# Patient Record
Sex: MALE | NOT HISPANIC OR LATINO | URBAN - METROPOLITAN AREA
[De-identification: names, ages, dates, MRNs, and addresses within clinical notes are randomized per-mention and may not be internally consistent; named-entity substitution may affect disease eponyms.]

---

## 2019-08-30 ENCOUNTER — INPATIENT (INPATIENT)
Facility: HOSPITAL | Age: 54
LOS: 1 days | Discharge: HOME | DRG: 281 | End: 2019-09-01
Attending: HOSPITALIST | Admitting: HOSPITALIST
Payer: COMMERCIAL

## 2019-08-30 VITALS — HEIGHT: 66 IN | WEIGHT: 214.95 LBS

## 2019-08-30 DIAGNOSIS — I21.4 NON-ST ELEVATION (NSTEMI) MYOCARDIAL INFARCTION: ICD-10-CM

## 2019-08-30 LAB
APPEARANCE UR: CLEAR — SIGNIFICANT CHANGE UP
BILIRUB UR-MCNC: NEGATIVE — SIGNIFICANT CHANGE UP
COLOR SPEC: YELLOW — SIGNIFICANT CHANGE UP
DIFF PNL FLD: ABNORMAL
GLUCOSE UR QL: 1000 MG/DL
KETONES UR-MCNC: NEGATIVE — SIGNIFICANT CHANGE UP
LEUKOCYTE ESTERASE UR-ACNC: NEGATIVE — SIGNIFICANT CHANGE UP
NITRITE UR-MCNC: NEGATIVE — SIGNIFICANT CHANGE UP
PH UR: 6 — SIGNIFICANT CHANGE UP (ref 5–8)
PROT UR-MCNC: 500 MG/DL
SP GR SPEC: 1.02 — SIGNIFICANT CHANGE UP (ref 1.01–1.02)
TROPONIN I SERPL-MCNC: 0.19 NG/ML — HIGH (ref 0.01–0.04)
UROBILINOGEN FLD QL: NEGATIVE MG/DL — SIGNIFICANT CHANGE UP

## 2019-08-30 PROCEDURE — 93306 TTE W/DOPPLER COMPLETE: CPT

## 2019-08-30 PROCEDURE — 93010 ELECTROCARDIOGRAM REPORT: CPT

## 2019-08-30 PROCEDURE — 71045 X-RAY EXAM CHEST 1 VIEW: CPT | Mod: 26

## 2019-08-30 PROCEDURE — 83036 HEMOGLOBIN GLYCOSYLATED A1C: CPT

## 2019-08-30 PROCEDURE — 36415 COLL VENOUS BLD VENIPUNCTURE: CPT

## 2019-08-30 PROCEDURE — 84484 ASSAY OF TROPONIN QUANT: CPT

## 2019-08-30 PROCEDURE — 82962 GLUCOSE BLOOD TEST: CPT

## 2019-08-30 RX ORDER — DEXTROSE 50 % IN WATER 50 %
25 SYRINGE (ML) INTRAVENOUS ONCE
Refills: 0 | Status: DISCONTINUED | OUTPATIENT
Start: 2019-08-30 | End: 2019-09-01

## 2019-08-30 RX ORDER — DEXTROSE 50 % IN WATER 50 %
12.5 SYRINGE (ML) INTRAVENOUS ONCE
Refills: 0 | Status: DISCONTINUED | OUTPATIENT
Start: 2019-08-30 | End: 2019-09-01

## 2019-08-30 RX ORDER — METOPROLOL TARTRATE 50 MG
25 TABLET ORAL EVERY 12 HOURS
Refills: 0 | Status: DISCONTINUED | OUTPATIENT
Start: 2019-08-30 | End: 2019-09-01

## 2019-08-30 RX ORDER — GLUCAGON INJECTION, SOLUTION 0.5 MG/.1ML
1 INJECTION, SOLUTION SUBCUTANEOUS ONCE
Refills: 0 | Status: DISCONTINUED | OUTPATIENT
Start: 2019-08-30 | End: 2019-09-01

## 2019-08-30 RX ORDER — METOPROLOL TARTRATE 50 MG
25 TABLET ORAL ONCE
Refills: 0 | Status: COMPLETED | OUTPATIENT
Start: 2019-08-30 | End: 2019-08-30

## 2019-08-30 RX ORDER — INSULIN LISPRO 100/ML
VIAL (ML) SUBCUTANEOUS AT BEDTIME
Refills: 0 | Status: DISCONTINUED | OUTPATIENT
Start: 2019-08-30 | End: 2019-09-01

## 2019-08-30 RX ORDER — AMLODIPINE BESYLATE 2.5 MG/1
10 TABLET ORAL ONCE
Refills: 0 | Status: COMPLETED | OUTPATIENT
Start: 2019-08-30 | End: 2019-08-30

## 2019-08-30 RX ORDER — ENOXAPARIN SODIUM 100 MG/ML
90 INJECTION SUBCUTANEOUS EVERY 12 HOURS
Refills: 0 | Status: DISCONTINUED | OUTPATIENT
Start: 2019-08-30 | End: 2019-08-31

## 2019-08-30 RX ORDER — SODIUM CHLORIDE 9 MG/ML
1000 INJECTION, SOLUTION INTRAVENOUS
Refills: 0 | Status: DISCONTINUED | OUTPATIENT
Start: 2019-08-30 | End: 2019-09-01

## 2019-08-30 RX ORDER — SIMVASTATIN 20 MG/1
20 TABLET, FILM COATED ORAL AT BEDTIME
Refills: 0 | Status: DISCONTINUED | OUTPATIENT
Start: 2019-08-30 | End: 2019-08-31

## 2019-08-30 RX ORDER — INSULIN LISPRO 100/ML
VIAL (ML) SUBCUTANEOUS
Refills: 0 | Status: DISCONTINUED | OUTPATIENT
Start: 2019-08-30 | End: 2019-09-01

## 2019-08-30 RX ORDER — ACETAMINOPHEN 500 MG
650 TABLET ORAL EVERY 6 HOURS
Refills: 0 | Status: DISCONTINUED | OUTPATIENT
Start: 2019-08-30 | End: 2019-09-01

## 2019-08-30 RX ORDER — ASPIRIN/CALCIUM CARB/MAGNESIUM 324 MG
324 TABLET ORAL ONCE
Refills: 0 | Status: COMPLETED | OUTPATIENT
Start: 2019-08-30 | End: 2019-08-30

## 2019-08-30 RX ORDER — ASPIRIN/CALCIUM CARB/MAGNESIUM 324 MG
325 TABLET ORAL DAILY
Refills: 0 | Status: DISCONTINUED | OUTPATIENT
Start: 2019-08-30 | End: 2019-09-01

## 2019-08-30 RX ORDER — LOSARTAN POTASSIUM 100 MG/1
50 TABLET, FILM COATED ORAL DAILY
Refills: 0 | Status: DISCONTINUED | OUTPATIENT
Start: 2019-08-30 | End: 2019-09-01

## 2019-08-30 RX ORDER — DEXTROSE 50 % IN WATER 50 %
15 SYRINGE (ML) INTRAVENOUS ONCE
Refills: 0 | Status: DISCONTINUED | OUTPATIENT
Start: 2019-08-30 | End: 2019-09-01

## 2019-08-30 RX ADMIN — SODIUM CHLORIDE 70 MILLILITER(S): 9 INJECTION, SOLUTION INTRAVENOUS at 18:12

## 2019-08-30 RX ADMIN — Medication 2: at 18:00

## 2019-08-30 RX ADMIN — ENOXAPARIN SODIUM 90 MILLIGRAM(S): 100 INJECTION SUBCUTANEOUS at 17:59

## 2019-08-30 RX ADMIN — Medication 324 MILLIGRAM(S): at 12:24

## 2019-08-30 RX ADMIN — LOSARTAN POTASSIUM 50 MILLIGRAM(S): 100 TABLET, FILM COATED ORAL at 17:59

## 2019-08-30 RX ADMIN — Medication 25 MILLIGRAM(S): at 23:47

## 2019-08-30 RX ADMIN — AMLODIPINE BESYLATE 10 MILLIGRAM(S): 2.5 TABLET ORAL at 12:24

## 2019-08-30 RX ADMIN — Medication 25 MILLIGRAM(S): at 12:24

## 2019-08-30 RX ADMIN — SIMVASTATIN 20 MILLIGRAM(S): 20 TABLET, FILM COATED ORAL at 23:47

## 2019-08-30 NOTE — ED STATDOCS - CLINICAL SUMMARY MEDICAL DECISION MAKING FREE TEXT BOX
53 M h/o HTN, HLD, DM presents from pulm office for elevated BP. Pt sees pulm for testing s/p PNA this past summer. Now c/o mild chest tightness and SOB. Exam with 1+ LE pitting edema. Heart score: 3. Will delta Trop, EKG, dimer, reeval. Pt is noncompliant with home meds.

## 2019-08-30 NOTE — ED STATDOCS - NS_ ATTENDINGSCRIBEDETAILS _ED_A_ED_FT
I, Kemar Cloud MD,  performed the initial face to face bedside interview with this patient regarding history of present illness, review of symptoms and relevant past medical, social and family history.  I completed an independent physical examination.  I was the initial provider who evaluated this patient.  The history, relevant review of systems, past medical and surgical history, medical decision making, and physical examination was documented by the scribe in my presence and I attest to the accuracy of the documentation.

## 2019-08-30 NOTE — H&P ADULT - NSHPLABSRESULTS_GEN_ALL_CORE
14.8   8.83  )-----------( 223      ( 30 Aug 2019 12:40 )             44.5     30 Aug 2019 12:40    137    |  102    |  21     ----------------------------<  178    3.8     |  28     |  1.41     Ca    8.9        30 Aug 2019 12:40  Mg     1.9       30 Aug 2019 12:40    TPro  7.8    /  Alb  3.6    /  TBili  0.4    /  DBili  x      /  AST  29     /  ALT  28     /  AlkPhos  98     30 Aug 2019 12:40    LIVER FUNCTIONS - ( 30 Aug 2019 12:40 )  Alb: 3.6 g/dL / Pro: 7.8 gm/dL / ALK PHOS: 98 U/L / ALT: 28 U/L / AST: 29 U/L / GGT: x           PT/INR - ( 30 Aug 2019 12:40 )   PT: 11.0 sec;   INR: 0.99 ratio         PTT - ( 30 Aug 2019 12:40 )  PTT:31.0 sec  CAPILLARY BLOOD GLUCOSE        CARDIAC MARKERS ( 30 Aug 2019 12:40 )  0.171 ng/mL / x     / x     / x     / x          Urinalysis Basic - ( 30 Aug 2019 13:00 )    Color: Yellow / Appearance: Clear / S.020 / pH: x  Gluc: x / Ketone: Negative  / Bili: Negative / Urobili: Negative mg/dL   Blood: x / Protein: 500 mg/dL / Nitrite: Negative   Leuk Esterase: Negative / RBC: 0-2 /HPF / WBC Negative   Sq Epi: x / Non Sq Epi: Negative / Bacteria: Occasional

## 2019-08-30 NOTE — ED STATDOCS - OBJECTIVE STATEMENT
52 y/o M with a PMHx of HTN, DM, HLD presenting to the ED c/o intermittent chest tightness. Pt had PNA in 7/2019 so he saw pulm MD today for a visit and was found to be hypertensive at 180/117 and was sent to ED for further evaluation. +SOB. Pt was prescribed Metformin, Bystolic 10mg, Amlodipine with which he has been noncompliant Denies EtOH use, tobacco use, weakens, N/V, fever, tingling, numbness, or recent travel. Pulmonologist: Dr. Arceo.

## 2019-08-30 NOTE — ED STATDOCS - CARE PLAN
Principal Discharge DX:	NSTEMI (non-ST elevated myocardial infarction)  Secondary Diagnosis:	Renal insufficiency  Secondary Diagnosis:	Uncontrolled hypertension

## 2019-08-30 NOTE — ED STATDOCS - NS ED ROS FT
Constitutional: No fever or chills  Eyes: No visual changes  HEENT: No throat pain  CV: +chest congestion/tightness   Resp: +SOB no cough  GI: No abd pain, nausea or vomiting  : No dysuria  MSK: No musculoskeletal pain  Skin: No rash  Neuro: No headache

## 2019-08-30 NOTE — H&P ADULT - NSHPPHYSICALEXAM_GEN_ALL_CORE
Vital Signs Last 24 Hrs  T(C): 36.7 (30 Aug 2019 11:27), Max: 36.7 (30 Aug 2019 11:27)  T(F): 98 (30 Aug 2019 11:27), Max: 98 (30 Aug 2019 11:27)  HR: 74 (30 Aug 2019 14:39) (74 - 91)  BP: 165/122 (30 Aug 2019 14:39) (161/109 - 181/117)  BP(mean): --  RR: 17 (30 Aug 2019 14:39) (17 - 18)  SpO2: 96% (30 Aug 2019 14:39) (96% - 96%)      Constitutional: NAD AAOx3  	Eyes: PERRLA EOMI  	Head: Normocephalic atraumatic  	Mouth: MMM  	Cardiac: regular rate. 1+ LE pitting edema   	Resp: Lungs CTAB  	GI: Abd s/nt/nd  	Neuro: CN2-12 intact  Skin: No rashes

## 2019-08-30 NOTE — ED ADULT NURSE NOTE - NSIMPLEMENTINTERV_GEN_ALL_ED
Implemented All Universal Safety Interventions:  [place] to call system. Call bell, personal items and telephone within reach. Instruct patient to call for assistance. Room bathroom lighting operational. Non-slip footwear when patient is off stretcher. Physically safe environment: no spills, clutter or unnecessary equipment. Stretcher in lowest position, wheels locked, appropriate side rails in place.

## 2019-08-30 NOTE — ED STATDOCS - PROGRESS NOTE DETAILS
Patient seen and evaluated in intake with ED Attending,  ED attending note and orders reviewed, will continue with patient follow up and care -Julita Fraire PA-C pt aware of lab results and agrees for admission. spoke to hospitalist. -Julita Fraire PA-C

## 2019-08-30 NOTE — H&P ADULT - HISTORY OF PRESENT ILLNESS
52 y/o M with a PMHx of HTN, DM, HLD, recent pneumonia and bronchitis presenting to the ED c/o intermittent chest tightness. Pt had PNA in 7/2019 and completed antibiotic course. He went to see Dr Arceo  today for a follow up visit and was found to be hypertensive at 180/117 and was sent to ED for further evaluation. +SOB. Pt was prescribed Metformin, Bystolic 10mg, Amlodipine with which he has been noncompliant. He Denies EtOH use, tobacco use, weakens, N/V, fever, tingling, numbness, or recent travel.

## 2019-08-30 NOTE — ED ADULT NURSE NOTE - OBJECTIVE STATEMENT
Patient presents to ED complaining of HTN. Patient was seen by pulmonologist sent to ED for high blood pressure and further evaluation. Patient states he was recently diagnosed with PNA. patient still experiencing chest tightness and productive cough. Pt states he did not take BP medications this morning. Patient denies dizziness, HA, vision changes. A&0x3

## 2019-08-30 NOTE — ED STATDOCS - PHYSICAL EXAMINATION
Constitutional: NAD AAOx3  Eyes: PERRLA EOMI  Head: Normocephalic atraumatic  Mouth: MMM  Cardiac: regular rate. 1+ LE pitting edema   Resp: Lungs CTAB  GI: Abd s/nt/nd  Neuro: CN2-12 intact  Skin: No rashes

## 2019-08-30 NOTE — H&P ADULT - ASSESSMENT
52 y/o M with a PMHx of HTN, DM, HLD, recent pneumonia and bronchitis presenting to the ED c/o intermittent chest tightness. Pt had PNA in 7/2019 and completed antibiotic course. He went to see Dr Arceo  today for a follow up visit and was found to be hypertensive at 180/117 and was sent to ED for further evaluation. +SOB. Pt was prescribed Metformin, Bystolic 10mg, Amlodipine with which he has been noncompliant. He Denies EtOH use, tobacco use, weakens, N/V, fever, tingling, numbness, or recent travel.       1. NSTEMI-  Possibly demand ischemia  Admit to tele  Check 2 more sets of cardiac enzymes.  Start lovenox, aspirin  Cardiology eval    2. Hypertensive urgency-  Noncompliant with his meds  Continue norvasc, ARB and BB    3. DM-2  Start ISS  Hold metformin.    4. Hyperlipidemia-  Continue statin.

## 2019-08-31 VITALS
DIASTOLIC BLOOD PRESSURE: 96 MMHG | RESPIRATION RATE: 18 BRPM | SYSTOLIC BLOOD PRESSURE: 148 MMHG | TEMPERATURE: 98 F | OXYGEN SATURATION: 100 % | HEART RATE: 87 BPM

## 2019-08-31 LAB
HBA1C BLD-MCNC: 8.6 % — HIGH (ref 4–5.6)
TROPONIN I SERPL-MCNC: 0.15 NG/ML — HIGH (ref 0.01–0.04)

## 2019-08-31 PROCEDURE — 93306 TTE W/DOPPLER COMPLETE: CPT | Mod: 26

## 2019-08-31 RX ORDER — SIMVASTATIN 20 MG/1
1 TABLET, FILM COATED ORAL
Qty: 0 | Refills: 0 | DISCHARGE

## 2019-08-31 RX ORDER — METOPROLOL TARTRATE 50 MG
1 TABLET ORAL
Qty: 60 | Refills: 0
Start: 2019-08-31

## 2019-08-31 RX ORDER — ATORVASTATIN CALCIUM 80 MG/1
40 TABLET, FILM COATED ORAL AT BEDTIME
Refills: 0 | Status: DISCONTINUED | OUTPATIENT
Start: 2019-08-31 | End: 2019-09-01

## 2019-08-31 RX ORDER — ATORVASTATIN CALCIUM 80 MG/1
1 TABLET, FILM COATED ORAL
Qty: 30 | Refills: 0
Start: 2019-08-31

## 2019-08-31 RX ORDER — ASPIRIN/CALCIUM CARB/MAGNESIUM 324 MG
1 TABLET ORAL
Qty: 0 | Refills: 0 | DISCHARGE
Start: 2019-08-31

## 2019-08-31 RX ORDER — NEBIVOLOL HYDROCHLORIDE 5 MG/1
3 TABLET ORAL
Qty: 0 | Refills: 0 | DISCHARGE

## 2019-08-31 RX ORDER — ACETAMINOPHEN 500 MG
2 TABLET ORAL
Qty: 0 | Refills: 0 | DISCHARGE
Start: 2019-08-31

## 2019-08-31 RX ORDER — LOSARTAN POTASSIUM 100 MG/1
1 TABLET, FILM COATED ORAL
Qty: 30 | Refills: 0
Start: 2019-08-31

## 2019-08-31 RX ADMIN — LOSARTAN POTASSIUM 50 MILLIGRAM(S): 100 TABLET, FILM COATED ORAL at 06:32

## 2019-08-31 RX ADMIN — Medication 325 MILLIGRAM(S): at 12:54

## 2019-08-31 RX ADMIN — Medication 2: at 12:53

## 2019-08-31 RX ADMIN — Medication 25 MILLIGRAM(S): at 12:54

## 2019-08-31 RX ADMIN — Medication 2: at 08:50

## 2019-08-31 RX ADMIN — ENOXAPARIN SODIUM 90 MILLIGRAM(S): 100 INJECTION SUBCUTANEOUS at 06:31

## 2019-08-31 NOTE — DISCHARGE NOTE PROVIDER - HOSPITAL COURSE
Subjective:    This patient presents for evaluation of chest pain, in the setting of hypertensive emergency. With blood pressure control, his chest pain and shortness of breath has resolved and is back to baseline. He is not on oxygen. Cardiac enzymes were mildly elevated but has since trended down. EKG shows no ST-T wave changes consistent with ischemia and telemetry shows no events.        Recommend aggressive blood pressure control with close outpatient follow-up. An echocardiogram performed showed slight cardio myopathy with ejection fraction of 50% with severe pulmonary hypertension. He'll need management of his hypertension, and underlying primary condition as the pulmonary hypertension is multifactorial. Regarding cardiomyopathy, this may be due to long-standing hypertension as the patient developed hypertension in his 30s.However given his cardio vascular risk factors he warrants an ischemic evaluation. Plan for ischemic evaluation as an outpatient given no anginal symptoms.                    Vital Signs Last 24 Hrs    T(C): 36.7 (31 Aug 2019 12:47), Max: 36.7 (31 Aug 2019 12:47)    T(F): 98 (31 Aug 2019 12:47), Max: 98 (31 Aug 2019 12:47)    HR: 87 (31 Aug 2019 12:47) (76 - 92)    BP: 148/96 (31 Aug 2019 12:47) (140/100 - 162/102)    BP(mean): --    RR: 18 (31 Aug 2019 12:47) (18 - 18)    SpO2: 100% (31 Aug 2019 12:47) (91% - 100%)        PHYSICAL EXAMINATION:    SKIN: no rashes    HEAD: NC/AT    EYES: PERRLA, EOMI    EARS: TM's intact    NOSE: no abnormalities    NECK:  Supple. No lymphadenopathy. Jugular venous pressure not elevated. Carotids equal.     HEART:   The cardiac impulse has a normal quality. Reg., Nl S1 and S2.  There are no murmurs, rubs or gallops noted    CHEST:  Chest is clear to auscultation. Normal respiratory effort.    ABDOMEN:  Soft and nontender.     EXTREMITIES:  no C/C/E    NEURO: AAO x 3, no focal deficts             LABS:                            14.8     8.83  )-----------( 223      ( 30 Aug 2019 12:40 )               44.5         08-30        137  |  102  |  21    ----------------------------<  178<H>    3.8   |  28  |  1.41<H>        Ca    8.9      30 Aug 2019 12:40    Mg     1.9             TPro  7.8  /  Alb  3.6  /  TBili  0.4  /  DBili  x   /  AST  29  /  ALT  28  /  AlkPhos  98          PT/INR - ( 30 Aug 2019 12:40 )   PT: 11.0 sec;   INR: 0.99 ratio               PTT - ( 30 Aug 2019 12:40 )  PTT:31.0 sec    Urinalysis Basic - ( 30 Aug 2019 13:00 )        Color: Yellow / Appearance: Clear / S.020 / pH: x    Gluc: x / Ketone: Negative  / Bili: Negative / Urobili: Negative mg/dL     Blood: x / Protein: 500 mg/dL / Nitrite: Negative     Leuk Esterase: Negative / RBC: 0-2 /HPF / WBC Negative     Sq Epi: x / Non Sq Epi: Negative / Bacteria: Occasional

## 2019-08-31 NOTE — CONSULT NOTE ADULT - SUBJECTIVE AND OBJECTIVE BOX
Patient is a 53y old  Male who presents with a chief complaint of Chest tightness (30 Aug 2019 15:39)      HPI:  52 y/o M with a PMHx of HTN, DM, HLD, recent pneumonia and bronchitis presenting to the ED c/o intermittent chest tightness. Pt had PNA in 2019 and completed antibiotic course. He went to see Dr Arceo  today for a follow up visit and was found to be hypertensive at 180/117 and was sent to ED for further evaluation. +SOB. Pt was prescribed Metformin, Bystolic 10mg, Amlodipine with which he has been noncompliant. He Denies EtOH use, tobacco use, weakens, N/V, fever, tingling, numbness, or recent travel. (30 Aug 2019 15:39)      PAST MEDICAL & SURGICAL HISTORY:  Diabetes  HTN (hypertension)      PREVIOUS DIAGNOSTIC TESTING:      MEDICATIONS  (STANDING):  aspirin 325 milliGRAM(s) Oral daily  dextrose 5%. 1000 milliLiter(s) (50 mL/Hr) IV Continuous <Continuous>  dextrose 50% Injectable 12.5 Gram(s) IV Push once  dextrose 50% Injectable 25 Gram(s) IV Push once  dextrose 50% Injectable 25 Gram(s) IV Push once  enoxaparin Injectable 90 milliGRAM(s) SubCutaneous every 12 hours  insulin lispro (HumaLOG) corrective regimen sliding scale   SubCutaneous three times a day before meals  insulin lispro (HumaLOG) corrective regimen sliding scale   SubCutaneous at bedtime  losartan 50 milliGRAM(s) Oral daily  metoprolol tartrate 25 milliGRAM(s) Oral every 12 hours  simvastatin 20 milliGRAM(s) Oral at bedtime  sodium chloride 0.45%. 1000 milliLiter(s) (70 mL/Hr) IV Continuous <Continuous>    MEDICATIONS  (PRN):  acetaminophen   Tablet .. 650 milliGRAM(s) Oral every 6 hours PRN Temp greater or equal to 38C (100.4F), Mild Pain (1 - 3)  dextrose 40% Gel 15 Gram(s) Oral once PRN Blood Glucose LESS THAN 70 milliGRAM(s)/deciliter  glucagon  Injectable 1 milliGRAM(s) IntraMuscular once PRN Glucose LESS THAN 70 milligrams/deciliter      FAMILY HISTORY:      SOCIAL HISTORY:  ***    REVIEW OF SYSTEM:  dyspnea, chest tightness, otherwise 12 point ROS negative    Vital Signs Last 24 Hrs  T(C): 36.4 (31 Aug 2019 05:48), Max: 36.7 (30 Aug 2019 11:27)  T(F): 97.5 (31 Aug 2019 05:48), Max: 98 (30 Aug 2019 11:27)  HR: 76 (31 Aug 2019 05:48) (74 - 92)  BP: 140/100 (31 Aug 2019 05:48) (140/100 - 181/117)  BP(mean): --  RR: 18 (31 Aug 2019 05:48) (15 - 18)  SpO2: 92% (31 Aug 2019 05:48) (91% - 96%)    I&O's Summary    PHYSICAL EXAM  General Appearance: cooperative, no acute distress,   HEENT: PERRL, conjunctiva clear, EOM's intact, non injected pharynx, no exudate, TM   normal  Neck: Supple, , no adenopathy, thyroid: not enlarged, no carotid bruit or JVD  Back: Symmetric, no  tenderness,no soft tissue tenderness  Lungs: diminished bilaterally   Heart: Regular rate and rhythm, S1, S2 normal, no murmur, rub or gallop  Abdomen: Soft, non-tender, bowel sounds active , no hepatosplenomegaly  Extremities: no cyanosis or edema, no joint swelling  Skin: Skin color, texture normal, no rashes   Neurologic: Alert and oriented X3 , cranial nerves intact, sensory and motor normal,    ECG:    LABS:                          14.8   8.83  )-----------( 223      ( 30 Aug 2019 12:40 )             44.5     08-30    137  |  102  |  21  ----------------------------<  178<H>  3.8   |  28  |  1.41<H>    Ca    8.9      30 Aug 2019 12:40  Mg     1.9     08-30    TPro  7.8  /  Alb  3.6  /  TBili  0.4  /  DBili  x   /  AST  29  /  ALT  28  /  AlkPhos  98  08-30    CARDIAC MARKERS ( 30 Aug 2019 15:22 )  0.194 ng/mL / x     / x     / x     / x      CARDIAC MARKERS ( 30 Aug 2019 12:40 )  0.171 ng/mL / x     / x     / x     / x            Pro BNP  675 08 @ 12:40  D Dimer  <150 08-30 @ 12:40    PT/INR - ( 30 Aug 2019 12:40 )   PT: 11.0 sec;   INR: 0.99 ratio         PTT - ( 30 Aug 2019 12:40 )  PTT:31.0 sec  Urinalysis Basic - ( 30 Aug 2019 13:00 )    Color: Yellow / Appearance: Clear / S.020 / pH: x  Gluc: x / Ketone: Negative  / Bili: Negative / Urobili: Negative mg/dL   Blood: x / Protein: 500 mg/dL / Nitrite: Negative   Leuk Esterase: Negative / RBC: 0-2 /HPF / WBC Negative   Sq Epi: x / Non Sq Epi: Negative / Bacteria: Occasional            RADIOLOGY & ADDITIONAL STUDIES:

## 2019-08-31 NOTE — DISCHARGE NOTE PROVIDER - NSDCCPCAREPLAN_GEN_ALL_CORE_FT
PRINCIPAL DISCHARGE DIAGNOSIS  Diagnosis: NSTEMI (non-ST elevated myocardial infarction)  Assessment and Plan of Treatment:       SECONDARY DISCHARGE DIAGNOSES  Diagnosis: Uncontrolled hypertension  Assessment and Plan of Treatment:     Diagnosis: Renal insufficiency  Assessment and Plan of Treatment:

## 2019-08-31 NOTE — CONSULT NOTE ADULT - SUBJECTIVE AND OBJECTIVE BOX
1.	NSTEMI Vs demand ischemia  2.	HTN Emergency  3.	DM  4.	HLD    Plan  Echo show LVH with pulm htn and low normal to mild red LVEF  No CP   Out pt ischemic eval  asa/statin    OK to DC  Full note to follow.     Thank you,  RUT Lujan DO, FACC Patient is a 53y old  Male who presents with a chief complaint of Chest tightness     ________________________________  ADAMS COY is a 53y year old Male with a past medical history of obesity, HTN, DM, HLD, recent pneumonia and bronchitis presenting to the ED c/o intermittent chest tightness. Pt had PNA in 2019 and completed antibiotic course. He went to see Dr Arceo  today for a follow up visit and was found to be hypertensive at 180/117 and was sent to ED for further evaluation.    Regarding his chest pain, he vaguely describes it as a numbing sensation. Symptoms are not exertional, not worse with deep breaths or movement. Symptoms have since resolved. Cardiac enzymes were mildly elevated, and remained profoundly flat.  Currently denies any chest pain shortness of breath. His blood pressure has markedly improved.    He was seen by pulmonary. Evaluation noted.    Regarding previous cardiac workup, last stress test in  showed no ischemic changes. Echocardiogram at that time showed preserved LVEF.    Had a urgent echocardiogram performed this morning. She'll need ejection fraction approximately 50% with severe pulmonary hypertension and left ventricular hypertrophy.    ________________________________  Review of systems: A 10 point review of system has been performed, and is negative except for what has been mentioned in the above history of present illness.     PAST MEDICAL & SURGICAL HISTORY:  Diabetes  HTN (hypertension)    FAMILY HISTORY:     The patient denies any history of premature CAD or sudden cardiac death.    SOCIAL HISTORY: The patient denies any history of tobacco abuse, alcohol abuse or illicit drug use.     Home Medications:  amlodipine-benazepril 10 mg-40 mg oral capsule: 1 cap(s) orally once a day (30 Aug 2019 15:42)  Bystolic 10 mg oral tablet: 3 tab(s) orally once a day (30 Aug 2019 15:42)  fenofibric acid 135 mg oral delayed release capsule: 1 cap(s) orally once a day (30 Aug 2019 15:42)  metFORMIN 500 mg oral tablet, extended release: 4 tab(s) orally once a day (30 Aug 2019 15:42)  simvastatin 20 mg oral tablet: 1 tab(s) orally once a day (at bedtime) (30 Aug 2019 15:42)  spironolactone 25 mg oral tablet: 1 tab(s) orally once a day (30 Aug 2019 15:42)    MEDICATIONS  (STANDING):  aspirin 325 milliGRAM(s) Oral daily  atorvastatin 40 milliGRAM(s) Oral at bedtime  dextrose 5%. 1000 milliLiter(s) (50 mL/Hr) IV Continuous <Continuous>  dextrose 50% Injectable 12.5 Gram(s) IV Push once  dextrose 50% Injectable 25 Gram(s) IV Push once  dextrose 50% Injectable 25 Gram(s) IV Push once  insulin lispro (HumaLOG) corrective regimen sliding scale   SubCutaneous three times a day before meals  insulin lispro (HumaLOG) corrective regimen sliding scale   SubCutaneous at bedtime  losartan 50 milliGRAM(s) Oral daily  metoprolol tartrate 25 milliGRAM(s) Oral every 12 hours  sodium chloride 0.45%. 1000 milliLiter(s) (70 mL/Hr) IV Continuous <Continuous>    MEDICATIONS  (PRN):  acetaminophen   Tablet .. 650 milliGRAM(s) Oral every 6 hours PRN Temp greater or equal to 38C (100.4F), Mild Pain (1 - 3)  dextrose 40% Gel 15 Gram(s) Oral once PRN Blood Glucose LESS THAN 70 milliGRAM(s)/deciliter  glucagon  Injectable 1 milliGRAM(s) IntraMuscular once PRN Glucose LESS THAN 70 milligrams/deciliter    Vital Signs Last 24 Hrs  T(C): 36.7 (31 Aug 2019 12:47), Max: 36.7 (31 Aug 2019 12:47)  T(F): 98 (31 Aug 2019 12:47), Max: 98 (31 Aug 2019 12:47)  HR: 87 (31 Aug 2019 12:47) (74 - 92)  BP: 148/96 (31 Aug 2019 12:47) (140/100 - 165/122)  BP(mean): --  RR: 18 (31 Aug 2019 12:47) (15 - 18)  SpO2: 100% (31 Aug 2019 12:47) (91% - 100%)  I&O's Summary    ________________________________  PHYSICAL EXAM:  GENERAL APPEARANCE:  No acute distress  HEAD: normocephalic, atraumatic  NECK: supple, no jugular venous distention, no carotid bruit    HEART: regular rate and rhythm, S1, S2 normal, no significant murmur    CHEST:  No anterior chest wall tenderness    LUNGS:  Clear to auscultation, without any wheezing, rhonchi or rales    ABDOMEN soft, nontender, nondistended, with positive bowel sounds appreciated  EXTREMITIES: no clubbing, cyanosis, or edema.   NEURO:  Alert and oriented x3  PSYC:  Normal affect  SKIN:  Dry   ________________________________  TELEMETRY: Sinus Rhythm     ECG:  Sinus Rhythm w/ non specific changes    LABS:                        14.8   8.83  )-----------( 223      ( 30 Aug 2019 12:40 )             44.5                 137  |  102  |  21  ----------------------------<  178<H>  3.8   |  28  |  1.41<H>    Ca    8.9      30 Aug 2019 12:40  Mg     1.9         TPro  7.8  /  Alb  3.6  /  TBili  0.4  /  DBili  x   /  AST  29  /  ALT  28  /  AlkPhos  98  30      LIVER FUNCTIONS - ( 30 Aug 2019 12:40 )  Alb: 3.6 g/dL / Pro: 7.8 gm/dL / ALK PHOS: 98 U/L / ALT: 28 U/L / AST: 29 U/L / GGT: x         PT/INR - ( 30 Aug 2019 12:40 )   PT: 11.0 sec;   INR: 0.99 ratio         PTT - ( 30 Aug 2019 12:40 )  PTT:31.0 sec     @ 11:11  Trop-I  0.146  CK      --  CK-MB   --     @ 15:22  Trop-I  0.194  CK      --  CK-MB   --     @ 12:40  Trop-I  0.171  CK      --  CK-MB   --  Urinalysis Basic - ( 30 Aug 2019 13:00 )    Color: Yellow / Appearance: Clear / S.020 / pH: x  Gluc: x / Ketone: Negative  / Bili: Negative / Urobili: Negative mg/dL   Blood: x / Protein: 500 mg/dL / Nitrite: Negative   Leuk Esterase: Negative / RBC: 0-2 /HPF / WBC Negative   Sq Epi: x / Non Sq Epi: Negative / Bacteria: Occasional      Pro BNP  675 08-30 @ 12:40  D Dimer  <150 08-30 @ 12:40    PT/INR - ( 30 Aug 2019 12:40 )   PT: 11.0 sec;   INR: 0.99 ratio         PTT - ( 30 Aug 2019 12:40 )  PTT:31.0 sec  Urinalysis Basic - ( 30 Aug 2019 13:00 )    Color: Yellow / Appearance: Clear / S.020 / pH: x  Gluc: x / Ketone: Negative  / Bili: Negative / Urobili: Negative mg/dL   Blood: x / Protein: 500 mg/dL / Nitrite: Negative   Leuk Esterase: Negative / RBC: 0-2 /HPF / WBC Negative   Sq Epi: x / Non Sq Epi: Negative / Bacteria: Occasional        ________________________________    RADIOLOGY & ADDITIONAL STUDIES:IMPRESSION:    No lung consolidations.          ________________________________    ASSESSMENT:  Type I non-ST elevation myocardial infarction versus demand ischemia  Hypertensive emergency  Chest pain and shortness of breath  Diabetes  Mixed hyperlipidemia    PLAN:  This patient presents for evaluation of chest pain, in the setting of hypertensive emergency. With blood pressure control, his chest pain and shortness of breath has resolved and is back to baseline. He is not on oxygen. Cardiac enzymes were mildly elevated but has since trended down. EKG shows no ST-T wave changes consistent with ischemia and telemetry shows no events.    Recommend aggressive blood pressure control with close outpatient follow-up. An echocardiogram performed showed slight cardio myopathy with ejection fraction of 50% per my review with severe pulmonary hypertension. He'll need management of his hypertension, and underlying primary condition as the pulmonary hypertension is multifactorial. Regarding cardio myopathy, this may be due to long-standing hypertension as the patient developed hypertension in his 30s.    However given his cardio vascular risk factors he wants an ischemic evaluation. We'll plan for ischemic evaluation as an outpatient given no anginal symptoms.      Recommend aspirin and statin therapy for cardioprotective effect. Recommend beta-blockade.  OK for DC  __________________________________________________________________________  Thank you for allowing me to participate in the care of your patient. Please contact me should any questions arise.    RUT Lujan DO, [facility]  [phone]

## 2019-08-31 NOTE — DISCHARGE NOTE PROVIDER - CARE PROVIDER_API CALL
Yodit Lujan (DO; JOSE)  Cardiology  [address] [place] [address]  Phone: [phone]  Fax: [phone]  Follow Up Time:

## 2019-08-31 NOTE — DISCHARGE NOTE NURSING/CASE MANAGEMENT/SOCIAL WORK - PATIENT PORTAL LINK FT
You can access the FollowMyHealth Patient Portal offered by [facility] by registering at the following website: [email or web address]. By joining [facility]’s FollowMyHealth portal, you will also be able to view your health information using other applications (apps) compatible with our system.

## 2019-08-31 NOTE — CONSULT NOTE ADULT - ASSESSMENT
1) Hypertensive Urgency  2) Bronchiectasis  3) Chronic Dyspnea  4) Abnormal CT Chest  5) NSTEMI    52 y/o M with a PMHx of HTN, DM, HLD, recent pneumonia and bronchitis presenting to the ED c/o intermittent chest tightness. Pt had PNA in 7/2019 and completed antibiotic course. He presented to the office on 8/30, was noted to have a BP of 180/117 and was sent to ED for further evaluation. +SOB.   He has extensive history of hypertension but has not been compliant with Metformin, Bystolic 10mg, Amlodipine with which he has been noncompliant.   He has chronic dyspnea, at this point thought to be secondary to bronchiectasis, PFT was not revealing  In light of his chest tightness associated with it, recommended that he see Cardiology  Follow up cardiology recommendations regarding cardiac catheterization/etc  Pulmonary wise, will start symbicort 2 puffs bid  Continue BP control  I'll repeat CT chest as an outpatient, had copious mucus plugs with atelectasis and plan was for bronchoscopy in the future.  Will continue to monitor

## 2019-09-04 PROBLEM — I10 ESSENTIAL (PRIMARY) HYPERTENSION: Chronic | Status: ACTIVE | Noted: 2019-08-30

## 2019-09-04 PROBLEM — E11.9 TYPE 2 DIABETES MELLITUS WITHOUT COMPLICATIONS: Chronic | Status: ACTIVE | Noted: 2019-08-30

## 2019-09-05 RX ORDER — FENOFIBRIC ACID 105 MG/1
1 TABLET ORAL
Qty: 0 | Refills: 0 | DISCHARGE

## 2019-09-05 RX ORDER — METFORMIN HYDROCHLORIDE 850 MG/1
4 TABLET ORAL
Qty: 0 | Refills: 0 | DISCHARGE

## 2019-09-05 NOTE — H&P ADULT - PROBLEM SELECTOR PLAN 1
Pt is referred for Lt heart cath/possible PCI. Labs & medications are reviewed. Informed consent obtained after discussion of [facility] risks, benefits and alternatives  with patient. Risk discussed included, but not limited to MI, stroke, mortality, major bleeding, arrhythmia, or infection.  An educational material provided. Pt. verbalizes understandings of pre-procedural instructions.

## 2019-09-05 NOTE — H&P ADULT - ASSESSMENT
52 yo M with PMHx of HTN, DM, had recent hospitalization d/t hypertensive urgency with NSTEMI d/t demand ischemia. Echo showed EF 45-50% 2+ TR, Pt referred for Rt & LHC with possible intervention. This is a 54 yo M with PM Hx of HTN, DM, had recent hospitalization d/t hypertensive urgency with NSTEMI d/t demand ischemia. Echo showed EF 45-50% 2+ TR, Pt referred for Rt & LHC with possible intervention.  Patient presents now for an angiogram with possible intervention.  - risk/ benefits discussed  - informed consent obtained

## 2019-09-05 NOTE — H&P ADULT - HISTORY OF PRESENT ILLNESS
54 yo M with PMHx of HTN, DM, had recent hospitalization d/t hypertensive urgency with NSTEMI d/t demand ischemia. Echo showed EF 45-50% 2+ TR, Pt referred for Rt & LHC with possible intervention. This is a 52 yo M with PM Hx of HTN, DM, had recent hospitalization d/t hypertensive urgency with NSTEMI d/t demand ischemia. Echo showed EF 45-50% 2+ TR, Pt referred for Rt & LHC with possible intervention.

## 2019-09-06 ENCOUNTER — OUTPATIENT (OUTPATIENT)
Dept: OUTPATIENT SERVICES | Facility: HOSPITAL | Age: 54
LOS: 1 days | Discharge: HOME | End: 2019-09-06
Payer: COMMERCIAL

## 2019-09-06 VITALS
RESPIRATION RATE: 18 BRPM | DIASTOLIC BLOOD PRESSURE: 80 MMHG | SYSTOLIC BLOOD PRESSURE: 146 MMHG | OXYGEN SATURATION: 98 % | HEART RATE: 80 BPM

## 2019-09-06 VITALS
DIASTOLIC BLOOD PRESSURE: 64 MMHG | OXYGEN SATURATION: 98 % | HEART RATE: 79 BPM | RESPIRATION RATE: 18 BRPM | TEMPERATURE: 98 F | SYSTOLIC BLOOD PRESSURE: 146 MMHG

## 2019-09-06 DIAGNOSIS — I25.10 ATHEROSCLEROTIC HEART DISEASE OF NATIVE CORONARY ARTERY WITHOUT ANGINA PECTORIS: ICD-10-CM

## 2019-09-06 PROCEDURE — C1889: CPT

## 2019-09-06 PROCEDURE — C1769: CPT

## 2019-09-06 PROCEDURE — 93460 R&L HRT ART/VENTRICLE ANGIO: CPT

## 2019-09-06 PROCEDURE — C1894: CPT

## 2019-09-06 PROCEDURE — C1760: CPT

## 2019-09-06 PROCEDURE — C1887: CPT

## 2019-09-06 RX ORDER — SPIRONOLACTONE 25 MG/1
1 TABLET, FILM COATED ORAL
Qty: 0 | Refills: 0 | DISCHARGE

## 2019-09-06 RX ORDER — LOSARTAN POTASSIUM 100 MG/1
1 TABLET, FILM COATED ORAL
Qty: 0 | Refills: 0 | DISCHARGE

## 2019-09-06 RX ORDER — FLUTICASONE PROPIONATE 50 MCG
1 SPRAY, SUSPENSION NASAL
Qty: 0 | Refills: 0 | DISCHARGE

## 2019-09-06 RX ORDER — AMLODIPINE BESYLATE AND BENAZEPRIL HYDROCHLORIDE 10; 20 MG/1; MG/1
1 CAPSULE ORAL
Qty: 0 | Refills: 0 | DISCHARGE

## 2019-09-06 RX ORDER — SIMVASTATIN 20 MG/1
1 TABLET, FILM COATED ORAL
Qty: 0 | Refills: 0 | DISCHARGE

## 2019-09-06 RX ORDER — METFORMIN HYDROCHLORIDE 850 MG/1
1 TABLET ORAL
Qty: 0 | Refills: 0 | DISCHARGE

## 2019-09-06 RX ORDER — FENOFIBRIC ACID 105 MG/1
1 TABLET ORAL
Qty: 0 | Refills: 0 | DISCHARGE

## 2019-09-06 NOTE — PROGRESS NOTE ADULT - SUBJECTIVE AND OBJECTIVE BOX
Cardiology NP     Patient is a 53y old  Male who presents with a chief complaint of HTN, elevated cardiac enzymes (05 Sep 2019 16:40)      HPI:  This is a 54 yo M with PM Hx of HTN, DM, had recent hospitalization d/t hypertensive urgency with NSTEMI d/t demand ischemia. Echo showed EF 45-50% 2+ TR, Pt referred for Rt & LHC with possible intervention. (05 Sep 2019 16:40)      PAST MEDICAL & SURGICAL HISTORY:  Diabetes  HTN (hypertension)      Allergies    No Known Allergies      REVIEW OF SYSTEMS: As mentioned in HPI all others Negative     Vital Signs Last 24 Hrs  T(C): 36.7 (06 Sep 2019 08:32), Max: 36.7 (06 Sep 2019 08:32)  T(F): 98 (06 Sep 2019 08:32), Max: 98 (06 Sep 2019 08:32)  HR: 79 (06 Sep 2019 08:32) (79 - 79)  BP: 146/64 (06 Sep 2019 08:32) (146/64 - 146/64)  BP(mean): --  RR: 18 (06 Sep 2019 08:32) (18 - 18)  SpO2: 98% (06 Sep 2019 08:32) (98% - 98%)    PHYSICAL EXAM:  NERVOUS SYSTEM:  Alert & Oriented X3, Good concentration  CHEST/LUNG: Clear to auscultation bilaterally; No rales, rhonchi, wheezing, or rubs  HEART: Regular rate and rhythm; No murmurs, rubs, or gallops  ABDOMEN: Soft, Nontender, Nondistended; Bowel sounds present  EXTREMITIES:  2+ Peripheral Pulses, No clubbing, cyanosis, or edema  SKIN: Right femoral cath site with Perclose device, site without bleeding or hematoma

## 2019-09-06 NOTE — PACU DISCHARGE NOTE - COMMENTS
pt ready for discharge home, pt verbalizes understanding discharge instructions, no questions at this time, pt has all belongings, pt refuses wheelchair at this time. site intact and iv removed

## 2019-09-06 NOTE — PROGRESS NOTE ADULT - ASSESSMENT
Patient now s/p angiogram that revealed :   Normal coronary arteries.   Normal LV systolic function with segmental abnormalities. Estimated LV   ejection fraction is 50 %.   No aortic valve stenosis.     Recommendations     Manage with medical therapy.    - DC home  - f/u with Dr in 1-2 weeks

## 2019-09-07 DIAGNOSIS — Z79.899 OTHER LONG TERM (CURRENT) DRUG THERAPY: ICD-10-CM

## 2019-09-07 DIAGNOSIS — R07.9 CHEST PAIN, UNSPECIFIED: ICD-10-CM

## 2019-09-07 DIAGNOSIS — I27.20 PULMONARY HYPERTENSION, UNSPECIFIED: ICD-10-CM

## 2019-09-07 DIAGNOSIS — Z79.84 LONG TERM (CURRENT) USE OF ORAL HYPOGLYCEMIC DRUGS: ICD-10-CM

## 2019-09-07 DIAGNOSIS — I42.9 CARDIOMYOPATHY, UNSPECIFIED: ICD-10-CM

## 2019-09-07 DIAGNOSIS — I21.4 NON-ST ELEVATION (NSTEMI) MYOCARDIAL INFARCTION: ICD-10-CM

## 2019-09-07 DIAGNOSIS — E78.5 HYPERLIPIDEMIA, UNSPECIFIED: ICD-10-CM

## 2019-09-07 DIAGNOSIS — I16.0 HYPERTENSIVE URGENCY: ICD-10-CM

## 2019-09-07 DIAGNOSIS — I10 ESSENTIAL (PRIMARY) HYPERTENSION: ICD-10-CM

## 2019-09-07 DIAGNOSIS — E11.9 TYPE 2 DIABETES MELLITUS WITHOUT COMPLICATIONS: ICD-10-CM

## 2019-09-07 DIAGNOSIS — J47.9 BRONCHIECTASIS, UNCOMPLICATED: ICD-10-CM

## 2019-09-10 DIAGNOSIS — I20.0 UNSTABLE ANGINA: ICD-10-CM

## 2019-09-10 DIAGNOSIS — E78.5 HYPERLIPIDEMIA, UNSPECIFIED: ICD-10-CM

## 2019-09-10 DIAGNOSIS — E11.9 TYPE 2 DIABETES MELLITUS WITHOUT COMPLICATIONS: ICD-10-CM

## 2019-09-10 DIAGNOSIS — I10 ESSENTIAL (PRIMARY) HYPERTENSION: ICD-10-CM

## 2021-07-21 ENCOUNTER — INPATIENT (INPATIENT)
Facility: HOSPITAL | Age: 56
LOS: 75 days | Discharge: HOME HEALTH | DRG: 177 | End: 2021-10-05
Attending: SURGERY | Admitting: HOSPITALIST
Payer: COMMERCIAL

## 2021-07-21 VITALS
OXYGEN SATURATION: 89 % | WEIGHT: 220.02 LBS | DIASTOLIC BLOOD PRESSURE: 72 MMHG | TEMPERATURE: 98 F | SYSTOLIC BLOOD PRESSURE: 109 MMHG | RESPIRATION RATE: 22 BRPM | HEART RATE: 90 BPM | HEIGHT: 66 IN

## 2021-07-21 DIAGNOSIS — J18.9 PNEUMONIA, UNSPECIFIED ORGANISM: ICD-10-CM

## 2021-07-21 LAB
ADD ON TEST-SPECIMEN IN LAB: SIGNIFICANT CHANGE UP
ALBUMIN SERPL ELPH-MCNC: 2.6 G/DL — LOW (ref 3.3–5)
ALP SERPL-CCNC: 187 U/L — HIGH (ref 40–120)
ALT FLD-CCNC: 51 U/L — SIGNIFICANT CHANGE UP (ref 12–78)
ANION GAP SERPL CALC-SCNC: 11 MMOL/L — SIGNIFICANT CHANGE UP (ref 5–17)
ANION GAP SERPL CALC-SCNC: 9 MMOL/L — SIGNIFICANT CHANGE UP (ref 5–17)
APTT BLD: 37.2 SEC — HIGH (ref 27.5–35.5)
AST SERPL-CCNC: 92 U/L — HIGH (ref 15–37)
BASE EXCESS BLDV CALC-SCNC: -3.8 MMOL/L — LOW (ref -2–2)
BASOPHILS # BLD AUTO: 0.02 K/UL — SIGNIFICANT CHANGE UP (ref 0–0.2)
BASOPHILS NFR BLD AUTO: 0.2 % — SIGNIFICANT CHANGE UP (ref 0–2)
BILIRUB SERPL-MCNC: 0.7 MG/DL — SIGNIFICANT CHANGE UP (ref 0.2–1.2)
BUN SERPL-MCNC: 30 MG/DL — HIGH (ref 7–23)
BUN SERPL-MCNC: 35 MG/DL — HIGH (ref 7–23)
CALCIUM SERPL-MCNC: 8.1 MG/DL — LOW (ref 8.5–10.1)
CALCIUM SERPL-MCNC: 8.5 MG/DL — SIGNIFICANT CHANGE UP (ref 8.5–10.1)
CHLORIDE SERPL-SCNC: 95 MMOL/L — LOW (ref 96–108)
CHLORIDE SERPL-SCNC: 96 MMOL/L — SIGNIFICANT CHANGE UP (ref 96–108)
CO2 SERPL-SCNC: 20 MMOL/L — LOW (ref 22–31)
CO2 SERPL-SCNC: 22 MMOL/L — SIGNIFICANT CHANGE UP (ref 22–31)
CREAT SERPL-MCNC: 1.71 MG/DL — HIGH (ref 0.5–1.3)
CREAT SERPL-MCNC: 1.9 MG/DL — HIGH (ref 0.5–1.3)
D DIMER BLD IA.RAPID-MCNC: 460 NG/ML DDU — HIGH
EOSINOPHIL # BLD AUTO: 0 K/UL — SIGNIFICANT CHANGE UP (ref 0–0.5)
EOSINOPHIL NFR BLD AUTO: 0 % — SIGNIFICANT CHANGE UP (ref 0–6)
FIBRINOGEN PPP-MCNC: 1106 MG/DL — HIGH (ref 290–520)
GLUCOSE SERPL-MCNC: 205 MG/DL — HIGH (ref 70–99)
GLUCOSE SERPL-MCNC: 291 MG/DL — HIGH (ref 70–99)
HCO3 BLDV-SCNC: 21 MMOL/L — SIGNIFICANT CHANGE UP (ref 21–29)
HCT VFR BLD CALC: 44 % — SIGNIFICANT CHANGE UP (ref 39–50)
HGB BLD-MCNC: 14.8 G/DL — SIGNIFICANT CHANGE UP (ref 13–17)
IMM GRANULOCYTES NFR BLD AUTO: 0.5 % — SIGNIFICANT CHANGE UP (ref 0–1.5)
INR BLD: 1.28 RATIO — HIGH (ref 0.88–1.16)
LACTATE SERPL-SCNC: 1.5 MMOL/L — SIGNIFICANT CHANGE UP (ref 0.7–2)
LDH SERPL L TO P-CCNC: 720 U/L — HIGH (ref 84–241)
LYMPHOCYTES # BLD AUTO: 0.89 K/UL — LOW (ref 1–3.3)
LYMPHOCYTES # BLD AUTO: 9.6 % — LOW (ref 13–44)
MCHC RBC-ENTMCNC: 27.8 PG — SIGNIFICANT CHANGE UP (ref 27–34)
MCHC RBC-ENTMCNC: 33.6 GM/DL — SIGNIFICANT CHANGE UP (ref 32–36)
MCV RBC AUTO: 82.6 FL — SIGNIFICANT CHANGE UP (ref 80–100)
MONOCYTES # BLD AUTO: 1.13 K/UL — HIGH (ref 0–0.9)
MONOCYTES NFR BLD AUTO: 12.2 % — SIGNIFICANT CHANGE UP (ref 2–14)
NEUTROPHILS # BLD AUTO: 7.16 K/UL — SIGNIFICANT CHANGE UP (ref 1.8–7.4)
NEUTROPHILS NFR BLD AUTO: 77.5 % — HIGH (ref 43–77)
PCO2 BLDV: 39 MMHG — SIGNIFICANT CHANGE UP (ref 35–50)
PH BLDV: 7.35 — SIGNIFICANT CHANGE UP (ref 7.35–7.45)
PLATELET # BLD AUTO: 228 K/UL — SIGNIFICANT CHANGE UP (ref 150–400)
PO2 BLDV: 52 MMHG — HIGH (ref 25–45)
POTASSIUM SERPL-MCNC: 3.6 MMOL/L — SIGNIFICANT CHANGE UP (ref 3.5–5.3)
POTASSIUM SERPL-MCNC: 3.6 MMOL/L — SIGNIFICANT CHANGE UP (ref 3.5–5.3)
POTASSIUM SERPL-SCNC: 3.6 MMOL/L — SIGNIFICANT CHANGE UP (ref 3.5–5.3)
POTASSIUM SERPL-SCNC: 3.6 MMOL/L — SIGNIFICANT CHANGE UP (ref 3.5–5.3)
PROT SERPL-MCNC: 7.7 GM/DL — SIGNIFICANT CHANGE UP (ref 6–8.3)
PROTHROM AB SERPL-ACNC: 14.8 SEC — HIGH (ref 10.6–13.6)
RBC # BLD: 5.33 M/UL — SIGNIFICANT CHANGE UP (ref 4.2–5.8)
RBC # FLD: 11.9 % — SIGNIFICANT CHANGE UP (ref 10.3–14.5)
SAO2 % BLDV: 82 % — SIGNIFICANT CHANGE UP (ref 67–88)
SODIUM SERPL-SCNC: 126 MMOL/L — LOW (ref 135–145)
SODIUM SERPL-SCNC: 127 MMOL/L — LOW (ref 135–145)
TROPONIN I SERPL-MCNC: 0.41 NG/ML — HIGH (ref 0.01–0.04)
WBC # BLD: 9.25 K/UL — SIGNIFICANT CHANGE UP (ref 3.8–10.5)
WBC # FLD AUTO: 9.25 K/UL — SIGNIFICANT CHANGE UP (ref 3.8–10.5)

## 2021-07-21 PROCEDURE — 85025 COMPLETE CBC W/AUTO DIFF WBC: CPT

## 2021-07-21 PROCEDURE — 84100 ASSAY OF PHOSPHORUS: CPT

## 2021-07-21 PROCEDURE — 85045 AUTOMATED RETICULOCYTE COUNT: CPT

## 2021-07-21 PROCEDURE — 82248 BILIRUBIN DIRECT: CPT

## 2021-07-21 PROCEDURE — 94640 AIRWAY INHALATION TREATMENT: CPT

## 2021-07-21 PROCEDURE — 99285 EMERGENCY DEPT VISIT HI MDM: CPT

## 2021-07-21 PROCEDURE — 82306 VITAMIN D 25 HYDROXY: CPT

## 2021-07-21 PROCEDURE — 80202 ASSAY OF VANCOMYCIN: CPT

## 2021-07-21 PROCEDURE — 82310 ASSAY OF CALCIUM: CPT

## 2021-07-21 PROCEDURE — 71045 X-RAY EXAM CHEST 1 VIEW: CPT

## 2021-07-21 PROCEDURE — 85379 FIBRIN DEGRADATION QUANT: CPT

## 2021-07-21 PROCEDURE — C9399: CPT

## 2021-07-21 PROCEDURE — 83880 ASSAY OF NATRIURETIC PEPTIDE: CPT

## 2021-07-21 PROCEDURE — 81001 URINALYSIS AUTO W/SCOPE: CPT

## 2021-07-21 PROCEDURE — 82803 BLOOD GASES ANY COMBINATION: CPT

## 2021-07-21 PROCEDURE — 86901 BLOOD TYPING SEROLOGIC RH(D): CPT

## 2021-07-21 PROCEDURE — 97530 THERAPEUTIC ACTIVITIES: CPT | Mod: GP

## 2021-07-21 PROCEDURE — 86850 RBC ANTIBODY SCREEN: CPT

## 2021-07-21 PROCEDURE — 83615 LACTATE (LD) (LDH) ENZYME: CPT

## 2021-07-21 PROCEDURE — 87070 CULTURE OTHR SPECIMN AEROBIC: CPT

## 2021-07-21 PROCEDURE — 83735 ASSAY OF MAGNESIUM: CPT

## 2021-07-21 PROCEDURE — 82962 GLUCOSE BLOOD TEST: CPT

## 2021-07-21 PROCEDURE — C9113: CPT

## 2021-07-21 PROCEDURE — 82728 ASSAY OF FERRITIN: CPT

## 2021-07-21 PROCEDURE — 99223 1ST HOSP IP/OBS HIGH 75: CPT

## 2021-07-21 PROCEDURE — 84466 ASSAY OF TRANSFERRIN: CPT

## 2021-07-21 PROCEDURE — 93005 ELECTROCARDIOGRAM TRACING: CPT

## 2021-07-21 PROCEDURE — 83605 ASSAY OF LACTIC ACID: CPT

## 2021-07-21 PROCEDURE — 93970 EXTREMITY STUDY: CPT

## 2021-07-21 PROCEDURE — 93306 TTE W/DOPPLER COMPLETE: CPT

## 2021-07-21 PROCEDURE — 86140 C-REACTIVE PROTEIN: CPT

## 2021-07-21 PROCEDURE — U0005: CPT

## 2021-07-21 PROCEDURE — 80076 HEPATIC FUNCTION PANEL: CPT

## 2021-07-21 PROCEDURE — 36415 COLL VENOUS BLD VENIPUNCTURE: CPT

## 2021-07-21 PROCEDURE — 86900 BLOOD TYPING SEROLOGIC ABO: CPT

## 2021-07-21 PROCEDURE — 94660 CPAP INITIATION&MGMT: CPT

## 2021-07-21 PROCEDURE — 97116 GAIT TRAINING THERAPY: CPT | Mod: GP

## 2021-07-21 PROCEDURE — 87040 BLOOD CULTURE FOR BACTERIA: CPT

## 2021-07-21 PROCEDURE — 36600 WITHDRAWAL OF ARTERIAL BLOOD: CPT

## 2021-07-21 PROCEDURE — 86769 SARS-COV-2 COVID-19 ANTIBODY: CPT

## 2021-07-21 PROCEDURE — 87449 NOS EACH ORGANISM AG IA: CPT

## 2021-07-21 PROCEDURE — 84478 ASSAY OF TRIGLYCERIDES: CPT

## 2021-07-21 PROCEDURE — 83970 ASSAY OF PARATHORMONE: CPT

## 2021-07-21 PROCEDURE — 93971 EXTREMITY STUDY: CPT | Mod: LT

## 2021-07-21 PROCEDURE — 84484 ASSAY OF TROPONIN QUANT: CPT

## 2021-07-21 PROCEDURE — 82565 ASSAY OF CREATININE: CPT

## 2021-07-21 PROCEDURE — 83540 ASSAY OF IRON: CPT

## 2021-07-21 PROCEDURE — 83550 IRON BINDING TEST: CPT

## 2021-07-21 PROCEDURE — 71045 X-RAY EXAM CHEST 1 VIEW: CPT | Mod: 26

## 2021-07-21 PROCEDURE — 83036 HEMOGLOBIN GLYCOSYLATED A1C: CPT

## 2021-07-21 PROCEDURE — 84145 PROCALCITONIN (PCT): CPT

## 2021-07-21 PROCEDURE — U0003: CPT

## 2021-07-21 PROCEDURE — 80048 BASIC METABOLIC PNL TOTAL CA: CPT

## 2021-07-21 PROCEDURE — 97162 PT EVAL MOD COMPLEX 30 MIN: CPT | Mod: GP

## 2021-07-21 PROCEDURE — 93010 ELECTROCARDIOGRAM REPORT: CPT

## 2021-07-21 PROCEDURE — 80053 COMPREHEN METABOLIC PANEL: CPT

## 2021-07-21 PROCEDURE — 94760 N-INVAS EAR/PLS OXIMETRY 1: CPT

## 2021-07-21 PROCEDURE — 85027 COMPLETE CBC AUTOMATED: CPT

## 2021-07-21 RX ORDER — ACETAMINOPHEN 500 MG
650 TABLET ORAL EVERY 4 HOURS
Refills: 0 | Status: DISCONTINUED | OUTPATIENT
Start: 2021-07-21 | End: 2021-10-05

## 2021-07-21 RX ORDER — ACETAMINOPHEN 500 MG
650 TABLET ORAL ONCE
Refills: 0 | Status: COMPLETED | OUTPATIENT
Start: 2021-07-21 | End: 2021-08-08

## 2021-07-21 RX ORDER — ALBUTEROL 90 UG/1
2 AEROSOL, METERED ORAL EVERY 4 HOURS
Refills: 0 | Status: DISCONTINUED | OUTPATIENT
Start: 2021-07-21 | End: 2021-10-05

## 2021-07-21 RX ORDER — LOSARTAN POTASSIUM 100 MG/1
50 TABLET, FILM COATED ORAL DAILY
Refills: 0 | Status: DISCONTINUED | OUTPATIENT
Start: 2021-07-21 | End: 2021-08-19

## 2021-07-21 RX ORDER — REMDESIVIR 5 MG/ML
INJECTION INTRAVENOUS
Refills: 0 | Status: COMPLETED | OUTPATIENT
Start: 2021-07-21 | End: 2021-07-25

## 2021-07-21 RX ORDER — GLUCAGON INJECTION, SOLUTION 0.5 MG/.1ML
1 INJECTION, SOLUTION SUBCUTANEOUS ONCE
Refills: 0 | Status: DISCONTINUED | OUTPATIENT
Start: 2021-07-21 | End: 2021-07-23

## 2021-07-21 RX ORDER — SODIUM CHLORIDE 9 MG/ML
1000 INJECTION INTRAMUSCULAR; INTRAVENOUS; SUBCUTANEOUS ONCE
Refills: 0 | Status: COMPLETED | OUTPATIENT
Start: 2021-07-21 | End: 2021-07-21

## 2021-07-21 RX ORDER — REMDESIVIR 5 MG/ML
100 INJECTION INTRAVENOUS EVERY 24 HOURS
Refills: 0 | Status: COMPLETED | OUTPATIENT
Start: 2021-07-22 | End: 2021-07-25

## 2021-07-21 RX ORDER — REMDESIVIR 5 MG/ML
200 INJECTION INTRAVENOUS EVERY 24 HOURS
Refills: 0 | Status: COMPLETED | OUTPATIENT
Start: 2021-07-21 | End: 2021-07-21

## 2021-07-21 RX ORDER — ATORVASTATIN CALCIUM 80 MG/1
80 TABLET, FILM COATED ORAL AT BEDTIME
Refills: 0 | Status: DISCONTINUED | OUTPATIENT
Start: 2021-07-21 | End: 2021-09-28

## 2021-07-21 RX ORDER — SPIRONOLACTONE 25 MG/1
25 TABLET, FILM COATED ORAL DAILY
Refills: 0 | Status: DISCONTINUED | OUTPATIENT
Start: 2021-07-21 | End: 2021-07-23

## 2021-07-21 RX ORDER — DEXTROSE 50 % IN WATER 50 %
25 SYRINGE (ML) INTRAVENOUS ONCE
Refills: 0 | Status: DISCONTINUED | OUTPATIENT
Start: 2021-07-21 | End: 2021-07-23

## 2021-07-21 RX ORDER — ACETAMINOPHEN 500 MG
650 TABLET ORAL ONCE
Refills: 0 | Status: COMPLETED | OUTPATIENT
Start: 2021-07-21 | End: 2021-07-21

## 2021-07-21 RX ORDER — ENOXAPARIN SODIUM 100 MG/ML
100 INJECTION SUBCUTANEOUS EVERY 12 HOURS
Refills: 0 | Status: DISCONTINUED | OUTPATIENT
Start: 2021-07-21 | End: 2021-07-22

## 2021-07-21 RX ORDER — ONDANSETRON 8 MG/1
4 TABLET, FILM COATED ORAL EVERY 6 HOURS
Refills: 0 | Status: DISCONTINUED | OUTPATIENT
Start: 2021-07-21 | End: 2021-07-24

## 2021-07-21 RX ORDER — SODIUM CHLORIDE 9 MG/ML
1000 INJECTION, SOLUTION INTRAVENOUS
Refills: 0 | Status: DISCONTINUED | OUTPATIENT
Start: 2021-07-21 | End: 2021-07-23

## 2021-07-21 RX ORDER — INSULIN LISPRO 100/ML
VIAL (ML) SUBCUTANEOUS AT BEDTIME
Refills: 0 | Status: DISCONTINUED | OUTPATIENT
Start: 2021-07-21 | End: 2021-07-23

## 2021-07-21 RX ORDER — DEXTROSE 50 % IN WATER 50 %
12.5 SYRINGE (ML) INTRAVENOUS ONCE
Refills: 0 | Status: DISCONTINUED | OUTPATIENT
Start: 2021-07-21 | End: 2021-07-23

## 2021-07-21 RX ORDER — DEXTROSE 50 % IN WATER 50 %
15 SYRINGE (ML) INTRAVENOUS ONCE
Refills: 0 | Status: DISCONTINUED | OUTPATIENT
Start: 2021-07-21 | End: 2021-07-23

## 2021-07-21 RX ORDER — LABETALOL HCL 100 MG
200 TABLET ORAL EVERY 12 HOURS
Refills: 0 | Status: DISCONTINUED | OUTPATIENT
Start: 2021-07-21 | End: 2021-08-18

## 2021-07-21 RX ORDER — DEXAMETHASONE 0.5 MG/5ML
6 ELIXIR ORAL ONCE
Refills: 0 | Status: COMPLETED | OUTPATIENT
Start: 2021-07-21 | End: 2021-07-21

## 2021-07-21 RX ORDER — DEXAMETHASONE 0.5 MG/5ML
6 ELIXIR ORAL DAILY
Refills: 0 | Status: DISCONTINUED | OUTPATIENT
Start: 2021-07-22 | End: 2021-07-24

## 2021-07-21 RX ORDER — AMLODIPINE BESYLATE 2.5 MG/1
10 TABLET ORAL DAILY
Refills: 0 | Status: DISCONTINUED | OUTPATIENT
Start: 2021-07-21 | End: 2021-07-23

## 2021-07-21 RX ORDER — INSULIN LISPRO 100/ML
VIAL (ML) SUBCUTANEOUS
Refills: 0 | Status: DISCONTINUED | OUTPATIENT
Start: 2021-07-21 | End: 2021-07-23

## 2021-07-21 RX ADMIN — Medication 650 MILLIGRAM(S): at 15:26

## 2021-07-21 RX ADMIN — Medication 100 MILLIGRAM(S): at 15:26

## 2021-07-21 RX ADMIN — Medication 650 MILLIGRAM(S): at 15:56

## 2021-07-21 RX ADMIN — SODIUM CHLORIDE 1000 MILLILITER(S): 9 INJECTION INTRAMUSCULAR; INTRAVENOUS; SUBCUTANEOUS at 15:25

## 2021-07-21 RX ADMIN — SODIUM CHLORIDE 1000 MILLILITER(S): 9 INJECTION INTRAMUSCULAR; INTRAVENOUS; SUBCUTANEOUS at 16:25

## 2021-07-21 RX ADMIN — Medication 6 MILLIGRAM(S): at 15:27

## 2021-07-21 RX ADMIN — ENOXAPARIN SODIUM 100 MILLIGRAM(S): 100 INJECTION SUBCUTANEOUS at 21:17

## 2021-07-21 RX ADMIN — Medication 4: at 21:17

## 2021-07-21 RX ADMIN — REMDESIVIR 200 MILLIGRAM(S): 5 INJECTION INTRAVENOUS at 21:19

## 2021-07-21 NOTE — H&P ADULT - ASSESSMENT
A:  Acute hypoxemic respiratory failure 2/2 to interstitial pneumonia secondary to COVID-19  Elevated troponin suspect demand from infection  Hyponatremia      P:  - Admit to tele for elevated troponin  - underwent LHC in 2019 with normal coronaries  - trend trops and obtain 2D echo, if any abnormalities, get cardiology involved  - Pulseox continuous. Airborne and contact isolation  - O2 supplemental and maintain SpO2 between 88-94%  - cont 15% NRB -> escalate to HFNC if increased work of breathing or desaturations  - IF still not responding to above or having increased work of breathing-> trial of bipap in negative pressure isolation room  - IV decadron 6mg IV qd for 5-10 days  - -> give first dose of remdesivir 200mg IV x 1  - obtain COVID abx  - Obtain baseline and inflammatory markers. Monitor Q72hrs  - high dimer and inflammatory markers, will obtain CTPE study when CrCl improves, cant have V/Q as has abnl CXR due to bilateral infiltrates from covi  - will treat empirically with therapeutic lmwh. No e/o strain on EKG  - Limit use of NSAIDs  - Albuterol MDI to limit aerosolization  - limit normal saline -> goal to keep patient more on the dry side. Goal increase Na 3-4 meq today.   - DVT px: COVID patients to be start on LMWH as per recent data supporting hypercoagubale state especially with high dimers with no absolute CI to its use ie GI bleed or other stigmata of bleeding within last 3 months or PLTs < 50-> this patient has Crcl of 63mL/min-> therefore can have LMWH      Full code

## 2021-07-21 NOTE — ED PROVIDER NOTE - NSTIMEPROVIDERCAREINITIATE_GEN_ER
[facility] in [place] dosen't have the adderall and doesn't know when they will. The Patient wants a written Rx for adderalland will find a pharmacy that has the quantity  Please call when ready [phone]  
21-Jul-2021 14:41

## 2021-07-21 NOTE — H&P ADULT - NSHPPHYSICALEXAM_GEN_ALL_CORE
ICU Vital Signs Last 24 Hrs  T(C): 36.8 (21 Jul 2021 16:30), Max: 36.8 (21 Jul 2021 16:30)  T(F): 98.3 (21 Jul 2021 16:30), Max: 98.3 (21 Jul 2021 16:30)  HR: 85 (21 Jul 2021 16:30) (85 - 91)  BP: 110/72 (21 Jul 2021 16:30) (95/70 - 118/80)  BP(mean): --  ABP: --  ABP(mean): --  RR: 28 (21 Jul 2021 16:30) (22 - 30)  SpO2: 95% (21 Jul 2021 16:30) (89% - 98%)      PHYSICAL EXAM:    Constitutional: NAD, awake and alert, ill apperaing  HEENT: PERR, EOMI, Normal Hearing, MMM  Neck: Soft and supple, No LAD, No JVD  Respiratory: decreased BS bilaterally  Cardiovascular: S1 and S2, regular rate and rhythm, no Murmurs, gallops or rubs  Gastrointestinal: Bowel Sounds present, soft, nontender, nondistended, no guarding, no rebound  Extremities: No peripheral edema  Vascular: 2+ peripheral pulses  Neurological: A/O x 3, no focal deficits  Musculoskeletal: 5/5 strength b/l upper and lower extremities  Skin: No rashes

## 2021-07-21 NOTE — ED ADULT NURSE NOTE - NSIMPLEMENTINTERV_GEN_ALL_ED
Implemented All Universal Safety Interventions:  [place] to call system. Call bell, personal items and telephone within reach. Instruct patient to call for assistance. Room bathroom lighting operational. Non-slip footwear when patient is off stretcher. Physically safe environment: no spills, clutter or unnecessary equipment. Stretcher in lowest position, wheels locked, appropriate side rails in place.

## 2021-07-21 NOTE — H&P ADULT - HISTORY OF PRESENT ILLNESS
55 M with pmh HLD, dm, htn presents with 8 days onset of covid symptoms which included, cough, fevers, sob, hypoxia, fevers, diarrhea, chills and myalgias. TEsted positive 7/15. Wife endorsed he was becoming more sob of recently. On arrival hypoxic to 80s and tachypneic. NRB placed, presently on 15% and saturating 95%. Na 126 s/p 1L NS. CXR: Frandy infiltrates. Last scr 1.4 in 2019-> today 1.9 unknown if underlying ckd. Dimer 450 - normal for age  however with covid and increasing fibrinogen, will need further eval.   Denies chets pain. LHC performed 2019 revealed normal coronaries.         PAST MEDICAL/SURGICAL/FAMILY/SOCIAL HISTORY:    Past Medical History:  Diabetes    HTN (hypertension).  No surgeries     Tobacco Usage:  · Tobacco Usage	non smoker  Fhx: none

## 2021-07-21 NOTE — ED PROVIDER NOTE - CLINICAL SUMMARY MEDICAL DECISION MAKING FREE TEXT BOX
54 y/o male PMHx of HTN, HLD, DM II, no prior COVID vaccine, BIBA worsening SOB and difficulty breathing. COVID sx x 8 days. Positive COVID test 7/15. Pt hypoxic in 80s on RA. Pt w/ b/l crackles on exam. Plan: EKG, CXR, COVID observation and precaution. Labs including coags, COVID inflammatory markers, O2 supplementation, IV decadron, Tessalon perles, Tylenol prn. monitor, observe, admit.

## 2021-07-21 NOTE — ED PROVIDER NOTE - PROGRESS NOTE DETAILS
FALGUNI Pierre MD:  case d/w DR. Fairchild for admission.  No CTA  at this time as per Dr. Fairchild, Tele admission accepted w/ COVID isolation precautions. C MD Gabby:  case d/w DR. Fairchild for admission.  No CTA  at this time as per Dr. Fairchild, Tele admission accepted w/ COVID isolation precautions.  B/L PNA believed to be COVID-related & NOT bacterial, no antibacterial Abx indicated at this time.

## 2021-07-21 NOTE — H&P ADULT - NSHPLABSRESULTS_GEN_ALL_CORE
LABS: All Labs Reviewed:                        14.8   9.25  )-----------( 228      ( 21 Jul 2021 14:42 )             44.0     07-21    126<L>  |  95<L>  |  30<H>  ----------------------------<  205<H>  3.6   |  22  |  1.90<H>    Ca    8.5      21 Jul 2021 14:42    TPro  7.7  /  Alb  2.6<L>  /  TBili  0.7  /  DBili  x   /  AST  92<H>  /  ALT  51  /  AlkPhos  187<H>  07-21    PT/INR - ( 21 Jul 2021 14:42 )   PT: 14.8 sec;   INR: 1.28 ratio         PTT - ( 21 Jul 2021 14:42 )  PTT:37.2 sec  CARDIAC MARKERS ( 21 Jul 2021 14:42 )  0.410 ng/mL / x     / x     / x     / x            EKG: NSR , no st-t changes  Blood Culture:

## 2021-07-21 NOTE — ED PROVIDER NOTE - OBJECTIVE STATEMENT
56 y/o male with a PMHx of HTN, HLD, DM Type II, 8 days of COVID like sx started on 7/13, then outpatient covid test positive on 7/15, presents to ED BIBA from home regarding increased cough, wife reports pt with increased difficulty breathing. EMS reports pt was hypoxic with O2 sat low 80s on room air. Administered 100% O2 NRB on route to ED. Pt reports 8 days fever, chills, fatigue, mild diarrhea, cough with poorly productive of greenish sputum, and diffuse myalgias. Denies SOB, except wife states that pt had difficulty breathing today. Denies change in smell/taste and HA. Pt unaware of ill exposure. NKDA  PCP: Fara

## 2021-07-21 NOTE — ED ADULT NURSE NOTE - OBJECTIVE STATEMENT
56 y/o male awake alert and oriented x4 presents to ED c/o SOB x couple of days. Pt was positive for COVID on 7/15/21. Unvaccinated. pt states "I came in because my wife thinks I am more short of breath." Denies chest pain, nausea, vomiting, fever/chills, abd pain or urinary sx. Pt presents to ED on NRB sat 95%. non smoker. 54 y/o male awake alert and oriented x4 presents to ED c/o SOB x couple of days. Pt was positive for COVID on 7/15/21. Unvaccinated. pt states "I came in because my wife thinks I am more short of breath." Denies chest pain, nausea, vomiting, fever/chills, abd pain or urinary sx. Pt presents to ED on NRB sat 95%. non smoker. Tachypneic and labored breathing noted.

## 2021-07-21 NOTE — ED ADULT NURSE REASSESSMENT NOTE - NS ED NURSE REASSESS COMMENT FT1
pt ambulate to and from the bathroom with assistance. oxygen provided. Pt tolerated well. Will cont to monitor for safety and comfort.

## 2021-07-21 NOTE — ED ADULT TRIAGE NOTE - CHIEF COMPLAINT QUOTE
Pt comes to ED with c/o SOB after being diagnosed with COVID about a week ago. Pt reports cough, SOB, low grade temp. SpO2 on RA in the low 80's per EMS. Pt on 15L NRB currently 89%.

## 2021-07-21 NOTE — ED PROVIDER NOTE - CARE PLAN
Principal Discharge DX:	Bilateral pneumonia  Secondary Diagnosis:	COVID-19  Secondary Diagnosis:	Troponin I above reference range  Secondary Diagnosis:	Hypoxia

## 2021-07-21 NOTE — ED ADULT NURSE REASSESSMENT NOTE - NS ED NURSE REASSESS COMMENT FT1
pt resting comfortably in bed with no acute distress noted. updated with plan of care.  Will cont to monitor for safety and comfort.

## 2021-07-22 LAB
A1C WITH ESTIMATED AVERAGE GLUCOSE RESULT: 11.6 % — HIGH (ref 4–5.6)
ANION GAP SERPL CALC-SCNC: 11 MMOL/L — SIGNIFICANT CHANGE UP (ref 5–17)
APPEARANCE UR: CLEAR — SIGNIFICANT CHANGE UP
BILIRUB UR-MCNC: NEGATIVE — SIGNIFICANT CHANGE UP
BUN SERPL-MCNC: 40 MG/DL — HIGH (ref 7–23)
CALCIUM SERPL-MCNC: 8.3 MG/DL — LOW (ref 8.5–10.1)
CHLORIDE SERPL-SCNC: 100 MMOL/L — SIGNIFICANT CHANGE UP (ref 96–108)
CO2 SERPL-SCNC: 19 MMOL/L — LOW (ref 22–31)
COLOR SPEC: YELLOW — SIGNIFICANT CHANGE UP
COVID-19 SPIKE DOMAIN AB INTERP: NEGATIVE — SIGNIFICANT CHANGE UP
COVID-19 SPIKE DOMAIN ANTIBODY RESULT: 0.4 U/ML — SIGNIFICANT CHANGE UP
CREAT SERPL-MCNC: 1.52 MG/DL — HIGH (ref 0.5–1.3)
CRP SERPL-MCNC: 157 MG/L — HIGH
DIFF PNL FLD: ABNORMAL
ESTIMATED AVERAGE GLUCOSE: 286 MG/DL — HIGH (ref 68–114)
FERRITIN SERPL-MCNC: 4077 NG/ML — HIGH (ref 30–400)
GLUCOSE SERPL-MCNC: 331 MG/DL — HIGH (ref 70–99)
GLUCOSE UR QL: 1000 MG/DL
HCT VFR BLD CALC: 40.6 % — SIGNIFICANT CHANGE UP (ref 39–50)
HGB BLD-MCNC: 14 G/DL — SIGNIFICANT CHANGE UP (ref 13–17)
KETONES UR-MCNC: NEGATIVE — SIGNIFICANT CHANGE UP
LEUKOCYTE ESTERASE UR-ACNC: NEGATIVE — SIGNIFICANT CHANGE UP
MCHC RBC-ENTMCNC: 28.3 PG — SIGNIFICANT CHANGE UP (ref 27–34)
MCHC RBC-ENTMCNC: 34.5 GM/DL — SIGNIFICANT CHANGE UP (ref 32–36)
MCV RBC AUTO: 82 FL — SIGNIFICANT CHANGE UP (ref 80–100)
NITRITE UR-MCNC: NEGATIVE — SIGNIFICANT CHANGE UP
PH UR: 5 — SIGNIFICANT CHANGE UP (ref 5–8)
PLATELET # BLD AUTO: 229 K/UL — SIGNIFICANT CHANGE UP (ref 150–400)
POTASSIUM SERPL-MCNC: 3.5 MMOL/L — SIGNIFICANT CHANGE UP (ref 3.5–5.3)
POTASSIUM SERPL-SCNC: 3.5 MMOL/L — SIGNIFICANT CHANGE UP (ref 3.5–5.3)
PROCALCITONIN SERPL-MCNC: 0.91 NG/ML — HIGH (ref 0.02–0.1)
PROT UR-MCNC: 30 MG/DL
RBC # BLD: 4.95 M/UL — SIGNIFICANT CHANGE UP (ref 4.2–5.8)
RBC # FLD: 12 % — SIGNIFICANT CHANGE UP (ref 10.3–14.5)
SARS-COV-2 IGG+IGM SERPL QL IA: 0.4 U/ML — SIGNIFICANT CHANGE UP
SARS-COV-2 IGG+IGM SERPL QL IA: NEGATIVE — SIGNIFICANT CHANGE UP
SODIUM SERPL-SCNC: 130 MMOL/L — LOW (ref 135–145)
SP GR SPEC: 1.02 — SIGNIFICANT CHANGE UP (ref 1.01–1.02)
TROPONIN I SERPL-MCNC: 0.32 NG/ML — HIGH (ref 0.01–0.04)
UROBILINOGEN FLD QL: NEGATIVE MG/DL — SIGNIFICANT CHANGE UP
WBC # BLD: 8.85 K/UL — SIGNIFICANT CHANGE UP (ref 3.8–10.5)
WBC # FLD AUTO: 8.85 K/UL — SIGNIFICANT CHANGE UP (ref 3.8–10.5)

## 2021-07-22 PROCEDURE — 93306 TTE W/DOPPLER COMPLETE: CPT | Mod: 26

## 2021-07-22 PROCEDURE — 99233 SBSQ HOSP IP/OBS HIGH 50: CPT

## 2021-07-22 PROCEDURE — 93010 ELECTROCARDIOGRAM REPORT: CPT

## 2021-07-22 RX ORDER — ENOXAPARIN SODIUM 100 MG/ML
40 INJECTION SUBCUTANEOUS EVERY 12 HOURS
Refills: 0 | Status: DISCONTINUED | OUTPATIENT
Start: 2021-07-22 | End: 2021-07-26

## 2021-07-22 RX ORDER — SODIUM CHLORIDE 9 MG/ML
500 INJECTION INTRAMUSCULAR; INTRAVENOUS; SUBCUTANEOUS ONCE
Refills: 0 | Status: COMPLETED | OUTPATIENT
Start: 2021-07-22 | End: 2021-07-22

## 2021-07-22 RX ORDER — INSULIN GLARGINE 100 [IU]/ML
15 INJECTION, SOLUTION SUBCUTANEOUS AT BEDTIME
Refills: 0 | Status: DISCONTINUED | OUTPATIENT
Start: 2021-07-22 | End: 2021-07-23

## 2021-07-22 RX ADMIN — Medication 6 MILLIGRAM(S): at 11:01

## 2021-07-22 RX ADMIN — INSULIN GLARGINE 15 UNIT(S): 100 INJECTION, SOLUTION SUBCUTANEOUS at 21:47

## 2021-07-22 RX ADMIN — ATORVASTATIN CALCIUM 80 MILLIGRAM(S): 80 TABLET, FILM COATED ORAL at 01:03

## 2021-07-22 RX ADMIN — REMDESIVIR 540 MILLIGRAM(S): 5 INJECTION INTRAVENOUS at 21:48

## 2021-07-22 RX ADMIN — ENOXAPARIN SODIUM 40 MILLIGRAM(S): 100 INJECTION SUBCUTANEOUS at 21:48

## 2021-07-22 RX ADMIN — Medication 8: at 08:59

## 2021-07-22 RX ADMIN — Medication 200 MILLIGRAM(S): at 21:48

## 2021-07-22 RX ADMIN — SODIUM CHLORIDE 500 MILLILITER(S): 9 INJECTION INTRAMUSCULAR; INTRAVENOUS; SUBCUTANEOUS at 05:30

## 2021-07-22 RX ADMIN — ENOXAPARIN SODIUM 40 MILLIGRAM(S): 100 INJECTION SUBCUTANEOUS at 11:00

## 2021-07-22 RX ADMIN — Medication 10: at 12:26

## 2021-07-22 RX ADMIN — Medication 6: at 21:58

## 2021-07-22 RX ADMIN — Medication 6: at 17:46

## 2021-07-22 RX ADMIN — ATORVASTATIN CALCIUM 80 MILLIGRAM(S): 80 TABLET, FILM COATED ORAL at 21:48

## 2021-07-22 RX ADMIN — Medication 100 MILLIGRAM(S): at 11:01

## 2021-07-22 RX ADMIN — Medication 100 MILLIGRAM(S): at 21:47

## 2021-07-22 RX ADMIN — Medication 200 MILLIGRAM(S): at 01:03

## 2021-07-22 NOTE — PROGRESS NOTE ADULT - SUBJECTIVE AND OBJECTIVE BOX
Reason for Admission: cough/sob  History of Present Illness:   Patient is a 55 M with pmh HLD, dm, htn presents with 8 days onset of covid symptoms which included, cough, fevers, sob, hypoxia, fevers, diarrhea, chills and myalgias. TEsted positive 7/15. Wife endorsed he was becoming more sob of recently. On arrival hypoxic to 80s and tachypneic. NRB placed, presently on 15% and saturating 95%. Na 126 s/p 1L NS. CXR: Frandy infiltrates. Last scr 1.4 in 2019-> today 1.9 unknown if underlying ckd. Dimer 450 - normal for age  however with covid and increasing fibrinogen, will need further eval.   Denies chets pain. LHC performed 2019 revealed normal coronaries.     Medical progress: on NRB , deconditioned, sick, requires to be proned. ID following  Complaints: SOB    REVIEW OF SYSTEMS:  General: NAD, hemodynamically stable, (-)  fever, (-) chills, (-) weakness  HEENT:  Eyes:  No visual loss, blurred vision, double vision or yellow sclerae. Ears, Nose, Throat:  No hearing loss, sneezing, congestion, runny nose or sore throat.  SKIN:  No rash or itching.  CARDIOVASCULAR:  No chest pain, chest pressure or chest discomfort. No palpitations or edema.  RESPIRATORY:  No shortness of breath, cough or sputum.  GASTROINTESTINAL:  No anorexia, nausea, vomiting or diarrhea. No abdominal pain or blood.  NEUROLOGICAL:  No headache, dizziness, syncope, paralysis, ataxia, numbness or tingling in the extremities. No change in bowel or bladder control.  MUSCULOSKELETAL:  No muscle, back pain, joint pain or stiffness.  HEMATOLOGIC:  No anemia, bleeding or bruising.  LYMPHATICS:  No enlarged nodes. No history of splenectomy.  ENDOCRINOLOGIC:  No reports of sweating, cold or heat intolerance. No polyuria or polydipsia.  ALLERGIES:  No history of asthma, hives, eczema or rhinitis.    Physical Exam:   GENERAL APPEARANCE:  NAD, hemodynamically stable  T(C): 36.4 (21 @ 09:36), Max: 36.8 (21 @ 19:38)  HR: 82 (21 @ 12:26) (54 - 86)  BP: 125/78 (21 @ 12:26) (91/55 - 130/78)  RR: 26 (21 @ 09:36) (24 - 35)  SpO2: 90% (21 @ 09:36) (87% - 96%)  Wt(kg): --  HEENT:  Head is normocephalic    Skin:  Warm and dry without any rash   NECK:  Supple without lymphadenopathy.   HEART:  Regular rate and rhythm. normal S1 and S2, No M/R/G  LUNGS:  Good ins/exp effort, no W/R/R/C  ABDOMEN:  Soft, nontender, nondistended with good bowel sounds heard  EXTREMITIES:  Without cyanosis, clubbing or edema.   NEUROLOGICAL:  Gross nonfocal       CBC Full  -  ( 2021 07:57 )  WBC Count : 8.85 K/uL  RBC Count : 4.95 M/uL  Hemoglobin : 14.0 g/dL  Hematocrit : 40.6 %  Platelet Count - Automated : 229 K/uL  Mean Cell Volume : 82.0 fl  Mean Cell Hemoglobin : 28.3 pg  Mean Cell Hemoglobin Concentration : 34.5 gm/dL  Auto Neutrophil # : x  Auto Lymphocyte # : x  Auto Monocyte # : x  Auto Eosinophil # : x  Auto Basophil # : x  Auto Neutrophil % : x  Auto Lymphocyte % : x  Auto Monocyte % : x  Auto Eosinophil % : x  Auto Basophil % : x    PT/INR - ( 2021 14:42 )   PT: 14.8 sec;   INR: 1.28 ratio         PTT - ( 2021 14:42 )  PTT:37.2 sec  Urinalysis Basic - ( 2021 06:10 )    Color: Yellow / Appearance: Clear / S.020 / pH: x  Gluc: x / Ketone: Negative  / Bili: Negative / Urobili: Negative mg/dL   Blood: x / Protein: 30 mg/dL / Nitrite: Negative   Leuk Esterase: Negative / RBC: Negative /HPF / WBC 0-2   Sq Epi: x / Non Sq Epi: Negative / Bacteria: Negative          130<L>  |  100  |  40<H>  ----------------------------<  331<H>  3.5   |  19<L>  |  1.52<H>    Ca    8.3<L>      2021 07:57    TPro  6.9  /  Alb  2.2<L>  /  TBili  0.4  /  DBili  0.1  /  AST  73<H>  /  ALT  51  /  AlkPhos  178<H>        # Acute hypoxemic respiratory failure 2/2 to interstitial pneumonia secondary to COVID-19  - agree with remdesivir #2 monitor renal/hepatic function closely on therapy  - on decadron 6mg daily #2  - on NRB  - isolation precautions  - prone positioning  - supportive care    # Uncontrolled DMII  - A1C - 11.6, poor glycemic control  - Lantus 15 units initiated ISS    # Hyponatremia  - pseudohyponatremia secondary to high blood sugars    # HTN  - Labetalol / losartan /  Norvasc /  spironolactone  - close monitoring of     # DVT proph Reason for Admission: cough/sob  History of Present Illness:   Patient is a 55 M with pmh HLD, dm, htn presents with 8 days onset of covid symptoms which included, cough, fevers, sob, hypoxia, fevers, diarrhea, chills and myalgias. TEsted positive 7/15. Wife endorsed he was becoming more sob of recently. On arrival hypoxic to 80s and tachypneic. NRB placed, presently on 15% and saturating 95%. Na 126 s/p 1L NS. CXR: Frandy infiltrates. Last scr 1.4 in 2019-> today 1.9 unknown if underlying ckd. Dimer 450 - normal for age  however with covid and increasing fibrinogen, will need further eval.   Denies chets pain. LHC performed 2019 revealed normal coronaries.     Medical progress: on NRB , deconditioned, sick, requires to be proned. ID following  Complaints: SOB    REVIEW OF SYSTEMS:  General: NAD, hemodynamically stable, (-)  fever, (-) chills, (-) weakness  HEENT:  Eyes:  No visual loss, blurred vision, double vision or yellow sclerae. Ears, Nose, Throat:  No hearing loss, sneezing, congestion, runny nose or sore throat.  SKIN:  No rash or itching.  CARDIOVASCULAR:  No chest pain, chest pressure or chest discomfort. No palpitations or edema.  RESPIRATORY:  No shortness of breath, cough or sputum.  GASTROINTESTINAL:  No anorexia, nausea, vomiting or diarrhea. No abdominal pain or blood.  NEUROLOGICAL:  No headache, dizziness, syncope, paralysis, ataxia, numbness or tingling in the extremities. No change in bowel or bladder control.  MUSCULOSKELETAL:  No muscle, back pain, joint pain or stiffness.  HEMATOLOGIC:  No anemia, bleeding or bruising.  LYMPHATICS:  No enlarged nodes. No history of splenectomy.  ENDOCRINOLOGIC:  No reports of sweating, cold or heat intolerance. No polyuria or polydipsia.  ALLERGIES:  No history of asthma, hives, eczema or rhinitis.    Physical Exam:   GENERAL APPEARANCE:  NAD, hemodynamically stable  T(C): 36.4 (21 @ 09:36), Max: 36.8 (21 @ 19:38)  HR: 82 (21 @ 12:26) (54 - 86)  BP: 125/78 (21 @ 12:26) (91/55 - 130/78)  RR: 26 (21 @ 09:36) (24 - 35)  SpO2: 90% (21 @ 09:36) (87% - 96%)  HEENT:  Head is normocephalic    Skin:  Warm and dry without any rash   NECK:  Supple without lymphadenopathy.   HEART:  Regular rate and rhythm. normal S1 and S2, No M/R/G  LUNGS:  Good ins/exp effort, no W/R/R/C  ABDOMEN:  Soft, nontender, nondistended with good bowel sounds heard  EXTREMITIES:  Without cyanosis, clubbing or edema.   NEUROLOGICAL:  Gross nonfocal       CBC Full  -  ( 2021 07:57 )  WBC Count : 8.85 K/uL  RBC Count : 4.95 M/uL  Hemoglobin : 14.0 g/dL  Hematocrit : 40.6 %  Platelet Count - Automated : 229 K/uL  Mean Cell Volume : 82.0 fl  Mean Cell Hemoglobin : 28.3 pg  Mean Cell Hemoglobin Concentration : 34.5 gm/dL  Auto Neutrophil # : x  Auto Lymphocyte # : x  Auto Monocyte # : x  Auto Eosinophil # : x  Auto Basophil # : x  Auto Neutrophil % : x  Auto Lymphocyte % : x  Auto Monocyte % : x  Auto Eosinophil % : x  Auto Basophil % : x    PT/INR - ( 2021 14:42 )   PT: 14.8 sec;   INR: 1.28 ratio         PTT - ( 2021 14:42 )  PTT:37.2 sec  Urinalysis Basic - ( 2021 06:10 )    Color: Yellow / Appearance: Clear / S.020 / pH: x  Gluc: x / Ketone: Negative  / Bili: Negative / Urobili: Negative mg/dL   Blood: x / Protein: 30 mg/dL / Nitrite: Negative   Leuk Esterase: Negative / RBC: Negative /HPF / WBC 0-2   Sq Epi: x / Non Sq Epi: Negative / Bacteria: Negative          130<L>  |  100  |  40<H>  ----------------------------<  331<H>  3.5   |  19<L>  |  1.52<H>    Ca    8.3<L>      2021 07:57    TPro  6.9  /  Alb  2.2<L>  /  TBili  0.4  /  DBili  0.1  /  AST  73<H>  /  ALT  51  /  AlkPhos  178<H>        # Acute hypoxemic respiratory failure 2/2 to interstitial pneumonia secondary to COVID-19  - agree with remdesivir #2 monitor renal/hepatic function closely on therapy  - on decadron 6mg daily #2  - on NRB  - isolation precautions  - prone positioning  - supportive care    # New dx of DMII (previously unknown for the patient)  - A1C - 11.6, poor glycemic control -  aggressive Blood sugar control  - Lantus 15 units initiated ISS    # Hyponatremia  - pseudohyponatremia secondary to high blood sugars    # HTN  - Labetalol / losartan /  Norvasc /  spironolactone  - close monitoring of     # DVT proph

## 2021-07-22 NOTE — CHART NOTE - NSCHARTNOTEFT_GEN_A_CORE
Alerted by RN for desat into 80s. RN also appreciated tachypnea, hypotension and diaphoresis. Patients bp meds were held 7/21PM due to med rec not being completed on time. Pt given labetolol. amlodipine and losartan held. Pt examined at bedside and is resting prone Trendelenburg. On exam patient is 92% O2 on non-rebreather mask.  Pt. no longer diaphoretic and is conversing. Pt. is in NAD at this time. Lung sounds unremarkable and are markedly improved from admission.          #desat  - desatting in 80s  - pt. on prone and trendelenburg  - satting in 90s after position shift  - on non rebreather  - will escalte to high flow O2 if desats continue     #hypotension  - SBP in high 80s  - Pt. received labetolol at 2300  - Amlodipine and losartan held over night  - Given 500mL bolus NS

## 2021-07-23 LAB
ALBUMIN SERPL ELPH-MCNC: 2.2 G/DL — LOW (ref 3.3–5)
ALP SERPL-CCNC: 160 U/L — HIGH (ref 40–120)
ALT FLD-CCNC: 50 U/L — SIGNIFICANT CHANGE UP (ref 12–78)
AST SERPL-CCNC: 59 U/L — HIGH (ref 15–37)
BILIRUB DIRECT SERPL-MCNC: 0.1 MG/DL — SIGNIFICANT CHANGE UP (ref 0–0.2)
BILIRUB INDIRECT FLD-MCNC: 0.3 MG/DL — SIGNIFICANT CHANGE UP (ref 0.2–1)
BILIRUB SERPL-MCNC: 0.4 MG/DL — SIGNIFICANT CHANGE UP (ref 0.2–1.2)
HCT VFR BLD CALC: 39.2 % — SIGNIFICANT CHANGE UP (ref 39–50)
HGB BLD-MCNC: 13.5 G/DL — SIGNIFICANT CHANGE UP (ref 13–17)
MCHC RBC-ENTMCNC: 28.7 PG — SIGNIFICANT CHANGE UP (ref 27–34)
MCHC RBC-ENTMCNC: 34.4 GM/DL — SIGNIFICANT CHANGE UP (ref 32–36)
MCV RBC AUTO: 83.4 FL — SIGNIFICANT CHANGE UP (ref 80–100)
PLATELET # BLD AUTO: 281 K/UL — SIGNIFICANT CHANGE UP (ref 150–400)
PROT SERPL-MCNC: 6.5 GM/DL — SIGNIFICANT CHANGE UP (ref 6–8.3)
RBC # BLD: 4.7 M/UL — SIGNIFICANT CHANGE UP (ref 4.2–5.8)
RBC # FLD: 12.2 % — SIGNIFICANT CHANGE UP (ref 10.3–14.5)
WBC # BLD: 14.65 K/UL — HIGH (ref 3.8–10.5)
WBC # FLD AUTO: 14.65 K/UL — HIGH (ref 3.8–10.5)

## 2021-07-23 PROCEDURE — 99233 SBSQ HOSP IP/OBS HIGH 50: CPT

## 2021-07-23 RX ORDER — TOCILIZUMAB 20 MG/ML
600 INJECTION, SOLUTION, CONCENTRATE INTRAVENOUS ONCE
Refills: 0 | Status: COMPLETED | OUTPATIENT
Start: 2021-07-23 | End: 2021-07-23

## 2021-07-23 RX ORDER — SODIUM CHLORIDE 9 MG/ML
1000 INJECTION, SOLUTION INTRAVENOUS
Refills: 0 | Status: DISCONTINUED | OUTPATIENT
Start: 2021-07-23 | End: 2021-08-03

## 2021-07-23 RX ORDER — INSULIN LISPRO 100/ML
VIAL (ML) SUBCUTANEOUS
Refills: 0 | Status: DISCONTINUED | OUTPATIENT
Start: 2021-07-23 | End: 2021-07-27

## 2021-07-23 RX ORDER — INSULIN LISPRO 100/ML
VIAL (ML) SUBCUTANEOUS AT BEDTIME
Refills: 0 | Status: DISCONTINUED | OUTPATIENT
Start: 2021-07-23 | End: 2021-08-01

## 2021-07-23 RX ORDER — INSULIN LISPRO 100/ML
7 VIAL (ML) SUBCUTANEOUS
Refills: 0 | Status: DISCONTINUED | OUTPATIENT
Start: 2021-07-23 | End: 2021-08-03

## 2021-07-23 RX ORDER — INSULIN LISPRO 100/ML
4 VIAL (ML) SUBCUTANEOUS ONCE
Refills: 0 | Status: COMPLETED | OUTPATIENT
Start: 2021-07-23 | End: 2021-07-24

## 2021-07-23 RX ORDER — DEXTROSE 50 % IN WATER 50 %
12.5 SYRINGE (ML) INTRAVENOUS ONCE
Refills: 0 | Status: DISCONTINUED | OUTPATIENT
Start: 2021-07-23 | End: 2021-08-01

## 2021-07-23 RX ORDER — DEXTROSE 50 % IN WATER 50 %
15 SYRINGE (ML) INTRAVENOUS ONCE
Refills: 0 | Status: DISCONTINUED | OUTPATIENT
Start: 2021-07-23 | End: 2021-08-01

## 2021-07-23 RX ORDER — ASPIRIN/CALCIUM CARB/MAGNESIUM 324 MG
81 TABLET ORAL DAILY
Refills: 0 | Status: DISCONTINUED | OUTPATIENT
Start: 2021-07-23 | End: 2021-09-28

## 2021-07-23 RX ORDER — DEXTROSE 50 % IN WATER 50 %
25 SYRINGE (ML) INTRAVENOUS ONCE
Refills: 0 | Status: DISCONTINUED | OUTPATIENT
Start: 2021-07-23 | End: 2021-08-01

## 2021-07-23 RX ORDER — GLUCAGON INJECTION, SOLUTION 0.5 MG/.1ML
1 INJECTION, SOLUTION SUBCUTANEOUS ONCE
Refills: 0 | Status: DISCONTINUED | OUTPATIENT
Start: 2021-07-23 | End: 2021-08-03

## 2021-07-23 RX ORDER — INSULIN GLARGINE 100 [IU]/ML
25 INJECTION, SOLUTION SUBCUTANEOUS AT BEDTIME
Refills: 0 | Status: DISCONTINUED | OUTPATIENT
Start: 2021-07-23 | End: 2021-07-25

## 2021-07-23 RX ADMIN — ENOXAPARIN SODIUM 40 MILLIGRAM(S): 100 INJECTION SUBCUTANEOUS at 21:59

## 2021-07-23 RX ADMIN — LOSARTAN POTASSIUM 50 MILLIGRAM(S): 100 TABLET, FILM COATED ORAL at 09:00

## 2021-07-23 RX ADMIN — ATORVASTATIN CALCIUM 80 MILLIGRAM(S): 80 TABLET, FILM COATED ORAL at 21:59

## 2021-07-23 RX ADMIN — ENOXAPARIN SODIUM 40 MILLIGRAM(S): 100 INJECTION SUBCUTANEOUS at 08:56

## 2021-07-23 RX ADMIN — SPIRONOLACTONE 25 MILLIGRAM(S): 25 TABLET, FILM COATED ORAL at 08:56

## 2021-07-23 RX ADMIN — Medication 12: at 17:35

## 2021-07-23 RX ADMIN — Medication 8: at 22:00

## 2021-07-23 RX ADMIN — AMLODIPINE BESYLATE 10 MILLIGRAM(S): 2.5 TABLET ORAL at 08:57

## 2021-07-23 RX ADMIN — REMDESIVIR 540 MILLIGRAM(S): 5 INJECTION INTRAVENOUS at 20:01

## 2021-07-23 RX ADMIN — INSULIN GLARGINE 25 UNIT(S): 100 INJECTION, SOLUTION SUBCUTANEOUS at 21:59

## 2021-07-23 RX ADMIN — Medication 200 MILLIGRAM(S): at 08:58

## 2021-07-23 RX ADMIN — TOCILIZUMAB 100 MILLIGRAM(S): 20 INJECTION, SOLUTION, CONCENTRATE INTRAVENOUS at 16:30

## 2021-07-23 RX ADMIN — Medication 8: at 10:07

## 2021-07-23 RX ADMIN — Medication 200 MILLIGRAM(S): at 21:59

## 2021-07-23 RX ADMIN — Medication 6 MILLIGRAM(S): at 08:57

## 2021-07-23 RX ADMIN — Medication 7 UNIT(S): at 16:41

## 2021-07-23 NOTE — DIETITIAN INITIAL EVALUATION ADULT. - PERTINENT MEDS FT
MEDICATIONS  (STANDING):  amLODIPine   Tablet 10 milliGRAM(s) Oral daily  atorvastatin 80 milliGRAM(s) Oral at bedtime  dexAMETHasone  Injectable 6 milliGRAM(s) IV Push daily  dextrose 40% Gel 15 Gram(s) Oral once  dextrose 5%. 1000 milliLiter(s) (50 mL/Hr) IV Continuous <Continuous>  dextrose 5%. 1000 milliLiter(s) (100 mL/Hr) IV Continuous <Continuous>  dextrose 50% Injectable 25 Gram(s) IV Push once  dextrose 50% Injectable 12.5 Gram(s) IV Push once  dextrose 50% Injectable 25 Gram(s) IV Push once    Home Medications:  metFORMIN 1000 mg oral tablet: 1 tab(s) orally 2 times a day (21 Jul 2021 21:40)  metFORMIN 500 mg oral tablet: 1 tab(s) orally 2 times a day (06 Sep 2019 08:40)    enoxaparin Injectable 40 milliGRAM(s) SubCutaneous every 12 hours  glucagon  Injectable 1 milliGRAM(s) IntraMuscular once  insulin glargine Injectable (LANTUS) 25 Unit(s) SubCutaneous at bedtime  insulin lispro (ADMELOG) corrective regimen sliding scale   SubCutaneous three times a day before meals  insulin lispro (ADMELOG) corrective regimen sliding scale   SubCutaneous at bedtime  insulin lispro Injectable (ADMELOG) 7 Unit(s) SubCutaneous three times a day before meals  labetalol 200 milliGRAM(s) Oral every 12 hours  losartan 50 milliGRAM(s) Oral daily  remdesivir  IVPB   IV Intermittent   remdesivir  IVPB 100 milliGRAM(s) IV Intermittent every 24 hours  spironolactone 25 milliGRAM(s) Oral daily  tocilizumab IVPB 600 milliGRAM(s) IV Intermittent once    MEDICATIONS  (PRN):  acetaminophen   Tablet .. 650 milliGRAM(s) Oral once PRN Temp greater or equal to 38C (100.4F), Mild Pain (1 - 3)  acetaminophen   Tablet .. 650 milliGRAM(s) Oral every 4 hours PRN Temp greater or equal to 38C (100.4F), Mild Pain (1 - 3)  ALBUTerol    90 MICROgram(s) HFA Inhaler 2 Puff(s) Inhalation every 4 hours PRN Shortness of Breath and/or Wheezing  benzonatate 100 milliGRAM(s) Oral three times a day PRN Cough  ondansetron Injectable 4 milliGRAM(s) IV Push every 6 hours PRN Nausea and/or Vomiting

## 2021-07-23 NOTE — DIETITIAN INITIAL EVALUATION ADULT. - OTHER INFO
55 M with pmh HLD, dm, htn presents with 8 days onset of covid symptoms which included, cough, fevers, sob, hypoxia, fevers, diarrhea, chills and myalgias. TEsted positive 7/15. Wife endorsed he was becoming more sob of recently. On arrival hypoxic to 80s and tachypneic. NRB placed, presently on 15% and saturating 95%. Na 126 s/p 1L NS. CXR: Frandy infiltrates. Last scr 1.4 in 2019-> today 1.9 unknown if underlying ckd. Dimer 450 - normal for age  however with covid and increasing fibrinogen, will need further eval.   Denies chets pain. LHC performed 2019 revealed normal coronaries.  Spoke with pt over phone, who reports 20 lb wt loss in 2 weeks.  PO intake < 75% nutritritional needs > one month  Pt breathing better now, was prone this AM.  Is eating much better.   On non-rebreather.

## 2021-07-23 NOTE — DIETITIAN NUTRITION RISK NOTIFICATION - ADDITIONAL COMMENTS/DIETITIAN RECOMMENDATIONS
Maintain Consistent Carb Diet  Glucerna 8 oz tid  POCT  SSI  Consider Lantus 15 U AM,  15 U PM   subQ  adjust PRN  Record PO intake in EMR after each meal (nursing.)   Monitor PO intake, tolerance, labs and weight.

## 2021-07-23 NOTE — DIETITIAN INITIAL EVALUATION ADULT. - ADD RECOMMEND
Record PO intake in EMR after each meal (nursing.)  SSI.  POCT Q 6 hours. Consider Lantus 15U AM, 15U PM.  Consult with pharmacy.  Maintain SSI.  Adjust Lantus PRN.  Pt PO intake is increasing.

## 2021-07-23 NOTE — PROGRESS NOTE ADULT - SUBJECTIVE AND OBJECTIVE BOX
Date of service: 21 @ 12:04    pt seen and examined  on NRB, hypoxic to mid 80s   has sob/dyspnea  sitting up   afebrile    ROS: no fever or chills; denies dizziness, no HA,  no abdominal pain, no diarrhea or constipation; no dysuria, no urinary frequency, no legs pain, no rashes    MEDICATIONS  (STANDING):  amLODIPine   Tablet 10 milliGRAM(s) Oral daily  atorvastatin 80 milliGRAM(s) Oral at bedtime  dexAMETHasone  Injectable 6 milliGRAM(s) IV Push daily  dextrose 40% Gel 15 Gram(s) Oral once  dextrose 5%. 1000 milliLiter(s) (50 mL/Hr) IV Continuous <Continuous>  dextrose 5%. 1000 milliLiter(s) (100 mL/Hr) IV Continuous <Continuous>  dextrose 50% Injectable 25 Gram(s) IV Push once  dextrose 50% Injectable 12.5 Gram(s) IV Push once  dextrose 50% Injectable 25 Gram(s) IV Push once  enoxaparin Injectable 40 milliGRAM(s) SubCutaneous every 12 hours  glucagon  Injectable 1 milliGRAM(s) IntraMuscular once  insulin glargine Injectable (LANTUS) 25 Unit(s) SubCutaneous at bedtime  insulin lispro (ADMELOG) corrective regimen sliding scale   SubCutaneous three times a day before meals  insulin lispro (ADMELOG) corrective regimen sliding scale   SubCutaneous at bedtime  insulin lispro Injectable (ADMELOG) 7 Unit(s) SubCutaneous three times a day before meals  labetalol 200 milliGRAM(s) Oral every 12 hours  losartan 50 milliGRAM(s) Oral daily  remdesivir  IVPB   IV Intermittent   remdesivir  IVPB 100 milliGRAM(s) IV Intermittent every 24 hours  spironolactone 25 milliGRAM(s) Oral daily  tocilizumab IVPB 600 milliGRAM(s) IV Intermittent once    Vital Signs Last 24 Hrs  T(C): 36.4 (2021 08:20), Max: 36.4 (2021 17:32)  T(F): 97.5 (2021 08:20), Max: 97.6 (2021 17:32)  HR: 75 (2021 08:20) (75 - 83)  BP: 113/70 (2021 08:20) (113/70 - 125/78)  BP(mean): --  RR: 17 (2021 08:20) (17 - 17)  SpO2: 85% (2021 08:20) (85% - 92%)      PE:  Constitutional: NAD on NRB  HEENT: NC/AT, EOMI, PERRLA, conjunctivae clear; ears and nose atraumatic; pharynx benign  Neck: supple; thyroid not palpable  Back: no tenderness  Respiratory: decreased breath sounds, rhonchi  Cardiovascular: S1S2 regular, no murmurs  Abdomen: soft, not tender, not distended, positive BS; liver and spleen WNL  Genitourinary: no suprapubic tenderness  Lymphatic: no LN palpable  Musculoskeletal: no muscle tenderness, no joint swelling or tenderness  Extremities: no pedal edema  Neurological/ Psychiatric: AxOx3, Judgement and insight normal;  moving all extremities  Skin: no rashes; no palpable lesions    Labs: all available labs reviewed                                   .5   1465 )-----------( 281      ( 2021 08:48 )             39.2         131<L>  |  100  |  43<H>  ----------------------------<  316<H>  3.6   |  21<L>  |  1.42<H>    Ca    8.5      2021 08:48    TPro  6.5  /  Alb  2.2<L>  /  TBili  0.4  /  DBili  0.1  /  AST  59<H>  /  ALT  50  /  AlkPhos  160<H>          C-Reactive Protein, Serum: 157 mg/L (21 @ 14:42)  D-Dimer Assay, Quantitative: 460 ng/mL DDU (21 @ 14:42)  Ferritin, Serum: 4077 ng/mL (21 @ 14:42)    Urinalysis Basic - ( 2021 06:10 )    Color: Yellow / Appearance: Clear / S.020 / pH: x  Gluc: x / Ketone: Negative  / Bili: Negative / Urobili: Negative mg/dL   Blood: x / Protein: 30 mg/dL / Nitrite: Negative   Leuk Esterase: Negative / RBC: Negative /HPF / WBC 0-2   Sq Epi: x / Non Sq Epi: Negative / Bacteria: Negative    Culture - Blood (21 @ 14:42)   Specimen Source: .Blood Blood-Peripheral   Culture Results:   No growth to date.       Radiology: all available radiological tests reviewed  < from: Xray Chest 1 View- PORTABLE-Urgent (21 @ 15:33) >    EXAM:  XR CHEST PORTABLE URGENT 1V                            PROCEDURE DATE:  2021          INTERPRETATION:  AP chest on 2021 at 3:27 PM. Patient is short of breath with cough and fever.    Heart magnified by technique.    There arescattered mid lower lung field infiltrates right greater than left possibly related: Pneumonia.    The lungs are clear on 2019.    IMPRESSION: Bilateral infiltrates as above.    < end of copied text >    Advanced directives addressed: full resuscitation

## 2021-07-23 NOTE — DIETITIAN INITIAL EVALUATION ADULT. - MALNUTRITION
Pt meets criteria for severe protein-calorie malnutrition in context of acute disease Pt meets criteria for severe protein-calorie malnutrition in context of acute condition

## 2021-07-23 NOTE — DIETITIAN INITIAL EVALUATION ADULT. - PERTINENT LABORATORY DATA
07-23 Na131 mmol/L<L> Glu 316 mg/dL<H> K+ 3.6 mmol/L Cr  1.42 mg/dL<H> BUN 43 mg/dL<H> Phos n/a   Alb 2.2 g/dL<L> PAB n/a       07-23    131<L>  |  100  |  43<H>  ----------------------------<  316<H>  3.6   |  21<L>  |  1.42<H>    Ca    8.5      23 Jul 2021 08:48      TPro  6.5  /  Alb  2.2<L>  /  TBili  0.4  /  DBili  0.1  /  AST  59<H>  /  ALT  50  /  AlkPhos  160<H>  07-23

## 2021-07-23 NOTE — PROVIDER CONTACT NOTE (OTHER) - ASSESSMENT
Tachypneic. only c/o exhaustion.  Explained to patient that he feels exhausted because he's breathing fast.   No acute distress.   Currently switched from R side to Trendelenburg and prone; still SaO2 <88%
VSS. asymptomatic

## 2021-07-23 NOTE — PROGRESS NOTE ADULT - SUBJECTIVE AND OBJECTIVE BOX
CC: cough/sob (2021 13:40)    HPI: 55 M with PMH of  HLD, DM Type II, HTN  presented  with 8 days onset of covid symptoms which included, cough, fevers, sob, hypoxia, fevers, diarrhea, chills and myalgias. Tested positive 7/15. Wife endorsed he was becoming more sob of recently. On arrival hypoxic to 80s and tachypneic. NRB placed, presently on 15% and saturating 95%. Na 126 s/p 1L NS. CXR: Frandy infiltrates. Last scr 1.4 in 2019-> today 1.9 unknown if underlying ckd. Dimer 450 - normal for age  however with covid and increasing fibrinogen, will need further eval.   Denies chets pain. LHC performed 2019 revealed normal coronaries.     INTERVAL HPI/ OVERNIGHT EVENTS: Chart reviewed, Pt was seen and examined, oob standing, trying to wash up himself, reports that today feels better. Less SOB, less cough, better oral intake. No CP. Plan and new med discussed     Vital Signs Last 24 Hrs  T(C): 34.7 (2021 16:11), Max: 36.4 (2021 20:45)  T(F): 94.5 (2021 16:11), Max: 97.6 (2021 20:45)  HR: 73 (2021 16:11) (73 - 83)  BP: 111/68 (2021 16:11) (111/68 - 121/80)  RR: 17 (2021 16:11) (17 - 17)  SpO2: 93% (2021 16:11) (85% - 93%)        REVIEW OF SYSTEMS:  All other review of systems is negative unless indicated above.      PHYSICAL EXAM:  General: Well developed; mildly dyspneic on NRB mask   Eyes:  EOMI; conjunctiva and sclera clear  Head: Normocephalic; atraumatic  ENMT: No nasal discharge; airway clear  Neck: Supple; non tender; no masses  Respiratory: Coarse BS b/l. No wheezes  Cardiovascular: Regular rate and rhythm. S1 and S2 Normal;   Gastrointestinal: Soft non-tender non-distended; Normal bowel sounds  Genitourinary: No  suprapubic  tenderness  Extremities: No  edema  Vascular: Peripheral pulses palpable 2+ bilaterally  Neurological: Alert and oriented x3, non focal   Musculoskeletal: Normal muscle tone and strength   Psychiatric: Cooperative       LABS:   CARDIAC MARKERS ( 2021 00:41 )  0.316 ng/mL / x     / x     / x     / x      CARDIAC MARKERS ( 2021 19:53 )  0.317 ng/mL / x     / x     / x     / x                                13.5   14.65 )-----------( 281      ( 2021 08:48 )             39.2     2021 08:48    131    |  100    |  43     ----------------------------<  316    3.6     |  21     |  1.42     Ca    8.5        2021 08:48    TPro  6.5    /  Alb  2.2    /  TBili  0.4    /  DBili  0.1    /  AST  59     /  ALT  50     /  AlkPhos  160    2021 08:48      CAPILLARY BLOOD GLUCOSE;   POCT Blood Glucose.: 415 mg/dL (2021 17:35)  POCT Blood Glucose.: 303 mg/dL (2021 09:10)  POCT Blood Glucose.: 356 mg/dL (2021 21:50)    LIVER FUNCTIONS - ( 2021 08:48 )  Alb: 2.2 g/dL / Pro: 6.5 gm/dL / ALK PHOS: 160 U/L / ALT: 50 U/L / AST: 59 U/L / GGT: x           Urinalysis Basic - ( 2021 06:10 )    Color: Yellow / Appearance: Clear / S.020 / pH: x  Gluc: x / Ketone: Negative  / Bili: Negative / Urobili: Negative mg/dL   Blood: x / Protein: 30 mg/dL / Nitrite: Negative   Leuk Esterase: Negative / RBC: Negative /HPF / WBC 0-2   Sq Epi: x / Non Sq Epi: Negative / Bacteria: Negative        MEDICATIONS  (STANDING):  atorvastatin 80 milliGRAM(s) Oral at bedtime  dexAMETHasone  Injectable 6 milliGRAM(s) IV Push daily  dextrose 40% Gel 15 Gram(s) Oral once  dextrose 5%. 1000 milliLiter(s) (50 mL/Hr) IV Continuous <Continuous>  dextrose 5%. 1000 milliLiter(s) (100 mL/Hr) IV Continuous <Continuous>  dextrose 50% Injectable 25 Gram(s) IV Push once  dextrose 50% Injectable 12.5 Gram(s) IV Push once  dextrose 50% Injectable 25 Gram(s) IV Push once  enoxaparin Injectable 40 milliGRAM(s) SubCutaneous every 12 hours  glucagon  Injectable 1 milliGRAM(s) IntraMuscular once  insulin glargine Injectable (LANTUS) 25 Unit(s) SubCutaneous at bedtime  insulin lispro (ADMELOG) corrective regimen sliding scale   SubCutaneous three times a day before meals  insulin lispro (ADMELOG) corrective regimen sliding scale   SubCutaneous at bedtime  insulin lispro Injectable (ADMELOG) 7 Unit(s) SubCutaneous three times a day before meals  labetalol 200 milliGRAM(s) Oral every 12 hours  losartan 50 milliGRAM(s) Oral daily  remdesivir  IVPB   IV Intermittent   remdesivir  IVPB 100 milliGRAM(s) IV Intermittent every 24 hours  tocilizumab IVPB 600 milliGRAM(s) IV Intermittent once    MEDICATIONS  (PRN):  acetaminophen   Tablet .. 650 milliGRAM(s) Oral once PRN Temp greater or equal to 38C (100.4F), Mild Pain (1 - 3)  acetaminophen   Tablet .. 650 milliGRAM(s) Oral every 4 hours PRN Temp greater or equal to 38C (100.4F), Mild Pain (1 - 3)  ALBUTerol    90 MICROgram(s) HFA Inhaler 2 Puff(s) Inhalation every 4 hours PRN Shortness of Breath and/or Wheezing  benzonatate 100 milliGRAM(s) Oral three times a day PRN Cough  ondansetron Injectable 4 milliGRAM(s) IV Push every 6 hours PRN Nausea and/or Vomiting        RADIOLOGY & ADDITIONAL TESTS:    EXAM:  XR CHEST PORTABLE URGENT 1V                        PROCEDURE DATE:  2021      INTERPRETATION:  AP chest on 2021 at 3:27 PM. Patient is short of breath with cough and fever.    Heart magnified by technique.    There arescattered mid lower lung field infiltrates right greater than left possibly related: Pneumonia.    The lungs are clear on 2019.    IMPRESSION: Bilateral infiltrates as above.

## 2021-07-23 NOTE — PROGRESS NOTE ADULT - ASSESSMENT
55 M with pmh HLD, dm, htn presents with 8 days onset of covid symptoms which included, cough, fevers, sob, hypoxia, fevers, diarrhea, chills and myalgias. Tested positive 7/15. Wife endorsed he was becoming more sob of recently. On arrival hypoxic to 80s and tachypneic. NRB placed, presently on 15% and saturating 95%. Na 126 s/p 1L NS. CXR: Frandy infiltrates. Last scr 1.4 in 2019-> 7/21 1.9 unknown if underlying ckd. Dimer 450 - normal for age  however with covid and increasing fibrinogen, will need further eval.   Denies chest pain. LHC performed 2019 revealed normal coronaries. Here xray with b/l lung infiltrates, hypoxic requiring NRB, was given remdesivir/decadron.     1. acute respiratory failure. covid-19 viral syndrome. multifocal pneumonia  - on remdesivir #3 monitor renal/hepatic function closely on therapy  - on decadron 6mg daily #3  - worsening hypoxia on nrb and on steroids/antiviral therapy, pt candidate for immunomodulator tx   - will order tocilizumab x 1, side effects/benefits/risks discussed with pt and is amenable to this therapy  - isolation precautions  - fu cbc  - monitor temps  - observe off antibiotics  - prone positoning  - may require icu eval/hfnc  - supportive care    2. other issues - care per medicine

## 2021-07-23 NOTE — DIETITIAN NUTRITION RISK NOTIFICATION - TREATMENT: THE FOLLOWING DIET HAS BEEN RECOMMENDED
Diet, Regular:   Consistent Carbohydrate {Evening Snack} (CSTCHOSN) (07-22-21 @ 12:12) [Active]

## 2021-07-23 NOTE — PROGRESS NOTE ADULT - ASSESSMENT
55 M with PMH of  HLD, DM Type II, HTN admitted for:       # Acute hypoxemic respiratory failure 2/2 to interstitial pneumonia secondary to COVID-19  - respiratpry status slightly improved today, satting 94-97 this afternoon   - C/w 100% NRB   - C/w  remdesivir #3   - C/w  decadron 6mg daily #3   - Albuterol inhaler PRN   - Tessalon pearls   - isolation precautions  - prone positioning  - supportive care  - trend inflammatory markers   - D/w Dr Marley, will start Tocilizumab today         # NSTEMI   -  no acute CP, ECG: ST, non specific T wave changes  - ECHO: EF 50% ( not new) but had mild hypokinesis   - Start ASA, c/w BB, ACEI and Lipitor   - CArdio eval     # Hypotension   Likely 2/2 infection and poor oral intake  BP better now, will hold spironolactone and Norvasc for now   Monitor BP  Encourage oral intake     # Hyponatremia  - initially  due to Poor oral inatke and dehydration now  pseudohyponatremia secondary to high blood sugars  - control BS and monitor BMP         # DESTINEY/CKD stage 3   -s/p  IVF   - monitor closely   - dose meds according to Cr[facility]        # New dx of DMII (previously unknown for the patient)  - A1C - 11.6, poor glycemic control now on steroids   -  aggressive Blood sugar control  - Increase  Lantus to 25 units, start pre meal Insulin  - Monitor BS QAS and HS and cover with ISS   - dietician eval          # HTN  - BP borderline  -C/w Labetalol / losartan, Hold Norvasc  and   spironolactone      DVT PPX: Lovenox SQ

## 2021-07-24 LAB
ALBUMIN SERPL ELPH-MCNC: 2.3 G/DL — LOW (ref 3.3–5)
ALP SERPL-CCNC: 165 U/L — HIGH (ref 40–120)
ALT FLD-CCNC: 64 U/L — SIGNIFICANT CHANGE UP (ref 12–78)
ANION GAP SERPL CALC-SCNC: 6 MMOL/L — SIGNIFICANT CHANGE UP (ref 5–17)
AST SERPL-CCNC: 73 U/L — HIGH (ref 15–37)
BASE EXCESS BLDA CALC-SCNC: -3.2 MMOL/L — LOW (ref -2–2)
BILIRUB SERPL-MCNC: 0.4 MG/DL — SIGNIFICANT CHANGE UP (ref 0.2–1.2)
BLOOD GAS COMMENTS ARTERIAL: SIGNIFICANT CHANGE UP
BUN SERPL-MCNC: 43 MG/DL — HIGH (ref 7–23)
CALCIUM SERPL-MCNC: 8.7 MG/DL — SIGNIFICANT CHANGE UP (ref 8.5–10.1)
CHLORIDE SERPL-SCNC: 105 MMOL/L — SIGNIFICANT CHANGE UP (ref 96–108)
CO2 SERPL-SCNC: 25 MMOL/L — SIGNIFICANT CHANGE UP (ref 22–31)
CREAT SERPL-MCNC: 1.31 MG/DL — HIGH (ref 0.5–1.3)
CRP SERPL-MCNC: 35 MG/L — HIGH
D DIMER BLD IA.RAPID-MCNC: 446 NG/ML DDU — HIGH
GAS PNL BLDA: SIGNIFICANT CHANGE UP
GLUCOSE SERPL-MCNC: 227 MG/DL — HIGH (ref 70–99)
HCO3 BLDA-SCNC: 19 MMOL/L — LOW (ref 21–29)
HCT VFR BLD CALC: 39.5 % — SIGNIFICANT CHANGE UP (ref 39–50)
HGB BLD-MCNC: 13.2 G/DL — SIGNIFICANT CHANGE UP (ref 13–17)
HOROWITZ INDEX BLDA+IHG-RTO: 1 — SIGNIFICANT CHANGE UP
LDH SERPL L TO P-CCNC: 678 U/L — HIGH (ref 84–241)
MCHC RBC-ENTMCNC: 28.1 PG — SIGNIFICANT CHANGE UP (ref 27–34)
MCHC RBC-ENTMCNC: 33.4 GM/DL — SIGNIFICANT CHANGE UP (ref 32–36)
MCV RBC AUTO: 84 FL — SIGNIFICANT CHANGE UP (ref 80–100)
PCO2 BLDA: 28 MMHG — LOW (ref 32–46)
PH BLDA: 7.45 — SIGNIFICANT CHANGE UP (ref 7.35–7.45)
PLATELET # BLD AUTO: 338 K/UL — SIGNIFICANT CHANGE UP (ref 150–400)
PO2 BLDA: 63 MMHG — LOW (ref 74–108)
POTASSIUM SERPL-MCNC: 4.3 MMOL/L — SIGNIFICANT CHANGE UP (ref 3.5–5.3)
POTASSIUM SERPL-SCNC: 4.3 MMOL/L — SIGNIFICANT CHANGE UP (ref 3.5–5.3)
PROT SERPL-MCNC: 6.6 GM/DL — SIGNIFICANT CHANGE UP (ref 6–8.3)
RBC # BLD: 4.7 M/UL — SIGNIFICANT CHANGE UP (ref 4.2–5.8)
RBC # FLD: 12.4 % — SIGNIFICANT CHANGE UP (ref 10.3–14.5)
SAO2 % BLDA: 92 % — SIGNIFICANT CHANGE UP (ref 92–96)
SODIUM SERPL-SCNC: 136 MMOL/L — SIGNIFICANT CHANGE UP (ref 135–145)
WBC # BLD: 13.52 K/UL — HIGH (ref 3.8–10.5)
WBC # FLD AUTO: 13.52 K/UL — HIGH (ref 3.8–10.5)

## 2021-07-24 PROCEDURE — 99233 SBSQ HOSP IP/OBS HIGH 50: CPT

## 2021-07-24 PROCEDURE — 99291 CRITICAL CARE FIRST HOUR: CPT

## 2021-07-24 RX ORDER — DEXAMETHASONE 0.5 MG/5ML
6 ELIXIR ORAL DAILY
Refills: 0 | Status: DISCONTINUED | OUTPATIENT
Start: 2021-07-24 | End: 2021-07-27

## 2021-07-24 RX ORDER — DEXAMETHASONE 0.5 MG/5ML
6 ELIXIR ORAL DAILY
Refills: 0 | Status: DISCONTINUED | OUTPATIENT
Start: 2021-07-24 | End: 2021-07-24

## 2021-07-24 RX ADMIN — Medication 6 MILLIGRAM(S): at 09:05

## 2021-07-24 RX ADMIN — Medication 6 MILLIGRAM(S): at 18:17

## 2021-07-24 RX ADMIN — ATORVASTATIN CALCIUM 80 MILLIGRAM(S): 80 TABLET, FILM COATED ORAL at 22:05

## 2021-07-24 RX ADMIN — Medication 7 UNIT(S): at 11:16

## 2021-07-24 RX ADMIN — REMDESIVIR 540 MILLIGRAM(S): 5 INJECTION INTRAVENOUS at 18:17

## 2021-07-24 RX ADMIN — Medication 4 UNIT(S): at 00:07

## 2021-07-24 RX ADMIN — Medication 4: at 11:16

## 2021-07-24 RX ADMIN — Medication 200 MILLIGRAM(S): at 09:08

## 2021-07-24 RX ADMIN — ENOXAPARIN SODIUM 40 MILLIGRAM(S): 100 INJECTION SUBCUTANEOUS at 22:05

## 2021-07-24 RX ADMIN — Medication 200 MILLIGRAM(S): at 22:05

## 2021-07-24 RX ADMIN — Medication 60 MILLIGRAM(S): at 11:18

## 2021-07-24 RX ADMIN — Medication 81 MILLIGRAM(S): at 09:04

## 2021-07-24 RX ADMIN — Medication 4: at 09:45

## 2021-07-24 RX ADMIN — Medication 7 UNIT(S): at 09:08

## 2021-07-24 RX ADMIN — Medication 7 UNIT(S): at 16:03

## 2021-07-24 RX ADMIN — LOSARTAN POTASSIUM 50 MILLIGRAM(S): 100 TABLET, FILM COATED ORAL at 09:05

## 2021-07-24 RX ADMIN — Medication 4: at 22:23

## 2021-07-24 RX ADMIN — INSULIN GLARGINE 25 UNIT(S): 100 INJECTION, SOLUTION SUBCUTANEOUS at 22:23

## 2021-07-24 RX ADMIN — ENOXAPARIN SODIUM 40 MILLIGRAM(S): 100 INJECTION SUBCUTANEOUS at 09:05

## 2021-07-24 RX ADMIN — Medication 2: at 16:03

## 2021-07-24 NOTE — CONSULT NOTE ADULT - ASSESSMENT
55 M with pmh HLD, dm, htn presents with 8 days onset of covid symptoms which included, cough, fevers, sob, hypoxia, fevers, diarrhea, chills and myalgias.    7/24  He is in respiratory distress and is being aggressively with proning, and high flow oxygen for his covid infection. His echo showed LV changes but the overall LV function is preserved at 50%. He had a cardiac cath in 2019 which showed no coronary stenosis.   At this time, I would not do anything differently from a cardiac perspective. He should continue aggressive covid therapy and when improved, be assess for obstructive sleep apnea.   Once also improved, will need a repeat echocardiogram to see if the LV function improves. I suspect the LV hypokinesis is related to his septic condition at present.   
55 M with pmh HLD, dm, htn presents with 8 days onset of covid symptoms which included, cough, fevers, sob, hypoxia, fevers, diarrhea, chills and myalgias. Tested positive 7/15. Wife endorsed he was becoming more sob of recently. On arrival hypoxic to 80s and tachypneic. NRB placed, presently on 15% and saturating 95%. Na 126 s/p 1L NS. CXR: Frandy infiltrates. Last scr 1.4 in 2019-> 7/21 1.9 unknown if underlying ckd. Dimer 450 - normal for age  however with covid and increasing fibrinogen, will need further eval.   Denies chest pain. LHC performed 2019 revealed normal coronaries. Here xray with b/l lung infiltrates, hypoxic requiring NRB, was given remdesivir/decadron.     1. acute respiratory failure. covid-19 viral syndrome. multifocal pneumonia  - agree with remdesivir #2 monitor renal/hepatic function closely on therapy  - on decadron 6mg daily #2  - isolation precautions  - fu cbc  - monitor temps  - observe off antibiotics  - prone positoning  - supportive care    2. other issues - care per medicine   
1) COVID Pneumonia  2) Dyspnea  3) Abnormal CXR  4) Hypoxemia  5) Hypertension  6) ROBERT    55 M noted to have COVID 7/15  On arrival hypoxic to 80s and tachypneic. NRB placed,in the ER saturating 95%. Na 126 s/p 1L NS. CXR: Frandy infiltrates. Last scr 1.4 in 2019-> today 1.9 unknown if underlying ckd.  Patient not vaccinated.   Last seen by me in 2019 for ROBERT noted to have uncontrolled hypertension  Treated with IV Remdesivir and Decadron, now on Toci  SaO2 is 80-85% despite being on 100% NRB  ABG pending  Reviewed CXR  Given the decline in status, will start IV Solumedrol 60mg q 8 which will be monitored closely and reduced based on clinical status  Placed order for HFNC  Appreciate ID recommendations  Started Symbicort 160/4.5 2 puffs BID  Discussed with hospitalist; given the obesity/hypertension and prior co-morbidities, he is at risk of intubation but will likely do well with HFNC. 
Imp:   Acute hypoxic resp failure and ARDS due to COVID pna     Plan:  Transfer to ICU   Continue HFNC, prone positioning   On steroids and remdesivir   Received toci 7/23  Will discuss role of casirivimab/imdevimab with ID given symptom onset ~7/13 and in presence of toci   No current evidence of bacterial infection, no need for abx   Does not appear volume overloaded   Continue insulin and other meds  DVT ppx with lovenox   Plan d/w pulm and ID

## 2021-07-24 NOTE — PROGRESS NOTE ADULT - SUBJECTIVE AND OBJECTIVE BOX
CC: cough/sob (2021 13:40)    HPI: 55 M with PMH of  HLD, DM Type II, HTN  presented  with 8 days onset of covid symptoms which included, cough, fevers, sob, hypoxia, fevers, diarrhea, chills and myalgias. Tested positive 7/15. Wife endorsed he was becoming more sob of recently. On arrival hypoxic to 80s and tachypneic. NRB placed, presently on 15% and saturating 95%. Na 126 s/p 1L NS. CXR: Frandy infiltrates. Last scr 1.4 in 2019-> today 1.9 unknown if underlying ckd. Dimer 450 - normal for age  however with covid and increasing fibrinogen, will need further eval.   Denies chets pain. LHC performed 2019 revealed normal coronaries.     INTERVAL HPI/ OVERNIGHT EVENTS:  Pt was seen and examined, proned,   started on HF NC, states that feels better. No fevers. transfer to ICU discussed was given Solumedrol earlier       Vital Signs Last 24 Hrs  T(C): 36.2 (2021 16:00), Max: 36.7 (2021 22:05)  T(F): 97.1 (2021 16:00), Max: 98 (2021 22:05)  HR: 71 (2021 19:00) (61 - 79)  BP: 120/73 (2021 19:00) (106/73 - 124/67)  BP(mean): 79 (2021 18:00) (75 - 83)  RR: 35 (2021 19:00) (15 - 37)  SpO2: 91% (2021 19:00) (85% - 94%)      REVIEW OF SYSTEMS:  All other review of systems is negative unless indicated above.      PHYSICAL EXAM:  General: Well developed; On HFNC  Eyes:  EOMI; conjunctiva and sclera clear  Head: Normocephalic; atraumatic  ENMT: No nasal discharge; airway clear  Neck: Supple; non tender; no masses  Respiratory: Coarse BS b/l. No wheezes  Cardiovascular: Regular rate and rhythm. S1 and S2 Normal;   Gastrointestinal: Soft non-tender non-distended; Normal bowel sounds  Genitourinary: No  suprapubic  tenderness  Extremities: No  edema  Vascular: Peripheral pulses palpable 2+ bilaterally  Neurological: Alert and oriented x3, non focal   Musculoskeletal: Normal muscle tone and strength   Psychiatric: Cooperative       LABS:                         13.2   13.52 )-----------( 338      ( 2021 08:06 )             39.5         136  |  105  |  43<H>  ----------------------------<  227<H>  4.3   |  25  |  1.31<H>    Ca    8.7      2021 08:06    TPro  6.6  /  Alb  2.3<L>  /  TBili  0.4  /  DBili  x   /  AST  73<H>  /  ALT  64  /  AlkPhos  165<H>        LIVER FUNCTIONS - ( 2021 08:06 )  Alb: 2.3 g/dL / Pro: 6.6 gm/dL / ALK PHOS: 165 U/L / ALT: 64 U/L / AST: 73 U/L / GGT: x             ABG - ( 2021 10:46 )  pH, Arterial: 7.45  pH, Blood: x     /  pCO2: 28    /  pO2: 63    / HCO3: 19    / Base Excess: -3.2  /  SaO2: 92                                  13.5   14.65 )-----------( 281      ( 2021 08:48 )             39.2     2021 08:48    131    |  100    |  43     ----------------------------<  316    3.6     |  21     |  1.42     Ca    8.5        2021 08:48    TPro  6.5    /  Alb  2.2    /  TBili  0.4    /  DBili  0.1    /  AST  59     /  ALT  50     /  AlkPhos  160    2021 08:48      CAPILLARY BLOOD GLUCOSE  POCT Blood Glucose.: 179 mg/dL (2021 16:00)  POCT Blood Glucose.: 211 mg/dL (2021 11:10)  POCT Blood Glucose.: 225 mg/dL (2021 09:14)  POCT Blood Glucose.: 305 mg/dL (2021 01:16)  POCT Blood Glucose.: 429 mg/dL (2021 23:10)  POCT Blood Glucose.: 406 mg/dL (2021 21:48)      LIVER FUNCTIONS - ( 2021 08:48 )  Alb: 2.2 g/dL / Pro: 6.5 gm/dL / ALK PHOS: 160 U/L / ALT: 50 U/L / AST: 59 U/L / GGT: x           Urinalysis Basic - ( 2021 06:10 )    Color: Yellow / Appearance: Clear / S.020 / pH: x  Gluc: x / Ketone: Negative  / Bili: Negative / Urobili: Negative mg/dL   Blood: x / Protein: 30 mg/dL / Nitrite: Negative   Leuk Esterase: Negative / RBC: Negative /HPF / WBC 0-2   Sq Epi: x / Non Sq Epi: Negative / Bacteria: Negative        MEDICATIONS  (STANDING):  aspirin  chewable 81 milliGRAM(s) Oral daily  atorvastatin 80 milliGRAM(s) Oral at bedtime  dextrose 40% Gel 15 Gram(s) Oral once  dextrose 5%. 1000 milliLiter(s) (50 mL/Hr) IV Continuous <Continuous>  dextrose 5%. 1000 milliLiter(s) (100 mL/Hr) IV Continuous <Continuous>  dextrose 50% Injectable 25 Gram(s) IV Push once  dextrose 50% Injectable 12.5 Gram(s) IV Push once  dextrose 50% Injectable 25 Gram(s) IV Push once  enoxaparin Injectable 40 milliGRAM(s) SubCutaneous every 12 hours  glucagon  Injectable 1 milliGRAM(s) IntraMuscular once  insulin glargine Injectable (LANTUS) 25 Unit(s) SubCutaneous at bedtime  insulin lispro (ADMELOG) corrective regimen sliding scale   SubCutaneous three times a day before meals  insulin lispro (ADMELOG) corrective regimen sliding scale   SubCutaneous at bedtime  insulin lispro Injectable (ADMELOG) 7 Unit(s) SubCutaneous three times a day before meals  labetalol 200 milliGRAM(s) Oral every 12 hours  losartan 50 milliGRAM(s) Oral daily  remdesivir  IVPB   IV Intermittent   remdesivir  IVPB 100 milliGRAM(s) IV Intermittent every 24 hours    MEDICATIONS  (PRN):  acetaminophen   Tablet .. 650 milliGRAM(s) Oral once PRN Temp greater or equal to 38C (100.4F), Mild Pain (1 - 3)  acetaminophen   Tablet .. 650 milliGRAM(s) Oral every 4 hours PRN Temp greater or equal to 38C (100.4F), Mild Pain (1 - 3)  ALBUTerol    90 MICROgram(s) HFA Inhaler 2 Puff(s) Inhalation every 4 hours PRN Shortness of Breath and/or Wheezing  benzonatate 100 milliGRAM(s) Oral three times a day PRN Cough        RADIOLOGY & ADDITIONAL TESTS:    EXAM:  XR CHEST PORTABLE URGENT 1V                        PROCEDURE DATE:  2021      INTERPRETATION:  AP chest on 2021 at 3:27 PM. Patient is short of breath with cough and fever.    Heart magnified by technique.    There arescattered mid lower lung field infiltrates right greater than left possibly related: Pneumonia.    The lungs are clear on 2019.    IMPRESSION: Bilateral infiltrates as above.

## 2021-07-24 NOTE — PROGRESS NOTE ADULT - SUBJECTIVE AND OBJECTIVE BOX
Date of service: 07-24-21 @ 14:37    Patient events noted; moved to ICU and on high flow oxygen  s/p torcilizumab  infusion  Afebrile, but hypoxic        ROS unable to obtain secondary to patient medical condition     MEDICATIONS  (STANDING):  aspirin  chewable 81 milliGRAM(s) Oral daily  atorvastatin 80 milliGRAM(s) Oral at bedtime  dextrose 40% Gel 15 Gram(s) Oral once  dextrose 5%. 1000 milliLiter(s) (50 mL/Hr) IV Continuous <Continuous>  dextrose 5%. 1000 milliLiter(s) (100 mL/Hr) IV Continuous <Continuous>  dextrose 50% Injectable 25 Gram(s) IV Push once  dextrose 50% Injectable 12.5 Gram(s) IV Push once  dextrose 50% Injectable 25 Gram(s) IV Push once  enoxaparin Injectable 40 milliGRAM(s) SubCutaneous every 12 hours  glucagon  Injectable 1 milliGRAM(s) IntraMuscular once  insulin glargine Injectable (LANTUS) 25 Unit(s) SubCutaneous at bedtime  insulin lispro (ADMELOG) corrective regimen sliding scale   SubCutaneous three times a day before meals  insulin lispro (ADMELOG) corrective regimen sliding scale   SubCutaneous at bedtime  insulin lispro Injectable (ADMELOG) 7 Unit(s) SubCutaneous three times a day before meals  labetalol 200 milliGRAM(s) Oral every 12 hours  losartan 50 milliGRAM(s) Oral daily  methylPREDNISolone sodium succinate Injectable 60 milliGRAM(s) IV Push every 8 hours  remdesivir  IVPB   IV Intermittent   remdesivir  IVPB 100 milliGRAM(s) IV Intermittent every 24 hours    MEDICATIONS  (PRN):  acetaminophen   Tablet .. 650 milliGRAM(s) Oral once PRN Temp greater or equal to 38C (100.4F), Mild Pain (1 - 3)  acetaminophen   Tablet .. 650 milliGRAM(s) Oral every 4 hours PRN Temp greater or equal to 38C (100.4F), Mild Pain (1 - 3)  ALBUTerol    90 MICROgram(s) HFA Inhaler 2 Puff(s) Inhalation every 4 hours PRN Shortness of Breath and/or Wheezing  benzonatate 100 milliGRAM(s) Oral three times a day PRN Cough      Vital Signs Last 24 Hrs  T(C): 35.8 (24 Jul 2021 12:50), Max: 36.7 (23 Jul 2021 22:05)  T(F): 96.4 (24 Jul 2021 12:50), Max: 98 (23 Jul 2021 22:05)  HR: 64 (24 Jul 2021 14:00) (64 - 79)  BP: 108/76 (24 Jul 2021 14:00) (108/70 - 124/67)  BP(mean): 83 (24 Jul 2021 14:00) (79 - 83)  RR: 25 (24 Jul 2021 14:00) (15 - 25)  SpO2: 88% (24 Jul 2021 14:00) (85% - 94%)        Physical Exam:            PE:  Constitutional: NAD on NRB  HEENT: NC/AT, EOMI, PERRLA, conjunctivae clear; ears and nose atraumatic; pharynx benign  Neck: supple; thyroid not palpable  Back: no tenderness  Respiratory: decreased breath sounds, rhonchi  Cardiovascular: S1S2 regular, no murmurs  Abdomen: soft, not tender, not distended, positive BS; liver and spleen WNL  Genitourinary: no suprapubic tenderness  Musculoskeletal: no muscle tenderness, no joint swelling or tenderness  Extremities: no pedal edema  Neurological/ Psychiatric: AxOx3, Judgement and insight normal;  moving all extremities  Skin: no rashes; no palpable lesions    Labs: all available labs reviewed                                Labs:                        13.2   13.52 )-----------( 338      ( 24 Jul 2021 08:06 )             39.5     07-24    136  |  105  |  43<H>  ----------------------------<  227<H>  4.3   |  25  |  1.31<H>    Ca    8.7      24 Jul 2021 08:06    TPro  6.6  /  Alb  2.3<L>  /  TBili  0.4  /  DBili  x   /  AST  73<H>  /  ALT  64  /  AlkPhos  165<H>  07-24           Cultures:       Culture - Blood (collected 07-21-21 @ 14:42)  Source: .Blood Blood-Peripheral  Preliminary Report (07-22-21 @ 22:01):    No growth to date.      D-Dimer Assay, Quantitative: 446 ng/mL DDU (07-24-21 @ 08:06)  C-Reactive Protein, Serum: 35 mg/L (07-24-21 @ 08:06)  C-Reactive Protein, Serum: 157 mg/L (07-21-21 @ 14:42)  D-Dimer Assay, Quantitative: 460 ng/mL DDU (07-21-21 @ 14:42)  Ferritin, Serum: 4077 ng/mL (07-21-21 @ 14:42)          Radiology: all available radiological tests reviewed  < from: Xray Chest 1 View- PORTABLE-Urgent (07.21.21 @ 15:33) >    EXAM:  XR CHEST PORTABLE URGENT 1V                            PROCEDURE DATE:  07/21/2021          INTERPRETATION:  AP chest on July 21, 2021 at 3:27 PM. Patient is short of breath with cough and fever.    Heart magnified by technique.    There arescattered mid lower lung field infiltrates right greater than left possibly related: Pneumonia.    The lungs are clear on August 30, 2019.    IMPRESSION: Bilateral infiltrates as above.    < end of copied text >    Advanced directives addressed: full resuscitation

## 2021-07-24 NOTE — PROGRESS NOTE ADULT - ASSESSMENT
55 M with pmh HLD, dm, htn presents with 8 days onset of covid symptoms which included, cough, fevers, sob, hypoxia, fevers, diarrhea, chills and myalgias. Tested positive 7/15. Wife endorsed he was becoming more sob of recently. On arrival hypoxic to 80s and tachypneic. NRB placed, presently on 15% and saturating 95%. Na 126 s/p 1L NS. CXR: Frandy infiltrates. Last scr 1.4 in 2019-> 7/21 1.9 unknown if underlying ckd. Dimer 450 - normal for age  however with covid and increasing fibrinogen, will need further eval.   Denies chest pain. LHC performed 2019 revealed normal coronaries. Here xray with b/l lung infiltrates, hypoxic requiring NRB, was given remdesivir/decadron.     1. acute respiratory failure. covid-19 viral syndrome. multifocal pneumonia  - on remdesivir #4 monitor renal/hepatic function closely on therapy, most likely will extend to 10 days rx  - decadron was changed to solumedrol, favor decadron given the Recovery trial and [facility] guidelines  - glycemic control  - continue on high flow oxygen  - isolation precautions  - fu cbc  - monitor temps  - observe off antibiotics  - prone positoning  - supportive care    2. other issues - care per medicine    55 M with pmh HLD, dm, htn presents with 8 days onset of covid symptoms which included, cough, fevers, sob, hypoxia, fevers, diarrhea, chills and myalgias. Tested positive 7/15. Wife endorsed he was becoming more sob of recently. On arrival hypoxic to 80s and tachypneic. NRB placed, presently on 15% and saturating 95%. Na 126 s/p 1L NS. CXR: Frandy infiltrates. Last scr 1.4 in 2019-> 7/21 1.9 unknown if underlying ckd. Dimer 450 - normal for age  however with covid and increasing fibrinogen, will need further eval.   Denies chest pain. LHC performed 2019 revealed normal coronaries. Here xray with b/l lung infiltrates, hypoxic requiring NRB, was given remdesivir/decadron.     1. acute respiratory failure. covid-19 viral syndrome. multifocal pneumonia  - on remdesivir #4 monitor renal/hepatic function closely on therapy, most likely will extend to 10 days rx  - decadron was changed to solumedrol, favor decadron given the Recovery trial and [facility] guidelines  - no role for monoclonal antibody, will negate the effects of torcilizumab, and the patient is greater than 3-4 days from diagnosis, usually the monoclonal antibody will be given in outpatient recently diagnosed patient with non severe disease  - glycemic control  - continue on high flow oxygen  - isolation precautions  - fu cbc  - monitor temps  - observe off antibiotics  - prone positoning  - supportive care    2. other issues - care per medicine

## 2021-07-24 NOTE — CONSULT NOTE ADULT - SUBJECTIVE AND OBJECTIVE BOX
Patient is a 55y old  Male who presents with a chief complaint of cough/sob (23 Jul 2021 14:03)      HPI:  55 M with pmh HLD, dm, htn presents with 8 days onset of covid symptoms which included, cough, fevers, sob, hypoxia, fevers, diarrhea, chills and myalgias. TEsted positive 7/15. Wife endorsed he was becoming more sob of recently. On arrival hypoxic to 80s and tachypneic. NRB placed, presently on 15% and saturating 95%. Na 126 s/p 1L NS. CXR: Frandy infiltrates. Last scr 1.4 in 2019-> today 1.9 unknown if underlying ckd.  Patient not vaccinated.   Last seen by me in 2019  History of ROBERT and uncontrolled hypertension  Treated with IV Remdesivir and Decadron, now on Toci  SaO2 is 80-85% despite being on 100% NRB  ABG pending          PAST MEDICAL/SURGICAL/FAMILY/SOCIAL HISTORY:    Past Medical History:  Diabetes    HTN (hypertension).  No surgeries     Tobacco Usage:  · Tobacco Usage	non smoker  Fhx: none (21 Jul 2021 19:19)      PAST MEDICAL & SURGICAL HISTORY:  HTN (hypertension)    Diabetes        PREVIOUS DIAGNOSTIC TESTING:      MEDICATIONS  (STANDING):  aspirin  chewable 81 milliGRAM(s) Oral daily  atorvastatin 80 milliGRAM(s) Oral at bedtime  dexAMETHasone  Injectable 6 milliGRAM(s) IV Push daily  dextrose 40% Gel 15 Gram(s) Oral once  dextrose 5%. 1000 milliLiter(s) (50 mL/Hr) IV Continuous <Continuous>  dextrose 5%. 1000 milliLiter(s) (100 mL/Hr) IV Continuous <Continuous>  dextrose 50% Injectable 25 Gram(s) IV Push once  dextrose 50% Injectable 12.5 Gram(s) IV Push once  dextrose 50% Injectable 25 Gram(s) IV Push once  enoxaparin Injectable 40 milliGRAM(s) SubCutaneous every 12 hours  glucagon  Injectable 1 milliGRAM(s) IntraMuscular once  insulin glargine Injectable (LANTUS) 25 Unit(s) SubCutaneous at bedtime  insulin lispro (ADMELOG) corrective regimen sliding scale   SubCutaneous three times a day before meals  insulin lispro (ADMELOG) corrective regimen sliding scale   SubCutaneous at bedtime  insulin lispro Injectable (ADMELOG) 7 Unit(s) SubCutaneous three times a day before meals  labetalol 200 milliGRAM(s) Oral every 12 hours  losartan 50 milliGRAM(s) Oral daily  remdesivir  IVPB   IV Intermittent   remdesivir  IVPB 100 milliGRAM(s) IV Intermittent every 24 hours    MEDICATIONS  (PRN):  acetaminophen   Tablet .. 650 milliGRAM(s) Oral once PRN Temp greater or equal to 38C (100.4F), Mild Pain (1 - 3)  acetaminophen   Tablet .. 650 milliGRAM(s) Oral every 4 hours PRN Temp greater or equal to 38C (100.4F), Mild Pain (1 - 3)  ALBUTerol    90 MICROgram(s) HFA Inhaler 2 Puff(s) Inhalation every 4 hours PRN Shortness of Breath and/or Wheezing  benzonatate 100 milliGRAM(s) Oral three times a day PRN Cough  ondansetron Injectable 4 milliGRAM(s) IV Push every 6 hours PRN Nausea and/or Vomiting      FAMILY HISTORY:      SOCIAL HISTORY:  ***    REVIEW OF SYSTEM:  Pertinent items are noted in HPI.  Constitutional negative for chills, fevers, sweats and weight loss  throat, and face:  negative for epistaxis, nasal congestion, sore throat and   tinnitus  Respiratory: negative for cough, dyspnea on exertion, pleuritic chest pain  and wheezing  Cardiovascular:  negative for chest pain, dyspnea and palpitations  Gastrointestinal: negative for abdominal pain, diarrhea, nausea and vomiting  Genitourinary: negative for dysuria, frequency and urinary incontinence  Skin:  negative for redness, rash, pruritus, swelling, dryness and   fissures  Hematologic/lymphatic: negative for bleeding and easy bruising  Musculoskeletal: negative for arthralgias, back pain and muscle weakness  Neurological: negative for dizziness, headaches, seizures and tremors  Behavioral/Psych:  negative for mood change, depression, suicidal attempts    Allergic/Immunologic: negative for anaphylaxis, angioedema and urticaria    Vital Signs Last 24 Hrs  T(C): 36.4 (24 Jul 2021 06:00), Max: 36.7 (23 Jul 2021 22:05)  T(F): 97.6 (24 Jul 2021 06:00), Max: 98 (23 Jul 2021 22:05)  HR: 69 (24 Jul 2021 06:00) (69 - 79)  BP: 124/67 (24 Jul 2021 06:00) (111/68 - 124/67)  BP(mean): --  RR: 20 (24 Jul 2021 06:00) (17 - 20)  SpO2: 94% (24 Jul 2021 06:00) (92% - 94%)    I&O's Summary    23 Jul 2021 07:01  -  24 Jul 2021 07:00  --------------------------------------------------------  IN: 0 mL / OUT: 1600 mL / NET: -1600 mL      PHYSICAL EXAM  General Appearance: cooperative, no acute distress,   HEENT: PERRL, conjunctiva clear, EOM's intact, non injected pharynx, no exudate, TM   normal  Neck: Supple, , no adenopathy, thyroid: not enlarged, no carotid bruit or JVD  Back: Symmetric, no  tenderness,no soft tissue tenderness  Lungs: Clear to auscultation bilateral,no adventitious breath sounds, normal   expiratory phase  Heart: Regular rate and rhythm, S1, S2 normal, no murmur, rub or gallop  Abdomen: Soft, non-tender, bowel sounds active , no hepatosplenomegaly  Extremities: no cyanosis or edema, no joint swelling  Skin: Skin color, texture normal, no rashes   Neurologic: Alert and oriented X3 , cranial nerves intact, sensory and motor normal,    ECG:    LABS:                          13.2   13.52 )-----------( 338      ( 24 Jul 2021 08:06 )             39.5     07-24    136  |  105  |  43<H>  ----------------------------<  227<H>  4.3   |  25  |  1.31<H>    Ca    8.7      24 Jul 2021 08:06    TPro  6.6  /  Alb  2.3<L>  /  TBili  0.4  /  DBili  x   /  AST  73<H>  /  ALT  64  /  AlkPhos  165<H>  07-24          Pro BNP  -- 07-24 @ 08:06  D Dimer  446 07-24 @ 08:06  Pro BNP  261 07-21 @ 14:42  D Dimer  460 07-21 @ 14:42              RADIOLOGY & ADDITIONAL STUDIES:    
55 M with pmh HLD, dm, htn presents with 8 days onset of covid symptoms which included, cough, fevers, sob, hypoxia, fevers, diarrhea, chills and myalgias. TEsted positive 7/15. Wife endorsed he was becoming more sob of recently. On arrival hypoxic to 80s and tachypneic. NRB placed, presently on 15% and saturating 95%. Na 126 s/p 1L NS. CXR: Frandy infiltrates. Last scr 1.4 in 2019-> today 1.9 unknown if underlying ckd. Dimer 450 - normal for age  however with covid and increasing fibrinogen, will need further eval.   Denies chets pain. LHC performed 2019 revealed normal coronaries.     7/24  overnight, had bradycardia down to the 30s. has obstructive sleep apnea. the bradycardia was asymptomatic and transient.   He has a Covid infection, with hypoxia, and is being proned. He is on high flow oxygen.   Had an echo which showed some LV hypokinesis but a preserved EF        PAST MEDICAL/SURGICAL/FAMILY/SOCIAL HISTORY:    Past Medical History:  Diabetes    HTN (hypertension).  No surgeries     Tobacco Usage:  · Tobacco Usage	non smoker  Fhx: none (21 Jul 2021 19:19)      PAST MEDICAL & SURGICAL HISTORY:  HTN (hypertension)    Diabetes      MEDICATIONS  (STANDING):  aspirin  chewable 81 milliGRAM(s) Oral daily  atorvastatin 80 milliGRAM(s) Oral at bedtime  dextrose 40% Gel 15 Gram(s) Oral once  dextrose 5%. 1000 milliLiter(s) (50 mL/Hr) IV Continuous <Continuous>  dextrose 5%. 1000 milliLiter(s) (100 mL/Hr) IV Continuous <Continuous>  dextrose 50% Injectable 25 Gram(s) IV Push once  dextrose 50% Injectable 25 Gram(s) IV Push once  dextrose 50% Injectable 12.5 Gram(s) IV Push once  enoxaparin Injectable 40 milliGRAM(s) SubCutaneous every 12 hours  glucagon  Injectable 1 milliGRAM(s) IntraMuscular once  insulin glargine Injectable (LANTUS) 25 Unit(s) SubCutaneous at bedtime  insulin lispro (ADMELOG) corrective regimen sliding scale   SubCutaneous three times a day before meals  insulin lispro (ADMELOG) corrective regimen sliding scale   SubCutaneous at bedtime  insulin lispro Injectable (ADMELOG) 7 Unit(s) SubCutaneous three times a day before meals  labetalol 200 milliGRAM(s) Oral every 12 hours  losartan 50 milliGRAM(s) Oral daily  methylPREDNISolone sodium succinate Injectable 60 milliGRAM(s) IV Push every 8 hours  remdesivir  IVPB   IV Intermittent   remdesivir  IVPB 100 milliGRAM(s) IV Intermittent every 24 hours    MEDICATIONS  (PRN):  acetaminophen   Tablet .. 650 milliGRAM(s) Oral once PRN Temp greater or equal to 38C (100.4F), Mild Pain (1 - 3)  acetaminophen   Tablet .. 650 milliGRAM(s) Oral every 4 hours PRN Temp greater or equal to 38C (100.4F), Mild Pain (1 - 3)  ALBUTerol    90 MICROgram(s) HFA Inhaler 2 Puff(s) Inhalation every 4 hours PRN Shortness of Breath and/or Wheezing  benzonatate 100 milliGRAM(s) Oral three times a day PRN Cough    Allergies    No Known Allergies    Intolerances      FAMILY HISTORY:        REVIEW OF SYSTEMS:    CONSTITUTIONAL: No weakness, fevers or chills  EYES/ENT: No visual changes;  No vertigo or throat pain   NECK: No pain or stiffness  RESPIRATORY: No cough, wheezing, hemoptysis; No shortness of breath  CARDIOVASCULAR: No chest pain or palpitations  GASTROINTESTINAL: No abdominal or epigastric pain. No nausea, vomiting, or hematemesis; No diarrhea or constipation. No melena or hematochezia.  GENITOURINARY: No dysuria, frequency or hematuria  NEUROLOGICAL: No numbness or weakness  SKIN: No itching, burning, rashes, or lesions   All other review of systems is negative unless indicated above      PHYSICAL EXAM:  Daily     Daily   Vital Signs Last 24 Hrs  T(C): 36.2 (24 Jul 2021 08:38), Max: 36.7 (23 Jul 2021 22:05)  T(F): 97.2 (24 Jul 2021 08:38), Max: 98 (23 Jul 2021 22:05)  HR: 70 (24 Jul 2021 08:38) (69 - 79)  BP: 110/74 (24 Jul 2021 08:38) (110/74 - 124/67)  BP(mean): --  RR: 22 (24 Jul 2021 08:38) (17 - 22)  SpO2: 90% (24 Jul 2021 08:38) (90% - 94%)      LABS: All Labs Reviewed:                        13.2   13.52 )-----------( 338      ( 24 Jul 2021 08:06 )             39.5     07-24    136  |  105  |  43<H>  ----------------------------<  227<H>  4.3   |  25  |  1.31<H>    Ca    8.7      24 Jul 2021 08:06    TPro  6.6  /  Alb  2.3<L>  /  TBili  0.4  /  DBili  x   /  AST  73<H>  /  ALT  64  /  AlkPhos  165<H>  07-24          Blood Culture: Organism --  Gram Stain Blood -- Gram Stain --  Specimen Source .Blood Blood-Peripheral  Culture-Blood --        RADIOLOGY: < from: Xray Chest 1 View- PORTABLE-Urgent (07.21.21 @ 15:33) >    EXAM:  XR CHEST PORTABLE URGENT 1V                            PROCEDURE DATE:  07/21/2021          INTERPRETATION:  AP chest on July 21, 2021 at 3:27 PM. Patient is short of breath with cough and fever.    Heart magnified by technique.    There arescattered mid lower lung field infiltrates right greater than left possibly related: Pneumonia.    The lungs are clear on August 30, 2019.    IMPRESSION: Bilateral infiltrates as above.    < end of copied text >    EKG: NSR    CARDIOLOGY TESTING:  < from: TTE Echo Complete w/o Contrast w/ Doppler (07.22.21 @ 11:51) >   EXAM:  ECHO TTE WO CON COMP W DOPP         PROCEDURE DATE:  07/22/2021        INTERPRETATION:  Transthoracic Echocardiography Report (TTE)     Demographics     Patient name        ANNELIESE LOWE       Age           55 year(s)     Med Rec #  [id number]            Gender        Male     Account #           [id number]         Date of Birth 1965     Interpreting        Elle Henderson, Room Number   0321   Physician           MD     Referring Physician Heidi Fairchild      Sonographer   Benita Longo                                                          ASHISH     Date of study       07/22/2021 11:07 AM     Height              65.98 in             Weight        220.02 pounds    Type of Study:     TTE procedure: ECHO TTEWO CON COMP W DOP     BP: 91/65 mmHg     [facility] Quality: Fair    Indications   1) R94.31 - Abnormal electrocardiogram ECG EKG    M-Mode Measurements (cm)     LVEDd: 5.68 cm            LVESd: 4.22 cm   IVSEd: 1.44 cm   LVPWd: 1.3 cm             AO Root Dimension: 3.8 cm                             ACS: 2 cm                             LA: 4.3 cm    Doppler Measurements:     AV Velocity:155 cm/s               MV Peak E-Wave: 86.9 cm/s   AV Peak Gradient: 9.61 mmHg        MV Peak A-Wave: 61.7 cm/s                                      MV E/A Ratio: 1.41 %   TR Velocity:277 cm/s               MV Peak Gradient: 3.02 mmHg   TR Gradient:30.6916 mmHg   Estimated RAP:5 mmHg   RVSP:36 mmHg     Findings     Mitral Valve   Fibrocalcific changes noted to the mitral valve leaflets with preserved   leaflet excursion.   Trace mitral regurgitation is present.     Aortic Valve   The aortic valve is trileaflet with thin pliable leaflets.   Mild aortic regurgitation is present.   The aortic root is at the upper limits of normal.   The ascending aorta appears to be minimally dilated.     Tricuspid Valve   The tricuspid valve leaflets are thin and pliable; valve motion is normal.   Trace tricuspid valve regurgitation is present.   Mild pulmonary hypertension.     Pulmonic Valve   Normal appearing pulmonic valve structure.   Trace pulmonic valvular regurgitation is present.     Left Atrium   Normal appearing left atrium.     Left Ventricle   Mild concentric left ventricular hypertrophy is present. Mild dyskinesis   of the left ventricle.   Estimated left ventricular ejection fraction is 50 %.     Right Atrium   The right atrium is at the upper limits of normal.     Right Ventricle   Normal appearing right ventricle structure and function.     Pericardial Effusion   No evidence of pericardial effusion.     Pleural Effusion   No evidence of pleural effusion.     Miscellaneous   The IVC appears underdistended.     Impression     Summary     Fibrocalcific changes noted to the mitral valve leaflets with preserved   leaflet excursion.   Trace mitral regurgitation is present.   The aortic valve is trileaflet with thin pliable leaflets.   Mild aortic regurgitation is present.   The aortic root is at the upper limits of normal.   The ascending aorta appears to be minimally dilated.   The tricuspid valve leaflets are thin and pliable; valve motion is normal.   Trace tricuspid valve regurgitation is present.   Mild pulmonary hypertension.   Normal appearing pulmonic valve structure.   Trace pulmonic valvular regurgitation is present.   Normal appearing left atrium.   Mild concentric left ventricular hypertrophy is present. Mild dyskinesis   of the left ventricle.   Estimated left ventricular ejection fraction is 50 %.   The right atrium is at the upper limits of normal.   Normal appearing right ventricle structure and function.    < end of copied text >  
Patient is a 55y old  Male who presents with a chief complaint of cough/sob (2021 19:19)    HPI:  55 M with pmh HLD, dm, htn presents with 8 days onset of covid symptoms which included, cough, fevers, sob, hypoxia, fevers, diarrhea, chills and myalgias. Tested positive 7/15. Wife endorsed he was becoming more sob of recently. On arrival hypoxic to 80s and tachypneic. NRB placed, presently on 15% and saturating 95%. Na 126 s/p 1L NS. CXR: Frandy infiltrates. Last scr 1.4 in ->  1.9 unknown if underlying ckd. Dimer 450 - normal for age  however with covid and increasing fibrinogen, will need further eval.   Denies chest pain. LHC performed  revealed normal coronaries. Here xray with b/l lung infiltrates, hypoxic requiring NRB, was given remdesivir/decadron.       PAST MEDICAL HX:    Diabetes    HTN (hypertension).  No surgeries      Meds: per reconciliation sheet, noted below  MEDICATIONS  (STANDING):  amLODIPine   Tablet 10 milliGRAM(s) Oral daily  atorvastatin 80 milliGRAM(s) Oral at bedtime  dexAMETHasone  Injectable 6 milliGRAM(s) IV Push daily  dextrose 40% Gel 15 Gram(s) Oral once  dextrose 5%. 1000 milliLiter(s) (50 mL/Hr) IV Continuous <Continuous>  dextrose 5%. 1000 milliLiter(s) (100 mL/Hr) IV Continuous <Continuous>  dextrose 50% Injectable 25 Gram(s) IV Push once  dextrose 50% Injectable 12.5 Gram(s) IV Push once  dextrose 50% Injectable 25 Gram(s) IV Push once  enoxaparin Injectable 40 milliGRAM(s) SubCutaneous every 12 hours  glucagon  Injectable 1 milliGRAM(s) IntraMuscular once  insulin lispro (ADMELOG) corrective regimen sliding scale   SubCutaneous three times a day before meals  insulin lispro (ADMELOG) corrective regimen sliding scale   SubCutaneous at bedtime  labetalol 200 milliGRAM(s) Oral every 12 hours  losartan 50 milliGRAM(s) Oral daily  remdesivir  IVPB   IV Intermittent   remdesivir  IVPB 100 milliGRAM(s) IV Intermittent every 24 hours  spironolactone 25 milliGRAM(s) Oral daily      Allergies    No Known Allergies    Intolerances      Social: no smoking, no alcohol, no illegal drugs; no recent travel, no exposure to TB  FAMILY HISTORY:     no history of premature cardiovascular disease in first degree relatives    ROS: no HA, no dizziness, no sore throat, no blurry vision, no CP, no palpitations, no abdominal pain, no diarrhea, no N/V, no dysuria, no leg pain, no claudication, no rash, no joint aches, no rectal pain or bleeding, no night sweats    All other systems reviewed and are negative    Vital Signs Last 24 Hrs  T(C): 36.4 (2021 09:36), Max: 36.8 (2021 16:30)  T(F): 97.6 (2021 09:36), Max: 98.3 (2021 16:30)  HR: 70 (2021 09:36) (54 - 91)  BP: 98/55 (2021 09:36) (91/55 - 130/78)  BP(mean): 67 (2021 06:18) (63 - 67)  RR: 26 (2021 09:36) (22 - 35)  SpO2: 90% (2021 09:36) (87% - 98%)  Daily Height in cm: 167.64 (2021 14:34)    Daily Weight in k.3 (2021 06:18)    PE:  Constitutional: NAD on NRB  HEENT: NC/AT, EOMI, PERRLA, conjunctivae clear; ears and nose atraumatic; pharynx benign  Neck: supple; thyroid not palpable  Back: no tenderness  Respiratory: decreased breath sounds, rhonchi  Cardiovascular: S1S2 regular, no murmurs  Abdomen: soft, not tender, not distended, positive BS; liver and spleen WNL  Genitourinary: no suprapubic tenderness  Lymphatic: no LN palpable  Musculoskeletal: no muscle tenderness, no joint swelling or tenderness  Extremities: no pedal edema  Neurological/ Psychiatric: AxOx3, Judgement and insight normal;  moving all extremities  Skin: no rashes; no palpable lesions    Labs: all available labs reviewed                        14.0   8.85  )-----------( 229      ( 2021 07:57 )             40.6     07-22    130<L>  |  100  |  40<H>  ----------------------------<  331<H>  3.5   |  19<L>  |  1.52<H>    Ca    8.3<L>      2021 07:57    TPro  6.9  /  Alb  2.2<L>  /  TBili  0.4  /  DBili  0.1  /  AST  73<H>  /  ALT  51  /  AlkPhos  178<H>       LIVER FUNCTIONS - ( 2021 07:57 )  Alb: 2.2 g/dL / Pro: 6.9 gm/dL / ALK PHOS: 178 U/L / ALT: 51 U/L / AST: 73 U/L / GGT: x           Urinalysis Basic - ( 2021 06:10 )    Color: Yellow / Appearance: Clear / S.020 / pH: x  Gluc: x / Ketone: Negative  / Bili: Negative / Urobili: Negative mg/dL   Blood: x / Protein: 30 mg/dL / Nitrite: Negative   Leuk Esterase: Negative / RBC: Negative /HPF / WBC 0-2   Sq Epi: x / Non Sq Epi: Negative / Bacteria: Negative          Radiology: all available radiological tests reviewed  < from: Xray Chest 1 View- PORTABLE-Urgent (21 @ 15:33) >    EXAM:  XR CHEST PORTABLE URGENT 1V                            PROCEDURE DATE:  2021          INTERPRETATION:  AP chest on 2021 at 3:27 PM. Patient is short of breath with cough and fever.    Heart magnified by technique.    There arescattered mid lower lung field infiltrates right greater than left possibly related: Pneumonia.    The lungs are clear on 2019.    IMPRESSION: Bilateral infiltrates as above.    < end of copied text >    Advanced directives addressed: full resuscitation
Patient is a 55y old  Male who presents with a chief complaint of cough/sob (24 Jul 2021 11:09)    HPI:  55 M with pmh HLD, dm, htn presents with 8 days onset of covid symptoms which included, cough, fevers, sob, hypoxia, fevers, diarrhea, chills and myalgias. TEsted positive 7/15. Wife endorsed he was becoming more sob of recently. On arrival hypoxic to 80s and tachypneic. NRB placed, presently on 15% and saturating 95%. Na 126 s/p 1L NS. CXR: Frandy infiltrates. Last scr 1.4 in 2019-> today 1.9 unknown if underlying ckd. Dimer 450 - normal for age  however with covid and increasing fibrinogen, will need further eval.   Denies chets pain. LHC performed 2019 revealed normal coronaries.     ICU: consulted for worsening hypoxia, patient was switched from dexamethasone to solumedrol today, received a dose of tocilizumab 7/23 and is on remdesivir. He is placed on HFNC today, sats are in 90s when he is calm and in prone position, drop to low to mid 80s when he starts moving around       PAST MEDICAL/SURGICAL/FAMILY/SOCIAL HISTORY:    Past Medical History:  Diabetes    HTN (hypertension).  No surgeries     Tobacco Usage:  · Tobacco Usage	non smoker  Fhx: none (21 Jul 2021 19:19)    Allergies: No Known Allergies    PAST MEDICAL & SURGICAL HISTORY:  HTN (hypertension)    Diabetes      FAMILY HISTORY:    HOME MEDICATIONS  amlodipine-benazepril 10 mg-40 mg oral capsule: 1 cap(s) orally once a day (06 Sep 2019 08:40)  aspirin 325 mg oral tablet: 1 tab(s) orally once a day (06 Sep 2019 08:40)  fenofibric acid 135 mg oral delayed release capsule: 1 cap(s) orally once a day (06 Sep 2019 08:40)  losartan 50 mg oral tablet: 1 tab(s) orally once a day (06 Sep 2019 08:40)  metFORMIN 1000 mg oral tablet: 1 tab(s) orally 2 times a day (21 Jul 2021 21:40)  metFORMIN 500 mg oral tablet: 1 tab(s) orally 2 times a day (06 Sep 2019 08:40)  Metoprolol Tartrate:  (06 Sep 2019 08:40)  spironolactone 25 mg oral tablet: 1 tab(s) orally once a day (06 Sep 2019 08:40)    REVIEW OF SYSTEMS  Constitutional: No fever, chills, fatigue  Neuro: No headache, numbness, weakness  Resp: +cough, shortness of breath  CVS: No chest pain, palpitations, leg swelling  GI: No abdominal pain, nausea, vomiting, diarrhea   : No dysuria, frequency, incontinence  Skin: No itching, burning, rashes, or lesions   Msk: No joint pain or swelling  Psych: No depression, anxiety, mood swings  Heme: No bleeding    T(F): 97.2 (07-24-21 @ 08:38), Max: 98 (07-23-21 @ 22:05)  HR: 70 (07-24-21 @ 08:38) (69 - 79)  BP: 110/74 (07-24-21 @ 08:38) (110/74 - 124/67)  RR: 22 (07-24-21 @ 08:38) (17 - 22)  SpO2: 90% (07-24-21 @ 08:38) (90% - 94%)  Wt(kg): --            I&O's Summary    07-23 @ 07:01  -  07-24 @ 07:00  --------------------------------------------------------  IN: 0 mL / OUT: 1600 mL / NET: -1600 mL      PHYSICAL EXAM  General: middle aged male on HFNC, NAD  CNS: AAOx3, no focal deficits   HEENT: on HFNC, dry mucosa, pupils reactive b/l   Resp: good b/l AE, scattered rales   CVS: S1S2, regular   Abd: soft, NT, +BS  Ext: no edema   Skin: warm, not mottled     MEDICATIONS  remdesivir  IVPB IV Intermittent  remdesivir  IVPB IV Intermittent    labetalol Oral  losartan Oral    atorvastatin Oral  dextrose 40% Gel Oral  dextrose 50% Injectable IV Push  dextrose 50% Injectable IV Push  dextrose 50% Injectable IV Push  glucagon  Injectable IntraMuscular  insulin glargine Injectable (LANTUS) SubCutaneous  insulin lispro (ADMELOG) corrective regimen sliding scale SubCutaneous  insulin lispro (ADMELOG) corrective regimen sliding scale SubCutaneous  insulin lispro Injectable (ADMELOG) SubCutaneous  methylPREDNISolone sodium succinate Injectable IV Push    ALBUTerol    90 MICROgram(s) HFA Inhaler Inhalation PRN  benzonatate Oral PRN    acetaminophen   Tablet .. Oral PRN  acetaminophen   Tablet .. Oral PRN      aspirin  chewable Oral  enoxaparin Injectable SubCutaneous        dextrose 5%. IV Continuous  dextrose 5%. IV Continuous                                13.2   13.52 )-----------( 338      ( 24 Jul 2021 08:06 )             39.5       07-24    136  |  105  |  43<H>  ----------------------------<  227<H>  4.3   |  25  |  1.31<H>    Ca    8.7      24 Jul 2021 08:06    TPro  6.6  /  Alb  2.3<L>  /  TBili  0.4  /  DBili  x   /  AST  73<H>  /  ALT  64  /  AlkPhos  165<H>  07-24              .Blood Blood-Peripheral   No growth to date. -- 07-21 @ 14:42      CXR (7/21): bilateral infiltrates, no pleural effusion     CODE STATUS: full  GOC discussion: LIANE

## 2021-07-24 NOTE — PROGRESS NOTE ADULT - ASSESSMENT
55 M with PMH of  HLD, DM Type II, HTN admitted for:       # Acute hypoxemic respiratory failure 2/2 to interstitial pneumonia secondary to COVID-19  - respiratory status tenuous, desats with mon movement to mid-high 80s on NRB     -d/w Dr Perkins, abg reviewed.  - Pt started on HFNC  - C/w  remdesivir #4  -  decadron changed to Solumedrol IV   - started on Tocilizumab on 7/23  - Albuterol inhaler PRN   - Tessalon pearls PRN  - isolation precautions  - prone positioning  - supportive care  - trend inflammatory markers       # NSTEMI   -  no acute CP, ECG: ST, non specific T wave changes  - ECHO: EF 50% ( not new) but had mild hypokinesis   - Start ASA, c/w BB, ACEI and Lipitor   - D/w Dr Pierce    # Hypotension   Likely 2/2 infection and poor oral intake  BP better now,  hold spironolactone and Norvasc for now   Monitor BP  Encourage oral intake     # Hyponatremia  - initially  due to Poor oral inatke and dehydration now  pseudohyponatremia secondary to high blood sugars  - control BS and monitor BMP         # DETSINEY/CKD stage 3   -s/p  IVF   - monitor closely   - dose meds according to [facility]        # New dx of DMII (previously unknown for the patient)  - A1C - 11.6, poor glycemic control now on steroids   -  aggressive Blood sugar control  - C/w   Lantus to 25 units, start pre meal Insulin  - Monitor BS QAS and HS and cover with ISS   - dietician evaristo          # HTN  - BP borderline  -C/w Labetalol / losartan, Hold Norvasc  and   spironolactone      DVT PPX: Lovenox SQ    D/w  intensivist Pt to be transferred to ICU for further management

## 2021-07-24 NOTE — CONSULT NOTE ADULT - CONSULT REASON
COVID Pneumonia
COVID pna, resp failure
acute resp failure  covid-19  viral syndrome/pna
LV wall motion changes on echo

## 2021-07-25 LAB
HCT VFR BLD CALC: 38.8 % — LOW (ref 39–50)
HGB BLD-MCNC: 13.1 G/DL — SIGNIFICANT CHANGE UP (ref 13–17)
MCHC RBC-ENTMCNC: 28.6 PG — SIGNIFICANT CHANGE UP (ref 27–34)
MCHC RBC-ENTMCNC: 33.8 GM/DL — SIGNIFICANT CHANGE UP (ref 32–36)
MCV RBC AUTO: 84.7 FL — SIGNIFICANT CHANGE UP (ref 80–100)
PLATELET # BLD AUTO: 348 K/UL — SIGNIFICANT CHANGE UP (ref 150–400)
RBC # BLD: 4.58 M/UL — SIGNIFICANT CHANGE UP (ref 4.2–5.8)
RBC # FLD: 12.4 % — SIGNIFICANT CHANGE UP (ref 10.3–14.5)
WBC # BLD: 12.51 K/UL — HIGH (ref 3.8–10.5)
WBC # FLD AUTO: 12.51 K/UL — HIGH (ref 3.8–10.5)

## 2021-07-25 PROCEDURE — 93970 EXTREMITY STUDY: CPT | Mod: 26

## 2021-07-25 PROCEDURE — 99291 CRITICAL CARE FIRST HOUR: CPT

## 2021-07-25 RX ORDER — REMDESIVIR 5 MG/ML
100 INJECTION INTRAVENOUS EVERY 24 HOURS
Refills: 0 | Status: COMPLETED | OUTPATIENT
Start: 2021-07-26 | End: 2021-07-30

## 2021-07-25 RX ORDER — HEPARIN SODIUM 5000 [USP'U]/ML
5000 INJECTION INTRAVENOUS; SUBCUTANEOUS EVERY 8 HOURS
Refills: 0 | Status: DISCONTINUED | OUTPATIENT
Start: 2021-07-25 | End: 2021-07-25

## 2021-07-25 RX ORDER — QUETIAPINE FUMARATE 200 MG/1
25 TABLET, FILM COATED ORAL AT BEDTIME
Refills: 0 | Status: DISCONTINUED | OUTPATIENT
Start: 2021-07-25 | End: 2021-07-25

## 2021-07-25 RX ORDER — INSULIN GLARGINE 100 [IU]/ML
35 INJECTION, SOLUTION SUBCUTANEOUS AT BEDTIME
Refills: 0 | Status: DISCONTINUED | OUTPATIENT
Start: 2021-07-25 | End: 2021-07-27

## 2021-07-25 RX ADMIN — ENOXAPARIN SODIUM 40 MILLIGRAM(S): 100 INJECTION SUBCUTANEOUS at 21:54

## 2021-07-25 RX ADMIN — ATORVASTATIN CALCIUM 80 MILLIGRAM(S): 80 TABLET, FILM COATED ORAL at 21:54

## 2021-07-25 RX ADMIN — Medication 7 UNIT(S): at 16:34

## 2021-07-25 RX ADMIN — Medication 7 UNIT(S): at 13:17

## 2021-07-25 RX ADMIN — Medication 4: at 16:34

## 2021-07-25 RX ADMIN — Medication 200 MILLIGRAM(S): at 21:54

## 2021-07-25 RX ADMIN — Medication 200 MILLIGRAM(S): at 10:27

## 2021-07-25 RX ADMIN — ENOXAPARIN SODIUM 40 MILLIGRAM(S): 100 INJECTION SUBCUTANEOUS at 10:27

## 2021-07-25 RX ADMIN — Medication 2: at 22:07

## 2021-07-25 RX ADMIN — INSULIN GLARGINE 35 UNIT(S): 100 INJECTION, SOLUTION SUBCUTANEOUS at 22:06

## 2021-07-25 RX ADMIN — Medication 7 UNIT(S): at 08:08

## 2021-07-25 RX ADMIN — LOSARTAN POTASSIUM 50 MILLIGRAM(S): 100 TABLET, FILM COATED ORAL at 10:28

## 2021-07-25 RX ADMIN — Medication 6: at 13:17

## 2021-07-25 RX ADMIN — Medication 6 MILLIGRAM(S): at 10:28

## 2021-07-25 RX ADMIN — REMDESIVIR 540 MILLIGRAM(S): 5 INJECTION INTRAVENOUS at 18:07

## 2021-07-25 RX ADMIN — Medication 8: at 08:08

## 2021-07-25 RX ADMIN — Medication 81 MILLIGRAM(S): at 10:27

## 2021-07-25 NOTE — PROGRESS NOTE ADULT - ASSESSMENT
1) COVID Pneumonia  2) Dyspnea  3) Abnormal CXR  4) Hypoxemia  5) Hypertension  6) ROBERT    55 M noted to have COVID 7/15  On arrival hypoxic to 80s and tachypneic. NRB placed,in the ER saturating 95%. Na 126 s/p 1L NS. CXR: Frandy infiltrates. Last scr 1.4 in 2019-> today 1.9 unknown if underlying ckd.  Patient not vaccinated.   Last seen by me in 2019 for ROBERT noted to have uncontrolled hypertension  Treated with IV Remdesivir and Decadron, ? Tocilizumab   SaO2 is 80-85% despite being on 100% NRB so was switched to HFNC  ABG/imaging reviewed  Given the decline in status, recommend to continue IV Solumedrol 60mg q 8 which will be monitored closely and reduced based on clinical status  Continue HFNC, imperative that invasive modality with 37C is placed at all times to improve conductance. currently 60L, 100%  Appreciate ID recommendations  Started Symbicort 160/4.5 2 puffs BID  Discussed with MICU service;   Given the obesity/hypertension and prior co-morbidities, he is at risk of intubation but continue HFNC, respiratory status remains tenuous

## 2021-07-25 NOTE — PROGRESS NOTE ADULT - ASSESSMENT
Acute hypoxic resp failure and ARDS due to COVID pna     Plan:     Continue HFNC, prone positioning as tolerated, if needed could try bipap  On steroids and remdesivir   Received toci 7/23  d/w ID not candidate for monoclonal antibodies.  No current evidence of bacterial infection, no need for abx   still hyperglycemic will increase lantus   DVT ppx with lovenox , will get screening dopplers to r/o DVT  Still hungry and eating  At extremely high risk of intubation,      Acute hypoxic resp failure and ARDS due to COVID pna     Plan:     Continue HFNC, prone positioning as tolerated, if needed could try bipap  On steroids and remdesivir   Received toci 7/23  d/w ID not candidate for monoclonal antibodies.  No current evidence of bacterial infection, no need for abx   still hyperglycemic will increase lantus  mild DESTINEY on admission 1.9 now 1.3 likely close to baseline  slightly increased LFTs will continue to monitor , continue remdesivir for  now   DVT ppx with lovenox , will get screening dopplers to r/o DVT  Still hungry and eating  At extremely high risk of intubation,      Acute hypoxic resp failure and ARDS due to COVID pna     Plan:     Continue HFNC, prone positioning as tolerated, if needed could try bipap  On steroids and remdesivir   Received toci 7/23  d/w ID not candidate for monoclonal antibodies.  No current evidence of bacterial infection, no need for abx   still hyperglycemic will increase lantus  mild DESTINEY on admission 1.9 now 1.3 likely close to baseline  slightly increased LFTs will continue to monitor , continue remdesivir for  now   DVT ppx with lovenox , will get screening dopplers to r/o DVT  Still hungry and eating  At extremely high risk of intubation,    Addendum: Wife was called and updated on seriousness of patients status

## 2021-07-25 NOTE — PROGRESS NOTE ADULT - SUBJECTIVE AND OBJECTIVE BOX
Patient is a 55y old  Male who presents with a chief complaint of cough/sob (25 Jul 2021 12:54)      BRIEF HOSPITAL COURSE: 54 y/o male with pmhx of HLD, HTN, DM admitted on 7/21 with COVID-19 pna. tested positive on 7/15 outpatient. unvaccinated. Escalating fio2 requirements and development of ARDS now requiring HFNC and NRB. S/p actemra on 7/23.    Events last 24 hours: remains on HFNC 100%/60 lpm with NRB on top of it. desats frequently but recovers on own.     PAST MEDICAL & SURGICAL HISTORY:  HTN (hypertension)    Diabetes        Review of Systems:  CONSTITUTIONAL: No fever, chills, or fatigue  EYES: No eye pain, visual disturbances, or discharge  ENMT:  No difficulty hearing, tinnitus, vertigo; No sinus or throat pain  NECK: No pain or stiffness  RESPIRATORY: No cough, wheezing, chills or hemoptysis; No shortness of breath  CARDIOVASCULAR: No chest pain, palpitations, dizziness, or leg swelling  GASTROINTESTINAL: No abdominal or epigastric pain. No nausea, vomiting, or hematemesis; No diarrhea or constipation. No melena or hematochezia.  GENITOURINARY: No dysuria, frequency, hematuria, or incontinence  NEUROLOGICAL: No headaches, memory loss, loss of strength, numbness, or tremors  SKIN: No itching, burning, rashes, or lesions   MUSCULOSKELETAL: No joint pain or swelling; No muscle, back, or extremity pain  PSYCHIATRIC: No depression, anxiety, mood swings, or difficulty sleeping      Medications:    labetalol 200 milliGRAM(s) Oral every 12 hours  losartan 50 milliGRAM(s) Oral daily    ALBUTerol    90 MICROgram(s) HFA Inhaler 2 Puff(s) Inhalation every 4 hours PRN  benzonatate 100 milliGRAM(s) Oral three times a day PRN    acetaminophen   Tablet .. 650 milliGRAM(s) Oral once PRN  acetaminophen   Tablet .. 650 milliGRAM(s) Oral every 4 hours PRN      aspirin  chewable 81 milliGRAM(s) Oral daily  enoxaparin Injectable 40 milliGRAM(s) SubCutaneous every 12 hours        atorvastatin 80 milliGRAM(s) Oral at bedtime  dexAMETHasone  Injectable 6 milliGRAM(s) IV Push daily  dextrose 40% Gel 15 Gram(s) Oral once  dextrose 50% Injectable 25 Gram(s) IV Push once  dextrose 50% Injectable 12.5 Gram(s) IV Push once  dextrose 50% Injectable 25 Gram(s) IV Push once  glucagon  Injectable 1 milliGRAM(s) IntraMuscular once  insulin glargine Injectable (LANTUS) 35 Unit(s) SubCutaneous at bedtime  insulin lispro (ADMELOG) corrective regimen sliding scale   SubCutaneous three times a day before meals  insulin lispro (ADMELOG) corrective regimen sliding scale   SubCutaneous at bedtime  insulin lispro Injectable (ADMELOG) 7 Unit(s) SubCutaneous three times a day before meals    dextrose 5%. 1000 milliLiter(s) IV Continuous <Continuous>  dextrose 5%. 1000 milliLiter(s) IV Continuous <Continuous>                ICU Vital Signs Last 24 Hrs  T(C): 37.1 (25 Jul 2021 20:00), Max: 37.1 (25 Jul 2021 20:00)  T(F): 98.7 (25 Jul 2021 20:00), Max: 98.7 (25 Jul 2021 20:00)  HR: 67 (25 Jul 2021 23:00) (62 - 77)  BP: 118/80 (25 Jul 2021 23:00) (103/79 - 140/74)  BP(mean): 89 (25 Jul 2021 23:00) (78 - 101)  ABP: --  ABP(mean): --  RR: 41 (25 Jul 2021 23:00) (15 - 43)  SpO2: 88% (25 Jul 2021 23:00) (83% - 95%)      ABG - ( 24 Jul 2021 10:46 )  pH, Arterial: 7.45  pH, Blood: x     /  pCO2: 28    /  pO2: 63    / HCO3: 19    / Base Excess: -3.2  /  SaO2: 92                  I&O's Detail    24 Jul 2021 07:01  -  25 Jul 2021 07:00  --------------------------------------------------------  IN:    Oral Fluid: 880 mL  Total IN: 880 mL    OUT:    Voided (mL): 3000 mL  Total OUT: 3000 mL    Total NET: -2120 mL      25 Jul 2021 07:01  -  26 Jul 2021 00:02  --------------------------------------------------------  IN:    IV PiggyBack: 270 mL  Total IN: 270 mL    OUT:    Voided (mL): 900 mL  Total OUT: 900 mL    Total NET: -630 mL            LABS:                        13.1   12.51 )-----------( 348      ( 25 Jul 2021 05:42 )             38.8     07-25    136  |  104  |  39<H>  ----------------------------<  318<H>  4.7   |  25  |  1.37<H>    Ca    8.4<L>      25 Jul 2021 05:41    TPro  6.4  /  Alb  2.3<L>  /  TBili  0.4  /  DBili  x   /  AST  159<H>  /  ALT  146<H>  /  AlkPhos  200<H>  07-25          CAPILLARY BLOOD GLUCOSE      POCT Blood Glucose.: 264 mg/dL (25 Jul 2021 22:00)        CULTURES:  Culture Results:   No growth to date. (07-21 @ 14:42)      Physical Examination:    General: No acute distress.      HEENT: Pupils equal, reactive to light.  Symmetric.    PULM: Clear to auscultation bilaterally, no significant sputum production. tachypneic to 30 but no use of accessory respiratory muscles.    NECK: Supple, no lymphadenopathy, trachea midline    CVS: Regular rate and rhythm, no murmurs, rubs, or gallops    ABD: Soft, nondistended, nontender, normoactive bowel sounds, no masses    EXT: No edema, nontender    SKIN: Warm and well perfused, no rashes noted.    NEURO: Alert, oriented, interactive, nonfocal    DEVICES: none    RADIOLOGY:     IMPRESSION:  No evidence of deep venous thrombosis in either lower extremity.    --- End of Report ---            JESUSITA SANCHEZ MD; Attending Radiologist  This document has been electronically signed. Jul 25 2021  1:13PM      CRITICAL CARE TIME SPENT: 35 minutes of critical care time spent providing medical care for patient's acute illness/conditions that impairs at least one vital organ system and/or poses a high risk of imminent or life threatening deterioration in the patient's condition. It includes time spent evaluating and treating the patient's acute illness as well as time spent reviewing labs, radiology, discussing goals of care with patient and/or patient's family, and discussing the case with a multidisciplinary team in an effort to prevent further life threatening deterioration or end organ damage. This time is independent of any procedures performed.

## 2021-07-25 NOTE — PROGRESS NOTE ADULT - ASSESSMENT
55 M with pmh HLD, dm, htn presents with 8 days onset of covid symptoms which included, cough, fevers, sob, hypoxia, fevers, diarrhea, chills and myalgias. Tested positive 7/15. Wife endorsed he was becoming more sob of recently. On arrival hypoxic to 80s and tachypneic. NRB placed, presently on 15% and saturating 95%. Na 126 s/p 1L NS. CXR: Frandy infiltrates. Last scr 1.4 in 2019-> 7/21 1.9 unknown if underlying ckd. Dimer 450 - normal for age  however with covid and increasing fibrinogen, will need further eval.   Denies chest pain. LHC performed 2019 revealed normal coronaries. Here xray with b/l lung infiltrates, hypoxic requiring NRB, was given remdesivir/decadron.     1. acute respiratory failure. covid-19 viral syndrome. multifocal pneumonia  - on remdesivir #5 monitor renal/hepatic function closely on therapy,  will extend to 10 days rx  - on decadron  - no role for monoclonal antibody, will negate the effects of torcilizumab, and the patient is greater than 3-4 days from diagnosis, usually the monoclonal antibody will be given in outpatient recently diagnosed patient with non severe disease  - glycemic control  - continue on high flow oxygen  - isolation precautions  - fu cbc  - monitor temps  - observe off antibiotics  - prone positoning  - supportive care    2. other issues - care per medicine

## 2021-07-25 NOTE — PROGRESS NOTE ADULT - SUBJECTIVE AND OBJECTIVE BOX
Date of service: 07-25-21 @ 12:55      Patient lying in bed; on high flow oxygen plus 100%NRB      ROS unable to obtain secondary to patient medical condition     MEDICATIONS  (STANDING):  aspirin  chewable 81 milliGRAM(s) Oral daily  atorvastatin 80 milliGRAM(s) Oral at bedtime  dexAMETHasone  Injectable 6 milliGRAM(s) IV Push daily  dextrose 40% Gel 15 Gram(s) Oral once  dextrose 5%. 1000 milliLiter(s) (50 mL/Hr) IV Continuous <Continuous>  dextrose 5%. 1000 milliLiter(s) (100 mL/Hr) IV Continuous <Continuous>  dextrose 50% Injectable 25 Gram(s) IV Push once  dextrose 50% Injectable 12.5 Gram(s) IV Push once  dextrose 50% Injectable 25 Gram(s) IV Push once  enoxaparin Injectable 40 milliGRAM(s) SubCutaneous every 12 hours  glucagon  Injectable 1 milliGRAM(s) IntraMuscular once  insulin glargine Injectable (LANTUS) 35 Unit(s) SubCutaneous at bedtime  insulin lispro (ADMELOG) corrective regimen sliding scale   SubCutaneous three times a day before meals  insulin lispro (ADMELOG) corrective regimen sliding scale   SubCutaneous at bedtime  insulin lispro Injectable (ADMELOG) 7 Unit(s) SubCutaneous three times a day before meals  labetalol 200 milliGRAM(s) Oral every 12 hours  losartan 50 milliGRAM(s) Oral daily  remdesivir  IVPB   IV Intermittent   remdesivir  IVPB 100 milliGRAM(s) IV Intermittent every 24 hours    MEDICATIONS  (PRN):  acetaminophen   Tablet .. 650 milliGRAM(s) Oral once PRN Temp greater or equal to 38C (100.4F), Mild Pain (1 - 3)  acetaminophen   Tablet .. 650 milliGRAM(s) Oral every 4 hours PRN Temp greater or equal to 38C (100.4F), Mild Pain (1 - 3)  ALBUTerol    90 MICROgram(s) HFA Inhaler 2 Puff(s) Inhalation every 4 hours PRN Shortness of Breath and/or Wheezing  benzonatate 100 milliGRAM(s) Oral three times a day PRN Cough      Vital Signs Last 24 Hrs  T(C): 35.8 (25 Jul 2021 08:00), Max: 37 (25 Jul 2021 00:00)  T(F): 96.4 (25 Jul 2021 08:00), Max: 98.6 (25 Jul 2021 00:00)  HR: 67 (25 Jul 2021 12:00) (61 - 89)  BP: 104/73 (25 Jul 2021 12:00) (103/79 - 152/83)  BP(mean): 81 (25 Jul 2021 12:00) (67 - 101)  RR: 34 (25 Jul 2021 12:00) (15 - 46)  SpO2: 93% (25 Jul 2021 12:00) (82% - 95%)        Physical Exam:          PE:  Constitutional: NAD on NRB  HEENT: NC/AT, EOMI, PERRLA, conjunctivae clear; ears and nose atraumatic; pharynx benign  Neck: supple; thyroid not palpable  Back: no tenderness  Respiratory: decreased breath sounds, rhonchi  Cardiovascular: S1S2 regular, no murmurs  Abdomen: soft, not tender, not distended, positive BS; liver and spleen WNL  Genitourinary: no suprapubic tenderness  Musculoskeletal: no muscle tenderness, no joint swelling or tenderness  Extremities: no pedal edema  Neurological/ Psychiatric: AxOx3, Judgement and insight normal;  moving all extremities  Skin: no rashes; no palpable lesions    Labs: all available labs reviewed                                Labs:                        Labs:                        13.1   12.51 )-----------( 348      ( 25 Jul 2021 05:42 )             38.8     07-25    136  |  104  |  39<H>  ----------------------------<  318<H>  4.7   |  25  |  1.37<H>    Ca    8.4<L>      25 Jul 2021 05:41    TPro  6.4  /  Alb  2.3<L>  /  TBili  0.4  /  DBili  x   /  AST  159<H>  /  ALT  146<H>  /  AlkPhos  200<H>  07-25           Cultures:       Culture - Blood (collected 07-21-21 @ 14:42)  Source: .Blood Blood-Peripheral  Preliminary Report (07-22-21 @ 22:01):    No growth to date.      D-Dimer Assay, Quantitative: 446 ng/mL DDU (07-24-21 @ 08:06)  C-Reactive Protein, Serum: 35 mg/L (07-24-21 @ 08:06)  C-Reactive Protein, Serum: 157 mg/L (07-21-21 @ 14:42)  D-Dimer Assay, Quantitative: 460 ng/mL DDU (07-21-21 @ 14:42)  Ferritin, Serum: 4077 ng/mL (07-21-21 @ 14:42)          Radiology: all available radiological tests reviewed  < from: Xray Chest 1 View- PORTABLE-Urgent (07.21.21 @ 15:33) >    EXAM:  XR CHEST PORTABLE URGENT 1V                            PROCEDURE DATE:  07/21/2021          INTERPRETATION:  AP chest on July 21, 2021 at 3:27 PM. Patient is short of breath with cough and fever.    Heart magnified by technique.    There arescattered mid lower lung field infiltrates right greater than left possibly related: Pneumonia.    The lungs are clear on August 30, 2019.    IMPRESSION: Bilateral infiltrates as above.    < end of copied text >    Advanced directives addressed: full resuscitation

## 2021-07-25 NOTE — PROGRESS NOTE ADULT - SUBJECTIVE AND OBJECTIVE BOX
Patient is a 55y old  Male who presents with a chief complaint of cough/sob (23 Jul 2021 14:03)      HPI:  55 M with pmh HLD, dm, htn presents with 8 days onset of covid symptoms which included, cough, fevers, sob, hypoxia, fevers, diarrhea, chills and myalgias. TEsted positive 7/15. Wife endorsed he was becoming more sob of recently. On arrival hypoxic to 80s and tachypneic. NRB placed, presently on 15% and saturating 95%. Na 126 s/p 1L NS. CXR: Frandy infiltrates. Last scr 1.4 in 2019-> today 1.9 unknown if underlying ckd.  Patient not vaccinated.   Last seen by me in 2019  History of ROBERT and uncontrolled hypertension  Treated with IV Remdesivir and Decadron, now on Toci  SaO2 is 80-85% despite being on 100% NRB  ABG pending    7/25/2021  Pt transferred to MICU  ABG 7.45/28/63  on HFNC      PAST MEDICAL/SURGICAL/FAMILY/SOCIAL HISTORY:    Past Medical History:  Diabetes    HTN (hypertension).  No surgeries     Tobacco Usage:  · Tobacco Usage	non smoker  Fhx: none (21 Jul 2021 19:19)      PAST MEDICAL & SURGICAL HISTORY:  HTN (hypertension)    Diabetes    MEDICATIONS  (STANDING):  aspirin  chewable 81 milliGRAM(s) Oral daily  atorvastatin 80 milliGRAM(s) Oral at bedtime  dexAMETHasone  Injectable 6 milliGRAM(s) IV Push daily  dextrose 40% Gel 15 Gram(s) Oral once  dextrose 5%. 1000 milliLiter(s) (50 mL/Hr) IV Continuous <Continuous>  dextrose 5%. 1000 milliLiter(s) (100 mL/Hr) IV Continuous <Continuous>  dextrose 50% Injectable 25 Gram(s) IV Push once  dextrose 50% Injectable 12.5 Gram(s) IV Push once  dextrose 50% Injectable 25 Gram(s) IV Push once  enoxaparin Injectable 40 milliGRAM(s) SubCutaneous every 12 hours  glucagon  Injectable 1 milliGRAM(s) IntraMuscular once  insulin glargine Injectable (LANTUS) 35 Unit(s) SubCutaneous at bedtime  insulin lispro (ADMELOG) corrective regimen sliding scale   SubCutaneous three times a day before meals  insulin lispro (ADMELOG) corrective regimen sliding scale   SubCutaneous at bedtime  insulin lispro Injectable (ADMELOG) 7 Unit(s) SubCutaneous three times a day before meals  labetalol 200 milliGRAM(s) Oral every 12 hours  losartan 50 milliGRAM(s) Oral daily  remdesivir  IVPB 100 milliGRAM(s) IV Intermittent every 24 hours  remdesivir  IVPB   IV Intermittent     MEDICATIONS  (PRN):  acetaminophen   Tablet .. 650 milliGRAM(s) Oral once PRN Temp greater or equal to 38C (100.4F), Mild Pain (1 - 3)  acetaminophen   Tablet .. 650 milliGRAM(s) Oral every 4 hours PRN Temp greater or equal to 38C (100.4F), Mild Pain (1 - 3)  ALBUTerol    90 MICROgram(s) HFA Inhaler 2 Puff(s) Inhalation every 4 hours PRN Shortness of Breath and/or Wheezing  benzonatate 100 milliGRAM(s) Oral three times a day PRN Cough            Vital Signs Last 24 Hrs  T(C): 35.8 (25 Jul 2021 08:00), Max: 37 (25 Jul 2021 00:00)  T(F): 96.4 (25 Jul 2021 08:00), Max: 98.6 (25 Jul 2021 00:00)  HR: 77 (25 Jul 2021 09:00) (61 - 89)  BP: 128/76 (25 Jul 2021 09:00) (103/79 - 152/83)  BP(mean): 88 (25 Jul 2021 09:00) (67 - 101)  RR: 38 (25 Jul 2021 09:00) (15 - 46)  SpO2: 89% (25 Jul 2021 09:00) (82% - 94%)  23 Jul 2021 07:01  -  24 Jul 2021 07:00  --------------------------------------------------------  IN: 0 mL / OUT: 1600 mL / NET: -1600 mL      PHYSICAL EXAM  General Appearance: cooperative, no acute distress,   HEENT: PERRL, conjunctiva clear, EOM's intact, non injected pharynx, no exudate, TM   normal  Neck: Supple, , no adenopathy, thyroid: not enlarged, no carotid bruit or JVD  Back: Symmetric, no  tenderness,no soft tissue tenderness  Lungs: coarse bilaterally  Heart: Regular rate and rhythm, S1, S2 normal, no murmur, rub or gallop  Abdomen: Soft, non-tender, bowel sounds active , no hepatosplenomegaly  Extremities: no cyanosis or edema, no joint swelling  Skin: Skin color, texture normal, no rashes   Neurologic: Alert and oriented X3 , cranial nerves intact, sensory and motor normal,    ECG:    LABS:                          13.2   13.52 )-----------( 338      ( 24 Jul 2021 08:06 )             39.5     07-24    136  |  105  |  43<H>  ----------------------------<  227<H>  4.3   |  25  |  1.31<H>    Ca    8.7      24 Jul 2021 08:06    TPro  6.6  /  Alb  2.3<L>  /  TBili  0.4  /  DBili  x   /  AST  73<H>  /  ALT  64  /  AlkPhos  165<H>  07-24          Pro BNP  -- 07-24 @ 08:06  D Dimer  446 07-24 @ 08:06  Pro BNP  261 07-21 @ 14:42  D Dimer  460 07-21 @ 14:42              RADIOLOGY & ADDITIONAL STUDIES:

## 2021-07-25 NOTE — PROGRESS NOTE ADULT - ASSESSMENT
56 y/o male with pmhx of HLD, HTN, DM now with:    1. covid-19 pna  2. acute hypoxic respiraotry failure  3. ARDS    - continue HFNC with NRB. unable to wean fio2/flow. remains on 100% and 60 lpm satting 90-92%  - complete 10 day course of steroids. now on decadron 6 mg IVP daily  - on remdesevir  -s/p toci  - self prone as tolerated  - high risk for further decompensation requiring intubation .

## 2021-07-26 LAB
ALBUMIN SERPL ELPH-MCNC: 2.2 G/DL — LOW (ref 3.3–5)
ALP SERPL-CCNC: 239 U/L — HIGH (ref 40–120)
ALT FLD-CCNC: 137 U/L — HIGH (ref 12–78)
ANION GAP SERPL CALC-SCNC: 6 MMOL/L — SIGNIFICANT CHANGE UP (ref 5–17)
AST SERPL-CCNC: 103 U/L — HIGH (ref 15–37)
BILIRUB SERPL-MCNC: 0.4 MG/DL — SIGNIFICANT CHANGE UP (ref 0.2–1.2)
BUN SERPL-MCNC: 37 MG/DL — HIGH (ref 7–23)
CALCIUM SERPL-MCNC: 8.4 MG/DL — LOW (ref 8.5–10.1)
CHLORIDE SERPL-SCNC: 104 MMOL/L — SIGNIFICANT CHANGE UP (ref 96–108)
CO2 SERPL-SCNC: 23 MMOL/L — SIGNIFICANT CHANGE UP (ref 22–31)
CREAT SERPL-MCNC: 1.17 MG/DL — SIGNIFICANT CHANGE UP (ref 0.5–1.3)
CULTURE RESULTS: SIGNIFICANT CHANGE UP
D DIMER BLD IA.RAPID-MCNC: 1663 NG/ML DDU — HIGH
FERRITIN SERPL-MCNC: 1806 NG/ML — HIGH (ref 30–400)
FERRITIN SERPL-MCNC: 2592 NG/ML — HIGH (ref 30–400)
GLUCOSE SERPL-MCNC: 232 MG/DL — HIGH (ref 70–99)
HCT VFR BLD CALC: 38.4 % — LOW (ref 39–50)
HGB BLD-MCNC: 12.9 G/DL — LOW (ref 13–17)
MCHC RBC-ENTMCNC: 28.4 PG — SIGNIFICANT CHANGE UP (ref 27–34)
MCHC RBC-ENTMCNC: 33.6 GM/DL — SIGNIFICANT CHANGE UP (ref 32–36)
MCV RBC AUTO: 84.6 FL — SIGNIFICANT CHANGE UP (ref 80–100)
PLATELET # BLD AUTO: 366 K/UL — SIGNIFICANT CHANGE UP (ref 150–400)
POTASSIUM SERPL-MCNC: 4.2 MMOL/L — SIGNIFICANT CHANGE UP (ref 3.5–5.3)
POTASSIUM SERPL-SCNC: 4.2 MMOL/L — SIGNIFICANT CHANGE UP (ref 3.5–5.3)
PROT SERPL-MCNC: 6.3 GM/DL — SIGNIFICANT CHANGE UP (ref 6–8.3)
RBC # BLD: 4.54 M/UL — SIGNIFICANT CHANGE UP (ref 4.2–5.8)
RBC # FLD: 12.5 % — SIGNIFICANT CHANGE UP (ref 10.3–14.5)
SODIUM SERPL-SCNC: 133 MMOL/L — LOW (ref 135–145)
SPECIMEN SOURCE: SIGNIFICANT CHANGE UP
WBC # BLD: 15 K/UL — HIGH (ref 3.8–10.5)
WBC # FLD AUTO: 15 K/UL — HIGH (ref 3.8–10.5)

## 2021-07-26 PROCEDURE — 71045 X-RAY EXAM CHEST 1 VIEW: CPT | Mod: 26

## 2021-07-26 PROCEDURE — 99291 CRITICAL CARE FIRST HOUR: CPT

## 2021-07-26 RX ORDER — FAMOTIDINE 10 MG/ML
20 INJECTION INTRAVENOUS DAILY
Refills: 0 | Status: DISCONTINUED | OUTPATIENT
Start: 2021-07-26 | End: 2021-08-18

## 2021-07-26 RX ORDER — ENOXAPARIN SODIUM 100 MG/ML
90 INJECTION SUBCUTANEOUS EVERY 12 HOURS
Refills: 0 | Status: DISCONTINUED | OUTPATIENT
Start: 2021-07-26 | End: 2021-08-02

## 2021-07-26 RX ADMIN — Medication 200 MILLIGRAM(S): at 23:32

## 2021-07-26 RX ADMIN — Medication 2: at 21:21

## 2021-07-26 RX ADMIN — Medication 81 MILLIGRAM(S): at 10:07

## 2021-07-26 RX ADMIN — Medication 7 UNIT(S): at 10:08

## 2021-07-26 RX ADMIN — Medication 4: at 18:31

## 2021-07-26 RX ADMIN — LOSARTAN POTASSIUM 50 MILLIGRAM(S): 100 TABLET, FILM COATED ORAL at 10:07

## 2021-07-26 RX ADMIN — Medication 4: at 13:06

## 2021-07-26 RX ADMIN — ENOXAPARIN SODIUM 40 MILLIGRAM(S): 100 INJECTION SUBCUTANEOUS at 10:07

## 2021-07-26 RX ADMIN — Medication 7 UNIT(S): at 13:07

## 2021-07-26 RX ADMIN — ATORVASTATIN CALCIUM 80 MILLIGRAM(S): 80 TABLET, FILM COATED ORAL at 21:18

## 2021-07-26 RX ADMIN — Medication 200 MILLIGRAM(S): at 10:07

## 2021-07-26 RX ADMIN — INSULIN GLARGINE 35 UNIT(S): 100 INJECTION, SOLUTION SUBCUTANEOUS at 21:21

## 2021-07-26 RX ADMIN — FAMOTIDINE 20 MILLIGRAM(S): 10 INJECTION INTRAVENOUS at 10:07

## 2021-07-26 RX ADMIN — REMDESIVIR 500 MILLIGRAM(S): 5 INJECTION INTRAVENOUS at 18:22

## 2021-07-26 RX ADMIN — ENOXAPARIN SODIUM 90 MILLIGRAM(S): 100 INJECTION SUBCUTANEOUS at 21:17

## 2021-07-26 RX ADMIN — Medication 6 MILLIGRAM(S): at 10:07

## 2021-07-26 RX ADMIN — Medication 4: at 08:26

## 2021-07-26 RX ADMIN — Medication 7 UNIT(S): at 18:31

## 2021-07-26 NOTE — PROGRESS NOTE ADULT - ASSESSMENT
A/P: 55 year old male with Acute Hypoxic Respiratory Failure from COVID-19 Pneumonia  HTN  DM II      Plan:  ICU    PULM: Continue NF NC O2, try to wean off the NRB.  CXR done today remains stable and out of proportion to the degress of hypoxia he has.  Can not send for a CTA of the Chest.  Will empirically start full dose Lovenox today.  Encourage Prone positioning     Cardio: Hemodynamics reasonable    Renal: Cr Stable    GI: H2 blocker, Po Diet    ENDO: Continue Lantus and RISS.  May need to increase the lantus    ID: S/P Actemra, On day 5 of Rem and Day 6 of Decadron

## 2021-07-26 NOTE — PROGRESS NOTE ADULT - SUBJECTIVE AND OBJECTIVE BOX
ICU Progress Note    HPI:    S:    Pt seen and examined  HD # 6  FULL CODE  PMHx HLD, HTN, DM   Pt here for OSB, cough for several days  Admitted on 7/21 with COVID-19 pna. tested positive on 7/15 outpatient. unvaccinated. Escalating fio2 requirements and development of ARDS now requiring HFNC and NRB. S/p actemra on 7/23 7/26 PM: Remains on HFNC 50/100% with frequent desaturations.    ROS: + SOB  Remainder of systems reviewed, negative    Allergies    No Known Allergies    Intolerances    MEDICATIONS  (STANDING):    aspirin  chewable 81 milliGRAM(s) Oral daily  atorvastatin 80 milliGRAM(s) Oral at bedtime  dextrose 40% Gel 15 Gram(s) Oral once  dextrose 5%. 1000 milliLiter(s) (50 mL/Hr) IV Continuous <Continuous>  dextrose 5%. 1000 milliLiter(s) (100 mL/Hr) IV Continuous <Continuous>  dextrose 50% Injectable 25 Gram(s) IV Push once  dextrose 50% Injectable 12.5 Gram(s) IV Push once  dextrose 50% Injectable 25 Gram(s) IV Push once  enoxaparin Injectable 90 milliGRAM(s) SubCutaneous every 12 hours  famotidine    Tablet 20 milliGRAM(s) Oral daily  glucagon  Injectable 1 milliGRAM(s) IntraMuscular once  insulin glargine Injectable (LANTUS) 40 Unit(s) SubCutaneous at bedtime  insulin lispro (ADMELOG) corrective regimen sliding scale   SubCutaneous three times a day before meals  insulin lispro (ADMELOG) corrective regimen sliding scale   SubCutaneous at bedtime  insulin lispro Injectable (ADMELOG) 7 Unit(s) SubCutaneous three times a day before meals  labetalol 200 milliGRAM(s) Oral every 12 hours  losartan 50 milliGRAM(s) Oral daily  methylPREDNISolone sodium succinate Injectable 60 milliGRAM(s) IV Push every 8 hours  remdesivir  IVPB 100 milliGRAM(s) IV Intermittent every 24 hours    MEDICATIONS  (PRN):    acetaminophen   Tablet .. 650 milliGRAM(s) Oral once PRN Temp greater or equal to 38C (100.4F), Mild Pain (1 - 3)  acetaminophen   Tablet .. 650 milliGRAM(s) Oral every 4 hours PRN Temp greater or equal to 38C (100.4F), Mild Pain (1 - 3)  ALBUTerol    90 MICROgram(s) HFA Inhaler 2 Puff(s) Inhalation every 4 hours PRN Shortness of Breath and/or Wheezing  benzonatate 100 milliGRAM(s) Oral three times a day PRN Cough    Drug Dosing Weight    Height (cm): 167.6 (21 Jul 2021 14:34)  Weight (kg): 90.4 (21 Jul 2021 22:00)  BMI (kg/m2): 32.2 (21 Jul 2021 22:00)  BSA (m2): 2 (21 Jul 2021 22:00)    PAST MEDICAL & SURGICAL HISTORY:    HTN (hypertension)    Diabetes    FAMILY HISTORY:    ROS: See HPI; otherwise, all systems reviewed and negative.    O:    ICU Vital Signs Last 24 Hrs  T(C): 36.7 (27 Jul 2021 18:00), Max: 36.7 (27 Jul 2021 18:00)  T(F): 98 (27 Jul 2021 18:00), Max: 98 (27 Jul 2021 18:00)  HR: 70 (27 Jul 2021 19:00) (59 - 70)  BP: 159/78 (27 Jul 2021 19:00) (120/91 - 164/89)  BP(mean): 98 (27 Jul 2021 19:00) (86 - 123)  ABP: --  ABP(mean): --  RR: 28 (27 Jul 2021 21:09) (18 - 39)  SpO2: 90% (27 Jul 2021 21:09) (84% - 93%)    I&O's Detail    26 Jul 2021 07:01  -  27 Jul 2021 07:00  --------------------------------------------------------  IN:    IV PiggyBack: 270 mL    Oral Fluid: 720 mL  Total IN: 990 mL    OUT:    Voided (mL): 2550 mL  Total OUT: 2550 mL    Total NET: -1560 mL    27 Jul 2021 07:01  -  27 Jul 2021 22:15  --------------------------------------------------------  IN:    Oral Fluid: 720 mL  Total IN: 720 mL    OUT:    Voided (mL): 1600 mL  Total OUT: 1600 mL    Total NET: -880 mL    PE:    Adult M sitting in chair  No JVD trachea midline + HFNC 50/100% + NRB  S1S2+  Diminished BS B/L  Abd soft NTND  No leg swelling/edema noted  Awake and alert  Skin pink and well perfused    LABS:    CBC Full  -  ( 27 Jul 2021 05:48 )  WBC Count : 11.75 K/uL  RBC Count : 5.02 M/uL  Hemoglobin : 13.9 g/dL  Hematocrit : 42.5 %  Platelet Count - Automated : 373 K/uL  Mean Cell Volume : 84.7 fl  Mean Cell Hemoglobin : 27.7 pg  Mean Cell Hemoglobin Concentration : 32.7 gm/dL  Auto Neutrophil # : x  Auto Lymphocyte # : x  Auto Monocyte # : x  Auto Eosinophil # : x  Auto Basophil # : x  Auto Neutrophil % : x  Auto Lymphocyte % : x  Auto Monocyte % : x  Auto Eosinophil % : x  Auto Basophil % : x    07-27    135  |  103  |  32<H>  ----------------------------<  157<H>  4.3   |  25  |  1.04    Ca    8.3<L>      27 Jul 2021 05:48  Phos  3.9     07-27  Mg     2.3     07-27    TPro  6.2  /  Alb  2.4<L>  /  TBili  0.5  /  DBili  0.1  /  AST  65<H>  /  ALT  114<H>  /  AlkPhos  269<H>  07-27        CAPILLARY BLOOD GLUCOSE      POCT Blood Glucose.: 246 mg/dL (27 Jul 2021 21:45)  POCT Blood Glucose.: 211 mg/dL (27 Jul 2021 17:31)  POCT Blood Glucose.: 179 mg/dL (27 Jul 2021 12:22)  POCT Blood Glucose.: 141 mg/dL (27 Jul 2021 07:19)        LIVER FUNCTIONS - ( 27 Jul 2021 05:48 )  Alb: 2.4 g/dL / Pro: 6.2 gm/dL / ALK PHOS: 269 U/L / ALT: 114 U/L / AST: 65 U/L / GGT: x

## 2021-07-26 NOTE — PROGRESS NOTE ADULT - SUBJECTIVE AND OBJECTIVE BOX
Date of service: 07-26-21 @ 11:46      Patient lying in bed; on high flow oxygen plus 100%NRB  O2 sats mid 80s  Afebrile      ROS: no fever or chills; denies dizziness, no HA,  no abdominal pain, no diarrhea or constipation; no dysuria, no urinary frequency, no legs pain, no rashes    MEDICATIONS  (STANDING):  aspirin  chewable 81 milliGRAM(s) Oral daily  atorvastatin 80 milliGRAM(s) Oral at bedtime  dexAMETHasone  Injectable 6 milliGRAM(s) IV Push daily  dextrose 40% Gel 15 Gram(s) Oral once  dextrose 5%. 1000 milliLiter(s) (50 mL/Hr) IV Continuous <Continuous>  dextrose 5%. 1000 milliLiter(s) (100 mL/Hr) IV Continuous <Continuous>  dextrose 50% Injectable 25 Gram(s) IV Push once  dextrose 50% Injectable 25 Gram(s) IV Push once  dextrose 50% Injectable 12.5 Gram(s) IV Push once  enoxaparin Injectable 40 milliGRAM(s) SubCutaneous every 12 hours  famotidine    Tablet 20 milliGRAM(s) Oral daily  glucagon  Injectable 1 milliGRAM(s) IntraMuscular once  insulin glargine Injectable (LANTUS) 35 Unit(s) SubCutaneous at bedtime  insulin lispro (ADMELOG) corrective regimen sliding scale   SubCutaneous three times a day before meals  insulin lispro (ADMELOG) corrective regimen sliding scale   SubCutaneous at bedtime  insulin lispro Injectable (ADMELOG) 7 Unit(s) SubCutaneous three times a day before meals  labetalol 200 milliGRAM(s) Oral every 12 hours  losartan 50 milliGRAM(s) Oral daily  remdesivir  IVPB 100 milliGRAM(s) IV Intermittent every 24 hours    Vital Signs Last 24 Hrs  T(C): 36.7 (26 Jul 2021 10:00), Max: 37.1 (25 Jul 2021 20:00)  T(F): 98 (26 Jul 2021 10:00), Max: 98.7 (25 Jul 2021 20:00)  HR: 66 (26 Jul 2021 11:00) (62 - 74)  BP: 118/75 (26 Jul 2021 11:00) (104/73 - 146/93)  BP(mean): 84 (26 Jul 2021 11:00) (78 - 110)  RR: 40 (26 Jul 2021 11:00) (19 - 41)  SpO2: 91% (26 Jul 2021 11:00) (81% - 95%)      PE:  Constitutional: NAD on NRB/HFNC  HEENT: NC/AT, EOMI, PERRLA, conjunctivae clear; ears and nose atraumatic; pharynx benign  Neck: supple; thyroid not palpable  Back: no tenderness  Respiratory: decreased breath sounds, rhonchi  Cardiovascular: S1S2 regular, no murmurs  Abdomen: soft, not tender, not distended, positive BS; liver and spleen WNL  Genitourinary: no suprapubic tenderness  Musculoskeletal: no muscle tenderness, no joint swelling or tenderness  Extremities: no pedal edema  Neurological/ Psychiatric: AxOx3, Judgement and insight normal;  moving all extremities  Skin: no rashes; no palpable lesions    Labs: all available labs reviewed                                                      12.9   15.00 )-----------( 366      ( 26 Jul 2021 06:20 )             38.4     07-26    133<L>  |  104  |  37<H>  ----------------------------<  232<H>  4.2   |  23  |  1.17    Ca    8.4<L>      26 Jul 2021 06:20    TPro  6.3  /  Alb  2.2<L>  /  TBili  0.4  /  DBili  0.1  /  AST  103<H>  /  ALT  137<H>  /  AlkPhos  239<H>  07       Cultures:       Culture - Blood (collected 07-21-21 @ 14:42)  Source: .Blood Blood-Peripheral  Preliminary Report (07-22-21 @ 22:01):    No growth to date.      D-Dimer Assay, Quantitative: 446 ng/mL DDU (07-24-21 @ 08:06)  C-Reactive Protein, Serum: 35 mg/L (07-24-21 @ 08:06)  C-Reactive Protein, Serum: 157 mg/L (07-21-21 @ 14:42)  D-Dimer Assay, Quantitative: 460 ng/mL DDU (07-21-21 @ 14:42)  Ferritin, Serum: 4077 ng/mL (07-21-21 @ 14:42)          Radiology: all available radiological tests reviewed  < from: Xray Chest 1 View- PORTABLE-Urgent (07.21.21 @ 15:33) >    EXAM:  XR CHEST PORTABLE URGENT 1V                            PROCEDURE DATE:  07/21/2021          INTERPRETATION:  AP chest on July 21, 2021 at 3:27 PM. Patient is short of breath with cough and fever.    Heart magnified by technique.    There arescattered mid lower lung field infiltrates right greater than left possibly related: Pneumonia.    The lungs are clear on August 30, 2019.    IMPRESSION: Bilateral infiltrates as above.    < end of copied text >    Advanced directives addressed: full resuscitation

## 2021-07-26 NOTE — PROGRESS NOTE ADULT - ASSESSMENT
55 M with pmh HLD, dm, htn presents with 8 days onset of covid symptoms which included, cough, fevers, sob, hypoxia, fevers, diarrhea, chills and myalgias. Tested positive 7/15. Wife endorsed he was becoming more sob of recently. On arrival hypoxic to 80s and tachypneic. NRB placed, presently on 15% and saturating 95%. Na 126 s/p 1L NS. CXR: Frandy infiltrates. Last scr 1.4 in 2019-> 7/21 1.9 unknown if underlying ckd. Dimer 450 - normal for age  however with covid and increasing fibrinogen, will need further eval.   Denies chest pain. LHC performed 2019 revealed normal coronaries. Here xray with b/l lung infiltrates, hypoxic requiring NRB, was given remdesivir/decadron.     1. acute respiratory failure. covid-19 viral syndrome. multifocal pneumonia  - on remdesivir #6/10 monitor renal/hepatic function closely on therapy  - on decadron #6/10  - s/p tocilizumab x 1 7/23  - no role for monoclonal antibody, will negate the effects of tocilizumab, and the patient is greater than 3-4 days from diagnosis, usually the monoclonal antibody will be given in outpatient recently diagnosed patient with non severe disease  - glycemic control  - continue on high flow oxygen  - isolation precautions  - fu cbc  - monitor temps  - observe off antibiotics  - prone positoning  - supportive care    2. other issues - care per medicine

## 2021-07-26 NOTE — PROGRESS NOTE ADULT - ASSESSMENT
1) COVID Pneumonia  2) Dyspnea  3) Abnormal CXR  4) Hypoxemia  5) Hypertension  6) ROBERT    55 M noted to have COVID 7/15  On arrival hypoxic to 80s and tachypneic. NRB placed,in the ER saturating 95%. Na 126 s/p 1L NS. CXR: Frandy infiltrates. Last scr 1.4 in 2019-> today 1.9 unknown if underlying ckd.  Patient not vaccinated.   Last seen by me in 2019 for ROBERT noted to have uncontrolled hypertension  Treated with IV Remdesivir and Decadron, ? Tocilizumab   SaO2 is 80-85% despite being on 100% NRB so was switched to HFNC  ABG/imaging reviewed  Given the decline in status, recommend to continue IV Solumedrol 60mg q 8 which will be monitored closely and reduced based on clinical status  Given the obesity/hypertension and prior co-morbidities, he is at risk of intubation but continue HFNC, respiratory status remains critical  7/26  covering for Dr Arceo today  Pt is critically ill on 100% Fio2 with O2 sat 85-88%  He gives thumb's up sign and appears in good spirits /sitting upright in bed  data discussed with critical care RN as well as the intensivist DR Espinoza today  His med list and recent films reviewed  repeat cxr requested now  elevated D Dimer with negative leg dopplers  Given the obesity/hypertension and prior co-morbidities, he is at risk of intubation but continue HFNC, respiratory status remains critical  suggest switch to Iv solumedrol and 60 Q8 hrs dose- discussed with Intensivist  CXR repeat pending  Suggest switch to full dose AC with Lovenox  given leukocytosis will discuss with ID if need to add broad spectrum antibiotic coverage  pt is at high risk for intubation due to hypoxemic resp failure  following hospital COVID pneumonia policies / data discussed with DR Alexis Arceo will resume in am

## 2021-07-26 NOTE — PROVIDER CONTACT NOTE (OTHER) - ACTION/TREATMENT ORDERED:
Lantus and short acting insulin given per MAR. recheck blood in an hour.
Called office.  Please fax discharge summary to [phone].
MD made aware.

## 2021-07-26 NOTE — PROGRESS NOTE ADULT - SUBJECTIVE AND OBJECTIVE BOX
HPI:     55 M with pmh HLD, dm, htn presents with 8 days onset of covid symptoms which included, cough, fevers, sob, hypoxia, fevers, diarrhea, chills and myalgias. tested positive 7/15.  Patient was unvaccinated. He thinks he got it going to daughters dance [facility] week of July 4 th.  Wife also had covid but her case was mild.  Admitted for Hypoxia to  on 7/21 and transferred to the ICU on 7/24 for HF NC O2.      7/26: Patient seen in bed, on HF NC O2 100% and 50L, along with a 100% NRB mask                PAST MEDICAL/SURGICAL/FAMILY/SOCIAL HISTORY:    Past Medical History:  Diabetes    HTN (hypertension).  No surgeries     Tobacco Usage:  · Tobacco Usage	non smoker  Fhx: none (21 Jul 2021 19:19)       PAST MEDICAL & SURGICAL HISTORY:  HTN (hypertension)    Diabetes        FAMILY HISTORY:      Social Hx:    Allergies    No Known Allergies    Intolerances            ICU Vital Signs Last 24 Hrs  T(C): 36.7 (26 Jul 2021 10:00), Max: 37.1 (25 Jul 2021 20:00)  T(F): 98 (26 Jul 2021 10:00), Max: 98.7 (25 Jul 2021 20:00)  HR: 71 (26 Jul 2021 12:00) (62 - 74)  BP: 134/91 (26 Jul 2021 12:00) (118/66 - 146/93)  BP(mean): 100 (26 Jul 2021 12:00) (78 - 110)  ABP: --  ABP(mean): --  RR: 40 (26 Jul 2021 12:00) (19 - 41)  SpO2: 92% (26 Jul 2021 12:00) (81% - 95%)          I&O's Summary    25 Jul 2021 07:01  -  26 Jul 2021 07:00  --------------------------------------------------------  IN: 750 mL / OUT: 1800 mL / NET: -1050 mL    26 Jul 2021 07:01  -  26 Jul 2021 13:16  --------------------------------------------------------  IN: 480 mL / OUT: 1000 mL / NET: -520 mL                              12.9   15.00 )-----------( 366      ( 26 Jul 2021 06:20 )             38.4       07-26    133<L>  |  104  |  37<H>  ----------------------------<  232<H>  4.2   |  23  |  1.17    Ca    8.4<L>      26 Jul 2021 06:20    TPro  6.3  /  Alb  2.2<L>  /  TBili  0.4  /  DBili  0.1  /  AST  103<H>  /  ALT  137<H>  /  AlkPhos  239<H>  07-26                    MEDICATIONS  (STANDING):  aspirin  chewable 81 milliGRAM(s) Oral daily  atorvastatin 80 milliGRAM(s) Oral at bedtime  dexAMETHasone  Injectable 6 milliGRAM(s) IV Push daily  dextrose 40% Gel 15 Gram(s) Oral once  dextrose 5%. 1000 milliLiter(s) (50 mL/Hr) IV Continuous <Continuous>  dextrose 5%. 1000 milliLiter(s) (100 mL/Hr) IV Continuous <Continuous>  dextrose 50% Injectable 25 Gram(s) IV Push once  dextrose 50% Injectable 12.5 Gram(s) IV Push once  dextrose 50% Injectable 25 Gram(s) IV Push once  enoxaparin Injectable 40 milliGRAM(s) SubCutaneous every 12 hours  famotidine    Tablet 20 milliGRAM(s) Oral daily  glucagon  Injectable 1 milliGRAM(s) IntraMuscular once  insulin glargine Injectable (LANTUS) 35 Unit(s) SubCutaneous at bedtime  insulin lispro (ADMELOG) corrective regimen sliding scale   SubCutaneous three times a day before meals  insulin lispro (ADMELOG) corrective regimen sliding scale   SubCutaneous at bedtime  insulin lispro Injectable (ADMELOG) 7 Unit(s) SubCutaneous three times a day before meals  labetalol 200 milliGRAM(s) Oral every 12 hours  losartan 50 milliGRAM(s) Oral daily  remdesivir  IVPB 100 milliGRAM(s) IV Intermittent every 24 hours    MEDICATIONS  (PRN):  acetaminophen   Tablet .. 650 milliGRAM(s) Oral once PRN Temp greater or equal to 38C (100.4F), Mild Pain (1 - 3)  acetaminophen   Tablet .. 650 milliGRAM(s) Oral every 4 hours PRN Temp greater or equal to 38C (100.4F), Mild Pain (1 - 3)  ALBUTerol    90 MICROgram(s) HFA Inhaler 2 Puff(s) Inhalation every 4 hours PRN Shortness of Breath and/or Wheezing  benzonatate 100 milliGRAM(s) Oral three times a day PRN Cough      DVT Prophylaxis:    Advanced Directives:  Discussed with:    Visit Information: 45 min    ** Time is exclusive of billed procedures and/or teaching and/or routine family updates.

## 2021-07-26 NOTE — PROGRESS NOTE ADULT - ASSESSMENT
A:    55M  HD # 6    Here for:    1. Acute hypoxic resp failure 2/2  2. COVID-19 PNA complicated by  3. ARDS  4. Hyperglycemia  5. DESTINEY  6. CKD    This patient requires critical care for support of one or more vital organ systems with a high probability of imminent or life threatening deterioration in his/her condition    P:    PRN analgesia/anxiolysis. Avoid deleriogenic meds.  HD monitoring. Labetalol 200mg PO BID, cozaar 50mg PO qd.  HFNC 50/100% + PRN NRB. Goal maintain SpO2 > 90% and alleviate WOB. Pulmonary involved.  Disease modifying therapy: s/p actemra 7/23, on RDV day # 6, plan for 10 day course. Continue steroid therapy.  VTE ppx lovenox, PUD ppx pepcid.  f/u AM labs, replete lytes PRN.  Lipitor.  Goal BG < 180, lantus and ISS.  DESTINEY on CKD, improving    Dispo: Cont critical care. Remains high risk for further decompensation requiring intubation    TOTAL CRITICAL CARE TIME: 37 minutes EXCLUSIVE of any non bundled procedures)    Note: This time spent INCLUDES time spent directly as this patient's bedside with evaluation, review of chart including review of laboratory and imaging studies, interpretation of vital signs and cardiac output measurements, any necessary ventilator management, and time spent discussing plan of care with patient and family, including goals of care discussion.

## 2021-07-26 NOTE — PROVIDER CONTACT NOTE (OTHER) - SITUATION
RR 50 while asleep, with accessory muscle use.   He states he "feels fine."  VS 91/55 (M63), HR 54, 97.7F oral, 85-87% on NRB, RR 30.
Spoke to Brenda at answering service
Spoke to Benita at answering service
Pt level of care upgraded from 3 north to ICU for respiratory distress.
diabetic pt with covid on steroids with a peripheral blood glucose of 407.

## 2021-07-26 NOTE — PROGRESS NOTE ADULT - SUBJECTIVE AND OBJECTIVE BOX
Patient is a 55y old  Male who presents with a chief complaint of cough/sob (23 Jul 2021 14:03)      HPI:  55 M with pmh HLD, dm, htn presents with 8 days onset of covid symptoms which included, cough, fevers, sob, hypoxia, fevers, diarrhea, chills and myalgias. TEsted positive 7/15. Wife endorsed he was becoming more sob of recently. On arrival hypoxic to 80s and tachypneic. NRB placed, presently on 15% and saturating 95%. Na 126 s/p 1L NS. CXR: Frandy infiltrates. Last scr 1.4 in 2019-> today 1.9 unknown if underlying ckd.  Patient not vaccinated.   Last seen by me in 2019  History of ROBERT and uncontrolled hypertension  Treated with IV Remdesivir and Decadron, now on Toci  SaO2 is 80-85% despite being on 100% NRB  ABG pending    7/25/2021  Pt transferred to MICU  ABG 7.45/28/63  on HFNC    7/26  covering for Dr Arceo today  Pt is critically ill on 100% Fio2 with O2 sat 85-88%  He gives thumb's up sign and appears in good spirits  sitting upright in bed  data discussed with critical care RN as well as the intensivist DR Espinoza today  His med list and recent films reviewed  repeat cxr requested now  time spent taking care of critically ill pt 30 mins      PAST MEDICAL/SURGICAL/FAMILY/SOCIAL HISTORY:    Past Medical History:  Diabetes    HTN (hypertension).  No surgeries     Tobacco Usage:  · Tobacco Usage	non smoker  Fhx: none (21 Jul 2021 19:19)      PAST MEDICAL & SURGICAL HISTORY:  HTN (hypertension)    Diabetes    MEDICATIONS  (STANDING):  aspirin  chewable 81 milliGRAM(s) Oral daily  atorvastatin 80 milliGRAM(s) Oral at bedtime  dexAMETHasone  Injectable 6 milliGRAM(s) IV Push daily  dextrose 40% Gel 15 Gram(s) Oral once  dextrose 5%. 1000 milliLiter(s) (50 mL/Hr) IV Continuous <Continuous>  dextrose 5%. 1000 milliLiter(s) (100 mL/Hr) IV Continuous <Continuous>  dextrose 50% Injectable 25 Gram(s) IV Push once  dextrose 50% Injectable 12.5 Gram(s) IV Push once  dextrose 50% Injectable 25 Gram(s) IV Push once  enoxaparin Injectable 40 milliGRAM(s) SubCutaneous every 12 hours  glucagon  Injectable 1 milliGRAM(s) IntraMuscular once  insulin glargine Injectable (LANTUS) 35 Unit(s) SubCutaneous at bedtime  insulin lispro (ADMELOG) corrective regimen sliding scale   SubCutaneous three times a day before meals  insulin lispro (ADMELOG) corrective regimen sliding scale   SubCutaneous at bedtime  insulin lispro Injectable (ADMELOG) 7 Unit(s) SubCutaneous three times a day before meals  labetalol 200 milliGRAM(s) Oral every 12 hours  losartan 50 milliGRAM(s) Oral daily  remdesivir  IVPB 100 milliGRAM(s) IV Intermittent every 24 hours  remdesivir  IVPB   IV Intermittent     MEDICATIONS  (PRN):  acetaminophen   Tablet .. 650 milliGRAM(s) Oral once PRN Temp greater or equal to 38C (100.4F), Mild Pain (1 - 3)  acetaminophen   Tablet .. 650 milliGRAM(s) Oral every 4 hours PRN Temp greater or equal to 38C (100.4F), Mild Pain (1 - 3)  ALBUTerol    90 MICROgram(s) HFA Inhaler 2 Puff(s) Inhalation every 4 hours PRN Shortness of Breath and/or Wheezing  benzonatate 100 milliGRAM(s) Oral three times a day PRN Cough            ICU Vital Signs Last 24 Hrs  T(C): 37 (26 Jul 2021 04:00), Max: 37.1 (25 Jul 2021 20:00)  T(F): 98.6 (26 Jul 2021 04:00), Max: 98.7 (25 Jul 2021 20:00)  HR: 63 (26 Jul 2021 08:09) (62 - 77)  BP: 140/90 (26 Jul 2021 08:00) (104/73 - 145/90)  BP(mean): 100 (26 Jul 2021 08:00) (78 - 110)  ABP: --  ABP(mean): --  RR: 19 (26 Jul 2021 08:09) (15 - 41)  SpO2: 92% (26 Jul 2021 08:09) (81% - 95%)  I&O's Detail    25 Jul 2021 07:01  -  26 Jul 2021 07:00  --------------------------------------------------------  IN:    IV PiggyBack: 270 mL    Oral Fluid: 480 mL  Total IN: 750 mL    OUT:    Voided (mL): 1800 mL  Total OUT: 1800 mL    Total NET: -1050 mL          PHYSICAL EXAM  General Appearance: sitting uptright and moderate resp distress  HEENT: PERRL, conjunctiva clear  Neck: Supple, , no adenopathy  Lungs: coarse bilaterally  Heart: Regular rate and rhythm, S1, S2 normal,Not tachycardic  Abdomen: Soft, non-tender, bowel sounds active   Extremities: no cyanosis or edema, no joint swelling  Skin: Skin color, texture normal, no rashes   Neurologic: Alert and oriented X3 , non focal    ECG:    LABS:                        12.9   15.00 )-----------( 366      ( 26 Jul 2021 06:20 )             38.4   07-26    133<L>  |  104  |  37<H>  ----------------------------<  232<H>  4.2   |  23  |  1.17    Ca    8.4<L>      26 Jul 2021 06:20    TPro  6.3  /  Alb  2.2<L>  /  TBili  0.4  /  DBili  0.1  /  AST  103<H>  /  ALT  137<H>  /  AlkPhos  239<H>  07-26                          13.2   13.52 )-----------( 338      ( 24 Jul 2021 08:06 )             39.5     07-24    136  |  105  |  43<H>  ----------------------------<  227<H>  4.3   |  25  |  1.31<H>    Ca    8.7      24 Jul 2021 08:06    TPro  6.6  /  Alb  2.3<L>  /  TBili  0.4  /  DBili  x   /  AST  73<H>  /  ALT  64  /  AlkPhos  165<H>  07-24          Pro BNP  -- 07-24 @ 08:06  D Dimer  446 07-24 @ 08:06  Pro BNP  261 07-21 @ 14:42  D Dimer  460 07-21 @ 14:42              < from: Xray Chest 1 View- PORTABLE-Urgent (07.21.21 @ 15:33) >    PROCEDURE DATE:  07/21/2021          INTERPRETATION:  AP chest on July 21, 2021 at 3:27 PM. Patient is short of breath with cough and fever.    Heart magnified by technique.    There arescattered mid lower lung field infiltrates right greater than left possibly related: Pneumonia.    The lungs are clear on August 30, 2019.    IMPRESSION: Bilateral infiltrates as above.    < end of copied text >  < from: US Duplex Venous Lower Ext Complete, Bilateral (07.25.21 @ 08:42) >  COMPARISON: None available.    TECHNIQUE: Duplex sonography of the BILATERAL LOWER extremity veins with color and spectral Doppler, with and without compression.    FINDINGS:    RIGHT:  Normal compressibility of the RIGHT common femoral, femoral and popliteal veins.  Doppler examination shows normal spontaneous and phasic flow.  No RIGHT calf vein thrombosis is detected.    LEFT:  Normal compressibility of the LEFT common femoral, femoral and popliteal veins.  Doppler examination shows normal spontaneous and phasic flow.  No LEFT calf vein thrombosis is detected.    IMPRESSION:  No evidence of deep venous thrombosis in either lower extremity.    < end of copied text >  RADIOLOGY & ADDITIONAL STUDIES:

## 2021-07-27 LAB
ANION GAP SERPL CALC-SCNC: 7 MMOL/L — SIGNIFICANT CHANGE UP (ref 5–17)
BUN SERPL-MCNC: 32 MG/DL — HIGH (ref 7–23)
CALCIUM SERPL-MCNC: 8.3 MG/DL — LOW (ref 8.5–10.1)
CHLORIDE SERPL-SCNC: 103 MMOL/L — SIGNIFICANT CHANGE UP (ref 96–108)
CO2 SERPL-SCNC: 25 MMOL/L — SIGNIFICANT CHANGE UP (ref 22–31)
CREAT SERPL-MCNC: 1.04 MG/DL — SIGNIFICANT CHANGE UP (ref 0.5–1.3)
GLUCOSE SERPL-MCNC: 157 MG/DL — HIGH (ref 70–99)
HCT VFR BLD CALC: 42.5 % — SIGNIFICANT CHANGE UP (ref 39–50)
HGB BLD-MCNC: 13.9 G/DL — SIGNIFICANT CHANGE UP (ref 13–17)
MAGNESIUM SERPL-MCNC: 2.3 MG/DL — SIGNIFICANT CHANGE UP (ref 1.6–2.6)
MCHC RBC-ENTMCNC: 27.7 PG — SIGNIFICANT CHANGE UP (ref 27–34)
MCHC RBC-ENTMCNC: 32.7 GM/DL — SIGNIFICANT CHANGE UP (ref 32–36)
MCV RBC AUTO: 84.7 FL — SIGNIFICANT CHANGE UP (ref 80–100)
PHOSPHATE SERPL-MCNC: 3.9 MG/DL — SIGNIFICANT CHANGE UP (ref 2.5–4.5)
PLATELET # BLD AUTO: 373 K/UL — SIGNIFICANT CHANGE UP (ref 150–400)
POTASSIUM SERPL-MCNC: 4.3 MMOL/L — SIGNIFICANT CHANGE UP (ref 3.5–5.3)
POTASSIUM SERPL-SCNC: 4.3 MMOL/L — SIGNIFICANT CHANGE UP (ref 3.5–5.3)
RBC # BLD: 5.02 M/UL — SIGNIFICANT CHANGE UP (ref 4.2–5.8)
RBC # FLD: 12.3 % — SIGNIFICANT CHANGE UP (ref 10.3–14.5)
SODIUM SERPL-SCNC: 135 MMOL/L — SIGNIFICANT CHANGE UP (ref 135–145)
WBC # BLD: 11.75 K/UL — HIGH (ref 3.8–10.5)
WBC # FLD AUTO: 11.75 K/UL — HIGH (ref 3.8–10.5)

## 2021-07-27 PROCEDURE — 99291 CRITICAL CARE FIRST HOUR: CPT

## 2021-07-27 RX ORDER — INSULIN GLARGINE 100 [IU]/ML
40 INJECTION, SOLUTION SUBCUTANEOUS AT BEDTIME
Refills: 0 | Status: DISCONTINUED | OUTPATIENT
Start: 2021-07-28 | End: 2021-07-30

## 2021-07-27 RX ORDER — INSULIN LISPRO 100/ML
VIAL (ML) SUBCUTANEOUS
Refills: 0 | Status: DISCONTINUED | OUTPATIENT
Start: 2021-07-27 | End: 2021-08-01

## 2021-07-27 RX ADMIN — ENOXAPARIN SODIUM 90 MILLIGRAM(S): 100 INJECTION SUBCUTANEOUS at 10:16

## 2021-07-27 RX ADMIN — Medication 100 MILLIGRAM(S): at 22:08

## 2021-07-27 RX ADMIN — FAMOTIDINE 20 MILLIGRAM(S): 10 INJECTION INTRAVENOUS at 10:17

## 2021-07-27 RX ADMIN — LOSARTAN POTASSIUM 50 MILLIGRAM(S): 100 TABLET, FILM COATED ORAL at 10:17

## 2021-07-27 RX ADMIN — Medication 2: at 12:24

## 2021-07-27 RX ADMIN — Medication 7 UNIT(S): at 12:23

## 2021-07-27 RX ADMIN — ENOXAPARIN SODIUM 90 MILLIGRAM(S): 100 INJECTION SUBCUTANEOUS at 21:43

## 2021-07-27 RX ADMIN — Medication 7 UNIT(S): at 17:31

## 2021-07-27 RX ADMIN — Medication 4: at 17:33

## 2021-07-27 RX ADMIN — Medication 81 MILLIGRAM(S): at 10:17

## 2021-07-27 RX ADMIN — INSULIN GLARGINE 35 UNIT(S): 100 INJECTION, SOLUTION SUBCUTANEOUS at 21:43

## 2021-07-27 RX ADMIN — ATORVASTATIN CALCIUM 80 MILLIGRAM(S): 80 TABLET, FILM COATED ORAL at 21:43

## 2021-07-27 RX ADMIN — Medication 7 UNIT(S): at 07:20

## 2021-07-27 RX ADMIN — REMDESIVIR 500 MILLIGRAM(S): 5 INJECTION INTRAVENOUS at 18:38

## 2021-07-27 RX ADMIN — Medication 200 MILLIGRAM(S): at 10:17

## 2021-07-27 RX ADMIN — Medication 60 MILLIGRAM(S): at 21:43

## 2021-07-27 RX ADMIN — Medication 6 MILLIGRAM(S): at 10:17

## 2021-07-27 RX ADMIN — Medication 60 MILLIGRAM(S): at 17:34

## 2021-07-27 RX ADMIN — Medication 200 MILLIGRAM(S): at 21:43

## 2021-07-27 NOTE — PROGRESS NOTE ADULT - SUBJECTIVE AND OBJECTIVE BOX
Patient is a 55y old  Male who presents with a chief complaint of cough/sob (23 Jul 2021 14:03)      HPI:  55 M with pmh HLD, dm, htn presents with 8 days onset of covid symptoms which included, cough, fevers, sob, hypoxia, fevers, diarrhea, chills and myalgias. TEsted positive 7/15. Wife endorsed he was becoming more sob of recently. On arrival hypoxic to 80s and tachypneic. NRB placed, presently on 15% and saturating 95%. Na 126 s/p 1L NS. CXR: Frandy infiltrates. Last scr 1.4 in 2019-> today 1.9 unknown if underlying ckd.  Patient not vaccinated.   Last seen by me in 2019  History of ROBERT and uncontrolled hypertension  Treated with IV Remdesivir and Decadron, now on Toci  SaO2 is 80-85% despite being on 100% NRB  ABG pending    7/25/2021  Pt transferred to MICU  ABG 7.45/28/63  on HFNC    7/26  covering for Dr Arceo today  Pt is critically ill on 100% Fio2 with O2 sat 85-88%  He gives thumb's up sign and appears in good spirits  sitting upright in bed  data discussed with critical care RN as well as the intensivist DR Espinoza today  His med list and recent films reviewed  repeat cxr requested now  time spent taking care of critically ill pt 30 mins  7/27  covering for DR Arceo  data discussed with team members  now on full dose AC but solumedrol not started with concern for additional infection as risk factor  data discussed with Intensivist again today  cxr is reviewed with critical care RN staff  he remains critically ill on 100% O2 and high flow being used  he could not tolerate Prone positioning  Auto diuresis with good urine output noted  sitting upright in bed  time spent taking care of critically ill pt 30 mins  PAST MEDICAL/SURGICAL/FAMILY/SOCIAL HISTORY:    Past Medical History:  Diabetes    HTN (hypertension).  No surgeries     Tobacco Usage:  · Tobacco Usage	non smoker  Fhx: none (21 Jul 2021 19:19)      PAST MEDICAL & SURGICAL HISTORY:  HTN (hypertension)    Diabetes    MEDICATIONS  (STANDING):  aspirin  chewable 81 milliGRAM(s) Oral daily  atorvastatin 80 milliGRAM(s) Oral at bedtime  dexAMETHasone  Injectable 6 milliGRAM(s) IV Push daily  dextrose 40% Gel 15 Gram(s) Oral once  dextrose 5%. 1000 milliLiter(s) (50 mL/Hr) IV Continuous <Continuous>  dextrose 5%. 1000 milliLiter(s) (100 mL/Hr) IV Continuous <Continuous>  dextrose 50% Injectable 25 Gram(s) IV Push once  dextrose 50% Injectable 12.5 Gram(s) IV Push once  dextrose 50% Injectable 25 Gram(s) IV Push once  enoxaparin Injectable 90 milliGRAM(s) SubCutaneous every 12 hours  famotidine    Tablet 20 milliGRAM(s) Oral daily  glucagon  Injectable 1 milliGRAM(s) IntraMuscular once  insulin glargine Injectable (LANTUS) 35 Unit(s) SubCutaneous at bedtime  insulin lispro (ADMELOG) corrective regimen sliding scale   SubCutaneous three times a day before meals  insulin lispro (ADMELOG) corrective regimen sliding scale   SubCutaneous at bedtime  insulin lispro Injectable (ADMELOG) 7 Unit(s) SubCutaneous three times a day before meals  labetalol 200 milliGRAM(s) Oral every 12 hours  losartan 50 milliGRAM(s) Oral daily  remdesivir  IVPB 100 milliGRAM(s) IV Intermittent every 24 hours      MEDICATIONS  (PRN):  acetaminophen   Tablet .. 650 milliGRAM(s) Oral once PRN Temp greater or equal to 38C (100.4F), Mild Pain (1 - 3)  acetaminophen   Tablet .. 650 milliGRAM(s) Oral every 4 hours PRN Temp greater or equal to 38C (100.4F), Mild Pain (1 - 3)  ALBUTerol    90 MICROgram(s) HFA Inhaler 2 Puff(s) Inhalation every 4 hours PRN Shortness of Breath and/or Wheezing  benzonatate 100 milliGRAM(s) Oral three times a day PRN Cough                ICU Vital Signs Last 24 Hrs  T(C): 36.1 (27 Jul 2021 02:00), Max: 36.7 (26 Jul 2021 10:00)  T(F): 97 (27 Jul 2021 02:00), Max: 98 (26 Jul 2021 10:00)  HR: 62 (27 Jul 2021 08:00) (59 - 72)  BP: 120/91 (27 Jul 2021 08:00) (118/75 - 164/89)  BP(mean): 96 (27 Jul 2021 08:00) (77 - 123)  ABP: --  ABP(mean): --  RR: 30 (27 Jul 2021 08:00) (18 - 41)  SpO2: 90% (27 Jul 2021 08:00) (77% - 93%)      I&O's Detail    26 Jul 2021 07:01  -  27 Jul 2021 07:00  --------------------------------------------------------  IN:    IV PiggyBack: 270 mL    Oral Fluid: 720 mL  Total IN: 990 mL    OUT:    Voided (mL): 2550 mL  Total OUT: 2550 mL    Total NET: -1560 mL            PHYSICAL EXAM  General Appearance: sitting uptright and mild to moderate resp distress  HEENT: PERRL, conjunctiva clear  Neck: Supple, , no adenopathy  Lungs: coarse bilaterally  Heart: Regular rate and rhythm, S1, S2 normal,Not tachycardic  Abdomen: Soft, non-tender, bowel sounds active   Extremities: no cyanosis or edema, no joint swelling  Skin: Skin color, texture normal, no rashes   Neurologic: Alert and oriented X3 , non focal    ECG:    LABS:                            12.9   15.00 )-----------( 366      ( 26 Jul 2021 06:20 )             38.4   07-26    133<L>  |  104  |  37<H>  ----------------------------<  232<H>  4.2   |  23  |  1.17    Ca    8.4<L>      26 Jul 2021 06:20    TPro  6.3  /  Alb  2.2<L>  /  TBili  0.4  /  DBili  0.1  /  AST  103<H>  /  ALT  137<H>  /  AlkPhos  239<H>  07-26                          13.2   13.52 )-----------( 338      ( 24 Jul 2021 08:06 )             39.5     07-24    136  |  105  |  43<H>  ----------------------------<  227<H>  4.3   |  25  |  1.31<H>    Ca    8.7      24 Jul 2021 08:06    TPro  6.6  /  Alb  2.3<L>  /  TBili  0.4  /  DBili  x   /  AST  73<H>  /  ALT  64  /  AlkPhos  165<H>  07-24          Pro BNP  -- 07-24 @ 08:06  D Dimer  446 07-24 @ 08:06  Pro BNP  261 07-21 @ 14:42  D Dimer  460 07-21 @ 14:42              < from: Xray Chest 1 View- PORTABLE-Urgent (07.21.21 @ 15:33) >    PROCEDURE DATE:  07/21/2021          INTERPRETATION:  AP chest on July 21, 2021 at 3:27 PM. Patient is short of breath with cough and fever.    Heart magnified by technique.    There arescattered mid lower lung field infiltrates right greater than left possibly related: Pneumonia.    The lungs are clear on August 30, 2019.    IMPRESSION: Bilateral infiltrates as above.    < end of copied text >  < from: US Duplex Venous Lower Ext Complete, Bilateral (07.25.21 @ 08:42) >  COMPARISON: None available.    TECHNIQUE: Duplex sonography of the BILATERAL LOWER extremity veins with color and spectral Doppler, with and without compression.    FINDINGS:    RIGHT:  Normal compressibility of the RIGHT common femoral, femoral and popliteal veins.  Doppler examination shows normal spontaneous and phasic flow.  No RIGHT calf vein thrombosis is detected.    LEFT:  Normal compressibility of the LEFT common femoral, femoral and popliteal veins.  Doppler examination shows normal spontaneous and phasic flow.  No LEFT calf vein thrombosis is detected.    IMPRESSION:  No evidence of deep venous thrombosis in either lower extremity.    < end of copied text >  RADIOLOGY & ADDITIONAL STUDIES:    < from: Xray Chest 1 View- PORTABLE-Urgent (Xray Chest 1 View- PORTABLE-Urgent .) (07.26.21 @ 08:43) >    PROCEDURE DATE:  07/26/2021          INTERPRETATION:  Portable chest radiograph    CLINICAL INFORMATION: Pneumonia due to Covid 19.. Follow-up    TECHNIQUE:  Portable  AP view of the chest was obtained.    COMPARISON: 7/21/2021 chest available for review.    FINDINGS:    The lungs show stable bilateral  multifocal and diffuse ill-defined airspace opacities.. No pneumothorax.    The  heart is enlarged in transverse diameter. No hilar mass.     Visualized osseous structures are intact.    IMPRESSION:   Stable bilateral  multifocal and diffuse ill-defined airspace opacities..    < end of copied text >

## 2021-07-27 NOTE — PROGRESS NOTE ADULT - ASSESSMENT
55 M with pmh HLD, dm, htn presents with 8 days onset of covid symptoms which included, cough, fevers, sob, hypoxia, fevers, diarrhea, chills and myalgias. Tested positive 7/15. Wife endorsed he was becoming more sob of recently. On arrival hypoxic to 80s and tachypneic. NRB placed, presently on 15% and saturating 95%. Na 126 s/p 1L NS. CXR: Frandy infiltrates. Last scr 1.4 in 2019-> 7/21 1.9 unknown if underlying ckd. Dimer 450 - normal for age  however with covid and increasing fibrinogen, will need further eval.   Denies chest pain. LHC performed 2019 revealed normal coronaries. Here xray with b/l lung infiltrates, hypoxic requiring NRB, was given remdesivir/decadron.     1. acute respiratory failure. covid-19 viral syndrome. multifocal pneumonia  - on remdesivir #7/10 monitor renal/hepatic function closely on therapy  - on decadron #7/10  - s/p tocilizumab x 1 7/23  - glycemic control  - continue on high flow oxygen  - isolation precautions  - fu cbc  - monitor temps  - observe off antibiotics  - prone positoning  - supportive care    2. other issues - care per medicine    55 M with pmh HLD, dm, htn presents with 8 days onset of covid symptoms which included, cough, fevers, sob, hypoxia, fevers, diarrhea, chills and myalgias. Tested positive 7/15. Wife endorsed he was becoming more sob of recently. On arrival hypoxic to 80s and tachypneic. NRB placed, presently on 15% and saturating 95%. Na 126 s/p 1L NS. CXR: Frandy infiltrates. Last scr 1.4 in 2019-> 7/21 1.9 unknown if underlying ckd. Dimer 450 - normal for age  however with covid and increasing fibrinogen, will need further eval. Denies chest pain. LHC performed 2019 revealed normal coronaries. Here xray with b/l lung infiltrates, hypoxic requiring NRB, was given remdesivir/decadron.     1. acute respiratory failure. covid-19 viral syndrome. multifocal pneumonia  - on remdesivir #7/10 monitor renal/hepatic function closely on therapy  - on steroids #7  - s/p tocilizumab x 1 7/23  - glycemic control  - continue on high flow oxygen  - isolation precautions  - fu cbc  - monitor temps  - observe off antibiotics  - prone positoning  - supportive care    2. other issues - care per medicine

## 2021-07-27 NOTE — PROGRESS NOTE ADULT - ASSESSMENT
A:    55M  HD # 7    Here for:    1. Acute hypoxic resp failure 2/2  2. COVID-19 PNA complicated by  3. ARDS  4. Hyperglycemia  5. DESTINEY  6. CKD    This patient requires critical care for support of one or more vital organ systems with a high probability of imminent or life threatening deterioration in his/her condition    P:    PRN analgesia/anxiolysis. Avoid deleriogenic meds.  HD monitoring. Labetalol 200mg PO BID, cozaar 50mg PO qd.  HFNC 50/100% + PRN NRB. Goal maintain SpO2 > 90% and alleviate WOB. Pulmonary involved.  Disease modifying therapy: s/p actemra 7/23, on RDV day # 7, plan for 10 day course. Continue steroid therapy.  VTE ppx lovenox, PUD ppx pepcid.  f/u AM labs, replete lytes PRN.  Lipitor.  Goal BG < 180, BG sub optimally controlled. Increase lantus from 35 to 40U qhs, increase ISS; suspect 2/2 steroid use.  DESTINEY on CKD, improving    Dispo: Cont critical care. Remains high risk for further decompensation requiring intubation    TOTAL CRITICAL CARE TIME: 38 minutes EXCLUSIVE of any non bundled procedures)    Note: This time spent INCLUDES time spent directly as this patient's bedside with evaluation, review of chart including review of laboratory and imaging studies, interpretation of vital signs and cardiac output measurements, any necessary ventilator management, and time spent discussing plan of care with patient and family, including goals of care discussion.

## 2021-07-27 NOTE — PROGRESS NOTE ADULT - SUBJECTIVE AND OBJECTIVE BOX
HPI:     55 M with pmh HLD, dm, htn presents with 8 days onset of covid symptoms which included, cough, fevers, sob, hypoxia, fevers, diarrhea, chills and myalgias. tested positive 7/15.  Patient was unvaccinated. He thinks he got it going to [facility] dance [facility] week of July 4 th.  Wife also had covid but her case was mild.  Admitted for Hypoxia to  on 7/21 and transferred to the ICU on 7/24 for HF NC O2.      7/26: Patient seen in bed, on HF NC O2 100% and 50L, along with a 100% NRB mask  7/27: Patient remains on HF NC O2 100% 50L as well as a %      PAST MEDICAL/SURGICAL/FAMILY/SOCIAL HISTORY:    Past Medical History:  Diabetes    HTN (hypertension).  No surgeries     Tobacco Usage:  · Tobacco Usage	non smoker  Fhx: none (21 Jul 2021 19:19)       PAST MEDICAL & SURGICAL HISTORY:  HTN (hypertension)    Diabetes        FAMILY HISTORY:      Social Hx:    Allergies    No Known Allergies    Intolerances            ICU Vital Signs Last 24 Hrs  T(C): 36.1 (27 Jul 2021 02:00), Max: 36.4 (27 Jul 2021 00:00)  T(F): 97 (27 Jul 2021 02:00), Max: 97.5 (27 Jul 2021 00:00)  HR: 69 (27 Jul 2021 10:00) (59 - 72)  BP: 140/93 (27 Jul 2021 10:00) (118/75 - 164/89)  BP(mean): 103 (27 Jul 2021 10:00) (77 - 123)  ABP: --  ABP(mean): --  RR: 31 (27 Jul 2021 10:00) (18 - 41)  SpO2: 88% (27 Jul 2021 10:00) (77% - 93%)          I&O's Summary    26 Jul 2021 07:01  -  27 Jul 2021 07:00  --------------------------------------------------------  IN: 990 mL / OUT: 2550 mL / NET: -1560 mL    27 Jul 2021 07:01  -  27 Jul 2021 10:47  --------------------------------------------------------  IN: 480 mL / OUT: 400 mL / NET: 80 mL                              13.9   11.75 )-----------( 373      ( 27 Jul 2021 05:48 )             42.5       07-27    135  |  103  |  32<H>  ----------------------------<  157<H>  4.3   |  25  |  1.04    Ca    8.3<L>      27 Jul 2021 05:48  Phos  3.9     07-27  Mg     2.3     07-27    TPro  6.2  /  Alb  2.4<L>  /  TBili  0.5  /  DBili  0.1  /  AST  65<H>  /  ALT  114<H>  /  AlkPhos  269<H>  07-27                    MEDICATIONS  (STANDING):  aspirin  chewable 81 milliGRAM(s) Oral daily  atorvastatin 80 milliGRAM(s) Oral at bedtime  dexAMETHasone  Injectable 6 milliGRAM(s) IV Push daily  dextrose 40% Gel 15 Gram(s) Oral once  dextrose 5%. 1000 milliLiter(s) (50 mL/Hr) IV Continuous <Continuous>  dextrose 5%. 1000 milliLiter(s) (100 mL/Hr) IV Continuous <Continuous>  dextrose 50% Injectable 25 Gram(s) IV Push once  dextrose 50% Injectable 12.5 Gram(s) IV Push once  dextrose 50% Injectable 25 Gram(s) IV Push once  enoxaparin Injectable 90 milliGRAM(s) SubCutaneous every 12 hours  famotidine    Tablet 20 milliGRAM(s) Oral daily  glucagon  Injectable 1 milliGRAM(s) IntraMuscular once  insulin glargine Injectable (LANTUS) 35 Unit(s) SubCutaneous at bedtime  insulin lispro (ADMELOG) corrective regimen sliding scale   SubCutaneous three times a day before meals  insulin lispro (ADMELOG) corrective regimen sliding scale   SubCutaneous at bedtime  insulin lispro Injectable (ADMELOG) 7 Unit(s) SubCutaneous three times a day before meals  labetalol 200 milliGRAM(s) Oral every 12 hours  losartan 50 milliGRAM(s) Oral daily  remdesivir  IVPB 100 milliGRAM(s) IV Intermittent every 24 hours    MEDICATIONS  (PRN):  acetaminophen   Tablet .. 650 milliGRAM(s) Oral once PRN Temp greater or equal to 38C (100.4F), Mild Pain (1 - 3)  acetaminophen   Tablet .. 650 milliGRAM(s) Oral every 4 hours PRN Temp greater or equal to 38C (100.4F), Mild Pain (1 - 3)  ALBUTerol    90 MICROgram(s) HFA Inhaler 2 Puff(s) Inhalation every 4 hours PRN Shortness of Breath and/or Wheezing  benzonatate 100 milliGRAM(s) Oral three times a day PRN Cough      DVT Prophylaxis: Lovenoxx    Advanced Directives:  Discussed with:    Visit Information: 45 min    ** Time is exclusive of billed procedures and/or teaching and/or routine family updates.

## 2021-07-27 NOTE — PROGRESS NOTE ADULT - SUBJECTIVE AND OBJECTIVE BOX
Date of service: 07-27-21 @ 12:07    Pt seen and examined  Patient sitting up  Feels okay  On high flow oxygen plus 100%NRB  O2 sats upper 80s-90s   Afebrile      ROS: no fever or chills; denies dizziness, no HA,  no abdominal pain, no diarrhea or constipation; no dysuria, no urinary frequency, no legs pain, no rashes      MEDICATIONS  (STANDING):  aspirin  chewable 81 milliGRAM(s) Oral daily  atorvastatin 80 milliGRAM(s) Oral at bedtime  dexAMETHasone  Injectable 6 milliGRAM(s) IV Push daily  dextrose 40% Gel 15 Gram(s) Oral once  dextrose 5%. 1000 milliLiter(s) (50 mL/Hr) IV Continuous <Continuous>  dextrose 5%. 1000 milliLiter(s) (100 mL/Hr) IV Continuous <Continuous>  dextrose 50% Injectable 25 Gram(s) IV Push once  dextrose 50% Injectable 12.5 Gram(s) IV Push once  dextrose 50% Injectable 25 Gram(s) IV Push once  enoxaparin Injectable 90 milliGRAM(s) SubCutaneous every 12 hours  famotidine    Tablet 20 milliGRAM(s) Oral daily  glucagon  Injectable 1 milliGRAM(s) IntraMuscular once  insulin glargine Injectable (LANTUS) 35 Unit(s) SubCutaneous at bedtime  insulin lispro (ADMELOG) corrective regimen sliding scale   SubCutaneous three times a day before meals  insulin lispro (ADMELOG) corrective regimen sliding scale   SubCutaneous at bedtime  insulin lispro Injectable (ADMELOG) 7 Unit(s) SubCutaneous three times a day before meals  labetalol 200 milliGRAM(s) Oral every 12 hours  losartan 50 milliGRAM(s) Oral daily  remdesivir  IVPB 100 milliGRAM(s) IV Intermittent every 24 hours    Vital Signs Last 24 Hrs  T(C): 36.2 (27 Jul 2021 10:00), Max: 36.4 (27 Jul 2021 00:00)  T(F): 97.2 (27 Jul 2021 10:00), Max: 97.5 (27 Jul 2021 00:00)  HR: 64 (27 Jul 2021 12:00) (59 - 72)  BP: 134/91 (27 Jul 2021 12:00) (120/91 - 164/89)  BP(mean): 102 (27 Jul 2021 12:00) (77 - 123)  RR: 34 (27 Jul 2021 12:00) (18 - 41)  SpO2: 88% (27 Jul 2021 12:00) (77% - 93%)        PE:  Constitutional: NAD on NRB/HFNC  HEENT: NC/AT, EOMI, PERRLA, conjunctivae clear; ears and nose atraumatic; pharynx benign  Neck: supple; thyroid not palpable  Back: no tenderness  Respiratory: decreased breath sounds, rhonchi  Cardiovascular: S1S2 regular, no murmurs  Abdomen: soft, not tender, not distended, positive BS; liver and spleen WNL  Genitourinary: no suprapubic tenderness  Musculoskeletal: no muscle tenderness, no joint swelling or tenderness  Extremities: no pedal edema  Neurological/ Psychiatric: AxOx3, Judgement and insight normal;  moving all extremities  Skin: no rashes; no palpable lesions    Labs: all available labs reviewed                                                                 13.9   11.75 )-----------( 373      ( 27 Jul 2021 05:48 )             42.5     07-27    135  |  103  |  32<H>  ----------------------------<  157<H>  4.3   |  25  |  1.04    Ca    8.3<L>      27 Jul 2021 05:48  Phos  3.9     07-27  Mg     2.3     07-27    TPro  6.2  /  Alb  2.4<L>  /  TBili  0.5  /  DBili  0.1  /  AST  65<H>  /  ALT  114<H>  /  AlkPhos  269<H>  07-27      Culture - Blood (collected 07-21-21 @ 14:42)  Source: .Blood Blood-Peripheral  Preliminary Report (07-22-21 @ 22:01):    No growth to date.      D-Dimer Assay, Quantitative: 446 ng/mL DDU (07-24-21 @ 08:06)  C-Reactive Protein, Serum: 35 mg/L (07-24-21 @ 08:06)  C-Reactive Protein, Serum: 157 mg/L (07-21-21 @ 14:42)  D-Dimer Assay, Quantitative: 460 ng/mL DDU (07-21-21 @ 14:42)  Ferritin, Serum: 4077 ng/mL (07-21-21 @ 14:42)          Radiology: all available radiological tests reviewed  < from: Xray Chest 1 View- PORTABLE-Urgent (07.21.21 @ 15:33) >    EXAM:  XR CHEST PORTABLE URGENT 1V                            PROCEDURE DATE:  07/21/2021          INTERPRETATION:  AP chest on July 21, 2021 at 3:27 PM. Patient is short of breath with cough and fever.    Heart magnified by technique.    There arescattered mid lower lung field infiltrates right greater than left possibly related: Pneumonia.    The lungs are clear on August 30, 2019.    IMPRESSION: Bilateral infiltrates as above.    < end of copied text >    Advanced directives addressed: full resuscitation

## 2021-07-27 NOTE — PROGRESS NOTE ADULT - SUBJECTIVE AND OBJECTIVE BOX
ICU Progress Note    HPI:    S:    Pt seen and examined  HD # 7  FULL CODE  PMHx HLD, HTN, DM   Pt here for OSB, cough for several days  Admitted on 7/21 with COVID-19 pna. tested positive on 7/15 outpatient. unvaccinated. Escalating fio2 requirements and development of ARDS now requiring HFNC and NRB. S/p actemra on 7/23 7/27 PM: Remains on HFNC 50/100% with frequent desaturations.    ROS: + SOB  Remainder of systems reviewed, negative    Allergies    No Known Allergies    Intolerances    MEDICATIONS  (STANDING):    aspirin  chewable 81 milliGRAM(s) Oral daily  atorvastatin 80 milliGRAM(s) Oral at bedtime  dextrose 40% Gel 15 Gram(s) Oral once  dextrose 5%. 1000 milliLiter(s) (50 mL/Hr) IV Continuous <Continuous>  dextrose 5%. 1000 milliLiter(s) (100 mL/Hr) IV Continuous <Continuous>  dextrose 50% Injectable 25 Gram(s) IV Push once  dextrose 50% Injectable 12.5 Gram(s) IV Push once  dextrose 50% Injectable 25 Gram(s) IV Push once  enoxaparin Injectable 90 milliGRAM(s) SubCutaneous every 12 hours  famotidine    Tablet 20 milliGRAM(s) Oral daily  glucagon  Injectable 1 milliGRAM(s) IntraMuscular once  insulin glargine Injectable (LANTUS) 35 Unit(s) SubCutaneous at bedtime  insulin lispro (ADMELOG) corrective regimen sliding scale   SubCutaneous three times a day before meals  insulin lispro (ADMELOG) corrective regimen sliding scale   SubCutaneous at bedtime  insulin lispro Injectable (ADMELOG) 7 Unit(s) SubCutaneous three times a day before meals  labetalol 200 milliGRAM(s) Oral every 12 hours  losartan 50 milliGRAM(s) Oral daily  methylPREDNISolone sodium succinate Injectable 60 milliGRAM(s) IV Push every 8 hours  remdesivir  IVPB 100 milliGRAM(s) IV Intermittent every 24 hours    MEDICATIONS  (PRN):    acetaminophen   Tablet .. 650 milliGRAM(s) Oral once PRN Temp greater or equal to 38C (100.4F), Mild Pain (1 - 3)  acetaminophen   Tablet .. 650 milliGRAM(s) Oral every 4 hours PRN Temp greater or equal to 38C (100.4F), Mild Pain (1 - 3)  ALBUTerol    90 MICROgram(s) HFA Inhaler 2 Puff(s) Inhalation every 4 hours PRN Shortness of Breath and/or Wheezing  benzonatate 100 milliGRAM(s) Oral three times a day PRN Cough    Drug Dosing Weight    Height (cm): 167.6 (21 Jul 2021 14:34)  Weight (kg): 90.4 (21 Jul 2021 22:00)  BMI (kg/m2): 32.2 (21 Jul 2021 22:00)  BSA (m2): 2 (21 Jul 2021 22:00)    PAST MEDICAL & SURGICAL HISTORY:    HTN (hypertension)    Diabetes    FAMILY HISTORY:    ROS: See HPI; otherwise, all systems reviewed and negative.    O:    ICU Vital Signs Last 24 Hrs  T(C): 36.7 (27 Jul 2021 18:00), Max: 36.7 (27 Jul 2021 18:00)  T(F): 98 (27 Jul 2021 18:00), Max: 98 (27 Jul 2021 18:00)  HR: 70 (27 Jul 2021 19:00) (59 - 70)  BP: 159/78 (27 Jul 2021 19:00) (120/91 - 164/89)  BP(mean): 98 (27 Jul 2021 19:00) (86 - 123)  ABP: --  ABP(mean): --  RR: 28 (27 Jul 2021 21:09) (18 - 39)  SpO2: 90% (27 Jul 2021 21:09) (84% - 93%)    I&O's Detail    26 Jul 2021 07:01  -  27 Jul 2021 07:00  --------------------------------------------------------  IN:    IV PiggyBack: 270 mL    Oral Fluid: 720 mL  Total IN: 990 mL    OUT:    Voided (mL): 2550 mL  Total OUT: 2550 mL    Total NET: -1560 mL    27 Jul 2021 07:01  -  27 Jul 2021 21:39  --------------------------------------------------------  IN:    Oral Fluid: 720 mL  Total IN: 720 mL    OUT:    Voided (mL): 1600 mL  Total OUT: 1600 mL    Total NET: -880 mL    PE:    Adult M sitting in chair  No JVD trachea midline + HFNC 50/100% + NRB  S1S2+  Diminished BS B/L  Abd soft NTND  No leg swelling/edema noted  Awake and alert  Skin pink and well perfused    LABS:    CBC Full  -  ( 27 Jul 2021 05:48 )  WBC Count : 11.75 K/uL  RBC Count : 5.02 M/uL  Hemoglobin : 13.9 g/dL  Hematocrit : 42.5 %  Platelet Count - Automated : 373 K/uL  Mean Cell Volume : 84.7 fl  Mean Cell Hemoglobin : 27.7 pg  Mean Cell Hemoglobin Concentration : 32.7 gm/dL  Auto Neutrophil # : x  Auto Lymphocyte # : x  Auto Monocyte # : x  Auto Eosinophil # : x  Auto Basophil # : x  Auto Neutrophil % : x  Auto Lymphocyte % : x  Auto Monocyte % : x  Auto Eosinophil % : x  Auto Basophil % : x    07-27    135  |  103  |  32<H>  ----------------------------<  157<H>  4.3   |  25  |  1.04    Ca    8.3<L>      27 Jul 2021 05:48  Phos  3.9     07-27  Mg     2.3     07-27    TPro  6.2  /  Alb  2.4<L>  /  TBili  0.5  /  DBili  0.1  /  AST  65<H>  /  ALT  114<H>  /  AlkPhos  269<H>  07-27      CAPILLARY BLOOD GLUCOSE    POCT Blood Glucose.: 211 mg/dL (27 Jul 2021 17:31)  POCT Blood Glucose.: 179 mg/dL (27 Jul 2021 12:22)  POCT Blood Glucose.: 141 mg/dL (27 Jul 2021 07:19)    LIVER FUNCTIONS - ( 27 Jul 2021 05:48 )    Alb: 2.4 g/dL / Pro: 6.2 gm/dL / ALK PHOS: 269 U/L / ALT: 114 U/L / AST: 65 U/L / GGT: x

## 2021-07-27 NOTE — PROGRESS NOTE ADULT - ASSESSMENT
1) COVID Pneumonia  2) Dyspnea  3) Abnormal CXR  4) Hypoxemia  5) Hypertension  6) ROBERT    55 M noted to have COVID 7/15  On arrival hypoxic to 80s and tachypneic. NRB placed,in the ER saturating 95%. Na 126 s/p 1L NS. CXR: Frandy infiltrates. Last scr 1.4 in 2019-> today 1.9 unknown if underlying ckd.  Patient not vaccinated.   Last seen by me in 2019 for ROBERT noted to have uncontrolled hypertension  Treated with IV Remdesivir and Decadron, ? Tocilizumab   SaO2 is 80-85% despite being on 100% NRB so was switched to HFNC  ABG/imaging reviewed  Given the decline in status, recommend to continue IV Solumedrol 60mg q 8 which will be monitored closely and reduced based on clinical status  Given the obesity/hypertension and prior co-morbidities, he is at risk of intubation but continue HFNC, respiratory status remains critical  7/26  covering for Dr Arceo today  Pt is critically ill on 100% Fio2 with O2 sat 85-88%  He gives thumb's up sign and appears in good spirits /sitting upright in bed  data discussed with critical care RN as well as the intensivist DR Espinoza today  His med list and recent films reviewed  repeat cxr requested now  elevated D Dimer with negative leg dopplers  Given the obesity/hypertension and prior co-morbidities, he is at risk of intubation but continue HFNC, respiratory status remains critical  suggest switch to Iv solumedrol and 60 Q8 hrs dose- discussed with Intensivist  CXR repeat pending  Suggest switch to full dose AC with Lovenox  given leukocytosis will discuss with ID if need to add broad spectrum antibiotic coverage  pt is at high risk for intubation due to hypoxemic resp failure  following hospital COVID pneumonia policies / data discussed with DR Alexis Arceo will resume in am  7/27  covering for DR Arceo  data discussed with team members  now on full dose AC but solumedrol not started with concern for additional infection as risk factor  data discussed with Intensivist again today  cxr is reviewed with critical care RN staff  he remains critically ill on 100% O2 and high flow being used  he could not tolerate Prone positioning  Auto diuresis with good urine output noted  I would recommend switch to solumedrol given overall presentation and DC  Decadron  bactrim prophylaxis maybe used while on high diose steroids  Resp status remains critical and will be closely monitored for any decline to proceed with intubation and vent support  overall prognosis remains guarded  Dr Arceo will resume in am

## 2021-07-27 NOTE — PROGRESS NOTE ADULT - ASSESSMENT
A/P: 55 year old male with Acute Hypoxic Respiratory Failure from COVID-19 Pneumonia  HTN  DM II      Plan:  ICU    PULM: Continue NF NC O2, try to wean off the NRB.  Continue full dose Lovenox for epimeric PE treatment.  Encourage Prone positioning     Cardio: Hemodynamics reasonable    Renal: Cr Stable    GI: H2 blocker, PO Diet    ENDO: Continue Lantus and RISS.    ID: S/P Actemra, On day 5 of Rem and Day 6 of Decadron    DVT Prophylaxis with Lovenox    LE Dopplers on 7/26 were negative for DVT

## 2021-07-28 LAB
ANION GAP SERPL CALC-SCNC: 8 MMOL/L — SIGNIFICANT CHANGE UP (ref 5–17)
BUN SERPL-MCNC: 32 MG/DL — HIGH (ref 7–23)
CALCIUM SERPL-MCNC: 8.3 MG/DL — LOW (ref 8.5–10.1)
CHLORIDE SERPL-SCNC: 102 MMOL/L — SIGNIFICANT CHANGE UP (ref 96–108)
CO2 SERPL-SCNC: 22 MMOL/L — SIGNIFICANT CHANGE UP (ref 22–31)
CREAT SERPL-MCNC: 1.08 MG/DL — SIGNIFICANT CHANGE UP (ref 0.5–1.3)
CRP SERPL-MCNC: 5 MG/L — HIGH
D DIMER BLD IA.RAPID-MCNC: 2409 NG/ML DDU — HIGH
FERRITIN SERPL-MCNC: 1719 NG/ML — HIGH (ref 30–400)
GLUCOSE SERPL-MCNC: 212 MG/DL — HIGH (ref 70–99)
HCT VFR BLD CALC: 41.3 % — SIGNIFICANT CHANGE UP (ref 39–50)
HGB BLD-MCNC: 13.9 G/DL — SIGNIFICANT CHANGE UP (ref 13–17)
LDH SERPL L TO P-CCNC: 769 U/L — HIGH (ref 84–241)
MAGNESIUM SERPL-MCNC: 2.3 MG/DL — SIGNIFICANT CHANGE UP (ref 1.6–2.6)
MCHC RBC-ENTMCNC: 28.1 PG — SIGNIFICANT CHANGE UP (ref 27–34)
MCHC RBC-ENTMCNC: 33.7 GM/DL — SIGNIFICANT CHANGE UP (ref 32–36)
MCV RBC AUTO: 83.4 FL — SIGNIFICANT CHANGE UP (ref 80–100)
PHOSPHATE SERPL-MCNC: 4.4 MG/DL — SIGNIFICANT CHANGE UP (ref 2.5–4.5)
PLATELET # BLD AUTO: 425 K/UL — HIGH (ref 150–400)
POTASSIUM SERPL-MCNC: 4.5 MMOL/L — SIGNIFICANT CHANGE UP (ref 3.5–5.3)
POTASSIUM SERPL-SCNC: 4.5 MMOL/L — SIGNIFICANT CHANGE UP (ref 3.5–5.3)
RBC # BLD: 4.95 M/UL — SIGNIFICANT CHANGE UP (ref 4.2–5.8)
RBC # FLD: 12.3 % — SIGNIFICANT CHANGE UP (ref 10.3–14.5)
SODIUM SERPL-SCNC: 132 MMOL/L — LOW (ref 135–145)
WBC # BLD: 13.07 K/UL — HIGH (ref 3.8–10.5)
WBC # FLD AUTO: 13.07 K/UL — HIGH (ref 3.8–10.5)

## 2021-07-28 PROCEDURE — 99291 CRITICAL CARE FIRST HOUR: CPT

## 2021-07-28 RX ADMIN — REMDESIVIR 500 MILLIGRAM(S): 5 INJECTION INTRAVENOUS at 18:10

## 2021-07-28 RX ADMIN — ATORVASTATIN CALCIUM 80 MILLIGRAM(S): 80 TABLET, FILM COATED ORAL at 21:12

## 2021-07-28 RX ADMIN — Medication 3: at 16:06

## 2021-07-28 RX ADMIN — Medication 2: at 21:13

## 2021-07-28 RX ADMIN — LOSARTAN POTASSIUM 50 MILLIGRAM(S): 100 TABLET, FILM COATED ORAL at 09:19

## 2021-07-28 RX ADMIN — Medication 200 MILLIGRAM(S): at 09:19

## 2021-07-28 RX ADMIN — ENOXAPARIN SODIUM 90 MILLIGRAM(S): 100 INJECTION SUBCUTANEOUS at 09:19

## 2021-07-28 RX ADMIN — Medication 60 MILLIGRAM(S): at 06:47

## 2021-07-28 RX ADMIN — Medication 7 UNIT(S): at 16:06

## 2021-07-28 RX ADMIN — Medication 60 MILLIGRAM(S): at 21:27

## 2021-07-28 RX ADMIN — ENOXAPARIN SODIUM 90 MILLIGRAM(S): 100 INJECTION SUBCUTANEOUS at 21:12

## 2021-07-28 RX ADMIN — Medication 81 MILLIGRAM(S): at 09:19

## 2021-07-28 RX ADMIN — Medication 8: at 13:43

## 2021-07-28 RX ADMIN — Medication 60 MILLIGRAM(S): at 13:43

## 2021-07-28 RX ADMIN — Medication 200 MILLIGRAM(S): at 21:12

## 2021-07-28 RX ADMIN — Medication 3: at 09:21

## 2021-07-28 RX ADMIN — INSULIN GLARGINE 40 UNIT(S): 100 INJECTION, SOLUTION SUBCUTANEOUS at 21:12

## 2021-07-28 RX ADMIN — Medication 7 UNIT(S): at 09:21

## 2021-07-28 RX ADMIN — FAMOTIDINE 20 MILLIGRAM(S): 10 INJECTION INTRAVENOUS at 09:19

## 2021-07-28 RX ADMIN — Medication 7 UNIT(S): at 13:43

## 2021-07-28 NOTE — PROGRESS NOTE ADULT - ASSESSMENT
1) COVID Pneumonia  2) Dyspnea  3) Abnormal CXR  4) Hypoxemia  5) Hypertension  6) ROBERT    55 M noted to have COVID 7/15  On arrival hypoxic to 80s and tachypneic. NRB placed,in the ER saturating 95%. Na 126 s/p 1L NS. CXR: Frandy infiltrates. Last scr 1.4 in 2019-> today 1.9 unknown if underlying ckd.  Patient not vaccinated.   Last seen by me in 2019 for ROBERT noted to have uncontrolled hypertension  Treated with IV Remdesivir and Decadron, Tocilizumab   SaO2 is 80-85% despite being on 100% NRB so was switched to HFNC  ABG/imaging reviewed  Given the obesity/hypertension and prior co-morbidities, he is at risk of intubation but continue HFNC, respiratory status remains critical  XR reviewed- pulmonary infiltrates are present  DDimer elevated  Possibly to go for CTPE once stabilized, on therapeutic lovenox now  Started IV Solumedrol 60mg q 8 hrs for 72 hours  Will likely need a prolonged course of HFNC given the tenuous respiratory status  Discussed with ICU

## 2021-07-28 NOTE — PROGRESS NOTE ADULT - SUBJECTIVE AND OBJECTIVE BOX
Patient is a 55y old  Male who presents with a chief complaint of cough/sob (28 Jul 2021 16:20)      BRIEF HOSPITAL COURSE: 54 y/o male with pmhx of HLD, HTN, DM admitted on 7/21 with COVID-19 pna. tested positive on 7/15 outpatient. unvaccinated. Escalating fio2 requirements and development of ARDS now requiring HFNC and NRB. S/p actemra on 7/23.    Events last 24 hours: remains on HFNC 100%/60 lpm with NRB on top of it. desats frequently but recovers on own.         PAST MEDICAL & SURGICAL HISTORY:  HTN (hypertension)    Diabetes        Review of Systems:  CONSTITUTIONAL: No fever, chills, or fatigue  EYES: No eye pain, visual disturbances, or discharge  ENMT:  No difficulty hearing, tinnitus, vertigo; No sinus or throat pain  NECK: No pain or stiffness  RESPIRATORY: No cough, wheezing, chills or hemoptysis; No shortness of breath  CARDIOVASCULAR: No chest pain, palpitations, dizziness, or leg swelling  GASTROINTESTINAL: No abdominal or epigastric pain. No nausea, vomiting, or hematemesis; No diarrhea or constipation. No melena or hematochezia.  GENITOURINARY: No dysuria, frequency, hematuria, or incontinence  NEUROLOGICAL: No headaches, memory loss, loss of strength, numbness, or tremors  SKIN: No itching, burning, rashes, or lesions   MUSCULOSKELETAL: No joint pain or swelling; No muscle, back, or extremity pain  PSYCHIATRIC: No depression, anxiety, mood swings, or difficulty sleeping      Medications:  remdesivir  IVPB 100 milliGRAM(s) IV Intermittent every 24 hours    labetalol 200 milliGRAM(s) Oral every 12 hours  losartan 50 milliGRAM(s) Oral daily    ALBUTerol    90 MICROgram(s) HFA Inhaler 2 Puff(s) Inhalation every 4 hours PRN  benzonatate 100 milliGRAM(s) Oral three times a day PRN    acetaminophen   Tablet .. 650 milliGRAM(s) Oral once PRN  acetaminophen   Tablet .. 650 milliGRAM(s) Oral every 4 hours PRN      aspirin  chewable 81 milliGRAM(s) Oral daily  enoxaparin Injectable 90 milliGRAM(s) SubCutaneous every 12 hours    famotidine    Tablet 20 milliGRAM(s) Oral daily      atorvastatin 80 milliGRAM(s) Oral at bedtime  dextrose 40% Gel 15 Gram(s) Oral once  dextrose 50% Injectable 25 Gram(s) IV Push once  dextrose 50% Injectable 12.5 Gram(s) IV Push once  dextrose 50% Injectable 25 Gram(s) IV Push once  glucagon  Injectable 1 milliGRAM(s) IntraMuscular once  insulin glargine Injectable (LANTUS) 40 Unit(s) SubCutaneous at bedtime  insulin lispro (ADMELOG) corrective regimen sliding scale   SubCutaneous at bedtime  insulin lispro (ADMELOG) corrective regimen sliding scale   SubCutaneous three times a day before meals  insulin lispro Injectable (ADMELOG) 7 Unit(s) SubCutaneous three times a day before meals  methylPREDNISolone sodium succinate Injectable 60 milliGRAM(s) IV Push every 8 hours    dextrose 5%. 1000 milliLiter(s) IV Continuous <Continuous>  dextrose 5%. 1000 milliLiter(s) IV Continuous <Continuous>                ICU Vital Signs Last 24 Hrs  T(C): 36.2 (28 Jul 2021 19:00), Max: 36.8 (27 Jul 2021 22:00)  T(F): 97.1 (28 Jul 2021 19:00), Max: 98.2 (27 Jul 2021 22:00)  HR: 74 (28 Jul 2021 21:00) (64 - 80)  BP: 127/81 (28 Jul 2021 21:00) (92/64 - 156/81)  BP(mean): 91 (28 Jul 2021 21:00) (70 - 117)  ABP: --  ABP(mean): --  RR: 35 (28 Jul 2021 21:00) (18 - 38)  SpO2: 87% (28 Jul 2021 21:00) (82% - 93%)          I&O's Detail    27 Jul 2021 07:01  -  28 Jul 2021 07:00  --------------------------------------------------------  IN:    Oral Fluid: 1080 mL  Total IN: 1080 mL    OUT:    Voided (mL): 2150 mL  Total OUT: 2150 mL    Total NET: -1070 mL      28 Jul 2021 07:01  -  28 Jul 2021 21:46  --------------------------------------------------------  IN:    IV PiggyBack: 250 mL    Oral Fluid: 880 mL  Total IN: 1130 mL    OUT:    Voided (mL): 1500 mL  Total OUT: 1500 mL    Total NET: -370 mL            LABS:                        13.9   13.07 )-----------( 425      ( 28 Jul 2021 05:47 )             41.3     07-28    132<L>  |  102  |  32<H>  ----------------------------<  212<H>  4.5   |  22  |  1.08    Ca    8.3<L>      28 Jul 2021 05:47  Phos  4.4     07-28  Mg     2.3     07-28    TPro  6.2  /  Alb  2.3<L>  /  TBili  0.5  /  DBili  0.1  /  AST  52<H>  /  ALT  95<H>  /  AlkPhos  272<H>  07-28          CAPILLARY BLOOD GLUCOSE      POCT Blood Glucose.: 283 mg/dL (28 Jul 2021 21:11)        CULTURES:      Physical Examination:    General: No acute distress.      HEENT: Pupils equal, reactive to light.  Symmetric.    PULM: Clear to auscultation bilaterally, no significant sputum production    NECK: Supple, no lymphadenopathy, trachea midline    CVS: Regular rate and rhythm, no murmurs, rubs, or gallops    ABD: Soft, nondistended, nontender, normoactive bowel sounds, no masses    EXT: No edema, nontender    SKIN: Warm and well perfused, no rashes noted.    NEURO: Alert, oriented, interactive, nonfocal    DEVICES:     RADIOLOGY:     EXAM:  XR CHEST PORTABLE URGENT 1V                            PROCEDURE DATE:  07/26/2021          INTERPRETATION:  Portable chest radiograph    CLINICAL INFORMATION: Pneumonia due to Covid 19.. Follow-up    TECHNIQUE:  Portable  AP view of the chest was obtained.    COMPARISON: 7/21/2021 chest available for review.    FINDINGS:    The lungs show stable bilateral  multifocal and diffuse ill-defined airspace opacities.. No pneumothorax.    The  heart is enlarged in transverse diameter. No hilar mass.     Visualized osseous structures are intact.    IMPRESSION:   Stable bilateral  multifocal and diffuse ill-defined airspace opacities..    --- End of Report ---            TERRI CANTU MD; Attending Radiologist  This document has been electronically signed. Jul 27 2021  8:32AM      CRITICAL CARE TIME SPENT: 35 minutes of critical care time spent providing medical care for patient's acute illness/conditions that impairs at least one vital organ system and/or poses a high risk of imminent or life threatening deterioration in the patient's condition. It includes time spent evaluating and treating the patient's acute illness as well as time spent reviewing labs, radiology, discussing goals of care with patient and/or patient's family, and discussing the case with a multidisciplinary team in an effort to prevent further life threatening deterioration or end organ damage. This time is independent of any procedures performed.

## 2021-07-28 NOTE — PROGRESS NOTE ADULT - ASSESSMENT
55 M with pmh HLD, dm, htn presents with 8 days onset of covid symptoms which included, cough, fevers, sob, hypoxia, fevers, diarrhea, chills and myalgias. Tested positive 7/15. Wife endorsed he was becoming more sob of recently. On arrival hypoxic to 80s and tachypneic. NRB placed, presently on 15% and saturating 95%. Na 126 s/p 1L NS. CXR: Frandy infiltrates. Last scr 1.4 in 2019-> 7/21 1.9 unknown if underlying ckd. Dimer 450 - normal for age  however with covid and increasing fibrinogen, will need further eval.   Denies chest pain. LHC performed 2019 revealed normal coronaries. Here xray with b/l lung infiltrates, hypoxic requiring NRB, was given remdesivir/decadron.     1. acute respiratory failure. covid-19 viral syndrome. multifocal pneumonia  - on remdesivir #8/10 monitor renal/hepatic function closely on therapy  - on decadron #8  - s/p tocilizumab x 1 7/23  - glycemic control  - continue on high flow oxygen  - isolation precautions  - fu cbc  - monitor temps  - observe off antibiotics  - prone positoning  - supportive care    2. other issues - care per medicine    55 M with pmh HLD, dm, htn presents with 8 days onset of covid symptoms which included, cough, fevers, sob, hypoxia, fevers, diarrhea, chills and myalgias. Tested positive 7/15. Wife endorsed he was becoming more sob of recently. On arrival hypoxic to 80s and tachypneic. NRB placed, presently on 15% and saturating 95%. Na 126 s/p 1L NS. CXR: Frandy infiltrates. Last scr 1.4 in 2019-> 7/21 1.9 unknown if underlying ckd. Dimer 450 - normal for age  however with covid and increasing fibrinogen, will need further eval.   Denies chest pain. LHC performed 2019 revealed normal coronaries. Here xray with b/l lung infiltrates, hypoxic requiring NRB, was given remdesivir/decadron.     1. acute respiratory failure. covid-19 viral syndrome. multifocal pneumonia  - on remdesivir #8/10 monitor renal/hepatic function closely on therapy  - on steroids #8  - s/p tocilizumab x 1 7/23  - glycemic control  - continue on high flow oxygen  - isolation precautions  - fu cbc  - monitor temps  - observe off antibiotics  - prone positoning  - supportive care    2. other issues - care per medicine

## 2021-07-28 NOTE — PROGRESS NOTE ADULT - SUBJECTIVE AND OBJECTIVE BOX
HPI:     55 M with pmh HLD, dm, htn presents with 8 days onset of covid symptoms which included, cough, fevers, sob, hypoxia, fevers, diarrhea, chills and myalgias. tested positive 7/15.  Patient was unvaccinated. He thinks he got it going to [facility] dance [facility] week of July 4 th.  Wife also had covid but her case was mild.  Admitted for Hypoxia to  on 7/21 and transferred to the ICU on 7/24 for HF NC O2.      7/26: Patient seen in bed, on HF NC O2 100% and 50L, along with a 100% NRB mask  7/27: Patient remains on HF NC O2 100% 50L as well as a %  7/28: He remains on 100% and 50 L HF NC and 100% NRB            PAST MEDICAL/SURGICAL/FAMILY/SOCIAL HISTORY:    Past Medical History:  Diabetes    HTN (hypertension).  No surgeries     Tobacco Usage:  · Tobacco Usage	non smoker  Fhx: none (21 Jul 2021 19:19)       PAST MEDICAL & SURGICAL HISTORY:  HTN (hypertension)    Diabetes        FAMILY HISTORY:      Social Hx:    Allergies    No Known Allergies    Intolerances            ICU Vital Signs Last 24 Hrs  T(C): 36.1 (28 Jul 2021 08:00), Max: 36.8 (27 Jul 2021 22:00)  T(F): 97 (28 Jul 2021 08:00), Max: 98.2 (27 Jul 2021 22:00)  HR: 69 (28 Jul 2021 12:00) (61 - 78)  BP: 138/90 (28 Jul 2021 12:00) (124/77 - 159/78)  BP(mean): 100 (28 Jul 2021 12:00) (84 - 117)  ABP: --  ABP(mean): --  RR: 28 (28 Jul 2021 12:00) (20 - 38)  SpO2: 89% (28 Jul 2021 12:00) (82% - 91%)          I&O's Summary    27 Jul 2021 07:01  -  28 Jul 2021 07:00  --------------------------------------------------------  IN: 1080 mL / OUT: 2150 mL / NET: -1070 mL    28 Jul 2021 07:01  -  28 Jul 2021 12:55  --------------------------------------------------------  IN: 0 mL / OUT: 500 mL / NET: -500 mL                              13.9   13.07 )-----------( 425      ( 28 Jul 2021 05:47 )             41.3       07-28    132<L>  |  102  |  32<H>  ----------------------------<  212<H>  4.5   |  22  |  1.08    Ca    8.3<L>      28 Jul 2021 05:47  Phos  4.4     07-28  Mg     2.3     07-28    TPro  6.2  /  Alb  2.3<L>  /  TBili  0.5  /  DBili  0.1  /  AST  52<H>  /  ALT  95<H>  /  AlkPhos  272<H>  07-28                    MEDICATIONS  (STANDING):  aspirin  chewable 81 milliGRAM(s) Oral daily  atorvastatin 80 milliGRAM(s) Oral at bedtime  dextrose 40% Gel 15 Gram(s) Oral once  dextrose 5%. 1000 milliLiter(s) (50 mL/Hr) IV Continuous <Continuous>  dextrose 5%. 1000 milliLiter(s) (100 mL/Hr) IV Continuous <Continuous>  dextrose 50% Injectable 25 Gram(s) IV Push once  dextrose 50% Injectable 12.5 Gram(s) IV Push once  dextrose 50% Injectable 25 Gram(s) IV Push once  enoxaparin Injectable 90 milliGRAM(s) SubCutaneous every 12 hours  famotidine    Tablet 20 milliGRAM(s) Oral daily  glucagon  Injectable 1 milliGRAM(s) IntraMuscular once  insulin glargine Injectable (LANTUS) 40 Unit(s) SubCutaneous at bedtime  insulin lispro (ADMELOG) corrective regimen sliding scale   SubCutaneous at bedtime  insulin lispro (ADMELOG) corrective regimen sliding scale   SubCutaneous three times a day before meals  insulin lispro Injectable (ADMELOG) 7 Unit(s) SubCutaneous three times a day before meals  labetalol 200 milliGRAM(s) Oral every 12 hours  losartan 50 milliGRAM(s) Oral daily  methylPREDNISolone sodium succinate Injectable 60 milliGRAM(s) IV Push every 8 hours  remdesivir  IVPB 100 milliGRAM(s) IV Intermittent every 24 hours    MEDICATIONS  (PRN):  acetaminophen   Tablet .. 650 milliGRAM(s) Oral once PRN Temp greater or equal to 38C (100.4F), Mild Pain (1 - 3)  acetaminophen   Tablet .. 650 milliGRAM(s) Oral every 4 hours PRN Temp greater or equal to 38C (100.4F), Mild Pain (1 - 3)  ALBUTerol    90 MICROgram(s) HFA Inhaler 2 Puff(s) Inhalation every 4 hours PRN Shortness of Breath and/or Wheezing  benzonatate 100 milliGRAM(s) Oral three times a day PRN Cough      DVT Prophylaxis: Lovenox    Advanced Directives:  Discussed with:    Visit Information: 45 min    ** Time is exclusive of billed procedures and/or teaching and/or routine family updates.

## 2021-07-28 NOTE — PROGRESS NOTE ADULT - ASSESSMENT
A/P: 55 year old male with Acute Hypoxic Respiratory Failure from COVID-19 Pneumonia  HTN  DM II      Plan:  ICU    PULM: Continue NF NC O2, try to wean off the NRB.  Continue full dose Lovenox for epimeric PE treatment.  Encourage Prone positioning     Cardio: Hemodynamics reasonable    Renal: Cr Stable    GI: H2 blocker, PO Diet    ENDO: Continue Lantus and RISS.    ID: S/P Actemra, On REM and High Dose Solumedrol    DVT Prophylaxis with Lovenox    LE Dopplers on 7/26 were negative for DVT

## 2021-07-28 NOTE — PROGRESS NOTE ADULT - ASSESSMENT
54 y/o male with pmhx of HLD, HTN, DM now with:    1. acute hypoxic respiratory failure  2. covid-19 pna  3. ARDS  4. hyperglycemia    -HFNC with NRB. remains on 100% fio2 high flow but flow rate lowered to 50 lpm. goal spo2 > 90%.   - BG curve improving. ISS coverage increased to moderate. goal 140-180 while critically ill  - decadron 60 q 8 hours  - on remdesevir extended course  - s/p actemra

## 2021-07-28 NOTE — PROGRESS NOTE ADULT - SUBJECTIVE AND OBJECTIVE BOX
Patient is a 55y old  Male who presents with a chief complaint of cough/sob (23 Jul 2021 14:03)      HPI:  55 M with pmh HLD, dm, htn presents with 8 days onset of covid symptoms which included, cough, fevers, sob, hypoxia, fevers, diarrhea, chills and myalgias. TEsted positive 7/15. Wife endorsed he was becoming more sob of recently. On arrival hypoxic to 80s and tachypneic. NRB placed, presently on 15% and saturating 95%. Na 126 s/p 1L NS. CXR: Frandy infiltrates. Last scr 1.4 in 2019-> today 1.9 unknown if underlying ckd.  Patient not vaccinated.   Last seen by me in 2019  History of ROBERT and uncontrolled hypertension  Treated with IV Remdesivir and Decadron, now on Toci  SaO2 is 80-85% despite being on 100% NRB  ABG pending    7/25/2021  Pt transferred to MICU  ABG 7.45/28/63  on HFNC    7/26  covering for Dr Arceo today  Pt is critically ill on 100% Fio2 with O2 sat 85-88%  He gives thumb's up sign and appears in good spirits  sitting upright in bed  data discussed with critical care RN as well as the intensivist DR Espinoza today  His med list and recent films reviewed  repeat cxr requested now  time spent taking care of critically ill pt 30 mins  7/27  covering for DR Arceo  data discussed with team members  now on full dose AC but solumedrol not started with concern for additional infection as risk factor  data discussed with Intensivist again today  cxr is reviewed with critical care RN staff  he remains critically ill on 100% O2 and high flow being used  he could not tolerate Prone positioning  Auto diuresis with good urine output noted  sitting upright in bed  time spent taking care of critically ill pt 30 mins    7/28  Patient is on HFNC  Started IV Solumedrol 60mg q 8 for 3 days    PAST MEDICAL/SURGICAL/FAMILY/SOCIAL HISTORY:    Past Medical History:  Diabetes    HTN (hypertension).  No surgeries     Tobacco Usage:  · Tobacco Usage	non smoker  Fhx: none (21 Jul 2021 19:19)      MEDICATIONS  (STANDING):  aspirin  chewable 81 milliGRAM(s) Oral daily  atorvastatin 80 milliGRAM(s) Oral at bedtime  dextrose 40% Gel 15 Gram(s) Oral once  dextrose 5%. 1000 milliLiter(s) (50 mL/Hr) IV Continuous <Continuous>  dextrose 5%. 1000 milliLiter(s) (100 mL/Hr) IV Continuous <Continuous>  dextrose 50% Injectable 25 Gram(s) IV Push once  dextrose 50% Injectable 12.5 Gram(s) IV Push once  dextrose 50% Injectable 25 Gram(s) IV Push once  enoxaparin Injectable 90 milliGRAM(s) SubCutaneous every 12 hours  famotidine    Tablet 20 milliGRAM(s) Oral daily  glucagon  Injectable 1 milliGRAM(s) IntraMuscular once  insulin glargine Injectable (LANTUS) 40 Unit(s) SubCutaneous at bedtime  insulin lispro (ADMELOG) corrective regimen sliding scale   SubCutaneous at bedtime  insulin lispro (ADMELOG) corrective regimen sliding scale   SubCutaneous three times a day before meals  insulin lispro Injectable (ADMELOG) 7 Unit(s) SubCutaneous three times a day before meals  labetalol 200 milliGRAM(s) Oral every 12 hours  losartan 50 milliGRAM(s) Oral daily  methylPREDNISolone sodium succinate Injectable 60 milliGRAM(s) IV Push every 8 hours  remdesivir  IVPB 100 milliGRAM(s) IV Intermittent every 24 hours    MEDICATIONS  (PRN):  acetaminophen   Tablet .. 650 milliGRAM(s) Oral once PRN Temp greater or equal to 38C (100.4F), Mild Pain (1 - 3)  acetaminophen   Tablet .. 650 milliGRAM(s) Oral every 4 hours PRN Temp greater or equal to 38C (100.4F), Mild Pain (1 - 3)  ALBUTerol    90 MICROgram(s) HFA Inhaler 2 Puff(s) Inhalation every 4 hours PRN Shortness of Breath and/or Wheezing  benzonatate 100 milliGRAM(s) Oral three times a day PRN Cough              Vital Signs Last 24 Hrs  T(C): 36.1 (28 Jul 2021 16:00), Max: 36.8 (27 Jul 2021 22:00)  T(F): 97 (28 Jul 2021 16:00), Max: 98.2 (27 Jul 2021 22:00)  HR: 68 (28 Jul 2021 16:00) (64 - 78)  BP: 141/57 (28 Jul 2021 16:00) (124/77 - 159/78)  BP(mean): 77 (28 Jul 2021 16:00) (77 - 117)  RR: 28 (28 Jul 2021 16:00) (18 - 38)  SpO2: 90% (28 Jul 2021 16:00) (82% - 93%)  I&O's Detail    26 Jul 2021 07:01  -  27 Jul 2021 07:00  --------------------------------------------------------  IN:    IV PiggyBack: 270 mL    Oral Fluid: 720 mL  Total IN: 990 mL    OUT:    Voided (mL): 2550 mL  Total OUT: 2550 mL    Total NET: -1560 mL            PHYSICAL EXAM  General Appearance: sitting uptright and mild to moderate resp distress  HEENT: PERRL, conjunctiva clear  Neck: Supple, , no adenopathy  Lungs: coarse bilaterally  Heart: Regular rate and rhythm, S1, S2 normal,Not tachycardic  Abdomen: Soft, non-tender, bowel sounds active   Extremities: no cyanosis or edema, no joint swelling  Skin: Skin color, texture normal, no rashes   Neurologic: Alert and oriented X3 , non focal    ECG:    LABS:                            12.9   15.00 )-----------( 366      ( 26 Jul 2021 06:20 )             38.4   07-26    133<L>  |  104  |  37<H>  ----------------------------<  232<H>  4.2   |  23  |  1.17    Ca    8.4<L>      26 Jul 2021 06:20    TPro  6.3  /  Alb  2.2<L>  /  TBili  0.4  /  DBili  0.1  /  AST  103<H>  /  ALT  137<H>  /  AlkPhos  239<H>  07-26                          13.2   13.52 )-----------( 338      ( 24 Jul 2021 08:06 )             39.5     07-24    136  |  105  |  43<H>  ----------------------------<  227<H>  4.3   |  25  |  1.31<H>    Ca    8.7      24 Jul 2021 08:06    TPro  6.6  /  Alb  2.3<L>  /  TBili  0.4  /  DBili  x   /  AST  73<H>  /  ALT  64  /  AlkPhos  165<H>  07-24          Pro BNP  -- 07-24 @ 08:06  D Dimer  446 07-24 @ 08:06  Pro BNP  261 07-21 @ 14:42  D Dimer  460 07-21 @ 14:42              < from: Xray Chest 1 View- PORTABLE-Urgent (07.21.21 @ 15:33) >    PROCEDURE DATE:  07/21/2021          INTERPRETATION:  AP chest on July 21, 2021 at 3:27 PM. Patient is short of breath with cough and fever.    Heart magnified by technique.    There arescattered mid lower lung field infiltrates right greater than left possibly related: Pneumonia.    The lungs are clear on August 30, 2019.    IMPRESSION: Bilateral infiltrates as above.    < end of copied text >  < from: US Duplex Venous Lower Ext Complete, Bilateral (07.25.21 @ 08:42) >  COMPARISON: None available.    TECHNIQUE: Duplex sonography of the BILATERAL LOWER extremity veins with color and spectral Doppler, with and without compression.    FINDINGS:    RIGHT:  Normal compressibility of the RIGHT common femoral, femoral and popliteal veins.  Doppler examination shows normal spontaneous and phasic flow.  No RIGHT calf vein thrombosis is detected.    LEFT:  Normal compressibility of the LEFT common femoral, femoral and popliteal veins.  Doppler examination shows normal spontaneous and phasic flow.  No LEFT calf vein thrombosis is detected.    IMPRESSION:  No evidence of deep venous thrombosis in either lower extremity.    < end of copied text >  RADIOLOGY & ADDITIONAL STUDIES:    < from: Xray Chest 1 View- PORTABLE-Urgent (Xray Chest 1 View- PORTABLE-Urgent .) (07.26.21 @ 08:43) >    PROCEDURE DATE:  07/26/2021          INTERPRETATION:  Portable chest radiograph    CLINICAL INFORMATION: Pneumonia due to Covid 19.. Follow-up    TECHNIQUE:  Portable  AP view of the chest was obtained.    COMPARISON: 7/21/2021 chest available for review.    FINDINGS:    The lungs show stable bilateral  multifocal and diffuse ill-defined airspace opacities.. No pneumothorax.    The  heart is enlarged in transverse diameter. No hilar mass.     Visualized osseous structures are intact.    IMPRESSION:   Stable bilateral  multifocal and diffuse ill-defined airspace opacities..    < end of copied text >

## 2021-07-28 NOTE — PROGRESS NOTE ADULT - SUBJECTIVE AND OBJECTIVE BOX
Date of service: 07-28-21 @ 12:37    Pt seen and examined  Patient sitting up  On high flow oxygen plus 100%NRB  O2 sats upper 80s-90s   Feels better  Afebrile      ROS: no fever or chills; denies dizziness, no HA,  no abdominal pain, no diarrhea or constipation; no dysuria, no urinary frequency, no legs pain, no rashes      MEDICATIONS  (STANDING):  aspirin  chewable 81 milliGRAM(s) Oral daily  atorvastatin 80 milliGRAM(s) Oral at bedtime  dextrose 40% Gel 15 Gram(s) Oral once  dextrose 5%. 1000 milliLiter(s) (50 mL/Hr) IV Continuous <Continuous>  dextrose 5%. 1000 milliLiter(s) (100 mL/Hr) IV Continuous <Continuous>  dextrose 50% Injectable 25 Gram(s) IV Push once  dextrose 50% Injectable 12.5 Gram(s) IV Push once  dextrose 50% Injectable 25 Gram(s) IV Push once  enoxaparin Injectable 90 milliGRAM(s) SubCutaneous every 12 hours  famotidine    Tablet 20 milliGRAM(s) Oral daily  glucagon  Injectable 1 milliGRAM(s) IntraMuscular once  insulin glargine Injectable (LANTUS) 40 Unit(s) SubCutaneous at bedtime  insulin lispro (ADMELOG) corrective regimen sliding scale   SubCutaneous at bedtime  insulin lispro (ADMELOG) corrective regimen sliding scale   SubCutaneous three times a day before meals  insulin lispro Injectable (ADMELOG) 7 Unit(s) SubCutaneous three times a day before meals  labetalol 200 milliGRAM(s) Oral every 12 hours  losartan 50 milliGRAM(s) Oral daily  methylPREDNISolone sodium succinate Injectable 60 milliGRAM(s) IV Push every 8 hours  remdesivir  IVPB 100 milliGRAM(s) IV Intermittent every 24 hours    Vital Signs Last 24 Hrs  T(C): 36.1 (28 Jul 2021 08:00), Max: 36.8 (27 Jul 2021 22:00)  T(F): 97 (28 Jul 2021 08:00), Max: 98.2 (27 Jul 2021 22:00)  HR: 75 (28 Jul 2021 11:00) (61 - 78)  BP: 140/72 (28 Jul 2021 11:00) (124/77 - 159/78)  BP(mean): 90 (28 Jul 2021 11:00) (84 - 117)  RR: 27 (28 Jul 2021 11:00) (20 - 38)  SpO2: 85% (28 Jul 2021 11:00) (82% - 91%)    PE:  Constitutional: NAD on NRB/HFNC  HEENT: NC/AT, EOMI, PERRLA, conjunctivae clear; ears and nose atraumatic; pharynx benign  Neck: supple; thyroid not palpable  Back: no tenderness  Respiratory: decreased breath sounds, rhonchi  Cardiovascular: S1S2 regular, no murmurs  Abdomen: soft, not tender, not distended, positive BS; liver and spleen WNL  Genitourinary: no suprapubic tenderness  Musculoskeletal: no muscle tenderness, no joint swelling or tenderness  Extremities: no pedal edema  Neurological/ Psychiatric: AxOx3, Judgement and insight normal;  moving all extremities  Skin: no rashes; no palpable lesions    Labs: all available labs reviewed                                                      13.9   13.07 )-----------( 425      ( 28 Jul 2021 05:47 )             41.3     07-28    132<L>  |  102  |  32<H>  ----------------------------<  212<H>  4.5   |  22  |  1.08    Ca    8.3<L>      28 Jul 2021 05:47  Phos  4.4     07-28  Mg     2.3     07-28    TPro  6.2  /  Alb  2.3<L>  /  TBili  0.5  /  DBili  0.1  /  AST  52<H>  /  ALT  95<H>  /  AlkPhos  272<H>  07-28           D-Dimer Assay, Quantitative: 2409 ng/mL DDU (07-28-21 @ 05:47)  Ferritin, Serum: 1719 ng/mL (07-28-21 @ 05:47)  D-Dimer Assay, Quantitative: 1663 ng/mL DDU (07-26-21 @ 06:20)  Ferritin, Serum: 1806 ng/mL (07-26-21 @ 06:20)  D-Dimer Assay, Quantitative: 446 ng/mL DDU (07-24-21 @ 08:06)  C-Reactive Protein, Serum: 35 mg/L (07-24-21 @ 08:06)  Ferritin, Serum: 2592 ng/mL (07-24-21 @ 08:06)  C-Reactive Protein, Serum: 157 mg/L (07-21-21 @ 14:42)  D-Dimer Assay, Quantitative: 460 ng/mL DDU (07-21-21 @ 14:42)  Ferritin, Serum: 4077 ng/mL (07-21-21 @ 14:42)      Culture - Blood (collected 07-21-21 @ 14:42)  Source: .Blood Blood-Peripheral  Preliminary Report (07-22-21 @ 22:01):    No growth to date.      Radiology: all available radiological tests reviewed    EXAM:  XR CHEST PORTABLE URGENT 1V                            PROCEDURE DATE:  07/21/2021          INTERPRETATION:  AP chest on July 21, 2021 at 3:27 PM. Patient is short of breath with cough and fever.    Heart magnified by technique.    There arescattered mid lower lung field infiltrates right greater than left possibly related: Pneumonia.    The lungs are clear on August 30, 2019.    IMPRESSION: Bilateral infiltrates as above.    < end of copied text >    Advanced directives addressed: full resuscitation

## 2021-07-29 LAB
ANION GAP SERPL CALC-SCNC: 6 MMOL/L — SIGNIFICANT CHANGE UP (ref 5–17)
BUN SERPL-MCNC: 40 MG/DL — HIGH (ref 7–23)
CALCIUM SERPL-MCNC: 8.4 MG/DL — LOW (ref 8.5–10.1)
CHLORIDE SERPL-SCNC: 104 MMOL/L — SIGNIFICANT CHANGE UP (ref 96–108)
CO2 SERPL-SCNC: 25 MMOL/L — SIGNIFICANT CHANGE UP (ref 22–31)
CREAT SERPL-MCNC: 1.18 MG/DL — SIGNIFICANT CHANGE UP (ref 0.5–1.3)
GLUCOSE SERPL-MCNC: 183 MG/DL — HIGH (ref 70–99)
HCT VFR BLD CALC: 40.7 % — SIGNIFICANT CHANGE UP (ref 39–50)
HGB BLD-MCNC: 13.6 G/DL — SIGNIFICANT CHANGE UP (ref 13–17)
MAGNESIUM SERPL-MCNC: 2.4 MG/DL — SIGNIFICANT CHANGE UP (ref 1.6–2.6)
MCHC RBC-ENTMCNC: 28.3 PG — SIGNIFICANT CHANGE UP (ref 27–34)
MCHC RBC-ENTMCNC: 33.4 GM/DL — SIGNIFICANT CHANGE UP (ref 32–36)
MCV RBC AUTO: 84.6 FL — SIGNIFICANT CHANGE UP (ref 80–100)
PHOSPHATE SERPL-MCNC: 4.2 MG/DL — SIGNIFICANT CHANGE UP (ref 2.5–4.5)
PLATELET # BLD AUTO: 459 K/UL — HIGH (ref 150–400)
POTASSIUM SERPL-MCNC: 4.4 MMOL/L — SIGNIFICANT CHANGE UP (ref 3.5–5.3)
POTASSIUM SERPL-SCNC: 4.4 MMOL/L — SIGNIFICANT CHANGE UP (ref 3.5–5.3)
RBC # BLD: 4.81 M/UL — SIGNIFICANT CHANGE UP (ref 4.2–5.8)
RBC # FLD: 12.3 % — SIGNIFICANT CHANGE UP (ref 10.3–14.5)
SODIUM SERPL-SCNC: 135 MMOL/L — SIGNIFICANT CHANGE UP (ref 135–145)
WBC # BLD: 22.68 K/UL — HIGH (ref 3.8–10.5)
WBC # FLD AUTO: 22.68 K/UL — HIGH (ref 3.8–10.5)

## 2021-07-29 PROCEDURE — 71045 X-RAY EXAM CHEST 1 VIEW: CPT | Mod: 26

## 2021-07-29 PROCEDURE — 99291 CRITICAL CARE FIRST HOUR: CPT

## 2021-07-29 RX ADMIN — ATORVASTATIN CALCIUM 80 MILLIGRAM(S): 80 TABLET, FILM COATED ORAL at 21:28

## 2021-07-29 RX ADMIN — LOSARTAN POTASSIUM 50 MILLIGRAM(S): 100 TABLET, FILM COATED ORAL at 09:33

## 2021-07-29 RX ADMIN — Medication 60 MILLIGRAM(S): at 05:53

## 2021-07-29 RX ADMIN — Medication 2: at 21:27

## 2021-07-29 RX ADMIN — Medication 7 UNIT(S): at 07:50

## 2021-07-29 RX ADMIN — Medication 3: at 07:51

## 2021-07-29 RX ADMIN — Medication 8: at 12:25

## 2021-07-29 RX ADMIN — Medication 60 MILLIGRAM(S): at 14:45

## 2021-07-29 RX ADMIN — Medication 200 MILLIGRAM(S): at 09:34

## 2021-07-29 RX ADMIN — Medication 60 MILLIGRAM(S): at 21:28

## 2021-07-29 RX ADMIN — Medication 81 MILLIGRAM(S): at 09:33

## 2021-07-29 RX ADMIN — Medication 8: at 17:14

## 2021-07-29 RX ADMIN — FAMOTIDINE 20 MILLIGRAM(S): 10 INJECTION INTRAVENOUS at 09:33

## 2021-07-29 RX ADMIN — Medication 200 MILLIGRAM(S): at 21:28

## 2021-07-29 RX ADMIN — INSULIN GLARGINE 40 UNIT(S): 100 INJECTION, SOLUTION SUBCUTANEOUS at 21:27

## 2021-07-29 RX ADMIN — ENOXAPARIN SODIUM 90 MILLIGRAM(S): 100 INJECTION SUBCUTANEOUS at 21:27

## 2021-07-29 RX ADMIN — ENOXAPARIN SODIUM 90 MILLIGRAM(S): 100 INJECTION SUBCUTANEOUS at 09:34

## 2021-07-29 RX ADMIN — Medication 7 UNIT(S): at 17:14

## 2021-07-29 RX ADMIN — Medication 7 UNIT(S): at 12:24

## 2021-07-29 RX ADMIN — REMDESIVIR 500 MILLIGRAM(S): 5 INJECTION INTRAVENOUS at 19:58

## 2021-07-29 NOTE — PROGRESS NOTE ADULT - ASSESSMENT
55 M with pmh HLD, dm, htn presents with 8 days onset of covid symptoms which included, cough, fevers, sob, hypoxia, fevers, diarrhea, chills and myalgias. Tested positive 7/15. Wife endorsed he was becoming more sob of recently. On arrival hypoxic to 80s and tachypneic. NRB placed, presently on 15% and saturating 95%. Na 126 s/p 1L NS. CXR: Frandy infiltrates. Last scr 1.4 in 2019-> 7/21 1.9 unknown if underlying ckd. Dimer 450 - normal for age  however with covid and increasing fibrinogen, will need further eval.   Denies chest pain. LHC performed 2019 revealed normal coronaries. Here xray with b/l lung infiltrates, hypoxic requiring NRB, was given remdesivir/decadron.     1. acute respiratory failure. covid-19 viral syndrome. multifocal pneumonia  - on remdesivir #8/10 monitor renal/hepatic function closely on therapy  - on decadron #8  - s/p tocilizumab x 1 7/23  - glycemic control  - continue on high flow oxygen  - isolation precautions  - fu cbc  - monitor temps  - observe off antibiotics  - prone positoning  - supportive care    2. other issues - care per medicine    55 M with pmh HLD, dm, htn presents with 8 days onset of covid symptoms which included, cough, fevers, sob, hypoxia, fevers, diarrhea, chills and myalgias. Tested positive 7/15. Wife endorsed he was becoming more sob of recently. On arrival hypoxic to 80s and tachypneic. NRB placed, presently on 15% and saturating 95%. Na 126 s/p 1L NS. CXR: Frandy infiltrates. Last scr 1.4 in 2019-> 7/21 1.9 unknown if underlying ckd. Dimer 450 - normal for age  however with covid and increasing fibrinogen, will need further eval.   Denies chest pain. LHC performed 2019 revealed normal coronaries. Here xray with b/l lung infiltrates, hypoxic requiring NRB, was given remdesivir/decadron.     1. acute respiratory failure. covid-19 viral syndrome. multifocal pneumonia  - on remdesivir #9/10 monitor renal/hepatic function closely on therapy  - on decadron #9  - s/p tocilizumab x 1 7/23  - glycemic control  - continue on high flow oxygen  - isolation precautions  - fu cbc  - monitor temps  - observe off antibiotics  - prone positoning  - supportive care    2. other issues - care per medicine    55 M with pmh HLD, dm, htn presents with 8 days onset of covid symptoms which included, cough, fevers, sob, hypoxia, fevers, diarrhea, chills and myalgias. Tested positive 7/15. Wife endorsed he was becoming more sob of recently. On arrival hypoxic to 80s and tachypneic. NRB placed, presently on 15% and saturating 95%. Na 126 s/p 1L NS. CXR: Frandy infiltrates. Last scr 1.4 in 2019-> 7/21 1.9 unknown if underlying ckd. Dimer 450 - normal for age  however with covid and increasing fibrinogen, will need further eval.   Denies chest pain. LHC performed 2019 revealed normal coronaries. Here xray with b/l lung infiltrates, hypoxic requiring NRB, was given remdesivir/decadron.     1. acute respiratory failure. covid-19 viral syndrome. multifocal pneumonia  - on remdesivir #9/10 monitor renal/hepatic function closely on therapy  - on IV steroids #9  - s/p tocilizumab x 1 7/23  - glycemic control  - continue on high flow oxygen  - isolation precautions  - fu cbc  - monitor temps  - observe off antibiotics  - prone positoning  - supportive care    2. other issues - care per medicine

## 2021-07-29 NOTE — PROGRESS NOTE ADULT - SUBJECTIVE AND OBJECTIVE BOX
Date of service: 07-28-21 @ 12:37    Pt seen and examined  Patient sitting up  On high flow oxygen plus 100%NRB  O2 sats upper 80s-90s   Feels better  Afebrile    ROS: no fever or chills; denies dizziness, no HA,  no abdominal pain, no diarrhea or constipation; no dysuria, no urinary frequency, no legs pain, no rashes    MEDICATIONS  (STANDING):  aspirin  chewable 81 milliGRAM(s) Oral daily  atorvastatin 80 milliGRAM(s) Oral at bedtime  dextrose 40% Gel 15 Gram(s) Oral once  dextrose 5%. 1000 milliLiter(s) (50 mL/Hr) IV Continuous <Continuous>  dextrose 5%. 1000 milliLiter(s) (100 mL/Hr) IV Continuous <Continuous>  dextrose 50% Injectable 25 Gram(s) IV Push once  dextrose 50% Injectable 12.5 Gram(s) IV Push once  dextrose 50% Injectable 25 Gram(s) IV Push once  enoxaparin Injectable 90 milliGRAM(s) SubCutaneous every 12 hours  famotidine    Tablet 20 milliGRAM(s) Oral daily  glucagon  Injectable 1 milliGRAM(s) IntraMuscular once  insulin glargine Injectable (LANTUS) 40 Unit(s) SubCutaneous at bedtime  insulin lispro (ADMELOG) corrective regimen sliding scale   SubCutaneous at bedtime  insulin lispro (ADMELOG) corrective regimen sliding scale   SubCutaneous three times a day before meals  insulin lispro Injectable (ADMELOG) 7 Unit(s) SubCutaneous three times a day before meals  labetalol 200 milliGRAM(s) Oral every 12 hours  losartan 50 milliGRAM(s) Oral daily  methylPREDNISolone sodium succinate Injectable 60 milliGRAM(s) IV Push every 8 hours  remdesivir  IVPB 100 milliGRAM(s) IV Intermittent every 24 hours    Vital Signs Last 24 Hrs  T(C): 36.1 (28 Jul 2021 08:00), Max: 36.8 (27 Jul 2021 22:00)  T(F): 97 (28 Jul 2021 08:00), Max: 98.2 (27 Jul 2021 22:00)  HR: 75 (28 Jul 2021 11:00) (61 - 78)  BP: 140/72 (28 Jul 2021 11:00) (124/77 - 159/78)  BP(mean): 90 (28 Jul 2021 11:00) (84 - 117)  RR: 27 (28 Jul 2021 11:00) (20 - 38)  SpO2: 85% (28 Jul 2021 11:00) (82% - 91%)    PE:  Constitutional: NAD on NRB/HFNC  HEENT: NC/AT, EOMI, PERRLA, conjunctivae clear; ears and nose atraumatic; pharynx benign  Neck: supple; thyroid not palpable  Back: no tenderness  Respiratory: decreased breath sounds, rhonchi  Cardiovascular: S1S2 regular, no murmurs  Abdomen: soft, not tender, not distended, positive BS; liver and spleen WNL  Genitourinary: no suprapubic tenderness  Musculoskeletal: no muscle tenderness, no joint swelling or tenderness  Extremities: no pedal edema  Neurological/ Psychiatric: AxOx3, Judgement and insight normal;  moving all extremities  Skin: no rashes; no palpable lesions    Labs: all available labs reviewed                                                      13.9   13.07 )-----------( 425      ( 28 Jul 2021 05:47 )             41.3     07-28    132<L>  |  102  |  32<H>  ----------------------------<  212<H>  4.5   |  22  |  1.08    Ca    8.3<L>      28 Jul 2021 05:47  Phos  4.4     07-28  Mg     2.3     07-28    TPro  6.2  /  Alb  2.3<L>  /  TBili  0.5  /  DBili  0.1  /  AST  52<H>  /  ALT  95<H>  /  AlkPhos  272<H>  07-28           D-Dimer Assay, Quantitative: 2409 ng/mL DDU (07-28-21 @ 05:47)  Ferritin, Serum: 1719 ng/mL (07-28-21 @ 05:47)  D-Dimer Assay, Quantitative: 1663 ng/mL DDU (07-26-21 @ 06:20)  Ferritin, Serum: 1806 ng/mL (07-26-21 @ 06:20)  D-Dimer Assay, Quantitative: 446 ng/mL DDU (07-24-21 @ 08:06)  C-Reactive Protein, Serum: 35 mg/L (07-24-21 @ 08:06)  Ferritin, Serum: 2592 ng/mL (07-24-21 @ 08:06)  C-Reactive Protein, Serum: 157 mg/L (07-21-21 @ 14:42)  D-Dimer Assay, Quantitative: 460 ng/mL DDU (07-21-21 @ 14:42)  Ferritin, Serum: 4077 ng/mL (07-21-21 @ 14:42)      Culture - Blood (collected 07-21-21 @ 14:42)  Source: .Blood Blood-Peripheral  Preliminary Report (07-22-21 @ 22:01):    No growth to date.      Radiology: all available radiological tests reviewed    EXAM:  XR CHEST PORTABLE URGENT 1V                            PROCEDURE DATE:  07/21/2021          INTERPRETATION:  AP chest on July 21, 2021 at 3:27 PM. Patient is short of breath with cough and fever.    Heart magnified by technique.    There arescattered mid lower lung field infiltrates right greater than left possibly related: Pneumonia.    The lungs are clear on August 30, 2019.    IMPRESSION: Bilateral infiltrates as above.    < end of copied text >    Advanced directives addressed: full resuscitation Date of service: 07-28-21 @ 12:37    Pt seen and examined  Patient sitting up in bed  On high flow oxygen 50 % plus 100%NRB  O2 sats upper 80s-90s   Afebrile    ROS: no fever or chills; denies dizziness, no HA,  no abdominal pain, no diarrhea or constipation; no dysuria, no urinary frequency, no legs pain, no rashes    MEDICATIONS  (STANDING):  aspirin  chewable 81 milliGRAM(s) Oral daily  atorvastatin 80 milliGRAM(s) Oral at bedtime  dextrose 40% Gel 15 Gram(s) Oral once  dextrose 5%. 1000 milliLiter(s) (50 mL/Hr) IV Continuous <Continuous>  dextrose 5%. 1000 milliLiter(s) (100 mL/Hr) IV Continuous <Continuous>  dextrose 50% Injectable 25 Gram(s) IV Push once  dextrose 50% Injectable 12.5 Gram(s) IV Push once  dextrose 50% Injectable 25 Gram(s) IV Push once  enoxaparin Injectable 90 milliGRAM(s) SubCutaneous every 12 hours  famotidine    Tablet 20 milliGRAM(s) Oral daily  glucagon  Injectable 1 milliGRAM(s) IntraMuscular once  insulin glargine Injectable (LANTUS) 40 Unit(s) SubCutaneous at bedtime  insulin lispro (ADMELOG) corrective regimen sliding scale   SubCutaneous at bedtime  insulin lispro (ADMELOG) corrective regimen sliding scale   SubCutaneous three times a day before meals  insulin lispro Injectable (ADMELOG) 7 Unit(s) SubCutaneous three times a day before meals  labetalol 200 milliGRAM(s) Oral every 12 hours  losartan 50 milliGRAM(s) Oral daily  methylPREDNISolone sodium succinate Injectable 60 milliGRAM(s) IV Push every 8 hours  remdesivir  IVPB 100 milliGRAM(s) IV Intermittent every 24 hours    Vital Signs Last 24 Hrs  T(C): 36.1 (28 Jul 2021 08:00), Max: 36.8 (27 Jul 2021 22:00)  T(F): 97 (28 Jul 2021 08:00), Max: 98.2 (27 Jul 2021 22:00)  HR: 75 (28 Jul 2021 11:00) (61 - 78)  BP: 140/72 (28 Jul 2021 11:00) (124/77 - 159/78)  BP(mean): 90 (28 Jul 2021 11:00) (84 - 117)  RR: 27 (28 Jul 2021 11:00) (20 - 38)  SpO2: 85% (28 Jul 2021 11:00) (82% - 91%)    PE:  Constitutional: NAD on NRB/HFNC  HEENT: NC/AT, EOMI, PERRLA, conjunctivae clear; ears and nose atraumatic; pharynx benign  Neck: supple; thyroid not palpable  Back: no tenderness  Respiratory: decreased breath sounds, rhonchi  Cardiovascular: S1S2 regular, no murmurs  Abdomen: soft, not tender, not distended, positive BS; liver and spleen WNL  Genitourinary: no suprapubic tenderness  Musculoskeletal: no muscle tenderness, no joint swelling or tenderness  Extremities: no pedal edema  Neurological/ Psychiatric: AxOx3, Judgement and insight normal;  moving all extremities  Skin: no rashes; no palpable lesions    Labs: all available labs reviewed                                   13.6   22.68 )-----------( 459      ( 29 Jul 2021 07:03 )             40.7     07-29    135  |  104  |  40<H>  ----------------------------<  183<H>  4.4   |  25  |  1.18    Ca    8.4<L>      29 Jul 2021 07:03  Phos  4.2     07-29  Mg     2.4     07-29    TPro  6.0  /  Alb  2.5<L>  /  TBili  0.5  /  DBili  0.1  /  AST  41<H>  /  ALT  81<H>  /  AlkPhos  244<H>  07-29      D-Dimer Assay, Quantitative: 2409 ng/mL DDU (07-28-21 @ 05:47)  C-Reactive Protein, Serum: 5 mg/L (07-28-21 @ 05:47)  Ferritin, Serum: 1719 ng/mL (07-28-21 @ 05:47)  D-Dimer Assay, Quantitative: 1663 ng/mL DDU (07-26-21 @ 06:20)  Ferritin, Serum: 1806 ng/mL (07-26-21 @ 06:20)  D-Dimer Assay, Quantitative: 446 ng/mL DDU (07-24-21 @ 08:06)  C-Reactive Protein, Serum: 35 mg/L (07-24-21 @ 08:06)  Ferritin, Serum: 2592 ng/mL (07-24-21 @ 08:06)  C-Reactive Protein, Serum: 157 mg/L (07-21-21 @ 14:42)  D-Dimer Assay, Quantitative: 460 ng/mL DDU (07-21-21 @ 14:42)  Ferritin, Serum: 4077 ng/mL (07-21-21 @ 14:42)      Culture - Blood (collected 07-21-21 @ 14:42)  Source: .Blood Blood-Peripheral  Preliminary Report (07-22-21 @ 22:01):    No growth to date.      Radiology: all available radiological tests reviewed    EXAM:  XR CHEST PORTABLE URGENT 1V                            PROCEDURE DATE:  07/21/2021          INTERPRETATION:  AP chest on July 21, 2021 at 3:27 PM. Patient is short of breath with cough and fever.    Heart magnified by technique.    There arescattered mid lower lung field infiltrates right greater than left possibly related: Pneumonia.    The lungs are clear on August 30, 2019.    IMPRESSION: Bilateral infiltrates as above.    < end of copied text >    Advanced directives addressed: full resuscitation

## 2021-07-29 NOTE — PROGRESS NOTE ADULT - SUBJECTIVE AND OBJECTIVE BOX
HPI:    5 y/o male with pmhx of HLD, HTN, DM admitted on 7/21 with COVID-19 pna. tested positive on 7/15 outpatient. unvaccinated. Escalating fio2 requirements and development of ARDS now requiring HFNC and NRB. S/p actemra on 7/23.    Events last 24 hours: Able to titrate HFNC to 80%  50L/min  WOB OK proning now          Hosp day # 8  ICU day 7/24      Vital signs/reviewed and physical exam performed where pertinent and urgently required.    Lab/radiology studies/ABG/meds reviewed and interpreted into the assessment and treatment plan.    Assessment/Plan/Therapeutic interventions      Neuro: No issues     Cor:  Titrating Pressor support as needed to maintain MAP 65.  Avoiding fluid challenges.     Pulm:  HFNC  medrol     GI:  Gi prophylaxis   Tolerating PO     Renal: Even to negative fluid balance as tolerated by hemodynamics and renal fx.    DESTINEY resolved     Heme:  On full AC  doppler LE neg     ID: second course remdesivir   s/p tocilizumab     Endo Aggressive glycemic control to limit FS glucose to < 180mg/dl.        COVID 19 specific considerations and therapeutic options based on the available and rapidly changing medical literature.    Goals of care considerations:  Ongoing assessment for patient specific treatment options based on clinical progression or decline.  I have involved the family with updates and requests in guidance for medical decision making.      38 minutes of critical care time spent, (independent of any procedures),  in the management of this critically ill COVID-19 patient  with continuous assessments and interventions based on the interpretation of multiple databases.   HPI:    56 y/o male with pmhx of HLD, HTN, DM admitted on 7/21 with COVID-19 pna. tested positive on 7/15 outpatient. unvaccinated. Escalating fio2 requirements and development of ARDS now requiring HFNC and NRB. S/p actemra on 7/23.    Events last 24 hours: Able to titrate HFNC to 80%  50L/min  WOB OK proning now          Hosp day # 8  ICU day 7/24      Vital signs/reviewed and physical exam performed where pertinent and urgently required.    Lab/radiology studies/ABG/meds reviewed and interpreted into the assessment and treatment plan.    Assessment/Plan/Therapeutic interventions      Neuro: No issues     Cor:  Titrating Pressor support as needed to maintain MAP 65.  Avoiding fluid challenges.     Pulm:  HFNC  medrol   trype 1 resp failure    GI:  Gi prophylaxis   Tolerating PO     Renal: Even to negative fluid balance as tolerated by hemodynamics and renal fx.    DESTINEY resolved     Heme:  On full AC  doppler LE neg     ID: second course remdesivir   s/p tocilizumab     Endo Aggressive glycemic control to limit FS glucose to < 180mg/dl.        COVID 19 specific considerations and therapeutic options based on the available and rapidly changing medical literature.    Goals of care considerations:  Ongoing assessment for patient specific treatment options based on clinical progression or decline.  I have involved the family with updates and requests in guidance for medical decision making.      38 minutes of critical care time spent, (independent of any procedures),  in the management of this critically ill COVID-19 patient  with continuous assessments and interventions based on the interpretation of multiple databases.

## 2021-07-29 NOTE — PROGRESS NOTE ADULT - ASSESSMENT
A/P: 55 year old male with Acute Hypoxic Respiratory Failure from COVID-19 Pneumonia  HTN  DM II      Plan:  ICU    PULM: Continue NF NC O2, try to wean off the NRB.  Continue full dose Lovenox for epimeric PE treatment.  Encourage Prone positioning     Cardio: Hemodynamics reasonable    Renal: Cr Stable but BUN rising--Will encourage PO intake.  May need to start IVF    GI: H2 blocker, PO Diet    ENDO: Continue Lantus and RISS.    ID: S/P Actemra, On REM and High Dose Solumedrol    DVT Prophylaxis with Lovenox    LE Dopplers on 7/26 were negative for DVT    OOB to Chair

## 2021-07-29 NOTE — PROGRESS NOTE ADULT - SUBJECTIVE AND OBJECTIVE BOX
HPI:     55 M with pmh HLD, dm, htn presents with 8 days onset of covid symptoms which included, cough, fevers, sob, hypoxia, fevers, diarrhea, chills and myalgias. tested positive 7/15.  Patient was unvaccinated. He thinks he got it going to daughters dance [facility] week of July 4 th.  Wife also had covid but her case was mild.  Admitted for Hypoxia to  on 7/21 and transferred to the ICU on 7/24 for HF NC O2.      7/26: Patient seen in bed, on HF NC O2 100% and 50L, along with a 100% NRB mask  7/27: Patient remains on HF NC O2 100% 50L as well as a %  7/28: He remains on 100% and 50 L HF NC and 100% NRB    7/29: Patient remains on 100% 50 L NF NC and 100% NRB        PAST MEDICAL/SURGICAL/FAMILY/SOCIAL HISTORY:    Past Medical History:  Diabetes    HTN (hypertension).  No surgeries     Tobacco Usage:  · Tobacco Usage	non smoker  Fhx: none (21 Jul 2021 19:19)       PAST MEDICAL & SURGICAL HISTORY:  HTN (hypertension)    Diabetes        FAMILY HISTORY:      Social Hx:    Allergies    No Known Allergies    Intolerances            ICU Vital Signs Last 24 Hrs  T(C): 36.1 (29 Jul 2021 08:00), Max: 36.6 (29 Jul 2021 04:00)  T(F): 96.9 (29 Jul 2021 08:00), Max: 97.8 (29 Jul 2021 04:00)  HR: 74 (29 Jul 2021 10:00) (65 - 82)  BP: 126/69 (29 Jul 2021 10:00) (92/64 - 162/87)  BP(mean): 81 (29 Jul 2021 10:00) (70 - 109)  ABP: --  ABP(mean): --  RR: 27 (29 Jul 2021 10:00) (18 - 35)  SpO2: 95% (29 Jul 2021 10:00) (80% - 95%)          I&O's Summary    28 Jul 2021 07:01  -  29 Jul 2021 07:00  --------------------------------------------------------  IN: 1130 mL / OUT: 1500 mL / NET: -370 mL                              13.6   22.68 )-----------( 459      ( 29 Jul 2021 07:03 )             40.7       07-29    135  |  104  |  40<H>  ----------------------------<  183<H>  4.4   |  25  |  1.18    Ca    8.4<L>      29 Jul 2021 07:03  Phos  4.2     07-29  Mg     2.4     07-29    TPro  6.0  /  Alb  2.5<L>  /  TBili  0.5  /  DBili  0.1  /  AST  41<H>  /  ALT  81<H>  /  AlkPhos  244<H>  07-29                    MEDICATIONS  (STANDING):  aspirin  chewable 81 milliGRAM(s) Oral daily  atorvastatin 80 milliGRAM(s) Oral at bedtime  dextrose 40% Gel 15 Gram(s) Oral once  dextrose 5%. 1000 milliLiter(s) (50 mL/Hr) IV Continuous <Continuous>  dextrose 5%. 1000 milliLiter(s) (100 mL/Hr) IV Continuous <Continuous>  dextrose 50% Injectable 25 Gram(s) IV Push once  dextrose 50% Injectable 12.5 Gram(s) IV Push once  dextrose 50% Injectable 25 Gram(s) IV Push once  enoxaparin Injectable 90 milliGRAM(s) SubCutaneous every 12 hours  famotidine    Tablet 20 milliGRAM(s) Oral daily  glucagon  Injectable 1 milliGRAM(s) IntraMuscular once  insulin glargine Injectable (LANTUS) 40 Unit(s) SubCutaneous at bedtime  insulin lispro (ADMELOG) corrective regimen sliding scale   SubCutaneous at bedtime  insulin lispro (ADMELOG) corrective regimen sliding scale   SubCutaneous three times a day before meals  insulin lispro Injectable (ADMELOG) 7 Unit(s) SubCutaneous three times a day before meals  labetalol 200 milliGRAM(s) Oral every 12 hours  losartan 50 milliGRAM(s) Oral daily  methylPREDNISolone sodium succinate Injectable 60 milliGRAM(s) IV Push every 8 hours  remdesivir  IVPB 100 milliGRAM(s) IV Intermittent every 24 hours    MEDICATIONS  (PRN):  acetaminophen   Tablet .. 650 milliGRAM(s) Oral every 4 hours PRN Temp greater or equal to 38C (100.4F), Mild Pain (1 - 3)  acetaminophen   Tablet .. 650 milliGRAM(s) Oral once PRN Temp greater or equal to 38C (100.4F), Mild Pain (1 - 3)  ALBUTerol    90 MICROgram(s) HFA Inhaler 2 Puff(s) Inhalation every 4 hours PRN Shortness of Breath and/or Wheezing  benzonatate 100 milliGRAM(s) Oral three times a day PRN Cough      DVT Prophylaxis:Lovenox    Advanced Directives:  Discussed with:    Visit Information: 45 min    ** Time is exclusive of billed procedures and/or teaching and/or routine family updates.

## 2021-07-30 LAB
HCT VFR BLD CALC: 40.9 % — SIGNIFICANT CHANGE UP (ref 39–50)
HGB BLD-MCNC: 13.9 G/DL — SIGNIFICANT CHANGE UP (ref 13–17)
MAGNESIUM SERPL-MCNC: 2.5 MG/DL — SIGNIFICANT CHANGE UP (ref 1.6–2.6)
MCHC RBC-ENTMCNC: 28.5 PG — SIGNIFICANT CHANGE UP (ref 27–34)
MCHC RBC-ENTMCNC: 34 GM/DL — SIGNIFICANT CHANGE UP (ref 32–36)
MCV RBC AUTO: 83.8 FL — SIGNIFICANT CHANGE UP (ref 80–100)
PHOSPHATE SERPL-MCNC: 4.7 MG/DL — HIGH (ref 2.5–4.5)
PLATELET # BLD AUTO: 450 K/UL — HIGH (ref 150–400)
RBC # BLD: 4.88 M/UL — SIGNIFICANT CHANGE UP (ref 4.2–5.8)
RBC # FLD: 12.4 % — SIGNIFICANT CHANGE UP (ref 10.3–14.5)
WBC # BLD: 24.58 K/UL — HIGH (ref 3.8–10.5)
WBC # FLD AUTO: 24.58 K/UL — HIGH (ref 3.8–10.5)

## 2021-07-30 PROCEDURE — 99291 CRITICAL CARE FIRST HOUR: CPT

## 2021-07-30 PROCEDURE — 71045 X-RAY EXAM CHEST 1 VIEW: CPT | Mod: 26

## 2021-07-30 RX ORDER — INSULIN GLARGINE 100 [IU]/ML
25 INJECTION, SOLUTION SUBCUTANEOUS
Refills: 0 | Status: DISCONTINUED | OUTPATIENT
Start: 2021-07-30 | End: 2021-07-31

## 2021-07-30 RX ORDER — LANOLIN ALCOHOL/MO/W.PET/CERES
6 CREAM (GRAM) TOPICAL AT BEDTIME
Refills: 0 | Status: DISCONTINUED | OUTPATIENT
Start: 2021-07-30 | End: 2021-09-04

## 2021-07-30 RX ORDER — CEFEPIME 1 G/1
2000 INJECTION, POWDER, FOR SOLUTION INTRAMUSCULAR; INTRAVENOUS EVERY 12 HOURS
Refills: 0 | Status: COMPLETED | OUTPATIENT
Start: 2021-07-30 | End: 2021-08-03

## 2021-07-30 RX ADMIN — ENOXAPARIN SODIUM 90 MILLIGRAM(S): 100 INJECTION SUBCUTANEOUS at 09:11

## 2021-07-30 RX ADMIN — Medication 7 UNIT(S): at 18:13

## 2021-07-30 RX ADMIN — Medication 200 MILLIGRAM(S): at 21:22

## 2021-07-30 RX ADMIN — Medication 60 MILLIGRAM(S): at 05:42

## 2021-07-30 RX ADMIN — CEFEPIME 100 MILLIGRAM(S): 1 INJECTION, POWDER, FOR SOLUTION INTRAMUSCULAR; INTRAVENOUS at 21:23

## 2021-07-30 RX ADMIN — Medication 60 MILLIGRAM(S): at 13:00

## 2021-07-30 RX ADMIN — FAMOTIDINE 20 MILLIGRAM(S): 10 INJECTION INTRAVENOUS at 09:10

## 2021-07-30 RX ADMIN — INSULIN GLARGINE 40 UNIT(S): 100 INJECTION, SOLUTION SUBCUTANEOUS at 21:23

## 2021-07-30 RX ADMIN — Medication 200 MILLIGRAM(S): at 09:10

## 2021-07-30 RX ADMIN — Medication 60 MILLIGRAM(S): at 21:23

## 2021-07-30 RX ADMIN — Medication 81 MILLIGRAM(S): at 09:10

## 2021-07-30 RX ADMIN — Medication 7 UNIT(S): at 12:58

## 2021-07-30 RX ADMIN — ENOXAPARIN SODIUM 90 MILLIGRAM(S): 100 INJECTION SUBCUTANEOUS at 21:22

## 2021-07-30 RX ADMIN — CEFEPIME 100 MILLIGRAM(S): 1 INJECTION, POWDER, FOR SOLUTION INTRAMUSCULAR; INTRAVENOUS at 09:11

## 2021-07-30 RX ADMIN — Medication 4: at 21:23

## 2021-07-30 RX ADMIN — Medication 7 UNIT(S): at 09:11

## 2021-07-30 RX ADMIN — Medication 100 MILLIGRAM(S): at 21:34

## 2021-07-30 RX ADMIN — Medication 8: at 12:58

## 2021-07-30 RX ADMIN — Medication 6: at 18:13

## 2021-07-30 RX ADMIN — LOSARTAN POTASSIUM 50 MILLIGRAM(S): 100 TABLET, FILM COATED ORAL at 09:11

## 2021-07-30 RX ADMIN — ATORVASTATIN CALCIUM 80 MILLIGRAM(S): 80 TABLET, FILM COATED ORAL at 21:22

## 2021-07-30 RX ADMIN — REMDESIVIR 500 MILLIGRAM(S): 5 INJECTION INTRAVENOUS at 18:11

## 2021-07-30 NOTE — PROGRESS NOTE ADULT - ASSESSMENT
1) COVID Pneumonia  2) Dyspnea  3) Abnormal CXR  4) Hypoxemia  5) Hypertension  6) ROBERT    55 M noted to have COVID 7/15  On arrival hypoxic to 80s and tachypneic. NRB placed,in the ER saturating 95%. Na 126 s/p 1L NS. CXR: Frandy infiltrates. Last scr 1.4 in 2019-> today 1.9 unknown if underlying ckd.  Patient not vaccinated.   Last seen by me in 2019 for ROBERT noted to have uncontrolled hypertension  Treated with IV Remdesivir and Decadron, Tocilizumab   SaO2 is 80-85% despite being on 100% NRB so was switched to HFNC  ABG/imaging reviewed  Given the obesity/hypertension and prior co-morbidities, he is at risk of intubation but continue HFNC, respiratory status remains critical  XR reviewed- pulmonary infiltrates are present  DDimer elevated  Possibly to go for CTPE once stabilized, on therapeutic lovenox now  Continue IV Solumedrol 60mg q 8 hrs for 72 hours (last day today)  HFNC 90%FiO2/50LPM 37C  Will likely need a prolonged course of HFNC given the tenuous respiratory status  Discussed inspiratory muscle exercises with patient while he is out of bed +/- incentive spirometer   Will need repeat DDimer

## 2021-07-30 NOTE — PROGRESS NOTE ADULT - SUBJECTIVE AND OBJECTIVE BOX
HPI:     55 M with pmh HLD, dm, htn presents with 8 days onset of covid symptoms which included, cough, fevers, sob, hypoxia, fevers, diarrhea, chills and myalgias. tested positive 7/15.  Patient was unvaccinated. He thinks he got it going to [facility] dance [facility] week of July 4 th.  Wife also had covid but her case was mild.  Admitted for Hypoxia to  on 7/21 and transferred to the ICU on 7/24 for HF NC O2.      7/26: Patient seen in bed, on HF NC O2 100% and 50L, along with a 100% NRB mask  7/27: Patient remains on HF NC O2 100% 50L as well as a %  7/28: He remains on 100% and 50 L HF NC and 100% NRB    7/29: Patient remains on 100% 50 L NF NC and 100% NRB  7/30: PAtient now down to 80% FI O2 and 50L on HF NC, WBC rising.            PAST MEDICAL/SURGICAL/FAMILY/SOCIAL HISTORY:    Past Medical History:  Diabetes    HTN (hypertension).  No surgeries     Tobacco Usage:  · Tobacco Usage	non smoker  Fhx: none (21 Jul 2021 19:19)       PAST MEDICAL & SURGICAL HISTORY:  HTN (hypertension)    Diabetes        FAMILY HISTORY:      Social Hx:    Allergies    No Known Allergies    Intolerances            ICU Vital Signs Last 24 Hrs  T(C): 36.2 (30 Jul 2021 04:00), Max: 37.2 (30 Jul 2021 00:00)  T(F): 97.2 (30 Jul 2021 04:00), Max: 98.9 (30 Jul 2021 00:00)  HR: 64 (30 Jul 2021 11:00) (61 - 75)  BP: 126/76 (30 Jul 2021 11:00) (99/75 - 136/79)  BP(mean): 87 (30 Jul 2021 11:00) (65 - 95)  ABP: --  ABP(mean): --  RR: 23 (30 Jul 2021 11:00) (17 - 31)  SpO2: 97% (30 Jul 2021 11:00) (83% - 100%)          I&O's Summary    29 Jul 2021 07:01  -  30 Jul 2021 07:00  --------------------------------------------------------  IN: 1300 mL / OUT: 1700 mL / NET: -400 mL                              13.9   24.58 )-----------( 450      ( 30 Jul 2021 06:04 )             40.9       07-30    136  |  106  |  40<H>  ----------------------------<  164<H>  5.0   |  23  |  1.12    Ca    8.2<L>      30 Jul 2021 06:04  Phos  4.7     07-30  Mg     2.5     07-30    TPro  6.0  /  Alb  2.3<L>  /  TBili  0.5  /  DBili  <0.1  /  AST  53<H>  /  ALT  96<H>  /  AlkPhos  219<H>  07-30                    MEDICATIONS  (STANDING):  aspirin  chewable 81 milliGRAM(s) Oral daily  atorvastatin 80 milliGRAM(s) Oral at bedtime  cefepime   IVPB 2000 milliGRAM(s) IV Intermittent every 12 hours  dextrose 40% Gel 15 Gram(s) Oral once  dextrose 5%. 1000 milliLiter(s) (50 mL/Hr) IV Continuous <Continuous>  dextrose 5%. 1000 milliLiter(s) (100 mL/Hr) IV Continuous <Continuous>  dextrose 50% Injectable 25 Gram(s) IV Push once  dextrose 50% Injectable 12.5 Gram(s) IV Push once  dextrose 50% Injectable 25 Gram(s) IV Push once  enoxaparin Injectable 90 milliGRAM(s) SubCutaneous every 12 hours  famotidine    Tablet 20 milliGRAM(s) Oral daily  glucagon  Injectable 1 milliGRAM(s) IntraMuscular once  insulin glargine Injectable (LANTUS) 40 Unit(s) SubCutaneous at bedtime  insulin lispro (ADMELOG) corrective regimen sliding scale   SubCutaneous at bedtime  insulin lispro (ADMELOG) corrective regimen sliding scale   SubCutaneous three times a day before meals  insulin lispro Injectable (ADMELOG) 7 Unit(s) SubCutaneous three times a day before meals  labetalol 200 milliGRAM(s) Oral every 12 hours  losartan 50 milliGRAM(s) Oral daily  methylPREDNISolone sodium succinate Injectable 60 milliGRAM(s) IV Push every 8 hours  remdesivir  IVPB 100 milliGRAM(s) IV Intermittent every 24 hours    MEDICATIONS  (PRN):  acetaminophen   Tablet .. 650 milliGRAM(s) Oral once PRN Temp greater or equal to 38C (100.4F), Mild Pain (1 - 3)  acetaminophen   Tablet .. 650 milliGRAM(s) Oral every 4 hours PRN Temp greater or equal to 38C (100.4F), Mild Pain (1 - 3)  ALBUTerol    90 MICROgram(s) HFA Inhaler 2 Puff(s) Inhalation every 4 hours PRN Shortness of Breath and/or Wheezing  benzonatate 100 milliGRAM(s) Oral three times a day PRN Cough      DVT Prophylaxis: Lovenox    Advanced Directives:  Discussed with:    Visit Information: 45 min    ** Time is exclusive of billed procedures and/or teaching and/or routine family updates.

## 2021-07-30 NOTE — PROGRESS NOTE ADULT - SUBJECTIVE AND OBJECTIVE BOX
Patient is a 55y old  Male who presents with a chief complaint of cough/sob (30 Jul 2021 12:17)      BRIEF HOSPITAL COURSE: 55M with PMHx HLD, HTN, DM admitted with cough, SOB, hypoxia, COVID+. Pt unvaccinated. Progressive hypoxic respiratory failure requiring escalation to HFNC. Complicated by ARDS. sp Actemra, sp Remdesivir.     Events last 24 hours: afebrile, hemodynamics stable, currently on HFNC weaned to 80% earlier today with 50L. Denies CP, abd pain, N/V, SOB. Still with dry cough.    PAST MEDICAL & SURGICAL HISTORY:  HTN (hypertension)    Diabetes    Hosp day #9d      Vital signs / Reviewed and Physical Exam Performed where pertinent and urgently required    Lab / Radiology  studies / ABG / Meds -  reviewed and interpreted into the assessment and treatment plan.      Impression:  1. COVID-19 Viral PNA  2. acute hyoxemic respiratory failure  3. ARDS  4. hyperglycemia/diabetes mellitus  5. benign essential HTN  6. DESTINEY    Neuro - stable, addition of melatonin for sleep hygiene    CV -  hemodynamics stable          cont cozaar and labetalol, long term goal BP<130/80    Pulm -  cont HFNC with high level flow for PEEP effect             actively titrating FiO2 to keep sats>86%             encouraging proning as tolerates             cont stress dose IV steroids    GI -  PPI  cont PO diet    Renal - Cr improved, avoid nephrotoxins, strict I/Os, remains negative fluid balance, trend BMP     Heme -  Full AC with tx dose lovenox, prior LE duplex negative, no signs of bleeding, SCDs     ID - empiric IV abx as per ID,  Abx adjustment/discontinuation based on discussion with ID in conjunction with clinical features and culture data    Endo -  Aggressive glycemic control to limit FS glucose to < 180mg/dl, A1c 11.6, remains hyperglycemic, change lantus to 25U BID with ISS              coverage and premeal, carb controlled diet    COVID 19 specific considerations and therapeutic  options based on the available and rapidly changing literature    35 minutes of critical care time spent in the management of this critically ill COVID-19 patient with continuous assessments and interventions based on the interpretation of multiple databases.   Patient is a 55y old  Male who presents with a chief complaint of cough/sob (30 Jul 2021 12:17)      BRIEF HOSPITAL COURSE: 55M with PMHx HLD, HTN, DM admitted with cough, SOB, hypoxia, COVID+. Pt unvaccinated. Progressive hypoxic respiratory failure requiring escalation to HFNC. Complicated by ARDS. sp Actemra, sp Remdesivir.     Events last 24 hours: afebrile, hemodynamics stable, currently on HFNC weaned to 80% earlier today with 50L. Denies CP, abd pain, N/V, SOB. Still with dry cough.    PAST MEDICAL & SURGICAL HISTORY:  HTN (hypertension)    Diabetes    Hosp day #9d      Vital signs / Reviewed and Physical Exam Performed where pertinent and urgently required    Lab / Radiology  studies / ABG / Meds -  reviewed and interpreted into the assessment and treatment plan.      Impression:  1. COVID-19 Viral PNA  2. acute hyoxemic respiratory failure  3. ARDS  4. hyperglycemia/diabetes mellitus  5. benign essential HTN  6. DESTINEY  7. severe protein malnutrition    Plan:  Neuro - stable, addition of melatonin for sleep hygiene    CV -  hemodynamics stable          cont cozaar and labetalol, long term goal BP<130/80    Pulm -  cont HFNC with high level flow for PEEP effect             actively titrating FiO2 to keep sats>86%             encouraging proning as tolerates             cont stress dose IV steroids    GI -  PPI  cont PO diet    Renal - Cr improved, avoid nephrotoxins, strict I/Os, remains negative fluid balance, trend BMP     Heme -  Full AC with tx dose lovenox, prior LE duplex negative, no signs of bleeding, SCDs     ID - empiric IV abx as per ID,  Abx adjustment/discontinuation based on discussion with ID in conjunction with clinical features and culture data    Endo -  Aggressive glycemic control to limit FS glucose to < 180mg/dl, A1c 11.6, remains hyperglycemic, change lantus to 25U BID with ISS              coverage and premeal, carb controlled diet    COVID 19 specific considerations and therapeutic  options based on the available and rapidly changing literature    35 minutes of critical care time spent in the management of this critically ill COVID-19 patient with continuous assessments and interventions based on the interpretation of multiple databases.   Patient is a 55y old  Male who presents with a chief complaint of cough/sob (30 Jul 2021 12:17)      BRIEF HOSPITAL COURSE: 55M with PMHx HLD, HTN, DM admitted with cough, SOB, hypoxia, COVID+. Pt unvaccinated. Progressive hypoxic respiratory failure requiring escalation to HFNC. Complicated by ARDS. sp Actemra, sp Remdesivir.     Events last 24 hours: afebrile, hemodynamics stable, currently on HFNC weaned to 80% earlier today with 50L. Denies CP, abd pain, N/V, SOB. Still with dry cough.    PAST MEDICAL & SURGICAL HISTORY:  HTN (hypertension)    Diabetes    Hosp day #9d      Vital signs / Reviewed and Physical Exam Performed where pertinent and urgently required    Lab / Radiology  studies / ABG / Meds -  reviewed and interpreted into the assessment and treatment plan.      Impression:  1. COVID-19 Viral PNA  2. acute hyoxemic respiratory failure  3. ARDS  4. hyperglycemia/diabetes mellitus  5. benign essential HTN  6. DESTINEY  7. severe protein malnutrition    Plan:  Neuro - stable, addition of melatonin for sleep hygiene    CV -  hemodynamics stable          cont cozaar and labetalol, long term goal BP<130/80    Pulm -  cont HFNC with high level flow for PEEP effect             actively titrating FiO2 to keep sats>86%             encouraging proning as tolerates             cont stress dose IV steroids    GI -  PPI  cont PO diet, carb controlled, add glucerna shakes TID as per RD recs    Renal - Cr improved, avoid nephrotoxins, strict I/Os, remains negative fluid balance, trend BMP     Heme -  Full AC with tx dose lovenox, prior LE duplex negative, no signs of bleeding, SCDs     ID - empiric IV abx as per ID,  Abx adjustment/discontinuation based on discussion with ID in conjunction with clinical features and culture data    Endo -  Aggressive glycemic control to limit FS glucose to < 180mg/dl, A1c 11.6, remains hyperglycemic, change lantus to 25U BID with ISS              coverage and premeal, carb controlled diet    COVID 19 specific considerations and therapeutic  options based on the available and rapidly changing literature    35 minutes of critical care time spent in the management of this critically ill COVID-19 patient with continuous assessments and interventions based on the interpretation of multiple databases.

## 2021-07-30 NOTE — PROGRESS NOTE ADULT - SUBJECTIVE AND OBJECTIVE BOX
Patient is a 55y old  Male who presents with a chief complaint of cough/sob (23 Jul 2021 14:03)      HPI:  55 M with pmh HLD, dm, htn presents with 8 days onset of covid symptoms which included, cough, fevers, sob, hypoxia, fevers, diarrhea, chills and myalgias. TEsted positive 7/15. Wife endorsed he was becoming more sob of recently. On arrival hypoxic to 80s and tachypneic. NRB placed, presently on 15% and saturating 95%. Na 126 s/p 1L NS. CXR: Frandy infiltrates. Last scr 1.4 in 2019-> today 1.9 unknown if underlying ckd.  Patient not vaccinated.   Last seen by me in 2019  History of ROBERT and uncontrolled hypertension  Treated with IV Remdesivir and Decadron, now on Toci  SaO2 is 80-85% despite being on 100% NRB  ABG pending    7/25/2021  Pt transferred to MICU  ABG 7.45/28/63  on HFNC    7/26  covering for Dr Arceo today  Pt is critically ill on 100% Fio2 with O2 sat 85-88%  He gives thumb's up sign and appears in good spirits  sitting upright in bed  data discussed with critical care RN as well as the intensivist DR Espinoza today  His med list and recent films reviewed  repeat cxr requested now  time spent taking care of critically ill pt 30 mins  7/27  covering for DR Arceo  data discussed with team members  now on full dose AC but solumedrol not started with concern for additional infection as risk factor  data discussed with Intensivist again today  cxr is reviewed with critical care RN staff  he remains critically ill on 100% O2 and high flow being used  he could not tolerate Prone positioning  Auto diuresis with good urine output noted  sitting upright in bed  time spent taking care of critically ill pt 30 mins    7/28  Patient is on HFNC  Started IV Solumedrol 60mg q 8 for 3 days    7/30  No acute pulmonary events occurred overnight  Patient continues HFNC 90%FiO2/50LPM 37C    PAST MEDICAL/SURGICAL/FAMILY/SOCIAL HISTORY:    Past Medical History:  Diabetes    HTN (hypertension).  No surgeries     Tobacco Usage:  · Tobacco Usage	non smoker  Fhx: none (21 Jul 2021 19:19)      MEDICATIONS  (STANDING):  aspirin  chewable 81 milliGRAM(s) Oral daily  atorvastatin 80 milliGRAM(s) Oral at bedtime  dextrose 40% Gel 15 Gram(s) Oral once  dextrose 5%. 1000 milliLiter(s) (50 mL/Hr) IV Continuous <Continuous>  dextrose 5%. 1000 milliLiter(s) (100 mL/Hr) IV Continuous <Continuous>  dextrose 50% Injectable 25 Gram(s) IV Push once  dextrose 50% Injectable 12.5 Gram(s) IV Push once  dextrose 50% Injectable 25 Gram(s) IV Push once  enoxaparin Injectable 90 milliGRAM(s) SubCutaneous every 12 hours  famotidine    Tablet 20 milliGRAM(s) Oral daily  glucagon  Injectable 1 milliGRAM(s) IntraMuscular once  insulin glargine Injectable (LANTUS) 40 Unit(s) SubCutaneous at bedtime  insulin lispro (ADMELOG) corrective regimen sliding scale   SubCutaneous at bedtime  insulin lispro (ADMELOG) corrective regimen sliding scale   SubCutaneous three times a day before meals  insulin lispro Injectable (ADMELOG) 7 Unit(s) SubCutaneous three times a day before meals  labetalol 200 milliGRAM(s) Oral every 12 hours  losartan 50 milliGRAM(s) Oral daily  methylPREDNISolone sodium succinate Injectable 60 milliGRAM(s) IV Push every 8 hours  remdesivir  IVPB 100 milliGRAM(s) IV Intermittent every 24 hours    MEDICATIONS  (PRN):  acetaminophen   Tablet .. 650 milliGRAM(s) Oral once PRN Temp greater or equal to 38C (100.4F), Mild Pain (1 - 3)  acetaminophen   Tablet .. 650 milliGRAM(s) Oral every 4 hours PRN Temp greater or equal to 38C (100.4F), Mild Pain (1 - 3)  ALBUTerol    90 MICROgram(s) HFA Inhaler 2 Puff(s) Inhalation every 4 hours PRN Shortness of Breath and/or Wheezing  benzonatate 100 milliGRAM(s) Oral three times a day PRN Cough              Vital Signs Last 24 Hrs  T(C): 36.1 (28 Jul 2021 16:00), Max: 36.8 (27 Jul 2021 22:00)  T(F): 97 (28 Jul 2021 16:00), Max: 98.2 (27 Jul 2021 22:00)  HR: 68 (28 Jul 2021 16:00) (64 - 78)  BP: 141/57 (28 Jul 2021 16:00) (124/77 - 159/78)  BP(mean): 77 (28 Jul 2021 16:00) (77 - 117)  RR: 28 (28 Jul 2021 16:00) (18 - 38)  SpO2: 90% (28 Jul 2021 16:00) (82% - 93%)  I&O's Detail    26 Jul 2021 07:01  -  27 Jul 2021 07:00  --------------------------------------------------------  IN:    IV PiggyBack: 270 mL    Oral Fluid: 720 mL  Total IN: 990 mL    OUT:    Voided (mL): 2550 mL  Total OUT: 2550 mL    Total NET: -1560 mL            PHYSICAL EXAM  General Appearance: sitting uptright and mild to moderate resp distress  HEENT: PERRL, conjunctiva clear  Neck: Supple, , no adenopathy  Lungs: coarse bilaterally  Heart: Regular rate and rhythm, S1, S2 normal,Not tachycardic  Abdomen: Soft, non-tender, bowel sounds active   Extremities: no cyanosis or edema, no joint swelling  Skin: Skin color, texture normal, no rashes   Neurologic: Alert and oriented X3 , non focal    ECG:    LABS:                            12.9   15.00 )-----------( 366      ( 26 Jul 2021 06:20 )             38.4   07-26    133<L>  |  104  |  37<H>  ----------------------------<  232<H>  4.2   |  23  |  1.17    Ca    8.4<L>      26 Jul 2021 06:20    TPro  6.3  /  Alb  2.2<L>  /  TBili  0.4  /  DBili  0.1  /  AST  103<H>  /  ALT  137<H>  /  AlkPhos  239<H>  07-26                          13.2   13.52 )-----------( 338      ( 24 Jul 2021 08:06 )             39.5     07-24    136  |  105  |  43<H>  ----------------------------<  227<H>  4.3   |  25  |  1.31<H>    Ca    8.7      24 Jul 2021 08:06    TPro  6.6  /  Alb  2.3<L>  /  TBili  0.4  /  DBili  x   /  AST  73<H>  /  ALT  64  /  AlkPhos  165<H>  07-24          Pro BNP  -- 07-24 @ 08:06  D Dimer  446 07-24 @ 08:06  Pro BNP  261 07-21 @ 14:42  D Dimer  460 07-21 @ 14:42              < from: Xray Chest 1 View- PORTABLE-Urgent (07.21.21 @ 15:33) >    PROCEDURE DATE:  07/21/2021          INTERPRETATION:  AP chest on July 21, 2021 at 3:27 PM. Patient is short of breath with cough and fever.    Heart magnified by technique.    There arescattered mid lower lung field infiltrates right greater than left possibly related: Pneumonia.    The lungs are clear on August 30, 2019.    IMPRESSION: Bilateral infiltrates as above.    < end of copied text >  < from: US Duplex Venous Lower Ext Complete, Bilateral (07.25.21 @ 08:42) >  COMPARISON: None available.    TECHNIQUE: Duplex sonography of the BILATERAL LOWER extremity veins with color and spectral Doppler, with and without compression.    FINDINGS:    RIGHT:  Normal compressibility of the RIGHT common femoral, femoral and popliteal veins.  Doppler examination shows normal spontaneous and phasic flow.  No RIGHT calf vein thrombosis is detected.    LEFT:  Normal compressibility of the LEFT common femoral, femoral and popliteal veins.  Doppler examination shows normal spontaneous and phasic flow.  No LEFT calf vein thrombosis is detected.    IMPRESSION:  No evidence of deep venous thrombosis in either lower extremity.    < end of copied text >  RADIOLOGY & ADDITIONAL STUDIES:    < from: Xray Chest 1 View- PORTABLE-Urgent (Xray Chest 1 View- PORTABLE-Urgent .) (07.26.21 @ 08:43) >    PROCEDURE DATE:  07/26/2021          INTERPRETATION:  Portable chest radiograph    CLINICAL INFORMATION: Pneumonia due to Covid 19.. Follow-up    TECHNIQUE:  Portable  AP view of the chest was obtained.    COMPARISON: 7/21/2021 chest available for review.    FINDINGS:    The lungs show stable bilateral  multifocal and diffuse ill-defined airspace opacities.. No pneumothorax.    The  heart is enlarged in transverse diameter. No hilar mass.     Visualized osseous structures are intact.    IMPRESSION:   Stable bilateral  multifocal and diffuse ill-defined airspace opacities..    < end of copied text >

## 2021-07-30 NOTE — PROGRESS NOTE ADULT - SUBJECTIVE AND OBJECTIVE BOX
Date of service: 07-30-21 @ 12:18    Pt seen and examined  Patient sitting up in chair  on high flow with NRB  O2 sats upper 80s-90s   Afebrile    ROS: no fever or chills; denies dizziness, no HA,  no abdominal pain, no diarrhea or constipation; no dysuria, no urinary frequency, no legs pain, no rashes    MEDICATIONS  (STANDING):  aspirin  chewable 81 milliGRAM(s) Oral daily  atorvastatin 80 milliGRAM(s) Oral at bedtime  cefepime   IVPB 2000 milliGRAM(s) IV Intermittent every 12 hours  dextrose 40% Gel 15 Gram(s) Oral once  dextrose 5%. 1000 milliLiter(s) (50 mL/Hr) IV Continuous <Continuous>  dextrose 5%. 1000 milliLiter(s) (100 mL/Hr) IV Continuous <Continuous>  dextrose 50% Injectable 25 Gram(s) IV Push once  dextrose 50% Injectable 12.5 Gram(s) IV Push once  dextrose 50% Injectable 25 Gram(s) IV Push once  enoxaparin Injectable 90 milliGRAM(s) SubCutaneous every 12 hours  famotidine    Tablet 20 milliGRAM(s) Oral daily  glucagon  Injectable 1 milliGRAM(s) IntraMuscular once  insulin glargine Injectable (LANTUS) 40 Unit(s) SubCutaneous at bedtime  insulin lispro (ADMELOG) corrective regimen sliding scale   SubCutaneous at bedtime  insulin lispro (ADMELOG) corrective regimen sliding scale   SubCutaneous three times a day before meals  insulin lispro Injectable (ADMELOG) 7 Unit(s) SubCutaneous three times a day before meals  labetalol 200 milliGRAM(s) Oral every 12 hours  losartan 50 milliGRAM(s) Oral daily  methylPREDNISolone sodium succinate Injectable 60 milliGRAM(s) IV Push every 8 hours  remdesivir  IVPB 100 milliGRAM(s) IV Intermittent every 24 hours      Vital Signs Last 24 Hrs  T(C): 36.2 (30 Jul 2021 04:00), Max: 37.2 (30 Jul 2021 00:00)  T(F): 97.2 (30 Jul 2021 04:00), Max: 98.9 (30 Jul 2021 00:00)  HR: 62 (30 Jul 2021 12:00) (61 - 75)  BP: 130/79 (30 Jul 2021 12:00) (99/75 - 136/79)  BP(mean): 91 (30 Jul 2021 12:00) (65 - 95)  RR: 28 (30 Jul 2021 12:00) (17 - 31)  SpO2: 91% (30 Jul 2021 12:00) (83% - 100%)        PE:  Constitutional: NAD on NRB/HFNC  HEENT: NC/AT, EOMI, PERRLA, conjunctivae clear; ears and nose atraumatic; pharynx benign  Neck: supple; thyroid not palpable  Back: no tenderness  Respiratory: decreased breath sounds, rhonchi  Cardiovascular: S1S2 regular, no murmurs  Abdomen: soft, not tender, not distended, positive BS; liver and spleen WNL  Genitourinary: no suprapubic tenderness  Musculoskeletal: no muscle tenderness, no joint swelling or tenderness  Extremities: no pedal edema  Neurological/ Psychiatric: AxOx3, Judgement and insight normal;  moving all extremities  Skin: no rashes; no palpable lesions    Labs: all available labs reviewed                                              13.9   24.58 )-----------( 450      ( 30 Jul 2021 06:04 )             40.9     07-30    136  |  106  |  40<H>  ----------------------------<  164<H>  5.0   |  23  |  1.12    Ca    8.2<L>      30 Jul 2021 06:04  Phos  4.7     07-30  Mg     2.5     07-30    TPro  6.0  /  Alb  2.3<L>  /  TBili  0.5  /  DBili  <0.1  /  AST  53<H>  /  ALT  96<H>  /  AlkPhos  219<H>  07-30        D-Dimer Assay, Quantitative: 2409 ng/mL DDU (07-28-21 @ 05:47)  C-Reactive Protein, Serum: 5 mg/L (07-28-21 @ 05:47)  Ferritin, Serum: 1719 ng/mL (07-28-21 @ 05:47)  D-Dimer Assay, Quantitative: 1663 ng/mL DDU (07-26-21 @ 06:20)  Ferritin, Serum: 1806 ng/mL (07-26-21 @ 06:20)  D-Dimer Assay, Quantitative: 446 ng/mL DDU (07-24-21 @ 08:06)  C-Reactive Protein, Serum: 35 mg/L (07-24-21 @ 08:06)  Ferritin, Serum: 2592 ng/mL (07-24-21 @ 08:06)  C-Reactive Protein, Serum: 157 mg/L (07-21-21 @ 14:42)  D-Dimer Assay, Quantitative: 460 ng/mL DDU (07-21-21 @ 14:42)  Ferritin, Serum: 4077 ng/mL (07-21-21 @ 14:42)      Culture - Blood (collected 07-21-21 @ 14:42)  Source: .Blood Blood-Peripheral  Preliminary Report (07-22-21 @ 22:01):    No growth to date.      Radiology: all available radiological tests reviewed    EXAM:  XR CHEST PORTABLE URGENT 1V                            PROCEDURE DATE:  07/21/2021          INTERPRETATION:  AP chest on July 21, 2021 at 3:27 PM. Patient is short of breath with cough and fever.    Heart magnified by technique.    There arescattered mid lower lung field infiltrates right greater than left possibly related: Pneumonia.    The lungs are clear on August 30, 2019.    IMPRESSION: Bilateral infiltrates as above.    < end of copied text >    Advanced directives addressed: full resuscitation

## 2021-07-30 NOTE — PROGRESS NOTE ADULT - ASSESSMENT
A/P: 55 year old male with Acute Hypoxic Respiratory Failure from COVID-19 Pneumonia  HTN  DM II      Plan:  ICU    PULM: Continue NF NC O2, continue to kathya down.  Continue full dose Lovenox for epimeric PE treatment.  Encourage Prone positioning.  CXR reviewed and remains unchanged     Cardio: Hemodynamics reasonable    Renal: Cr Stable but BUN rising--Will encourage PO intake.  May need to start IVF    GI: H2 blocker, PO Diet    ENDO: Continue Lantus and RISS.    ID: S/P Actemra, On REM and High Dose Solumedrol    DVT Prophylaxis with Lovenox    LE Dopplers on 7/26 were negative for DVT    OOB to Chair    D/W Patients wife daily

## 2021-07-30 NOTE — PROGRESS NOTE ADULT - ASSESSMENT
55 M with pmh HLD, dm, htn presents with 8 days onset of covid symptoms which included, cough, fevers, sob, hypoxia, fevers, diarrhea, chills and myalgias. Tested positive 7/15. Wife endorsed he was becoming more sob of recently. On arrival hypoxic to 80s and tachypneic. NRB placed, presently on 15% and saturating 95%. Na 126 s/p 1L NS. CXR: Frandy infiltrates. Last scr 1.4 in 2019-> 7/21 1.9 unknown if underlying ckd. Dimer 450 - normal for age  however with covid and increasing fibrinogen, will need further eval.   Denies chest pain. LHC performed 2019 revealed normal coronaries. Here xray with b/l lung infiltrates, hypoxic requiring NRB, was given remdesivir/decadron.     1. acute respiratory failure. covid-19 viral syndrome. multifocal pneumonia  - leukocytosis 24, persistnt lung infiltrates, concern for superimposed bacterial pna  - on cefepime [id number]   - on remdesivir #10/10 monitor renal/hepatic function closely on therapy  - on decadron #10  - s/p tocilizumab x 1 7/23  - glycemic control  - isolation precautions  - fu cbc  - monitor temps  - prone positoning  - supportive care    2. other issues - care per medicine    55 M with pmh HLD, dm, htn presents with 8 days onset of covid symptoms which included, cough, fevers, sob, hypoxia, fevers, diarrhea, chills and myalgias. Tested positive 7/15. Wife endorsed he was becoming more sob of recently. On arrival hypoxic to 80s and tachypneic. NRB placed, presently on 15% and saturating 95%. Na 126 s/p 1L NS. CXR: Frandy infiltrates. Last scr 1.4 in 2019-> 7/21 1.9 unknown if underlying ckd. Dimer 450 - normal for age  however with covid and increasing fibrinogen, will need further eval.   Denies chest pain. LHC performed 2019 revealed normal coronaries. Here xray with b/l lung infiltrates, hypoxic requiring NRB, was given remdesivir/decadron.     1. acute respiratory failure. covid-19 viral syndrome. multifocal pneumonia  - leukocytosis 24, persistnt lung infiltrates, concern for superimposed bacterial pna  - on cefepime [id number]   - on remdesivir #10/10 monitor renal/hepatic function closely on therapy  - on IV steroids #10  - s/p tocilizumab x 1 7/23  - glycemic control  - isolation precautions  - fu cbc  - monitor temps  - prone positoning  - supportive care    2. other issues - care per medicine

## 2021-07-31 LAB
ANION GAP SERPL CALC-SCNC: 5 MMOL/L — SIGNIFICANT CHANGE UP (ref 5–17)
BUN SERPL-MCNC: 38 MG/DL — HIGH (ref 7–23)
CALCIUM SERPL-MCNC: 8.1 MG/DL — LOW (ref 8.5–10.1)
CHLORIDE SERPL-SCNC: 106 MMOL/L — SIGNIFICANT CHANGE UP (ref 96–108)
CO2 SERPL-SCNC: 25 MMOL/L — SIGNIFICANT CHANGE UP (ref 22–31)
CREAT SERPL-MCNC: 0.97 MG/DL — SIGNIFICANT CHANGE UP (ref 0.5–1.3)
GLUCOSE SERPL-MCNC: 113 MG/DL — HIGH (ref 70–99)
HCT VFR BLD CALC: 40.8 % — SIGNIFICANT CHANGE UP (ref 39–50)
HGB BLD-MCNC: 13.4 G/DL — SIGNIFICANT CHANGE UP (ref 13–17)
MAGNESIUM SERPL-MCNC: 2.4 MG/DL — SIGNIFICANT CHANGE UP (ref 1.6–2.6)
MCHC RBC-ENTMCNC: 27.7 PG — SIGNIFICANT CHANGE UP (ref 27–34)
MCHC RBC-ENTMCNC: 32.8 GM/DL — SIGNIFICANT CHANGE UP (ref 32–36)
MCV RBC AUTO: 84.5 FL — SIGNIFICANT CHANGE UP (ref 80–100)
PHOSPHATE SERPL-MCNC: 4.4 MG/DL — SIGNIFICANT CHANGE UP (ref 2.5–4.5)
PLATELET # BLD AUTO: 413 K/UL — HIGH (ref 150–400)
POTASSIUM SERPL-MCNC: 4.6 MMOL/L — SIGNIFICANT CHANGE UP (ref 3.5–5.3)
POTASSIUM SERPL-SCNC: 4.6 MMOL/L — SIGNIFICANT CHANGE UP (ref 3.5–5.3)
RBC # BLD: 4.83 M/UL — SIGNIFICANT CHANGE UP (ref 4.2–5.8)
RBC # FLD: 12.3 % — SIGNIFICANT CHANGE UP (ref 10.3–14.5)
SODIUM SERPL-SCNC: 136 MMOL/L — SIGNIFICANT CHANGE UP (ref 135–145)
WBC # BLD: 26.75 K/UL — HIGH (ref 3.8–10.5)
WBC # FLD AUTO: 26.75 K/UL — HIGH (ref 3.8–10.5)

## 2021-07-31 PROCEDURE — 99291 CRITICAL CARE FIRST HOUR: CPT

## 2021-07-31 RX ORDER — INSULIN GLARGINE 100 [IU]/ML
30 INJECTION, SOLUTION SUBCUTANEOUS EVERY MORNING
Refills: 0 | Status: DISCONTINUED | OUTPATIENT
Start: 2021-07-31 | End: 2021-08-01

## 2021-07-31 RX ORDER — INSULIN GLARGINE 100 [IU]/ML
25 INJECTION, SOLUTION SUBCUTANEOUS AT BEDTIME
Refills: 0 | Status: DISCONTINUED | OUTPATIENT
Start: 2021-07-31 | End: 2021-08-01

## 2021-07-31 RX ORDER — BENZOCAINE AND MENTHOL 5; 1 G/100ML; G/100ML
1 LIQUID ORAL EVERY 6 HOURS
Refills: 0 | Status: DISCONTINUED | OUTPATIENT
Start: 2021-07-31 | End: 2021-09-04

## 2021-07-31 RX ADMIN — Medication 12: at 18:25

## 2021-07-31 RX ADMIN — Medication 60 MILLIGRAM(S): at 23:20

## 2021-07-31 RX ADMIN — CEFEPIME 100 MILLIGRAM(S): 1 INJECTION, POWDER, FOR SOLUTION INTRAMUSCULAR; INTRAVENOUS at 23:20

## 2021-07-31 RX ADMIN — Medication 60 MILLIGRAM(S): at 05:58

## 2021-07-31 RX ADMIN — ATORVASTATIN CALCIUM 80 MILLIGRAM(S): 80 TABLET, FILM COATED ORAL at 23:20

## 2021-07-31 RX ADMIN — Medication 7 UNIT(S): at 18:25

## 2021-07-31 RX ADMIN — BENZOCAINE AND MENTHOL 1 LOZENGE: 5; 1 LIQUID ORAL at 23:20

## 2021-07-31 RX ADMIN — INSULIN GLARGINE 25 UNIT(S): 100 INJECTION, SOLUTION SUBCUTANEOUS at 23:27

## 2021-07-31 RX ADMIN — Medication 200 MILLIGRAM(S): at 23:20

## 2021-07-31 RX ADMIN — CEFEPIME 100 MILLIGRAM(S): 1 INJECTION, POWDER, FOR SOLUTION INTRAMUSCULAR; INTRAVENOUS at 09:33

## 2021-07-31 RX ADMIN — LOSARTAN POTASSIUM 50 MILLIGRAM(S): 100 TABLET, FILM COATED ORAL at 09:30

## 2021-07-31 RX ADMIN — Medication 7 UNIT(S): at 14:59

## 2021-07-31 RX ADMIN — Medication 6: at 15:00

## 2021-07-31 RX ADMIN — INSULIN GLARGINE 25 UNIT(S): 100 INJECTION, SOLUTION SUBCUTANEOUS at 09:29

## 2021-07-31 RX ADMIN — Medication 81 MILLIGRAM(S): at 09:30

## 2021-07-31 RX ADMIN — ENOXAPARIN SODIUM 90 MILLIGRAM(S): 100 INJECTION SUBCUTANEOUS at 09:30

## 2021-07-31 RX ADMIN — Medication 100 MILLIGRAM(S): at 09:30

## 2021-07-31 RX ADMIN — FAMOTIDINE 20 MILLIGRAM(S): 10 INJECTION INTRAVENOUS at 09:30

## 2021-07-31 RX ADMIN — Medication 2: at 23:26

## 2021-07-31 RX ADMIN — ENOXAPARIN SODIUM 90 MILLIGRAM(S): 100 INJECTION SUBCUTANEOUS at 23:19

## 2021-07-31 RX ADMIN — Medication 7 UNIT(S): at 09:30

## 2021-07-31 RX ADMIN — Medication 60 MILLIGRAM(S): at 15:00

## 2021-07-31 NOTE — PROGRESS NOTE ADULT - SUBJECTIVE AND OBJECTIVE BOX
Patient is a 55y old  Male who presents with a chief complaint of cough/sob (31 Jul 2021 13:46)      BRIEF HOSPITAL COURSE:   55M with PMHx HLD, HTN, DM admitted with cough, SOB, hypoxia, COVID+. Pt unvaccinated. Progressive hypoxic respiratory failure requiring escalation to HFNC. Complicated by ARDS. sp Actemra, sp Remdesivir.     Events last 24 hours: afebrile, hemodynamics stable, currently on HFNC weaned to 70% earlier today with 50L. Denies CP, abd pain, N/V, SOB. Still with dry cough. Tolerating shakes. Remains with hyperglycemia.    PAST MEDICAL & SURGICAL HISTORY:  HTN (hypertension)    Diabetes        Hosp day #10d      Vital signs / Reviewed and Physical Exam Performed where pertinent and urgently required    Lab / Radiology  studies / ABG / Meds -  reviewed and interpreted into the assessment and treatment plan.    I&O's Summary    30 Jul 2021 07:01  -  31 Jul 2021 07:00  --------------------------------------------------------  IN: 0 mL / OUT: 800 mL / NET: -800 mL    31 Jul 2021 07:01  -  31 Jul 2021 20:55  --------------------------------------------------------  IN: 50 mL / OUT: 600 mL / NET: -550 mL        Impression:  1. COVID-19 Viral PNA  2. acute hyoxemic respiratory failure  3. ARDS  4. hyperglycemia/diabetes mellitus  5. benign essential HTN  6. DESTINEY  7. severe protein malnutrition    Plan:  Neuro - stable, addition of melatonin for sleep hygiene    CV -  hemodynamics stable          cont cozaar and labetalol, long term goal BP<130/80    Pulm -  cont HFNC with high level flow for PEEP effect             actively titrating FiO2 to keep sats>86%             encouraging proning as tolerates             cont stress dose IV steroids             add cepacol lozenges for cough/sore throat    GI -  PPI  cont PO diet, carb controlled, cont glucerna shakes TID as per RD recs    Renal - Cr stable, avoid nephrotoxins, strict I/Os, remains negative fluid balance, trend BMP     Heme -  Full AC with tx dose lovenox, prior LE duplex negative, no signs of bleeding, SCDs     ID - empiric IV abx as per ID,  Abx adjustment/discontinuation based on discussion with ID in conjunction with clinical features and culture data    Endo -  Aggressive glycemic control to limit FS glucose to < 180mg/dl, A1c 11.6, remains hyperglycemic, change lantus to 25U pm and 30 U am,               with ISS coverage and premeal, carb controlled diet    COVID 19 specific considerations and therapeutic  options based on the available and rapidly changing literature    35 minutes of critical care time spent in the management of this critically ill COVID-19 patient with continuous assessments and interventions based on the interpretation of multiple databases.

## 2021-07-31 NOTE — PROGRESS NOTE ADULT - SUBJECTIVE AND OBJECTIVE BOX
Date of service: 07-31-21 @ 13:46    Pt seen and examined  Patient sitting up in chair  On high flow with NRB  Afebrile    ROS: no fever or chills; denies dizziness, no HA,  no abdominal pain, no diarrhea or constipation; no dysuria, no urinary frequency, no legs pain, no rashes      MEDICATIONS  (STANDING):  aspirin  chewable 81 milliGRAM(s) Oral daily  atorvastatin 80 milliGRAM(s) Oral at bedtime  cefepime   IVPB 2000 milliGRAM(s) IV Intermittent every 12 hours  dextrose 40% Gel 15 Gram(s) Oral once  dextrose 5%. 1000 milliLiter(s) (50 mL/Hr) IV Continuous <Continuous>  dextrose 5%. 1000 milliLiter(s) (100 mL/Hr) IV Continuous <Continuous>  dextrose 50% Injectable 25 Gram(s) IV Push once  dextrose 50% Injectable 12.5 Gram(s) IV Push once  dextrose 50% Injectable 25 Gram(s) IV Push once  enoxaparin Injectable 90 milliGRAM(s) SubCutaneous every 12 hours  famotidine    Tablet 20 milliGRAM(s) Oral daily  glucagon  Injectable 1 milliGRAM(s) IntraMuscular once  insulin glargine Injectable (LANTUS) 25 Unit(s) SubCutaneous two times a day  insulin lispro (ADMELOG) corrective regimen sliding scale   SubCutaneous at bedtime  insulin lispro (ADMELOG) corrective regimen sliding scale   SubCutaneous three times a day before meals  insulin lispro Injectable (ADMELOG) 7 Unit(s) SubCutaneous three times a day before meals  labetalol 200 milliGRAM(s) Oral every 12 hours  losartan 50 milliGRAM(s) Oral daily  methylPREDNISolone sodium succinate Injectable 60 milliGRAM(s) IV Push every 8 hours    Vital Signs Last 24 Hrs  T(C): 36.1 (31 Jul 2021 09:18), Max: 36.8 (30 Jul 2021 16:00)  T(F): 96.9 (31 Jul 2021 09:18), Max: 98.2 (30 Jul 2021 16:00)  HR: 66 (31 Jul 2021 11:00) (60 - 81)  BP: 110/61 (31 Jul 2021 11:00) (110/61 - 150/115)  BP(mean): 71 (31 Jul 2021 11:00) (71 - 123)  RR: 22 (31 Jul 2021 11:00) (19 - 32)  SpO2: 92% (31 Jul 2021 11:00) (80% - 99%)      PE:  Constitutional: NAD on NRB/HFNC  HEENT: NC/AT, EOMI, PERRLA, conjunctivae clear; ears and nose atraumatic; pharynx benign  Neck: supple; thyroid not palpable  Back: no tenderness  Respiratory: decreased breath sounds, rhonchi  Cardiovascular: S1S2 regular, no murmurs  Abdomen: soft, not tender, not distended, positive BS; liver and spleen WNL  Genitourinary: no suprapubic tenderness  Musculoskeletal: no muscle tenderness, no joint swelling or tenderness  Extremities: no pedal edema  Neurological/ Psychiatric: AxOx3, Judgement and insight normal;  moving all extremities  Skin: no rashes; no palpable lesions    Labs: all available labs reviewed                                              13.4   26.75 )-----------( 413      ( 31 Jul 2021 05:55 )             40.8     07-31    136  |  106  |  38<H>  ----------------------------<  113<H>  4.6   |  25  |  0.97    Ca    8.1<L>      31 Jul 2021 05:55  Phos  4.4     07-31  Mg     2.4     07-31    TPro  6.0  /  Alb  2.3<L>  /  TBili  0.5  /  DBili  <0.1  /  AST  53<H>  /  ALT  96<H>  /  AlkPhos  219<H>  07-30            D-Dimer Assay, Quantitative: 2409 ng/mL DDU (07-28-21 @ 05:47)  C-Reactive Protein, Serum: 5 mg/L (07-28-21 @ 05:47)  Ferritin, Serum: 1719 ng/mL (07-28-21 @ 05:47)  D-Dimer Assay, Quantitative: 1663 ng/mL DDU (07-26-21 @ 06:20)  Ferritin, Serum: 1806 ng/mL (07-26-21 @ 06:20)  D-Dimer Assay, Quantitative: 446 ng/mL DDU (07-24-21 @ 08:06)  C-Reactive Protein, Serum: 35 mg/L (07-24-21 @ 08:06)  Ferritin, Serum: 2592 ng/mL (07-24-21 @ 08:06)  C-Reactive Protein, Serum: 157 mg/L (07-21-21 @ 14:42)  D-Dimer Assay, Quantitative: 460 ng/mL DDU (07-21-21 @ 14:42)  Ferritin, Serum: 4077 ng/mL (07-21-21 @ 14:42)      Culture - Blood (collected 07-21-21 @ 14:42)  Source: .Blood Blood-Peripheral  Preliminary Report (07-22-21 @ 22:01):    No growth to date.      Radiology: all available radiological tests reviewed    EXAM:  XR CHEST PORTABLE URGENT 1V                            PROCEDURE DATE:  07/21/2021          INTERPRETATION:  AP chest on July 21, 2021 at 3:27 PM. Patient is short of breath with cough and fever.    Heart magnified by technique.    There arescattered mid lower lung field infiltrates right greater than left possibly related: Pneumonia.    The lungs are clear on August 30, 2019.    IMPRESSION: Bilateral infiltrates as above.    < end of copied text >    Advanced directives addressed: full resuscitation

## 2021-07-31 NOTE — PROGRESS NOTE ADULT - SUBJECTIVE AND OBJECTIVE BOX
Patient is a 55y old  Male who presents with a chief complaint of cough/sob (23 Jul 2021 14:03)      HPI:  55 M with pmh HLD, dm, htn presents with 8 days onset of covid symptoms which included, cough, fevers, sob, hypoxia, fevers, diarrhea, chills and myalgias. TEsted positive 7/15. Wife endorsed he was becoming more sob of recently. On arrival hypoxic to 80s and tachypneic. NRB placed, presently on 15% and saturating 95%. Na 126 s/p 1L NS. CXR: Frandy infiltrates. Last scr 1.4 in 2019-> today 1.9 unknown if underlying ckd.  Patient not vaccinated.   Last seen by me in 2019  History of ROBERT and uncontrolled hypertension  Treated with IV Remdesivir and Decadron, now on Toci  SaO2 is 80-85% despite being on 100% NRB  ABG pending    7/25/2021  Pt transferred to MICU  ABG 7.45/28/63  on HFNC    7/26  covering for Dr Arceo today  Pt is critically ill on 100% Fio2 with O2 sat 85-88%  He gives thumb's up sign and appears in good spirits  sitting upright in bed  data discussed with critical care RN as well as the intensivist DR Espinoza today  His med list and recent films reviewed  repeat cxr requested now  time spent taking care of critically ill pt 30 mins  7/27  covering for DR Arceo  data discussed with team members  now on full dose AC but solumedrol not started with concern for additional infection as risk factor  data discussed with Intensivist again today  cxr is reviewed with critical care RN staff  he remains critically ill on 100% O2 and high flow being used  he could not tolerate Prone positioning  Auto diuresis with good urine output noted  sitting upright in bed  time spent taking care of critically ill pt 30 mins    7/28  Patient is on HFNC  Started IV Solumedrol 60mg q 8 for 3 days    7/30  No acute pulmonary events occurred overnight  Patient continues HFNC 90%FiO2/50LPM 37C    7/31  On HFNC 85%, 50LPM      PAST MEDICAL/SURGICAL/FAMILY/SOCIAL HISTORY:    Past Medical History:  Diabetes    HTN (hypertension).  No surgeries     Tobacco Usage:  · Tobacco Usage	non smoker  Fhx: none (21 Jul 2021 19:19)      MEDICATIONS  (STANDING):  aspirin  chewable 81 milliGRAM(s) Oral daily  atorvastatin 80 milliGRAM(s) Oral at bedtime  dextrose 40% Gel 15 Gram(s) Oral once  dextrose 5%. 1000 milliLiter(s) (50 mL/Hr) IV Continuous <Continuous>  dextrose 5%. 1000 milliLiter(s) (100 mL/Hr) IV Continuous <Continuous>  dextrose 50% Injectable 25 Gram(s) IV Push once  dextrose 50% Injectable 12.5 Gram(s) IV Push once  dextrose 50% Injectable 25 Gram(s) IV Push once  enoxaparin Injectable 90 milliGRAM(s) SubCutaneous every 12 hours  famotidine    Tablet 20 milliGRAM(s) Oral daily  glucagon  Injectable 1 milliGRAM(s) IntraMuscular once  insulin glargine Injectable (LANTUS) 40 Unit(s) SubCutaneous at bedtime  insulin lispro (ADMELOG) corrective regimen sliding scale   SubCutaneous at bedtime  insulin lispro (ADMELOG) corrective regimen sliding scale   SubCutaneous three times a day before meals  insulin lispro Injectable (ADMELOG) 7 Unit(s) SubCutaneous three times a day before meals  labetalol 200 milliGRAM(s) Oral every 12 hours  losartan 50 milliGRAM(s) Oral daily  methylPREDNISolone sodium succinate Injectable 60 milliGRAM(s) IV Push every 8 hours  remdesivir  IVPB 100 milliGRAM(s) IV Intermittent every 24 hours    MEDICATIONS  (PRN):  acetaminophen   Tablet .. 650 milliGRAM(s) Oral once PRN Temp greater or equal to 38C (100.4F), Mild Pain (1 - 3)  acetaminophen   Tablet .. 650 milliGRAM(s) Oral every 4 hours PRN Temp greater or equal to 38C (100.4F), Mild Pain (1 - 3)  ALBUTerol    90 MICROgram(s) HFA Inhaler 2 Puff(s) Inhalation every 4 hours PRN Shortness of Breath and/or Wheezing  benzonatate 100 milliGRAM(s) Oral three times a day PRN Cough          Vital Signs Last 24 Hrs  T(C): 35.7 (31 Jul 2021 06:00), Max: 36.8 (30 Jul 2021 16:00)  T(F): 96.3 (31 Jul 2021 06:00), Max: 98.2 (30 Jul 2021 16:00)  HR: 65 (31 Jul 2021 07:00) (62 - 81)  BP: 136/84 (31 Jul 2021 06:00) (115/72 - 144/93)  BP(mean): 96 (31 Jul 2021 06:00) (82 - 111)  RR: 21 (31 Jul 2021 07:00) (21 - 32)  SpO2: 98% (31 Jul 2021 07:00) (80% - 99%)    IN:    IV PiggyBack: 270 mL    Oral Fluid: 720 mL  Total IN: 990 mL    OUT:    Voided (mL): 2550 mL  Total OUT: 2550 mL    Total NET: -1560 mL            PHYSICAL EXAM  General Appearance: sitting uptright and mild to moderate resp distress  HEENT: PERRL, conjunctiva clear  Neck: Supple, , no adenopathy  Lungs: coarse bilaterally  Heart: Regular rate and rhythm, S1, S2 normal,Not tachycardic  Abdomen: Soft, non-tender, bowel sounds active   Extremities: no cyanosis or edema, no joint swelling  Skin: Skin color, texture normal, no rashes   Neurologic: Alert and oriented X3 , non focal    ECG:    LABS:                            12.9   15.00 )-----------( 366      ( 26 Jul 2021 06:20 )             38.4   07-26    133<L>  |  104  |  37<H>  ----------------------------<  232<H>  4.2   |  23  |  1.17    Ca    8.4<L>      26 Jul 2021 06:20    TPro  6.3  /  Alb  2.2<L>  /  TBili  0.4  /  DBili  0.1  /  AST  103<H>  /  ALT  137<H>  /  AlkPhos  239<H>  07-26                          13.2   13.52 )-----------( 338      ( 24 Jul 2021 08:06 )             39.5     07-24    136  |  105  |  43<H>  ----------------------------<  227<H>  4.3   |  25  |  1.31<H>    Ca    8.7      24 Jul 2021 08:06    TPro  6.6  /  Alb  2.3<L>  /  TBili  0.4  /  DBili  x   /  AST  73<H>  /  ALT  64  /  AlkPhos  165<H>  07-24          Pro BNP  -- 07-24 @ 08:06  D Dimer  446 07-24 @ 08:06  Pro BNP  261 07-21 @ 14:42  D Dimer  460 07-21 @ 14:42              < from: Xray Chest 1 View- PORTABLE-Urgent (07.21.21 @ 15:33) >    PROCEDURE DATE:  07/21/2021          INTERPRETATION:  AP chest on July 21, 2021 at 3:27 PM. Patient is short of breath with cough and fever.    Heart magnified by technique.    There arescattered mid lower lung field infiltrates right greater than left possibly related: Pneumonia.    The lungs are clear on August 30, 2019.    IMPRESSION: Bilateral infiltrates as above.    < end of copied text >  < from: US Duplex Venous Lower Ext Complete, Bilateral (07.25.21 @ 08:42) >  COMPARISON: None available.    TECHNIQUE: Duplex sonography of the BILATERAL LOWER extremity veins with color and spectral Doppler, with and without compression.    FINDINGS:    RIGHT:  Normal compressibility of the RIGHT common femoral, femoral and popliteal veins.  Doppler examination shows normal spontaneous and phasic flow.  No RIGHT calf vein thrombosis is detected.    LEFT:  Normal compressibility of the LEFT common femoral, femoral and popliteal veins.  Doppler examination shows normal spontaneous and phasic flow.  No LEFT calf vein thrombosis is detected.    IMPRESSION:  No evidence of deep venous thrombosis in either lower extremity.    < end of copied text >  RADIOLOGY & ADDITIONAL STUDIES:    < from: Xray Chest 1 View- PORTABLE-Urgent (Xray Chest 1 View- PORTABLE-Urgent .) (07.26.21 @ 08:43) >    PROCEDURE DATE:  07/26/2021          INTERPRETATION:  Portable chest radiograph    CLINICAL INFORMATION: Pneumonia due to Covid 19.. Follow-up    TECHNIQUE:  Portable  AP view of the chest was obtained.    COMPARISON: 7/21/2021 chest available for review.    FINDINGS:    The lungs show stable bilateral  multifocal and diffuse ill-defined airspace opacities.. No pneumothorax.    The  heart is enlarged in transverse diameter. No hilar mass.     Visualized osseous structures are intact.    IMPRESSION:   Stable bilateral  multifocal and diffuse ill-defined airspace opacities..    < end of copied text >

## 2021-07-31 NOTE — PROGRESS NOTE ADULT - ASSESSMENT
1) COVID Pneumonia  2) Dyspnea  3) Abnormal CXR  4) Hypoxemia  5) Hypertension  6) ROBERT  7) Mild PH    55 M noted to have COVID 7/15  On arrival hypoxic to 80s and tachypneic. NRB placed,in the ER saturating 95%. Na 126 s/p 1L NS. CXR: Frandy infiltrates. Last scr 1.4 in 2019-> today 1.9 unknown if underlying ckd.  Patient not vaccinated.   Last seen by me in 2019 for ROBERT noted to have uncontrolled hypertension  Treated with IV Remdesivir and Decadron, Tocilizumab   SaO2 is 80-85% despite being on 100% NRB so was switched to HFNC  ABG/imaging reviewed  Given the obesity/hypertension and prior co-morbidities, he is at risk of intubation but continue HFNC, respiratory status remains critical  XR reviewed- pulmonary infiltrates are present  DDimer elevated  Possibly to go for CTPE once stabilized, on therapeutic Lovenox now  Continue IV Solumedrol 60mg q 8 hrs for 72 hours (last day today)  HFNC 85%FiO2/50LPM 37C  Will likely need a prolonged course of HFNC given the tenuous respiratory status  Discussed inspiratory muscle exercises with patient while he is out of bed +/- incentive spirometer   Will need repeat DDimer

## 2021-07-31 NOTE — PROGRESS NOTE ADULT - ASSESSMENT
55 M with pmh HLD, dm, htn presents with 8 days onset of covid symptoms which included, cough, fevers, sob, hypoxia, fevers, diarrhea, chills and myalgias. Tested positive 7/15. Wife endorsed he was becoming more sob of recently. On arrival hypoxic to 80s and tachypneic. NRB placed, presently on 15% and saturating 95%. Na 126 s/p 1L NS. CXR: Frandy infiltrates. Last scr 1.4 in 2019-> 7/21 1.9 unknown if underlying ckd. Dimer 450 - normal for age  however with covid and increasing fibrinogen, will need further eval.   Denies chest pain. LHC performed 2019 revealed normal coronaries. Here xray with b/l lung infiltrates, hypoxic requiring NRB, was given remdesivir/decadron.     1. acute respiratory failure. covid-19 viral syndrome. multifocal pneumonia  - leukocytosis 26, persistnt lung infiltrates, concern for superimposed bacterial pna  - on cefepime [id number] #2  - continue with abx coverage  - check sputum cx if able  - steroids could also be contributing to wbc ct   - completed remdesivir #10/10 monitor renal/hepatic function closely on therapy  - on IV steroids #11  - s/p tocilizumab x 1 7/23  - glycemic control  - isolation precautions  - fu cbc  - monitor temps  - prone positoning  - supportive care    2. other issues - care per medicine

## 2021-07-31 NOTE — PROGRESS NOTE ADULT - SUBJECTIVE AND OBJECTIVE BOX
HPI:     55 M with pmh HLD, dm, htn presents with 8 days onset of covid symptoms which included, cough, fevers, sob, hypoxia, fevers, diarrhea, chills and myalgias. tested positive 7/15.  Patient was unvaccinated. He thinks he got it going to [facility] week of July 4 th.  Wife also had covid but her case was mild.  Admitted for Hypoxia to  on 7/21 and transferred to the ICU on 7/24 for HF NC O2.      7/26: Patient seen in bed, on HF NC O2 100% and 50L, along with a 100% NRB mask  7/27: Patient remains on HF NC O2 100% 50L as well as a %  7/28: He remains on 100% and 50 L HF NC and 100% NRB    7/29: Patient remains on 100% 50 L NF NC and 100% NRB  7/30: Patient now down to 80% FI O2 and 50L on HF NC, WBC rising.    7/31: Patient is on 75 % Fi O2 now, WBC remains elevated    PAST MEDICAL/SURGICAL/FAMILY/SOCIAL HISTORY:    Past Medical History:  Diabetes    HTN (hypertension).  No surgeries     Tobacco Usage:  · Tobacco Usage	non smoker  Fhx: none (21 Jul 2021 19:19)       PAST MEDICAL & SURGICAL HISTORY:  HTN (hypertension)    Diabetes        FAMILY HISTORY:      Social Hx:    Allergies    No Known Allergies    Intolerances            ICU Vital Signs Last 24 Hrs  T(C): 36.1 (31 Jul 2021 09:18), Max: 36.8 (30 Jul 2021 16:00)  T(F): 96.9 (31 Jul 2021 09:18), Max: 98.2 (30 Jul 2021 16:00)  HR: 66 (31 Jul 2021 10:58) (60 - 81)  BP: 120/65 (31 Jul 2021 10:00) (115/72 - 150/115)  BP(mean): 78 (31 Jul 2021 10:00) (78 - 123)  ABP: --  ABP(mean): --  RR: 22 (31 Jul 2021 10:58) (19 - 32)  SpO2: 92% (31 Jul 2021 10:58) (80% - 99%)          I&O's Summary    30 Jul 2021 07:01  -  31 Jul 2021 07:00  --------------------------------------------------------  IN: 0 mL / OUT: 800 mL / NET: -800 mL                              13.4   26.75 )-----------( 413      ( 31 Jul 2021 05:55 )             40.8       07-31    136  |  106  |  38<H>  ----------------------------<  113<H>  4.6   |  25  |  0.97    Ca    8.1<L>      31 Jul 2021 05:55  Phos  4.4     07-31  Mg     2.4     07-31    TPro  6.0  /  Alb  2.3<L>  /  TBili  0.5  /  DBili  <0.1  /  AST  53<H>  /  ALT  96<H>  /  AlkPhos  219<H>  07-30                    MEDICATIONS  (STANDING):  aspirin  chewable 81 milliGRAM(s) Oral daily  atorvastatin 80 milliGRAM(s) Oral at bedtime  cefepime   IVPB 2000 milliGRAM(s) IV Intermittent every 12 hours  dextrose 40% Gel 15 Gram(s) Oral once  dextrose 5%. 1000 milliLiter(s) (50 mL/Hr) IV Continuous <Continuous>  dextrose 5%. 1000 milliLiter(s) (100 mL/Hr) IV Continuous <Continuous>  dextrose 50% Injectable 25 Gram(s) IV Push once  dextrose 50% Injectable 12.5 Gram(s) IV Push once  dextrose 50% Injectable 25 Gram(s) IV Push once  enoxaparin Injectable 90 milliGRAM(s) SubCutaneous every 12 hours  famotidine    Tablet 20 milliGRAM(s) Oral daily  glucagon  Injectable 1 milliGRAM(s) IntraMuscular once  insulin glargine Injectable (LANTUS) 25 Unit(s) SubCutaneous two times a day  insulin lispro (ADMELOG) corrective regimen sliding scale   SubCutaneous at bedtime  insulin lispro (ADMELOG) corrective regimen sliding scale   SubCutaneous three times a day before meals  insulin lispro Injectable (ADMELOG) 7 Unit(s) SubCutaneous three times a day before meals  labetalol 200 milliGRAM(s) Oral every 12 hours  losartan 50 milliGRAM(s) Oral daily  methylPREDNISolone sodium succinate Injectable 60 milliGRAM(s) IV Push every 8 hours    MEDICATIONS  (PRN):  acetaminophen   Tablet .. 650 milliGRAM(s) Oral once PRN Temp greater or equal to 38C (100.4F), Mild Pain (1 - 3)  acetaminophen   Tablet .. 650 milliGRAM(s) Oral every 4 hours PRN Temp greater or equal to 38C (100.4F), Mild Pain (1 - 3)  ALBUTerol    90 MICROgram(s) HFA Inhaler 2 Puff(s) Inhalation every 4 hours PRN Shortness of Breath and/or Wheezing  benzonatate 100 milliGRAM(s) Oral three times a day PRN Cough  melatonin 3 milliGRAM(s) Oral at bedtime PRN Insomnia      DVT Prophylaxis:    Advanced Directives:  Discussed with:    Visit Information: 30 min    ** Time is exclusive of billed procedures and/or teaching and/or routine family updates.

## 2021-07-31 NOTE — PROGRESS NOTE ADULT - ASSESSMENT
A/P: 55 year old male with Acute Hypoxic Respiratory Failure from COVID-19 Pneumonia  HTN  DM II      Plan:  ICU    PULM: Continue NF NC O2, continue to wean down.  Continue full dose Lovenox for epimeric PE treatment.  Encourage Prone positioning.  CXR on 8/1    Cardio: Hemodynamics reasonable    Renal: Cr Stable but BUN improving--Will encourage PO intake.      GI: H2 blocker, PO Diet    ENDO: Lantus increased to 25 bid and RISS.    ID: S/P Actemra, Completed REM and High Dose Solumedrol    DVT Prophylaxis with Lovenox    LE Dopplers on 7/26 were negative for DVT    OOB to Chair    D/W Patients wife daily

## 2021-08-01 LAB
ADD ON TEST-SPECIMEN IN LAB: SIGNIFICANT CHANGE UP
ANION GAP SERPL CALC-SCNC: 5 MMOL/L — SIGNIFICANT CHANGE UP (ref 5–17)
BUN SERPL-MCNC: 40 MG/DL — HIGH (ref 7–23)
CALCIUM SERPL-MCNC: 8.3 MG/DL — LOW (ref 8.5–10.1)
CHLORIDE SERPL-SCNC: 106 MMOL/L — SIGNIFICANT CHANGE UP (ref 96–108)
CO2 SERPL-SCNC: 25 MMOL/L — SIGNIFICANT CHANGE UP (ref 22–31)
CREAT SERPL-MCNC: 1.06 MG/DL — SIGNIFICANT CHANGE UP (ref 0.5–1.3)
GLUCOSE SERPL-MCNC: 214 MG/DL — HIGH (ref 70–99)
HCT VFR BLD CALC: 42.1 % — SIGNIFICANT CHANGE UP (ref 39–50)
HGB BLD-MCNC: 13.7 G/DL — SIGNIFICANT CHANGE UP (ref 13–17)
MAGNESIUM SERPL-MCNC: 2.5 MG/DL — SIGNIFICANT CHANGE UP (ref 1.6–2.6)
MCHC RBC-ENTMCNC: 27.8 PG — SIGNIFICANT CHANGE UP (ref 27–34)
MCHC RBC-ENTMCNC: 32.5 GM/DL — SIGNIFICANT CHANGE UP (ref 32–36)
MCV RBC AUTO: 85.4 FL — SIGNIFICANT CHANGE UP (ref 80–100)
PHOSPHATE SERPL-MCNC: 4.3 MG/DL — SIGNIFICANT CHANGE UP (ref 2.5–4.5)
PLATELET # BLD AUTO: 384 K/UL — SIGNIFICANT CHANGE UP (ref 150–400)
POTASSIUM SERPL-MCNC: 4.5 MMOL/L — SIGNIFICANT CHANGE UP (ref 3.5–5.3)
POTASSIUM SERPL-SCNC: 4.5 MMOL/L — SIGNIFICANT CHANGE UP (ref 3.5–5.3)
RBC # BLD: 4.93 M/UL — SIGNIFICANT CHANGE UP (ref 4.2–5.8)
RBC # FLD: 12.5 % — SIGNIFICANT CHANGE UP (ref 10.3–14.5)
SODIUM SERPL-SCNC: 136 MMOL/L — SIGNIFICANT CHANGE UP (ref 135–145)
WBC # BLD: 27.07 K/UL — HIGH (ref 3.8–10.5)
WBC # FLD AUTO: 27.07 K/UL — HIGH (ref 3.8–10.5)

## 2021-08-01 PROCEDURE — 71045 X-RAY EXAM CHEST 1 VIEW: CPT | Mod: 26

## 2021-08-01 PROCEDURE — 99291 CRITICAL CARE FIRST HOUR: CPT

## 2021-08-01 RX ORDER — INSULIN GLARGINE 100 [IU]/ML
40 INJECTION, SOLUTION SUBCUTANEOUS AT BEDTIME
Refills: 0 | Status: DISCONTINUED | OUTPATIENT
Start: 2021-08-01 | End: 2021-08-01

## 2021-08-01 RX ORDER — DEXTROSE 50 % IN WATER 50 %
25 SYRINGE (ML) INTRAVENOUS ONCE
Refills: 0 | Status: DISCONTINUED | OUTPATIENT
Start: 2021-08-01 | End: 2021-08-03

## 2021-08-01 RX ORDER — INSULIN GLARGINE 100 [IU]/ML
40 INJECTION, SOLUTION SUBCUTANEOUS EVERY MORNING
Refills: 0 | Status: DISCONTINUED | OUTPATIENT
Start: 2021-08-01 | End: 2021-08-01

## 2021-08-01 RX ORDER — DEXTROSE 50 % IN WATER 50 %
12.5 SYRINGE (ML) INTRAVENOUS ONCE
Refills: 0 | Status: DISCONTINUED | OUTPATIENT
Start: 2021-08-01 | End: 2021-08-03

## 2021-08-01 RX ORDER — DEXTROSE 50 % IN WATER 50 %
15 SYRINGE (ML) INTRAVENOUS ONCE
Refills: 0 | Status: DISCONTINUED | OUTPATIENT
Start: 2021-08-01 | End: 2021-08-03

## 2021-08-01 RX ORDER — INSULIN GLARGINE 100 [IU]/ML
40 INJECTION, SOLUTION SUBCUTANEOUS AT BEDTIME
Refills: 0 | Status: DISCONTINUED | OUTPATIENT
Start: 2021-08-01 | End: 2021-08-03

## 2021-08-01 RX ORDER — INSULIN LISPRO 100/ML
VIAL (ML) SUBCUTANEOUS
Refills: 0 | Status: DISCONTINUED | OUTPATIENT
Start: 2021-08-01 | End: 2021-08-03

## 2021-08-01 RX ORDER — BUDESONIDE AND FORMOTEROL FUMARATE DIHYDRATE 160; 4.5 UG/1; UG/1
2 AEROSOL RESPIRATORY (INHALATION)
Refills: 0 | Status: DISCONTINUED | OUTPATIENT
Start: 2021-08-01 | End: 2021-10-05

## 2021-08-01 RX ADMIN — Medication 200 MILLIGRAM(S): at 10:20

## 2021-08-01 RX ADMIN — Medication 200 MILLIGRAM(S): at 21:09

## 2021-08-01 RX ADMIN — CEFEPIME 100 MILLIGRAM(S): 1 INJECTION, POWDER, FOR SOLUTION INTRAMUSCULAR; INTRAVENOUS at 21:09

## 2021-08-01 RX ADMIN — INSULIN GLARGINE 40 UNIT(S): 100 INJECTION, SOLUTION SUBCUTANEOUS at 08:52

## 2021-08-01 RX ADMIN — Medication 40 MILLIGRAM(S): at 10:22

## 2021-08-01 RX ADMIN — Medication 2: at 18:07

## 2021-08-01 RX ADMIN — CEFEPIME 100 MILLIGRAM(S): 1 INJECTION, POWDER, FOR SOLUTION INTRAMUSCULAR; INTRAVENOUS at 10:18

## 2021-08-01 RX ADMIN — ENOXAPARIN SODIUM 90 MILLIGRAM(S): 100 INJECTION SUBCUTANEOUS at 10:18

## 2021-08-01 RX ADMIN — ATORVASTATIN CALCIUM 80 MILLIGRAM(S): 80 TABLET, FILM COATED ORAL at 21:09

## 2021-08-01 RX ADMIN — INSULIN GLARGINE 40 UNIT(S): 100 INJECTION, SOLUTION SUBCUTANEOUS at 21:10

## 2021-08-01 RX ADMIN — Medication 60 MILLIGRAM(S): at 05:14

## 2021-08-01 RX ADMIN — BUDESONIDE AND FORMOTEROL FUMARATE DIHYDRATE 2 PUFF(S): 160; 4.5 AEROSOL RESPIRATORY (INHALATION) at 20:59

## 2021-08-01 RX ADMIN — Medication 8: at 21:17

## 2021-08-01 RX ADMIN — Medication 7 UNIT(S): at 09:06

## 2021-08-01 RX ADMIN — Medication 40 MILLIGRAM(S): at 21:09

## 2021-08-01 RX ADMIN — ENOXAPARIN SODIUM 90 MILLIGRAM(S): 100 INJECTION SUBCUTANEOUS at 21:09

## 2021-08-01 RX ADMIN — Medication 81 MILLIGRAM(S): at 10:20

## 2021-08-01 RX ADMIN — FAMOTIDINE 20 MILLIGRAM(S): 10 INJECTION INTRAVENOUS at 10:20

## 2021-08-01 RX ADMIN — Medication 7 UNIT(S): at 18:07

## 2021-08-01 RX ADMIN — Medication 7 UNIT(S): at 13:44

## 2021-08-01 RX ADMIN — Medication 2: at 13:44

## 2021-08-01 RX ADMIN — LOSARTAN POTASSIUM 50 MILLIGRAM(S): 100 TABLET, FILM COATED ORAL at 10:20

## 2021-08-01 NOTE — PROGRESS NOTE ADULT - SUBJECTIVE AND OBJECTIVE BOX
Date of service: 08-01-21 @ 10:48    Pt seen and examined  Patient sitting up in chair  On high flow 70%  Feels better  Afebrile    ROS: no fever or chills; denies dizziness, no HA,  no abdominal pain, no diarrhea or constipation; no dysuria, no urinary frequency, no legs pain, no rashes      MEDICATIONS  (STANDING):  aspirin  chewable 81 milliGRAM(s) Oral daily  atorvastatin 80 milliGRAM(s) Oral at bedtime  budesonide 160 MICROgram(s)/formoterol 4.5 MICROgram(s) Inhaler 2 Puff(s) Inhalation two times a day  cefepime   IVPB 2000 milliGRAM(s) IV Intermittent every 12 hours  dextrose 40% Gel 15 Gram(s) Oral once  dextrose 5%. 1000 milliLiter(s) (50 mL/Hr) IV Continuous <Continuous>  dextrose 5%. 1000 milliLiter(s) (100 mL/Hr) IV Continuous <Continuous>  dextrose 50% Injectable 25 Gram(s) IV Push once  dextrose 50% Injectable 12.5 Gram(s) IV Push once  dextrose 50% Injectable 25 Gram(s) IV Push once  enoxaparin Injectable 90 milliGRAM(s) SubCutaneous every 12 hours  famotidine    Tablet 20 milliGRAM(s) Oral daily  glucagon  Injectable 1 milliGRAM(s) IntraMuscular once  insulin glargine Injectable (LANTUS) 40 Unit(s) SubCutaneous at bedtime  insulin lispro (ADMELOG) corrective regimen sliding scale   SubCutaneous Before meals and at bedtime  insulin lispro Injectable (ADMELOG) 7 Unit(s) SubCutaneous three times a day before meals  labetalol 200 milliGRAM(s) Oral every 12 hours  losartan 50 milliGRAM(s) Oral daily  methylPREDNISolone sodium succinate Injectable 40 milliGRAM(s) IV Push every 12 hours      Vital Signs Last 24 Hrs  T(C): 36 (01 Aug 2021 09:00), Max: 36.6 (01 Aug 2021 00:00)  T(F): 96.8 (01 Aug 2021 09:00), Max: 97.8 (01 Aug 2021 00:00)  HR: 63 (01 Aug 2021 09:00) (61 - 84)  BP: 128/96 (01 Aug 2021 09:00) (104/71 - 139/81)  BP(mean): 103 (01 Aug 2021 09:00) (71 - 112)  RR: 24 (01 Aug 2021 09:00) (8 - 32)  SpO2: 91% (01 Aug 2021 09:00) (83% - 99%)      PE:  Constitutional: NAD on HFNC  HEENT: NC/AT, EOMI, PERRLA, conjunctivae clear; ears and nose atraumatic; pharynx benign  Neck: supple; thyroid not palpable  Back: no tenderness  Respiratory: decreased breath sounds, rhonchi  Cardiovascular: S1S2 regular, no murmurs  Abdomen: soft, not tender, not distended, positive BS; liver and spleen WNL  Genitourinary: no suprapubic tenderness  Musculoskeletal: no muscle tenderness, no joint swelling or tenderness  Extremities: no pedal edema  Neurological/ Psychiatric: AxOx3, Judgement and insight normal;  moving all extremities  Skin: no rashes; no palpable lesions    Labs: all available labs reviewed                                              13.7   27.07 )-----------( 384      ( 01 Aug 2021 07:28 )             42.1     08-01    136  |  106  |  40<H>  ----------------------------<  214<H>  4.5   |  25  |  1.06    Ca    8.3<L>      01 Aug 2021 07:28  Phos  4.3     08-01  Mg     2.5     08-01              D-Dimer Assay, Quantitative: 2409 ng/mL DDU (07-28-21 @ 05:47)  C-Reactive Protein, Serum: 5 mg/L (07-28-21 @ 05:47)  Ferritin, Serum: 1719 ng/mL (07-28-21 @ 05:47)  D-Dimer Assay, Quantitative: 1663 ng/mL DDU (07-26-21 @ 06:20)  Ferritin, Serum: 1806 ng/mL (07-26-21 @ 06:20)  D-Dimer Assay, Quantitative: 446 ng/mL DDU (07-24-21 @ 08:06)  C-Reactive Protein, Serum: 35 mg/L (07-24-21 @ 08:06)  Ferritin, Serum: 2592 ng/mL (07-24-21 @ 08:06)  C-Reactive Protein, Serum: 157 mg/L (07-21-21 @ 14:42)  D-Dimer Assay, Quantitative: 460 ng/mL DDU (07-21-21 @ 14:42)  Ferritin, Serum: 4077 ng/mL (07-21-21 @ 14:42)      Culture - Blood (collected 07-21-21 @ 14:42)  Source: .Blood Blood-Peripheral  Preliminary Report (07-22-21 @ 22:01):    No growth to date.      Radiology: all available radiological tests reviewed    EXAM:  XR CHEST PORTABLE URGENT 1V                            PROCEDURE DATE:  07/21/2021          INTERPRETATION:  AP chest on July 21, 2021 at 3:27 PM. Patient is short of breath with cough and fever.    Heart magnified by technique.    There arescattered mid lower lung field infiltrates right greater than left possibly related: Pneumonia.    The lungs are clear on August 30, 2019.    IMPRESSION: Bilateral infiltrates as above.    < end of copied text >    Advanced directives addressed: full resuscitation

## 2021-08-01 NOTE — PROGRESS NOTE ADULT - SUBJECTIVE AND OBJECTIVE BOX
HPI:    54 y/o male with pmhx of HLD, HTN, DM admitted on 7/21 with COVID-19 pna. tested positive on 7/15 outpatient. unvaccinated. Type 1 res failureEscalating fio2 requirements and development of ARDS now requiring HFNC and NRB. S/p actemra on 7/23.    Events last 24 hours: Able to titrate HFNC to 60%  50L/min  with NRBM  WOB OK proning now        Symptomatic day 7/15   Hosp day # 7/21  ICU day 7/24  Remdesivir  completed 10 days   Tocilizumab 7/23    On full AC empirically  doppler negative      Recent clinical changes:  Continued slow improvement        Vital signs/reviewed and physical exam performed where pertinent and urgently required.    Lab/radiology studies/ABG/meds reviewed and interpreted into the assessment and treatment plan.    Assessment/Plan/Therapeutic interventions      Neuro:  Melatonin sleep hygiene     Cor:  HD stable SR  BP controlled with current regimen     Pulm:  HTFNC with NRBM  Prone   tapering medrol 40 q12.      GI:  Gi prophylaxis   Enteric feeds as tolerated.     Renal: Even to negative fluid balance as tolerated by hemodynamics and renal fx.      Heme:  Pharmacologic DVT P in addition to SCD's  following D- Dimer clinical course and doppler studied where appropriate.   Dr Espinoza has patient on full AC d dimer was > 2k coming down now.  LE doppler neg     ID: ABX discontinuation based on discussion with ID in conjunction with clinical features, culture data, and judicious procalcitonin monitoring.  Cefepime as per ID.  Send PCT if low d/c abx   wbc inc > steroids         Endo Aggressive glycemic control to limit FS glucose to < 180mg/dl.  Lantus adjusted         COVID 19 specific considerations and therapeutic options based on the available and rapidly changing medical literature.    Goals of care considerations:  Ongoing assessment for patient specific treatment options based on clinical progression or decline.  I have involved the family with updates and requests in guidance for medical decision making.      33  minutes of critical care time spent, (independent of any procedures),  in the management of this critically ill COVID-19 patient  with continuous assessments and interventions based on the interpretation of multiple databases.

## 2021-08-01 NOTE — PROGRESS NOTE ADULT - SUBJECTIVE AND OBJECTIVE BOX
Patient is a 55y old  Male who presents with a chief complaint of cough/sob (23 Jul 2021 14:03)      HPI:  55 M with pmh HLD, dm, htn presents with 8 days onset of covid symptoms which included, cough, fevers, sob, hypoxia, fevers, diarrhea, chills and myalgias. TEsted positive 7/15. Wife endorsed he was becoming more sob of recently. On arrival hypoxic to 80s and tachypneic. NRB placed, presently on 15% and saturating 95%. Na 126 s/p 1L NS. CXR: Frandy infiltrates. Last scr 1.4 in 2019-> today 1.9 unknown if underlying ckd.  Patient not vaccinated.   Last seen by me in 2019  History of ROBERT and uncontrolled hypertension  Treated with IV Remdesivir and Decadron, now on Toci  SaO2 is 80-85% despite being on 100% NRB  ABG pending    7/25/2021  Pt transferred to MICU  ABG 7.45/28/63  on HFNC    7/26  covering for Dr Arceo today  Pt is critically ill on 100% Fio2 with O2 sat 85-88%  He gives thumb's up sign and appears in good spirits  sitting upright in bed  data discussed with critical care RN as well as the intensivist DR Espinoza today  His med list and recent films reviewed  repeat cxr requested now  time spent taking care of critically ill pt 30 mins  7/27  covering for DR Arceo  data discussed with team members  now on full dose AC but solumedrol not started with concern for additional infection as risk factor  data discussed with Intensivist again today  cxr is reviewed with critical care RN staff  he remains critically ill on 100% O2 and high flow being used  he could not tolerate Prone positioning  Auto diuresis with good urine output noted  sitting upright in bed  time spent taking care of critically ill pt 30 mins    7/28  Patient is on HFNC  Started IV Solumedrol 60mg q 8 for 3 days    7/30  No acute pulmonary events occurred overnight  Patient continues HFNC 90%FiO2/50LPM 37C    7/31  On HFNC 85%, 50LPM    8/1  HFNC reduced to 50LPM, 70%FiO2    PAST MEDICAL/SURGICAL/FAMILY/SOCIAL HISTORY:    Past Medical History:  Diabetes    HTN (hypertension).  No surgeries     Tobacco Usage:  · Tobacco Usage	non smoker  Fhx: none (21 Jul 2021 19:19)      MEDICATIONS  (STANDING):  aspirin  chewable 81 milliGRAM(s) Oral daily  atorvastatin 80 milliGRAM(s) Oral at bedtime  dextrose 40% Gel 15 Gram(s) Oral once  dextrose 5%. 1000 milliLiter(s) (50 mL/Hr) IV Continuous <Continuous>  dextrose 5%. 1000 milliLiter(s) (100 mL/Hr) IV Continuous <Continuous>  dextrose 50% Injectable 25 Gram(s) IV Push once  dextrose 50% Injectable 12.5 Gram(s) IV Push once  dextrose 50% Injectable 25 Gram(s) IV Push once  enoxaparin Injectable 90 milliGRAM(s) SubCutaneous every 12 hours  famotidine    Tablet 20 milliGRAM(s) Oral daily  glucagon  Injectable 1 milliGRAM(s) IntraMuscular once  insulin glargine Injectable (LANTUS) 40 Unit(s) SubCutaneous at bedtime  insulin lispro (ADMELOG) corrective regimen sliding scale   SubCutaneous at bedtime  insulin lispro (ADMELOG) corrective regimen sliding scale   SubCutaneous three times a day before meals  insulin lispro Injectable (ADMELOG) 7 Unit(s) SubCutaneous three times a day before meals  labetalol 200 milliGRAM(s) Oral every 12 hours  losartan 50 milliGRAM(s) Oral daily  methylPREDNISolone sodium succinate Injectable 60 milliGRAM(s) IV Push every 8 hours  remdesivir  IVPB 100 milliGRAM(s) IV Intermittent every 24 hours    MEDICATIONS  (PRN):  acetaminophen   Tablet .. 650 milliGRAM(s) Oral once PRN Temp greater or equal to 38C (100.4F), Mild Pain (1 - 3)  acetaminophen   Tablet .. 650 milliGRAM(s) Oral every 4 hours PRN Temp greater or equal to 38C (100.4F), Mild Pain (1 - 3)  ALBUTerol    90 MICROgram(s) HFA Inhaler 2 Puff(s) Inhalation every 4 hours PRN Shortness of Breath and/or Wheezing  benzonatate 100 milliGRAM(s) Oral three times a day PRN Cough      Vital Signs Last 24 Hrs  T(C): 36.6 (01 Aug 2021 05:00), Max: 36.6 (01 Aug 2021 00:00)  T(F): 97.8 (01 Aug 2021 05:00), Max: 97.8 (01 Aug 2021 00:00)  HR: 67 (01 Aug 2021 08:00) (60 - 84)  BP: 123/94 (01 Aug 2021 08:00) (104/71 - 150/115)  BP(mean): 101 (01 Aug 2021 08:00) (71 - 123)  RR: 31 (01 Aug 2021 08:00) (8 - 32)  SpO2: 89% (01 Aug 2021 08:00) (83% - 99%)  IN:    IV PiggyBack: 270 mL    Oral Fluid: 720 mL  Total IN: 990 mL    OUT:    Voided (mL): 2550 mL  Total OUT: 2550 mL    Total NET: -1560 mL            PHYSICAL EXAM  General Appearance: sitting uptright and mild to moderate resp distress  HEENT: PERRL, conjunctiva clear  Neck: Supple, , no adenopathy  Lungs: coarse bilaterally  Heart: Regular rate and rhythm, S1, S2 normal,Not tachycardic  Abdomen: Soft, non-tender, bowel sounds active   Extremities: no cyanosis or edema, no joint swelling  Skin: Skin color, texture normal, no rashes   Neurologic: Alert and oriented X3 , non focal    ECG:    LABS:                            12.9   15.00 )-----------( 366      ( 26 Jul 2021 06:20 )             38.4   07-26    133<L>  |  104  |  37<H>  ----------------------------<  232<H>  4.2   |  23  |  1.17    Ca    8.4<L>      26 Jul 2021 06:20    TPro  6.3  /  Alb  2.2<L>  /  TBili  0.4  /  DBili  0.1  /  AST  103<H>  /  ALT  137<H>  /  AlkPhos  239<H>  07-26                          13.2   13.52 )-----------( 338      ( 24 Jul 2021 08:06 )             39.5     07-24    136  |  105  |  43<H>  ----------------------------<  227<H>  4.3   |  25  |  1.31<H>    Ca    8.7      24 Jul 2021 08:06    TPro  6.6  /  Alb  2.3<L>  /  TBili  0.4  /  DBili  x   /  AST  73<H>  /  ALT  64  /  AlkPhos  165<H>  07-24          Pro BNP  -- 07-24 @ 08:06  D Dimer  446 07-24 @ 08:06  Pro BNP  261 07-21 @ 14:42  D Dimer  460 07-21 @ 14:42              < from: Xray Chest 1 View- PORTABLE-Urgent (07.21.21 @ 15:33) >    PROCEDURE DATE:  07/21/2021          INTERPRETATION:  AP chest on July 21, 2021 at 3:27 PM. Patient is short of breath with cough and fever.    Heart magnified by technique.    There arescattered mid lower lung field infiltrates right greater than left possibly related: Pneumonia.    The lungs are clear on August 30, 2019.    IMPRESSION: Bilateral infiltrates as above.    < end of copied text >  < from: US Duplex Venous Lower Ext Complete, Bilateral (07.25.21 @ 08:42) >  COMPARISON: None available.    TECHNIQUE: Duplex sonography of the BILATERAL LOWER extremity veins with color and spectral Doppler, with and without compression.    FINDINGS:    RIGHT:  Normal compressibility of the RIGHT common femoral, femoral and popliteal veins.  Doppler examination shows normal spontaneous and phasic flow.  No RIGHT calf vein thrombosis is detected.    LEFT:  Normal compressibility of the LEFT common femoral, femoral and popliteal veins.  Doppler examination shows normal spontaneous and phasic flow.  No LEFT calf vein thrombosis is detected.    IMPRESSION:  No evidence of deep venous thrombosis in either lower extremity.    < end of copied text >  RADIOLOGY & ADDITIONAL STUDIES:    < from: Xray Chest 1 View- PORTABLE-Urgent (Xray Chest 1 View- PORTABLE-Urgent .) (07.26.21 @ 08:43) >    PROCEDURE DATE:  07/26/2021          INTERPRETATION:  Portable chest radiograph    CLINICAL INFORMATION: Pneumonia due to Covid 19.. Follow-up    TECHNIQUE:  Portable  AP view of the chest was obtained.    COMPARISON: 7/21/2021 chest available for review.    FINDINGS:    The lungs show stable bilateral  multifocal and diffuse ill-defined airspace opacities.. No pneumothorax.    The  heart is enlarged in transverse diameter. No hilar mass.     Visualized osseous structures are intact.    IMPRESSION:   Stable bilateral  multifocal and diffuse ill-defined airspace opacities..    < end of copied text >

## 2021-08-01 NOTE — PROGRESS NOTE ADULT - ASSESSMENT
1) COVID Pneumonia  2) Dyspnea  3) Abnormal CXR  4) Hypoxemia  5) Hypertension  6) ROBERT  7) Mild PH    55 M noted to have COVID 7/15  On arrival hypoxic to 80s and tachypneic. NRB placed,in the ER saturating 95%. Na 126 s/p 1L NS. CXR: Frandy infiltrates. Last scr 1.4 in 2019-> today 1.9 unknown if underlying ckd.  Patient not vaccinated.   Last seen by me in 2019 for ROBERT noted to have uncontrolled hypertension  Treated with IV Remdesivir and Decadron, Tocilizumab   SaO2 is 80-85% despite being on 100% NRB so was switched to HFNC  ABG/imaging reviewed  Given the obesity/hypertension and prior co-morbidities, he is at risk of intubation but continue HFNC, respiratory status remains critical  XR reviewed- pulmonary infiltrates are present  DDimer elevated  Possibly to go for CTPE once stabilized, on therapeutic Lovenox now  On Solumedrol 40mg q 12/ Symbicort 160/4.5 BID (https://www.theResearchGatet.com/journals/lanres/article/TDCS1624-4674, STO study)  HFNC 70%FiO2/50LPM 37C, can likely d/c NRB  Will likely need a prolonged course of HFNC given the tenuous respiratory status  Discussed inspiratory muscle exercises with patient while he is out of bed +/- incentive spirometer   Follow up BNP, DDimer

## 2021-08-01 NOTE — PROGRESS NOTE ADULT - SUBJECTIVE AND OBJECTIVE BOX
HPI:     55 M with pmh HLD, dm, htn presents with 8 days onset of covid symptoms which included, cough, fevers, sob, hypoxia, fevers, diarrhea, chills and myalgias. tested positive 7/15.  Patient was unvaccinated. He thinks he got it going to [facility] week of July 4 th.  Wife also had covid but her case was mild.  Admitted for Hypoxia to  on 7/21 and transferred to the ICU on 7/24 for HF NC O2.      7/26: Patient seen in bed, on HF NC O2 100% and 50L, along with a 100% NRB mask  7/27: Patient remains on HF NC O2 100% 50L as well as a %  7/28: He remains on 100% and 50 L HF NC and 100% NRB    7/29: Patient remains on 100% 50 L NF NC and 100% NRB  7/30: Patient now down to 80% FI O2 and 50L on HF NC, WBC rising.    7/31: Patient is on 75 % Fi O2 now, WBC remains elevated  8/1: Patient now down to 65% and 45L        PAST MEDICAL/SURGICAL/FAMILY/SOCIAL HISTORY:    Past Medical History:  Diabetes    HTN (hypertension).  No surgeries     Tobacco Usage:  · Tobacco Usage	non smoker  Fhx: none (21 Jul 2021 19:19)       PAST MEDICAL & SURGICAL HISTORY:  HTN (hypertension)    Diabetes        FAMILY HISTORY:      Social Hx:    Allergies    No Known Allergies    Intolerances            ICU Vital Signs Last 24 Hrs  T(C): 36 (01 Aug 2021 09:00), Max: 36.6 (01 Aug 2021 00:00)  T(F): 96.8 (01 Aug 2021 09:00), Max: 97.8 (01 Aug 2021 00:00)  HR: 71 (01 Aug 2021 11:00) (61 - 84)  BP: 135/83 (01 Aug 2021 11:00) (104/71 - 139/81)  BP(mean): 96 (01 Aug 2021 11:00) (71 - 112)  ABP: --  ABP(mean): --  RR: 26 (01 Aug 2021 11:00) (8 - 32)  SpO2: 88% (01 Aug 2021 11:00) (83% - 99%)          I&O's Summary    31 Jul 2021 07:01  -  01 Aug 2021 07:00  --------------------------------------------------------  IN: 100 mL / OUT: 1290 mL / NET: -1190 mL                              13.7   27.07 )-----------( 384      ( 01 Aug 2021 07:28 )             42.1       08-01    136  |  106  |  40<H>  ----------------------------<  214<H>  4.5   |  25  |  1.06    Ca    8.3<L>      01 Aug 2021 07:28  Phos  4.3     08-01  Mg     2.5     08-01        MEDICATIONS  (STANDING):  aspirin  chewable 81 milliGRAM(s) Oral daily  atorvastatin 80 milliGRAM(s) Oral at bedtime  budesonide 160 MICROgram(s)/formoterol 4.5 MICROgram(s) Inhaler 2 Puff(s) Inhalation two times a day  cefepime   IVPB 2000 milliGRAM(s) IV Intermittent every 12 hours  dextrose 40% Gel 15 Gram(s) Oral once  dextrose 5%. 1000 milliLiter(s) (50 mL/Hr) IV Continuous <Continuous>  dextrose 5%. 1000 milliLiter(s) (100 mL/Hr) IV Continuous <Continuous>  dextrose 50% Injectable 25 Gram(s) IV Push once  dextrose 50% Injectable 12.5 Gram(s) IV Push once  dextrose 50% Injectable 25 Gram(s) IV Push once  enoxaparin Injectable 90 milliGRAM(s) SubCutaneous every 12 hours  famotidine    Tablet 20 milliGRAM(s) Oral daily  glucagon  Injectable 1 milliGRAM(s) IntraMuscular once  insulin glargine Injectable (LANTUS) 40 Unit(s) SubCutaneous at bedtime  insulin lispro (ADMELOG) corrective regimen sliding scale   SubCutaneous Before meals and at bedtime  insulin lispro Injectable (ADMELOG) 7 Unit(s) SubCutaneous three times a day before meals  labetalol 200 milliGRAM(s) Oral every 12 hours  losartan 50 milliGRAM(s) Oral daily  methylPREDNISolone sodium succinate Injectable 40 milliGRAM(s) IV Push every 12 hours    MEDICATIONS  (PRN):  acetaminophen   Tablet .. 650 milliGRAM(s) Oral once PRN Temp greater or equal to 38C (100.4F), Mild Pain (1 - 3)  acetaminophen   Tablet .. 650 milliGRAM(s) Oral every 4 hours PRN Temp greater or equal to 38C (100.4F), Mild Pain (1 - 3)  ALBUTerol    90 MICROgram(s) HFA Inhaler 2 Puff(s) Inhalation every 4 hours PRN Shortness of Breath and/or Wheezing  benzocaine 15 mG/menthol 3.6 mG (Sugar-Free) Lozenge 1 Lozenge Oral every 6 hours PRN Sore Throat  benzonatate 100 milliGRAM(s) Oral three times a day PRN Cough  melatonin 3 milliGRAM(s) Oral at bedtime PRN Insomnia      DVT Prophylaxis:    Advanced Directives:  Discussed with:    Visit Information: 30 min    ** Time is exclusive of billed procedures and/or teaching and/or routine family updates.

## 2021-08-01 NOTE — PROGRESS NOTE ADULT - ASSESSMENT
55 M with pmh HLD, dm, htn presents with 8 days onset of covid symptoms which included, cough, fevers, sob, hypoxia, fevers, diarrhea, chills and myalgias. Tested positive 7/15. Wife endorsed he was becoming more sob of recently. On arrival hypoxic to 80s and tachypneic. NRB placed, presently on 15% and saturating 95%. Na 126 s/p 1L NS. CXR: Frandy infiltrates. Last scr 1.4 in 2019-> 7/21 1.9 unknown if underlying ckd. Dimer 450 - normal for age  however with covid and increasing fibrinogen, will need further eval.   Denies chest pain. LHC performed 2019 revealed normal coronaries. Here xray with b/l lung infiltrates, hypoxic requiring NRB, was given remdesivir/decadron.     1. acute respiratory failure. covid-19 viral syndrome. multifocal pneumonia  - leukocytosis 27, persistnt lung infiltrates, concern for superimposed bacterial pna  - on cefepime [id number] #3  - continue with abx coverage  - check sputum cx if able  - steroids could also be contributing to wbc ct   - completed remdesivir #10/10 monitor renal/hepatic function closely on therapy  - on IV steroids #12  - s/p tocilizumab x 1 7/23  - glycemic control  - isolation precautions-  - on ac, plan for CTA when stable  - prone positoning  - supportive care    2. other issues - care per medicine

## 2021-08-01 NOTE — PROGRESS NOTE ADULT - ASSESSMENT
A/P: 55 year old male with Acute Hypoxic Respiratory Failure from COVID-19 Pneumonia  HTN  DM II      Plan:  ICU    PULM: Continue NF NC O2, continue to wean down FI O2 as saturation tolerates.  Continue full dose Lovenox for epimeric PE treatment.  Encourage Prone positioning.  CXR on 8/1 with increased diffuse infiltrates.  Continue Cefepime for possible superimposed bacterial PNA    Cardio: Hemodynamics reasonable    Renal: Cr Stable but BUN improving--Continue to encourage PO intake.      GI: H2 blocker, PO Diet--Consistent carbohydrate     ENDO: Lantus increased to 40 bid and RISS and nutritional insulin    ID: S/P Actemra, Completed REM and High Dose Solumedrol 40 q12h    DVT Prophylaxis with Lovenox    LE Dopplers on 7/26 were negative for DVT    OOB to Chair    D/W Patients wife daily

## 2021-08-02 LAB
ANION GAP SERPL CALC-SCNC: 4 MMOL/L — LOW (ref 5–17)
BUN SERPL-MCNC: 46 MG/DL — HIGH (ref 7–23)
CALCIUM SERPL-MCNC: 8.4 MG/DL — LOW (ref 8.5–10.1)
CHLORIDE SERPL-SCNC: 107 MMOL/L — SIGNIFICANT CHANGE UP (ref 96–108)
CO2 SERPL-SCNC: 25 MMOL/L — SIGNIFICANT CHANGE UP (ref 22–31)
CREAT SERPL-MCNC: 1.1 MG/DL — SIGNIFICANT CHANGE UP (ref 0.5–1.3)
CRP SERPL-MCNC: <3 MG/L — SIGNIFICANT CHANGE UP
D DIMER BLD IA.RAPID-MCNC: 647 NG/ML DDU — HIGH
GLUCOSE SERPL-MCNC: 177 MG/DL — HIGH (ref 70–99)
HCT VFR BLD CALC: 40.6 % — SIGNIFICANT CHANGE UP (ref 39–50)
HGB BLD-MCNC: 13.3 G/DL — SIGNIFICANT CHANGE UP (ref 13–17)
MAGNESIUM SERPL-MCNC: 2.5 MG/DL — SIGNIFICANT CHANGE UP (ref 1.6–2.6)
MCHC RBC-ENTMCNC: 28.1 PG — SIGNIFICANT CHANGE UP (ref 27–34)
MCHC RBC-ENTMCNC: 32.8 GM/DL — SIGNIFICANT CHANGE UP (ref 32–36)
MCV RBC AUTO: 85.7 FL — SIGNIFICANT CHANGE UP (ref 80–100)
NT-PROBNP SERPL-SCNC: 175 PG/ML — HIGH (ref 0–125)
PHOSPHATE SERPL-MCNC: 4.4 MG/DL — SIGNIFICANT CHANGE UP (ref 2.5–4.5)
PLATELET # BLD AUTO: 350 K/UL — SIGNIFICANT CHANGE UP (ref 150–400)
POTASSIUM SERPL-MCNC: 4.8 MMOL/L — SIGNIFICANT CHANGE UP (ref 3.5–5.3)
POTASSIUM SERPL-SCNC: 4.8 MMOL/L — SIGNIFICANT CHANGE UP (ref 3.5–5.3)
PROCALCITONIN SERPL-MCNC: 0.02 NG/ML — SIGNIFICANT CHANGE UP (ref 0.02–0.1)
PROCALCITONIN SERPL-MCNC: 0.03 NG/ML — SIGNIFICANT CHANGE UP (ref 0.02–0.1)
RBC # BLD: 4.74 M/UL — SIGNIFICANT CHANGE UP (ref 4.2–5.8)
RBC # FLD: 12.5 % — SIGNIFICANT CHANGE UP (ref 10.3–14.5)
SODIUM SERPL-SCNC: 136 MMOL/L — SIGNIFICANT CHANGE UP (ref 135–145)
WBC # BLD: 30.49 K/UL — HIGH (ref 3.8–10.5)
WBC # FLD AUTO: 30.49 K/UL — HIGH (ref 3.8–10.5)

## 2021-08-02 PROCEDURE — 99291 CRITICAL CARE FIRST HOUR: CPT

## 2021-08-02 RX ORDER — ENOXAPARIN SODIUM 100 MG/ML
40 INJECTION SUBCUTANEOUS EVERY 12 HOURS
Refills: 0 | Status: DISCONTINUED | OUTPATIENT
Start: 2021-08-03 | End: 2021-09-28

## 2021-08-02 RX ADMIN — Medication 81 MILLIGRAM(S): at 12:00

## 2021-08-02 RX ADMIN — FAMOTIDINE 20 MILLIGRAM(S): 10 INJECTION INTRAVENOUS at 12:01

## 2021-08-02 RX ADMIN — CEFEPIME 100 MILLIGRAM(S): 1 INJECTION, POWDER, FOR SOLUTION INTRAMUSCULAR; INTRAVENOUS at 22:38

## 2021-08-02 RX ADMIN — BUDESONIDE AND FORMOTEROL FUMARATE DIHYDRATE 2 PUFF(S): 160; 4.5 AEROSOL RESPIRATORY (INHALATION) at 21:24

## 2021-08-02 RX ADMIN — Medication 7 UNIT(S): at 09:33

## 2021-08-02 RX ADMIN — Medication 200 MILLIGRAM(S): at 22:39

## 2021-08-02 RX ADMIN — ATORVASTATIN CALCIUM 80 MILLIGRAM(S): 80 TABLET, FILM COATED ORAL at 22:36

## 2021-08-02 RX ADMIN — INSULIN GLARGINE 40 UNIT(S): 100 INJECTION, SOLUTION SUBCUTANEOUS at 22:38

## 2021-08-02 RX ADMIN — Medication 2: at 22:38

## 2021-08-02 RX ADMIN — Medication 40 MILLIGRAM(S): at 12:02

## 2021-08-02 RX ADMIN — ENOXAPARIN SODIUM 90 MILLIGRAM(S): 100 INJECTION SUBCUTANEOUS at 12:01

## 2021-08-02 RX ADMIN — LOSARTAN POTASSIUM 50 MILLIGRAM(S): 100 TABLET, FILM COATED ORAL at 12:01

## 2021-08-02 RX ADMIN — CEFEPIME 100 MILLIGRAM(S): 1 INJECTION, POWDER, FOR SOLUTION INTRAMUSCULAR; INTRAVENOUS at 12:00

## 2021-08-02 RX ADMIN — BUDESONIDE AND FORMOTEROL FUMARATE DIHYDRATE 2 PUFF(S): 160; 4.5 AEROSOL RESPIRATORY (INHALATION) at 09:20

## 2021-08-02 RX ADMIN — Medication 2: at 12:01

## 2021-08-02 NOTE — PROGRESS NOTE ADULT - ASSESSMENT
55M PMH HTN, DM2, HLD, obesity, admitted with COVID-19 pneumonia and acute hypoxic resp failure. He did not receive COVID vaccine.     -resp status mostly unchanged   -wean HFNC as he tolerates (on 50L, 100% this morning)   -s/p tocilizumab 7/23  -on steroids since admission, intermittently received high dose solumedrol, on 40mg q12 now - will taper  -continue symbicort, IS   -leucocytosis likely due to steroids but on empiric cefepime for suspected superimposed bacterial pna, ID following   -LE duplex neg for DVT 7/25, don't suspect PE, change lovenox to 40mg q12 for prophylaxis   -uncontrolled DM2 - on lantus, sliding scale, goal glu 140-200   -HD stable, continue labetalol, losartan, statin, aspirin   -ambulating in room   -lovenox and SCDs for DVT ppx   -patient updated, plan d/w pulm

## 2021-08-02 NOTE — PROGRESS NOTE ADULT - ASSESSMENT
55 M with pmh HLD, dm, htn presents with 8 days onset of covid symptoms which included, cough, fevers, sob, hypoxia, fevers, diarrhea, chills and myalgias. Tested positive 7/15. Wife endorsed he was becoming more sob of recently. On arrival hypoxic to 80s and tachypneic. NRB placed, presently on 15% and saturating 95%. Na 126 s/p 1L NS. CXR: Frandy infiltrates. Last scr 1.4 in 2019-> 7/21 1.9 unknown if underlying ckd. Dimer 450 - normal for age  however with covid and increasing fibrinogen, will need further eval.   Denies chest pain. LHC performed 2019 revealed normal coronaries. Here xray with b/l lung infiltrates, hypoxic requiring NRB, was given remdesivir/decadron.     1. acute respiratory failure. covid-19 viral syndrome. multifocal pneumonia  - leukocytosis 30 persistnt lung infiltrates, concern for superimposed bacterial pna  - on cefepime [id number] #4  - continue with abx coverage  - check sputum cx if able  - steroids could also be contributing to wbc ct   - completed remdesivir #10- --> 7/30  - on IV steroids #12  - s/p tocilizumab x 1 7/23  - glycemic control  - isolation precautions-  - on ac, plan for CTA when stable  - prone positoning  - supportive care    2. other issues - care per medicine

## 2021-08-02 NOTE — PROGRESS NOTE ADULT - ASSESSMENT
1) COVID Pneumonia  2) Dyspnea  3) Abnormal CXR  4) Hypoxemia  5) Hypertension  6) ROBERT  7) Mild PH    55 M noted to have COVID 7/15  On arrival hypoxic to 80s and tachypneic. NRB placed,in the ER saturating 95%. Na 126 s/p 1L NS. CXR: Frandy infiltrates. Last scr 1.4 in 2019-> today 1.9 unknown if underlying ckd.  Patient not vaccinated.   Last seen by me in 2019 for ROBERT noted to have uncontrolled hypertension  Treated with IV Remdesivir and Decadron, Tocilizumab   SaO2 is 80-85% despite being on 100% NRB so was switched to HFNC  ABG/imaging reviewed  Given the obesity/hypertension and prior co-morbidities, he is at risk of intubation but continue HFNC, respiratory status remains critical  XR reviewed- pulmonary infiltrates are present  DDimer elevated  Possibly to go for CTPE once stabilized, on therapeutic Lovenox now  On Solumedrol 40mg q 12/ Symbicort 160/4.5 BID (https://www.the[employer].com/journals/lanres/article/[email or web address], STO study)  HFNC 100%FiO2/50LPM 37C,   Will likely need a prolonged course of HFNC given the tenuous respiratory status  Discussed inspiratory muscle exercises with patient while he is out of bed +/- incentive spirometer   Reviewed Ddimer/BNP  Discussed with RT/Nursing staff

## 2021-08-02 NOTE — PROGRESS NOTE ADULT - SUBJECTIVE AND OBJECTIVE BOX
54 y/o male with pmhx of HLD, HTN, DM admitted on 7/21 with COVID-19 pna. tested positive on 7/15 outpatient. unvaccinated. Type 1 resp  failure Escalating fio2 requirements and development of ARDS now requiring HFNC and NRB. S/p actemra on 7/23.    Events last 24 hours: Able to titrate HFNC to 50%  40L/min  with NRBM  on and off mask.  Desaturated with activity  WOB OK proning now        Symptomatic day 7/15   Hosp day # 7/21  ICU day 7/24  Remdesivir  completed 10 days   Tocilizumab 7/23    On full AC empirically  doppler negative      Recent clinical changes:  Continued slow improvement        Vital signs/reviewed and physical exam performed where pertinent and urgently required.    Lab/radiology studies/ABG/meds reviewed and interpreted into the assessment and treatment plan.    Assessment/Plan/Therapeutic interventions      Neuro:  Melatonin sleep hygiene     Cor:  HD stable SR  BP controlled with current regimen     Pulm:  HTFNC with NRBM  Prone   tapering medrol 40 q12.      GI:  Gi prophylaxis   Enteric feeds as tolerated.     Renal: Even to negative fluid balance as tolerated by hemodynamics and renal fx.      Heme:  Pharmacologic DVT P in addition to SCD's  following D- Dimer clinical course and doppler studied where appropriate.   Dr Espinoza has patient on full AC d dimer was > 2k coming down now.  LE doppler neg     ID: ABX discontinuation based on discussion with ID in conjunction with clinical features, culture data, and judicious procalcitonin monitoring.  Cefepime as per ID.  Send PCT if low d/c abx   wbc inc > steroids         Endo Aggressive glycemic control to limit FS glucose to < 180mg/dl.  Lantus adjusted         COVID 19 specific considerations and therapeutic options based on the available and rapidly changing medical literature.    Goals of care considerations:  Ongoing assessment for patient specific treatment options based on clinical progression or decline.  I have involved the family with updates and requests in guidance for medical decision making.      33  minutes of critical care time spent, (independent of any procedures),  in the management of this critically ill COVID-19 patient  with continuous assessments and interventions based on the interpretation of multiple databases.

## 2021-08-02 NOTE — PROGRESS NOTE ADULT - SUBJECTIVE AND OBJECTIVE BOX
Patient is a 55y old  Male who presents with a chief complaint of cough/sob (23 Jul 2021 14:03)      HPI:  55 M with pmh HLD, dm, htn presents with 8 days onset of covid symptoms which included, cough, fevers, sob, hypoxia, fevers, diarrhea, chills and myalgias. TEsted positive 7/15. Wife endorsed he was becoming more sob of recently. On arrival hypoxic to 80s and tachypneic. NRB placed, presently on 15% and saturating 95%. Na 126 s/p 1L NS. CXR: Frandy infiltrates. Last scr 1.4 in 2019-> today 1.9 unknown if underlying ckd.  Patient not vaccinated.   Last seen by me in 2019  History of ROBERT and uncontrolled hypertension  Treated with IV Remdesivir and Decadron, now on Toci  SaO2 is 80-85% despite being on 100% NRB  ABG pending    7/25/2021  Pt transferred to MICU  ABG 7.45/28/63  on HFNC    7/26  covering for Dr Arceo today  Pt is critically ill on 100% Fio2 with O2 sat 85-88%  He gives thumb's up sign and appears in good spirits  sitting upright in bed  data discussed with critical care RN as well as the intensivist DR Espinoza today  His med list and recent films reviewed  repeat cxr requested now  time spent taking care of critically ill pt 30 mins  7/27  covering for DR Arceo  data discussed with team members  now on full dose AC but solumedrol not started with concern for additional infection as risk factor  data discussed with Intensivist again today  cxr is reviewed with critical care RN staff  he remains critically ill on 100% O2 and high flow being used  he could not tolerate Prone positioning  Auto diuresis with good urine output noted  sitting upright in bed  time spent taking care of critically ill pt 30 mins    7/28  Patient is on HFNC  Started IV Solumedrol 60mg q 8 for 3 days    7/30  No acute pulmonary events occurred overnight  Patient continues HFNC 90%FiO2/50LPM 37C    7/31  On HFNC 85%, 50LPM    8/1  HFNC reduced to 50LPM, 70%FiO2    8/2  On HFNC 50LPM, 100%FiO2 without NRB this morning      PAST MEDICAL/SURGICAL/FAMILY/SOCIAL HISTORY:    Past Medical History:  Diabetes    HTN (hypertension).  No surgeries     Tobacco Usage:  · Tobacco Usage	non smoker  Fhx: none (21 Jul 2021 19:19)      MEDICATIONS  (STANDING):  aspirin  chewable 81 milliGRAM(s) Oral daily  atorvastatin 80 milliGRAM(s) Oral at bedtime  dextrose 40% Gel 15 Gram(s) Oral once  dextrose 5%. 1000 milliLiter(s) (50 mL/Hr) IV Continuous <Continuous>  dextrose 5%. 1000 milliLiter(s) (100 mL/Hr) IV Continuous <Continuous>  dextrose 50% Injectable 25 Gram(s) IV Push once  dextrose 50% Injectable 12.5 Gram(s) IV Push once  dextrose 50% Injectable 25 Gram(s) IV Push once  enoxaparin Injectable 90 milliGRAM(s) SubCutaneous every 12 hours  famotidine    Tablet 20 milliGRAM(s) Oral daily  glucagon  Injectable 1 milliGRAM(s) IntraMuscular once  insulin glargine Injectable (LANTUS) 40 Unit(s) SubCutaneous at bedtime  insulin lispro (ADMELOG) corrective regimen sliding scale   SubCutaneous at bedtime  insulin lispro (ADMELOG) corrective regimen sliding scale   SubCutaneous three times a day before meals  insulin lispro Injectable (ADMELOG) 7 Unit(s) SubCutaneous three times a day before meals  labetalol 200 milliGRAM(s) Oral every 12 hours  losartan 50 milliGRAM(s) Oral daily  methylPREDNISolone sodium succinate Injectable 60 milliGRAM(s) IV Push every 8 hours  remdesivir  IVPB 100 milliGRAM(s) IV Intermittent every 24 hours    MEDICATIONS  (PRN):  acetaminophen   Tablet .. 650 milliGRAM(s) Oral once PRN Temp greater or equal to 38C (100.4F), Mild Pain (1 - 3)  acetaminophen   Tablet .. 650 milliGRAM(s) Oral every 4 hours PRN Temp greater or equal to 38C (100.4F), Mild Pain (1 - 3)  ALBUTerol    90 MICROgram(s) HFA Inhaler 2 Puff(s) Inhalation every 4 hours PRN Shortness of Breath and/or Wheezing  benzonatate 100 milliGRAM(s) Oral three times a day PRN Cough      Vital Signs Last 24 Hrs  T(C): 36.6 (01 Aug 2021 05:00), Max: 36.6 (01 Aug 2021 00:00)  T(F): 97.8 (01 Aug 2021 05:00), Max: 97.8 (01 Aug 2021 00:00)  HR: 67 (01 Aug 2021 08:00) (60 - 84)  BP: 123/94 (01 Aug 2021 08:00) (104/71 - 150/115)  BP(mean): 101 (01 Aug 2021 08:00) (71 - 123)  RR: 31 (01 Aug 2021 08:00) (8 - 32)  SpO2: 89% (01 Aug 2021 08:00) (83% - 99%)  IN:    IV PiggyBack: 270 mL    Oral Fluid: 720 mL  Total IN: 990 mL    OUT:    Voided (mL): 2550 mL  Total OUT: 2550 mL    Total NET: -1560 mL            PHYSICAL EXAM  General Appearance: sitting uptright and mild to moderate resp distress  HEENT: PERRL, conjunctiva clear  Neck: Supple, , no adenopathy  Lungs: coarse bilaterally  Heart: Regular rate and rhythm, S1, S2 normal,Not tachycardic  Abdomen: Soft, non-tender, bowel sounds active   Extremities: no cyanosis or edema, no joint swelling  Skin: Skin color, texture normal, no rashes   Neurologic: Alert and oriented X3 , non focal    ECG:    LABS:                            12.9   15.00 )-----------( 366      ( 26 Jul 2021 06:20 )             38.4   07-26    133<L>  |  104  |  37<H>  ----------------------------<  232<H>  4.2   |  23  |  1.17    Ca    8.4<L>      26 Jul 2021 06:20    TPro  6.3  /  Alb  2.2<L>  /  TBili  0.4  /  DBili  0.1  /  AST  103<H>  /  ALT  137<H>  /  AlkPhos  239<H>  07-26                          13.2   13.52 )-----------( 338      ( 24 Jul 2021 08:06 )             39.5     07-24    136  |  105  |  43<H>  ----------------------------<  227<H>  4.3   |  25  |  1.31<H>    Ca    8.7      24 Jul 2021 08:06    TPro  6.6  /  Alb  2.3<L>  /  TBili  0.4  /  DBili  x   /  AST  73<H>  /  ALT  64  /  AlkPhos  165<H>  07-24          Pro BNP  -- 07-24 @ 08:06  D Dimer  446 07-24 @ 08:06  Pro BNP  261 07-21 @ 14:42  D Dimer  460 07-21 @ 14:42              < from: Xray Chest 1 View- PORTABLE-Urgent (07.21.21 @ 15:33) >    PROCEDURE DATE:  07/21/2021          INTERPRETATION:  AP chest on July 21, 2021 at 3:27 PM. Patient is short of breath with cough and fever.    Heart magnified by technique.    There arescattered mid lower lung field infiltrates right greater than left possibly related: Pneumonia.    The lungs are clear on August 30, 2019.    IMPRESSION: Bilateral infiltrates as above.    < end of copied text >  < from: US Duplex Venous Lower Ext Complete, Bilateral (07.25.21 @ 08:42) >  COMPARISON: None available.    TECHNIQUE: Duplex sonography of the BILATERAL LOWER extremity veins with color and spectral Doppler, with and without compression.    FINDINGS:    RIGHT:  Normal compressibility of the RIGHT common femoral, femoral and popliteal veins.  Doppler examination shows normal spontaneous and phasic flow.  No RIGHT calf vein thrombosis is detected.    LEFT:  Normal compressibility of the LEFT common femoral, femoral and popliteal veins.  Doppler examination shows normal spontaneous and phasic flow.  No LEFT calf vein thrombosis is detected.    IMPRESSION:  No evidence of deep venous thrombosis in either lower extremity.    < end of copied text >  RADIOLOGY & ADDITIONAL STUDIES:    < from: Xray Chest 1 View- PORTABLE-Urgent (Xray Chest 1 View- PORTABLE-Urgent .) (07.26.21 @ 08:43) >    PROCEDURE DATE:  07/26/2021          INTERPRETATION:  Portable chest radiograph    CLINICAL INFORMATION: Pneumonia due to Covid 19.. Follow-up    TECHNIQUE:  Portable  AP view of the chest was obtained.    COMPARISON: 7/21/2021 chest available for review.    FINDINGS:    The lungs show stable bilateral  multifocal and diffuse ill-defined airspace opacities.. No pneumothorax.    The  heart is enlarged in transverse diameter. No hilar mass.     Visualized osseous structures are intact.    IMPRESSION:   Stable bilateral  multifocal and diffuse ill-defined airspace opacities..    < end of copied text >

## 2021-08-02 NOTE — CHART NOTE - NSCHARTNOTEFT_GEN_A_CORE
Clinical Nutrition Follow Up Note:    *56yo male admitted with acute hypoxic resp failure from COVID-19 PNA; on HFNC.     *Labs Reviewed:  08-02    136  |  107  |  46<H>  ----------------------------<  177<H>  4.8   |  25  |  1.10    Ca    8.4<L>      02 Aug 2021 06:54  Phos  4.4     08-02  Mg     2.5     08-02    HbA1c: A1C with Estimated Average Glucose Result: 11.6 % (07-22-21 @ 07:57)  Glucose: POCT Blood Glucose.: 186 mg/dL (08-02-21 @ 11:48)    POCT Blood Glucose.: 186 mg/dL (02 Aug 2021 11:48)  POCT Blood Glucose.: 139 mg/dL (02 Aug 2021 07:58)  POCT Blood Glucose.: 324 mg/dL (01 Aug 2021 21:11)  POCT Blood Glucose.: 200 mg/dL (01 Aug 2021 17:51)  POCT Blood Glucose.: 181 mg/dL (01 Aug 2021 13:25)    *labs reviewed; potassium at upper end of normal; monitor and if continues to climb, add potassium restriction to diet Rx.    *pertinent meds: atorvastatin, lantus 40 units at bedtime, admelog sliding scale, ademlog 7 units with each meal, melatonin.    *(+) BM on 7/31  ; pt not on bowel regimen.    *linda score of 20 :no PU documented.  (+2) Lt/Rt leg edema documented.    *PO intake; consuming 100% of tray and Glucerna; encourage oral supplement as pt with increased calorie needs 2/2 COVID.    *Malnutrition dx: severe malnutrition in acute illness r/t COVID AEB >5% wt loss in 1 wk; moderate fat/muscle wasting        Diet, Consistent Carbohydrate w/Evening Snack (08-01-21 @ 07:51) [Active]        Estimated Needs: Based on 72Kg (adjusted IBW)   Calories: 1114-5579 Kcal (25-30 Kcal/Kg)  Protein:  115-130g (1.6-1.8 g/Kg)  Fluids:  9133-8009 mL (25-30 mL/Kg)    *Wt Hx:  no new wt since admission; obtain new wt when feasible  Height (cm): 167.6 (07-21-21 @ 14:34)  Weight (kg): 90.4 (07-21-21 @ 22:00)  BMI (kg/m2): 32.2 (07-21-21 @ 22:00)  BSA (m2): 2 (07-21-21 @ 22:00)    Recommendations:  1) monitor potassium level; if becomes elevated, add potassium restriction prn  2) consider checking vitamin D level and supplement prn  3) monitor BM: if > 3 days without BM, add bowel regimen  4) obtain new wt when feasible to track/trend changes        *will continue to monitor and follow up with adjustments to treatment plan prn*    Anita Arnold MA, RDN, CDN, [facility] [phone]

## 2021-08-02 NOTE — PROGRESS NOTE ADULT - SUBJECTIVE AND OBJECTIVE BOX
Patient is a 55y old  Male who presents with a chief complaint of cough/sob (02 Aug 2021 11:01)    HPI:  55 M with pmh HLD, dm, htn presents with 8 days onset of covid symptoms which included, cough, fevers, sob, hypoxia, fevers, diarrhea, chills and myalgias. TEsted positive 7/15. Wife endorsed he was becoming more sob of recently. On arrival hypoxic to 80s and tachypneic. NRB placed, presently on 15% and saturating 95%. Na 126 s/p 1L NS. CXR: Frandy infiltrates. Last scr 1.4 in 2019-> today 1.9 unknown if underlying ckd. Dimer 450 - normal for age  however with covid and increasing fibrinogen, will need further eval.   Denies chets pain. LHC performed 2019 revealed normal coronaries.         PAST MEDICAL/SURGICAL/FAMILY/SOCIAL HISTORY:    Past Medical History:  Diabetes    HTN (hypertension).  No surgeries     Tobacco Usage:  · Tobacco Usage	non smoker  Fhx: none (21 Jul 2021 19:19)    24 hour events: ***    PAST MEDICAL & SURGICAL HISTORY:  HTN (hypertension)    Diabetes      REVIEW OF SYSTEMS  Constitutional: No fever, chills, fatigue  Neuro: No headache, numbness, weakness  Resp: No cough, wheezing, shortness of breath  CVS: No chest pain, palpitations, leg swelling  GI: No abdominal pain, nausea, vomiting, diarrhea   : No dysuria, frequency, incontinence  Skin: No itching, burning, rashes, or lesions   Msk: No joint pain or swelling  Psych: No depression, anxiety, mood swings  Heme: No bleeding    T(F): 96.8 (08-02-21 @ 04:00), Max: 97.1 (08-01-21 @ 20:00)  HR: 62 (08-02-21 @ 09:00) (61 - 78)  BP: 125/67 (08-02-21 @ 08:00) (102/67 - 137/72)  RR: 26 (08-02-21 @ 09:00) (22 - 33)  SpO2: 92% (08-02-21 @ 09:00) (80% - 97%)  Wt(kg): --      CAPILLARY BLOOD GLUCOSE  POCT Blood Glucose.: 186 mg/dL (02 Aug 2021 11:48)  POCT Blood Glucose.: 139 mg/dL (02 Aug 2021 07:58)  POCT Blood Glucose.: 324 mg/dL (01 Aug 2021 21:11)  POCT Blood Glucose.: 200 mg/dL (01 Aug 2021 17:51)  POCT Blood Glucose.: 181 mg/dL (01 Aug 2021 13:25)    I&O's Summary  PHYSICAL EXAM  General:   CNS:   HEENT:   Resp:   CVS:   Abd:   Ext:   Skin:     MEDICATIONS  cefepime   IVPB IV Intermittent  labetalol Oral  losartan Oral  atorvastatin Oral  dextrose 40% Gel Oral  dextrose 50% Injectable IV Push  dextrose 50% Injectable IV Push  dextrose 50% Injectable IV Push  glucagon  Injectable IntraMuscular  insulin glargine Injectable (LANTUS) SubCutaneous  insulin lispro (ADMELOG) corrective regimen sliding scale SubCutaneous  insulin lispro Injectable (ADMELOG) SubCutaneous  methylPREDNISolone sodium succinate Injectable IV Push  ALBUTerol    90 MICROgram(s) HFA Inhaler Inhalation PRN  benzonatate Oral PRN  budesonide 160 MICROgram(s)/formoterol 4.5 MICROgram(s) Inhaler Inhalation  acetaminophen   Tablet .. Oral PRN  acetaminophen   Tablet .. Oral PRN  melatonin Oral PRN  aspirin  chewable Oral  enoxaparin Injectable SubCutaneous  famotidine    Tablet Oral  dextrose 5%. IV Continuous  dextrose 5%. IV Continuous  benzocaine 15 mG/menthol 3.6 mG (Sugar-Free) Lozenge Oral PRN                          13.3   30.49 )-----------( 350      ( 02 Aug 2021 06:54 )             40.6       08-02    136  |  107  |  46<H>  ----------------------------<  177<H>  4.8   |  25  |  1.10    Ca    8.4<L>      02 Aug 2021 06:54  Phos  4.4     08-02  Mg     2.5     08-02                    Radiology: ***    CENTRAL LINE: Y/N          DATE INSERTED:              REMOVE: Y/N  GOFF: Y/N                        DATE INSERTED:              REMOVE: Y/N  A-LINE: Y/N                       DATE INSERTED:              REMOVE: Y/N    GLOBAL ISSUE/BEST PRACTICE  Analgesia:   Sedation:   CAM-ICU:   HOB elevation: yes  Stress ulcer prophylaxis:   VTE prophylaxis:   Glycemic control:   Nutrition:     CODE STATUS: ***  [facility] discussion: Y Patient is a 55y old  Male who presents with a chief complaint of cough/sob (02 Aug 2021 11:01)    HPI:  55 M with pmh HLD, dm, htn presents with 8 days onset of covid symptoms which included, cough, fevers, sob, hypoxia, fevers, diarrhea, chills and myalgias. TEsted positive 7/15. Wife endorsed he was becoming more sob of recently. On arrival hypoxic to 80s and tachypneic. NRB placed, presently on 15% and saturating 95%. Na 126 s/p 1L NS. CXR: Frandy infiltrates. Last scr 1.4 in 2019-> today 1.9 unknown if underlying ckd. Dimer 450 - normal for age  however with covid and increasing fibrinogen, will need further eval.   Denies chets pain. LHC performed 2019 revealed normal coronaries.     24 hour events: remains on HFNC  denies purulent cough, fever, chills, diarrhea    PAST MEDICAL & SURGICAL HISTORY:  HTN (hypertension)    Diabetes      REVIEW OF SYSTEMS  Constitutional: No fever, chills, fatigue  Neuro: No headache, numbness, weakness  Resp: +cough, +shortness of breath  CVS: No chest pain, palpitations, leg swelling  GI: No abdominal pain, nausea, vomiting, diarrhea   : No dysuria, frequency, incontinence  Skin: No itching, burning, rashes, or lesions   Msk: No joint pain or swelling  Psych: No depression, anxiety, mood swings  Heme: No bleeding    T(F): 96.8 (08-02-21 @ 04:00), Max: 97.1 (08-01-21 @ 20:00)  HR: 62 (08-02-21 @ 09:00) (61 - 78)  BP: 125/67 (08-02-21 @ 08:00) (102/67 - 137/72)  RR: 26 (08-02-21 @ 09:00) (22 - 33)  SpO2: 92% (08-02-21 @ 09:00) (80% - 97%)  Wt(kg): --      CAPILLARY BLOOD GLUCOSE  POCT Blood Glucose.: 186 mg/dL (02 Aug 2021 11:48)  POCT Blood Glucose.: 139 mg/dL (02 Aug 2021 07:58)  POCT Blood Glucose.: 324 mg/dL (01 Aug 2021 21:11)  POCT Blood Glucose.: 200 mg/dL (01 Aug 2021 17:51)  POCT Blood Glucose.: 181 mg/dL (01 Aug 2021 13:25)    I&O's Summary    PHYSICAL EXAM  General: NAD while sitting in chair   CNS: AAOx3, interactive, nonfocal neuro exam   HEENT: PERRL, dry mucosa   Resp: good b/l AE with scattered rhonchi   CVS: S1S2, regular   Abd: soft, NT, +BS  Ext: 1+ edema   Skin: warm, not mottled     MEDICATIONS  cefepime   IVPB IV Intermittent  labetalol Oral  losartan Oral  atorvastatin Oral  dextrose 40% Gel Oral  dextrose 50% Injectable IV Push  dextrose 50% Injectable IV Push  dextrose 50% Injectable IV Push  glucagon  Injectable IntraMuscular  insulin glargine Injectable (LANTUS) SubCutaneous  insulin lispro (ADMELOG) corrective regimen sliding scale SubCutaneous  insulin lispro Injectable (ADMELOG) SubCutaneous  methylPREDNISolone sodium succinate Injectable IV Push  ALBUTerol    90 MICROgram(s) HFA Inhaler Inhalation PRN  benzonatate Oral PRN  budesonide 160 MICROgram(s)/formoterol 4.5 MICROgram(s) Inhaler Inhalation  acetaminophen   Tablet .. Oral PRN  acetaminophen   Tablet .. Oral PRN  melatonin Oral PRN  aspirin  chewable Oral  enoxaparin Injectable SubCutaneous  famotidine    Tablet Oral  dextrose 5%. IV Continuous  dextrose 5%. IV Continuous  benzocaine 15 mG/menthol 3.6 mG (Sugar-Free) Lozenge Oral PRN                          13.3   30.49 )-----------( 350      ( 02 Aug 2021 06:54 )             40.6       08-02    136  |  107  |  46<H>  ----------------------------<  177<H>  4.8   |  25  |  1.10    Ca    8.4<L>      02 Aug 2021 06:54  Phos  4.4     08-02  Mg     2.5     08-02    CENTRAL LINE: N           GOFF: N                         A-LINE: N                      GLOBAL ISSUE/BEST PRACTICE  Analgesia: NA  Sedation: NA  CAM-ICU: neg  HOB elevation: yes  Stress ulcer prophylaxis: NA  VTE prophylaxis: Y  Glycemic control: Y  Nutrition: Y    CODE STATUS: full  GOC discussion: Y

## 2021-08-02 NOTE — PROGRESS NOTE ADULT - SUBJECTIVE AND OBJECTIVE BOX
Date of service: 08-02-21 @ 11:02    Pt seen and examined  Patient sitting up in chair  On high flow 100%  Feels better  Afebrile    ROS: no fever or chills; denies dizziness, no HA,  no abdominal pain, no diarrhea or constipation; no dysuria, no urinary frequency, no legs pain, no rashes      MEDICATIONS  (STANDING):  aspirin  chewable 81 milliGRAM(s) Oral daily  atorvastatin 80 milliGRAM(s) Oral at bedtime  budesonide 160 MICROgram(s)/formoterol 4.5 MICROgram(s) Inhaler 2 Puff(s) Inhalation two times a day  cefepime   IVPB 2000 milliGRAM(s) IV Intermittent every 12 hours  dextrose 40% Gel 15 Gram(s) Oral once  dextrose 5%. 1000 milliLiter(s) (50 mL/Hr) IV Continuous <Continuous>  dextrose 5%. 1000 milliLiter(s) (100 mL/Hr) IV Continuous <Continuous>  dextrose 50% Injectable 25 Gram(s) IV Push once  dextrose 50% Injectable 12.5 Gram(s) IV Push once  dextrose 50% Injectable 25 Gram(s) IV Push once  enoxaparin Injectable 90 milliGRAM(s) SubCutaneous every 12 hours  famotidine    Tablet 20 milliGRAM(s) Oral daily  glucagon  Injectable 1 milliGRAM(s) IntraMuscular once  insulin glargine Injectable (LANTUS) 40 Unit(s) SubCutaneous at bedtime  insulin lispro (ADMELOG) corrective regimen sliding scale   SubCutaneous Before meals and at bedtime  insulin lispro Injectable (ADMELOG) 7 Unit(s) SubCutaneous three times a day before meals  labetalol 200 milliGRAM(s) Oral every 12 hours  losartan 50 milliGRAM(s) Oral daily  methylPREDNISolone sodium succinate Injectable 40 milliGRAM(s) IV Push every 12 hours    Vital Signs Last 24 Hrs  T(C): 36 (02 Aug 2021 04:00), Max: 36.2 (01 Aug 2021 20:00)  T(F): 96.8 (02 Aug 2021 04:00), Max: 97.1 (01 Aug 2021 20:00)  HR: 62 (02 Aug 2021 09:00) (61 - 78)  BP: 125/67 (02 Aug 2021 08:00) (102/67 - 137/72)  BP(mean): 82 (02 Aug 2021 08:00) (68 - 110)  RR: 26 (02 Aug 2021 09:00) (22 - 33)  SpO2: 92% (02 Aug 2021 09:00) (80% - 97%)      PE:  Constitutional: NAD on HFNC  HEENT: NC/AT, EOMI, PERRLA, conjunctivae clear; ears and nose atraumatic; pharynx benign  Neck: supple; thyroid not palpable  Back: no tenderness  Respiratory: decreased breath sounds, rhonchi  Cardiovascular: S1S2 regular, no murmurs  Abdomen: soft, not tender, not distended, positive BS; liver and spleen WNL  Genitourinary: no suprapubic tenderness  Musculoskeletal: no muscle tenderness, no joint swelling or tenderness  Extremities: no pedal edema  Neurological/ Psychiatric: AxOx3, Judgement and insight normal;  moving all extremities  Skin: no rashes; no palpable lesions    Labs: all available labs reviewed                                                         13.3   30.49 )-----------( 350      ( 02 Aug 2021 06:54 )             40.6     08-02    136  |  107  |  46<H>  ----------------------------<  177<H>  4.8   |  25  |  1.10    Ca    8.4<L>      02 Aug 2021 06:54  Phos  4.4     08-02  Mg     2.5     08-02      Culture - Blood (collected 07-21-21 @ 14:42)  Source: .Blood Blood-Peripheral  Preliminary Report (07-22-21 @ 22:01):    No growth to date.      Radiology: all available radiological tests reviewed    EXAM:  XR CHEST PORTABLE URGENT 1V                            PROCEDURE DATE:  07/21/2021          INTERPRETATION:  AP chest on July 21, 2021 at 3:27 PM. Patient is short of breath with cough and fever.    Heart magnified by technique.    There arescattered mid lower lung field infiltrates right greater than left possibly related: Pneumonia.    The lungs are clear on August 30, 2019.    IMPRESSION: Bilateral infiltrates as above.    < end of copied text >    Advanced directives addressed: full resuscitation

## 2021-08-03 LAB
ALBUMIN SERPL ELPH-MCNC: 2.3 G/DL — LOW (ref 3.3–5)
ALP SERPL-CCNC: 212 U/L — HIGH (ref 40–120)
ALT FLD-CCNC: 149 U/L — HIGH (ref 12–78)
ANION GAP SERPL CALC-SCNC: 5 MMOL/L — SIGNIFICANT CHANGE UP (ref 5–17)
AST SERPL-CCNC: 67 U/L — HIGH (ref 15–37)
BASOPHILS # BLD AUTO: 0.08 K/UL — SIGNIFICANT CHANGE UP (ref 0–0.2)
BASOPHILS NFR BLD AUTO: 0.2 % — SIGNIFICANT CHANGE UP (ref 0–2)
BILIRUB SERPL-MCNC: 0.7 MG/DL — SIGNIFICANT CHANGE UP (ref 0.2–1.2)
BUN SERPL-MCNC: 38 MG/DL — HIGH (ref 7–23)
CALCIUM SERPL-MCNC: 8.2 MG/DL — LOW (ref 8.5–10.1)
CHLORIDE SERPL-SCNC: 107 MMOL/L — SIGNIFICANT CHANGE UP (ref 96–108)
CO2 SERPL-SCNC: 26 MMOL/L — SIGNIFICANT CHANGE UP (ref 22–31)
CREAT SERPL-MCNC: 1.06 MG/DL — SIGNIFICANT CHANGE UP (ref 0.5–1.3)
EOSINOPHIL # BLD AUTO: 0.03 K/UL — SIGNIFICANT CHANGE UP (ref 0–0.5)
EOSINOPHIL NFR BLD AUTO: 0.1 % — SIGNIFICANT CHANGE UP (ref 0–6)
FERRITIN SERPL-MCNC: 1512 NG/ML — HIGH (ref 30–400)
GLUCOSE SERPL-MCNC: 66 MG/DL — LOW (ref 70–99)
HCT VFR BLD CALC: 39.9 % — SIGNIFICANT CHANGE UP (ref 39–50)
HGB BLD-MCNC: 13 G/DL — SIGNIFICANT CHANGE UP (ref 13–17)
IMM GRANULOCYTES NFR BLD AUTO: 0.8 % — SIGNIFICANT CHANGE UP (ref 0–1.5)
LYMPHOCYTES # BLD AUTO: 0.93 K/UL — LOW (ref 1–3.3)
LYMPHOCYTES # BLD AUTO: 2.8 % — LOW (ref 13–44)
MAGNESIUM SERPL-MCNC: 2.5 MG/DL — SIGNIFICANT CHANGE UP (ref 1.6–2.6)
MANUAL SMEAR VERIFICATION: SIGNIFICANT CHANGE UP
MCHC RBC-ENTMCNC: 27.9 PG — SIGNIFICANT CHANGE UP (ref 27–34)
MCHC RBC-ENTMCNC: 32.6 GM/DL — SIGNIFICANT CHANGE UP (ref 32–36)
MCV RBC AUTO: 85.6 FL — SIGNIFICANT CHANGE UP (ref 80–100)
MONOCYTES # BLD AUTO: 2.01 K/UL — HIGH (ref 0–0.9)
MONOCYTES NFR BLD AUTO: 6 % — SIGNIFICANT CHANGE UP (ref 2–14)
NEUTROPHILS # BLD AUTO: 30.24 K/UL — HIGH (ref 1.8–7.4)
NEUTROPHILS NFR BLD AUTO: 90.1 % — HIGH (ref 43–77)
PHOSPHATE SERPL-MCNC: 3.9 MG/DL — SIGNIFICANT CHANGE UP (ref 2.5–4.5)
PLAT MORPH BLD: NORMAL — SIGNIFICANT CHANGE UP
PLATELET # BLD AUTO: 358 K/UL — SIGNIFICANT CHANGE UP (ref 150–400)
POTASSIUM SERPL-MCNC: 4.2 MMOL/L — SIGNIFICANT CHANGE UP (ref 3.5–5.3)
POTASSIUM SERPL-SCNC: 4.2 MMOL/L — SIGNIFICANT CHANGE UP (ref 3.5–5.3)
PROT SERPL-MCNC: 5.3 GM/DL — LOW (ref 6–8.3)
RBC # BLD: 4.66 M/UL — SIGNIFICANT CHANGE UP (ref 4.2–5.8)
RBC # FLD: 12.6 % — SIGNIFICANT CHANGE UP (ref 10.3–14.5)
RBC BLD AUTO: NORMAL — SIGNIFICANT CHANGE UP
SODIUM SERPL-SCNC: 138 MMOL/L — SIGNIFICANT CHANGE UP (ref 135–145)
WBC # BLD: 33.57 K/UL — HIGH (ref 3.8–10.5)
WBC # FLD AUTO: 33.57 K/UL — HIGH (ref 3.8–10.5)

## 2021-08-03 PROCEDURE — 99291 CRITICAL CARE FIRST HOUR: CPT

## 2021-08-03 RX ORDER — SODIUM CHLORIDE 9 MG/ML
1000 INJECTION, SOLUTION INTRAVENOUS
Refills: 0 | Status: DISCONTINUED | OUTPATIENT
Start: 2021-08-03 | End: 2021-08-04

## 2021-08-03 RX ORDER — INSULIN LISPRO 100/ML
VIAL (ML) SUBCUTANEOUS
Refills: 0 | Status: DISCONTINUED | OUTPATIENT
Start: 2021-08-03 | End: 2021-08-04

## 2021-08-03 RX ORDER — DEXTROSE 50 % IN WATER 50 %
25 SYRINGE (ML) INTRAVENOUS ONCE
Refills: 0 | Status: DISCONTINUED | OUTPATIENT
Start: 2021-08-03 | End: 2021-08-03

## 2021-08-03 RX ORDER — SODIUM CHLORIDE 9 MG/ML
1000 INJECTION, SOLUTION INTRAVENOUS
Refills: 0 | Status: DISCONTINUED | OUTPATIENT
Start: 2021-08-03 | End: 2021-08-05

## 2021-08-03 RX ORDER — DEXTROSE 50 % IN WATER 50 %
15 SYRINGE (ML) INTRAVENOUS ONCE
Refills: 0 | Status: DISCONTINUED | OUTPATIENT
Start: 2021-08-03 | End: 2021-08-04

## 2021-08-03 RX ORDER — DEXTROSE 50 % IN WATER 50 %
12.5 SYRINGE (ML) INTRAVENOUS ONCE
Refills: 0 | Status: DISCONTINUED | OUTPATIENT
Start: 2021-08-03 | End: 2021-08-04

## 2021-08-03 RX ORDER — GLUCAGON INJECTION, SOLUTION 0.5 MG/.1ML
1 INJECTION, SOLUTION SUBCUTANEOUS ONCE
Refills: 0 | Status: DISCONTINUED | OUTPATIENT
Start: 2021-08-03 | End: 2021-08-04

## 2021-08-03 RX ORDER — INSULIN GLARGINE 100 [IU]/ML
10 INJECTION, SOLUTION SUBCUTANEOUS AT BEDTIME
Refills: 0 | Status: DISCONTINUED | OUTPATIENT
Start: 2021-08-03 | End: 2021-08-04

## 2021-08-03 RX ORDER — DEXTROSE 50 % IN WATER 50 %
25 SYRINGE (ML) INTRAVENOUS ONCE
Refills: 0 | Status: DISCONTINUED | OUTPATIENT
Start: 2021-08-03 | End: 2021-08-04

## 2021-08-03 RX ORDER — DEXTROSE 50 % IN WATER 50 %
12.5 SYRINGE (ML) INTRAVENOUS ONCE
Refills: 0 | Status: DISCONTINUED | OUTPATIENT
Start: 2021-08-03 | End: 2021-08-03

## 2021-08-03 RX ORDER — DEXTROSE 50 % IN WATER 50 %
12.5 SYRINGE (ML) INTRAVENOUS ONCE
Refills: 0 | Status: COMPLETED | OUTPATIENT
Start: 2021-08-03 | End: 2021-08-03

## 2021-08-03 RX ADMIN — Medication 81 MILLIGRAM(S): at 11:20

## 2021-08-03 RX ADMIN — INSULIN GLARGINE 10 UNIT(S): 100 INJECTION, SOLUTION SUBCUTANEOUS at 21:17

## 2021-08-03 RX ADMIN — Medication 12.5 GRAM(S): at 09:44

## 2021-08-03 RX ADMIN — BUDESONIDE AND FORMOTEROL FUMARATE DIHYDRATE 2 PUFF(S): 160; 4.5 AEROSOL RESPIRATORY (INHALATION) at 20:26

## 2021-08-03 RX ADMIN — ENOXAPARIN SODIUM 40 MILLIGRAM(S): 100 INJECTION SUBCUTANEOUS at 21:05

## 2021-08-03 RX ADMIN — CEFEPIME 100 MILLIGRAM(S): 1 INJECTION, POWDER, FOR SOLUTION INTRAMUSCULAR; INTRAVENOUS at 11:20

## 2021-08-03 RX ADMIN — FAMOTIDINE 20 MILLIGRAM(S): 10 INJECTION INTRAVENOUS at 11:19

## 2021-08-03 RX ADMIN — LOSARTAN POTASSIUM 50 MILLIGRAM(S): 100 TABLET, FILM COATED ORAL at 11:20

## 2021-08-03 RX ADMIN — BUDESONIDE AND FORMOTEROL FUMARATE DIHYDRATE 2 PUFF(S): 160; 4.5 AEROSOL RESPIRATORY (INHALATION) at 11:49

## 2021-08-03 RX ADMIN — CEFEPIME 100 MILLIGRAM(S): 1 INJECTION, POWDER, FOR SOLUTION INTRAMUSCULAR; INTRAVENOUS at 21:04

## 2021-08-03 RX ADMIN — Medication 40 MILLIGRAM(S): at 11:20

## 2021-08-03 RX ADMIN — ATORVASTATIN CALCIUM 80 MILLIGRAM(S): 80 TABLET, FILM COATED ORAL at 21:05

## 2021-08-03 RX ADMIN — Medication 3: at 21:17

## 2021-08-03 RX ADMIN — Medication 3: at 18:31

## 2021-08-03 RX ADMIN — SODIUM CHLORIDE 25 MILLILITER(S): 9 INJECTION, SOLUTION INTRAVENOUS at 23:19

## 2021-08-03 RX ADMIN — ENOXAPARIN SODIUM 40 MILLIGRAM(S): 100 INJECTION SUBCUTANEOUS at 11:21

## 2021-08-03 RX ADMIN — Medication 200 MILLIGRAM(S): at 21:05

## 2021-08-03 NOTE — PROGRESS NOTE ADULT - ASSESSMENT
rising WBC  slight increase in LFTs  55M PMH HTN, DM2, HLD, obesity, admitted with COVID-19 pneumonia and acute hypoxic resp failure. He did not receive COVID vaccine.     -resp status remains tenuous even though he feels ok subjectively   -no obvious evidence of infection however feeling "hot", diaphoresis, productive cough, and worsening leucocytosis are concerning, will check sputum cx   -he has received 5 days of cefepime, at this time agree with monitoring off abx as it was empiric therapy, if he develops obvious signs of infection will repeat infectious w/u and provide targeted therapy   -wean HFNC as he tolerates (on 50L, 90% this morning)   -s/p tocilizumab 7/23  -taper steroids   -continue symbicort, IS   -LE duplex neg for DVT 7/25  -hypoglycemia today - decrease lantus to 10 units (may d/c entirely later), d/c pre-meal and decrease sliding scale   -HD stable, continue labetalol, losartan, statin, aspirin   -ambulating in room   -lovenox and SCDs for DVT ppx     Remains critical with high risk of decompensation

## 2021-08-03 NOTE — PROGRESS NOTE ADULT - SUBJECTIVE AND OBJECTIVE BOX
Patient is a 55y old  Male who presents with a chief complaint of cough/sob (02 Aug 2021 22:11)    HPI:  55 M with pmh HLD, dm, htn presents with 8 days onset of covid symptoms which included, cough, fevers, sob, hypoxia, fevers, diarrhea, chills and myalgias. TEsted positive 7/15. Wife endorsed he was becoming more sob of recently. On arrival hypoxic to 80s and tachypneic. NRB placed, presently on 15% and saturating 95%. Na 126 s/p 1L NS. CXR: Frandy infiltrates. Last scr 1.4 in 2019-> today 1.9 unknown if underlying ckd. Dimer 450 - normal for age  however with covid and increasing fibrinogen, will need further eval.   Denies chets pain. LHC performed 2019 revealed normal coronaries.         PAST MEDICAL/SURGICAL/FAMILY/SOCIAL HISTORY:    Past Medical History:  Diabetes    HTN (hypertension).  No surgeries     Tobacco Usage:  · Tobacco Usage	non smoker  Fhx: none (21 Jul 2021 19:19)    24 hour events: ***    PAST MEDICAL & SURGICAL HISTORY:  HTN (hypertension)    Diabetes      REVIEW OF SYSTEMS  Constitutional: No fever, chills, fatigue  Neuro: No headache, numbness, weakness  Resp: No cough, wheezing, shortness of breath  CVS: No chest pain, palpitations, leg swelling  GI: No abdominal pain, nausea, vomiting, diarrhea   : No dysuria, frequency, incontinence  Skin: No itching, burning, rashes, or lesions   Msk: No joint pain or swelling  Psych: No depression, anxiety, mood swings  Heme: No bleeding    T(F): 96.6 (08-03-21 @ 04:00), Max: 97.1 (08-03-21 @ 00:00)  HR: 56 (08-03-21 @ 08:00) (56 - 77)  BP: 128/66 (08-03-21 @ 08:00) (106/57 - 135/78)  RR: 24 (08-03-21 @ 08:00) (20 - 33)  SpO2: 96% (08-03-21 @ 08:00) (83% - 100%)  Wt(kg): --      CAPILLARY BLOOD GLUCOSE  POCT Blood Glucose.: 186 mg/dL (02 Aug 2021 22:21)  POCT Blood Glucose.: 182 mg/dL (02 Aug 2021 16:01)  POCT Blood Glucose.: 186 mg/dL (02 Aug 2021 11:48)    I&O's Summary    08-02 @ 07:01  -  08-03 @ 07:00  --------------------------------------------------------  IN: 100 mL / OUT: 1200 mL / NET: -1100 mL    PHYSICAL EXAM  General:   CNS:   HEENT:   Resp:   CVS:   Abd:   Ext:   Skin:     MEDICATIONS  cefepime   IVPB IV Intermittent  labetalol Oral  losartan Oral  atorvastatin Oral  dextrose 40% Gel Oral  dextrose 50% Injectable IV Push  dextrose 50% Injectable IV Push  dextrose 50% Injectable IV Push  glucagon  Injectable IntraMuscular  insulin glargine Injectable (LANTUS) SubCutaneous  insulin lispro (ADMELOG) corrective regimen sliding scale SubCutaneous  insulin lispro Injectable (ADMELOG) SubCutaneous  methylPREDNISolone sodium succinate Injectable IV Push  ALBUTerol    90 MICROgram(s) HFA Inhaler Inhalation PRN  benzonatate Oral PRN  budesonide 160 MICROgram(s)/formoterol 4.5 MICROgram(s) Inhaler Inhalation  acetaminophen   Tablet .. Oral PRN  acetaminophen   Tablet .. Oral PRN  melatonin Oral PRN  aspirin  chewable Oral  enoxaparin Injectable SubCutaneous  famotidine    Tablet Oral  dextrose 5%. IV Continuous  dextrose 5%. IV Continuous  benzocaine 15 mG/menthol 3.6 mG (Sugar-Free) Lozenge Oral PRN                          13.0   33.57 )-----------( 358      ( 03 Aug 2021 07:11 )             39.9       08-03    138  |  107  |  38<H>  ----------------------------<  66<L>  4.2   |  26  |  1.06    Ca    8.2<L>      03 Aug 2021 07:11  Phos  3.9     08-03  Mg     2.5     08-03    TPro  5.3<L>  /  Alb  2.3<L>  /  TBili  0.7  /  DBili  x   /  AST  67<H>  /  ALT  149<H>  /  AlkPhos  212<H>  08-03    CENTRAL LINE: HI GOFF: HI                         A-LINE: N                      GLOBAL ISSUE/BEST PRACTICE  Analgesia: NA  Sedation: NA  CAM-ICU: neg  HOB elevation: yes  Stress ulcer prophylaxis: NA  VTE prophylaxis: Y  Glycemic control: Y  Nutrition: Y    CODE STATUS: full  GOC discussion: Y Patient is a 55y old  Male who presents with a chief complaint of cough/sob (02 Aug 2021 22:11)    HPI:  55 M with pmh HLD, dm, htn presents with 8 days onset of covid symptoms which included, cough, fevers, sob, hypoxia, fevers, diarrhea, chills and myalgias. TEsted positive 7/15. Wife endorsed he was becoming more sob of recently. On arrival hypoxic to 80s and tachypneic. NRB placed, presently on 15% and saturating 95%. Na 126 s/p 1L NS. CXR: Frandy infiltrates. Last scr 1.4 in 2019-> today 1.9 unknown if underlying ckd. Dimer 450 - normal for age  however with covid and increasing fibrinogen, will need further eval.   Denies chets pain. LHC performed 2019 revealed normal coronaries.         PAST MEDICAL/SURGICAL/FAMILY/SOCIAL HISTORY:    Past Medical History:  Diabetes    HTN (hypertension).  No surgeries     Tobacco Usage:  · Tobacco Usage	non smoker  Fhx: none (21 Jul 2021 19:19)    24 hour events: intermittent episodes of hypoxia especially     PAST MEDICAL & SURGICAL HISTORY:  HTN (hypertension)    Diabetes      REVIEW OF SYSTEMS  Constitutional: No fever, chills, fatigue  Neuro: No headache, numbness, weakness  Resp: No cough, wheezing, shortness of breath  CVS: No chest pain, palpitations, leg swelling  GI: No abdominal pain, nausea, vomiting, diarrhea   : No dysuria, frequency, incontinence  Skin: No itching, burning, rashes, or lesions   Msk: No joint pain or swelling  Psych: No depression, anxiety, mood swings  Heme: No bleeding    T(F): 96.6 (08-03-21 @ 04:00), Max: 97.1 (08-03-21 @ 00:00)  HR: 56 (08-03-21 @ 08:00) (56 - 77)  BP: 128/66 (08-03-21 @ 08:00) (106/57 - 135/78)  RR: 24 (08-03-21 @ 08:00) (20 - 33)  SpO2: 96% (08-03-21 @ 08:00) (83% - 100%)  Wt(kg): --      CAPILLARY BLOOD GLUCOSE  POCT Blood Glucose.: 186 mg/dL (02 Aug 2021 22:21)  POCT Blood Glucose.: 182 mg/dL (02 Aug 2021 16:01)  POCT Blood Glucose.: 186 mg/dL (02 Aug 2021 11:48)    I&O's Summary    08-02 @ 07:01  -  08-03 @ 07:00  --------------------------------------------------------  IN: 100 mL / OUT: 1200 mL / NET: -1100 mL    PHYSICAL EXAM  General:   CNS:   HEENT:   Resp:   CVS:   Abd:   Ext:   Skin:     MEDICATIONS  cefepime   IVPB IV Intermittent  labetalol Oral  losartan Oral  atorvastatin Oral  dextrose 40% Gel Oral  dextrose 50% Injectable IV Push  dextrose 50% Injectable IV Push  dextrose 50% Injectable IV Push  glucagon  Injectable IntraMuscular  insulin glargine Injectable (LANTUS) SubCutaneous  insulin lispro (ADMELOG) corrective regimen sliding scale SubCutaneous  insulin lispro Injectable (ADMELOG) SubCutaneous  methylPREDNISolone sodium succinate Injectable IV Push  ALBUTerol    90 MICROgram(s) HFA Inhaler Inhalation PRN  benzonatate Oral PRN  budesonide 160 MICROgram(s)/formoterol 4.5 MICROgram(s) Inhaler Inhalation  acetaminophen   Tablet .. Oral PRN  acetaminophen   Tablet .. Oral PRN  melatonin Oral PRN  aspirin  chewable Oral  enoxaparin Injectable SubCutaneous  famotidine    Tablet Oral  dextrose 5%. IV Continuous  dextrose 5%. IV Continuous  benzocaine 15 mG/menthol 3.6 mG (Sugar-Free) Lozenge Oral PRN                          13.0   33.57 )-----------( 358      ( 03 Aug 2021 07:11 )             39.9       08-03    138  |  107  |  38<H>  ----------------------------<  66<L>  4.2   |  26  |  1.06    Ca    8.2<L>      03 Aug 2021 07:11  Phos  3.9     08-03  Mg     2.5     08-03    TPro  5.3<L>  /  Alb  2.3<L>  /  TBili  0.7  /  DBili  x   /  AST  67<H>  /  ALT  149<H>  /  AlkPhos  212<H>  08-03    CENTRAL LINE: N           GOFF: N                         A-LINE: N                      GLOBAL ISSUE/BEST PRACTICE  Analgesia: NA  Sedation: NA  CAM-ICU: neg  HOB elevation: yes  Stress ulcer prophylaxis: NA  VTE prophylaxis: Y  Glycemic control: Y  Nutrition: Y    CODE STATUS: full  GOC discussion: Y Patient is a 55y old  Male who presents with a chief complaint of cough/sob (02 Aug 2021 22:11)    HPI:  55 M with pmh HLD, dm, htn presents with 8 days onset of covid symptoms which included, cough, fevers, sob, hypoxia, fevers, diarrhea, chills and myalgias. TEsted positive 7/15. Wife endorsed he was becoming more sob of recently. On arrival hypoxic to 80s and tachypneic. NRB placed, presently on 15% and saturating 95%. Na 126 s/p 1L NS. CXR: Frandy infiltrates. Last scr 1.4 in 2019-> today 1.9 unknown if underlying ckd. Dimer 450 - normal for age  however with covid and increasing fibrinogen, will need further eval.   Denies chets pain. LHC performed 2019 revealed normal coronaries.         PAST MEDICAL/SURGICAL/FAMILY/SOCIAL HISTORY:    Past Medical History:  Diabetes    HTN (hypertension).  No surgeries     Tobacco Usage:  · Tobacco Usage	non smoker  Fhx: none (21 Jul 2021 19:19)    24 hour events: intermittent episodes of hypoxia especially during exertion   felt "hot" yesterday, attributes to hot tea  diaphoretic overnight   cough becoming slightly productive    PAST MEDICAL & SURGICAL HISTORY:  HTN (hypertension)    Diabetes      REVIEW OF SYSTEMS  Constitutional: No fever, chills, fatigue  Neuro: No headache, numbness, weakness  Resp: +cough, shortness of breath  CVS: No chest pain, palpitations, leg swelling  GI: No abdominal pain, nausea, vomiting, diarrhea   : No dysuria, frequency, incontinence  Skin: No itching, burning, rashes, or lesions   Msk: No joint pain or swelling  Psych: No depression, anxiety, mood swings  Heme: No bleeding    T(F): 96.6 (08-03-21 @ 04:00), Max: 97.1 (08-03-21 @ 00:00)  HR: 56 (08-03-21 @ 08:00) (56 - 77)  BP: 128/66 (08-03-21 @ 08:00) (106/57 - 135/78)  RR: 24 (08-03-21 @ 08:00) (20 - 33)  SpO2: 96% (08-03-21 @ 08:00) (83% - 100%)  Wt(kg): --      CAPILLARY BLOOD GLUCOSE  POCT Blood Glucose.: 186 mg/dL (02 Aug 2021 22:21)  POCT Blood Glucose.: 182 mg/dL (02 Aug 2021 16:01)  POCT Blood Glucose.: 186 mg/dL (02 Aug 2021 11:48)    I&O's Summary    08-02 @ 07:01  -  08-03 @ 07:00  --------------------------------------------------------  IN: 100 mL / OUT: 1200 mL / NET: -1100 mL    PHYSICAL EXAM  General: NAD while sitting in chair   CNS: AAOx3, interactive, nonfocal neuro exam   HEENT: PERRL, dry mucosa   Resp: good b/l AE with scattered rhonchi   CVS: S1S2, regular   Abd: soft, NT, +BS  Ext: 1+ edema   Skin: warm, not mottled     MEDICATIONS  cefepime   IVPB IV Intermittent  labetalol Oral  losartan Oral  atorvastatin Oral  dextrose 40% Gel Oral  dextrose 50% Injectable IV Push  dextrose 50% Injectable IV Push  dextrose 50% Injectable IV Push  glucagon  Injectable IntraMuscular  insulin glargine Injectable (LANTUS) SubCutaneous  insulin lispro (ADMELOG) corrective regimen sliding scale SubCutaneous  insulin lispro Injectable (ADMELOG) SubCutaneous  methylPREDNISolone sodium succinate Injectable IV Push  ALBUTerol    90 MICROgram(s) HFA Inhaler Inhalation PRN  benzonatate Oral PRN  budesonide 160 MICROgram(s)/formoterol 4.5 MICROgram(s) Inhaler Inhalation  acetaminophen   Tablet .. Oral PRN  acetaminophen   Tablet .. Oral PRN  melatonin Oral PRN  aspirin  chewable Oral  enoxaparin Injectable SubCutaneous  famotidine    Tablet Oral  dextrose 5%. IV Continuous  dextrose 5%. IV Continuous  benzocaine 15 mG/menthol 3.6 mG (Sugar-Free) Lozenge Oral PRN                          13.0   33.57 )-----------( 358      ( 03 Aug 2021 07:11 )             39.9       08-03    138  |  107  |  38<H>  ----------------------------<  66<L>  4.2   |  26  |  1.06    Ca    8.2<L>      03 Aug 2021 07:11  Phos  3.9     08-03  Mg     2.5     08-03    TPro  5.3<L>  /  Alb  2.3<L>  /  TBili  0.7  /  DBili  x   /  AST  67<H>  /  ALT  149<H>  /  AlkPhos  212<H>  08-03    CENTRAL LINE: N           GOFF: N                         A-LINE: N                      GLOBAL ISSUE/BEST PRACTICE  Analgesia: NA  Sedation: NA  CAM-ICU: neg  HOB elevation: yes  Stress ulcer prophylaxis: NA  VTE prophylaxis: Y  Glycemic control: Y  Nutrition: Y    CODE STATUS: full  GOC discussion: Y

## 2021-08-03 NOTE — PROGRESS NOTE ADULT - ASSESSMENT
55 M with pmh HLD, dm, htn presents with 8 days onset of covid symptoms which included, cough, fevers, sob, hypoxia, fevers, diarrhea, chills and myalgias. Tested positive 7/15. Wife endorsed he was becoming more sob of recently. On arrival hypoxic to 80s and tachypneic. NRB placed, presently on 15% and saturating 95%. Na 126 s/p 1L NS. CXR: Frandy infiltrates. Last scr 1.4 in 2019-> 7/21 1.9 unknown if underlying ckd. Dimer 450 - normal for age  however with covid and increasing fibrinogen, will need further eval.   Denies chest pain. LHC performed 2019 revealed normal coronaries. Here xray with b/l lung infiltrates, hypoxic requiring NRB, was given remdesivir/decadron.     1. acute respiratory failure. covid-19 viral syndrome. multifocal pneumonia  - leukocytosis 33 could be reactive/steroid related f/u cbc  - persistent lung infiltrates, concern for superimposed bacterial pna  - on cefepime [id number] #5  - complete 5 day abx course   - completed remdesivir #10- --> 7/30  - on IV steroids #12  - s/p tocilizumab x 1 7/23  - glycemic control  - isolation precautions  - prone positoning  - supportive care    2. other issues - care per medicine

## 2021-08-03 NOTE — PROGRESS NOTE ADULT - SUBJECTIVE AND OBJECTIVE BOX
Date of service: 08-03-21 @ 13:33    Pt seen and examined  Patient sitting up in chair  On high flow 90% fio2  Feels better  Has dry cough  Afebrile    ROS: no fever or chills; denies dizziness, no HA,  no abdominal pain, no diarrhea or constipation; no dysuria, no urinary frequency, no legs pain, no rashes      MEDICATIONS  (STANDING):  aspirin  chewable 81 milliGRAM(s) Oral daily  atorvastatin 80 milliGRAM(s) Oral at bedtime  budesonide 160 MICROgram(s)/formoterol 4.5 MICROgram(s) Inhaler 2 Puff(s) Inhalation two times a day  cefepime   IVPB 2000 milliGRAM(s) IV Intermittent every 12 hours  dextrose 40% Gel 15 Gram(s) Oral once  dextrose 5%. 1000 milliLiter(s) (50 mL/Hr) IV Continuous <Continuous>  dextrose 5%. 1000 milliLiter(s) (50 mL/Hr) IV Continuous <Continuous>  dextrose 5%. 1000 milliLiter(s) (100 mL/Hr) IV Continuous <Continuous>  dextrose 50% Injectable 25 Gram(s) IV Push once  dextrose 50% Injectable 12.5 Gram(s) IV Push once  dextrose 50% Injectable 25 Gram(s) IV Push once  enoxaparin Injectable 40 milliGRAM(s) SubCutaneous every 12 hours  famotidine    Tablet 20 milliGRAM(s) Oral daily  glucagon  Injectable 1 milliGRAM(s) IntraMuscular once  insulin glargine Injectable (LANTUS) 40 Unit(s) SubCutaneous at bedtime  insulin lispro (ADMELOG) corrective regimen sliding scale   SubCutaneous Before meals and at bedtime  insulin lispro Injectable (ADMELOG) 7 Unit(s) SubCutaneous three times a day before meals  labetalol 200 milliGRAM(s) Oral every 12 hours  losartan 50 milliGRAM(s) Oral daily  methylPREDNISolone sodium succinate Injectable 40 milliGRAM(s) IV Push daily      Vital Signs Last 24 Hrs  T(C): 36.8 (03 Aug 2021 08:00), Max: 36.8 (03 Aug 2021 08:00)  T(F): 98.3 (03 Aug 2021 08:00), Max: 98.3 (03 Aug 2021 08:00)  HR: 63 (03 Aug 2021 13:00) (48 - 77)  BP: 113/97 (03 Aug 2021 12:00) (96/73 - 139/69)  BP(mean): 101 (03 Aug 2021 12:00) (70 - 101)  RR: 30 (03 Aug 2021 13:00) (20 - 33)  SpO2: 81% (03 Aug 2021 13:00) (81% - 100%)      PE:  Constitutional: NAD on HFNC  HEENT: NC/AT, EOMI, PERRLA, conjunctivae clear; ears and nose atraumatic; pharynx benign  Neck: supple; thyroid not palpable  Back: no tenderness  Respiratory: decreased breath sounds, rhonchi  Cardiovascular: S1S2 regular, no murmurs  Abdomen: soft, not tender, not distended, positive BS; liver and spleen WNL  Genitourinary: no suprapubic tenderness  Musculoskeletal: no muscle tenderness, no joint swelling or tenderness  Extremities: no pedal edema  Neurological/ Psychiatric: AxOx3, Judgement and insight normal;  moving all extremities  Skin: no rashes; no palpable lesions    Labs: all available labs reviewed                                                         13.0   33.57 )-----------( 358      ( 03 Aug 2021 07:11 )             39.9     08-03    138  |  107  |  38<H>  ----------------------------<  66<L>  4.2   |  26  |  1.06    Ca    8.2<L>      03 Aug 2021 07:11  Phos  3.9     08-03  Mg     2.5     08-03    TPro  5.3<L>  /  Alb  2.3<L>  /  TBili  0.7  /  DBili  x   /  AST  67<H>  /  ALT  149<H>  /  AlkPhos  212<H>  08-03          Culture - Blood (collected 07-21-21 @ 14:42)  Source: .Blood Blood-Peripheral  Preliminary Report (07-22-21 @ 22:01):    No growth to date.      Radiology: all available radiological tests reviewed    EXAM:  XR CHEST PORTABLE URGENT 1V                            PROCEDURE DATE:  07/21/2021          INTERPRETATION:  AP chest on July 21, 2021 at 3:27 PM. Patient is short of breath with cough and fever.    Heart magnified by technique.    There arescattered mid lower lung field infiltrates right greater than left possibly related: Pneumonia.    The lungs are clear on August 30, 2019.    IMPRESSION: Bilateral infiltrates as above.    < end of copied text >    Advanced directives addressed: full resuscitation

## 2021-08-03 NOTE — PROGRESS NOTE ADULT - SUBJECTIVE AND OBJECTIVE BOX
Patient is a 55y old  Male who presents with a chief complaint of cough/sob (03 Aug 2021 13:32)      BRIEF HOSPITAL COURSE: 55y y/o male pmhx HLD, DM2, HTN presented on 7/21 w/ worsening shortness of breath, fever and hypoxia. He had tested positive for COVID-19 on 7/15.   Admitted to medicine floor, hospital course complicated by worsening hypoxia. Transferred to ICU on 7/24  for worsening acute hypoxic respiratory failure and ARDS.       Events last 24 hours: Called by RN for worsening hypoxia on HFNC 50LPM and 75% FiO2. Patient sitting up in chair, w/ some increased work of breathing and O2 sat 75-80%. Increased to 90% FiO2 and patient helped back to bed.     PAST MEDICAL & SURGICAL HISTORY:  HTN (hypertension)    Diabetes          Hosp day #13d            Vital signs / Reviewed and Physical Exam Performed where pertinent and urgently required    Lab / Radiology  studies / ABG / Meds -  reviewed and interpreted into the assessment and treatment plan.      Assessment/Plan/Therapeutic interventions    Impression:  1. Acute hypoxic respiratory failure  2. ARDS  3. COVID-19 Viralp pneumonia   4. Hypoglycemia   5. HTN         Neuro - Morphine PRN for increased work of breathing.     CV -  Monitoring end points of perfusion. Maintain MAP >65.   - Continue labetalol, losartan w/ hold parameters  - Daily statin and baby ASA     Pulm -  Actively titrating FiO2 to maintain O2 sat >86%. Now on 90% FiO2 and 50LPM. Encourage self proning as tolerated. His respiratory status is extremely tenuous and he is high risk for intubation.   - Steroids being tapered. s/p Toci.   - Symbicort BID and Albuterol q6hr.       GI -  PPI daily. Tolerating PO diet.     Renal - Goal net negative fluid balance. Closely monitoring renal indices     Heme -  Lovenox 40mg BID. D-Dimer now down trending. LE dopplers negative for DVT.     ID - s/p Cefepime x5 days. D/c abx today. Close monitoring for signs/sypmtoms of infectious etiology. Sputum culture is pending.     Endo -  Hypoglycemic today, lantus d/c and pre meal coverage d/c . Started on D5 @50cc/hr. Continue ISS ACHS. Goal BG <180.       COVID 19 specific considerations and therapeutic  options based on the available and rapidly changing literature    Goals of care considerations:  Ongoing assessment for patient specific treatment options based on progression or decline.  I have involved the family with updates and requests in guidance for medical decision making.          38  Minutes of critical care time spent in the management of this critically ill COVID-19 patient/PUI patient with continuous assessments and interventions based on the interpretation of multiple databases.

## 2021-08-04 LAB
ALBUMIN SERPL ELPH-MCNC: 2.3 G/DL — LOW (ref 3.3–5)
ALP SERPL-CCNC: 214 U/L — HIGH (ref 40–120)
ALT FLD-CCNC: 159 U/L — HIGH (ref 12–78)
ANION GAP SERPL CALC-SCNC: 4 MMOL/L — LOW (ref 5–17)
AST SERPL-CCNC: 57 U/L — HIGH (ref 15–37)
BASOPHILS # BLD AUTO: 0.03 K/UL — SIGNIFICANT CHANGE UP (ref 0–0.2)
BASOPHILS NFR BLD AUTO: 0.1 % — SIGNIFICANT CHANGE UP (ref 0–2)
BILIRUB SERPL-MCNC: 0.8 MG/DL — SIGNIFICANT CHANGE UP (ref 0.2–1.2)
BUN SERPL-MCNC: 26 MG/DL — HIGH (ref 7–23)
CALCIUM SERPL-MCNC: 8.3 MG/DL — LOW (ref 8.5–10.1)
CHLORIDE SERPL-SCNC: 104 MMOL/L — SIGNIFICANT CHANGE UP (ref 96–108)
CO2 SERPL-SCNC: 29 MMOL/L — SIGNIFICANT CHANGE UP (ref 22–31)
CREAT SERPL-MCNC: 0.8 MG/DL — SIGNIFICANT CHANGE UP (ref 0.5–1.3)
EOSINOPHIL # BLD AUTO: 0.04 K/UL — SIGNIFICANT CHANGE UP (ref 0–0.5)
EOSINOPHIL NFR BLD AUTO: 0.2 % — SIGNIFICANT CHANGE UP (ref 0–6)
GLUCOSE SERPL-MCNC: 68 MG/DL — LOW (ref 70–99)
GRAM STN FLD: SIGNIFICANT CHANGE UP
HCT VFR BLD CALC: 38.3 % — LOW (ref 39–50)
HGB BLD-MCNC: 12.3 G/DL — LOW (ref 13–17)
IMM GRANULOCYTES NFR BLD AUTO: 0.8 % — SIGNIFICANT CHANGE UP (ref 0–1.5)
LYMPHOCYTES # BLD AUTO: 0.64 K/UL — LOW (ref 1–3.3)
LYMPHOCYTES # BLD AUTO: 2.7 % — LOW (ref 13–44)
MAGNESIUM SERPL-MCNC: 2.4 MG/DL — SIGNIFICANT CHANGE UP (ref 1.6–2.6)
MCHC RBC-ENTMCNC: 27.8 PG — SIGNIFICANT CHANGE UP (ref 27–34)
MCHC RBC-ENTMCNC: 32.1 GM/DL — SIGNIFICANT CHANGE UP (ref 32–36)
MCV RBC AUTO: 86.7 FL — SIGNIFICANT CHANGE UP (ref 80–100)
MONOCYTES # BLD AUTO: 1.34 K/UL — HIGH (ref 0–0.9)
MONOCYTES NFR BLD AUTO: 5.7 % — SIGNIFICANT CHANGE UP (ref 2–14)
NEUTROPHILS # BLD AUTO: 21.22 K/UL — HIGH (ref 1.8–7.4)
NEUTROPHILS NFR BLD AUTO: 90.5 % — HIGH (ref 43–77)
PHOSPHATE SERPL-MCNC: 3.5 MG/DL — SIGNIFICANT CHANGE UP (ref 2.5–4.5)
PLATELET # BLD AUTO: 253 K/UL — SIGNIFICANT CHANGE UP (ref 150–400)
POTASSIUM SERPL-MCNC: 4.1 MMOL/L — SIGNIFICANT CHANGE UP (ref 3.5–5.3)
POTASSIUM SERPL-SCNC: 4.1 MMOL/L — SIGNIFICANT CHANGE UP (ref 3.5–5.3)
PROT SERPL-MCNC: 5.3 GM/DL — LOW (ref 6–8.3)
RBC # BLD: 4.42 M/UL — SIGNIFICANT CHANGE UP (ref 4.2–5.8)
RBC # FLD: 12.5 % — SIGNIFICANT CHANGE UP (ref 10.3–14.5)
SODIUM SERPL-SCNC: 137 MMOL/L — SIGNIFICANT CHANGE UP (ref 135–145)
SPECIMEN SOURCE: SIGNIFICANT CHANGE UP
WBC # BLD: 23.45 K/UL — HIGH (ref 3.8–10.5)
WBC # FLD AUTO: 23.45 K/UL — HIGH (ref 3.8–10.5)

## 2021-08-04 PROCEDURE — 93308 TTE F-UP OR LMTD: CPT | Mod: 26

## 2021-08-04 PROCEDURE — 99291 CRITICAL CARE FIRST HOUR: CPT

## 2021-08-04 PROCEDURE — 76604 US EXAM CHEST: CPT | Mod: 26

## 2021-08-04 RX ORDER — INSULIN GLARGINE 100 [IU]/ML
10 INJECTION, SOLUTION SUBCUTANEOUS
Refills: 0 | Status: DISCONTINUED | OUTPATIENT
Start: 2021-08-05 | End: 2021-08-09

## 2021-08-04 RX ORDER — SODIUM CHLORIDE 9 MG/ML
1000 INJECTION, SOLUTION INTRAVENOUS
Refills: 0 | Status: DISCONTINUED | OUTPATIENT
Start: 2021-08-04 | End: 2021-08-27

## 2021-08-04 RX ORDER — DEXTROSE 50 % IN WATER 50 %
25 SYRINGE (ML) INTRAVENOUS ONCE
Refills: 0 | Status: DISCONTINUED | OUTPATIENT
Start: 2021-08-04 | End: 2021-08-27

## 2021-08-04 RX ORDER — DEXTROSE 50 % IN WATER 50 %
15 SYRINGE (ML) INTRAVENOUS ONCE
Refills: 0 | Status: DISCONTINUED | OUTPATIENT
Start: 2021-08-04 | End: 2021-08-27

## 2021-08-04 RX ORDER — INSULIN LISPRO 100/ML
VIAL (ML) SUBCUTANEOUS
Refills: 0 | Status: DISCONTINUED | OUTPATIENT
Start: 2021-08-04 | End: 2021-08-21

## 2021-08-04 RX ORDER — DEXTROSE 50 % IN WATER 50 %
12.5 SYRINGE (ML) INTRAVENOUS ONCE
Refills: 0 | Status: DISCONTINUED | OUTPATIENT
Start: 2021-08-04 | End: 2021-08-27

## 2021-08-04 RX ORDER — FUROSEMIDE 40 MG
10 TABLET ORAL ONCE
Refills: 0 | Status: COMPLETED | OUTPATIENT
Start: 2021-08-04 | End: 2021-08-04

## 2021-08-04 RX ORDER — GLUCAGON INJECTION, SOLUTION 0.5 MG/.1ML
1 INJECTION, SOLUTION SUBCUTANEOUS ONCE
Refills: 0 | Status: DISCONTINUED | OUTPATIENT
Start: 2021-08-04 | End: 2021-08-27

## 2021-08-04 RX ORDER — INSULIN LISPRO 100/ML
VIAL (ML) SUBCUTANEOUS AT BEDTIME
Refills: 0 | Status: DISCONTINUED | OUTPATIENT
Start: 2021-08-04 | End: 2021-08-18

## 2021-08-04 RX ADMIN — LOSARTAN POTASSIUM 50 MILLIGRAM(S): 100 TABLET, FILM COATED ORAL at 09:54

## 2021-08-04 RX ADMIN — Medication 81 MILLIGRAM(S): at 09:54

## 2021-08-04 RX ADMIN — Medication 20 MILLIGRAM(S): at 09:53

## 2021-08-04 RX ADMIN — BUDESONIDE AND FORMOTEROL FUMARATE DIHYDRATE 2 PUFF(S): 160; 4.5 AEROSOL RESPIRATORY (INHALATION) at 20:10

## 2021-08-04 RX ADMIN — Medication 2: at 12:06

## 2021-08-04 RX ADMIN — ENOXAPARIN SODIUM 40 MILLIGRAM(S): 100 INJECTION SUBCUTANEOUS at 22:16

## 2021-08-04 RX ADMIN — Medication 200 MILLIGRAM(S): at 22:16

## 2021-08-04 RX ADMIN — Medication 200 MILLIGRAM(S): at 09:54

## 2021-08-04 RX ADMIN — ENOXAPARIN SODIUM 40 MILLIGRAM(S): 100 INJECTION SUBCUTANEOUS at 09:53

## 2021-08-04 RX ADMIN — ATORVASTATIN CALCIUM 80 MILLIGRAM(S): 80 TABLET, FILM COATED ORAL at 22:16

## 2021-08-04 RX ADMIN — FAMOTIDINE 20 MILLIGRAM(S): 10 INJECTION INTRAVENOUS at 09:54

## 2021-08-04 RX ADMIN — Medication 2: at 17:28

## 2021-08-04 RX ADMIN — SODIUM CHLORIDE 40 MILLILITER(S): 9 INJECTION, SOLUTION INTRAVENOUS at 06:15

## 2021-08-04 RX ADMIN — BUDESONIDE AND FORMOTEROL FUMARATE DIHYDRATE 2 PUFF(S): 160; 4.5 AEROSOL RESPIRATORY (INHALATION) at 08:28

## 2021-08-04 RX ADMIN — Medication 10 MILLIGRAM(S): at 14:27

## 2021-08-04 NOTE — PROGRESS NOTE ADULT - SUBJECTIVE AND OBJECTIVE BOX
Patient is a 55y old  Male who presents with a chief complaint of cough/sob (03 Aug 2021 22:26)    HPI:  55 M with pmh HLD, dm, htn presents with 8 days onset of covid symptoms which included, cough, fevers, sob, hypoxia, fevers, diarrhea, chills and myalgias. TEsted positive 7/15. Wife endorsed he was becoming more sob of recently. On arrival hypoxic to 80s and tachypneic. NRB placed, presently on 15% and saturating 95%. Na 126 s/p 1L NS. CXR: Frandy infiltrates. Last scr 1.4 in 2019-> today 1.9 unknown if underlying ckd. Dimer 450 - normal for age  however with covid and increasing fibrinogen, will need further eval.   Denies chets pain. LHC performed 2019 revealed normal coronaries.         PAST MEDICAL/SURGICAL/FAMILY/SOCIAL HISTORY:    Past Medical History:  Diabetes    HTN (hypertension).  No surgeries     Tobacco Usage:  · Tobacco Usage	non smoker  Fhx: none (21 Jul 2021 19:19)    24 hour events: episodes of desaturation during the day and night  unable to properly prone overnight due to tubing/leads in the way   denies feeling hot or having chills   cough is dry today     PAST MEDICAL & SURGICAL HISTORY:  HTN (hypertension)    Diabetes      REVIEW OF SYSTEMS  Constitutional: No fever, chills, fatigue  Neuro: No headache, numbness, weakness  Resp: +cough, shortness of breath  CVS: No chest pain, palpitations, leg swelling  GI: No abdominal pain, nausea, vomiting, diarrhea   : No dysuria, frequency, incontinence  Skin: No itching, burning, rashes, or lesions   Msk: No joint pain or swelling  Psych: No depression, anxiety, mood swings  Heme: No bleeding    T(F): 96.8 (08-04-21 @ 12:00), Max: 98.4 (08-03-21 @ 16:00)  HR: 70 (08-04-21 @ 14:00) (61 - 80)  BP: 129/84 (08-04-21 @ 14:00) (99/59 - 148/75)  RR: 22 (08-04-21 @ 14:00) (17 - 35)  SpO2: 96% (08-04-21 @ 14:00) (82% - 100%)  Wt(kg): --      CAPILLARY BLOOD GLUCOSE  POCT Blood Glucose.: 189 mg/dL (04 Aug 2021 12:02)  POCT Blood Glucose.: 93 mg/dL (04 Aug 2021 06:07)  POCT Blood Glucose.: 251 mg/dL (03 Aug 2021 21:15)  POCT Blood Glucose.: 270 mg/dL (03 Aug 2021 18:25)    I&O's Summary    08-03 @ 07:01 - 08-04 @ 07:00  --------------------------------------------------------  IN: 2340 mL / OUT: 2850 mL / NET: -510 mL    08-04 @ 07:01 - 08-04 @ 14:04  --------------------------------------------------------  IN: 0 mL / OUT: 650 mL / NET: -650 mL    PHYSICAL EXAM  General: NAD while sitting in chair   CNS: AAOx3, interactive, nonfocal neuro exam   HEENT: PERRL, dry mucosa   Resp: scattered rales b/l   CVS: S1S2, regular   Abd: soft, NT, +BS  Ext: 1+ edema   Skin: warm, not mottled     MEDICATIONS  furosemide   Injectable IV Push  labetalol Oral  losartan Oral  atorvastatin Oral  dextrose 40% Gel Oral  dextrose 50% Injectable IV Push  dextrose 50% Injectable IV Push  dextrose 50% Injectable IV Push  glucagon  Injectable IntraMuscular  insulin lispro (ADMELOG) corrective regimen sliding scale SubCutaneous  insulin lispro (ADMELOG) corrective regimen sliding scale SubCutaneous  methylPREDNISolone sodium succinate Injectable IV Push  ALBUTerol    90 MICROgram(s) HFA Inhaler Inhalation PRN  benzonatate Oral PRN  budesonide 160 MICROgram(s)/formoterol 4.5 MICROgram(s) Inhaler Inhalation  acetaminophen   Tablet .. Oral PRN  acetaminophen   Tablet .. Oral PRN  melatonin Oral PRN  aspirin  chewable Oral  enoxaparin Injectable SubCutaneous  famotidine    Tablet Oral  dextrose 5%. IV Continuous  dextrose 5%. IV Continuous  dextrose 5%. IV Continuous  benzocaine 15 mG/menthol 3.6 mG (Sugar-Free) Lozenge Oral PRN                          12.3   23.45 )-----------( 253      ( 04 Aug 2021 07:21 )             38.3       08-04    137  |  104  |  26<H>  ----------------------------<  68<L>  4.1   |  29  |  0.80    Ca    8.3<L>      04 Aug 2021 07:21  Phos  3.5     08-04  Mg     2.4     08-04    TPro  5.3<L>  /  Alb  2.3<L>  /  TBili  0.8  /  DBili  x   /  AST  57<H>  /  ALT  159<H>  /  AlkPhos  214<H>  08-04              .Sputum Sputum --   Few polymorphonuclear leukocytes per low power field  Few Squamous epithelial cells per low power field  Few Gram positive cocci in pairs seen per oil power field  Few Gram Negative Rods seen per oil power field  Few Yeast like cells seen per oil power field 08-03 @ 00:15    CENTRAL LINE: N           [place]: N                         A-LINE: N                      GLOBAL ISSUE/BEST PRACTICE  Analgesia: NA  Sedation: NA  CAM-ICU: neg  HOB elevation: yes  Stress ulcer prophylaxis: NA  VTE prophylaxis: Y  Glycemic control: Y  Nutrition: Y    CODE STATUS: full  GO discussion: Y

## 2021-08-04 NOTE — PROGRESS NOTE ADULT - SUBJECTIVE AND OBJECTIVE BOX
Patient is a 55y old  Male who presents with a chief complaint of cough/sob (23 Jul 2021 14:03)      HPI:  55 M with pmh HLD, dm, htn presents with 8 days onset of covid symptoms which included, cough, fevers, sob, hypoxia, fevers, diarrhea, chills and myalgias. TEsted positive 7/15. Wife endorsed he was becoming more sob of recently. On arrival hypoxic to 80s and tachypneic. NRB placed, presently on 15% and saturating 95%. Na 126 s/p 1L NS. CXR: Frandy infiltrates. Last scr 1.4 in 2019-> today 1.9 unknown if underlying ckd.  Patient not vaccinated.   Last seen by me in 2019  History of ROBERT and uncontrolled hypertension  Treated with IV Remdesivir and Decadron, now on Toci  SaO2 is 80-85% despite being on 100% NRB  ABG pending    8/3  No acute pulmonary events occurred overnight   On HFNC 50LPM, 90%FiO2 without NRB this morning  Critical Care performed POCUS, noted to have pulmonary edema       PAST MEDICAL/SURGICAL/FAMILY/SOCIAL HISTORY:    Past Medical History:  Diabetes    HTN (hypertension).  No surgeries     Tobacco Usage:  · Tobacco Usage	non smoker  Fhx: none (21 Jul 2021 19:19)      MEDICATIONS  (STANDING):  aspirin  chewable 81 milliGRAM(s) Oral daily  atorvastatin 80 milliGRAM(s) Oral at bedtime  budesonide 160 MICROgram(s)/formoterol 4.5 MICROgram(s) Inhaler 2 Puff(s) Inhalation two times a day  dextrose 40% Gel 15 Gram(s) Oral once  dextrose 5%. 1000 milliLiter(s) (40 mL/Hr) IV Continuous <Continuous>  dextrose 5%. 1000 milliLiter(s) (50 mL/Hr) IV Continuous <Continuous>  dextrose 5%. 1000 milliLiter(s) (100 mL/Hr) IV Continuous <Continuous>  dextrose 50% Injectable 25 Gram(s) IV Push once  dextrose 50% Injectable 12.5 Gram(s) IV Push once  dextrose 50% Injectable 25 Gram(s) IV Push once  enoxaparin Injectable 40 milliGRAM(s) SubCutaneous every 12 hours  famotidine    Tablet 20 milliGRAM(s) Oral daily  glucagon  Injectable 1 milliGRAM(s) IntraMuscular once  insulin lispro (ADMELOG) corrective regimen sliding scale   SubCutaneous three times a day before meals  insulin lispro (ADMELOG) corrective regimen sliding scale   SubCutaneous at bedtime  labetalol 200 milliGRAM(s) Oral every 12 hours  losartan 50 milliGRAM(s) Oral daily  methylPREDNISolone sodium succinate Injectable 20 milliGRAM(s) IV Push daily    MEDICATIONS  (PRN):  acetaminophen   Tablet .. 650 milliGRAM(s) Oral once PRN Temp greater or equal to 38C (100.4F), Mild Pain (1 - 3)  acetaminophen   Tablet .. 650 milliGRAM(s) Oral every 4 hours PRN Temp greater or equal to 38C (100.4F), Mild Pain (1 - 3)  ALBUTerol    90 MICROgram(s) HFA Inhaler 2 Puff(s) Inhalation every 4 hours PRN Shortness of Breath and/or Wheezing  benzocaine 15 mG/menthol 3.6 mG (Sugar-Free) Lozenge 1 Lozenge Oral every 6 hours PRN Sore Throat  benzonatate 100 milliGRAM(s) Oral three times a day PRN Cough  melatonin 3 milliGRAM(s) Oral at bedtime PRN Insomnia    MEDICATIONS  (PRN):  acetaminophen   Tablet .. 650 milliGRAM(s) Oral once PRN Temp greater or equal to 38C (100.4F), Mild Pain (1 - 3)  acetaminophen   Tablet .. 650 milliGRAM(s) Oral every 4 hours PRN Temp greater or equal to 38C (100.4F), Mild Pain (1 - 3)  ALBUTerol    90 MICROgram(s) HFA Inhaler 2 Puff(s) Inhalation every 4 hours PRN Shortness of Breath and/or Wheezing  benzonatate 100 milliGRAM(s) Oral three times a day PRN Cough      Vital Signs Last 24 Hrs  T(C): 36.6 (01 Aug 2021 05:00), Max: 36.6 (01 Aug 2021 00:00)  T(F): 97.8 (01 Aug 2021 05:00), Max: 97.8 (01 Aug 2021 00:00)  HR: 67 (01 Aug 2021 08:00) (60 - 84)  BP: 123/94 (01 Aug 2021 08:00) (104/71 - 150/115)  BP(mean): 101 (01 Aug 2021 08:00) (71 - 123)  RR: 31 (01 Aug 2021 08:00) (8 - 32)  SpO2: 89% (01 Aug 2021 08:00) (83% - 99%)  IN:    IV PiggyBack: 270 mL    Oral Fluid: 720 mL  Total IN: 990 mL    OUT:    Voided (mL): 2550 mL  Total OUT: 2550 mL    Total NET: -1560 mL            PHYSICAL EXAM  General Appearance: sitting uptright and mild to moderate resp distress  HEENT: PERRL, conjunctiva clear  Neck: Supple, , no adenopathy  Lungs: coarse bilaterally  Heart: Regular rate and rhythm, S1, S2 normal,Not tachycardic  Abdomen: Soft, non-tender, bowel sounds active   Extremities: no cyanosis or edema, no joint swelling  Skin: Skin color, texture normal, no rashes   Neurologic: Alert and oriented X3 , non focal    ECG:    LABS:                            12.9   15.00 )-----------( 366      ( 26 Jul 2021 06:20 )             38.4   07-26    133<L>  |  104  |  37<H>  ----------------------------<  232<H>  4.2   |  23  |  1.17    Ca    8.4<L>      26 Jul 2021 06:20    TPro  6.3  /  Alb  2.2<L>  /  TBili  0.4  /  DBili  0.1  /  AST  103<H>  /  ALT  137<H>  /  AlkPhos  239<H>  07-26                          13.2   13.52 )-----------( 338      ( 24 Jul 2021 08:06 )             39.5     07-24    136  |  105  |  43<H>  ----------------------------<  227<H>  4.3   |  25  |  1.31<H>    Ca    8.7      24 Jul 2021 08:06    TPro  6.6  /  Alb  2.3<L>  /  TBili  0.4  /  DBili  x   /  AST  73<H>  /  ALT  64  /  AlkPhos  165<H>  07-24          Pro BNP  -- 07-24 @ 08:06  D Dimer  446 07-24 @ 08:06  Pro BNP  261 07-21 @ 14:42  D Dimer  460 07-21 @ 14:42              < from: Xray Chest 1 View- PORTABLE-Urgent (07.21.21 @ 15:33) >    PROCEDURE DATE:  07/21/2021          INTERPRETATION:  AP chest on July 21, 2021 at 3:27 PM. Patient is short of breath with cough and fever.    Heart magnified by technique.    There arescattered mid lower lung field infiltrates right greater than left possibly related: Pneumonia.    The lungs are clear on August 30, 2019.    IMPRESSION: Bilateral infiltrates as above.    < end of copied text >  < from: US Duplex Venous Lower Ext Complete, Bilateral (07.25.21 @ 08:42) >  COMPARISON: None available.    TECHNIQUE: Duplex sonography of the BILATERAL LOWER extremity veins with color and spectral Doppler, with and without compression.    FINDINGS:    RIGHT:  Normal compressibility of the RIGHT common femoral, femoral and popliteal veins.  Doppler examination shows normal spontaneous and phasic flow.  No RIGHT calf vein thrombosis is detected.    LEFT:  Normal compressibility of the LEFT common femoral, femoral and popliteal veins.  Doppler examination shows normal spontaneous and phasic flow.  No LEFT calf vein thrombosis is detected.    IMPRESSION:  No evidence of deep venous thrombosis in either lower extremity.    < end of copied text >  RADIOLOGY & ADDITIONAL STUDIES:    < from: Xray Chest 1 View- PORTABLE-Urgent (Xray Chest 1 View- PORTABLE-Urgent .) (07.26.21 @ 08:43) >    PROCEDURE DATE:  07/26/2021          INTERPRETATION:  Portable chest radiograph    CLINICAL INFORMATION: Pneumonia due to Covid 19.. Follow-up    TECHNIQUE:  Portable  AP view of the chest was obtained.    COMPARISON: 7/21/2021 chest available for review.    FINDINGS:    The lungs show stable bilateral  multifocal and diffuse ill-defined airspace opacities.. No pneumothorax.    The  heart is enlarged in transverse diameter. No hilar mass.     Visualized osseous structures are intact.    IMPRESSION:   Stable bilateral  multifocal and diffuse ill-defined airspace opacities..    < end of copied text >

## 2021-08-04 NOTE — PROGRESS NOTE ADULT - ASSESSMENT
55M PMH HTN, DM2, HLD, obesity, admitted with COVID-19 pneumonia and acute hypoxic resp failure. He did not receive COVID vaccine.     -resp status remains tenuous even though he feels ok subjectively   -no obvious evidence of infection, cough is dry today and denies feeling "hot" in the past 24hrs, WBC has finally improved, will monitor off abx  -sputum cx likely contamination - follow final    -wean HFNC as he tolerates (on 50L, 90% this morning)   -give 10mg lasix to maintain net neg fluid status, cardiac status is normal and pulmonary edema is noncardiogenic   -intermittent proning   -s/p tocilizumab 7/23  -taper steroids   -continue symbicort, IS   -LE duplex neg for DVT 7/25  -am glucose remain low, will change lantus to 1200 pm daily, continue D5 for now today  -HD stable, continue labetalol, losartan, statin, aspirin   -ambulating in room   -lovenox and SCDs for DVT ppx     Remains critical with high risk of decompensation

## 2021-08-04 NOTE — PROGRESS NOTE ADULT - ASSESSMENT
1) COVID Pneumonia  2) Dyspnea  3) Abnormal CXR  4) Hypoxemia  5) Hypertension  6) ROBERT  7) Mild PH    55 M noted to have COVID 7/15  On arrival hypoxic to 80s and tachypneic. NRB placed,in the ER saturating 95%. Na 126 s/p 1L NS. CXR: Frandy infiltrates. Last scr 1.4 in 2019-> today 1.9 unknown if underlying ckd.  Patient not vaccinated.   Last seen by me in 2019 for ROBERT noted to have uncontrolled hypertension  Treated with IV Remdesivir and Decadron, Tocilizumab   SaO2 is 80-85% despite being on 100% NRB so was switched to HFNC  ABG/imaging reviewed  Given the obesity/hypertension and prior co-morbidities, he is at risk of intubation but continue HFNC, respiratory status remains critical  XR reviewed- pulmonary infiltrates are present  DDimer elevated  Possibly to go for CTPE once stabilized, on therapeutic Lovenox now  On tapered Solumedrol 20mg daily Symbicort 160/4.5 BID (https://[email or web address]/journals/lanres/article/LKSK6303-0536, STOIC study)  HFNC 90%FiO2/50LPM 37C, received diuresis  Will likely need a prolonged course of HFNC given the tenuous respiratory status  Discussed inspiratory muscle exercises with patient while he is out of bed +/- incentive spirometer   Reviewed Ddimer/BNP  Discussed with RT/Nursing staff/Intensivist

## 2021-08-04 NOTE — CHART NOTE - NSCHARTNOTEFT_GEN_A_CORE
: Luis Tineo MD    INDICATION: resp failure     PROCEDURE:  [X] LIMITED ECHO  [X] LIMITED CHEST  [ ] LIMITED RETROPERITONEAL  [ ] LIMITED ABDOMINAL  [ ] LIMITED DVT  [ ] NEEDLE GUIDANCE VASCULAR  [ ] NEEDLE GUIDANCE THORACENTESIS  [ ] NEEDLE GUIDANCE PARACENTESIS  [ ] NEEDLE GUIDANCE PERICARDIOCENTESIS  [ ] OTHER    FINDINGS: focal b/l b-lines with thickened pleura, no pleural effusion, normal LV sys fn, normal RV size and fn, IVC 1cm and collapsible       INTERPRETATION: noncardiogenic pulmonary edema, normal LV and RV fn

## 2021-08-05 LAB
24R-OH-CALCIDIOL SERPL-MCNC: 13.9 NG/ML — LOW (ref 30–80)
ALBUMIN SERPL ELPH-MCNC: 2.2 G/DL — LOW (ref 3.3–5)
ALP SERPL-CCNC: 216 U/L — HIGH (ref 40–120)
ALT FLD-CCNC: 136 U/L — HIGH (ref 12–78)
ANION GAP SERPL CALC-SCNC: 4 MMOL/L — LOW (ref 5–17)
AST SERPL-CCNC: 72 U/L — HIGH (ref 15–37)
BASOPHILS # BLD AUTO: 0.03 K/UL — SIGNIFICANT CHANGE UP (ref 0–0.2)
BASOPHILS NFR BLD AUTO: 0.1 % — SIGNIFICANT CHANGE UP (ref 0–2)
BILIRUB SERPL-MCNC: 0.9 MG/DL — SIGNIFICANT CHANGE UP (ref 0.2–1.2)
BUN SERPL-MCNC: 24 MG/DL — HIGH (ref 7–23)
CALCIUM SERPL-MCNC: 8.4 MG/DL — LOW (ref 8.5–10.1)
CHLORIDE SERPL-SCNC: 103 MMOL/L — SIGNIFICANT CHANGE UP (ref 96–108)
CO2 SERPL-SCNC: 28 MMOL/L — SIGNIFICANT CHANGE UP (ref 22–31)
CREAT SERPL-MCNC: 0.77 MG/DL — SIGNIFICANT CHANGE UP (ref 0.5–1.3)
CRP SERPL-MCNC: <3 MG/L — SIGNIFICANT CHANGE UP
EOSINOPHIL # BLD AUTO: 0.21 K/UL — SIGNIFICANT CHANGE UP (ref 0–0.5)
EOSINOPHIL NFR BLD AUTO: 0.9 % — SIGNIFICANT CHANGE UP (ref 0–6)
FERRITIN SERPL-MCNC: 1653 NG/ML — HIGH (ref 30–400)
GLUCOSE SERPL-MCNC: 121 MG/DL — HIGH (ref 70–99)
HCT VFR BLD CALC: 38.1 % — LOW (ref 39–50)
HGB BLD-MCNC: 12.5 G/DL — LOW (ref 13–17)
IMM GRANULOCYTES NFR BLD AUTO: 0.8 % — SIGNIFICANT CHANGE UP (ref 0–1.5)
LYMPHOCYTES # BLD AUTO: 0.94 K/UL — LOW (ref 1–3.3)
LYMPHOCYTES # BLD AUTO: 4.2 % — LOW (ref 13–44)
MAGNESIUM SERPL-MCNC: 2.2 MG/DL — SIGNIFICANT CHANGE UP (ref 1.6–2.6)
MCHC RBC-ENTMCNC: 28.5 PG — SIGNIFICANT CHANGE UP (ref 27–34)
MCHC RBC-ENTMCNC: 32.8 GM/DL — SIGNIFICANT CHANGE UP (ref 32–36)
MCV RBC AUTO: 87 FL — SIGNIFICANT CHANGE UP (ref 80–100)
MONOCYTES # BLD AUTO: 1.43 K/UL — HIGH (ref 0–0.9)
MONOCYTES NFR BLD AUTO: 6.4 % — SIGNIFICANT CHANGE UP (ref 2–14)
NEUTROPHILS # BLD AUTO: 19.52 K/UL — HIGH (ref 1.8–7.4)
NEUTROPHILS NFR BLD AUTO: 87.6 % — HIGH (ref 43–77)
PHOSPHATE SERPL-MCNC: 3.7 MG/DL — SIGNIFICANT CHANGE UP (ref 2.5–4.5)
PLATELET # BLD AUTO: 233 K/UL — SIGNIFICANT CHANGE UP (ref 150–400)
POTASSIUM SERPL-MCNC: 4.1 MMOL/L — SIGNIFICANT CHANGE UP (ref 3.5–5.3)
POTASSIUM SERPL-SCNC: 4.1 MMOL/L — SIGNIFICANT CHANGE UP (ref 3.5–5.3)
PROCALCITONIN SERPL-MCNC: 0.09 NG/ML — SIGNIFICANT CHANGE UP (ref 0.02–0.1)
PROT SERPL-MCNC: 5.3 GM/DL — LOW (ref 6–8.3)
RBC # BLD: 4.38 M/UL — SIGNIFICANT CHANGE UP (ref 4.2–5.8)
RBC # FLD: 12.5 % — SIGNIFICANT CHANGE UP (ref 10.3–14.5)
SODIUM SERPL-SCNC: 135 MMOL/L — SIGNIFICANT CHANGE UP (ref 135–145)
WBC # BLD: 22.3 K/UL — HIGH (ref 3.8–10.5)
WBC # FLD AUTO: 22.3 K/UL — HIGH (ref 3.8–10.5)

## 2021-08-05 PROCEDURE — 99291 CRITICAL CARE FIRST HOUR: CPT

## 2021-08-05 RX ORDER — CHOLECALCIFEROL (VITAMIN D3) 125 MCG
2000 CAPSULE ORAL DAILY
Refills: 0 | Status: DISCONTINUED | OUTPATIENT
Start: 2021-08-05 | End: 2021-10-05

## 2021-08-05 RX ADMIN — ENOXAPARIN SODIUM 40 MILLIGRAM(S): 100 INJECTION SUBCUTANEOUS at 21:03

## 2021-08-05 RX ADMIN — Medication 2000 UNIT(S): at 12:59

## 2021-08-05 RX ADMIN — BUDESONIDE AND FORMOTEROL FUMARATE DIHYDRATE 2 PUFF(S): 160; 4.5 AEROSOL RESPIRATORY (INHALATION) at 09:31

## 2021-08-05 RX ADMIN — ENOXAPARIN SODIUM 40 MILLIGRAM(S): 100 INJECTION SUBCUTANEOUS at 10:24

## 2021-08-05 RX ADMIN — Medication 81 MILLIGRAM(S): at 10:24

## 2021-08-05 RX ADMIN — Medication 4: at 12:03

## 2021-08-05 RX ADMIN — SODIUM CHLORIDE 40 MILLILITER(S): 9 INJECTION, SOLUTION INTRAVENOUS at 00:04

## 2021-08-05 RX ADMIN — INSULIN GLARGINE 10 UNIT(S): 100 INJECTION, SOLUTION SUBCUTANEOUS at 12:03

## 2021-08-05 RX ADMIN — Medication 200 MILLIGRAM(S): at 10:23

## 2021-08-05 RX ADMIN — FAMOTIDINE 20 MILLIGRAM(S): 10 INJECTION INTRAVENOUS at 10:23

## 2021-08-05 RX ADMIN — Medication 200 MILLIGRAM(S): at 21:03

## 2021-08-05 RX ADMIN — Medication 1: at 21:18

## 2021-08-05 RX ADMIN — ATORVASTATIN CALCIUM 80 MILLIGRAM(S): 80 TABLET, FILM COATED ORAL at 21:03

## 2021-08-05 RX ADMIN — LOSARTAN POTASSIUM 50 MILLIGRAM(S): 100 TABLET, FILM COATED ORAL at 10:23

## 2021-08-05 RX ADMIN — Medication 20 MILLIGRAM(S): at 10:23

## 2021-08-05 RX ADMIN — BUDESONIDE AND FORMOTEROL FUMARATE DIHYDRATE 2 PUFF(S): 160; 4.5 AEROSOL RESPIRATORY (INHALATION) at 20:54

## 2021-08-05 RX ADMIN — Medication 2: at 16:04

## 2021-08-05 NOTE — PROGRESS NOTE ADULT - SUBJECTIVE AND OBJECTIVE BOX
Patient is a 55y old  Male who presents with a chief complaint of cough/sob (05 Aug 2021 13:33)    HPI:  55 M with pmh HLD, dm, htn presents with 8 days onset of covid symptoms which included, cough, fevers, sob, hypoxia, fevers, diarrhea, chills and myalgias. TEsted positive 7/15. Wife endorsed he was becoming more sob of recently. On arrival hypoxic to 80s and tachypneic. NRB placed, presently on 15% and saturating 95%. Na 126 s/p 1L NS. CXR: Frandy infiltrates. Last scr 1.4 in 2019-> today 1.9 unknown if underlying ckd. Dimer 450 - normal for age  however with covid and increasing fibrinogen, will need further eval.   Denies chets pain. LHC performed 2019 revealed normal coronaries.         PAST MEDICAL/SURGICAL/FAMILY/SOCIAL HISTORY:    Past Medical History:  Diabetes    HTN (hypertension).  No surgeries     Tobacco Usage:  · Tobacco Usage	non smoker  Fhx: none (21 Jul 2021 19:19)    24 hour events: ***    PAST MEDICAL & SURGICAL HISTORY:  HTN (hypertension)    Diabetes      REVIEW OF SYSTEMS  Constitutional: No fever, chills, fatigue  Neuro: No headache, numbness, weakness  Resp: No cough, wheezing, shortness of breath  CVS: No chest pain, palpitations, leg swelling  GI: No abdominal pain, nausea, vomiting, diarrhea   : No dysuria, frequency, incontinence  Skin: No itching, burning, rashes, or lesions   Msk: No joint pain or swelling  Psych: No depression, anxiety, mood swings  Heme: No bleeding    T(F): 97.7 (08-05-21 @ 14:00), Max: 99.6 (08-05-21 @ 02:00)  HR: 71 (08-05-21 @ 17:00) (63 - 85)  BP: 116/61 (08-05-21 @ 17:00) (91/56 - 152/67)  RR: 25 (08-05-21 @ 17:00) (11 - 33)  SpO2: 89% (08-05-21 @ 17:00) (84% - 100%)  Wt(kg): --      CAPILLARY BLOOD GLUCOSE  POCT Blood Glucose.: 162 mg/dL (05 Aug 2021 15:54)  POCT Blood Glucose.: 227 mg/dL (05 Aug 2021 11:54)  POCT Blood Glucose.: 105 mg/dL (05 Aug 2021 07:51)  POCT Blood Glucose.: 228 mg/dL (04 Aug 2021 22:21)  POCT Blood Glucose.: 163 mg/dL (04 Aug 2021 17:25)    I&O's Summary    08-04 @ 07:01 - 08-05 @ 07:00  --------------------------------------------------------  IN: 1387 mL / OUT: 2900 mL / NET: -1513 mL    08-05 @ 07:01  -  08-05 @ 17:06  --------------------------------------------------------  IN: 0 mL / OUT: 1000 mL / NET: -1000 mL    PHYSICAL EXAM  General:   CNS:   HEENT:   Resp:   CVS:   Abd:   Ext:   Skin:     MEDICATIONS  labetalol Oral  losartan Oral  atorvastatin Oral  dextrose 40% Gel Oral  dextrose 50% Injectable IV Push  dextrose 50% Injectable IV Push  dextrose 50% Injectable IV Push  glucagon  Injectable IntraMuscular  insulin glargine Injectable (LANTUS) SubCutaneous  insulin lispro (ADMELOG) corrective regimen sliding scale SubCutaneous  insulin lispro (ADMELOG) corrective regimen sliding scale SubCutaneous  methylPREDNISolone sodium succinate Injectable IV Push  ALBUTerol    90 MICROgram(s) HFA Inhaler Inhalation PRN  benzonatate Oral PRN  budesonide 160 MICROgram(s)/formoterol 4.5 MICROgram(s) Inhaler Inhalation  acetaminophen   Tablet .. Oral PRN  acetaminophen   Tablet .. Oral PRN  melatonin Oral PRN  aspirin  chewable Oral  enoxaparin Injectable SubCutaneous  famotidine    Tablet Oral  cholecalciferol Oral  dextrose 5%. IV Continuous  dextrose 5%. IV Continuous  dextrose 5%. IV Continuous  benzocaine 15 mG/menthol 3.6 mG (Sugar-Free) Lozenge Oral PRN                          12.5   22.30 )-----------( 233      ( 05 Aug 2021 06:54 )             38.1       08-05    135  |  103  |  24<H>  ----------------------------<  121<H>  4.1   |  28  |  0.77    Ca    8.4<L>      05 Aug 2021 06:54  Phos  3.7     08-05  Mg     2.2     08-05    TPro  5.3<L>  /  Alb  2.2<L>  /  TBili  0.9  /  DBili  x   /  AST  72<H>  /  ALT  136<H>  /  AlkPhos  216<H>  08-05              .Sputum Sputum   Normal Respiratory Dalia present   Few polymorphonuclear leukocytes per low power field  Few Squamous epithelial cells per low power field  Few Gram positive cocci in pairs seen per oil power field  Few Gram Negative Rods seen per oil power field  Few Yeast like cells seen per oil power field 08-03 @ 00:15      Radiology: ***    CENTRAL LINE: Y/N          DATE INSERTED:              REMOVE: Y/N  GOFF: Y/N                        DATE INSERTED:              REMOVE: Y/N  A-LINE: Y/N                       DATE INSERTED:              REMOVE: Y/N    GLOBAL ISSUE/BEST PRACTICE  Analgesia:   Sedation:   CAM-ICU:   HOB elevation: yes  Stress ulcer prophylaxis:   VTE prophylaxis:   Glycemic control:   Nutrition:     CODE STATUS: ***  [facility] discussion: Y Patient is a 55y old  Male who presents with a chief complaint of cough/sob (05 Aug 2021 13:33)    HPI:  55 M with pmh HLD, dm, htn presents with 8 days onset of covid symptoms which included, cough, fevers, sob, hypoxia, fevers, diarrhea, chills and myalgias. TEsted positive 7/15. Wife endorsed he was becoming more sob of recently. On arrival hypoxic to 80s and tachypneic. NRB placed, presently on 15% and saturating 95%. Na 126 s/p 1L NS. CXR: Frandy infiltrates. Last scr 1.4 in 2019-> today 1.9 unknown if underlying ckd. Dimer 450 - normal for age  however with covid and increasing fibrinogen, will need further eval.   Denies chets pain. LHC performed 2019 revealed normal coronaries.         PAST MEDICAL/SURGICAL/FAMILY/SOCIAL HISTORY:    Past Medical History:  Diabetes    HTN (hypertension).  No surgeries     Tobacco Usage:  · Tobacco Usage	non smoker  Fhx: none (21 Jul 2021 19:19)    24 hour events: remains on HFNC 50L, 90%    PAST MEDICAL & SURGICAL HISTORY:  HTN (hypertension)    Diabetes      REVIEW OF SYSTEMS  Constitutional: No fever, chills, fatigue  Neuro: No headache, numbness, weakness  Resp: +cough, shortness of breath  CVS: No chest pain, palpitations, leg swelling  GI: No abdominal pain, nausea, vomiting, diarrhea   : No dysuria, frequency, incontinence  Skin: No itching, burning, rashes, or lesions   Msk: No joint pain or swelling  Psych: No depression, anxiety, mood swings  Heme: No bleeding    T(F): 97.7 (08-05-21 @ 14:00), Max: 99.6 (08-05-21 @ 02:00)  HR: 71 (08-05-21 @ 17:00) (63 - 85)  BP: 116/61 (08-05-21 @ 17:00) (91/56 - 152/67)  RR: 25 (08-05-21 @ 17:00) (11 - 33)  SpO2: 89% (08-05-21 @ 17:00) (84% - 100%)  Wt(kg): --      CAPILLARY BLOOD GLUCOSE  POCT Blood Glucose.: 162 mg/dL (05 Aug 2021 15:54)  POCT Blood Glucose.: 227 mg/dL (05 Aug 2021 11:54)  POCT Blood Glucose.: 105 mg/dL (05 Aug 2021 07:51)  POCT Blood Glucose.: 228 mg/dL (04 Aug 2021 22:21)  POCT Blood Glucose.: 163 mg/dL (04 Aug 2021 17:25)    I&O's Summary    08-04 @ 07:01 - 08-05 @ 07:00  --------------------------------------------------------  IN: 1387 mL / OUT: 2900 mL / NET: -1513 mL    08-05 @ 07:01 - 08-05 @ 17:06  --------------------------------------------------------  IN: 0 mL / OUT: 1000 mL / NET: -1000 mL    PHYSICAL EXAM  General: NAD while sitting in chair   CNS: AAOx3, nonfocal exam   HEENT: PERRL  Resp: limited air entry bibasilar   CVS: S1S2, regular   Abd: soft, NT, +BS  Ext: trace edema   Skin: warm, not mottled     MEDICATIONS  labetalol Oral  losartan Oral  atorvastatin Oral  dextrose 40% Gel Oral  dextrose 50% Injectable IV Push  dextrose 50% Injectable IV Push  dextrose 50% Injectable IV Push  glucagon  Injectable IntraMuscular  insulin glargine Injectable (LANTUS) SubCutaneous  insulin lispro (ADMELOG) corrective regimen sliding scale SubCutaneous  insulin lispro (ADMELOG) corrective regimen sliding scale SubCutaneous  methylPREDNISolone sodium succinate Injectable IV Push  ALBUTerol    90 MICROgram(s) HFA Inhaler Inhalation PRN  benzonatate Oral PRN  budesonide 160 MICROgram(s)/formoterol 4.5 MICROgram(s) Inhaler Inhalation  acetaminophen   Tablet .. Oral PRN  acetaminophen   Tablet .. Oral PRN  melatonin Oral PRN  aspirin  chewable Oral  enoxaparin Injectable SubCutaneous  famotidine    Tablet Oral  cholecalciferol Oral  dextrose 5%. IV Continuous  dextrose 5%. IV Continuous  dextrose 5%. IV Continuous  benzocaine 15 mG/menthol 3.6 mG (Sugar-Free) Lozenge Oral PRN                          12.5   22.30 )-----------( 233      ( 05 Aug 2021 06:54 )             38.1       08-05    135  |  103  |  24<H>  ----------------------------<  121<H>  4.1   |  28  |  0.77    Ca    8.4<L>      05 Aug 2021 06:54  Phos  3.7     08-05  Mg     2.2     08-05    TPro  5.3<L>  /  Alb  2.2<L>  /  TBili  0.9  /  DBili  x   /  AST  72<H>  /  ALT  136<H>  /  AlkPhos  216<H>  08-05              .Sputum Sputum   Normal Respiratory Dalia present   Few polymorphonuclear leukocytes per low power field  Few Squamous epithelial cells per low power field  Few Gram positive cocci in pairs seen per oil power field  Few Gram Negative Rods seen per oil power field  Few Yeast like cells seen per oil power field 08-03 @ 00:15      CENTRAL LINE: N           GOFF: N                         A-LINE: N                      GLOBAL ISSUE/BEST PRACTICE  Analgesia: NA  Sedation: NA  CAM-ICU: neg  HOB elevation: yes  Stress ulcer prophylaxis: NA  VTE prophylaxis: Y  Glycemic control: Y  Nutrition: Y    CODE STATUS: full  GO discussion: Y

## 2021-08-05 NOTE — PROGRESS NOTE ADULT - SUBJECTIVE AND OBJECTIVE BOX
Patient is a 55y old  Male who presents with a chief complaint of cough/sob (23 Jul 2021 14:03)      HPI:  55 M with pmh HLD, dm, htn presents with 8 days onset of covid symptoms which included, cough, fevers, sob, hypoxia, fevers, diarrhea, chills and myalgias. TEsted positive 7/15. Wife endorsed he was becoming more sob of recently. On arrival hypoxic to 80s and tachypneic. NRB placed, presently on 15% and saturating 95%. Na 126 s/p 1L NS. CXR: Frandy infiltrates. Last scr 1.4 in 2019-> today 1.9 unknown if underlying ckd.  Patient not vaccinated.   Last seen by me in 2019  History of ROBERT and uncontrolled hypertension  Treated with IV Remdesivir and Decadron, now on Toci  SaO2 is 80-85% despite being on 100% NRB  ABG pending    8/5  No acute pulmonary events occurred overnight   On HFNC 50LPM, 90%FiO2 without NRB this morning  Critical Care performed POCUS on 8/4, noted to have pulmonary edema       PAST MEDICAL/SURGICAL/FAMILY/SOCIAL HISTORY:    Past Medical History:  Diabetes    HTN (hypertension).  No surgeries     Tobacco Usage:  · Tobacco Usage	non smoker  Fhx: none (21 Jul 2021 19:19)      MEDICATIONS  (STANDING):  aspirin  chewable 81 milliGRAM(s) Oral daily  atorvastatin 80 milliGRAM(s) Oral at bedtime  budesonide 160 MICROgram(s)/formoterol 4.5 MICROgram(s) Inhaler 2 Puff(s) Inhalation two times a day  dextrose 40% Gel 15 Gram(s) Oral once  dextrose 5%. 1000 milliLiter(s) (40 mL/Hr) IV Continuous <Continuous>  dextrose 5%. 1000 milliLiter(s) (50 mL/Hr) IV Continuous <Continuous>  dextrose 5%. 1000 milliLiter(s) (100 mL/Hr) IV Continuous <Continuous>  dextrose 50% Injectable 25 Gram(s) IV Push once  dextrose 50% Injectable 12.5 Gram(s) IV Push once  dextrose 50% Injectable 25 Gram(s) IV Push once  enoxaparin Injectable 40 milliGRAM(s) SubCutaneous every 12 hours  famotidine    Tablet 20 milliGRAM(s) Oral daily  glucagon  Injectable 1 milliGRAM(s) IntraMuscular once  insulin lispro (ADMELOG) corrective regimen sliding scale   SubCutaneous three times a day before meals  insulin lispro (ADMELOG) corrective regimen sliding scale   SubCutaneous at bedtime  labetalol 200 milliGRAM(s) Oral every 12 hours  losartan 50 milliGRAM(s) Oral daily  methylPREDNISolone sodium succinate Injectable 20 milliGRAM(s) IV Push daily    MEDICATIONS  (PRN):  acetaminophen   Tablet .. 650 milliGRAM(s) Oral once PRN Temp greater or equal to 38C (100.4F), Mild Pain (1 - 3)  acetaminophen   Tablet .. 650 milliGRAM(s) Oral every 4 hours PRN Temp greater or equal to 38C (100.4F), Mild Pain (1 - 3)  ALBUTerol    90 MICROgram(s) HFA Inhaler 2 Puff(s) Inhalation every 4 hours PRN Shortness of Breath and/or Wheezing  benzocaine 15 mG/menthol 3.6 mG (Sugar-Free) Lozenge 1 Lozenge Oral every 6 hours PRN Sore Throat  benzonatate 100 milliGRAM(s) Oral three times a day PRN Cough  melatonin 3 milliGRAM(s) Oral at bedtime PRN Insomnia    MEDICATIONS  (PRN):  acetaminophen   Tablet .. 650 milliGRAM(s) Oral once PRN Temp greater or equal to 38C (100.4F), Mild Pain (1 - 3)  acetaminophen   Tablet .. 650 milliGRAM(s) Oral every 4 hours PRN Temp greater or equal to 38C (100.4F), Mild Pain (1 - 3)  ALBUTerol    90 MICROgram(s) HFA Inhaler 2 Puff(s) Inhalation every 4 hours PRN Shortness of Breath and/or Wheezing  benzonatate 100 milliGRAM(s) Oral three times a day PRN Cough    Vital Signs Last 24 Hrs  T(C): 36.6 (05 Aug 2021 10:00), Max: 37.6 (05 Aug 2021 02:00)  T(F): 97.8 (05 Aug 2021 10:00), Max: 99.6 (05 Aug 2021 02:00)  HR: 70 (05 Aug 2021 13:00) (63 - 85)  BP: 152/67 (05 Aug 2021 13:00) (91/56 - 152/67)  BP(mean): 91 (05 Aug 2021 13:00) (63 - 99)  RR: 19 (05 Aug 2021 13:00) (11 - 33)  SpO2: 87% (05 Aug 2021 13:00) (84% - 100%)      IV PiggyBack: 270 mL    Oral Fluid: 720 mL  Total IN: 990 mL    OUT:    Voided (mL): 2550 mL  Total OUT: 2550 mL    Total NET: -1560 mL            PHYSICAL EXAM  General Appearance: sitting uptright and mild to moderate resp distress  HEENT: PERRL, conjunctiva clear  Neck: Supple, , no adenopathy  Lungs: coarse bilaterally  Heart: Regular rate and rhythm, S1, S2 normal,Not tachycardic  Abdomen: Soft, non-tender, bowel sounds active   Extremities: no cyanosis or edema, no joint swelling  Skin: Skin color, texture normal, no rashes   Neurologic: Alert and oriented X3 , non focal    ECG:    LABS:                            12.9   15.00 )-----------( 366      ( 26 Jul 2021 06:20 )             38.4   07-26    133<L>  |  104  |  37<H>  ----------------------------<  232<H>  4.2   |  23  |  1.17    Ca    8.4<L>      26 Jul 2021 06:20    TPro  6.3  /  Alb  2.2<L>  /  TBili  0.4  /  DBili  0.1  /  AST  103<H>  /  ALT  137<H>  /  AlkPhos  239<H>  07-26                          13.2   13.52 )-----------( 338      ( 24 Jul 2021 08:06 )             39.5     07-24    136  |  105  |  43<H>  ----------------------------<  227<H>  4.3   |  25  |  1.31<H>    Ca    8.7      24 Jul 2021 08:06    TPro  6.6  /  Alb  2.3<L>  /  TBili  0.4  /  DBili  x   /  AST  73<H>  /  ALT  64  /  AlkPhos  165<H>  07-24          Pro BNP  -- 07-24 @ 08:06  D Dimer  446 07-24 @ 08:06  Pro BNP  261 07-21 @ 14:42  D Dimer  460 07-21 @ 14:42              < from: Xray Chest 1 View- PORTABLE-Urgent (07.21.21 @ 15:33) >    PROCEDURE DATE:  07/21/2021          INTERPRETATION:  AP chest on July 21, 2021 at 3:27 PM. Patient is short of breath with cough and fever.    Heart magnified by technique.    There arescattered mid lower lung field infiltrates right greater than left possibly related: Pneumonia.    The lungs are clear on August 30, 2019.    IMPRESSION: Bilateral infiltrates as above.    < end of copied text >  < from: US Duplex Venous Lower Ext Complete, Bilateral (07.25.21 @ 08:42) >  COMPARISON: None available.    TECHNIQUE: Duplex sonography of the BILATERAL LOWER extremity veins with color and spectral Doppler, with and without compression.    FINDINGS:    RIGHT:  Normal compressibility of the RIGHT common femoral, femoral and popliteal veins.  Doppler examination shows normal spontaneous and phasic flow.  No RIGHT calf vein thrombosis is detected.    LEFT:  Normal compressibility of the LEFT common femoral, femoral and popliteal veins.  Doppler examination shows normal spontaneous and phasic flow.  No LEFT calf vein thrombosis is detected.    IMPRESSION:  No evidence of deep venous thrombosis in either lower extremity.    < end of copied text >  RADIOLOGY & ADDITIONAL STUDIES:    < from: Xray Chest 1 View- PORTABLE-Urgent (Xray Chest 1 View- PORTABLE-Urgent .) (07.26.21 @ 08:43) >    PROCEDURE DATE:  07/26/2021          INTERPRETATION:  Portable chest radiograph    CLINICAL INFORMATION: Pneumonia due to Covid 19.. Follow-up    TECHNIQUE:  Portable  AP view of the chest was obtained.    COMPARISON: 7/21/2021 chest available for review.    FINDINGS:    The lungs show stable bilateral  multifocal and diffuse ill-defined airspace opacities.. No pneumothorax.    The  heart is enlarged in transverse diameter. No hilar mass.     Visualized osseous structures are intact.    IMPRESSION:   Stable bilateral  multifocal and diffuse ill-defined airspace opacities..    < end of copied text >

## 2021-08-05 NOTE — PROGRESS NOTE ADULT - ASSESSMENT
55M PMH HTN, DM2, HLD, obesity, admitted with COVID-19 pneumonia and acute hypoxic resp failure. He did not receive COVID vaccine.     -resp status remains tenuous  -unable to sleep properly at night, remains tired during the day, self-proning is difficult, and he has intermittent apneic episodes per staff - will place on BiPAP tonight, I educated him on the benefit that PPV may provide, he is willing to try   -continue HFNC during the day   -may consider repeating CT chest tomorrow if resp status remains relatively stable   -no obvious evidence of infection, WBC improving, monitor off abx   -diuresed well 8/4 but resp status unchanged, avoid more   -s/p tocilizumab 7/23  -slowly taper steroids   -continue symbicort, IS   -LE duplex neg for DVT 7/25  -glucose control improved - continue current regimen, d/c D5  -HD stable, continue labetalol, losartan, statin, aspirin   -ambulating in room   -lovenox and SCDs for DVT ppx     Remains critical with high risk of decompensation

## 2021-08-05 NOTE — PROGRESS NOTE ADULT - ASSESSMENT
1) COVID Pneumonia  2) Dyspnea  3) Abnormal CXR  4) Hypoxemia  5) Hypertension  6) ROBERT  7) Mild PH    55 M noted to have COVID 7/15  On arrival hypoxic to 80s and tachypneic. NRB placed,in the ER saturating 95%. Na 126 s/p 1L NS. CXR: Frandy infiltrates. Last scr 1.4 in 2019-> today 1.9 unknown if underlying ckd.  Patient not vaccinated.   Last seen by me in 2019 for ROBERT noted to have uncontrolled hypertension  Treated with IV Remdesivir and Decadron, Tocilizumab   SaO2 is 80-85% despite being on 100% NRB so was switched to HFNC  ABG/imaging reviewed  Given the obesity/hypertension and prior co-morbidities, he is at risk of intubation but continue HFNC, respiratory status remains critical  XR reviewed- pulmonary infiltrates are present  DDimer elevated  Possibly to go for CTPE once stabilized, on therapeutic Lovenox now  On tapered Solumedrol 20mg daily Symbicort 160/4.5 BID ([email or web address], STOIC study)  HFNC 90%FiO2/50LPM 37C, received diuresis  Will likely need a prolonged course of HFNC given the tenuous respiratory status  Discussed inspiratory muscle exercises with patient while he is out of bed +/- incentive spirometer   Reviewed Ddimer/BNP  Discussed with RT/Nursing staff/Intensivist

## 2021-08-06 LAB
ADD ON TEST-SPECIMEN IN LAB: SIGNIFICANT CHANGE UP
ALBUMIN SERPL ELPH-MCNC: 2.3 G/DL — LOW (ref 3.3–5)
ALP SERPL-CCNC: 200 U/L — HIGH (ref 40–120)
ALT FLD-CCNC: 213 U/L — HIGH (ref 12–78)
ANION GAP SERPL CALC-SCNC: 4 MMOL/L — LOW (ref 5–17)
AST SERPL-CCNC: 81 U/L — HIGH (ref 15–37)
BASOPHILS # BLD AUTO: 0.02 K/UL — SIGNIFICANT CHANGE UP (ref 0–0.2)
BASOPHILS NFR BLD AUTO: 0.1 % — SIGNIFICANT CHANGE UP (ref 0–2)
BILIRUB SERPL-MCNC: 0.8 MG/DL — SIGNIFICANT CHANGE UP (ref 0.2–1.2)
BUN SERPL-MCNC: 29 MG/DL — HIGH (ref 7–23)
CALCIUM SERPL-MCNC: 8.5 MG/DL — SIGNIFICANT CHANGE UP (ref 8.5–10.1)
CHLORIDE SERPL-SCNC: 104 MMOL/L — SIGNIFICANT CHANGE UP (ref 96–108)
CO2 SERPL-SCNC: 29 MMOL/L — SIGNIFICANT CHANGE UP (ref 22–31)
CREAT SERPL-MCNC: 0.83 MG/DL — SIGNIFICANT CHANGE UP (ref 0.5–1.3)
CULTURE RESULTS: SIGNIFICANT CHANGE UP
EOSINOPHIL # BLD AUTO: 0.23 K/UL — SIGNIFICANT CHANGE UP (ref 0–0.5)
EOSINOPHIL NFR BLD AUTO: 1.4 % — SIGNIFICANT CHANGE UP (ref 0–6)
FUNGITELL: <31 PG/ML — SIGNIFICANT CHANGE UP
GLUCOSE SERPL-MCNC: 139 MG/DL — HIGH (ref 70–99)
HCT VFR BLD CALC: 34.4 % — LOW (ref 39–50)
HGB BLD-MCNC: 11.5 G/DL — LOW (ref 13–17)
IMM GRANULOCYTES NFR BLD AUTO: 0.8 % — SIGNIFICANT CHANGE UP (ref 0–1.5)
LYMPHOCYTES # BLD AUTO: 0.85 K/UL — LOW (ref 1–3.3)
LYMPHOCYTES # BLD AUTO: 5.2 % — LOW (ref 13–44)
MAGNESIUM SERPL-MCNC: 2.1 MG/DL — SIGNIFICANT CHANGE UP (ref 1.6–2.6)
MCHC RBC-ENTMCNC: 28.9 PG — SIGNIFICANT CHANGE UP (ref 27–34)
MCHC RBC-ENTMCNC: 33.4 GM/DL — SIGNIFICANT CHANGE UP (ref 32–36)
MCV RBC AUTO: 86.4 FL — SIGNIFICANT CHANGE UP (ref 80–100)
MONOCYTES # BLD AUTO: 1.21 K/UL — HIGH (ref 0–0.9)
MONOCYTES NFR BLD AUTO: 7.3 % — SIGNIFICANT CHANGE UP (ref 2–14)
NEUTROPHILS # BLD AUTO: 14.05 K/UL — HIGH (ref 1.8–7.4)
NEUTROPHILS NFR BLD AUTO: 85.2 % — HIGH (ref 43–77)
PHOSPHATE SERPL-MCNC: 3.8 MG/DL — SIGNIFICANT CHANGE UP (ref 2.5–4.5)
PLATELET # BLD AUTO: 212 K/UL — SIGNIFICANT CHANGE UP (ref 150–400)
POTASSIUM SERPL-MCNC: 4.4 MMOL/L — SIGNIFICANT CHANGE UP (ref 3.5–5.3)
POTASSIUM SERPL-SCNC: 4.4 MMOL/L — SIGNIFICANT CHANGE UP (ref 3.5–5.3)
PROT SERPL-MCNC: 5.2 GM/DL — LOW (ref 6–8.3)
RBC # BLD: 3.98 M/UL — LOW (ref 4.2–5.8)
RBC # FLD: 12.4 % — SIGNIFICANT CHANGE UP (ref 10.3–14.5)
SODIUM SERPL-SCNC: 137 MMOL/L — SIGNIFICANT CHANGE UP (ref 135–145)
SPECIMEN SOURCE: SIGNIFICANT CHANGE UP
WBC # BLD: 16.49 K/UL — HIGH (ref 3.8–10.5)
WBC # FLD AUTO: 16.49 K/UL — HIGH (ref 3.8–10.5)

## 2021-08-06 PROCEDURE — 99291 CRITICAL CARE FIRST HOUR: CPT

## 2021-08-06 PROCEDURE — 93971 EXTREMITY STUDY: CPT | Mod: 26,LT

## 2021-08-06 PROCEDURE — 71045 X-RAY EXAM CHEST 1 VIEW: CPT | Mod: 26

## 2021-08-06 PROCEDURE — 93970 EXTREMITY STUDY: CPT | Mod: 26

## 2021-08-06 RX ADMIN — Medication 3 MILLIGRAM(S): at 23:28

## 2021-08-06 RX ADMIN — BUDESONIDE AND FORMOTEROL FUMARATE DIHYDRATE 2 PUFF(S): 160; 4.5 AEROSOL RESPIRATORY (INHALATION) at 20:34

## 2021-08-06 RX ADMIN — BUDESONIDE AND FORMOTEROL FUMARATE DIHYDRATE 2 PUFF(S): 160; 4.5 AEROSOL RESPIRATORY (INHALATION) at 11:07

## 2021-08-06 RX ADMIN — Medication 20 MILLIGRAM(S): at 10:46

## 2021-08-06 RX ADMIN — ENOXAPARIN SODIUM 40 MILLIGRAM(S): 100 INJECTION SUBCUTANEOUS at 10:46

## 2021-08-06 RX ADMIN — Medication 81 MILLIGRAM(S): at 10:45

## 2021-08-06 RX ADMIN — Medication 2000 UNIT(S): at 13:29

## 2021-08-06 RX ADMIN — Medication 200 MILLIGRAM(S): at 10:44

## 2021-08-06 RX ADMIN — Medication 200 MILLIGRAM(S): at 21:34

## 2021-08-06 RX ADMIN — ENOXAPARIN SODIUM 40 MILLIGRAM(S): 100 INJECTION SUBCUTANEOUS at 21:34

## 2021-08-06 RX ADMIN — LOSARTAN POTASSIUM 50 MILLIGRAM(S): 100 TABLET, FILM COATED ORAL at 13:30

## 2021-08-06 RX ADMIN — INSULIN GLARGINE 10 UNIT(S): 100 INJECTION, SOLUTION SUBCUTANEOUS at 12:00

## 2021-08-06 RX ADMIN — Medication 4: at 18:35

## 2021-08-06 RX ADMIN — FAMOTIDINE 20 MILLIGRAM(S): 10 INJECTION INTRAVENOUS at 10:45

## 2021-08-06 RX ADMIN — ATORVASTATIN CALCIUM 80 MILLIGRAM(S): 80 TABLET, FILM COATED ORAL at 21:34

## 2021-08-06 NOTE — PROGRESS NOTE ADULT - ASSESSMENT
Imp:   Acute hypoxic resp failure, ARDS from COVID-19 pna (not vaccinated)  Hx HTN, DM2, HLD, obesity     Resp status remains tenuous   Didn't tolerate NRM by itself     Plan:   Continue HFNC during the day, BiPAP at night   Self-proning as tolerated   Change steroids to prednisone 20 mg daily from tomorrow   S/p tocilizumab 7/23  Continue Symbicort  Unable to perform CTA chest today due to desaturation  Awaiting fungitell   No current evidence of infection but low threshold to start empiric coverage if status worsens   Glucose better controlled after changing lantus timing   Lovenox for DVT ppx     Remains critical with high risk for decompensation  Imp:   Acute hypoxic resp failure, ARDS from COVID-19 pna (not vaccinated)  Vit D deficiency   Hx HTN, DM2, HLD, obesity     Resp status remains tenuous   Didn't tolerate NRM by itself     Plan:   Continue HFNC during the day, BiPAP at night   Self-proning as tolerated   Change steroids to prednisone 20 mg daily from tomorrow   S/p tocilizumab 7/23  Continue Symbicort  Unable to perform CTA chest today due to desaturation  Awaiting fungitell   No current evidence of infection but low threshold to start empiric coverage if status worsens   Glucose better controlled after changing lantus timing   PO diet  Vit D supplementation   Lovenox for DVT ppx     Remains critical with high risk for decompensation

## 2021-08-06 NOTE — PROGRESS NOTE ADULT - SUBJECTIVE AND OBJECTIVE BOX
Patient is a 55y old  Male who presents with a chief complaint of cough/sob (06 Aug 2021 09:19)    HPI:  55 M with pmh HLD, dm, htn presents with 8 days onset of covid symptoms which included, cough, fevers, sob, hypoxia, fevers, diarrhea, chills and myalgias. TEsted positive 7/15. Wife endorsed he was becoming more sob of recently. On arrival hypoxic to 80s and tachypneic. NRB placed, presently on 15% and saturating 95%. Na 126 s/p 1L NS. CXR: Frandy infiltrates. Last scr 1.4 in 2019-> today 1.9 unknown if underlying ckd. Dimer 450 - normal for age  however with covid and increasing fibrinogen, will need further eval.   Denies chets pain. LHC performed 2019 revealed normal coronaries.         PAST MEDICAL/SURGICAL/FAMILY/SOCIAL HISTORY:    Past Medical History:  Diabetes    HTN (hypertension).  No surgeries     Tobacco Usage:  · Tobacco Usage	non smoker  Fhx: none (21 Jul 2021 19:19)    24 hour events: ***    PAST MEDICAL & SURGICAL HISTORY:  HTN (hypertension)    Diabetes      REVIEW OF SYSTEMS  Constitutional: No fever, chills, fatigue  Neuro: No headache, numbness, weakness  Resp: No cough, wheezing, shortness of breath  CVS: No chest pain, palpitations, leg swelling  GI: No abdominal pain, nausea, vomiting, diarrhea   : No dysuria, frequency, incontinence  Skin: No itching, burning, rashes, or lesions   Msk: No joint pain or swelling  Psych: No depression, anxiety, mood swings  Heme: No bleeding    T(F): 97 (08-06-21 @ 08:00), Max: 97.8 (08-05-21 @ 18:00)  HR: 80 (08-06-21 @ 11:00) (58 - 82)  BP: 156/85 (08-06-21 @ 11:00) (100/53 - 156/85)  RR: 16 (08-06-21 @ 11:00) (16 - 36)  SpO2: 82% (08-06-21 @ 11:00) (81% - 99%)  Wt(kg): --      CAPILLARY BLOOD GLUCOSE  POCT Blood Glucose.: 267 mg/dL (05 Aug 2021 21:07)  POCT Blood Glucose.: 162 mg/dL (05 Aug 2021 15:54)    I&O's Summary    08-05 @ 07:01  -  08-06 @ 07:00  --------------------------------------------------------  IN: 1671 mL / OUT: 2650 mL / NET: -979 mL    PHYSICAL EXAM  General:   CNS:   HEENT:   Resp:   CVS:   Abd:   Ext:   Skin:     MEDICATIONS  labetalol Oral  losartan Oral  atorvastatin Oral  dextrose 40% Gel Oral  dextrose 50% Injectable IV Push  dextrose 50% Injectable IV Push  dextrose 50% Injectable IV Push  glucagon  Injectable IntraMuscular  insulin glargine Injectable (LANTUS) SubCutaneous  insulin lispro (ADMELOG) corrective regimen sliding scale SubCutaneous  insulin lispro (ADMELOG) corrective regimen sliding scale SubCutaneous  methylPREDNISolone sodium succinate Injectable IV Push  ALBUTerol    90 MICROgram(s) HFA Inhaler Inhalation PRN  benzonatate Oral PRN  budesonide 160 MICROgram(s)/formoterol 4.5 MICROgram(s) Inhaler Inhalation  acetaminophen   Tablet .. Oral PRN  acetaminophen   Tablet .. Oral PRN  melatonin Oral PRN  aspirin  chewable Oral  enoxaparin Injectable SubCutaneous  famotidine    Tablet Oral  cholecalciferol Oral  dextrose 5%. IV Continuous  dextrose 5%. IV Continuous  benzocaine 15 mG/menthol 3.6 mG (Sugar-Free) Lozenge Oral PRN                          11.5   16.49 )-----------( 212      ( 06 Aug 2021 07:29 )             34.4       08-06    137  |  104  |  29<H>  ----------------------------<  139<H>  4.4   |  29  |  0.83    Ca    8.5      06 Aug 2021 07:29  Phos  3.8     08-06  Mg     2.1     08-06    TPro  5.2<L>  /  Alb  2.3<L>  /  TBili  0.8  /  DBili  x   /  AST  81<H>  /  ALT  213<H>  /  AlkPhos  200<H>  08-06              .Sputum Sputum   Normal Respiratory Dalia present   Few polymorphonuclear leukocytes per low power field  Few Squamous epithelial cells per low power field  Few Gram positive cocci in pairs seen per oil power field  Few Gram Negative Rods seen per oil power field  Few Yeast like cells seen per oil power field 08-03 @ 00:15      Radiology: ***    CENTRAL LINE: Y/N          DATE INSERTED:              REMOVE: Y/N  GOFF: Y/N                        DATE INSERTED:              REMOVE: Y/N  A-LINE: Y/N                       DATE INSERTED:              REMOVE: Y/N    GLOBAL ISSUE/BEST PRACTICE  Analgesia:   Sedation:   CAM-ICU:   HOB elevation: yes  Stress ulcer prophylaxis:   VTE prophylaxis:   Glycemic control:   Nutrition:     CODE STATUS: ***  GO discussion: Y Patient is a 55y old  Male who presents with a chief complaint of cough/sob (06 Aug 2021 09:19)    HPI:  55 M with pmh HLD, dm, htn presents with 8 days onset of covid symptoms which included, cough, fevers, sob, hypoxia, fevers, diarrhea, chills and myalgias. TEsted positive 7/15. Wife endorsed he was becoming more sob of recently. On arrival hypoxic to 80s and tachypneic. NRB placed, presently on 15% and saturating 95%. Na 126 s/p 1L NS. CXR: Frandy infiltrates. Last scr 1.4 in 2019-> today 1.9 unknown if underlying ckd. Dimer 450 - normal for age  however with covid and increasing fibrinogen, will need further eval.   Denies chets pain. LHC performed 2019 revealed normal coronaries.         PAST MEDICAL/SURGICAL/FAMILY/SOCIAL HISTORY:    Past Medical History:  Diabetes    HTN (hypertension).  No surgeries     Tobacco Usage:  · Tobacco Usage	non smoker  Fhx: none (21 Jul 2021 19:19)    24 hour events: used bipap overnight     PAST MEDICAL & SURGICAL HISTORY:  HTN (hypertension)    Diabetes      REVIEW OF SYSTEMS  Constitutional: No fever, chills, fatigue  Neuro: No headache, numbness, weakness  Resp: +shortness of breath  CVS: No chest pain, palpitations, leg swelling  GI: No abdominal pain, nausea, vomiting, diarrhea   : No dysuria, frequency, incontinence  Skin: No itching, burning, rashes, or lesions   Msk: No joint pain or swelling  Psych: No depression, anxiety, mood swings  Heme: No bleeding    T(F): 97 (08-06-21 @ 08:00), Max: 97.8 (08-05-21 @ 18:00)  HR: 80 (08-06-21 @ 11:00) (58 - 82)  BP: 156/85 (08-06-21 @ 11:00) (100/53 - 156/85)  RR: 16 (08-06-21 @ 11:00) (16 - 36)  SpO2: 82% (08-06-21 @ 11:00) (81% - 99%)  Wt(kg): --      CAPILLARY BLOOD GLUCOSE  POCT Blood Glucose.: 267 mg/dL (05 Aug 2021 21:07)  POCT Blood Glucose.: 162 mg/dL (05 Aug 2021 15:54)    I&O's Summary    08-05 @ 07:01  -  08-06 @ 07:00  --------------------------------------------------------  IN: 1671 mL / OUT: 2650 mL / NET: -979 mL    PHYSICAL EXAM  General: NAD   CNS: AAOx3, nonfocal exam   HEENT: PERRL  Resp: diminished AE b/l   CVS: S1S2, regular   Abd: soft, NT, +BS  Ext: no edema   Skin: warm, not mottled     MEDICATIONS  labetalol Oral  losartan Oral  atorvastatin Oral  dextrose 40% Gel Oral  dextrose 50% Injectable IV Push  dextrose 50% Injectable IV Push  dextrose 50% Injectable IV Push  glucagon  Injectable IntraMuscular  insulin glargine Injectable (LANTUS) SubCutaneous  insulin lispro (ADMELOG) corrective regimen sliding scale SubCutaneous  insulin lispro (ADMELOG) corrective regimen sliding scale SubCutaneous  methylPREDNISolone sodium succinate Injectable IV Push  ALBUTerol    90 MICROgram(s) HFA Inhaler Inhalation PRN  benzonatate Oral PRN  budesonide 160 MICROgram(s)/formoterol 4.5 MICROgram(s) Inhaler Inhalation  acetaminophen   Tablet .. Oral PRN  acetaminophen   Tablet .. Oral PRN  melatonin Oral PRN  aspirin  chewable Oral  enoxaparin Injectable SubCutaneous  famotidine    Tablet Oral  cholecalciferol Oral  dextrose 5%. IV Continuous  dextrose 5%. IV Continuous  benzocaine 15 mG/menthol 3.6 mG (Sugar-Free) Lozenge Oral PRN                          11.5   16.49 )-----------( 212      ( 06 Aug 2021 07:29 )             34.4       08-06    137  |  104  |  29<H>  ----------------------------<  139<H>  4.4   |  29  |  0.83    Ca    8.5      06 Aug 2021 07:29  Phos  3.8     08-06  Mg     2.1     08-06    TPro  5.2<L>  /  Alb  2.3<L>  /  TBili  0.8  /  DBili  x   /  AST  81<H>  /  ALT  213<H>  /  AlkPhos  200<H>  08-06              .Sputum Sputum   Normal Respiratory Dalia present   Few polymorphonuclear leukocytes per low power field  Few Squamous epithelial cells per low power field  Few Gram positive cocci in pairs seen per oil power field  Few Gram Negative Rods seen per oil power field  Few Yeast like cells seen per oil power field 08-03 @ 00:15      CENTRAL LINE: N           GOFF: N                         A-LINE: N                      GLOBAL ISSUE/BEST PRACTICE  Analgesia: NA  Sedation: NA  CAM-ICU: neg  HOB elevation: yes  Stress ulcer prophylaxis: NA  VTE prophylaxis: Y  Glycemic control: Y  Nutrition: Y    CODE STATUS: full  GOC discussion: Y

## 2021-08-06 NOTE — PROGRESS NOTE ADULT - ASSESSMENT
1) COVID Pneumonia  2) Dyspnea  3) Abnormal CXR  4) Hypoxemia  5) Hypertension  6) ROBERT  7) Mild PH    55 M noted to have COVID 7/15  On arrival hypoxic to 80s and tachypneic. NRB placed,in the ER saturating 95%. Na 126 s/p 1L NS. CXR: Frandy infiltrates. Last scr 1.4 in 2019-> today 1.9 unknown if underlying ckd.  Patient not vaccinated.   Last seen by me in 2019 for ROBERT noted to have uncontrolled hypertension  Treated with IV Remdesivir and Decadron, Tocilizumab   SaO2 is 80-85% despite being on 100% NRB so was switched to HFNC  ABG/imaging reviewed  Given the obesity/hypertension and prior co-morbidities, he is at risk of intubation but continue HFNC, respiratory status remains critical  XR reviewed- pulmonary infiltrates are present  DDimer elevated, was on therapeutic lovenox, transitioned to 40mg q 12  On tapered Solumedrol, currently IV 20mg daily Symbicort 160/4.5 BID (https://[email or web address]/journals/lanres/article/ADGU5449-7196, STOIC study)  On HFNC 50LPM, 90%FiO2. Attempted BiPAP overnight  Critical Care performed POCUS on 8/4, noted to have pulmonary edema   Now pending CTPE study, in process of being transitioned to NRB to see if he can tolerate it prior to CT  Will likely need a prolonged course of HFNC given the tenuous respiratory status  Discussed inspiratory muscle exercises with patient while he is out of bed +/- incentive spirometer   Discussed with RT/Nursing staff; will discuss with Dr Noman Salazar to cover 8/7-8/9

## 2021-08-07 LAB
ANION GAP SERPL CALC-SCNC: 5 MMOL/L — SIGNIFICANT CHANGE UP (ref 5–17)
BASOPHILS # BLD AUTO: 0.01 K/UL — SIGNIFICANT CHANGE UP (ref 0–0.2)
BASOPHILS NFR BLD AUTO: 0.1 % — SIGNIFICANT CHANGE UP (ref 0–2)
BUN SERPL-MCNC: 31 MG/DL — HIGH (ref 7–23)
CALCIUM SERPL-MCNC: 8.2 MG/DL — LOW (ref 8.5–10.1)
CHLORIDE SERPL-SCNC: 104 MMOL/L — SIGNIFICANT CHANGE UP (ref 96–108)
CO2 SERPL-SCNC: 29 MMOL/L — SIGNIFICANT CHANGE UP (ref 22–31)
CREAT SERPL-MCNC: 0.77 MG/DL — SIGNIFICANT CHANGE UP (ref 0.5–1.3)
EOSINOPHIL # BLD AUTO: 0.29 K/UL — SIGNIFICANT CHANGE UP (ref 0–0.5)
EOSINOPHIL NFR BLD AUTO: 1.7 % — SIGNIFICANT CHANGE UP (ref 0–6)
GLUCOSE SERPL-MCNC: 107 MG/DL — HIGH (ref 70–99)
HCT VFR BLD CALC: 32.3 % — LOW (ref 39–50)
HGB BLD-MCNC: 10.4 G/DL — LOW (ref 13–17)
IMM GRANULOCYTES NFR BLD AUTO: 0.9 % — SIGNIFICANT CHANGE UP (ref 0–1.5)
LYMPHOCYTES # BLD AUTO: 1 K/UL — SIGNIFICANT CHANGE UP (ref 1–3.3)
LYMPHOCYTES # BLD AUTO: 5.8 % — LOW (ref 13–44)
MAGNESIUM SERPL-MCNC: 2 MG/DL — SIGNIFICANT CHANGE UP (ref 1.6–2.6)
MCHC RBC-ENTMCNC: 27.9 PG — SIGNIFICANT CHANGE UP (ref 27–34)
MCHC RBC-ENTMCNC: 32.2 GM/DL — SIGNIFICANT CHANGE UP (ref 32–36)
MCV RBC AUTO: 86.6 FL — SIGNIFICANT CHANGE UP (ref 80–100)
MONOCYTES # BLD AUTO: 1.34 K/UL — HIGH (ref 0–0.9)
MONOCYTES NFR BLD AUTO: 7.8 % — SIGNIFICANT CHANGE UP (ref 2–14)
NEUTROPHILS # BLD AUTO: 14.4 K/UL — HIGH (ref 1.8–7.4)
NEUTROPHILS NFR BLD AUTO: 83.7 % — HIGH (ref 43–77)
PHOSPHATE SERPL-MCNC: 4.6 MG/DL — HIGH (ref 2.5–4.5)
PLATELET # BLD AUTO: 198 K/UL — SIGNIFICANT CHANGE UP (ref 150–400)
POTASSIUM SERPL-MCNC: 4.4 MMOL/L — SIGNIFICANT CHANGE UP (ref 3.5–5.3)
POTASSIUM SERPL-SCNC: 4.4 MMOL/L — SIGNIFICANT CHANGE UP (ref 3.5–5.3)
RBC # BLD: 3.73 M/UL — LOW (ref 4.2–5.8)
RBC # FLD: 12.5 % — SIGNIFICANT CHANGE UP (ref 10.3–14.5)
SODIUM SERPL-SCNC: 138 MMOL/L — SIGNIFICANT CHANGE UP (ref 135–145)
WBC # BLD: 17.19 K/UL — HIGH (ref 3.8–10.5)
WBC # FLD AUTO: 17.19 K/UL — HIGH (ref 3.8–10.5)

## 2021-08-07 PROCEDURE — 99291 CRITICAL CARE FIRST HOUR: CPT

## 2021-08-07 RX ORDER — VANCOMYCIN HCL 1 G
1250 VIAL (EA) INTRAVENOUS EVERY 12 HOURS
Refills: 0 | Status: COMPLETED | OUTPATIENT
Start: 2021-08-07 | End: 2021-08-09

## 2021-08-07 RX ORDER — CEFEPIME 1 G/1
2000 INJECTION, POWDER, FOR SOLUTION INTRAMUSCULAR; INTRAVENOUS EVERY 8 HOURS
Refills: 0 | Status: COMPLETED | OUTPATIENT
Start: 2021-08-07 | End: 2021-08-09

## 2021-08-07 RX ORDER — VANCOMYCIN HCL 1 G
VIAL (EA) INTRAVENOUS
Refills: 0 | Status: COMPLETED | OUTPATIENT
Start: 2021-08-07 | End: 2021-08-09

## 2021-08-07 RX ORDER — VANCOMYCIN HCL 1 G
1250 VIAL (EA) INTRAVENOUS ONCE
Refills: 0 | Status: COMPLETED | OUTPATIENT
Start: 2021-08-07 | End: 2021-08-07

## 2021-08-07 RX ORDER — CEFEPIME 1 G/1
INJECTION, POWDER, FOR SOLUTION INTRAMUSCULAR; INTRAVENOUS
Refills: 0 | Status: COMPLETED | OUTPATIENT
Start: 2021-08-07 | End: 2021-08-09

## 2021-08-07 RX ORDER — CEFEPIME 1 G/1
2000 INJECTION, POWDER, FOR SOLUTION INTRAMUSCULAR; INTRAVENOUS ONCE
Refills: 0 | Status: COMPLETED | OUTPATIENT
Start: 2021-08-07 | End: 2021-08-07

## 2021-08-07 RX ADMIN — CEFEPIME 100 MILLIGRAM(S): 1 INJECTION, POWDER, FOR SOLUTION INTRAMUSCULAR; INTRAVENOUS at 17:51

## 2021-08-07 RX ADMIN — FAMOTIDINE 20 MILLIGRAM(S): 10 INJECTION INTRAVENOUS at 10:39

## 2021-08-07 RX ADMIN — Medication 3 MILLIGRAM(S): at 23:23

## 2021-08-07 RX ADMIN — LOSARTAN POTASSIUM 50 MILLIGRAM(S): 100 TABLET, FILM COATED ORAL at 10:39

## 2021-08-07 RX ADMIN — CEFEPIME 100 MILLIGRAM(S): 1 INJECTION, POWDER, FOR SOLUTION INTRAMUSCULAR; INTRAVENOUS at 21:03

## 2021-08-07 RX ADMIN — BUDESONIDE AND FORMOTEROL FUMARATE DIHYDRATE 2 PUFF(S): 160; 4.5 AEROSOL RESPIRATORY (INHALATION) at 08:19

## 2021-08-07 RX ADMIN — Medication 2000 UNIT(S): at 11:39

## 2021-08-07 RX ADMIN — Medication 200 MILLIGRAM(S): at 10:39

## 2021-08-07 RX ADMIN — Medication 20 MILLIGRAM(S): at 10:39

## 2021-08-07 RX ADMIN — Medication 4: at 16:11

## 2021-08-07 RX ADMIN — ENOXAPARIN SODIUM 40 MILLIGRAM(S): 100 INJECTION SUBCUTANEOUS at 21:04

## 2021-08-07 RX ADMIN — Medication 166.67 MILLIGRAM(S): at 11:41

## 2021-08-07 RX ADMIN — CEFEPIME 100 MILLIGRAM(S): 1 INJECTION, POWDER, FOR SOLUTION INTRAMUSCULAR; INTRAVENOUS at 10:39

## 2021-08-07 RX ADMIN — INSULIN GLARGINE 10 UNIT(S): 100 INJECTION, SOLUTION SUBCUTANEOUS at 11:39

## 2021-08-07 RX ADMIN — Medication 4: at 18:11

## 2021-08-07 RX ADMIN — ENOXAPARIN SODIUM 40 MILLIGRAM(S): 100 INJECTION SUBCUTANEOUS at 10:39

## 2021-08-07 RX ADMIN — Medication 200 MILLIGRAM(S): at 21:03

## 2021-08-07 RX ADMIN — ATORVASTATIN CALCIUM 80 MILLIGRAM(S): 80 TABLET, FILM COATED ORAL at 21:03

## 2021-08-07 RX ADMIN — Medication 166.67 MILLIGRAM(S): at 21:03

## 2021-08-07 RX ADMIN — Medication 81 MILLIGRAM(S): at 10:40

## 2021-08-07 NOTE — PROGRESS NOTE ADULT - ASSESSMENT
1) COVID Pneumonia  2) Dyspnea  3) Abnormal CXR  4) Hypoxemia  5) Hypertension  6) ROBERT  7) Mild PH    55 M noted to have COVID 7/15  On arrival hypoxic to 80s and tachypneic. NRB placed,in the ER saturating 95%. Na 126 s/p 1L NS. CXR: Frandy infiltrates. Last scr 1.4 in 2019-> today 1.9 unknown if underlying ckd.  Patient not vaccinated.   Last seen by me in 2019 for ROBERT noted to have uncontrolled hypertension  Treated with IV Remdesivir and Decadron, Tocilizumab   SaO2 is 80-85% despite being on 100% NRB so was switched to HFNC  ABG/imaging reviewed  Given the obesity/hypertension and prior co-morbidities, he is at risk of intubation but continue HFNC, respiratory status remains critical  XR reviewed- pulmonary infiltrates are present  DDimer elevated, was on therapeutic lovenox, transitioned to 40mg q 12  On tapered Solumedrol, currently IV 20mg daily Symbicort 160/4.5 BID (https://www.[email or web address]/journals/lanres/article/JKIC1540-3724, STOIC study)  On HFNC 50LPM, 90%FiO2. Attempted BiPAP overnight  Critical Care performed POCUS on 8/4, noted to have pulmonary edema   Now pending CTPE study, in process of being transitioned to NRB to see if he can tolerate it prior to CT  Will likely need a prolonged course of HFNC given the tenuous respiratory status  Discussed inspiratory muscle exercises with patient while he is out of bed +/- incentive spirometer     8/7  covering for Dr Arceo  data discussed with the intensivist and critical care team this am  not stable for further imaging and transport to ct scan due to high FIO2 demand  he appears the same with high O2 requirements  'sitting upright in bed  data discussed with Dr Tineo  overall resp status remains critical despite all aggressive measures  can not entirely rule out superimposed bacterial infection  agree with adding broad spectrum coverage  once improved may reconsider Ct scan imaging for further eval  overall prognosis remains guarded  ICU level care appreciated

## 2021-08-07 NOTE — PROGRESS NOTE ADULT - SUBJECTIVE AND OBJECTIVE BOX
Patient is a 55y old  Male who presents with a chief complaint of cough/sob (23 Jul 2021 14:03)      HPI:  55 M with pmh HLD, dm, htn presents with 8 days onset of covid symptoms which included, cough, fevers, sob, hypoxia, fevers, diarrhea, chills and myalgias. TEsted positive 7/15. Wife endorsed he was becoming more sob of recently. On arrival hypoxic to 80s and tachypneic. NRB placed, presently on 15% and saturating 95%. Na 126 s/p 1L NS. CXR: Frandy infiltrates. Last scr 1.4 in 2019-> today 1.9 unknown if underlying ckd.  Patient not vaccinated.   Last seen by me in 2019  History of ROBERT and uncontrolled hypertension  Treated with IV Remdesivir and Decadron, now on Toci  SaO2 is 80-85% despite being on 100% NRB  ABG pending    8/6  No acute pulmonary events occurred overnight   On HFNC 50LPM, 90%FiO2. Attempted BiPAP overnight  Critical Care performed POCUS on 8/4, noted to have pulmonary edema   Now pending CTPE study, in process of being transitioned to NRB to see if he can tolerate it prior to CT  8/7  covering for Dr Arceo  data discussed with the intensivist and critical care team this am  not stable for further imaging and transport to ct scan due to high FIO2 demand  he appears the same with high O2 requirements  'sitting upright in bed    PAST MEDICAL/SURGICAL/FAMILY/SOCIAL HISTORY:    Past Medical History:  Diabetes    HTN (hypertension).  No surgeries     Tobacco Usage:  · Tobacco Usage	non smoker  Fhx: none (21 Jul 2021 19:19)      MEDICATIONS  (STANDING):  aspirin  chewable 81 milliGRAM(s) Oral daily  atorvastatin 80 milliGRAM(s) Oral at bedtime  budesonide 160 MICROgram(s)/formoterol 4.5 MICROgram(s) Inhaler 2 Puff(s) Inhalation two times a day  cefepime   IVPB      cholecalciferol 2000 Unit(s) Oral daily  dextrose 40% Gel 15 Gram(s) Oral once  dextrose 5%. 1000 milliLiter(s) (100 mL/Hr) IV Continuous <Continuous>  dextrose 5%. 1000 milliLiter(s) (50 mL/Hr) IV Continuous <Continuous>  dextrose 50% Injectable 25 Gram(s) IV Push once  dextrose 50% Injectable 12.5 Gram(s) IV Push once  dextrose 50% Injectable 25 Gram(s) IV Push once  enoxaparin Injectable 40 milliGRAM(s) SubCutaneous every 12 hours  famotidine    Tablet 20 milliGRAM(s) Oral daily  glucagon  Injectable 1 milliGRAM(s) IntraMuscular once  insulin glargine Injectable (LANTUS) 10 Unit(s) SubCutaneous <User Schedule>  insulin lispro (ADMELOG) corrective regimen sliding scale   SubCutaneous three times a day before meals  insulin lispro (ADMELOG) corrective regimen sliding scale   SubCutaneous at bedtime  labetalol 200 milliGRAM(s) Oral every 12 hours  losartan 50 milliGRAM(s) Oral daily  predniSONE   Tablet 20 milliGRAM(s) Oral daily  vancomycin  IVPB          MEDICATIONS  (PRN):  acetaminophen   Tablet .. 650 milliGRAM(s) Oral once PRN Temp greater or equal to 38C (100.4F), Mild Pain (1 - 3)  acetaminophen   Tablet .. 650 milliGRAM(s) Oral every 4 hours PRN Temp greater or equal to 38C (100.4F), Mild Pain (1 - 3)  ALBUTerol    90 MICROgram(s) HFA Inhaler 2 Puff(s) Inhalation every 4 hours PRN Shortness of Breath and/or Wheezing  benzocaine 15 mG/menthol 3.6 mG (Sugar-Free) Lozenge 1 Lozenge Oral every 6 hours PRN Sore Throat  benzonatate 100 milliGRAM(s) Oral three times a day PRN Cough  melatonin 3 milliGRAM(s) Oral at bedtime PRN Insomnia    ICU Vital Signs Last 24 Hrs  T(C): 37 (07 Aug 2021 04:00), Max: 37.1 (06 Aug 2021 20:00)  T(F): 98.6 (07 Aug 2021 04:00), Max: 98.7 (06 Aug 2021 20:00)  HR: 67 (07 Aug 2021 07:00) (63 - 93)  BP: 137/84 (07 Aug 2021 07:00) (108/67 - 156/85)  BP(mean): 95 (07 Aug 2021 07:00) (77 - 106)  ABP: --  ABP(mean): --  RR: 26 (07 Aug 2021 07:00) (16 - 33)  SpO2: 92% (07 Aug 2021 07:00) (77% - 95%)  I&O's Detail    06 Aug 2021 07:01  -  07 Aug 2021 07:00  --------------------------------------------------------  IN:    Oral Fluid: 1110 mL  Total IN: 1110 mL    OUT:    Voided (mL): 1900 mL  Total OUT: 1900 mL    Total NET: -790 mL          PHYSICAL EXAM  General Appearance:supine, on HFNC  Neck: Supple,   Lungs: coarse bilaterally  Heart: Regular rate and rhythm, S1, S2 normal,Not tachycardic  Abdomen: Soft, non-tender, bowel sounds active   Extremities: no cyanosis or edema, no joint swelling  Skin: Skin color, texture normal, no rashes   Neurologic: Alert and oriented X3 , non focal    ECG:    LABS:                        10.4   17.19 )-----------( 198      ( 07 Aug 2021 07:06 )             32.3   08-07    138  |  104  |  31<H>  ----------------------------<  107<H>  4.4   |  29  |  0.77    Ca    8.2<L>      07 Aug 2021 07:06  Phos  4.6     08-07  Mg     2.0     08-07    TPro  5.2<L>  /  Alb  2.3<L>  /  TBili  0.8  /  DBili  x   /  AST  81<H>  /  ALT  213<H>  /  AlkPhos  200<H>  08-06                            12.9   15.00 )-----------( 366      ( 26 Jul 2021 06:20 )             38.4   07-26    133<L>  |  104  |  37<H>  ----------------------------<  232<H>  4.2   |  23  |  1.17    Ca    8.4<L>      26 Jul 2021 06:20    TPro  6.3  /  Alb  2.2<L>  /  TBili  0.4  /  DBili  0.1  /  AST  103<H>  /  ALT  137<H>  /  AlkPhos  239<H>  07-26                          13.2   13.52 )-----------( 338      ( 24 Jul 2021 08:06 )             39.5     07-24    136  |  105  |  43<H>  ----------------------------<  227<H>  4.3   |  25  |  1.31<H>    Ca    8.7      24 Jul 2021 08:06    TPro  6.6  /  Alb  2.3<L>  /  TBili  0.4  /  DBili  x   /  AST  73<H>  /  ALT  64  /  AlkPhos  165<H>  07-24          Pro BNP  -- 07-24 @ 08:06  D Dimer  446 07-24 @ 08:06  Pro BNP  261 07-21 @ 14:42  D Dimer  460 07-21 @ 14:42              < from: Xray Chest 1 View- PORTABLE-Urgent (07.21.21 @ 15:33) >    PROCEDURE DATE:  07/21/2021          INTERPRETATION:  AP chest on July 21, 2021 at 3:27 PM. Patient is short of breath with cough and fever.    Heart magnified by technique.    There arescattered mid lower lung field infiltrates right greater than left possibly related: Pneumonia.    The lungs are clear on August 30, 2019.    IMPRESSION: Bilateral infiltrates as above.    < end of copied text >  < from: US Duplex Venous Lower Ext Complete, Bilateral (07.25.21 @ 08:42) >  COMPARISON: None available.    TECHNIQUE: Duplex sonography of the BILATERAL LOWER extremity veins with color and spectral Doppler, with and without compression.    FINDINGS:    RIGHT:  Normal compressibility of the RIGHT common femoral, femoral and popliteal veins.  Doppler examination shows normal spontaneous and phasic flow.  No RIGHT calf vein thrombosis is detected.    LEFT:  Normal compressibility of the LEFT common femoral, femoral and popliteal veins.  Doppler examination shows normal spontaneous and phasic flow.  No LEFT calf vein thrombosis is detected.    IMPRESSION:  No evidence of deep venous thrombosis in either lower extremity.    < end of copied text >  RADIOLOGY & ADDITIONAL STUDIES:    < from: Xray Chest 1 View- PORTABLE-Urgent (Xray Chest 1 View- PORTABLE-Urgent .) (07.26.21 @ 08:43) >    PROCEDURE DATE:  07/26/2021          INTERPRETATION:  Portable chest radiograph    CLINICAL INFORMATION: Pneumonia due to Covid 19.. Follow-up    TECHNIQUE:  Portable  AP view of the chest was obtained.    COMPARISON: 7/21/2021 chest available for review.    FINDINGS:    The lungs show stable bilateral  multifocal and diffuse ill-defined airspace opacities.. No pneumothorax.    The  heart is enlarged in transverse diameter. No hilar mass.     Visualized osseous structures are intact.    IMPRESSION:   Stable bilateral  multifocal and diffuse ill-defined airspace opacities..    < end of copied text >  < from: US Duplex Venous Lower Ext Complete, Bilateral (08.06.21 @ 17:16) >  FINDINGS:    RIGHT:  Normal compressibility of the RIGHT common femoral, femoral and popliteal veins.  Doppler examination shows normal spontaneous and phasic flow.  No RIGHT calf vein thrombosis is detected.    LEFT:  Normal compressibility of the LEFT common femoral, femoral and popliteal veins.  Doppler examination shows normal spontaneous and phasic flow.  No LEFT calf vein thrombosisis detected.    IMPRESSION:  No evidence of deep venous thrombosis in either lower extremity.    < end of copied text >  < from: Xray Chest 1 View- PORTABLE-Urgent (Xray Chest 1 View- PORTABLE-Urgent .) (08.06.21 @ 16:45) >  INTERPRETATION:  Portable chest radiograph    CLINICAL INFORMATION: Pneumonia due to Covid 19.    TECHNIQUE:  Portable  AP view of the chest was obtained.    COMPARISON: 8/1/2021 chest radiograph available for review.    FINDINGS:    The lungs show decreasing bilateral diffuse airspace disease.. No pneumothorax.    The  heart is mildly enlarged in transverse diameter. No hilar mass.   Visualized osseous structures are intact.    IMPRESSION:   Decreasing bilateral diffuse airspace disease..    < end of copied text >

## 2021-08-07 NOTE — PROGRESS NOTE ADULT - SUBJECTIVE AND OBJECTIVE BOX
Patient is a 55y old  Male who presents with a chief complaint of cough/sob (07 Aug 2021 09:50)    HPI:  55 M with pmh HLD, dm, htn presents with 8 days onset of covid symptoms which included, cough, fevers, sob, hypoxia, fevers, diarrhea, chills and myalgias. TEsted positive 7/15. Wife endorsed he was becoming more sob of recently. On arrival hypoxic to 80s and tachypneic. NRB placed, presently on 15% and saturating 95%. Na 126 s/p 1L NS. CXR: Frandy infiltrates. Last scr 1.4 in 2019-> today 1.9 unknown if underlying ckd. Dimer 450 - normal for age  however with covid and increasing fibrinogen, will need further eval.   Denies chets pain. LHC performed 2019 revealed normal coronaries.         PAST MEDICAL/SURGICAL/FAMILY/SOCIAL HISTORY:    Past Medical History:  Diabetes    HTN (hypertension).  No surgeries     Tobacco Usage:  · Tobacco Usage	non smoker  Fhx: none (21 Jul 2021 19:19)    24 hour events: more frequent and longer episodes of desaturations    PAST MEDICAL & SURGICAL HISTORY:  HTN (hypertension)    Diabetes      REVIEW OF SYSTEMS  Constitutional: No fever, chills, fatigue  Neuro: No headache, numbness, weakness  Resp: +cough, shortness of breath  CVS: No chest pain, palpitations, leg swelling  GI: No abdominal pain, nausea, vomiting, diarrhea   : No dysuria, frequency, incontinence  Skin: No itching, burning, rashes, or lesions   Msk: No joint pain or swelling  Psych: No depression, anxiety, mood swings  Heme: No bleeding    T(F): 98.6 (08-07-21 @ 04:00), Max: 98.7 (08-06-21 @ 20:00)  HR: 73 (08-07-21 @ 09:00) (63 - 93)  BP: 135/77 (08-07-21 @ 09:00) (108/67 - 149/76)  RR: 27 (08-07-21 @ 09:00) (20 - 33)  SpO2: 84% (08-07-21 @ 09:00) (77% - 95%)  Wt(kg): --      CAPILLARY BLOOD GLUCOSE  POCT Blood Glucose.: 206 mg/dL (06 Aug 2021 21:37)  POCT Blood Glucose.: 250 mg/dL (06 Aug 2021 18:31)  POCT Blood Glucose.: 134 mg/dL (06 Aug 2021 12:29)    I&O's Summary    08-06 @ 07:01  -  08-07 @ 07:00  --------------------------------------------------------  IN: 1110 mL / OUT: 1900 mL / NET: -790 mL    PHYSICAL EXAM  General: NAD   CNS: AAOx3, nonfocal exam   HEENT: PERRL  Resp: diminished AE b/l with rhonchi  CVS: S1S2, regular   Abd: soft, NT, +BS  Ext: no edema   Skin: warm, not mottled     MEDICATIONS  cefepime   IVPB IV Intermittent  cefepime   IVPB   vancomycin  IVPB   vancomycin  IVPB IV Intermittent  vancomycin  IVPB IV Intermittent  labetalol Oral  losartan Oral  atorvastatin Oral  dextrose 40% Gel Oral  dextrose 50% Injectable IV Push  dextrose 50% Injectable IV Push  dextrose 50% Injectable IV Push  glucagon  Injectable IntraMuscular  insulin glargine Injectable (LANTUS) SubCutaneous  insulin lispro (ADMELOG) corrective regimen sliding scale SubCutaneous  insulin lispro (ADMELOG) corrective regimen sliding scale SubCutaneous  predniSONE   Tablet Oral  ALBUTerol    90 MICROgram(s) HFA Inhaler Inhalation PRN  benzonatate Oral PRN  budesonide 160 MICROgram(s)/formoterol 4.5 MICROgram(s) Inhaler Inhalation  acetaminophen   Tablet .. Oral PRN  acetaminophen   Tablet .. Oral PRN  melatonin Oral PRN  aspirin  chewable Oral  enoxaparin Injectable SubCutaneous  famotidine    Tablet Oral  cholecalciferol Oral  dextrose 5%. IV Continuous  dextrose 5%. IV Continuous  benzocaine 15 mG/menthol 3.6 mG (Sugar-Free) Lozenge Oral PRN                          10.4   17.19 )-----------( 198      ( 07 Aug 2021 07:06 )             32.3       08-07    138  |  104  |  31<H>  ----------------------------<  107<H>  4.4   |  29  |  0.77    Ca    8.2<L>      07 Aug 2021 07:06  Phos  4.6     08-07  Mg     2.0     08-07    TPro  5.2<L>  /  Alb  2.3<L>  /  TBili  0.8  /  DBili  x   /  AST  81<H>  /  ALT  213<H>  /  AlkPhos  200<H>  08-06              .Sputum Sputum   Normal Respiratory Dalia present   Few polymorphonuclear leukocytes per low power field  Few Squamous epithelial cells per low power field  Few Gram positive cocci in pairs seen per oil power field  Few Gram Negative Rods seen per oil power field  Few Yeast like cells seen per oil power field 08-03 @ 00:15      CENTRAL LINE: N           GOFF: N                         A-LINE: N                      GLOBAL ISSUE/BEST PRACTICE  Analgesia: NA  Sedation: NA  CAM-ICU: neg  HOB elevation: yes  Stress ulcer prophylaxis: NA  VTE prophylaxis: Y  Glycemic control: Y  Nutrition: Y    CODE STATUS: full  GOC discussion: Y

## 2021-08-07 NOTE — PROGRESS NOTE ADULT - ASSESSMENT
Imp:   Acute hypoxic resp failure, ARDS from COVID-19 pna (not vaccinated)  Vit D deficiency   Hx HTN, DM2, HLD, obesity     Frequent and longer desaturation episodes   CXR with improved aeration overall however increased consolidative patterns in R base and L mid/upper areas     Plan:   In view of persistent and slightly worsening hypoxia will start empiric vanc and cefepime for HCAP   Fungitell normal   LE venous dopplers neg for DVT   L cephalic sup thrombophlebitis - apply warm compress   Continue HFNC during the day, BiPAP at night   Self-proning as tolerated   Prednisone 20 mg daily   S/p tocilizumab 7/23  Continue Symbicort  Unable to perform CTA chest due to desaturation  Glucose better controlled after changing lantus timing   PO diet, having BMs  Vit D supplementation   Lovenox for DVT ppx     Remains critical with high risk for decompensation

## 2021-08-08 LAB
ANION GAP SERPL CALC-SCNC: 6 MMOL/L — SIGNIFICANT CHANGE UP (ref 5–17)
BASOPHILS # BLD AUTO: 0.02 K/UL — SIGNIFICANT CHANGE UP (ref 0–0.2)
BASOPHILS NFR BLD AUTO: 0.1 % — SIGNIFICANT CHANGE UP (ref 0–2)
BUN SERPL-MCNC: 27 MG/DL — HIGH (ref 7–23)
CALCIUM SERPL-MCNC: 8.4 MG/DL — LOW (ref 8.5–10.1)
CHLORIDE SERPL-SCNC: 102 MMOL/L — SIGNIFICANT CHANGE UP (ref 96–108)
CO2 SERPL-SCNC: 29 MMOL/L — SIGNIFICANT CHANGE UP (ref 22–31)
CREAT SERPL-MCNC: 0.77 MG/DL — SIGNIFICANT CHANGE UP (ref 0.5–1.3)
EOSINOPHIL # BLD AUTO: 0.66 K/UL — HIGH (ref 0–0.5)
EOSINOPHIL NFR BLD AUTO: 3.5 % — SIGNIFICANT CHANGE UP (ref 0–6)
GLUCOSE SERPL-MCNC: 121 MG/DL — HIGH (ref 70–99)
HCT VFR BLD CALC: 30.3 % — LOW (ref 39–50)
HGB BLD-MCNC: 10 G/DL — LOW (ref 13–17)
IMM GRANULOCYTES NFR BLD AUTO: 0.8 % — SIGNIFICANT CHANGE UP (ref 0–1.5)
LYMPHOCYTES # BLD AUTO: 0.77 K/UL — LOW (ref 1–3.3)
LYMPHOCYTES # BLD AUTO: 4 % — LOW (ref 13–44)
MAGNESIUM SERPL-MCNC: 2 MG/DL — SIGNIFICANT CHANGE UP (ref 1.6–2.6)
MCHC RBC-ENTMCNC: 28.4 PG — SIGNIFICANT CHANGE UP (ref 27–34)
MCHC RBC-ENTMCNC: 33 GM/DL — SIGNIFICANT CHANGE UP (ref 32–36)
MCV RBC AUTO: 86.1 FL — SIGNIFICANT CHANGE UP (ref 80–100)
MONOCYTES # BLD AUTO: 1 K/UL — HIGH (ref 0–0.9)
MONOCYTES NFR BLD AUTO: 5.2 % — SIGNIFICANT CHANGE UP (ref 2–14)
NEUTROPHILS # BLD AUTO: 16.45 K/UL — HIGH (ref 1.8–7.4)
NEUTROPHILS NFR BLD AUTO: 86.4 % — HIGH (ref 43–77)
PHOSPHATE SERPL-MCNC: 3.4 MG/DL — SIGNIFICANT CHANGE UP (ref 2.5–4.5)
PLATELET # BLD AUTO: 179 K/UL — SIGNIFICANT CHANGE UP (ref 150–400)
POTASSIUM SERPL-MCNC: 3.9 MMOL/L — SIGNIFICANT CHANGE UP (ref 3.5–5.3)
POTASSIUM SERPL-SCNC: 3.9 MMOL/L — SIGNIFICANT CHANGE UP (ref 3.5–5.3)
RBC # BLD: 3.52 M/UL — LOW (ref 4.2–5.8)
RBC # FLD: 12.7 % — SIGNIFICANT CHANGE UP (ref 10.3–14.5)
SODIUM SERPL-SCNC: 137 MMOL/L — SIGNIFICANT CHANGE UP (ref 135–145)
VANCOMYCIN TROUGH SERPL-MCNC: 9.6 UG/ML — LOW (ref 10–20)
WBC # BLD: 19.05 K/UL — HIGH (ref 3.8–10.5)
WBC # FLD AUTO: 19.05 K/UL — HIGH (ref 3.8–10.5)

## 2021-08-08 PROCEDURE — 99291 CRITICAL CARE FIRST HOUR: CPT

## 2021-08-08 RX ADMIN — CEFEPIME 100 MILLIGRAM(S): 1 INJECTION, POWDER, FOR SOLUTION INTRAMUSCULAR; INTRAVENOUS at 22:55

## 2021-08-08 RX ADMIN — Medication 81 MILLIGRAM(S): at 10:14

## 2021-08-08 RX ADMIN — CEFEPIME 100 MILLIGRAM(S): 1 INJECTION, POWDER, FOR SOLUTION INTRAMUSCULAR; INTRAVENOUS at 05:04

## 2021-08-08 RX ADMIN — Medication 40 MILLIGRAM(S): at 11:30

## 2021-08-08 RX ADMIN — ENOXAPARIN SODIUM 40 MILLIGRAM(S): 100 INJECTION SUBCUTANEOUS at 22:55

## 2021-08-08 RX ADMIN — BUDESONIDE AND FORMOTEROL FUMARATE DIHYDRATE 2 PUFF(S): 160; 4.5 AEROSOL RESPIRATORY (INHALATION) at 20:35

## 2021-08-08 RX ADMIN — Medication 650 MILLIGRAM(S): at 10:15

## 2021-08-08 RX ADMIN — Medication 200 MILLIGRAM(S): at 10:14

## 2021-08-08 RX ADMIN — BUDESONIDE AND FORMOTEROL FUMARATE DIHYDRATE 2 PUFF(S): 160; 4.5 AEROSOL RESPIRATORY (INHALATION) at 09:18

## 2021-08-08 RX ADMIN — Medication 166.67 MILLIGRAM(S): at 05:05

## 2021-08-08 RX ADMIN — Medication 20 MILLIGRAM(S): at 10:19

## 2021-08-08 RX ADMIN — Medication 2000 UNIT(S): at 11:30

## 2021-08-08 RX ADMIN — CEFEPIME 100 MILLIGRAM(S): 1 INJECTION, POWDER, FOR SOLUTION INTRAMUSCULAR; INTRAVENOUS at 13:24

## 2021-08-08 RX ADMIN — ATORVASTATIN CALCIUM 80 MILLIGRAM(S): 80 TABLET, FILM COATED ORAL at 22:56

## 2021-08-08 RX ADMIN — Medication 3 MILLIGRAM(S): at 22:56

## 2021-08-08 RX ADMIN — LOSARTAN POTASSIUM 50 MILLIGRAM(S): 100 TABLET, FILM COATED ORAL at 10:15

## 2021-08-08 RX ADMIN — ENOXAPARIN SODIUM 40 MILLIGRAM(S): 100 INJECTION SUBCUTANEOUS at 10:13

## 2021-08-08 RX ADMIN — Medication 166.67 MILLIGRAM(S): at 18:29

## 2021-08-08 RX ADMIN — INSULIN GLARGINE 10 UNIT(S): 100 INJECTION, SOLUTION SUBCUTANEOUS at 12:18

## 2021-08-08 RX ADMIN — FAMOTIDINE 20 MILLIGRAM(S): 10 INJECTION INTRAVENOUS at 10:15

## 2021-08-08 RX ADMIN — Medication 10: at 17:24

## 2021-08-08 RX ADMIN — Medication 4: at 12:19

## 2021-08-08 RX ADMIN — Medication 200 MILLIGRAM(S): at 22:56

## 2021-08-08 RX ADMIN — Medication 650 MILLIGRAM(S): at 11:11

## 2021-08-08 NOTE — PROGRESS NOTE ADULT - ASSESSMENT
Imp:   Acute hypoxic resp failure, ARDS from COVID-19 pna (not vaccinated)  Vit D deficiency   Hx HTN, DM2, HLD, obesity     Status mostly unchanged but feels better subjectively  Continue HFNC during the day, BiPAP at night   Vanc, cefepime x 5 days, WBC still rising despite tapering steroids - monitor   On prednisone 20 mg daily - slowly taper   Tolerating po diet, having BBMs   Repeat LE venous dopplers neg for DVT   LUE less swollen and nontender   Lantus, sliding scale   Vit D supplementation   Lovenox for DVT ppx     Remains critical with high risk for decompensation

## 2021-08-08 NOTE — PROGRESS NOTE ADULT - SUBJECTIVE AND OBJECTIVE BOX
ICU Progress Note    ICU Progress Note    HPI:    S:    Pt seen and examined  HD # 20  FULL CODE  PMHx HLD, HTN, DM   Pt here for OSB, cough for several days  Admitted on 7/21 with COVID-19 pna. tested positive on 7/15 outpatient. unvaccinated. Escalating fio2 requirements and development of ARDS now requiring HFNC and NRB. S/p actemra on 7/23 7/27 PM: Remains on HFNC 50/100% with frequent desaturations.    8/9 PM: Remains on HFNC 50/90% + PRN/qHS with frequent desaturations.     ROS: + SOB  Remainder of systems reviewed, negative    Allergies    No Known Allergies    Intolerances    MEDICATIONS  (STANDING):    aspirin  chewable 81 milliGRAM(s) Oral daily  atorvastatin 80 milliGRAM(s) Oral at bedtime  budesonide 160 MICROgram(s)/formoterol 4.5 MICROgram(s) Inhaler 2 Puff(s) Inhalation two times a day  cefepime   IVPB      cefepime   IVPB 2000 milliGRAM(s) IV Intermittent every 8 hours  cholecalciferol 2000 Unit(s) Oral daily  dextrose 40% Gel 15 Gram(s) Oral once  dextrose 5%. 1000 milliLiter(s) (50 mL/Hr) IV Continuous <Continuous>  dextrose 5%. 1000 milliLiter(s) (100 mL/Hr) IV Continuous <Continuous>  dextrose 50% Injectable 25 Gram(s) IV Push once  dextrose 50% Injectable 12.5 Gram(s) IV Push once  dextrose 50% Injectable 25 Gram(s) IV Push once  enoxaparin Injectable 40 milliGRAM(s) SubCutaneous every 12 hours  famotidine    Tablet 20 milliGRAM(s) Oral daily  glucagon  Injectable 1 milliGRAM(s) IntraMuscular once  insulin glargine Injectable (LANTUS) 10 Unit(s) SubCutaneous <User Schedule>  insulin lispro (ADMELOG) corrective regimen sliding scale   SubCutaneous three times a day before meals  insulin lispro (ADMELOG) corrective regimen sliding scale   SubCutaneous at bedtime  labetalol 200 milliGRAM(s) Oral every 12 hours  losartan 50 milliGRAM(s) Oral daily  predniSONE   Tablet 20 milliGRAM(s) Oral daily  vancomycin  IVPB      vancomycin  IVPB 1250 milliGRAM(s) IV Intermittent every 12 hours    MEDICATIONS  (PRN):    acetaminophen   Tablet .. 650 milliGRAM(s) Oral every 4 hours PRN Temp greater or equal to 38C (100.4F), Mild Pain (1 - 3)  ALBUTerol    90 MICROgram(s) HFA Inhaler 2 Puff(s) Inhalation every 4 hours PRN Shortness of Breath and/or Wheezing  benzocaine 15 mG/menthol 3.6 mG (Sugar-Free) Lozenge 1 Lozenge Oral every 6 hours PRN Sore Throat  benzonatate 100 milliGRAM(s) Oral three times a day PRN Cough  melatonin 3 milliGRAM(s) Oral at bedtime PRN Insomnia    Drug Dosing Weight    Height (cm): 167.6 (21 Jul 2021 14:34)  Weight (kg): 90.4 (21 Jul 2021 22:00)  BMI (kg/m2): 32.2 (21 Jul 2021 22:00)  BSA (m2): 2 (21 Jul 2021 22:00)    PAST MEDICAL & SURGICAL HISTORY:    HTN (hypertension)    Diabetes    FAMILY HISTORY:    ROS: See HPI; otherwise, all systems reviewed and negative.    O:    ICU Vital Signs Last 24 Hrs  T(C): 36.1 (08 Aug 2021 23:00), Max: 37 (08 Aug 2021 04:00)  T(F): 97 (08 Aug 2021 23:00), Max: 98.6 (08 Aug 2021 04:00)  HR: 84 (08 Aug 2021 23:19) (53 - 88)  BP: 110/96 (08 Aug 2021 23:00) (106/63 - 138/78)  BP(mean): 99 (08 Aug 2021 23:00) (70 - 107)  ABP: --  ABP(mean): --  RR: 31 (08 Aug 2021 23:00) (22 - 34)  SpO2: 86% (08 Aug 2021 23:19) (74% - 95%)    I&O's Detail    07 Aug 2021 07:01  -  08 Aug 2021 07:00  --------------------------------------------------------  IN:    IV PiggyBack: 1050 mL    Oral Fluid: 1360 mL  Total IN: 2410 mL    OUT:    Voided (mL): 3100 mL  Total OUT: 3100 mL    Total NET: -690 mL      08 Aug 2021 07:01  -  09 Aug 2021 00:26  --------------------------------------------------------  IN:    IV PiggyBack: 100 mL    Oral Fluid: 120 mL  Total IN: 220 mL    OUT:    Voided (mL): 2100 mL  Total OUT: 2100 mL    Total NET: -1880 mL    PE:    Adult M sitting in chair  No JVD trachea midline + HFNC 50/90% +/- NRB  S1S2+  Diminished BS B/L  Abd soft NTND  No leg swelling/edema noted  Awake and alert  Skin pink and well perfused    LABS:    CBC Full  -  ( 08 Aug 2021 07:11 )  WBC Count : 19.05 K/uL  RBC Count : 3.52 M/uL  Hemoglobin : 10.0 g/dL  Hematocrit : 30.3 %  Platelet Count - Automated : 179 K/uL  Mean Cell Volume : 86.1 fl  Mean Cell Hemoglobin : 28.4 pg  Mean Cell Hemoglobin Concentration : 33.0 gm/dL  Auto Neutrophil # : 16.45 K/uL  Auto Lymphocyte # : 0.77 K/uL  Auto Monocyte # : 1.00 K/uL  Auto Eosinophil # : 0.66 K/uL  Auto Basophil # : 0.02 K/uL  Auto Neutrophil % : 86.4 %  Auto Lymphocyte % : 4.0 %  Auto Monocyte % : 5.2 %  Auto Eosinophil % : 3.5 %  Auto Basophil % : 0.1 %    08-08    137  |  102  |  27<H>  ----------------------------<  121<H>  3.9   |  29  |  0.77    Ca    8.4<L>      08 Aug 2021 07:11  Phos  3.4     08-08  Mg     2.0     08-08    CAPILLARY BLOOD GLUCOSE    POCT Blood Glucose.: 213 mg/dL (08 Aug 2021 23:02)  POCT Blood Glucose.: 361 mg/dL (08 Aug 2021 16:43)  POCT Blood Glucose.: 212 mg/dL (08 Aug 2021 11:39)  POCT Blood Glucose.: 125 mg/dL (08 Aug 2021 08:37)

## 2021-08-08 NOTE — PROGRESS NOTE ADULT - ASSESSMENT
A:    55M  HD # 21    Here for:    1. Acute hypoxic resp failure 2/2  2. COVID-19 PNA complicated by  3. ARDS  4. Hyperglycemia  5. DESTINEY  6. CKD  7. PNA    This patient requires critical care for support of one or more vital organ systems with a high probability of imminent or life threatening deterioration in his/her condition    P:    PRN analgesia/anxiolysis. Avoid deleriogenic meds.  HD monitoring. Labetalol 200mg PO BID, cozaar 50mg PO qd.  HFNC 50/90% + PRN NRB. Goal maintain SpO2 > 90% and alleviate WOB. Pulmonary involved.  Disease modifying therapy: s/p actemra 7/23, s/p RDV, on prednisone 20mg PO qd.  Broad spectrum abx empiric dosing for PNA cefepime 1g IV TID + vancomycin 1250mg q12h. ID involved. WBC 19k, afebrile.   VTE ppx lovenox, PUD ppx pepcid.  f/u AM labs, replete lytes PRN.  Lipitor.  Goal BG < 180, BG sub optimally controlled. Lantus 10u qd + ISS.  DESTINEY on CKD, improved.    Dispo: Cont critical care. Remains high risk for further decompensation requiring intubation    TOTAL CRITICAL CARE TIME: 37 minutes EXCLUSIVE of any non bundled procedures)    Note: This time spent INCLUDES time spent directly as this patient's bedside with evaluation, review of chart including review of laboratory and imaging studies, interpretation of vital signs and cardiac output measurements, any necessary ventilator management, and time spent discussing plan of care with patient and family, including goals of care discussion.

## 2021-08-08 NOTE — PROGRESS NOTE ADULT - SUBJECTIVE AND OBJECTIVE BOX
Patient is a 55y old  Male who presents with a chief complaint of cough/sob (08 Aug 2021 09:34)    HPI:  55 M with pmh HLD, dm, htn presents with 8 days onset of covid symptoms which included, cough, fevers, sob, hypoxia, fevers, diarrhea, chills and myalgias. TEsted positive 7/15. Wife endorsed he was becoming more sob of recently. On arrival hypoxic to 80s and tachypneic. NRB placed, presently on 15% and saturating 95%. Na 126 s/p 1L NS. CXR: Frandy infiltrates. Last scr 1.4 in 2019-> today 1.9 unknown if underlying ckd. Dimer 450 - normal for age  however with covid and increasing fibrinogen, will need further eval.   Denies chets pain. LHC performed 2019 revealed normal coronaries.         PAST MEDICAL/SURGICAL/FAMILY/SOCIAL HISTORY:    Past Medical History:  Diabetes    HTN (hypertension).  No surgeries     Tobacco Usage:  · Tobacco Usage	non smoker  Fhx: none (21 Jul 2021 19:19)    24 hour events: feels better today   remains on HFNC 50L, 90% with intermittent episodes of desaturations     PAST MEDICAL & SURGICAL HISTORY:  HTN (hypertension)    Diabetes      REVIEW OF SYSTEMS  Constitutional: No fever, chills, fatigue  Neuro: No headache, numbness, weakness  Resp: +shortness of breath, No cough, wheezing  CVS: No chest pain, palpitations, leg swelling  GI: No abdominal pain, nausea, vomiting, diarrhea   : No dysuria, frequency, incontinence  Skin: No itching, burning, rashes, or lesions   Msk: No joint pain or swelling  Psych: No depression, anxiety, mood swings  Heme: No bleeding    T(F): 97.9 (08-08-21 @ 13:40), Max: 98.6 (08-08-21 @ 04:00)  HR: 74 (08-08-21 @ 15:00) (53 - 88)  BP: 127/77 (08-08-21 @ 15:00) (95/69 - 152/96)  RR: 27 (08-08-21 @ 15:00) (22 - 34)  SpO2: 86% (08-08-21 @ 15:00) (74% - 97%)  Wt(kg): --      CAPILLARY BLOOD GLUCOSE  POCT Blood Glucose.: 212 mg/dL (08 Aug 2021 11:39)  POCT Blood Glucose.: 125 mg/dL (08 Aug 2021 08:37)  POCT Blood Glucose.: 192 mg/dL (07 Aug 2021 21:07)  POCT Blood Glucose.: 216 mg/dL (07 Aug 2021 17:53)    I&O's Summary    08-07 @ 07:01 - 08-08 @ 07:00  --------------------------------------------------------  IN: 2410 mL / OUT: 3100 mL / NET: -690 mL    08-08 @ 07:01 - 08-08 @ 16:04  --------------------------------------------------------  IN: 100 mL / OUT: 1350 mL / NET: -1250 mL    PHYSICAL EXAM  General: NAD   CNS: AAOx3, nonfocal exam   HEENT: PERRL  Resp: good and equal AE b/l   CVS: S1S2, regular   Abd: soft, NT, +BS  Ext: no edema   Skin: warm, not mottled     MEDICATIONS  cefepime   IVPB   cefepime   IVPB IV Intermittent  vancomycin  IVPB IV Intermittent  vancomycin  IVPB   labetalol Oral  losartan Oral  atorvastatin Oral  dextrose 40% Gel Oral  dextrose 50% Injectable IV Push  dextrose 50% Injectable IV Push  dextrose 50% Injectable IV Push  glucagon  Injectable IntraMuscular  insulin glargine Injectable (LANTUS) SubCutaneous  insulin lispro (ADMELOG) corrective regimen sliding scale SubCutaneous  insulin lispro (ADMELOG) corrective regimen sliding scale SubCutaneous  predniSONE   Tablet Oral  ALBUTerol    90 MICROgram(s) HFA Inhaler Inhalation PRN  benzonatate Oral PRN  budesonide 160 MICROgram(s)/formoterol 4.5 MICROgram(s) Inhaler Inhalation  acetaminophen   Tablet .. Oral PRN  melatonin Oral PRN  aspirin  chewable Oral  enoxaparin Injectable SubCutaneous  famotidine    Tablet Oral  cholecalciferol Oral  dextrose 5%. IV Continuous  dextrose 5%. IV Continuous  benzocaine 15 mG/menthol 3.6 mG (Sugar-Free) Lozenge Oral PRN                          10.0   19.05 )-----------( 179      ( 08 Aug 2021 07:11 )             30.3       08-08    137  |  102  |  27<H>  ----------------------------<  121<H>  3.9   |  29  |  0.77    Ca    8.4<L>      08 Aug 2021 07:11  Phos  3.4     08-08  Mg     2.0     08-08    CENTRAL LINE: N           GOFF: N                         A-LINE: N                      GLOBAL ISSUE/BEST PRACTICE  Analgesia: NA  Sedation: NA  CAM-ICU: neg  HOB elevation: yes  Stress ulcer prophylaxis: NA  VTE prophylaxis: Y  Glycemic control: Y  Nutrition: Y    CODE STATUS: full  GO discussion: Y

## 2021-08-08 NOTE — PROGRESS NOTE ADULT - SUBJECTIVE AND OBJECTIVE BOX
Patient is a 55y old  Male who presents with a chief complaint of cough/sob (23 Jul 2021 14:03)      HPI:  55 M with pmh HLD, dm, htn presents with 8 days onset of covid symptoms which included, cough, fevers, sob, hypoxia, fevers, diarrhea, chills and myalgias. TEsted positive 7/15. Wife endorsed he was becoming more sob of recently. On arrival hypoxic to 80s and tachypneic. NRB placed, presently on 15% and saturating 95%. Na 126 s/p 1L NS. CXR: Frandy infiltrates. Last scr 1.4 in 2019-> today 1.9 unknown if underlying ckd.  Patient not vaccinated.   Last seen by me in 2019  History of ROBERT and uncontrolled hypertension  Treated with IV Remdesivir and Decadron, now on Toci  SaO2 is 80-85% despite being on 100% NRB  ABG pending    8/6  No acute pulmonary events occurred overnight   On HFNC 50LPM, 90%FiO2. Attempted BiPAP overnight  Critical Care performed POCUS on 8/4, noted to have pulmonary edema   Now pending CTPE study, in process of being transitioned to NRB to see if he can tolerate it prior to CT  8/7  covering for Dr Arceo  data discussed with the intensivist and critical care team this am  not stable for further imaging and transport to ct scan due to high FIO2 demand  he appears the same with high O2 requirements  'sitting upright in bed  8/8  he has moved from bed to chair with assistance this am  he remains on HIGH FIO2 requirements  not labored now after he settled down  data discussed with RT therapist this am    PAST MEDICAL/SURGICAL/FAMILY/SOCIAL HISTORY:    Past Medical History:  Diabetes    HTN (hypertension).  No surgeries     Tobacco Usage:  · Tobacco Usage	non smoker  Fhx: none (21 Jul 2021 19:19)    MEDICATIONS  (STANDING):  aspirin  chewable 81 milliGRAM(s) Oral daily  atorvastatin 80 milliGRAM(s) Oral at bedtime  budesonide 160 MICROgram(s)/formoterol 4.5 MICROgram(s) Inhaler 2 Puff(s) Inhalation two times a day  cefepime   IVPB      cefepime   IVPB 2000 milliGRAM(s) IV Intermittent every 8 hours  cholecalciferol 2000 Unit(s) Oral daily  dextrose 40% Gel 15 Gram(s) Oral once  dextrose 5%. 1000 milliLiter(s) (50 mL/Hr) IV Continuous <Continuous>  dextrose 5%. 1000 milliLiter(s) (100 mL/Hr) IV Continuous <Continuous>  dextrose 50% Injectable 25 Gram(s) IV Push once  dextrose 50% Injectable 12.5 Gram(s) IV Push once  dextrose 50% Injectable 25 Gram(s) IV Push once  enoxaparin Injectable 40 milliGRAM(s) SubCutaneous every 12 hours  famotidine    Tablet 20 milliGRAM(s) Oral daily  glucagon  Injectable 1 milliGRAM(s) IntraMuscular once  insulin glargine Injectable (LANTUS) 10 Unit(s) SubCutaneous <User Schedule>  insulin lispro (ADMELOG) corrective regimen sliding scale   SubCutaneous three times a day before meals  insulin lispro (ADMELOG) corrective regimen sliding scale   SubCutaneous at bedtime  labetalol 200 milliGRAM(s) Oral every 12 hours  losartan 50 milliGRAM(s) Oral daily  predniSONE   Tablet 20 milliGRAM(s) Oral daily  vancomycin  IVPB 1250 milliGRAM(s) IV Intermittent every 12 hours  vancomycin  IVPB          MEDICATIONS  (PRN):  acetaminophen   Tablet .. 650 milliGRAM(s) Oral once PRN Temp greater or equal to 38C (100.4F), Mild Pain (1 - 3)  acetaminophen   Tablet .. 650 milliGRAM(s) Oral every 4 hours PRN Temp greater or equal to 38C (100.4F), Mild Pain (1 - 3)  ALBUTerol    90 MICROgram(s) HFA Inhaler 2 Puff(s) Inhalation every 4 hours PRN Shortness of Breath and/or Wheezing  benzocaine 15 mG/menthol 3.6 mG (Sugar-Free) Lozenge 1 Lozenge Oral every 6 hours PRN Sore Throat  benzonatate 100 milliGRAM(s) Oral three times a day PRN Cough  melatonin 3 milliGRAM(s) Oral at bedtime PRN Insomnia    ICU Vital Signs Last 24 Hrs  T(C): 36.8 (08 Aug 2021 09:00), Max: 37 (08 Aug 2021 04:00)  T(F): 98.3 (08 Aug 2021 09:00), Max: 98.6 (08 Aug 2021 04:00)  HR: 83 (08 Aug 2021 09:20) (53 - 83)  BP: 126/70 (08 Aug 2021 08:00) (95/69 - 152/96)  BP(mean): 81 (08 Aug 2021 08:00) (72 - 117)  ABP: --  ABP(mean): --  RR: 30 (08 Aug 2021 09:00) (22 - 33)  SpO2: 83% (08 Aug 2021 09:20) (79% - 97%)    I&O's Detail    07 Aug 2021 07:01  -  08 Aug 2021 07:00  --------------------------------------------------------  IN:    IV PiggyBack: 1050 mL    Oral Fluid: 1360 mL  Total IN: 2410 mL    OUT:    Voided (mL): 3100 mL  Total OUT: 3100 mL    Total NET: -690 mL              PHYSICAL EXAM  General Appearance:supine, on HFNC  Neck: Supple,   Lungs: coarse bilaterally  Heart: Regular rate and rhythm, S1, S2 normal,Not tachycardic  Abdomen: Soft, non-tender, bowel sounds active   Extremities: no cyanosis or edema, no joint swelling  Skin: Skin color, texture normal, no rashes   Neurologic: Alert and oriented X3 , non focal    ECG:    LABS:                                 10.0   19.05 )-----------( 179      ( 08 Aug 2021 07:11 )             30.3   08-08    137  |  102  |  27<H>  ----------------------------<  121<H>  3.9   |  29  |  0.77    Ca    8.4<L>      08 Aug 2021 07:11  Phos  3.4     08-08  Mg     2.0     08-08                 10.4   17.19 )-----------( 198      ( 07 Aug 2021 07:06 )             32.3   08-07    138  |  104  |  31<H>  ----------------------------<  107<H>  4.4   |  29  |  0.77    Ca    8.2<L>      07 Aug 2021 07:06  Phos  4.6     08-07  Mg     2.0     08-07    TPro  5.2<L>  /  Alb  2.3<L>  /  TBili  0.8  /  DBili  x   /  AST  81<H>  /  ALT  213<H>  /  AlkPhos  200<H>  08-06                            12.9   15.00 )-----------( 366      ( 26 Jul 2021 06:20 )             38.4   07-26    133<L>  |  104  |  37<H>  ----------------------------<  232<H>  4.2   |  23  |  1.17    Ca    8.4<L>      26 Jul 2021 06:20    TPro  6.3  /  Alb  2.2<L>  /  TBili  0.4  /  DBili  0.1  /  AST  103<H>  /  ALT  137<H>  /  AlkPhos  239<H>  07-26                          13.2   13.52 )-----------( 338      ( 24 Jul 2021 08:06 )             39.5     07-24    136  |  105  |  43<H>  ----------------------------<  227<H>  4.3   |  25  |  1.31<H>    Ca    8.7      24 Jul 2021 08:06    TPro  6.6  /  Alb  2.3<L>  /  TBili  0.4  /  DBili  x   /  AST  73<H>  /  ALT  64  /  AlkPhos  165<H>  07-24          Pro BNP  -- 07-24 @ 08:06  D Dimer  446 07-24 @ 08:06  Pro BNP  261 07-21 @ 14:42  D Dimer  460 07-21 @ 14:42              < from: Xray Chest 1 View- PORTABLE-Urgent (07.21.21 @ 15:33) >    PROCEDURE DATE:  07/21/2021          INTERPRETATION:  AP chest on July 21, 2021 at 3:27 PM. Patient is short of breath with cough and fever.    Heart magnified by technique.    There arescattered mid lower lung field infiltrates right greater than left possibly related: Pneumonia.    The lungs are clear on August 30, 2019.    IMPRESSION: Bilateral infiltrates as above.    < end of copied text >  < from: US Duplex Venous Lower Ext Complete, Bilateral (07.25.21 @ 08:42) >  COMPARISON: None available.    TECHNIQUE: Duplex sonography of the BILATERAL LOWER extremity veins with color and spectral Doppler, with and without compression.    FINDINGS:    RIGHT:  Normal compressibility of the RIGHT common femoral, femoral and popliteal veins.  Doppler examination shows normal spontaneous and phasic flow.  No RIGHT calf vein thrombosis is detected.    LEFT:  Normal compressibility of the LEFT common femoral, femoral and popliteal veins.  Doppler examination shows normal spontaneous and phasic flow.  No LEFT calf vein thrombosis is detected.    IMPRESSION:  No evidence of deep venous thrombosis in either lower extremity.    < end of copied text >  RADIOLOGY & ADDITIONAL STUDIES:    < from: Xray Chest 1 View- PORTABLE-Urgent (Xray Chest 1 View- PORTABLE-Urgent .) (07.26.21 @ 08:43) >    PROCEDURE DATE:  07/26/2021          INTERPRETATION:  Portable chest radiograph    CLINICAL INFORMATION: Pneumonia due to Covid 19.. Follow-up    TECHNIQUE:  Portable  AP view of the chest was obtained.    COMPARISON: 7/21/2021 chest available for review.    FINDINGS:    The lungs show stable bilateral  multifocal and diffuse ill-defined airspace opacities.. No pneumothorax.    The  heart is enlarged in transverse diameter. No hilar mass.     Visualized osseous structures are intact.    IMPRESSION:   Stable bilateral  multifocal and diffuse ill-defined airspace opacities..    < end of copied text >  < from: US Duplex Venous Lower Ext Complete, Bilateral (08.06.21 @ 17:16) >  FINDINGS:    RIGHT:  Normal compressibility of the RIGHT common femoral, femoral and popliteal veins.  Doppler examination shows normal spontaneous and phasic flow.  No RIGHT calf vein thrombosis is detected.    LEFT:  Normal compressibility of the LEFT common femoral, femoral and popliteal veins.  Doppler examination shows normal spontaneous and phasic flow.  No LEFT calf vein thrombosisis detected.    IMPRESSION:  No evidence of deep venous thrombosis in either lower extremity.    < end of copied text >  < from: Xray Chest 1 View- PORTABLE-Urgent (Xray Chest 1 View- PORTABLE-Urgent .) (08.06.21 @ 16:45) >  INTERPRETATION:  Portable chest radiograph    CLINICAL INFORMATION: Pneumonia due to Covid 19.    TECHNIQUE:  Portable  AP view of the chest was obtained.    COMPARISON: 8/1/2021 chest radiograph available for review.    FINDINGS:    The lungs show decreasing bilateral diffuse airspace disease.. No pneumothorax.    The  heart is mildly enlarged in transverse diameter. No hilar mass.   Visualized osseous structures are intact.    IMPRESSION:   Decreasing bilateral diffuse airspace disease..    < end of copied text >

## 2021-08-08 NOTE — PROGRESS NOTE ADULT - ASSESSMENT
1) COVID Pneumonia  2) Dyspnea  3) Abnormal CXR  4) Hypoxemia  5) Hypertension  6) ROBERT  7) Mild PH    55 M noted to have COVID 7/15  On arrival hypoxic to 80s and tachypneic. NRB placed,in the ER saturating 95%. Na 126 s/p 1L NS. CXR: Frandy infiltrates. Last scr 1.4 in 2019-> today 1.9 unknown if underlying ckd.  Patient not vaccinated.   Last seen by me in 2019 for ROBERT noted to have uncontrolled hypertension  Treated with IV Remdesivir and Decadron, Tocilizumab   SaO2 is 80-85% despite being on 100% NRB so was switched to HFNC  ABG/imaging reviewed  Given the obesity/hypertension and prior co-morbidities, he is at risk of intubation but continue HFNC, respiratory status remains critical  XR reviewed- pulmonary infiltrates are present  DDimer elevated, was on therapeutic lovenox, transitioned to 40mg q 12  On tapered Solumedrol, currently IV 20mg daily Symbicort 160/4.5 BID (https://www.theUllinkt.com/journals/lanres/article/[email or web address], STOIC study)  On HFNC 50LPM, 90%FiO2. Attempted BiPAP overnight  Critical Care performed POCUS on 8/4, noted to have pulmonary edema   Now pending CTPE study, in process of being transitioned to NRB to see if he can tolerate it prior to CT  Will likely need a prolonged course of HFNC given the tenuous respiratory status  Discussed inspiratory muscle exercises with patient while he is out of bed +/- incentive spirometer     8/7  covering for Dr Arceo  data discussed with the intensivist and critical care team this am  not stable for further imaging and transport to ct scan due to high FIO2 demand  he appears the same with high O2 requirements  'sitting upright in bed  data discussed with Dr Tineo  overall resp status remains critical despite all aggressive measures  can not entirely rule out superimposed bacterial infection  agree with adding broad spectrum coverage  once improved may reconsider Ct scan imaging for further eval  overall prognosis remains guarded  ICU level care appreciated    8/8  mobilize OOB to chair  resp status remains critical  high Fio2 requirement  agree with additional antibiotic coverage in view of leukocytosis and prolonged ICY stay as wellas  xray findings  not stable for ct scan imaging at this time  appreciate ICU level care

## 2021-08-09 PROCEDURE — 99291 CRITICAL CARE FIRST HOUR: CPT

## 2021-08-09 RX ORDER — INSULIN GLARGINE 100 [IU]/ML
10 INJECTION, SOLUTION SUBCUTANEOUS
Refills: 0 | Status: DISCONTINUED | OUTPATIENT
Start: 2021-08-09 | End: 2021-08-12

## 2021-08-09 RX ADMIN — BUDESONIDE AND FORMOTEROL FUMARATE DIHYDRATE 2 PUFF(S): 160; 4.5 AEROSOL RESPIRATORY (INHALATION) at 10:39

## 2021-08-09 RX ADMIN — FAMOTIDINE 20 MILLIGRAM(S): 10 INJECTION INTRAVENOUS at 09:37

## 2021-08-09 RX ADMIN — BUDESONIDE AND FORMOTEROL FUMARATE DIHYDRATE 2 PUFF(S): 160; 4.5 AEROSOL RESPIRATORY (INHALATION) at 20:53

## 2021-08-09 RX ADMIN — CEFEPIME 100 MILLIGRAM(S): 1 INJECTION, POWDER, FOR SOLUTION INTRAMUSCULAR; INTRAVENOUS at 21:43

## 2021-08-09 RX ADMIN — ATORVASTATIN CALCIUM 80 MILLIGRAM(S): 80 TABLET, FILM COATED ORAL at 21:43

## 2021-08-09 RX ADMIN — Medication 166.67 MILLIGRAM(S): at 05:25

## 2021-08-09 RX ADMIN — INSULIN GLARGINE 10 UNIT(S): 100 INJECTION, SOLUTION SUBCUTANEOUS at 09:35

## 2021-08-09 RX ADMIN — Medication 20 MILLIGRAM(S): at 09:37

## 2021-08-09 RX ADMIN — Medication 4: at 13:04

## 2021-08-09 RX ADMIN — Medication 2: at 08:34

## 2021-08-09 RX ADMIN — INSULIN GLARGINE 10 UNIT(S): 100 INJECTION, SOLUTION SUBCUTANEOUS at 21:43

## 2021-08-09 RX ADMIN — LOSARTAN POTASSIUM 50 MILLIGRAM(S): 100 TABLET, FILM COATED ORAL at 09:36

## 2021-08-09 RX ADMIN — CEFEPIME 100 MILLIGRAM(S): 1 INJECTION, POWDER, FOR SOLUTION INTRAMUSCULAR; INTRAVENOUS at 15:17

## 2021-08-09 RX ADMIN — Medication 6 MILLIGRAM(S): at 21:43

## 2021-08-09 RX ADMIN — Medication 200 MILLIGRAM(S): at 09:36

## 2021-08-09 RX ADMIN — ENOXAPARIN SODIUM 40 MILLIGRAM(S): 100 INJECTION SUBCUTANEOUS at 09:37

## 2021-08-09 RX ADMIN — Medication 166.67 MILLIGRAM(S): at 17:19

## 2021-08-09 RX ADMIN — CEFEPIME 100 MILLIGRAM(S): 1 INJECTION, POWDER, FOR SOLUTION INTRAMUSCULAR; INTRAVENOUS at 05:25

## 2021-08-09 RX ADMIN — Medication 2: at 16:30

## 2021-08-09 RX ADMIN — Medication 81 MILLIGRAM(S): at 09:37

## 2021-08-09 RX ADMIN — Medication 200 MILLIGRAM(S): at 21:43

## 2021-08-09 RX ADMIN — Medication 2000 UNIT(S): at 15:18

## 2021-08-09 RX ADMIN — ENOXAPARIN SODIUM 40 MILLIGRAM(S): 100 INJECTION SUBCUTANEOUS at 21:43

## 2021-08-09 NOTE — PROGRESS NOTE ADULT - NSICDXPILOT_GEN_ALL_CORE
[place]

## 2021-08-09 NOTE — PROGRESS NOTE ADULT - ASSESSMENT
IMP:    56 y/o male with HLD, HTN and DM--Non Vacc-- admitted with Viral PNA sepsis secondary to COVID--Acute type 1 resp failure and ARDS.    Doubt HAP as CXR on 8/6 was improving   Very high risk for intubation    Severe Pro Ted malnutrition     Critically ill.  High risk for acute decompensation and deterioration including death   Patient requires critical care for support of one or more vital organ systems with a high probability of imminent or life threatening deterioration in his/her condition     Plan:    HFNC--keep oxygen saturation in mid 80s accepting "Happy Hypoxia" to avoid/limit oxygen toxicity.  NIV QHS.  Prone as tolerated  Taper steroids down to 10 daily  FS with insulin coverage--keep FS < 180.  Will increase Lantus to 10 bid  Encourage nutritional support  OOB  IV Vanco/Cefepime (3) with no Cxs.  Will DC after last dose tonight   DVT prophy--SCD and LMWH  Recheck inflammatory markers in AM    ICU care-- d/w ICU staff on multi disciplinary rounds and pt-- All concerns addressed including but not limited to diagnosis, treatment plan and overall prognosis

## 2021-08-09 NOTE — PROGRESS NOTE ADULT - SUBJECTIVE AND OBJECTIVE BOX
Patient is a 55y old  Male who presents with a chief complaint of cough/sob (23 Jul 2021 14:03)      HPI:  55 M with pmh HLD, dm, htn presents with 8 days onset of covid symptoms which included, cough, fevers, sob, hypoxia, fevers, diarrhea, chills and myalgias. TEsted positive 7/15. Wife endorsed he was becoming more sob of recently. On arrival hypoxic to 80s and tachypneic. NRB placed, presently on 15% and saturating 95%. Na 126 s/p 1L NS. CXR: Frandy infiltrates. Last scr 1.4 in 2019-> today 1.9 unknown if underlying ckd.  Patient not vaccinated.   Last seen by me in 2019  History of ROBERT and uncontrolled hypertension  Treated with IV Remdesivir and Decadron, now on Toci  SaO2 is 80-85% despite being on 100% NRB  ABG pending    8/6  No acute pulmonary events occurred overnight   On HFNC 50LPM, 90%FiO2. Attempted BiPAP overnight  Critical Care performed POCUS on 8/4, noted to have pulmonary edema   Now pending CTPE study, in process of being transitioned to NRB to see if he can tolerate it prior to CT  8/7  covering for Dr Arceo  data discussed with the intensivist and critical care team this am  not stable for further imaging and transport to ct scan due to high FIO2 demand  he appears the same with high O2 requirements  'sitting upright in bed  8/8  he has moved from bed to chair with assistance this am  he remains on HIGH FIO2 requirements  not labored now after he settled down  data discussed with RT therapist this am  8/9  High fio2 requirement with desats on mild exertion  despite diuresis and negative fluid balance  Dr Arceo will resume in am  PAST MEDICAL/SURGICAL/FAMILY/SOCIAL HISTORY:    Past Medical History:  Diabetes    HTN (hypertension).  No surgeries     Tobacco Usage:  · Tobacco Usage	non smoker  Fhx: none (21 Jul 2021 19:19)    MEDICATIONS  (STANDING):  aspirin  chewable 81 milliGRAM(s) Oral daily  atorvastatin 80 milliGRAM(s) Oral at bedtime  budesonide 160 MICROgram(s)/formoterol 4.5 MICROgram(s) Inhaler 2 Puff(s) Inhalation two times a day  cefepime   IVPB      cefepime   IVPB 2000 milliGRAM(s) IV Intermittent every 8 hours  cholecalciferol 2000 Unit(s) Oral daily  dextrose 40% Gel 15 Gram(s) Oral once  dextrose 5%. 1000 milliLiter(s) (50 mL/Hr) IV Continuous <Continuous>  dextrose 5%. 1000 milliLiter(s) (100 mL/Hr) IV Continuous <Continuous>  dextrose 50% Injectable 25 Gram(s) IV Push once  dextrose 50% Injectable 12.5 Gram(s) IV Push once  dextrose 50% Injectable 25 Gram(s) IV Push once  enoxaparin Injectable 40 milliGRAM(s) SubCutaneous every 12 hours  famotidine    Tablet 20 milliGRAM(s) Oral daily  glucagon  Injectable 1 milliGRAM(s) IntraMuscular once  insulin glargine Injectable (LANTUS) 10 Unit(s) SubCutaneous two times a day  insulin lispro (ADMELOG) corrective regimen sliding scale   SubCutaneous three times a day before meals  insulin lispro (ADMELOG) corrective regimen sliding scale   SubCutaneous at bedtime  labetalol 200 milliGRAM(s) Oral every 12 hours  losartan 50 milliGRAM(s) Oral daily  predniSONE   Tablet 10 milliGRAM(s) Oral daily  vancomycin  IVPB      vancomycin  IVPB 1250 milliGRAM(s) IV Intermittent every 12 hours        MEDICATIONS  (PRN):  acetaminophen   Tablet .. 650 milliGRAM(s) Oral once PRN Temp greater or equal to 38C (100.4F), Mild Pain (1 - 3)  acetaminophen   Tablet .. 650 milliGRAM(s) Oral every 4 hours PRN Temp greater or equal to 38C (100.4F), Mild Pain (1 - 3)  ALBUTerol    90 MICROgram(s) HFA Inhaler 2 Puff(s) Inhalation every 4 hours PRN Shortness of Breath and/or Wheezing  benzocaine 15 mG/menthol 3.6 mG (Sugar-Free) Lozenge 1 Lozenge Oral every 6 hours PRN Sore Throat  benzonatate 100 milliGRAM(s) Oral three times a day PRN Cough  melatonin 3 milliGRAM(s) Oral at bedtime PRN Insomnia    ICU Vital Signs Last 24 Hrs  T(C): 36.1 (09 Aug 2021 06:00), Max: 36.8 (08 Aug 2021 17:00)  T(F): 97 (09 Aug 2021 06:00), Max: 98.3 (08 Aug 2021 17:00)  HR: 88 (09 Aug 2021 09:00) (68 - 88)  BP: 119/59 (09 Aug 2021 09:00) (106/63 - 136/82)  BP(mean): 73 (09 Aug 2021 09:00) (70 - 107)  ABP: --  ABP(mean): --  RR: 30 (09 Aug 2021 09:00) (22 - 36)  SpO2: 82% (09 Aug 2021 09:00) (76% - 95%)      I&O's Detail    08 Aug 2021 07:01  -  09 Aug 2021 07:00  --------------------------------------------------------  IN:    IV PiggyBack: 450 mL    Oral Fluid: 240 mL  Total IN: 690 mL    OUT:    Voided (mL): 2450 mL  Total OUT: 2450 mL    Total NET: -1760 mL      PHYSICAL EXAM  General Appearance:supine, on HFNC  Neck: Supple,   Lungs: coarse bilaterally  Heart: Regular rate and rhythm, S1, S2 normal,Not tachycardic  Abdomen: Soft, non-tender, bowel sounds active   Extremities: no cyanosis or edema, no joint swelling  Skin: Skin color, texture normal, no rashes   Neurologic: Alert and oriented X3 , non focal    ECG:    LABS:                                         10.0   19.05 )-----------( 179      ( 08 Aug 2021 07:11 )             30.3   08-08    137  |  102  |  27<H>  ----------------------------<  121<H>  3.9   |  29  |  0.77    Ca    8.4<L>      08 Aug 2021 07:11  Phos  3.4     08-08  Mg     2.0     08-08                  10.0   19.05 )-----------( 179      ( 08 Aug 2021 07:11 )             30.3   08-08    137  |  102  |  27<H>  ----------------------------<  121<H>  3.9   |  29  |  0.77    Ca    8.4<L>      08 Aug 2021 07:11  Phos  3.4     08-08  Mg     2.0     08-08                 10.4   17.19 )-----------( 198      ( 07 Aug 2021 07:06 )             32.3   08-07    138  |  104  |  31<H>  ----------------------------<  107<H>  4.4   |  29  |  0.77    Ca    8.2<L>      07 Aug 2021 07:06  Phos  4.6     08-07  Mg     2.0     08-07    TPro  5.2<L>  /  Alb  2.3<L>  /  TBili  0.8  /  DBili  x   /  AST  81<H>  /  ALT  213<H>  /  AlkPhos  200<H>  08-06                            12.9   15.00 )-----------( 366      ( 26 Jul 2021 06:20 )             38.4   07-26    133<L>  |  104  |  37<H>  ----------------------------<  232<H>  4.2   |  23  |  1.17    Ca    8.4<L>      26 Jul 2021 06:20    TPro  6.3  /  Alb  2.2<L>  /  TBili  0.4  /  DBili  0.1  /  AST  103<H>  /  ALT  137<H>  /  AlkPhos  239<H>  07-26                          13.2   13.52 )-----------( 338      ( 24 Jul 2021 08:06 )             39.5     07-24    136  |  105  |  43<H>  ----------------------------<  227<H>  4.3   |  25  |  1.31<H>    Ca    8.7      24 Jul 2021 08:06    TPro  6.6  /  Alb  2.3<L>  /  TBili  0.4  /  DBili  x   /  AST  73<H>  /  ALT  64  /  AlkPhos  165<H>  07-24          Pro BNP  -- 07-24 @ 08:06  D Dimer  446 07-24 @ 08:06  Pro BNP  261 07-21 @ 14:42  D Dimer  460 07-21 @ 14:42              < from: Xray Chest 1 View- PORTABLE-Urgent (07.21.21 @ 15:33) >    PROCEDURE DATE:  07/21/2021          INTERPRETATION:  AP chest on July 21, 2021 at 3:27 PM. Patient is short of breath with cough and fever.    Heart magnified by technique.    There arescattered mid lower lung field infiltrates right greater than left possibly related: Pneumonia.    The lungs are clear on August 30, 2019.    IMPRESSION: Bilateral infiltrates as above.    < end of copied text >  < from: US Duplex Venous Lower Ext Complete, Bilateral (07.25.21 @ 08:42) >  COMPARISON: None available.    TECHNIQUE: Duplex sonography of the BILATERAL LOWER extremity veins with color and spectral Doppler, with and without compression.    FINDINGS:    RIGHT:  Normal compressibility of the RIGHT common femoral, femoral and popliteal veins.  Doppler examination shows normal spontaneous and phasic flow.  No RIGHT calf vein thrombosis is detected.    LEFT:  Normal compressibility of the LEFT common femoral, femoral and popliteal veins.  Doppler examination shows normal spontaneous and phasic flow.  No LEFT calf vein thrombosis is detected.    IMPRESSION:  No evidence of deep venous thrombosis in either lower extremity.    < end of copied text >  RADIOLOGY & ADDITIONAL STUDIES:    < from: Xray Chest 1 View- PORTABLE-Urgent (Xray Chest 1 View- PORTABLE-Urgent .) (07.26.21 @ 08:43) >    PROCEDURE DATE:  07/26/2021          INTERPRETATION:  Portable chest radiograph    CLINICAL INFORMATION: Pneumonia due to Covid 19.. Follow-up    TECHNIQUE:  Portable  AP view of the chest was obtained.    COMPARISON: 7/21/2021 chest available for review.    FINDINGS:    The lungs show stable bilateral  multifocal and diffuse ill-defined airspace opacities.. No pneumothorax.    The  heart is enlarged in transverse diameter. No hilar mass.     Visualized osseous structures are intact.    IMPRESSION:   Stable bilateral  multifocal and diffuse ill-defined airspace opacities..    < end of copied text >  < from: US Duplex Venous Lower Ext Complete, Bilateral (08.06.21 @ 17:16) >  FINDINGS:    RIGHT:  Normal compressibility of the RIGHT common femoral, femoral and popliteal veins.  Doppler examination shows normal spontaneous and phasic flow.  No RIGHT calf vein thrombosis is detected.    LEFT:  Normal compressibility of the LEFT common femoral, femoral and popliteal veins.  Doppler examination shows normal spontaneous and phasic flow.  No LEFT calf vein thrombosisis detected.    IMPRESSION:  No evidence of deep venous thrombosis in either lower extremity.    < end of copied text >  < from: Xray Chest 1 View- PORTABLE-Urgent (Xray Chest 1 View- PORTABLE-Urgent .) (08.06.21 @ 16:45) >  INTERPRETATION:  Portable chest radiograph    CLINICAL INFORMATION: Pneumonia due to Covid 19.    TECHNIQUE:  Portable  AP view of the chest was obtained.    COMPARISON: 8/1/2021 chest radiograph available for review.    FINDINGS:    The lungs show decreasing bilateral diffuse airspace disease.. No pneumothorax.    The  heart is mildly enlarged in transverse diameter. No hilar mass.   Visualized osseous structures are intact.    IMPRESSION:   Decreasing bilateral diffuse airspace disease..    < end of copied text >

## 2021-08-09 NOTE — PROGRESS NOTE ADULT - ASSESSMENT
1) COVID Pneumonia  2) Dyspnea  3) Abnormal CXR  4) Hypoxemia  5) Hypertension  6) ROBERT  7) Mild PH    55 M noted to have COVID 7/15  On arrival hypoxic to 80s and tachypneic. NRB placed,in the ER saturating 95%. Na 126 s/p 1L NS. CXR: Frandy infiltrates. Last scr 1.4 in 2019-> today 1.9 unknown if underlying ckd.  Patient not vaccinated.   Last seen by me in 2019 for ROBERT noted to have uncontrolled hypertension  Treated with IV Remdesivir and Decadron, Tocilizumab   SaO2 is 80-85% despite being on 100% NRB so was switched to HFNC  ABG/imaging reviewed  Given the obesity/hypertension and prior co-morbidities, he is at risk of intubation but continue HFNC, respiratory status remains critical  XR reviewed- pulmonary infiltrates are present  DDimer elevated, was on therapeutic lovenox, transitioned to 40mg q 12  On tapered Solumedrol, currently IV 20mg daily Symbicort 160/4.5 BID (https://[email or web address]/lanres/[email or web address], STOIC study)  On HFNC 50LPM, 90%FiO2. Attempted BiPAP overnight  Critical Care performed POCUS on 8/4, noted to have pulmonary edema   Now pending CTPE study, in process of being transitioned to NRB to see if he can tolerate it prior to CT  Will likely need a prolonged course of HFNC given the tenuous respiratory status  Discussed inspiratory muscle exercises with patient while he is out of bed +/- incentive spirometer     8/7  covering for Dr Arceo  data discussed with the intensivist and critical care team this am  not stable for further imaging and transport to ct scan due to high FIO2 demand  he appears the same with high O2 requirements  'sitting upright in bed  data discussed with Dr Tineo  overall resp status remains critical despite all aggressive measures  can not entirely rule out superimposed bacterial infection  agree with adding broad spectrum coverage  once improved may reconsider Ct scan imaging for further eval  overall prognosis remains guarded  ICU level care appreciated    8/8  mobilize OOB to chair  resp status remains critical  high Fio2 requirement  agree with additional antibiotic coverage in view of leukocytosis and prolonged ICY stay as wellas  xray findings  not stable for ct scan imaging at this time  appreciate ICU level care    8/9  High fio2 requirement with desats on mild exertion  despite diuresis and negative fluid balance  cont antibiotic coverage  check inflammatory markers again  Dr Arceo will resume in am

## 2021-08-09 NOTE — PROGRESS NOTE ADULT - SUBJECTIVE AND OBJECTIVE BOX
[facility]ospital D # 19  ICU # 16    CC:  COVID     HPI:    54 y/o male with HLD, HTN and DM--Non Vacc--presents with 8 days onset of covid symptoms which included, cough, fevers, sob, hypoxia, fevers, diarrhea, chills and myalgias. Tested positive 7/15.  Rx with Rem/Dexa and then high dose steroids and full AC.  Pt tx to ICU on 7/24 for worsening hypoxia.       8/9:  Pt seen and examined in ICU.  Sitting in chair.  On HFNC 50/90% with O2 sat 80-84%. Slightly dysneic. Still eating.  Awake and alert.  Tm 98.6  WBC 19  FS high.  LE Dopp Negative for DVT.  L arm superficial thrombosis--NO DVT.      PMH:  As above.     PSH:  As above.     FH: Non Contributory other than those listed in HPI    Social History:  NC    MEDICATIONS  (STANDING):  aspirin  chewable 81 milliGRAM(s) Oral daily  atorvastatin 80 milliGRAM(s) Oral at bedtime  budesonide 160 MICROgram(s)/formoterol 4.5 MICROgram(s) Inhaler 2 Puff(s) Inhalation two times a day  cefepime   IVPB      cefepime   IVPB 2000 milliGRAM(s) IV Intermittent every 8 hours  cholecalciferol 2000 Unit(s) Oral daily  dextrose 40% Gel 15 Gram(s) Oral once  dextrose 5%. 1000 milliLiter(s) (50 mL/Hr) IV Continuous <Continuous>  dextrose 5%. 1000 milliLiter(s) (100 mL/Hr) IV Continuous <Continuous>  dextrose 50% Injectable 25 Gram(s) IV Push once  dextrose 50% Injectable 12.5 Gram(s) IV Push once  dextrose 50% Injectable 25 Gram(s) IV Push once  enoxaparin Injectable 40 milliGRAM(s) SubCutaneous every 12 hours  famotidine    Tablet 20 milliGRAM(s) Oral daily  glucagon  Injectable 1 milliGRAM(s) IntraMuscular once  insulin glargine Injectable (LANTUS) 10 Unit(s) SubCutaneous two times a day  insulin lispro (ADMELOG) corrective regimen sliding scale   SubCutaneous three times a day before meals  insulin lispro (ADMELOG) corrective regimen sliding scale   SubCutaneous at bedtime  labetalol 200 milliGRAM(s) Oral every 12 hours  losartan 50 milliGRAM(s) Oral daily  predniSONE   Tablet 10 milliGRAM(s) Oral daily  vancomycin  IVPB      vancomycin  IVPB 1250 milliGRAM(s) IV Intermittent every 12 hours    MEDICATIONS  (PRN):  acetaminophen   Tablet .. 650 milliGRAM(s) Oral every 4 hours PRN Temp greater or equal to 38C (100.4F), Mild Pain (1 - 3)  ALBUTerol    90 MICROgram(s) HFA Inhaler 2 Puff(s) Inhalation every 4 hours PRN Shortness of Breath and/or Wheezing  benzocaine 15 mG/menthol 3.6 mG (Sugar-Free) Lozenge 1 Lozenge Oral every 6 hours PRN Sore Throat  benzonatate 100 milliGRAM(s) Oral three times a day PRN Cough  melatonin 6 milliGRAM(s) Oral at bedtime PRN Insomnia      Allergies: NKDA    ROS:  SEE BELOW        ICU Vital Signs Last 24 Hrs  T(C): 36.1 (09 Aug 2021 06:00), Max: 36.8 (08 Aug 2021 17:00)  T(F): 97 (09 Aug 2021 06:00), Max: 98.3 (08 Aug 2021 17:00)  HR: 92 (09 Aug 2021 10:00) (68 - 92)  BP: 116/66 (09 Aug 2021 10:00) (110/96 - 136/82)  BP(mean): 77 (09 Aug 2021 10:00) (70 - 107)  ABP: --  ABP(mean): --  RR: 25 (09 Aug 2021 10:00) (22 - 36)  SpO2: 81% (09 Aug 2021 10:00) (76% - 95%)          I&O's Summary    08 Aug 2021 07:01  -  09 Aug 2021 07:00  --------------------------------------------------------  IN: 690 mL / OUT: 2450 mL / NET: -1760 mL    09 Aug 2021 07:01  -  09 Aug 2021 11:10  --------------------------------------------------------  IN: 0 mL / OUT: 900 mL / NET: -900 mL        Physical Exam:  SEE BELOW                          10.0   19.05 )-----------( 179      ( 08 Aug 2021 07:11 )             30.3       08-08    137  |  102  |  27<H>  ----------------------------<  121<H>  3.9   |  29  |  0.77    Ca    8.4<L>      08 Aug 2021 07:11  Phos  3.4     08-08  Mg     2.0     08-08                      DVT Prophylaxis:                                                            Contraindication:     Advanced Directives:    Discussed with:    Visit Information:  Time spent excluding procedure:      ** Time is exclusive of billed procedures and/or teaching and/or routine family updates.

## 2021-08-09 NOTE — PROGRESS NOTE ADULT - ASSESSMENT
A:    55M  HD # 22    Here for:    1. Acute hypoxic resp failure 2/2  2. COVID-19 PNA complicated by  3. ARDS  4. Hyperglycemia  5. DESTINEY  6. CKD  7. PNA    This patient requires critical care for support of one or more vital organ systems with a high probability of imminent or life threatening deterioration in his/her condition    P:    PRN analgesia/anxiolysis. Avoid deleriogenic meds.  HD monitoring. Labetalol 200mg PO BID, cozaar 50mg PO qd.  HFNC 50/90% + PRN NRB. Goal maintain SpO2 > 90% and alleviate WOB. Pulmonary involved.  Disease modifying therapy: s/p actemra 7/23, s/p RDV, on prednisone 20mg PO qd.  Broad spectrum abx empiric dosing for PNA cefepime 1g IV TID + vancomycin 1250mg q12h. ID involved. WBC 19k, afebrile.   VTE ppx lovenox, PUD ppx pepcid.  f/u AM labs, replete lytes PRN.  Lipitor, ASA.  Goal BG < 180, BG sub optimally controlled. Lantus 10u BID from qd + ISS.  DESTINEY on CKD, improved.    Dispo: Cont critical care. Remains high risk for further decompensation requiring intubation    TOTAL CRITICAL CARE TIME: 38 minutes EXCLUSIVE of any non bundled procedures)    Note: This time spent INCLUDES time spent directly as this patient's bedside with evaluation, review of chart including review of laboratory and imaging studies, interpretation of vital signs and cardiac output measurements, any necessary ventilator management, and time spent discussing plan of care with patient and family, including goals of care discussion.

## 2021-08-09 NOTE — PROGRESS NOTE ADULT - SUBJECTIVE AND OBJECTIVE BOX
ICU Progress Note    HPI:    S:    Pt seen and examined  HD # 21  FULL CODE  PMHx HLD, HTN, DM   Pt here for OSB, cough for several days  Admitted on 7/21 with COVID-19 pna. tested positive on 7/15 outpatient. unvaccinated. Escalating fio2 requirements and development of ARDS now requiring HFNC and NRB. S/p actemra on 7/23 7/27 PM: Remains on HFNC 50/100% with frequent desaturations.    8/9 PM: Remains on HFNC 50/90% + PRN/qHS with frequent desaturations.   8/10 PM: remains on HFNC. Increasing insulin requirements.    ROS: + SOB  Remainder of systems reviewed, negative    Allergies    No Known Allergies    Intolerances    MEDICATIONS  (STANDING):    aspirin  chewable 81 milliGRAM(s) Oral daily  atorvastatin 80 milliGRAM(s) Oral at bedtime  budesonide 160 MICROgram(s)/formoterol 4.5 MICROgram(s) Inhaler 2 Puff(s) Inhalation two times a day  cholecalciferol 2000 Unit(s) Oral daily  dextrose 40% Gel 15 Gram(s) Oral once  dextrose 5%. 1000 milliLiter(s) (50 mL/Hr) IV Continuous <Continuous>  dextrose 5%. 1000 milliLiter(s) (100 mL/Hr) IV Continuous <Continuous>  dextrose 50% Injectable 25 Gram(s) IV Push once  dextrose 50% Injectable 12.5 Gram(s) IV Push once  dextrose 50% Injectable 25 Gram(s) IV Push once  enoxaparin Injectable 40 milliGRAM(s) SubCutaneous every 12 hours  famotidine    Tablet 20 milliGRAM(s) Oral daily  glucagon  Injectable 1 milliGRAM(s) IntraMuscular once  insulin glargine Injectable (LANTUS) 10 Unit(s) SubCutaneous two times a day  insulin lispro (ADMELOG) corrective regimen sliding scale   SubCutaneous three times a day before meals  insulin lispro (ADMELOG) corrective regimen sliding scale   SubCutaneous at bedtime  labetalol 200 milliGRAM(s) Oral every 12 hours  losartan 50 milliGRAM(s) Oral daily  predniSONE   Tablet 10 milliGRAM(s) Oral daily    MEDICATIONS  (PRN):    acetaminophen   Tablet .. 650 milliGRAM(s) Oral every 4 hours PRN Temp greater or equal to 38C (100.4F), Mild Pain (1 - 3)  ALBUTerol    90 MICROgram(s) HFA Inhaler 2 Puff(s) Inhalation every 4 hours PRN Shortness of Breath and/or Wheezing  benzocaine 15 mG/menthol 3.6 mG (Sugar-Free) Lozenge 1 Lozenge Oral every 6 hours PRN Sore Throat  benzonatate 100 milliGRAM(s) Oral three times a day PRN Cough  melatonin 6 milliGRAM(s) Oral at bedtime PRN Insomnia    Drug Dosing Weight    Height (cm): 167.6 (21 Jul 2021 14:34)  Weight (kg): 90.4 (21 Jul 2021 22:00)  BMI (kg/m2): 32.2 (21 Jul 2021 22:00)  BSA (m2): 2 (21 Jul 2021 22:00)    PAST MEDICAL & SURGICAL HISTORY:    HTN (hypertension)    Diabetes    FAMILY HISTORY:    ROS: See HPI; otherwise, all systems reviewed and negative.    O:    ICU Vital Signs Last 24 Hrs  T(C): 36.7 (09 Aug 2021 21:00), Max: 37.1 (09 Aug 2021 09:00)  T(F): 98 (09 Aug 2021 21:00), Max: 98.8 (09 Aug 2021 09:00)  HR: 69 (09 Aug 2021 23:00) (69 - 92)  BP: 130/75 (09 Aug 2021 23:00) (78/64 - 158/90)  BP(mean): 86 (09 Aug 2021 23:00) (67 - 106)  ABP: --  ABP(mean): --  RR: 30 (09 Aug 2021 23:00) (25 - 36)  SpO2: 87% (09 Aug 2021 23:00) (76% - 99%)    I&O's Detail    08 Aug 2021 07:01  -  09 Aug 2021 07:00  --------------------------------------------------------  IN:    IV PiggyBack: 450 mL    Oral Fluid: 240 mL  Total IN: 690 mL    OUT:    Voided (mL): 2450 mL  Total OUT: 2450 mL    Total NET: -1760 mL    09 Aug 2021 07:01  -  09 Aug 2021 23:51  --------------------------------------------------------  IN:    Oral Fluid: 120 mL  Total IN: 120 mL    OUT:    Voided (mL): 1700 mL  Total OUT: 1700 mL    Total NET: -1580 mL    PE:    Adult M sitting in chair  No JVD trachea midline + HFNC 50/90% +/- NRB  S1S2+  Diminished BS B/L  Abd soft NTND  No leg swelling/edema noted  Awake and alert  Skin pink and well perfused    LABS:    CBC Full  -  ( 08 Aug 2021 07:11 )  WBC Count : 19.05 K/uL  RBC Count : 3.52 M/uL  Hemoglobin : 10.0 g/dL  Hematocrit : 30.3 %  Platelet Count - Automated : 179 K/uL  Mean Cell Volume : 86.1 fl  Mean Cell Hemoglobin : 28.4 pg  Mean Cell Hemoglobin Concentration : 33.0 gm/dL  Auto Neutrophil # : 16.45 K/uL  Auto Lymphocyte # : 0.77 K/uL  Auto Monocyte # : 1.00 K/uL  Auto Eosinophil # : 0.66 K/uL  Auto Basophil # : 0.02 K/uL  Auto Neutrophil % : 86.4 %  Auto Lymphocyte % : 4.0 %  Auto Monocyte % : 5.2 %  Auto Eosinophil % : 3.5 %  Auto Basophil % : 0.1 %    08-08    137  |  102  |  27<H>  ----------------------------<  121<H>  3.9   |  29  |  0.77    Ca    8.4<L>      08 Aug 2021 07:11  Phos  3.4     08-08  Mg     2.0     08-08    CAPILLARY BLOOD GLUCOSE      POCT Blood Glucose.: 192 mg/dL (09 Aug 2021 21:50)  POCT Blood Glucose.: 172 mg/dL (09 Aug 2021 17:23)  POCT Blood Glucose.: 209 mg/dL (09 Aug 2021 12:20)  POCT Blood Glucose.: 155 mg/dL (09 Aug 2021 08:19)

## 2021-08-10 LAB
ANION GAP SERPL CALC-SCNC: 6 MMOL/L — SIGNIFICANT CHANGE UP (ref 5–17)
BUN SERPL-MCNC: 29 MG/DL — HIGH (ref 7–23)
CALCIUM SERPL-MCNC: 8.7 MG/DL — SIGNIFICANT CHANGE UP (ref 8.5–10.1)
CHLORIDE SERPL-SCNC: 102 MMOL/L — SIGNIFICANT CHANGE UP (ref 96–108)
CO2 SERPL-SCNC: 27 MMOL/L — SIGNIFICANT CHANGE UP (ref 22–31)
CREAT SERPL-MCNC: 0.85 MG/DL — SIGNIFICANT CHANGE UP (ref 0.5–1.3)
CRP SERPL-MCNC: 6 MG/L — HIGH
D DIMER BLD IA.RAPID-MCNC: 772 NG/ML DDU — HIGH
FERRITIN SERPL-MCNC: 1153 NG/ML — HIGH (ref 30–400)
GLUCOSE SERPL-MCNC: 87 MG/DL — SIGNIFICANT CHANGE UP (ref 70–99)
HCT VFR BLD CALC: 28.4 % — LOW (ref 39–50)
HGB BLD-MCNC: 9.1 G/DL — LOW (ref 13–17)
MAGNESIUM SERPL-MCNC: 2.2 MG/DL — SIGNIFICANT CHANGE UP (ref 1.6–2.6)
MCHC RBC-ENTMCNC: 27.7 PG — SIGNIFICANT CHANGE UP (ref 27–34)
MCHC RBC-ENTMCNC: 32 GM/DL — SIGNIFICANT CHANGE UP (ref 32–36)
MCV RBC AUTO: 86.3 FL — SIGNIFICANT CHANGE UP (ref 80–100)
PHOSPHATE SERPL-MCNC: 4 MG/DL — SIGNIFICANT CHANGE UP (ref 2.5–4.5)
PLATELET # BLD AUTO: 164 K/UL — SIGNIFICANT CHANGE UP (ref 150–400)
POTASSIUM SERPL-MCNC: 4.1 MMOL/L — SIGNIFICANT CHANGE UP (ref 3.5–5.3)
POTASSIUM SERPL-SCNC: 4.1 MMOL/L — SIGNIFICANT CHANGE UP (ref 3.5–5.3)
RBC # BLD: 3.29 M/UL — LOW (ref 4.2–5.8)
RBC # FLD: 12.5 % — SIGNIFICANT CHANGE UP (ref 10.3–14.5)
SODIUM SERPL-SCNC: 135 MMOL/L — SIGNIFICANT CHANGE UP (ref 135–145)
WBC # BLD: 16.95 K/UL — HIGH (ref 3.8–10.5)
WBC # FLD AUTO: 16.95 K/UL — HIGH (ref 3.8–10.5)

## 2021-08-10 PROCEDURE — 99291 CRITICAL CARE FIRST HOUR: CPT

## 2021-08-10 PROCEDURE — 71045 X-RAY EXAM CHEST 1 VIEW: CPT | Mod: 26

## 2021-08-10 RX ADMIN — ATORVASTATIN CALCIUM 80 MILLIGRAM(S): 80 TABLET, FILM COATED ORAL at 21:20

## 2021-08-10 RX ADMIN — Medication 2: at 13:37

## 2021-08-10 RX ADMIN — Medication 2000 UNIT(S): at 13:30

## 2021-08-10 RX ADMIN — BUDESONIDE AND FORMOTEROL FUMARATE DIHYDRATE 2 PUFF(S): 160; 4.5 AEROSOL RESPIRATORY (INHALATION) at 20:56

## 2021-08-10 RX ADMIN — INSULIN GLARGINE 10 UNIT(S): 100 INJECTION, SOLUTION SUBCUTANEOUS at 08:43

## 2021-08-10 RX ADMIN — Medication 6 MILLIGRAM(S): at 23:01

## 2021-08-10 RX ADMIN — Medication 200 MILLIGRAM(S): at 21:20

## 2021-08-10 RX ADMIN — Medication 200 MILLIGRAM(S): at 10:35

## 2021-08-10 RX ADMIN — ENOXAPARIN SODIUM 40 MILLIGRAM(S): 100 INJECTION SUBCUTANEOUS at 10:34

## 2021-08-10 RX ADMIN — INSULIN GLARGINE 10 UNIT(S): 100 INJECTION, SOLUTION SUBCUTANEOUS at 21:20

## 2021-08-10 RX ADMIN — Medication 81 MILLIGRAM(S): at 10:34

## 2021-08-10 RX ADMIN — ENOXAPARIN SODIUM 40 MILLIGRAM(S): 100 INJECTION SUBCUTANEOUS at 21:19

## 2021-08-10 RX ADMIN — BUDESONIDE AND FORMOTEROL FUMARATE DIHYDRATE 2 PUFF(S): 160; 4.5 AEROSOL RESPIRATORY (INHALATION) at 09:30

## 2021-08-10 RX ADMIN — FAMOTIDINE 20 MILLIGRAM(S): 10 INJECTION INTRAVENOUS at 10:35

## 2021-08-10 RX ADMIN — LOSARTAN POTASSIUM 50 MILLIGRAM(S): 100 TABLET, FILM COATED ORAL at 10:34

## 2021-08-10 RX ADMIN — Medication 10 MILLIGRAM(S): at 10:35

## 2021-08-10 NOTE — PROGRESS NOTE ADULT - ASSESSMENT
IMP:    54 y/o male with HLD, HTN and DM--Non Vacc-- admitted with Viral PNA sepsis secondary to COVID--Acute type 1 resp failure and ARDS    Doubt HAP as CXR on 8/6 was improving   Very high risk for intubation    Severe Pro Ted malnutrition     Critically ill.  High risk for acute decompensation and deterioration including death   Patient requires critical care for support of one or more vital organ systems with a high probability of imminent or life threatening deterioration in his/her condition     Plan:    HFNC--keep oxygen saturation in mid 80s accepting "Happy Hypoxia" to avoid/limit oxygen toxicity.  NIV QHS.  Prone as tolerated  Cont steroids at 10 daily  FS with insulin coverage--keep FS < 180.  Cont Lantus 10 bid.    Encourage nutritional support  OOB  IV Vanco/Cefepime (3) with no Cxs.  Completed 3 days  DVT prophy--SCD and LMWH  Follow up inflammatory markers    ICU care-- d/w ICU staff on multi disciplinary rounds and pt-- All concerns addressed including but not limited to diagnosis, treatment plan and overall prognosis

## 2021-08-10 NOTE — PROGRESS NOTE ADULT - SUBJECTIVE AND OBJECTIVE BOX
Hospital D # 20  ICU # 17    CC:  COVID     HPI:    56 y/o male with HLD, HTN and DM--Non Vacc--presents with 8 days onset of covid symptoms which included, cough, fevers, sob, hypoxia, fevers, diarrhea, chills and myalgias. Tested positive 7/15.  Rx with Rem/Dexa and then high dose steroids and full AC.  Pt tx to ICU on 7/24 for worsening hypoxia.       8/9:  Pt seen and examined in ICU.  Sitting in chair.  On HFNC 50/90% with O2 sat 80-84%. Slightly dysneic. Still eating.  Awake and alert.  Tm 98.6  WBC 19  FS high.  LE Dopp Negative for DVT.  L arm superficial thrombosis--NO DVT.      8/10: Pt seen and examined in ICU.  Case d/w Dr shah at bedside.  On HFNC at 50/90% with O2 sat 86%.  NAD.  Complete full sentences.  Ferritin lower.  Tm 98.8  WBC 16.  Eating well.  CXR-- Personally reviewed--slightly worsening infiltrates      PMH:  As above.     PSH:  As above.     FH: Non Contributory other than those listed in HPI    Social History:  NC    MEDICATIONS  (STANDING):  aspirin  chewable 81 milliGRAM(s) Oral daily  atorvastatin 80 milliGRAM(s) Oral at bedtime  budesonide 160 MICROgram(s)/formoterol 4.5 MICROgram(s) Inhaler 2 Puff(s) Inhalation two times a day  cholecalciferol 2000 Unit(s) Oral daily  dextrose 40% Gel 15 Gram(s) Oral once  dextrose 5%. 1000 milliLiter(s) (50 mL/Hr) IV Continuous <Continuous>  dextrose 5%. 1000 milliLiter(s) (100 mL/Hr) IV Continuous <Continuous>  dextrose 50% Injectable 25 Gram(s) IV Push once  dextrose 50% Injectable 12.5 Gram(s) IV Push once  dextrose 50% Injectable 25 Gram(s) IV Push once  enoxaparin Injectable 40 milliGRAM(s) SubCutaneous every 12 hours  famotidine    Tablet 20 milliGRAM(s) Oral daily  glucagon  Injectable 1 milliGRAM(s) IntraMuscular once  insulin glargine Injectable (LANTUS) 10 Unit(s) SubCutaneous two times a day  insulin lispro (ADMELOG) corrective regimen sliding scale   SubCutaneous three times a day before meals  insulin lispro (ADMELOG) corrective regimen sliding scale   SubCutaneous at bedtime  labetalol 200 milliGRAM(s) Oral every 12 hours  losartan 50 milliGRAM(s) Oral daily  predniSONE   Tablet 10 milliGRAM(s) Oral daily    MEDICATIONS  (PRN):  acetaminophen   Tablet .. 650 milliGRAM(s) Oral every 4 hours PRN Temp greater or equal to 38C (100.4F), Mild Pain (1 - 3)  ALBUTerol    90 MICROgram(s) HFA Inhaler 2 Puff(s) Inhalation every 4 hours PRN Shortness of Breath and/or Wheezing  benzocaine 15 mG/menthol 3.6 mG (Sugar-Free) Lozenge 1 Lozenge Oral every 6 hours PRN Sore Throat  benzonatate 100 milliGRAM(s) Oral three times a day PRN Cough  melatonin 6 milliGRAM(s) Oral at bedtime PRN Insomnia      Allergies: NKDA    ROS:  SEE BELOW        ICU Vital Signs Last 24 Hrs  T(C): 36.7 (09 Aug 2021 21:00), Max: 36.9 (09 Aug 2021 13:00)  T(F): 98 (09 Aug 2021 21:00), Max: 98.4 (09 Aug 2021 13:00)  HR: 82 (10 Aug 2021 11:00) (55 - 86)  BP: 123/68 (10 Aug 2021 11:00) (116/68 - 158/90)  BP(mean): 82 (10 Aug 2021 11:00) (77 - 106)  ABP: --  ABP(mean): --  RR: 33 (10 Aug 2021 11:00) (20 - 34)  SpO2: 81% (10 Aug 2021 11:00) (71% - 99%)          I&O's Summary    09 Aug 2021 07:01  -  10 Aug 2021 07:00  --------------------------------------------------------  IN: 120 mL / OUT: 1700 mL / NET: -1580 mL    10 Aug 2021 07:01  -  10 Aug 2021 12:00  --------------------------------------------------------  IN: 0 mL / OUT: 800 mL / NET: -800 mL        Physical Exam:  SEE BELOW                          9.1    16.95 )-----------( 164      ( 10 Aug 2021 06:24 )             28.4       08-10    135  |  102  |  29<H>  ----------------------------<  87  4.1   |  27  |  0.85    Ca    8.7      10 Aug 2021 06:24  Phos  4.0     08-10  Mg     2.2     08-10                      DVT Prophylaxis:                                                            Contraindication:     Advanced Directives:    Discussed with:    Visit Information:  Time spent excluding procedure:      ** Time is exclusive of billed procedures and/or teaching and/or routine family updates.

## 2021-08-10 NOTE — PROGRESS NOTE ADULT - ASSESSMENT
1) COVID Pneumonia  2) Dyspnea  3) Abnormal CXR  4) Hypoxemia  5) Hypertension  6) ROBERT  7) Mild PH    55 M noted to have COVID 7/15  On arrival hypoxic to 80s and tachypneic. NRB placed,in the ER saturating 95%. Na 126 s/p 1L NS. CXR: Frandy infiltrates. Last scr 1.4 in 2019-> today 1.9 unknown if underlying ckd.  Patient not vaccinated.   Last seen by me in 2019 for ROBERT noted to have uncontrolled hypertension  Treated with IV Remdesivir and Decadron, Tocilizumab   SaO2 is 80-85% despite being on 100% NRB so was switched to HFNC  ABG/imaging reviewed  Given the obesity/hypertension and prior co-morbidities, he is at risk of intubation but continue HFNC, respiratory status remains critical  XR reviewed- pulmonary infiltrates are present  DDimer elevated, was on therapeutic lovenox, transitioned to 40mg q 12  Completed Solumedrol, Symbicort 160/4.5 BID (https://[email or web address]/lanres/article/[email or web address], STOIC study)  On HFNC 50LPM, 100%FiO2 with BiPAP overnight  Critical Care performed POCUS on 8/4, noted to have pulmonary edema   Now pending CTPE study, in process of being transitioned to NRB to see if he can tolerate it prior to CT  Will likely need a prolonged course of HFNC given the tenuous respiratory status  Discussed inspiratory muscle exercises with patient while he is out of bed +/- incentive spirometer   Discussed plan with Dr Pineda

## 2021-08-10 NOTE — PROGRESS NOTE ADULT - SUBJECTIVE AND OBJECTIVE BOX
Patient is a 55y old  Male who presents with a chief complaint of cough/sob (23 Jul 2021 14:03)      HPI:  55 M with pmh HLD, dm, htn presents with 8 days onset of covid symptoms which included, cough, fevers, sob, hypoxia, fevers, diarrhea, chills and myalgias. TEsted positive 7/15. Wife endorsed he was becoming more sob of recently. On arrival hypoxic to 80s and tachypneic. NRB placed, presently on 15% and saturating 95%. Na 126 s/p 1L NS. CXR: Frandy infiltrates. Last scr 1.4 in 2019-> today 1.9 unknown if underlying ckd.  Patient not vaccinated.   Last seen by me in 2019  History of ROBERT and uncontrolled hypertension  Treated with IV Remdesivir and Decadron, now on Toci  SaO2 is 80-85% despite being on 100% NRB  ABG pending    8/6  No acute pulmonary events occurred overnight   On HFNC 50LPM, 90%FiO2. Attempted BiPAP overnight  Critical Care performed POCUS on 8/4, noted to have pulmonary edema   Now pending CTPE study, in process of being transitioned to NRB to see if he can tolerate it prior to CT  8/7  covering for Dr Arceo  data discussed with the intensivist and critical care team this am  not stable for further imaging and transport to ct scan due to high FIO2 demand  he appears the same with high O2 requirements  'sitting upright in bed  8/8  he has moved from bed to chair with assistance this am  he remains on HIGH FIO2 requirements  not labored now after he settled down  data discussed with RT therapist this am  8/9  High fio2 requirement with desats on mild exertion  despite diuresis and negative fluid balance  Dr Arceo will resume in am    8/10  Patient still on HFNC, on NRB this morning       Tobacco Usage:  · Tobacco Usage	non smoker  Fhx: none (21 Jul 2021 19:19)    MEDICATIONS  (STANDING):  aspirin  chewable 81 milliGRAM(s) Oral daily  atorvastatin 80 milliGRAM(s) Oral at bedtime  budesonide 160 MICROgram(s)/formoterol 4.5 MICROgram(s) Inhaler 2 Puff(s) Inhalation two times a day  cefepime   IVPB      cefepime   IVPB 2000 milliGRAM(s) IV Intermittent every 8 hours  cholecalciferol 2000 Unit(s) Oral daily  dextrose 40% Gel 15 Gram(s) Oral once  dextrose 5%. 1000 milliLiter(s) (50 mL/Hr) IV Continuous <Continuous>  dextrose 5%. 1000 milliLiter(s) (100 mL/Hr) IV Continuous <Continuous>  dextrose 50% Injectable 25 Gram(s) IV Push once  dextrose 50% Injectable 12.5 Gram(s) IV Push once  dextrose 50% Injectable 25 Gram(s) IV Push once  enoxaparin Injectable 40 milliGRAM(s) SubCutaneous every 12 hours  famotidine    Tablet 20 milliGRAM(s) Oral daily  glucagon  Injectable 1 milliGRAM(s) IntraMuscular once  insulin glargine Injectable (LANTUS) 10 Unit(s) SubCutaneous two times a day  insulin lispro (ADMELOG) corrective regimen sliding scale   SubCutaneous three times a day before meals  insulin lispro (ADMELOG) corrective regimen sliding scale   SubCutaneous at bedtime  labetalol 200 milliGRAM(s) Oral every 12 hours  losartan 50 milliGRAM(s) Oral daily  predniSONE   Tablet 10 milliGRAM(s) Oral daily  vancomycin  IVPB      vancomycin  IVPB 1250 milliGRAM(s) IV Intermittent every 12 hours        MEDICATIONS  (PRN):  acetaminophen   Tablet .. 650 milliGRAM(s) Oral once PRN Temp greater or equal to 38C (100.4F), Mild Pain (1 - 3)  acetaminophen   Tablet .. 650 milliGRAM(s) Oral every 4 hours PRN Temp greater or equal to 38C (100.4F), Mild Pain (1 - 3)  ALBUTerol    90 MICROgram(s) HFA Inhaler 2 Puff(s) Inhalation every 4 hours PRN Shortness of Breath and/or Wheezing  benzocaine 15 mG/menthol 3.6 mG (Sugar-Free) Lozenge 1 Lozenge Oral every 6 hours PRN Sore Throat  benzonatate 100 milliGRAM(s) Oral three times a day PRN Cough  melatonin 3 milliGRAM(s) Oral at bedtime PRN Insomnia    Vital Signs Last 24 Hrs  T(C): 36.7 (09 Aug 2021 21:00), Max: 36.7 (09 Aug 2021 21:00)  T(F): 98 (09 Aug 2021 21:00), Max: 98 (09 Aug 2021 21:00)  HR: 69 (10 Aug 2021 16:00) (55 - 86)  BP: 122/64 (10 Aug 2021 16:00) (99/51 - 144/79)  BP(mean): 78 (10 Aug 2021 16:00) (62 - 101)  RR: 29 (10 Aug 2021 16:00) (20 - 34)  SpO2: 80% (10 Aug 2021 16:00) (71% - 99%)    I&O's Detail    08 Aug 2021 07:01  -  09 Aug 2021 07:00  --------------------------------------------------------  IN:    IV PiggyBack: 450 mL    Oral Fluid: 240 mL  Total IN: 690 mL    OUT:    Voided (mL): 2450 mL  Total OUT: 2450 mL    Total NET: -1760 mL      PHYSICAL EXAM  General Appearance: HFNC  Neck: Supple,   Lungs: coarse bilaterally  Heart: Regular rate and rhythm, S1, S2 normal,Not tachycardic  Abdomen: Soft, non-tender, bowel sounds active   Extremities: no cyanosis or edema, no joint swelling  Skin: Skin color, texture normal, no rashes   Neurologic: Alert and oriented X3 , non focal    ECG:    LABS:                                         10.0   19.05 )-----------( 179      ( 08 Aug 2021 07:11 )             30.3   08-08    137  |  102  |  27<H>  ----------------------------<  121<H>  3.9   |  29  |  0.77    Ca    8.4<L>      08 Aug 2021 07:11  Phos  3.4     08-08  Mg     2.0     08-08                  10.0   19.05 )-----------( 179      ( 08 Aug 2021 07:11 )             30.3   08-08    137  |  102  |  27<H>  ----------------------------<  121<H>  3.9   |  29  |  0.77    Ca    8.4<L>      08 Aug 2021 07:11  Phos  3.4     08-08  Mg     2.0     08-08                 10.4   17.19 )-----------( 198      ( 07 Aug 2021 07:06 )             32.3   08-07    138  |  104  |  31<H>  ----------------------------<  107<H>  4.4   |  29  |  0.77    Ca    8.2<L>      07 Aug 2021 07:06  Phos  4.6     08-07  Mg     2.0     08-07    TPro  5.2<L>  /  Alb  2.3<L>  /  TBili  0.8  /  DBili  x   /  AST  81<H>  /  ALT  213<H>  /  AlkPhos  200<H>  08-06                            12.9   15.00 )-----------( 366      ( 26 Jul 2021 06:20 )             38.4   07-26    133<L>  |  104  |  37<H>  ----------------------------<  232<H>  4.2   |  23  |  1.17    Ca    8.4<L>      26 Jul 2021 06:20    TPro  6.3  /  Alb  2.2<L>  /  TBili  0.4  /  DBili  0.1  /  AST  103<H>  /  ALT  137<H>  /  AlkPhos  239<H>  07-26                          13.2   13.52 )-----------( 338      ( 24 Jul 2021 08:06 )             39.5     07-24    136  |  105  |  43<H>  ----------------------------<  227<H>  4.3   |  25  |  1.31<H>    Ca    8.7      24 Jul 2021 08:06    TPro  6.6  /  Alb  2.3<L>  /  TBili  0.4  /  DBili  x   /  AST  73<H>  /  ALT  64  /  AlkPhos  165<H>  07-24          Pro BNP  -- 07-24 @ 08:06  D Dimer  446 07-24 @ 08:06  Pro BNP  261 07-21 @ 14:42  D Dimer  460 07-21 @ 14:42              < from: Xray Chest 1 View- PORTABLE-Urgent (07.21.21 @ 15:33) >    PROCEDURE DATE:  07/21/2021          INTERPRETATION:  AP chest on July 21, 2021 at 3:27 PM. Patient is short of breath with cough and fever.    Heart magnified by technique.    There arescattered mid lower lung field infiltrates right greater than left possibly related: Pneumonia.    The lungs are clear on August 30, 2019.    IMPRESSION: Bilateral infiltrates as above.    < end of copied text >  < from: US Duplex Venous Lower Ext Complete, Bilateral (07.25.21 @ 08:42) >  COMPARISON: None available.    TECHNIQUE: Duplex sonography of the BILATERAL LOWER extremity veins with color and spectral Doppler, with and without compression.    FINDINGS:    RIGHT:  Normal compressibility of the RIGHT common femoral, femoral and popliteal veins.  Doppler examination shows normal spontaneous and phasic flow.  No RIGHT calf vein thrombosis is detected.    LEFT:  Normal compressibility of the LEFT common femoral, femoral and popliteal veins.  Doppler examination shows normal spontaneous and phasic flow.  No LEFT calf vein thrombosis is detected.    IMPRESSION:  No evidence of deep venous thrombosis in either lower extremity.    < end of copied text >  RADIOLOGY & ADDITIONAL STUDIES:    < from: Xray Chest 1 View- PORTABLE-Urgent (Xray Chest 1 View- PORTABLE-Urgent .) (07.26.21 @ 08:43) >    PROCEDURE DATE:  07/26/2021          INTERPRETATION:  Portable chest radiograph    CLINICAL INFORMATION: Pneumonia due to Covid 19.. Follow-up    TECHNIQUE:  Portable  AP view of the chest was obtained.    COMPARISON: 7/21/2021 chest available for review.    FINDINGS:    The lungs show stable bilateral  multifocal and diffuse ill-defined airspace opacities.. No pneumothorax.    The  heart is enlarged in transverse diameter. No hilar mass.     Visualized osseous structures are intact.    IMPRESSION:   Stable bilateral  multifocal and diffuse ill-defined airspace opacities..    < end of copied text >  < from: US Duplex Venous Lower Ext Complete, Bilateral (08.06.21 @ 17:16) >  FINDINGS:    RIGHT:  Normal compressibility of the RIGHT common femoral, femoral and popliteal veins.  Doppler examination shows normal spontaneous and phasic flow.  No RIGHT calf vein thrombosis is detected.    LEFT:  Normal compressibility of the LEFT common femoral, femoral and popliteal veins.  Doppler examination shows normal spontaneous and phasic flow.  No LEFT calf vein thrombosisis detected.    IMPRESSION:  No evidence of deep venous thrombosis in either lower extremity.    < end of copied text >  < from: Xray Chest 1 View- PORTABLE-Urgent (Xray Chest 1 View- PORTABLE-Urgent .) (08.06.21 @ 16:45) >  INTERPRETATION:  Portable chest radiograph    CLINICAL INFORMATION: Pneumonia due to Covid 19.    TECHNIQUE:  Portable  AP view of the chest was obtained.    COMPARISON: 8/1/2021 chest radiograph available for review.    FINDINGS:    The lungs show decreasing bilateral diffuse airspace disease.. No pneumothorax.    The  heart is mildly enlarged in transverse diameter. No hilar mass.   Visualized osseous structures are intact.    IMPRESSION:   Decreasing bilateral diffuse airspace disease..    < end of copied text >

## 2021-08-10 NOTE — CHART NOTE - NSCHARTNOTEFT_GEN_A_CORE
Clinical Nutrition Follow Up Note:    *54yo male admitted with acute hypoxic resp failure from COVID-19 PNA; on HFNC.     *Labs Reviewed:  08-10    135  |  102  |  29<H>  ----------------------------<  87  4.1   |  27  |  0.85    Ca    8.7      10 Aug 2021 06:24  Phos  4.0     08-10  Mg     2.2     08-10    HbA1c: A1C with Estimated Average Glucose Result: 11.6 % (07-22-21 @ 07:57)  Glucose: POCT Blood Glucose.: 186 mg/dL (08-02-21 @ 11:48)    POCT Blood Glucose.: 112 mg/dL (10 Aug 2021 08:33)  POCT Blood Glucose.: 192 mg/dL (09 Aug 2021 21:50)  POCT Blood Glucose.: 172 mg/dL (09 Aug 2021 17:23)    *labs reviewed; POCT better controlled.  lytes WNL.     *pertinent meds: atorvastatin, lantus 10 units at BID, admelog sliding scale, ademlog 7 units with each meal, melatonin, cholecalciferol 2000 IU daily    *(+) BM on 8/9  ; pt not on bowel regimen.    *linda score of 19 :no PU documented.  no edema documented.    *PO intake; consuming 2-3 small meals per day; RN encouraging glucerna TID.  rec'd add gelatein BID to optimize protein intake.    *Malnutrition dx: severe malnutrition in acute illness r/t COVID AEB >5% wt loss in 1 wk; moderate fat/muscle wasting        Diet, Consistent Carbohydrate w/Evening Snack (08-01-21 @ 07:51) [Active]        Estimated Needs: Based on 72Kg (adjusted IBW)   Calories: 6314-1656 Kcal (25-30 Kcal/Kg)  Protein:  115-130g (1.6-1.8 g/Kg)  Fluids:  1958-6007 mL (25-30 mL/Kg)    *Wt Hx:  no new wt since admission; obtain new wt when feasible  Height (cm): 167.6 (07-21-21 @ 14:34)  Weight (kg): 90.4 (07-21-21 @ 22:00)  BMI (kg/m2): 32.2 (07-21-21 @ 22:00)  BSA (m2): 2 (07-21-21 @ 22:00)    Recommendations:  1) add gelatein BID and encourage oral supplement to optimize calorie/protein intake to better meet nutr needs  2) monitor BM: if > 3 days without BM, add bowel regimen  3) add MVI with minerals daily to ensure 100% RDI met  4) obtain new wt when feasible to track/trend changes        *will continue to monitor and follow up with adjustments to treatment plan prn*    Anita Arnold MA, RDN, CDN, [facility] [phone]

## 2021-08-11 LAB
ANION GAP SERPL CALC-SCNC: 6 MMOL/L — SIGNIFICANT CHANGE UP (ref 5–17)
BUN SERPL-MCNC: 29 MG/DL — HIGH (ref 7–23)
CALCIUM SERPL-MCNC: 8.7 MG/DL — SIGNIFICANT CHANGE UP (ref 8.5–10.1)
CHLORIDE SERPL-SCNC: 103 MMOL/L — SIGNIFICANT CHANGE UP (ref 96–108)
CO2 SERPL-SCNC: 28 MMOL/L — SIGNIFICANT CHANGE UP (ref 22–31)
CREAT SERPL-MCNC: 0.86 MG/DL — SIGNIFICANT CHANGE UP (ref 0.5–1.3)
GLUCOSE SERPL-MCNC: 95 MG/DL — SIGNIFICANT CHANGE UP (ref 70–99)
HCT VFR BLD CALC: 28.5 % — LOW (ref 39–50)
HGB BLD-MCNC: 9.3 G/DL — LOW (ref 13–17)
MAGNESIUM SERPL-MCNC: 2 MG/DL — SIGNIFICANT CHANGE UP (ref 1.6–2.6)
MCHC RBC-ENTMCNC: 28.4 PG — SIGNIFICANT CHANGE UP (ref 27–34)
MCHC RBC-ENTMCNC: 32.6 GM/DL — SIGNIFICANT CHANGE UP (ref 32–36)
MCV RBC AUTO: 87.2 FL — SIGNIFICANT CHANGE UP (ref 80–100)
PHOSPHATE SERPL-MCNC: 3.8 MG/DL — SIGNIFICANT CHANGE UP (ref 2.5–4.5)
PLATELET # BLD AUTO: 164 K/UL — SIGNIFICANT CHANGE UP (ref 150–400)
POTASSIUM SERPL-MCNC: 3.9 MMOL/L — SIGNIFICANT CHANGE UP (ref 3.5–5.3)
POTASSIUM SERPL-SCNC: 3.9 MMOL/L — SIGNIFICANT CHANGE UP (ref 3.5–5.3)
RBC # BLD: 3.27 M/UL — LOW (ref 4.2–5.8)
RBC # FLD: 12.8 % — SIGNIFICANT CHANGE UP (ref 10.3–14.5)
SODIUM SERPL-SCNC: 137 MMOL/L — SIGNIFICANT CHANGE UP (ref 135–145)
WBC # BLD: 16.34 K/UL — HIGH (ref 3.8–10.5)
WBC # FLD AUTO: 16.34 K/UL — HIGH (ref 3.8–10.5)

## 2021-08-11 PROCEDURE — 99291 CRITICAL CARE FIRST HOUR: CPT

## 2021-08-11 RX ADMIN — Medication 10 MILLIGRAM(S): at 11:27

## 2021-08-11 RX ADMIN — Medication 10 MILLIGRAM(S): at 13:50

## 2021-08-11 RX ADMIN — ENOXAPARIN SODIUM 40 MILLIGRAM(S): 100 INJECTION SUBCUTANEOUS at 21:48

## 2021-08-11 RX ADMIN — ENOXAPARIN SODIUM 40 MILLIGRAM(S): 100 INJECTION SUBCUTANEOUS at 11:27

## 2021-08-11 RX ADMIN — BUDESONIDE AND FORMOTEROL FUMARATE DIHYDRATE 2 PUFF(S): 160; 4.5 AEROSOL RESPIRATORY (INHALATION) at 20:59

## 2021-08-11 RX ADMIN — INSULIN GLARGINE 10 UNIT(S): 100 INJECTION, SOLUTION SUBCUTANEOUS at 08:37

## 2021-08-11 RX ADMIN — Medication 81 MILLIGRAM(S): at 11:27

## 2021-08-11 RX ADMIN — LOSARTAN POTASSIUM 50 MILLIGRAM(S): 100 TABLET, FILM COATED ORAL at 11:27

## 2021-08-11 RX ADMIN — ATORVASTATIN CALCIUM 80 MILLIGRAM(S): 80 TABLET, FILM COATED ORAL at 21:48

## 2021-08-11 RX ADMIN — Medication 2000 UNIT(S): at 11:27

## 2021-08-11 RX ADMIN — Medication 6 MILLIGRAM(S): at 21:49

## 2021-08-11 RX ADMIN — Medication 200 MILLIGRAM(S): at 21:49

## 2021-08-11 RX ADMIN — Medication 4: at 18:13

## 2021-08-11 RX ADMIN — INSULIN GLARGINE 10 UNIT(S): 100 INJECTION, SOLUTION SUBCUTANEOUS at 21:48

## 2021-08-11 RX ADMIN — FAMOTIDINE 20 MILLIGRAM(S): 10 INJECTION INTRAVENOUS at 11:27

## 2021-08-11 RX ADMIN — BUDESONIDE AND FORMOTEROL FUMARATE DIHYDRATE 2 PUFF(S): 160; 4.5 AEROSOL RESPIRATORY (INHALATION) at 08:08

## 2021-08-11 RX ADMIN — Medication 200 MILLIGRAM(S): at 11:27

## 2021-08-11 NOTE — PROGRESS NOTE ADULT - ASSESSMENT
1) COVID Pneumonia  2) Dyspnea  3) Abnormal CXR  4) Hypoxemia  5) Hypertension  6) ROBERT  7) Mild PH    55 M noted to have COVID 7/15  On arrival hypoxic to 80s and tachypneic. NRB placed,in the ER saturating 95%. Na 126 s/p 1L NS. CXR: Frandy infiltrates. Last scr 1.4 in 2019-> today 1.9 unknown if underlying ckd.  Patient not vaccinated.   Last seen by me in 2019 for ROBERT noted to have uncontrolled hypertension  Treated with IV Remdesivir and Decadron, Tocilizumab   SaO2 is 80-85% despite being on 100% NRB so was switched to HFNC  ABG/imaging reviewed  Given the obesity/hypertension and prior co-morbidities, he is at risk of intubation but continue HFNC, respiratory status remains critical  XR reviewed- pulmonary infiltrates are present  DDimer elevated, was on therapeutic lovenox, transitioned to 40mg q 12  Completed Solumedrol, Symbicort 160/4.5 BID ([email or web address], STO study)  On HFNC 50LPM, 100%FiO2 with BiPAP overnight  CTPE not performed  Will likely need a prolonged course of HFNC given the tenuous respiratory status  Discussed inspiratory muscle exercises with patient while he is out of bed +/- incentive spirometer   Discussed plan with Dr Pineda

## 2021-08-11 NOTE — PROGRESS NOTE ADULT - ASSESSMENT
IMP:    54 y/o male with HLD, HTN and DM--Non Vacc-- admitted with Viral PNA sepsis secondary to COVID--Acute type 1 resp failure and ARDS.  More hypoxic wo sig symptoms   Doubt HAP as CXR on 8/6 was improving   Very high risk for intubation    Severe Pro Ted malnutrition     Critically ill.  High risk for acute decompensation and deterioration including death   Patient requires critical care for support of one or more vital organ systems with a high probability of imminent or life threatening deterioration in his/her condition     Plan:    HFNC--keep oxygen saturation in mid 80s accepting "Happy Hypoxia" to avoid/limit oxygen toxicity.  NIV QHS.  Prone as tolerated  Cont steroids--will increase back to 20 daily  FS with insulin coverage--keep FS < 180.  Cont Lantus 10 bid.    Encourage nutritional support  OOB  IV Vanco/Cefepime (3) with no Cxs.  Completed 3 days  DVT prophy--SCD and LMWH    ICU care-- d/w ICU staff on multi disciplinary rounds and pt-- All concerns addressed including but not limited to diagnosis, treatment plan and overall prognosis

## 2021-08-11 NOTE — PROGRESS NOTE ADULT - SUBJECTIVE AND OBJECTIVE BOX
Patient is a 55y old  Male who presents with a chief complaint of cough/sob (23 Jul 2021 14:03)      HPI:  55 M with pmh HLD, dm, htn presents with 8 days onset of covid symptoms which included, cough, fevers, sob, hypoxia, fevers, diarrhea, chills and myalgias. TEsted positive 7/15. Wife endorsed he was becoming more sob of recently. On arrival hypoxic to 80s and tachypneic. NRB placed, presently on 15% and saturating 95%. Na 126 s/p 1L NS. CXR: Frandy infiltrates. Last scr 1.4 in 2019-> today 1.9 unknown if underlying ckd.  Patient not vaccinated.   Last seen by me in 2019  History of ROBERT and uncontrolled hypertension  Treated with IV Remdesivir and Decadron, now on Toci  SaO2 is 80-85% despite being on 100% NRB  ABG pending    8/6  No acute pulmonary events occurred overnight   On HFNC 50LPM, 90%FiO2. Attempted BiPAP overnight  Critical Care performed POCUS on 8/4, noted to have pulmonary edema   Now pending CTPE study, in process of being transitioned to NRB to see if he can tolerate it prior to CT  8/7  covering for Dr Arceo  data discussed with the intensivist and critical care team this am  not stable for further imaging and transport to ct scan due to high FIO2 demand  he appears the same with high O2 requirements  'sitting upright in bed  8/8  he has moved from bed to chair with assistance this am  he remains on HIGH FIO2 requirements  not labored now after he settled down  data discussed with RT therapist this am  8/9  High fio2 requirement with desats on mild exertion  despite diuresis and negative fluid balance  Dr Arceo will resume in am    8/10  Patient still on HFNC, on NRB this morning    8/11  Noted to have increased saturations yesterday during the day >90%  On HFNC 90/50, attempted 85%     Tobacco Usage:  · Tobacco Usage	non smoker  Fhx: none (21 Jul 2021 19:19)    MEDICATIONS  (STANDING):  aspirin  chewable 81 milliGRAM(s) Oral daily  atorvastatin 80 milliGRAM(s) Oral at bedtime  budesonide 160 MICROgram(s)/formoterol 4.5 MICROgram(s) Inhaler 2 Puff(s) Inhalation two times a day  cefepime   IVPB      cefepime   IVPB 2000 milliGRAM(s) IV Intermittent every 8 hours  cholecalciferol 2000 Unit(s) Oral daily  dextrose 40% Gel 15 Gram(s) Oral once  dextrose 5%. 1000 milliLiter(s) (50 mL/Hr) IV Continuous <Continuous>  dextrose 5%. 1000 milliLiter(s) (100 mL/Hr) IV Continuous <Continuous>  dextrose 50% Injectable 25 Gram(s) IV Push once  dextrose 50% Injectable 12.5 Gram(s) IV Push once  dextrose 50% Injectable 25 Gram(s) IV Push once  enoxaparin Injectable 40 milliGRAM(s) SubCutaneous every 12 hours  famotidine    Tablet 20 milliGRAM(s) Oral daily  glucagon  Injectable 1 milliGRAM(s) IntraMuscular once  insulin glargine Injectable (LANTUS) 10 Unit(s) SubCutaneous two times a day  insulin lispro (ADMELOG) corrective regimen sliding scale   SubCutaneous three times a day before meals  insulin lispro (ADMELOG) corrective regimen sliding scale   SubCutaneous at bedtime  labetalol 200 milliGRAM(s) Oral every 12 hours  losartan 50 milliGRAM(s) Oral daily  predniSONE   Tablet 10 milliGRAM(s) Oral daily  vancomycin  IVPB      vancomycin  IVPB 1250 milliGRAM(s) IV Intermittent every 12 hours        MEDICATIONS  (PRN):  acetaminophen   Tablet .. 650 milliGRAM(s) Oral once PRN Temp greater or equal to 38C (100.4F), Mild Pain (1 - 3)  acetaminophen   Tablet .. 650 milliGRAM(s) Oral every 4 hours PRN Temp greater or equal to 38C (100.4F), Mild Pain (1 - 3)  ALBUTerol    90 MICROgram(s) HFA Inhaler 2 Puff(s) Inhalation every 4 hours PRN Shortness of Breath and/or Wheezing  benzocaine 15 mG/menthol 3.6 mG (Sugar-Free) Lozenge 1 Lozenge Oral every 6 hours PRN Sore Throat  benzonatate 100 milliGRAM(s) Oral three times a day PRN Cough  melatonin 3 milliGRAM(s) Oral at bedtime PRN Insomnia    Vital Signs Last 24 Hrs  T(C): 36.4 (10 Aug 2021 22:00), Max: 36.6 (10 Aug 2021 18:00)  T(F): 97.5 (10 Aug 2021 22:00), Max: 97.9 (10 Aug 2021 18:00)  HR: 80 (11 Aug 2021 14:00) (61 - 105)  BP: 110/72 (11 Aug 2021 14:00) (104/58 - 140/71)  BP(mean): 81 (11 Aug 2021 14:00) (68 - 98)  RR: 29 (11 Aug 2021 14:00) (22 - 39)  SpO2: 80% (11 Aug 2021 14:00) (74% - 97%)  I&O's Detail    08 Aug 2021 07:01  -  09 Aug 2021 07:00  --------------------------------------------------------  IN:    IV PiggyBack: 450 mL    Oral Fluid: 240 mL  Total IN: 690 mL    OUT:    Voided (mL): 2450 mL  Total OUT: 2450 mL    Total NET: -1760 mL      PHYSICAL EXAM  General Appearance: HFNC  Neck: Supple,   Lungs: coarse bilaterally  Heart: Regular rate and rhythm, S1, S2 normal,Not tachycardic  Abdomen: Soft, non-tender, bowel sounds active   Extremities: no cyanosis or edema, no joint swelling  Skin: Skin color, texture normal, no rashes   Neurologic: Alert and oriented X3 , non focal    ECG:    LABS:                                         10.0   19.05 )-----------( 179      ( 08 Aug 2021 07:11 )             30.3   08-08    137  |  102  |  27<H>  ----------------------------<  121<H>  3.9   |  29  |  0.77    Ca    8.4<L>      08 Aug 2021 07:11  Phos  3.4     08-08  Mg     2.0     08-08                  10.0   19.05 )-----------( 179      ( 08 Aug 2021 07:11 )             30.3   08-08    137  |  102  |  27<H>  ----------------------------<  121<H>  3.9   |  29  |  0.77    Ca    8.4<L>      08 Aug 2021 07:11  Phos  3.4     08-08  Mg     2.0     08-08                 10.4   17.19 )-----------( 198      ( 07 Aug 2021 07:06 )             32.3   08-07    138  |  104  |  31<H>  ----------------------------<  107<H>  4.4   |  29  |  0.77    Ca    8.2<L>      07 Aug 2021 07:06  Phos  4.6     08-07  Mg     2.0     08-07    TPro  5.2<L>  /  Alb  2.3<L>  /  TBili  0.8  /  DBili  x   /  AST  81<H>  /  ALT  213<H>  /  AlkPhos  200<H>  08-06                            12.9   15.00 )-----------( 366      ( 26 Jul 2021 06:20 )             38.4   07-26    133<L>  |  104  |  37<H>  ----------------------------<  232<H>  4.2   |  23  |  1.17    Ca    8.4<L>      26 Jul 2021 06:20    TPro  6.3  /  Alb  2.2<L>  /  TBili  0.4  /  DBili  0.1  /  AST  103<H>  /  ALT  137<H>  /  AlkPhos  239<H>  07-26                          13.2   13.52 )-----------( 338      ( 24 Jul 2021 08:06 )             39.5     07-24    136  |  105  |  43<H>  ----------------------------<  227<H>  4.3   |  25  |  1.31<H>    Ca    8.7      24 Jul 2021 08:06    TPro  6.6  /  Alb  2.3<L>  /  TBili  0.4  /  DBili  x   /  AST  73<H>  /  ALT  64  /  AlkPhos  165<H>  07-24          Pro BNP  -- 07-24 @ 08:06  D Dimer  446 07-24 @ 08:06  Pro BNP  261 07-21 @ 14:42  D Dimer  460 07-21 @ 14:42              < from: Xray Chest 1 View- PORTABLE-Urgent (07.21.21 @ 15:33) >    PROCEDURE DATE:  07/21/2021          INTERPRETATION:  AP chest on July 21, 2021 at 3:27 PM. Patient is short of breath with cough and fever.    Heart magnified by technique.    There arescattered mid lower lung field infiltrates right greater than left possibly related: Pneumonia.    The lungs are clear on August 30, 2019.    IMPRESSION: Bilateral infiltrates as above.    < end of copied text >  < from: US Duplex Venous Lower Ext Complete, Bilateral (07.25.21 @ 08:42) >  COMPARISON: None available.    TECHNIQUE: Duplex sonography of the BILATERAL LOWER extremity veins with color and spectral Doppler, with and without compression.    FINDINGS:    RIGHT:  Normal compressibility of the RIGHT common femoral, femoral and popliteal veins.  Doppler examination shows normal spontaneous and phasic flow.  No RIGHT calf vein thrombosis is detected.    LEFT:  Normal compressibility of the LEFT common femoral, femoral and popliteal veins.  Doppler examination shows normal spontaneous and phasic flow.  No LEFT calf vein thrombosis is detected.    IMPRESSION:  No evidence of deep venous thrombosis in either lower extremity.    < end of copied text >  RADIOLOGY & ADDITIONAL STUDIES:    < from: Xray Chest 1 View- PORTABLE-Urgent (Xray Chest 1 View- PORTABLE-Urgent .) (07.26.21 @ 08:43) >    PROCEDURE DATE:  07/26/2021          INTERPRETATION:  Portable chest radiograph    CLINICAL INFORMATION: Pneumonia due to Covid 19.. Follow-up    TECHNIQUE:  Portable  AP view of the chest was obtained.    COMPARISON: 7/21/2021 chest available for review.    FINDINGS:    The lungs show stable bilateral  multifocal and diffuse ill-defined airspace opacities.. No pneumothorax.    The  heart is enlarged in transverse diameter. No hilar mass.     Visualized osseous structures are intact.    IMPRESSION:   Stable bilateral  multifocal and diffuse ill-defined airspace opacities..    < end of copied text >  < from: US Duplex Venous Lower Ext Complete, Bilateral (08.06.21 @ 17:16) >  FINDINGS:    RIGHT:  Normal compressibility of the RIGHT common femoral, femoral and popliteal veins.  Doppler examination shows normal spontaneous and phasic flow.  No RIGHT calf vein thrombosis is detected.    LEFT:  Normal compressibility of the LEFT common femoral, femoral and popliteal veins.  Doppler examination shows normal spontaneous and phasic flow.  No LEFT calf vein thrombosisis detected.    IMPRESSION:  No evidence of deep venous thrombosis in either lower extremity.    < end of copied text >  < from: Xray Chest 1 View- PORTABLE-Urgent (Xray Chest 1 View- PORTABLE-Urgent .) (08.06.21 @ 16:45) >  INTERPRETATION:  Portable chest radiograph    CLINICAL INFORMATION: Pneumonia due to Covid 19.    TECHNIQUE:  Portable  AP view of the chest was obtained.    COMPARISON: 8/1/2021 chest radiograph available for review.    FINDINGS:    The lungs show decreasing bilateral diffuse airspace disease.. No pneumothorax.    The  heart is mildly enlarged in transverse diameter. No hilar mass.   Visualized osseous structures are intact.    IMPRESSION:   Decreasing bilateral diffuse airspace disease..    < end of copied text >

## 2021-08-11 NOTE — PROGRESS NOTE ADULT - SUBJECTIVE AND OBJECTIVE BOX
Hospital D # 21  ICU # [id number]    CC:  COVID     HPI:    54 y/o male with HLD, HTN and DM--Non Vacc--presents with 8 days onset of covid symptoms which included, cough, fevers, sob, hypoxia, fevers, diarrhea, chills and myalgias. Tested positive 7/15.  Rx with Rem/Dexa and then high dose steroids and full AC.  Pt tx to ICU on 7/24 for worsening hypoxia.       8/9:  Pt seen and examined in ICU.  Sitting in chair.  On HFNC 50/90% with O2 sat 80-84%. Slightly dysneic. Still eating.  Awake and alert.  Tm 98.6  WBC 19  FS high.  LE Dopp Negative for DVT.  L arm superficial thrombosis--NO DVT.      8/10: Pt seen and examined in ICU.  Case d/w Dr shah at bedside.  On HFNC at 50/90% with O2 sat 86%.  NAD.  Complete full sentences.  Ferritin lower.  Tm 98.8  WBC 16.  Eating well.  CXR-- Personally reviewed--slightly worsening infiltrates      8/11:  Pt seen and examined in ICU.  O2 sat not as high as yesterday.  On HFNC 50/95%.  Sitting in chair.  Taking longer to recover after care this morning.  He doesn't feel any different.  Tm 97.9  WBC 16  FS better.  Still eating well    PMH:  As above.     PSH:  As above.     FH: Non Contributory other than those listed in HPI    Social History:  NC    MEDICATIONS  (STANDING):  aspirin  chewable 81 milliGRAM(s) Oral daily  atorvastatin 80 milliGRAM(s) Oral at bedtime  budesonide 160 MICROgram(s)/formoterol 4.5 MICROgram(s) Inhaler 2 Puff(s) Inhalation two times a day  cholecalciferol 2000 Unit(s) Oral daily  dextrose 40% Gel 15 Gram(s) Oral once  dextrose 5%. 1000 milliLiter(s) (50 mL/Hr) IV Continuous <Continuous>  dextrose 5%. 1000 milliLiter(s) (100 mL/Hr) IV Continuous <Continuous>  dextrose 50% Injectable 25 Gram(s) IV Push once  dextrose 50% Injectable 12.5 Gram(s) IV Push once  dextrose 50% Injectable 25 Gram(s) IV Push once  enoxaparin Injectable 40 milliGRAM(s) SubCutaneous every 12 hours  famotidine    Tablet 20 milliGRAM(s) Oral daily  glucagon  Injectable 1 milliGRAM(s) IntraMuscular once  insulin glargine Injectable (LANTUS) 10 Unit(s) SubCutaneous two times a day  insulin lispro (ADMELOG) corrective regimen sliding scale   SubCutaneous three times a day before meals  insulin lispro (ADMELOG) corrective regimen sliding scale   SubCutaneous at bedtime  labetalol 200 milliGRAM(s) Oral every 12 hours  losartan 50 milliGRAM(s) Oral daily  predniSONE   Tablet 10 milliGRAM(s) Oral daily    MEDICATIONS  (PRN):  acetaminophen   Tablet .. 650 milliGRAM(s) Oral every 4 hours PRN Temp greater or equal to 38C (100.4F), Mild Pain (1 - 3)  ALBUTerol    90 MICROgram(s) HFA Inhaler 2 Puff(s) Inhalation every 4 hours PRN Shortness of Breath and/or Wheezing  benzocaine 15 mG/menthol 3.6 mG (Sugar-Free) Lozenge 1 Lozenge Oral every 6 hours PRN Sore Throat  benzonatate 100 milliGRAM(s) Oral three times a day PRN Cough  melatonin 6 milliGRAM(s) Oral at bedtime PRN Insomnia      Allergies: NKDA    ROS:  SEE BELOW        ICU Vital Signs Last 24 Hrs  T(C): 36.4 (10 Aug 2021 22:00), Max: 36.6 (10 Aug 2021 18:00)  T(F): 97.5 (10 Aug 2021 22:00), Max: 97.9 (10 Aug 2021 18:00)  HR: 89 (11 Aug 2021 11:47) (61 - 105)  BP: 124/51 (11 Aug 2021 10:00) (104/58 - 140/71)  BP(mean): 68 (11 Aug 2021 10:00) (68 - 98)  ABP: --  ABP(mean): --  RR: 34 (11 Aug 2021 11:47) (22 - 39)  SpO2: 79% (11 Aug 2021 11:47) (74% - 97%)          I&O's Summary    10 Aug 2021 07:01  -  11 Aug 2021 07:00  --------------------------------------------------------  IN: 240 mL / OUT: 2650 mL / NET: -2410 mL        Physical Exam:  SEE BELOW                          9.3    16.34 )-----------( 164      ( 11 Aug 2021 06:40 )             28.5       08-11    137  |  103  |  29<H>  ----------------------------<  95  3.9   |  28  |  0.86    Ca    8.7      11 Aug 2021 06:40  Phos  3.8     08-11  Mg     2.0     08-11                      DVT Prophylaxis:                                                            Contraindication:     Advanced Directives:    Discussed with:    Visit Information:  Time spent excluding procedure:      ** Time is exclusive of billed procedures and/or teaching and/or routine family updates.

## 2021-08-12 PROCEDURE — 99291 CRITICAL CARE FIRST HOUR: CPT

## 2021-08-12 RX ORDER — INSULIN GLARGINE 100 [IU]/ML
15 INJECTION, SOLUTION SUBCUTANEOUS EVERY MORNING
Refills: 0 | Status: DISCONTINUED | OUTPATIENT
Start: 2021-08-12 | End: 2021-08-14

## 2021-08-12 RX ORDER — INSULIN GLARGINE 100 [IU]/ML
15 INJECTION, SOLUTION SUBCUTANEOUS
Refills: 0 | Status: DISCONTINUED | OUTPATIENT
Start: 2021-08-12 | End: 2021-08-12

## 2021-08-12 RX ADMIN — Medication 200 MILLIGRAM(S): at 10:09

## 2021-08-12 RX ADMIN — ATORVASTATIN CALCIUM 80 MILLIGRAM(S): 80 TABLET, FILM COATED ORAL at 23:05

## 2021-08-12 RX ADMIN — BUDESONIDE AND FORMOTEROL FUMARATE DIHYDRATE 2 PUFF(S): 160; 4.5 AEROSOL RESPIRATORY (INHALATION) at 08:17

## 2021-08-12 RX ADMIN — Medication 2: at 13:41

## 2021-08-12 RX ADMIN — Medication 20 MILLIGRAM(S): at 10:08

## 2021-08-12 RX ADMIN — Medication 6 MILLIGRAM(S): at 23:05

## 2021-08-12 RX ADMIN — LOSARTAN POTASSIUM 50 MILLIGRAM(S): 100 TABLET, FILM COATED ORAL at 10:08

## 2021-08-12 RX ADMIN — Medication 200 MILLIGRAM(S): at 23:05

## 2021-08-12 RX ADMIN — INSULIN GLARGINE 15 UNIT(S): 100 INJECTION, SOLUTION SUBCUTANEOUS at 10:07

## 2021-08-12 RX ADMIN — ENOXAPARIN SODIUM 40 MILLIGRAM(S): 100 INJECTION SUBCUTANEOUS at 23:05

## 2021-08-12 RX ADMIN — BUDESONIDE AND FORMOTEROL FUMARATE DIHYDRATE 2 PUFF(S): 160; 4.5 AEROSOL RESPIRATORY (INHALATION) at 20:55

## 2021-08-12 RX ADMIN — Medication 2000 UNIT(S): at 10:09

## 2021-08-12 RX ADMIN — Medication 2: at 18:39

## 2021-08-12 RX ADMIN — Medication 100 MILLIGRAM(S): at 17:11

## 2021-08-12 RX ADMIN — FAMOTIDINE 20 MILLIGRAM(S): 10 INJECTION INTRAVENOUS at 10:07

## 2021-08-12 RX ADMIN — Medication 81 MILLIGRAM(S): at 10:07

## 2021-08-12 RX ADMIN — ENOXAPARIN SODIUM 40 MILLIGRAM(S): 100 INJECTION SUBCUTANEOUS at 10:07

## 2021-08-12 NOTE — PROGRESS NOTE ADULT - ASSESSMENT
IMP:    54 y/o male with HLD, HTN and DM--Non Vacc-- admitted with Viral PNA sepsis secondary to COVID--Acute type 1 resp failure and ARDS.    Doubt HAP as CXR on 8/6 was improving   Very high risk for intubation    Severe Pro Ted malnutrition     Critically ill.  High risk for acute decompensation and deterioration including death   Patient requires critical care for support of one or more vital organ systems with a high probability of imminent or life threatening deterioration in his/her condition     Plan:    HFNC--keep oxygen saturation in mid 80s accepting "Happy Hypoxia" to avoid/limit oxygen toxicity.  NIV QHS.  Prone as tolerated  Cont steroids--cont 20 daily  FS with insulin coverage--keep FS < 180.  Will increase AM dose 15 and stop HS dose given FS 90s in AM  Encourage nutritional support  OOB  IV Vanco/Cefepime (3) with no Cxs.  Completed 3 days  DVT prophy--SCD and LMWH  Check Labs in AM    ICU care-- d/w ICU staff on multi disciplinary rounds and pt-- All concerns addressed including but not limited to diagnosis, treatment plan and overall prognosis

## 2021-08-12 NOTE — PROGRESS NOTE ADULT - ASSESSMENT
1) COVID Pneumonia  2) Dyspnea  3) Abnormal CXR  4) Hypoxemia  5) Hypertension  6) ROBERT  7) Mild PH    55 M noted to have COVID 7/15  On arrival hypoxic to 80s and tachypneic. NRB placed,in the ER saturating 95%. Na 126 s/p 1L NS. CXR: Frandy infiltrates. Last scr 1.4 in 2019-> today 1.9 unknown if underlying ckd.  Patient not vaccinated.   Last seen by me in 2019 for ROBERT noted to have uncontrolled hypertension  Treated with IV Remdesivir and Decadron, Tocilizumab   SaO2 is 80-85% despite being on 100% NRB so was switched to HFNC  ABG/imaging reviewed  Given the obesity/hypertension and prior co-morbidities, he is at risk of intubation but continue HFNC, respiratory status remains critical  XR reviewed- pulmonary infiltrates are present  DDimer elevated, was on therapeutic lovenox, transitioned to 40mg q 12  Completed Solumedrol, Symbicort 160/4.5 BID (https://[email or web address]/[email or web address]/[email or web address], STO study)  On HFNC 50LPM, %FiO2 with BiPAP overnight  CTPE not performed  Will likely need a prolonged course of HFNC given the tenuous respiratory status  Discussed inspiratory muscle exercises with patient while he is out of bed +/- incentive spirometer   Discussed plan with Dr Pineda

## 2021-08-12 NOTE — PROGRESS NOTE ADULT - SUBJECTIVE AND OBJECTIVE BOX
Patient is a 55y old  Male who presents with a chief complaint of cough/sob (23 Jul 2021 14:03)      HPI:  55 M with pmh HLD, dm, htn presents with 8 days onset of covid symptoms which included, cough, fevers, sob, hypoxia, fevers, diarrhea, chills and myalgias. TEsted positive 7/15. Wife endorsed he was becoming more sob of recently. On arrival hypoxic to 80s and tachypneic. NRB placed, presently on 15% and saturating 95%. Na 126 s/p 1L NS. CXR: Frandy infiltrates. Last scr 1.4 in 2019-> today 1.9 unknown if underlying ckd.  Patient not vaccinated.   Last seen by me in 2019  History of ROBERT and uncontrolled hypertension  Treated with IV Remdesivir and Decadron, now on Toci  SaO2 is 80-85% despite being on 100% NRB  ABG pending    8/6  No acute pulmonary events occurred overnight   On HFNC 50LPM, 90%FiO2. Attempted BiPAP overnight  Critical Care performed POCUS on 8/4, noted to have pulmonary edema   Now pending CTPE study, in process of being transitioned to NRB to see if he can tolerate it prior to CT  8/7  covering for Dr Arceo  data discussed with the intensivist and critical care team this am  not stable for further imaging and transport to ct scan due to high FIO2 demand  he appears the same with high O2 requirements  'sitting upright in bed  8/8  he has moved from bed to chair with assistance this am  he remains on HIGH FIO2 requirements  not labored now after he settled down  data discussed with RT therapist this am  8/9  High fio2 requirement with desats on mild exertion  despite diuresis and negative fluid balance  Dr Arceo will resume in am    8/10  Patient still on HFNC, on NRB this morning    8/11  Noted to have increased saturations yesterday during the day >90%  On HFNC 90/50, attempted 85%    8/12  Patient noted to have desaturations this morning  In prone position, saturation 78%-85%  No acute distress     Tobacco Usage:  · Tobacco Usage	non smoker  Fhx: none (21 Jul 2021 19:19)    MEDICATIONS  (STANDING):  aspirin  chewable 81 milliGRAM(s) Oral daily  atorvastatin 80 milliGRAM(s) Oral at bedtime  budesonide 160 MICROgram(s)/formoterol 4.5 MICROgram(s) Inhaler 2 Puff(s) Inhalation two times a day  cefepime   IVPB      cefepime   IVPB 2000 milliGRAM(s) IV Intermittent every 8 hours  cholecalciferol 2000 Unit(s) Oral daily  dextrose 40% Gel 15 Gram(s) Oral once  dextrose 5%. 1000 milliLiter(s) (50 mL/Hr) IV Continuous <Continuous>  dextrose 5%. 1000 milliLiter(s) (100 mL/Hr) IV Continuous <Continuous>  dextrose 50% Injectable 25 Gram(s) IV Push once  dextrose 50% Injectable 12.5 Gram(s) IV Push once  dextrose 50% Injectable 25 Gram(s) IV Push once  enoxaparin Injectable 40 milliGRAM(s) SubCutaneous every 12 hours  famotidine    Tablet 20 milliGRAM(s) Oral daily  glucagon  Injectable 1 milliGRAM(s) IntraMuscular once  insulin glargine Injectable (LANTUS) 10 Unit(s) SubCutaneous two times a day  insulin lispro (ADMELOG) corrective regimen sliding scale   SubCutaneous three times a day before meals  insulin lispro (ADMELOG) corrective regimen sliding scale   SubCutaneous at bedtime  labetalol 200 milliGRAM(s) Oral every 12 hours  losartan 50 milliGRAM(s) Oral daily  predniSONE   Tablet 10 milliGRAM(s) Oral daily  vancomycin  IVPB      vancomycin  IVPB 1250 milliGRAM(s) IV Intermittent every 12 hours        MEDICATIONS  (PRN):  acetaminophen   Tablet .. 650 milliGRAM(s) Oral once PRN Temp greater or equal to 38C (100.4F), Mild Pain (1 - 3)  acetaminophen   Tablet .. 650 milliGRAM(s) Oral every 4 hours PRN Temp greater or equal to 38C (100.4F), Mild Pain (1 - 3)  ALBUTerol    90 MICROgram(s) HFA Inhaler 2 Puff(s) Inhalation every 4 hours PRN Shortness of Breath and/or Wheezing  benzocaine 15 mG/menthol 3.6 mG (Sugar-Free) Lozenge 1 Lozenge Oral every 6 hours PRN Sore Throat  benzonatate 100 milliGRAM(s) Oral three times a day PRN Cough  melatonin 3 milliGRAM(s) Oral at bedtime PRN Insomnia    Vital Signs Last 24 Hrs  T(C): 36.1 (11 Aug 2021 22:00), Max: 36.6 (11 Aug 2021 18:00)  T(F): 97 (11 Aug 2021 22:00), Max: 97.9 (11 Aug 2021 18:00)  HR: 83 (12 Aug 2021 08:23) (73 - 105)  BP: 120/67 (12 Aug 2021 08:00) (90/35 - 139/78)  BP(mean): 78 (12 Aug 2021 08:00) (47 - 113)  RR: 27 (12 Aug 2021 08:23) (21 - 36)  SpO2: 80% (12 Aug 2021 08:24) (73% - 98%)    08 Aug 2021 07:01  -  09 Aug 2021 07:00  --------------------------------------------------------  IN:    IV PiggyBack: 450 mL    Oral Fluid: 240 mL  Total IN: 690 mL    OUT:    Voided (mL): 2450 mL  Total OUT: 2450 mL    Total NET: -1760 mL      PHYSICAL EXAM  General Appearance: HFNC  Neck: Supple,   Lungs: coarse bilaterally  Heart: Regular rate and rhythm, S1, S2 normal,Not tachycardic  Abdomen: Soft, non-tender, bowel sounds active   Extremities: no cyanosis or edema, no joint swelling  Skin: Skin color, texture normal, no rashes   Neurologic: Alert and oriented X3 , non focal    ECG:    LABS:                                         10.0   19.05 )-----------( 179      ( 08 Aug 2021 07:11 )             30.3   08-08    137  |  102  |  27<H>  ----------------------------<  121<H>  3.9   |  29  |  0.77    Ca    8.4<L>      08 Aug 2021 07:11  Phos  3.4     08-08  Mg     2.0     08-08                  10.0   19.05 )-----------( 179      ( 08 Aug 2021 07:11 )             30.3   08-08    137  |  102  |  27<H>  ----------------------------<  121<H>  3.9   |  29  |  0.77    Ca    8.4<L>      08 Aug 2021 07:11  Phos  3.4     08-08  Mg     2.0     08-08                 10.4   17.19 )-----------( 198      ( 07 Aug 2021 07:06 )             32.3   08-07    138  |  104  |  31<H>  ----------------------------<  107<H>  4.4   |  29  |  0.77    Ca    8.2<L>      07 Aug 2021 07:06  Phos  4.6     08-07  Mg     2.0     08-07    TPro  5.2<L>  /  Alb  2.3<L>  /  TBili  0.8  /  DBili  x   /  AST  81<H>  /  ALT  213<H>  /  AlkPhos  200<H>  08-06                            12.9   15.00 )-----------( 366      ( 26 Jul 2021 06:20 )             38.4   07-26    133<L>  |  104  |  37<H>  ----------------------------<  232<H>  4.2   |  23  |  1.17    Ca    8.4<L>      26 Jul 2021 06:20    TPro  6.3  /  Alb  2.2<L>  /  TBili  0.4  /  DBili  0.1  /  AST  103<H>  /  ALT  137<H>  /  AlkPhos  239<H>  07-26                          13.2   13.52 )-----------( 338      ( 24 Jul 2021 08:06 )             39.5     07-24    136  |  105  |  43<H>  ----------------------------<  227<H>  4.3   |  25  |  1.31<H>    Ca    8.7      24 Jul 2021 08:06    TPro  6.6  /  Alb  2.3<L>  /  TBili  0.4  /  DBili  x   /  AST  73<H>  /  ALT  64  /  AlkPhos  165<H>  07-24          Pro BNP  -- 07-24 @ 08:06  D Dimer  446 07-24 @ 08:06  Pro BNP  261 07-21 @ 14:42  D Dimer  460 07-21 @ 14:42              < from: Xray Chest 1 View- PORTABLE-Urgent (07.21.21 @ 15:33) >    PROCEDURE DATE:  07/21/2021          INTERPRETATION:  AP chest on July 21, 2021 at 3:27 PM. Patient is short of breath with cough and fever.    Heart magnified by technique.    There arescattered mid lower lung field infiltrates right greater than left possibly related: Pneumonia.    The lungs are clear on August 30, 2019.    IMPRESSION: Bilateral infiltrates as above.    < end of copied text >  < from: US Duplex Venous Lower Ext Complete, Bilateral (07.25.21 @ 08:42) >  COMPARISON: None available.    TECHNIQUE: Duplex sonography of the BILATERAL LOWER extremity veins with color and spectral Doppler, with and without compression.    FINDINGS:    RIGHT:  Normal compressibility of the RIGHT common femoral, femoral and popliteal veins.  Doppler examination shows normal spontaneous and phasic flow.  No RIGHT calf vein thrombosis is detected.    LEFT:  Normal compressibility of the LEFT common femoral, femoral and popliteal veins.  Doppler examination shows normal spontaneous and phasic flow.  No LEFT calf vein thrombosis is detected.    IMPRESSION:  No evidence of deep venous thrombosis in either lower extremity.    < end of copied text >  RADIOLOGY & ADDITIONAL STUDIES:    < from: Xray Chest 1 View- PORTABLE-Urgent (Xray Chest 1 View- PORTABLE-Urgent .) (07.26.21 @ 08:43) >    PROCEDURE DATE:  07/26/2021          INTERPRETATION:  Portable chest radiograph    CLINICAL INFORMATION: Pneumonia due to Covid 19.. Follow-up    TECHNIQUE:  Portable  AP view of the chest was obtained.    COMPARISON: 7/21/2021 chest available for review.    FINDINGS:    The lungs show stable bilateral  multifocal and diffuse ill-defined airspace opacities.. No pneumothorax.    The  heart is enlarged in transverse diameter. No hilar mass.     Visualized osseous structures are intact.    IMPRESSION:   Stable bilateral  multifocal and diffuse ill-defined airspace opacities..    < end of copied text >  < from: US Duplex Venous Lower Ext Complete, Bilateral (08.06.21 @ 17:16) >  FINDINGS:    RIGHT:  Normal compressibility of the RIGHT common femoral, femoral and popliteal veins.  Doppler examination shows normal spontaneous and phasic flow.  No RIGHT calf vein thrombosis is detected.    LEFT:  Normal compressibility of the LEFT common femoral, femoral and popliteal veins.  Doppler examination shows normal spontaneous and phasic flow.  No LEFT calf vein thrombosisis detected.    IMPRESSION:  No evidence of deep venous thrombosis in either lower extremity.    < end of copied text >  < from: Xray Chest 1 View- PORTABLE-Urgent (Xray Chest 1 View- PORTABLE-Urgent .) (08.06.21 @ 16:45) >  INTERPRETATION:  Portable chest radiograph    CLINICAL INFORMATION: Pneumonia due to Covid 19.    TECHNIQUE:  Portable  AP view of the chest was obtained.    COMPARISON: 8/1/2021 chest radiograph available for review.    FINDINGS:    The lungs show decreasing bilateral diffuse airspace disease.. No pneumothorax.    The  heart is mildly enlarged in transverse diameter. No hilar mass.   Visualized osseous structures are intact.    IMPRESSION:   Decreasing bilateral diffuse airspace disease..    < end of copied text >

## 2021-08-12 NOTE — PROGRESS NOTE ADULT - SUBJECTIVE AND OBJECTIVE BOX
Hospital D # 22  ICU # 19    CC:  COVID     HPI:    54 y/o male with HLD, HTN and DM--Non Vacc--presents with 8 days onset of covid symptoms which included, cough, fevers, sob, hypoxia, fevers, diarrhea, chills and myalgias. Tested positive 7/15.  Rx with Rem/Dexa and then high dose steroids and full AC.  Pt tx to ICU on 7/24 for worsening hypoxia.       8/9:  Pt seen and examined in ICU.  Sitting in chair.  On HFNC 50/90% with O2 sat 80-84%. Slightly dysneic. Still eating.  Awake and alert.  Tm 98.6  WBC 19  FS high.  LE Dopp Negative for DVT.  L arm superficial thrombosis--NO DVT.      8/10: Pt seen and examined in ICU.  Case d/w Dr shah at bedside.  On HFNC at 50/90% with O2 sat 86%.  NAD.  Complete full sentences.  Ferritin lower.  Tm 98.8  WBC 16.  Eating well.  CXR-- Personally reviewed--slightly worsening infiltrates      8/11:  Pt seen and examined in ICU.  O2 sat not as high as yesterday.  On HFNC 50/95%.  Sitting in chair.  Taking longer to recover after care this morning.  He doesn't feel any different.  Tm 97.9  WBC 16  FS better.  Still eating well    8/12:  Pt seen and examined in ICU.  Self prone O/N.  Sitting in chair.  Clinically unchanged.  On HFNC 50/95%.  Awake and alert.  WOB unchanged.  Tm 97.9   FS high during night hours    PMH:  As above.     PSH:  As above.     FH: Non Contributory other than those listed in HPI    Social History:  NC    MEDICATIONS  (STANDING):  aspirin  chewable 81 milliGRAM(s) Oral daily  atorvastatin 80 milliGRAM(s) Oral at bedtime  budesonide 160 MICROgram(s)/formoterol 4.5 MICROgram(s) Inhaler 2 Puff(s) Inhalation two times a day  cholecalciferol 2000 Unit(s) Oral daily  dextrose 40% Gel 15 Gram(s) Oral once  dextrose 5%. 1000 milliLiter(s) (50 mL/Hr) IV Continuous <Continuous>  dextrose 5%. 1000 milliLiter(s) (100 mL/Hr) IV Continuous <Continuous>  dextrose 50% Injectable 25 Gram(s) IV Push once  dextrose 50% Injectable 12.5 Gram(s) IV Push once  dextrose 50% Injectable 25 Gram(s) IV Push once  enoxaparin Injectable 40 milliGRAM(s) SubCutaneous every 12 hours  famotidine    Tablet 20 milliGRAM(s) Oral daily  glucagon  Injectable 1 milliGRAM(s) IntraMuscular once  insulin glargine Injectable (LANTUS) 15 Unit(s) SubCutaneous every morning  insulin lispro (ADMELOG) corrective regimen sliding scale   SubCutaneous three times a day before meals  insulin lispro (ADMELOG) corrective regimen sliding scale   SubCutaneous at bedtime  labetalol 200 milliGRAM(s) Oral every 12 hours  losartan 50 milliGRAM(s) Oral daily  predniSONE   Tablet 20 milliGRAM(s) Oral daily    MEDICATIONS  (PRN):  acetaminophen   Tablet .. 650 milliGRAM(s) Oral every 4 hours PRN Temp greater or equal to 38C (100.4F), Mild Pain (1 - 3)  ALBUTerol    90 MICROgram(s) HFA Inhaler 2 Puff(s) Inhalation every 4 hours PRN Shortness of Breath and/or Wheezing  benzocaine 15 mG/menthol 3.6 mG (Sugar-Free) Lozenge 1 Lozenge Oral every 6 hours PRN Sore Throat  benzonatate 100 milliGRAM(s) Oral three times a day PRN Cough  melatonin 6 milliGRAM(s) Oral at bedtime PRN Insomnia      Allergies: NKDA    ROS:  SEE BELOW        ICU Vital Signs Last 24 Hrs  T(C): 36.1 (11 Aug 2021 22:00), Max: 36.6 (11 Aug 2021 18:00)  T(F): 97 (11 Aug 2021 22:00), Max: 97.9 (11 Aug 2021 18:00)  HR: 83 (12 Aug 2021 08:23) (73 - 94)  BP: 120/67 (12 Aug 2021 08:00) (90/35 - 139/78)  BP(mean): 78 (12 Aug 2021 08:00) (47 - 113)  ABP: --  ABP(mean): --  RR: 27 (12 Aug 2021 08:23) (21 - 36)  SpO2: 80% (12 Aug 2021 08:24) (73% - 98%)          I&O's Summary    11 Aug 2021 07:01  -  12 Aug 2021 07:00  --------------------------------------------------------  IN: 0 mL / OUT: 1200 mL / NET: -1200 mL        Physical Exam:  SEE BELOW                          9.3    16.34 )-----------( 164      ( 11 Aug 2021 06:40 )             28.5       08-11    137  |  103  |  29<H>  ----------------------------<  95  3.9   |  28  |  0.86    Ca    8.7      11 Aug 2021 06:40  Phos  3.8     08-11  Mg     2.0     08-11                      DVT Prophylaxis:                                                            Contraindication:     Advanced Directives:    Discussed with:    Visit Information:  Time spent excluding procedure:      ** Time is exclusive of billed procedures and/or teaching and/or routine family updates.

## 2021-08-13 LAB
ANION GAP SERPL CALC-SCNC: 5 MMOL/L — SIGNIFICANT CHANGE UP (ref 5–17)
BUN SERPL-MCNC: 26 MG/DL — HIGH (ref 7–23)
CALCIUM SERPL-MCNC: 8.9 MG/DL — SIGNIFICANT CHANGE UP (ref 8.5–10.1)
CHLORIDE SERPL-SCNC: 102 MMOL/L — SIGNIFICANT CHANGE UP (ref 96–108)
CO2 SERPL-SCNC: 28 MMOL/L — SIGNIFICANT CHANGE UP (ref 22–31)
CREAT SERPL-MCNC: 0.83 MG/DL — SIGNIFICANT CHANGE UP (ref 0.5–1.3)
GLUCOSE SERPL-MCNC: 88 MG/DL — SIGNIFICANT CHANGE UP (ref 70–99)
HCT VFR BLD CALC: 26 % — LOW (ref 39–50)
HGB BLD-MCNC: 8.4 G/DL — LOW (ref 13–17)
MAGNESIUM SERPL-MCNC: 2.1 MG/DL — SIGNIFICANT CHANGE UP (ref 1.6–2.6)
MCHC RBC-ENTMCNC: 27.9 PG — SIGNIFICANT CHANGE UP (ref 27–34)
MCHC RBC-ENTMCNC: 32.3 GM/DL — SIGNIFICANT CHANGE UP (ref 32–36)
MCV RBC AUTO: 86.4 FL — SIGNIFICANT CHANGE UP (ref 80–100)
PHOSPHATE SERPL-MCNC: 3.8 MG/DL — SIGNIFICANT CHANGE UP (ref 2.5–4.5)
PLATELET # BLD AUTO: 164 K/UL — SIGNIFICANT CHANGE UP (ref 150–400)
POTASSIUM SERPL-MCNC: 3.9 MMOL/L — SIGNIFICANT CHANGE UP (ref 3.5–5.3)
POTASSIUM SERPL-SCNC: 3.9 MMOL/L — SIGNIFICANT CHANGE UP (ref 3.5–5.3)
RBC # BLD: 3.01 M/UL — LOW (ref 4.2–5.8)
RBC # FLD: 12.8 % — SIGNIFICANT CHANGE UP (ref 10.3–14.5)
SODIUM SERPL-SCNC: 135 MMOL/L — SIGNIFICANT CHANGE UP (ref 135–145)
WBC # BLD: 16.53 K/UL — HIGH (ref 3.8–10.5)
WBC # FLD AUTO: 16.53 K/UL — HIGH (ref 3.8–10.5)

## 2021-08-13 PROCEDURE — 99291 CRITICAL CARE FIRST HOUR: CPT

## 2021-08-13 RX ADMIN — ENOXAPARIN SODIUM 40 MILLIGRAM(S): 100 INJECTION SUBCUTANEOUS at 09:56

## 2021-08-13 RX ADMIN — Medication 6 MILLIGRAM(S): at 23:47

## 2021-08-13 RX ADMIN — Medication 650 MILLIGRAM(S): at 09:55

## 2021-08-13 RX ADMIN — ENOXAPARIN SODIUM 40 MILLIGRAM(S): 100 INJECTION SUBCUTANEOUS at 22:47

## 2021-08-13 RX ADMIN — Medication 20 MILLIGRAM(S): at 09:56

## 2021-08-13 RX ADMIN — Medication 100 MILLIGRAM(S): at 00:00

## 2021-08-13 RX ADMIN — Medication 81 MILLIGRAM(S): at 09:55

## 2021-08-13 RX ADMIN — BUDESONIDE AND FORMOTEROL FUMARATE DIHYDRATE 2 PUFF(S): 160; 4.5 AEROSOL RESPIRATORY (INHALATION) at 20:29

## 2021-08-13 RX ADMIN — INSULIN GLARGINE 15 UNIT(S): 100 INJECTION, SOLUTION SUBCUTANEOUS at 08:18

## 2021-08-13 RX ADMIN — Medication 100 MILLIGRAM(S): at 08:19

## 2021-08-13 RX ADMIN — Medication 200 MILLIGRAM(S): at 09:55

## 2021-08-13 RX ADMIN — Medication 200 MILLIGRAM(S): at 22:47

## 2021-08-13 RX ADMIN — Medication 100 MILLIGRAM(S): at 22:47

## 2021-08-13 RX ADMIN — LOSARTAN POTASSIUM 50 MILLIGRAM(S): 100 TABLET, FILM COATED ORAL at 09:56

## 2021-08-13 RX ADMIN — Medication 650 MILLIGRAM(S): at 10:25

## 2021-08-13 RX ADMIN — BUDESONIDE AND FORMOTEROL FUMARATE DIHYDRATE 2 PUFF(S): 160; 4.5 AEROSOL RESPIRATORY (INHALATION) at 11:00

## 2021-08-13 RX ADMIN — Medication 2: at 15:26

## 2021-08-13 RX ADMIN — ATORVASTATIN CALCIUM 80 MILLIGRAM(S): 80 TABLET, FILM COATED ORAL at 22:47

## 2021-08-13 RX ADMIN — FAMOTIDINE 20 MILLIGRAM(S): 10 INJECTION INTRAVENOUS at 09:56

## 2021-08-13 RX ADMIN — Medication 2000 UNIT(S): at 09:55

## 2021-08-13 NOTE — PROGRESS NOTE ADULT - ASSESSMENT
A:    55M  HD # 26    Here for:    1. Acute hypoxic resp failure 2/2  2. COVID-19 PNA complicated by  3. ARDS  4. Hyperglycemia  5. DESTINEY  6. CKD  7. PNA    This patient requires critical care for support of one or more vital organ systems with a high probability of imminent or life threatening deterioration in his/her condition    P:    PRN analgesia/anxiolysis. Avoid deleriogenic meds. Melatonin for sleep hygiene   HD monitoring. Labetalol 200mg PO BID, cozaar 50mg PO qd.  HFNC 50/100% + PRN NRB. Goal maintain SpO2 > 90% and alleviate WOB. PRN proning noreen tolerated.  Disease modifying therapy: s/p actemra 7/23, s/p RDV, on prednisone 20mg PO qd.  Broad spectrum abx empiric dosing for PNA cefepime 1g IV TID + vancomycin 1250mg q12h. ID involved. WBC 19k, afebrile.   VTE ppx lovenox BID, PUD ppx pepcid.  f/u AM labs, replete lytes PRN.  Lipitor, ASA.  Goal BG < 180, BG control improved. Lantus 15u qd + ISS.  DESTINEY on CKD, improved.    Dispo: Cont critical care. Remains high risk for further decompensation requiring intubation    TOTAL CRITICAL CARE TIME: 38 minutes EXCLUSIVE of any non bundled procedures)    Note: This time spent INCLUDES time spent directly as this patient's bedside with evaluation, review of chart including review of laboratory and imaging studies, interpretation of vital signs and cardiac output measurements, any necessary ventilator management, and time spent discussing plan of care with patient and family, including goals of care discussion.

## 2021-08-13 NOTE — PROGRESS NOTE ADULT - SUBJECTIVE AND OBJECTIVE BOX
ICU Progress Note    HPI:    S:    Pt seen and examined  HD # 25  FULL CODE  PMHx HLD, HTN, DM   Pt here for OSB, cough for several days  Admitted on 7/21 with COVID-19 pna. tested positive on 7/15 outpatient. unvaccinated. Escalating fio2 requirements and development of ARDS now requiring HFNC and NRB. S/p actemra on 7/23 7/27 PM: Remains on HFNC 50/100% with frequent desaturations.    8/9 PM: Remains on HFNC 50/90% + PRN/qHS with frequent desaturations.   8/10 PM: remains on HFNC. Increasing insulin requirements.  8/13 PM: Remains on HFNC with frequent desaturations.     ROS: + SOB  Remainder of systems reviewed, negative    Allergies    No Known Allergies    Intolerances    MEDICATIONS  (STANDING):    aspirin  chewable 81 milliGRAM(s) Oral daily  atorvastatin 80 milliGRAM(s) Oral at bedtime  budesonide 160 MICROgram(s)/formoterol 4.5 MICROgram(s) Inhaler 2 Puff(s) Inhalation two times a day  cholecalciferol 2000 Unit(s) Oral daily  dextrose 40% Gel 15 Gram(s) Oral once  dextrose 5%. 1000 milliLiter(s) (50 mL/Hr) IV Continuous <Continuous>  dextrose 5%. 1000 milliLiter(s) (100 mL/Hr) IV Continuous <Continuous>  dextrose 50% Injectable 25 Gram(s) IV Push once  dextrose 50% Injectable 12.5 Gram(s) IV Push once  dextrose 50% Injectable 25 Gram(s) IV Push once  enoxaparin Injectable 40 milliGRAM(s) SubCutaneous every 12 hours  famotidine    Tablet 20 milliGRAM(s) Oral daily  glucagon  Injectable 1 milliGRAM(s) IntraMuscular once  insulin glargine Injectable (LANTUS) 15 Unit(s) SubCutaneous every morning  insulin lispro (ADMELOG) corrective regimen sliding scale   SubCutaneous three times a day before meals  insulin lispro (ADMELOG) corrective regimen sliding scale   SubCutaneous at bedtime  labetalol 200 milliGRAM(s) Oral every 12 hours  losartan 50 milliGRAM(s) Oral daily  predniSONE   Tablet 20 milliGRAM(s) Oral daily    MEDICATIONS  (PRN):    acetaminophen   Tablet .. 650 milliGRAM(s) Oral every 4 hours PRN Temp greater or equal to 38C (100.4F), Mild Pain (1 - 3)  ALBUTerol    90 MICROgram(s) HFA Inhaler 2 Puff(s) Inhalation every 4 hours PRN Shortness of Breath and/or Wheezing  benzocaine 15 mG/menthol 3.6 mG (Sugar-Free) Lozenge 1 Lozenge Oral every 6 hours PRN Sore Throat  benzonatate 100 milliGRAM(s) Oral three times a day PRN Cough  melatonin 6 milliGRAM(s) Oral at bedtime PRN Insomnia    Drug Dosing Weight    Height (cm): 167.6 (21 Jul 2021 14:34)  Weight (kg): 90.4 (21 Jul 2021 22:00)  BMI (kg/m2): 32.2 (21 Jul 2021 22:00)  BSA (m2): 2 (21 Jul 2021 22:00)    PAST MEDICAL & SURGICAL HISTORY:  HTN (hypertension)    Diabetes    FAMILY HISTORY:    ROS: See HPI; otherwise, all systems reviewed and negative.    O:    ICU Vital Signs Last 24 Hrs  T(C): 37.1 (13 Aug 2021 20:00), Max: 37.1 (13 Aug 2021 20:00)  T(F): 98.8 (13 Aug 2021 20:00), Max: 98.8 (13 Aug 2021 20:00)  HR: 91 (13 Aug 2021 21:00) (71 - 93)  BP: 145/77 (13 Aug 2021 21:00) (97/83 - 145/77)  BP(mean): 92 (13 Aug 2021 21:00) (72 - 95)  ABP: --  ABP(mean): --  RR: 21 (13 Aug 2021 21:00) (21 - 37)  SpO2: 88% (13 Aug 2021 21:00) (80% - 98%)    I&O's Detail    12 Aug 2021 07:01  -  13 Aug 2021 07:00  --------------------------------------------------------  IN:  Total IN: 0 mL    OUT:    Voided (mL): 1100 mL  Total OUT: 1100 mL    Total NET: -1100 mL      13 Aug 2021 07:01  -  13 Aug 2021 21:41  --------------------------------------------------------  IN:  Total IN: 0 mL    OUT:    Voided (mL): 1150 mL  Total OUT: 1150 mL    Total NET: -1150 ml    PE:    Adult M sitting in chair  No JVD trachea midline + HFNC 50/100%, SpO2 80's +/- NRB  S1S2+  Diminished BS B/L  Abd soft NTND  No leg swelling/edema noted  Awake and alert  Skin pink and well perfused    LABS:    CBC Full  -  ( 13 Aug 2021 06:34 )  WBC Count : 16.53 K/uL  RBC Count : 3.01 M/uL  Hemoglobin : 8.4 g/dL  Hematocrit : 26.0 %  Platelet Count - Automated : 164 K/uL  Mean Cell Volume : 86.4 fl  Mean Cell Hemoglobin : 27.9 pg  Mean Cell Hemoglobin Concentration : 32.3 gm/dL  Auto Neutrophil # : x  Auto Lymphocyte # : x  Auto Monocyte # : x  Auto Eosinophil # : x  Auto Basophil # : x  Auto Neutrophil % : x  Auto Lymphocyte % : x  Auto Monocyte % : x  Auto Eosinophil % : x  Auto Basophil % : x    08-13    135  |  102  |  26<H>  ----------------------------<  88  3.9   |  28  |  0.83    Ca    8.9      13 Aug 2021 06:34  Phos  3.8     08-13  Mg     2.1     08-13    CAPILLARY BLOOD GLUCOSE    POCT Blood Glucose.: 160 mg/dL (13 Aug 2021 15:15)  POCT Blood Glucose.: 121 mg/dL (13 Aug 2021 08:05)  POCT Blood Glucose.: 187 mg/dL (12 Aug 2021 23:02)

## 2021-08-13 NOTE — PROGRESS NOTE ADULT - SUBJECTIVE AND OBJECTIVE BOX
Patient is a 55y old  Male who presents with a chief complaint of cough/sob (23 Jul 2021 14:03)      HPI:  55 M with pmh HLD, dm, htn presents with 8 days onset of covid symptoms which included, cough, fevers, sob, hypoxia, fevers, diarrhea, chills and myalgias. TEsted positive 7/15. Wife endorsed he was becoming more sob of recently. On arrival hypoxic to 80s and tachypneic. NRB placed, presently on 15% and saturating 95%. Na 126 s/p 1L NS. CXR: Frandy infiltrates. Last scr 1.4 in 2019-> today 1.9 unknown if underlying ckd.  Patient not vaccinated.   Last seen by me in 2019  History of ROBERT and uncontrolled hypertension  Treated with IV Remdesivir and Decadron, now on Toci  SaO2 is 80-85% despite being on 100% NRB  ABG pending    8/6  No acute pulmonary events occurred overnight   On HFNC 50LPM, 90%FiO2. Attempted BiPAP overnight  Critical Care performed POCUS on 8/4, noted to have pulmonary edema   Now pending CTPE study, in process of being transitioned to NRB to see if he can tolerate it prior to CT  8/7  covering for Dr Arceo  data discussed with the intensivist and critical care team this am  not stable for further imaging and transport to ct scan due to high FIO2 demand  he appears the same with high O2 requirements  'sitting upright in bed  8/8  he has moved from bed to chair with assistance this am  he remains on HIGH FIO2 requirements  not labored now after he settled down  data discussed with RT therapist this am  8/9  High fio2 requirement with desats on mild exertion  despite diuresis and negative fluid balance  Dr Arceo will resume in am    8/10  Patient still on HFNC, on NRB this morning    8/11  Noted to have increased saturations yesterday during the day >90%  On HFNC 90/50, attempted 85%    8/12  Patient noted to have desaturations this morning  In prone position, saturation 78%-85%  No acute distress    8/13  No acute pulmonary events occurred overnight  Still requiring HFNC     Tobacco Usage:  · Tobacco Usage	non smoker  Fhx: none (21 Jul 2021 19:19)    MEDICATIONS  (STANDING):  aspirin  chewable 81 milliGRAM(s) Oral daily  atorvastatin 80 milliGRAM(s) Oral at bedtime  budesonide 160 MICROgram(s)/formoterol 4.5 MICROgram(s) Inhaler 2 Puff(s) Inhalation two times a day  cefepime   IVPB      cefepime   IVPB 2000 milliGRAM(s) IV Intermittent every 8 hours  cholecalciferol 2000 Unit(s) Oral daily  dextrose 40% Gel 15 Gram(s) Oral once  dextrose 5%. 1000 milliLiter(s) (50 mL/Hr) IV Continuous <Continuous>  dextrose 5%. 1000 milliLiter(s) (100 mL/Hr) IV Continuous <Continuous>  dextrose 50% Injectable 25 Gram(s) IV Push once  dextrose 50% Injectable 12.5 Gram(s) IV Push once  dextrose 50% Injectable 25 Gram(s) IV Push once  enoxaparin Injectable 40 milliGRAM(s) SubCutaneous every 12 hours  famotidine    Tablet 20 milliGRAM(s) Oral daily  glucagon  Injectable 1 milliGRAM(s) IntraMuscular once  insulin glargine Injectable (LANTUS) 10 Unit(s) SubCutaneous two times a day  insulin lispro (ADMELOG) corrective regimen sliding scale   SubCutaneous three times a day before meals  insulin lispro (ADMELOG) corrective regimen sliding scale   SubCutaneous at bedtime  labetalol 200 milliGRAM(s) Oral every 12 hours  losartan 50 milliGRAM(s) Oral daily  predniSONE   Tablet 10 milliGRAM(s) Oral daily  vancomycin  IVPB      vancomycin  IVPB 1250 milliGRAM(s) IV Intermittent every 12 hours        MEDICATIONS  (PRN):  acetaminophen   Tablet .. 650 milliGRAM(s) Oral once PRN Temp greater or equal to 38C (100.4F), Mild Pain (1 - 3)  acetaminophen   Tablet .. 650 milliGRAM(s) Oral every 4 hours PRN Temp greater or equal to 38C (100.4F), Mild Pain (1 - 3)  ALBUTerol    90 MICROgram(s) HFA Inhaler 2 Puff(s) Inhalation every 4 hours PRN Shortness of Breath and/or Wheezing  benzocaine 15 mG/menthol 3.6 mG (Sugar-Free) Lozenge 1 Lozenge Oral every 6 hours PRN Sore Throat  benzonatate 100 milliGRAM(s) Oral three times a day PRN Cough  melatonin 3 milliGRAM(s) Oral at bedtime PRN Insomnia    Vital Signs Last 24 Hrs  T(C): 36.9 (14 Aug 2021 08:00), Max: 37.1 (13 Aug 2021 20:00)  T(F): 98.5 (14 Aug 2021 08:00), Max: 98.8 (13 Aug 2021 20:00)  HR: 79 (14 Aug 2021 08:28) (71 - 97)  BP: 149/84 (14 Aug 2021 07:00) (107/88 - 154/88)  BP(mean): 100 (14 Aug 2021 07:00) (78 - 102)  RR: 30 (14 Aug 2021 08:28) (19 - 36)  SpO2: 91% (14 Aug 2021 08:28) (71% - 98%)    08 Aug 2021 07:01  -  09 Aug 2021 07:00  --------------------------------------------------------  IN:    IV PiggyBack: 450 mL    Oral Fluid: 240 mL  Total IN: 690 mL    OUT:    Voided (mL): 2450 mL  Total OUT: 2450 mL    Total NET: -1760 mL      PHYSICAL EXAM  General Appearance: HFNC  Neck: Supple,   Lungs: coarse bilaterally  Heart: Regular rate and rhythm, S1, S2 normal,Not tachycardic  Abdomen: Soft, non-tender, bowel sounds active   Extremities: no cyanosis or edema, no joint swelling  Skin: Skin color, texture normal, no rashes   Neurologic: Alert and oriented X3 , non focal    ECG:    LABS:                                         10.0   19.05 )-----------( 179      ( 08 Aug 2021 07:11 )             30.3   08-08    137  |  102  |  27<H>  ----------------------------<  121<H>  3.9   |  29  |  0.77    Ca    8.4<L>      08 Aug 2021 07:11  Phos  3.4     08-08  Mg     2.0     08-08                  10.0   19.05 )-----------( 179      ( 08 Aug 2021 07:11 )             30.3   08-08    137  |  102  |  27<H>  ----------------------------<  121<H>  3.9   |  29  |  0.77    Ca    8.4<L>      08 Aug 2021 07:11  Phos  3.4     08-08  Mg     2.0     08-08                 10.4   17.19 )-----------( 198      ( 07 Aug 2021 07:06 )             32.3   08-07    138  |  104  |  31<H>  ----------------------------<  107<H>  4.4   |  29  |  0.77    Ca    8.2<L>      07 Aug 2021 07:06  Phos  4.6     08-07  Mg     2.0     08-07    TPro  5.2<L>  /  Alb  2.3<L>  /  TBili  0.8  /  DBili  x   /  AST  81<H>  /  ALT  213<H>  /  AlkPhos  200<H>  08-06                            12.9   15.00 )-----------( 366      ( 26 Jul 2021 06:20 )             38.4   07-26    133<L>  |  104  |  37<H>  ----------------------------<  232<H>  4.2   |  23  |  1.17    Ca    8.4<L>      26 Jul 2021 06:20    TPro  6.3  /  Alb  2.2<L>  /  TBili  0.4  /  DBili  0.1  /  AST  103<H>  /  ALT  137<H>  /  AlkPhos  239<H>  07-26                          13.2   13.52 )-----------( 338      ( 24 Jul 2021 08:06 )             39.5     07-24    136  |  105  |  43<H>  ----------------------------<  227<H>  4.3   |  25  |  1.31<H>    Ca    8.7      24 Jul 2021 08:06    TPro  6.6  /  Alb  2.3<L>  /  TBili  0.4  /  DBili  x   /  AST  73<H>  /  ALT  64  /  AlkPhos  165<H>  07-24          Pro BNP  -- 07-24 @ 08:06  D Dimer  446 07-24 @ 08:06  Pro BNP  261 07-21 @ 14:42  D Dimer  460 07-21 @ 14:42              < from: Xray Chest 1 View- PORTABLE-Urgent (07.21.21 @ 15:33) >    PROCEDURE DATE:  07/21/2021          INTERPRETATION:  AP chest on July 21, 2021 at 3:27 PM. Patient is short of breath with cough and fever.    Heart magnified by technique.    There arescattered mid lower lung field infiltrates right greater than left possibly related: Pneumonia.    The lungs are clear on August 30, 2019.    IMPRESSION: Bilateral infiltrates as above.    < end of copied text >  < from: US Duplex Venous Lower Ext Complete, Bilateral (07.25.21 @ 08:42) >  COMPARISON: None available.    TECHNIQUE: Duplex sonography of the BILATERAL LOWER extremity veins with color and spectral Doppler, with and without compression.    FINDINGS:    RIGHT:  Normal compressibility of the RIGHT common femoral, femoral and popliteal veins.  Doppler examination shows normal spontaneous and phasic flow.  No RIGHT calf vein thrombosis is detected.    LEFT:  Normal compressibility of the LEFT common femoral, femoral and popliteal veins.  Doppler examination shows normal spontaneous and phasic flow.  No LEFT calf vein thrombosis is detected.    IMPRESSION:  No evidence of deep venous thrombosis in either lower extremity.    < end of copied text >  RADIOLOGY & ADDITIONAL STUDIES:    < from: Xray Chest 1 View- PORTABLE-Urgent (Xray Chest 1 View- PORTABLE-Urgent .) (07.26.21 @ 08:43) >    PROCEDURE DATE:  07/26/2021          INTERPRETATION:  Portable chest radiograph    CLINICAL INFORMATION: Pneumonia due to Covid 19.. Follow-up    TECHNIQUE:  Portable  AP view of the chest was obtained.    COMPARISON: 7/21/2021 chest available for review.    FINDINGS:    The lungs show stable bilateral  multifocal and diffuse ill-defined airspace opacities.. No pneumothorax.    The  heart is enlarged in transverse diameter. No hilar mass.     Visualized osseous structures are intact.    IMPRESSION:   Stable bilateral  multifocal and diffuse ill-defined airspace opacities..    < end of copied text >  < from: US Duplex Venous Lower Ext Complete, Bilateral (08.06.21 @ 17:16) >  FINDINGS:    RIGHT:  Normal compressibility of the RIGHT common femoral, femoral and popliteal veins.  Doppler examination shows normal spontaneous and phasic flow.  No RIGHT calf vein thrombosis is detected.    LEFT:  Normal compressibility of the LEFT common femoral, femoral and popliteal veins.  Doppler examination shows normal spontaneous and phasic flow.  No LEFT calf vein thrombosisis detected.    IMPRESSION:  No evidence of deep venous thrombosis in either lower extremity.    < end of copied text >  < from: Xray Chest 1 View- PORTABLE-Urgent (Xray Chest 1 View- PORTABLE-Urgent .) (08.06.21 @ 16:45) >  INTERPRETATION:  Portable chest radiograph    CLINICAL INFORMATION: Pneumonia due to Covid 19.    TECHNIQUE:  Portable  AP view of the chest was obtained.    COMPARISON: 8/1/2021 chest radiograph available for review.    FINDINGS:    The lungs show decreasing bilateral diffuse airspace disease.. No pneumothorax.    The  heart is mildly enlarged in transverse diameter. No hilar mass.   Visualized osseous structures are intact.    IMPRESSION:   Decreasing bilateral diffuse airspace disease..    < end of copied text >

## 2021-08-13 NOTE — PROGRESS NOTE ADULT - ASSESSMENT
IMP:    54 y/o male with HLD, HTN and DM--Non Vacc-- admitted with Viral PNA sepsis secondary to COVID--Acute type 1 resp failure and ARDS.    Doubt HAP as CXR on 8/6 was improving   Very high risk for intubation    Severe Pro Ted malnutrition     Critically ill.  High risk for acute decompensation and deterioration including death   Patient requires critical care for support of one or more vital organ systems with a high probability of imminent or life threatening deterioration in his/her condition     Plan:    HFNC--keep oxygen saturation in mid 80s accepting "Happy Hypoxia" to avoid/limit oxygen toxicity.  NIV QHS.  Prone as tolerated  Cont steroids--20 daily  Anti tussive   FS with insulin coverage--keep FS < 180.  Lantus 15 AM   Encourage nutritional support  OOB  IV Vanco/Cefepime (3) with no Cxs.  Completed 3 days  DVT prophy--SCD and LMWH  No Labs in AM    ICU care-- d/w ICU staff on multi disciplinary rounds and pt-- All concerns addressed including but not limited to diagnosis, treatment plan and overall prognosis

## 2021-08-13 NOTE — PROGRESS NOTE ADULT - SUBJECTIVE AND OBJECTIVE BOX
[facility] # 23  ICU # 20    CC:  COVID     HPI:    56 y/o male with HLD, HTN and DM--Non Vacc--presents with 8 days onset of covid symptoms which included, cough, fevers, sob, hypoxia, fevers, diarrhea, chills and myalgias. Tested positive 7/15.  Rx with Rem/Dexa and then high dose steroids and full AC.  Pt tx to ICU on 7/24 for worsening hypoxia.       8/9:  Pt seen and examined in ICU.  Sitting in chair.  On HFNC 50/90% with O2 sat 80-84%. Slightly dysneic. Still eating.  Awake and alert.  Tm 98.6  WBC 19  FS high.  LE Dopp Negative for DVT.  L arm superficial thrombosis--NO DVT.      8/10: Pt seen and examined in ICU.  Case d/w Dr shah at bedside.  On HFNC at 50/90% with O2 sat 86%.  NAD.  Complete full sentences.  Ferritin lower.  Tm 98.8  WBC 16.  Eating well.  CXR-- Personally reviewed--slightly worsening infiltrates      8/11:  Pt seen and examined in ICU.  O2 sat not as high as yesterday.  On HFNC 50/95%.  Sitting in chair.  Taking longer to recover after care this morning.  He doesn't feel any different.  Tm 97.9  WBC 16  FS better.  Still eating well    8/12:  Pt seen and examined in ICU.  Self prone O/N.  Sitting in chair.  Clinically unchanged.  On HFNC 50/95%.  Awake and alert.  WOB unchanged.  Tm 97.9   FS high during night hours    8/13:  Pt seen and examined in ICU.  Had coughing spell O/N resulting in desaturation.  Sitting in chair.  Remains on HFNC and intermittent NRM. WOB unchanged. Tm 97.8  WBC 16  FS better    PMH:  As above.     PSH:  As above.     FH: Non Contributory other than those listed in HPI    Social History:  NC    MEDICATIONS  (STANDING):  aspirin  chewable 81 milliGRAM(s) Oral daily  atorvastatin 80 milliGRAM(s) Oral at bedtime  budesonide 160 MICROgram(s)/formoterol 4.5 MICROgram(s) Inhaler 2 Puff(s) Inhalation two times a day  cholecalciferol 2000 Unit(s) Oral daily  dextrose 40% Gel 15 Gram(s) Oral once  dextrose 5%. 1000 milliLiter(s) (50 mL/Hr) IV Continuous <Continuous>  dextrose 5%. 1000 milliLiter(s) (100 mL/Hr) IV Continuous <Continuous>  dextrose 50% Injectable 25 Gram(s) IV Push once  dextrose 50% Injectable 12.5 Gram(s) IV Push once  dextrose 50% Injectable 25 Gram(s) IV Push once  enoxaparin Injectable 40 milliGRAM(s) SubCutaneous every 12 hours  famotidine    Tablet 20 milliGRAM(s) Oral daily  glucagon  Injectable 1 milliGRAM(s) IntraMuscular once  insulin glargine Injectable (LANTUS) 15 Unit(s) SubCutaneous every morning  insulin lispro (ADMELOG) corrective regimen sliding scale   SubCutaneous three times a day before meals  insulin lispro (ADMELOG) corrective regimen sliding scale   SubCutaneous at bedtime  labetalol 200 milliGRAM(s) Oral every 12 hours  losartan 50 milliGRAM(s) Oral daily  predniSONE   Tablet 20 milliGRAM(s) Oral daily    MEDICATIONS  (PRN):  acetaminophen   Tablet .. 650 milliGRAM(s) Oral every 4 hours PRN Temp greater or equal to 38C (100.4F), Mild Pain (1 - 3)  ALBUTerol    90 MICROgram(s) HFA Inhaler 2 Puff(s) Inhalation every 4 hours PRN Shortness of Breath and/or Wheezing  benzocaine 15 mG/menthol 3.6 mG (Sugar-Free) Lozenge 1 Lozenge Oral every 6 hours PRN Sore Throat  benzonatate 100 milliGRAM(s) Oral three times a day PRN Cough  melatonin 6 milliGRAM(s) Oral at bedtime PRN Insomnia      Allergies: NKDA    ROS:  SEE BELOW        ICU Vital Signs Last 24 Hrs  T(C): 36.6 (13 Aug 2021 10:00), Max: 36.6 (12 Aug 2021 14:00)  T(F): 97.9 (13 Aug 2021 10:00), Max: 97.9 (13 Aug 2021 10:00)  HR: 89 (13 Aug 2021 10:00) (70 - 92)  BP: 122/66 (13 Aug 2021 10:00) (92/50 - 136/76)  BP(mean): 78 (13 Aug 2021 10:00) (60 - 89)  ABP: --  ABP(mean): --  RR: 34 (13 Aug 2021 10:00) (22 - 37)  SpO2: 80% (13 Aug 2021 10:00) (74% - 97%)          I&O's Summary    12 Aug 2021 07:01  -  13 Aug 2021 07:00  --------------------------------------------------------  IN: 0 mL / OUT: 1100 mL / NET: -1100 mL        Physical Exam:  SEE BELOW                          8.4    16.53 )-----------( 164      ( 13 Aug 2021 06:34 )             26.0       08-13    135  |  102  |  26<H>  ----------------------------<  88  3.9   |  28  |  0.83    Ca    8.9      13 Aug 2021 06:34  Phos  3.8     08-13  Mg     2.1     08-13                      DVT Prophylaxis:                                                            Contraindication:     Advanced Directives:    Discussed with:    Visit Information:  Time spent excluding procedure:      ** Time is exclusive of billed procedures and/or teaching and/or routine family updates.

## 2021-08-13 NOTE — PROGRESS NOTE ADULT - ASSESSMENT
1) COVID Pneumonia  2) Dyspnea  3) Abnormal CXR  4) Hypoxemia  5) Hypertension  6) ROBERT  7) Mild PH    55 M noted to have COVID 7/15  On arrival hypoxic to 80s and tachypneic. NRB placed,in the ER saturating 95%. Na 126 s/p 1L NS. CXR: Frandy infiltrates. Last scr 1.4 in 2019-> today 1.9 unknown if underlying ckd.  Patient not vaccinated.   Last seen by me in 2019 for ROBERT noted to have uncontrolled hypertension  Treated with IV Remdesivir and Decadron, Tocilizumab   SaO2 is 80-85% despite being on 100% NRB so was switched to HFNC  ABG/imaging reviewed  Given the obesity/hypertension and prior co-morbidities, he is at risk of intubation but continue HFNC, respiratory status remains critical  XR reviewed- pulmonary infiltrates are present  DDimer elevated, was on therapeutic lovenox, transitioned to 40mg q 12  Completed Solumedrol, Symbicort 160/4.5 BID (https://[email or web address]/[email or web address], STO study)  On HFNC 50LPM, %FiO2 with BiPAP overnight  CTPE not performed  Will likely need a prolonged course of HFNC given the tenuous respiratory status  Discussed inspiratory muscle exercises with patient while he is out of bed +/- incentive spirometer   Discussed plan with Dr Pineda

## 2021-08-14 PROCEDURE — 99291 CRITICAL CARE FIRST HOUR: CPT

## 2021-08-14 RX ORDER — INSULIN GLARGINE 100 [IU]/ML
20 INJECTION, SOLUTION SUBCUTANEOUS EVERY MORNING
Refills: 0 | Status: DISCONTINUED | OUTPATIENT
Start: 2021-08-14 | End: 2021-08-18

## 2021-08-14 RX ADMIN — Medication 650 MILLIGRAM(S): at 10:00

## 2021-08-14 RX ADMIN — Medication 100 MILLIGRAM(S): at 08:59

## 2021-08-14 RX ADMIN — Medication 2: at 12:11

## 2021-08-14 RX ADMIN — ATORVASTATIN CALCIUM 80 MILLIGRAM(S): 80 TABLET, FILM COATED ORAL at 21:35

## 2021-08-14 RX ADMIN — Medication 81 MILLIGRAM(S): at 09:01

## 2021-08-14 RX ADMIN — BENZOCAINE AND MENTHOL 1 LOZENGE: 5; 1 LIQUID ORAL at 09:00

## 2021-08-14 RX ADMIN — Medication 650 MILLIGRAM(S): at 09:00

## 2021-08-14 RX ADMIN — BENZOCAINE AND MENTHOL 1 LOZENGE: 5; 1 LIQUID ORAL at 19:42

## 2021-08-14 RX ADMIN — Medication 2000 UNIT(S): at 09:01

## 2021-08-14 RX ADMIN — LOSARTAN POTASSIUM 50 MILLIGRAM(S): 100 TABLET, FILM COATED ORAL at 09:02

## 2021-08-14 RX ADMIN — BUDESONIDE AND FORMOTEROL FUMARATE DIHYDRATE 2 PUFF(S): 160; 4.5 AEROSOL RESPIRATORY (INHALATION) at 20:14

## 2021-08-14 RX ADMIN — BUDESONIDE AND FORMOTEROL FUMARATE DIHYDRATE 2 PUFF(S): 160; 4.5 AEROSOL RESPIRATORY (INHALATION) at 09:34

## 2021-08-14 RX ADMIN — Medication 6: at 16:30

## 2021-08-14 RX ADMIN — Medication 20 MILLIGRAM(S): at 09:01

## 2021-08-14 RX ADMIN — FAMOTIDINE 20 MILLIGRAM(S): 10 INJECTION INTRAVENOUS at 09:01

## 2021-08-14 RX ADMIN — Medication 200 MILLIGRAM(S): at 09:01

## 2021-08-14 RX ADMIN — Medication 6 MILLIGRAM(S): at 23:07

## 2021-08-14 RX ADMIN — ENOXAPARIN SODIUM 40 MILLIGRAM(S): 100 INJECTION SUBCUTANEOUS at 09:00

## 2021-08-14 RX ADMIN — INSULIN GLARGINE 15 UNIT(S): 100 INJECTION, SOLUTION SUBCUTANEOUS at 08:23

## 2021-08-14 RX ADMIN — Medication 200 MILLIGRAM(S): at 21:35

## 2021-08-14 RX ADMIN — Medication 100 MILLIGRAM(S): at 19:42

## 2021-08-14 RX ADMIN — ENOXAPARIN SODIUM 40 MILLIGRAM(S): 100 INJECTION SUBCUTANEOUS at 21:35

## 2021-08-14 NOTE — PROGRESS NOTE ADULT - SUBJECTIVE AND OBJECTIVE BOX
[facility]ospital [facility] # 24  ICU # 21    CC:  COVID     HPI:    56 y/o male with HLD, HTN and DM--Non Vacc--presents with 8 days onset of covid symptoms which included, cough, fevers, sob, hypoxia, fevers, diarrhea, chills and myalgias. Tested positive 7/15.  Rx with Rem/Dexa and then high dose steroids and full AC.  Pt tx to ICU on 7/24 for worsening hypoxia.       8/9:  Pt seen and examined in ICU.  Sitting in chair.  On HFNC 50/90% with O2 sat 80-84%. Slightly dysneic. Still eating.  Awake and alert.  Tm 98.6  WBC 19  FS high.  LE Dopp Negative for DVT.  L arm superficial thrombosis--NO DVT.      8/10: Pt seen and examined in ICU.  Case d/w Dr shah at bedside.  On HFNC at 50/90% with O2 sat 86%.  NAD.  Complete full sentences.  Ferritin lower.  Tm 98.8  WBC 16.  Eating well.  CXR-- Personally reviewed--slightly worsening infiltrates      8/11:  Pt seen and examined in ICU.  O2 sat not as high as yesterday.  On HFNC 50/95%.  Sitting in chair.  Taking longer to recover after care this morning.  He doesn't feel any different.  Tm 97.9  WBC 16  FS better.  Still eating well    8/12:  Pt seen and examined in ICU.  Self prone O/N.  Sitting in chair.  Clinically unchanged.  On HFNC 50/95%.  Awake and alert.  WOB unchanged.  Tm 97.9   FS high during night hours    8/13:  Pt seen and examined in ICU.  Had coughing spell O/N resulting in desaturation.  Sitting in chair.  Remains on HFNC and intermittent NRM. WOB unchanged. Tm 97.8  WBC 16  FS better    8/14:  Pt seen and examined in ICU.  Coughing O/N.  Awake and alert.  WOB stable.  Tm 98.8      PMH:  As above.     PSH:  As above.     FH: Non Contributory other than those listed in HPI    Social History:  NC    MEDICATIONS  (STANDING):  aspirin  chewable 81 milliGRAM(s) Oral daily  atorvastatin 80 milliGRAM(s) Oral at bedtime  budesonide 160 MICROgram(s)/formoterol 4.5 MICROgram(s) Inhaler 2 Puff(s) Inhalation two times a day  cholecalciferol 2000 Unit(s) Oral daily  dextrose 40% Gel 15 Gram(s) Oral once  dextrose 5%. 1000 milliLiter(s) (50 mL/Hr) IV Continuous <Continuous>  dextrose 5%. 1000 milliLiter(s) (100 mL/Hr) IV Continuous <Continuous>  dextrose 50% Injectable 25 Gram(s) IV Push once  dextrose 50% Injectable 12.5 Gram(s) IV Push once  dextrose 50% Injectable 25 Gram(s) IV Push once  enoxaparin Injectable 40 milliGRAM(s) SubCutaneous every 12 hours  famotidine    Tablet 20 milliGRAM(s) Oral daily  glucagon  Injectable 1 milliGRAM(s) IntraMuscular once  insulin glargine Injectable (LANTUS) 15 Unit(s) SubCutaneous every morning  insulin lispro (ADMELOG) corrective regimen sliding scale   SubCutaneous three times a day before meals  insulin lispro (ADMELOG) corrective regimen sliding scale   SubCutaneous at bedtime  labetalol 200 milliGRAM(s) Oral every 12 hours  losartan 50 milliGRAM(s) Oral daily  predniSONE   Tablet 20 milliGRAM(s) Oral daily    MEDICATIONS  (PRN):  acetaminophen   Tablet .. 650 milliGRAM(s) Oral every 4 hours PRN Temp greater or equal to 38C (100.4F), Mild Pain (1 - 3)  ALBUTerol    90 MICROgram(s) HFA Inhaler 2 Puff(s) Inhalation every 4 hours PRN Shortness of Breath and/or Wheezing  benzocaine 15 mG/menthol 3.6 mG (Sugar-Free) Lozenge 1 Lozenge Oral every 6 hours PRN Sore Throat  benzonatate 100 milliGRAM(s) Oral three times a day PRN Cough  melatonin 6 milliGRAM(s) Oral at bedtime PRN Insomnia      Allergies: NKDA    ROS:  SEE BELOW        ICU Vital Signs Last 24 Hrs  T(C): 36.9 (14 Aug 2021 08:00), Max: 37.1 (13 Aug 2021 20:00)  T(F): 98.5 (14 Aug 2021 08:00), Max: 98.8 (13 Aug 2021 20:00)  HR: 79 (14 Aug 2021 08:28) (71 - 97)  BP: 149/84 (14 Aug 2021 07:00) (107/88 - 154/88)  BP(mean): 100 (14 Aug 2021 07:00) (80 - 102)  ABP: --  ABP(mean): --  RR: 30 (14 Aug 2021 08:28) (19 - 36)  SpO2: 91% (14 Aug 2021 08:28) (71% - 98%)          I&O's Summary    13 Aug 2021 07:01  -  14 Aug 2021 07:00  --------------------------------------------------------  IN: 0 mL / OUT: 1650 mL / NET: -1650 mL    14 Aug 2021 07:01  -  14 Aug 2021 10:28  --------------------------------------------------------  IN: 0 mL / OUT: 850 mL / NET: -850 mL        Physical Exam:  SEE BELOW                          8.4    16.53 )-----------( 164      ( 13 Aug 2021 06:34 )             26.0       08-13    135  |  102  |  26<H>  ----------------------------<  88  3.9   |  28  |  0.83    Ca    8.9      13 Aug 2021 06:34  Phos  3.8     08-13  Mg     2.1     08-13                      DVT Prophylaxis:                                                            Contraindication:     Advanced Directives:    Discussed with:    Visit Information:  Time spent excluding procedure:      ** Time is exclusive of billed procedures and/or teaching and/or routine family updates.

## 2021-08-14 NOTE — PROGRESS NOTE ADULT - ASSESSMENT
A:    55M  HD # 27    Here for:    1. Acute hypoxic resp failure 2/2  2. COVID-19 PNA complicated by  3. ARDS  4. Hyperglycemia  5. DESTINEY  6. CKD  7. PNA    This patient requires critical care for support of one or more vital organ systems with a high probability of imminent or life threatening deterioration in his/her condition    P:    PRN analgesia/anxiolysis. Avoid deleriogenic meds. Melatonin for sleep hygiene   HD monitoring. Labetalol 200mg PO BID, cozaar 50mg PO qd.  HFNC 50/100% + PRN NRB. Goal maintain SpO2 > 90% and alleviate WOB. PRN proning noreen tolerated.  Disease modifying therapy: s/p actemra 7/23, s/p RDV, on prednisone 20mg PO qd.  s/p broad spectrum abx empiric dosing for PNA cefepime 1g IV TID + vancomycin 1250mg q12h, now off. Monitor off abx.  ID involved. WBC 16k (suspect demargination 2/2 systemic steroids), afebrile.   VTE ppx lovenox BID, PUD ppx pepcid.  Anti tussives.  f/u AM labs, replete lytes PRN.  Lipitor, ASA.  Goal BG < 180, BG control improved. Lantus 20u qd + ISS.  DESTINEY on CKD, improved.    Dispo: Cont critical care. Remains high risk for further decompensation requiring intubation    TOTAL CRITICAL CARE TIME: 37 minutes EXCLUSIVE of any non bundled procedures)    Note: This time spent INCLUDES time spent directly as this patient's bedside with evaluation, review of chart including review of laboratory and imaging studies, interpretation of vital signs and cardiac output measurements, any necessary ventilator management, and time spent discussing plan of care with patient and family, including goals of care discussion.

## 2021-08-14 NOTE — PROGRESS NOTE ADULT - ASSESSMENT
IMP:    54 y/o male with HLD, HTN and DM--Non Vacc-- admitted with Viral PNA sepsis secondary to COVID--Acute type 1 resp failure and ARDS.    Doubt HAP as CXR on 8/6 was improving   Very high risk for intubation    Severe Pro Ted malnutrition     Critically ill.  High risk for acute decompensation and deterioration including death   Patient requires critical care for support of one or more vital organ systems with a high probability of imminent or life threatening deterioration in his/her condition     Plan:    HFNC--keep oxygen saturation in mid 80s accepting "Happy Hypoxia" to avoid/limit oxygen toxicity.  NIV QHS.  Prone as tolerated  Cont steroids--20 daily  Add PRN hycodan--Anti tussive   FS with insulin coverage--keep FS < 180.  Lantus 15 AM   Encourage nutritional support  OOB  IV Vanco/Cefepime (3) with no Cxs.  Completed 3 days  DVT prophy--SCD and LMWH  No Labs in AM    ICU care-- d/w ICU staff on multi disciplinary rounds and pt-- All concerns addressed including but not limited to diagnosis, treatment plan and overall prognosis

## 2021-08-14 NOTE — PROGRESS NOTE ADULT - SUBJECTIVE AND OBJECTIVE BOX
ICU Progress Note    HPI:    S:    Pt seen and examined  HD # 26  FULL CODE  PMHx HLD, HTN, DM   Pt here for OSB, cough for several days  Admitted on 7/21 with COVID-19 pna. tested positive on 7/15 outpatient. unvaccinated. Escalating fio2 requirements and development of ARDS now requiring HFNC and NRB. S/p actemra on 7/23 7/27 PM: Remains on HFNC 50/100% with frequent desaturations.    8/9 PM: Remains on HFNC 50/90% + PRN/qHS with frequent desaturations.   8/10 PM: remains on HFNC. Increasing insulin requirements.  8/13 PM: Remains on HFNC with frequent desaturations.   8/14 PM: Remains on HFNC with desaturations.     ROS: + SOB  Remainder of systems reviewed, negative    Allergies    No Known Allergies    Intolerances    MEDICATIONS  (STANDING):    aspirin  chewable 81 milliGRAM(s) Oral daily  atorvastatin 80 milliGRAM(s) Oral at bedtime  budesonide 160 MICROgram(s)/formoterol 4.5 MICROgram(s) Inhaler 2 Puff(s) Inhalation two times a day  cholecalciferol 2000 Unit(s) Oral daily  dextrose 40% Gel 15 Gram(s) Oral once  dextrose 5%. 1000 milliLiter(s) (50 mL/Hr) IV Continuous <Continuous>  dextrose 5%. 1000 milliLiter(s) (100 mL/Hr) IV Continuous <Continuous>  dextrose 50% Injectable 25 Gram(s) IV Push once  dextrose 50% Injectable 12.5 Gram(s) IV Push once  dextrose 50% Injectable 25 Gram(s) IV Push once  enoxaparin Injectable 40 milliGRAM(s) SubCutaneous every 12 hours  famotidine    Tablet 20 milliGRAM(s) Oral daily  glucagon  Injectable 1 milliGRAM(s) IntraMuscular once  insulin glargine Injectable (LANTUS) 15 Unit(s) SubCutaneous every morning  insulin lispro (ADMELOG) corrective regimen sliding scale   SubCutaneous three times a day before meals  insulin lispro (ADMELOG) corrective regimen sliding scale   SubCutaneous at bedtime  labetalol 200 milliGRAM(s) Oral every 12 hours  losartan 50 milliGRAM(s) Oral daily  predniSONE   Tablet 20 milliGRAM(s) Oral daily    MEDICATIONS  (PRN):    acetaminophen   Tablet .. 650 milliGRAM(s) Oral every 4 hours PRN Temp greater or equal to 38C (100.4F), Mild Pain (1 - 3)  ALBUTerol    90 MICROgram(s) HFA Inhaler 2 Puff(s) Inhalation every 4 hours PRN Shortness of Breath and/or Wheezing  benzocaine 15 mG/menthol 3.6 mG (Sugar-Free) Lozenge 1 Lozenge Oral every 6 hours PRN Sore Throat  benzonatate 100 milliGRAM(s) Oral three times a day PRN Cough  melatonin 6 milliGRAM(s) Oral at bedtime PRN Insomnia    Drug Dosing Weight    Height (cm): 167.6 (21 Jul 2021 14:34)  Weight (kg): 90.4 (21 Jul 2021 22:00)  BMI (kg/m2): 32.2 (21 Jul 2021 22:00)  BSA (m2): 2 (21 Jul 2021 22:00)    PAST MEDICAL & SURGICAL HISTORY:  HTN (hypertension)    Diabetes    FAMILY HISTORY:    ROS: See HPI; otherwise, all systems reviewed and negative.    O:    ICU Vital Signs Last 24 Hrs  T(C): 37.1 (13 Aug 2021 20:00), Max: 37.1 (13 Aug 2021 20:00)  T(F): 98.8 (13 Aug 2021 20:00), Max: 98.8 (13 Aug 2021 20:00)  HR: 91 (13 Aug 2021 21:00) (71 - 93)  BP: 145/77 (13 Aug 2021 21:00) (97/83 - 145/77)  BP(mean): 92 (13 Aug 2021 21:00) (72 - 95)  ABP: --  ABP(mean): --  RR: 21 (13 Aug 2021 21:00) (21 - 37)  SpO2: 88% (13 Aug 2021 21:00) (80% - 98%)    I&O's Detail    12 Aug 2021 07:01  -  13 Aug 2021 07:00  --------------------------------------------------------  IN:  Total IN: 0 mL    OUT:    Voided (mL): 1100 mL  Total OUT: 1100 mL    Total NET: -1100 mL      13 Aug 2021 07:01  -  13 Aug 2021 21:41  --------------------------------------------------------  IN:  Total IN: 0 mL    OUT:    Voided (mL): 1150 mL  Total OUT: 1150 mL    Total NET: -1150 ml    PE:    Adult M sitting in chair  No JVD trachea midline + HFNC 50/100%, SpO2 80's +/- NRB  S1S2+  Diminished BS B/L  Abd soft NTND  No leg swelling/edema noted  Awake and alert  Skin pink and well perfused    LABS:    CBC Full  -  ( 13 Aug 2021 06:34 )  WBC Count : 16.53 K/uL  RBC Count : 3.01 M/uL  Hemoglobin : 8.4 g/dL  Hematocrit : 26.0 %  Platelet Count - Automated : 164 K/uL  Mean Cell Volume : 86.4 fl  Mean Cell Hemoglobin : 27.9 pg  Mean Cell Hemoglobin Concentration : 32.3 gm/dL  Auto Neutrophil # : x  Auto Lymphocyte # : x  Auto Monocyte # : x  Auto Eosinophil # : x  Auto Basophil # : x  Auto Neutrophil % : x  Auto Lymphocyte % : x  Auto Monocyte % : x  Auto Eosinophil % : x  Auto Basophil % : x    08-13    135  |  102  |  26<H>  ----------------------------<  88  3.9   |  28  |  0.83    Ca    8.9      13 Aug 2021 06:34  Phos  3.8     08-13  Mg     2.1     08-13    CAPILLARY BLOOD GLUCOSE    POCT Blood Glucose.: 160 mg/dL (13 Aug 2021 15:15)  POCT Blood Glucose.: 121 mg/dL (13 Aug 2021 08:05)  POCT Blood Glucose.: 187 mg/dL (12 Aug 2021 23:02)

## 2021-08-15 PROCEDURE — 99291 CRITICAL CARE FIRST HOUR: CPT

## 2021-08-15 RX ADMIN — Medication 2000 UNIT(S): at 10:10

## 2021-08-15 RX ADMIN — BUDESONIDE AND FORMOTEROL FUMARATE DIHYDRATE 2 PUFF(S): 160; 4.5 AEROSOL RESPIRATORY (INHALATION) at 20:17

## 2021-08-15 RX ADMIN — ATORVASTATIN CALCIUM 80 MILLIGRAM(S): 80 TABLET, FILM COATED ORAL at 21:03

## 2021-08-15 RX ADMIN — ENOXAPARIN SODIUM 40 MILLIGRAM(S): 100 INJECTION SUBCUTANEOUS at 21:02

## 2021-08-15 RX ADMIN — Medication 2: at 18:45

## 2021-08-15 RX ADMIN — FAMOTIDINE 20 MILLIGRAM(S): 10 INJECTION INTRAVENOUS at 10:10

## 2021-08-15 RX ADMIN — Medication 200 MILLIGRAM(S): at 21:03

## 2021-08-15 RX ADMIN — Medication 100 MILLIGRAM(S): at 16:04

## 2021-08-15 RX ADMIN — INSULIN GLARGINE 20 UNIT(S): 100 INJECTION, SOLUTION SUBCUTANEOUS at 08:44

## 2021-08-15 RX ADMIN — Medication 20 MILLIGRAM(S): at 10:10

## 2021-08-15 RX ADMIN — LOSARTAN POTASSIUM 50 MILLIGRAM(S): 100 TABLET, FILM COATED ORAL at 10:10

## 2021-08-15 RX ADMIN — Medication 200 MILLIGRAM(S): at 10:10

## 2021-08-15 RX ADMIN — Medication 81 MILLIGRAM(S): at 10:10

## 2021-08-15 RX ADMIN — ENOXAPARIN SODIUM 40 MILLIGRAM(S): 100 INJECTION SUBCUTANEOUS at 10:10

## 2021-08-15 RX ADMIN — Medication 6 MILLIGRAM(S): at 21:02

## 2021-08-15 RX ADMIN — BUDESONIDE AND FORMOTEROL FUMARATE DIHYDRATE 2 PUFF(S): 160; 4.5 AEROSOL RESPIRATORY (INHALATION) at 09:07

## 2021-08-15 NOTE — PROGRESS NOTE ADULT - ASSESSMENT
IMP:    54 y/o male with HLD, HTN and DM--Non Vacc-- admitted with Viral PNA sepsis secondary to COVID--Acute type 1 resp failure and ARDS.    Doubt HAP as CXR on 8/6 was improving   Very high risk for intubation    Severe Pro Ted malnutrition     Critically ill.  High risk for acute decompensation and deterioration including death   Patient requires critical care for support of one or more vital organ systems with a high probability of imminent or life threatening deterioration in his/her condition     Plan:    HFNC--keep oxygen saturation in mid 80s accepting "Happy Hypoxia" to avoid/limit oxygen toxicity.  NIV QHS.  Prone as tolerated  Cont steroids--20 daily  Cont PRN hycodan--Anti tussive   FS with insulin coverage--keep FS < 180.  Lantus 15 AM   Encourage nutritional support  OOB  IV Vanco/Cefepime (3) with no Cxs.  Completed 3 days  DVT prophy--SCD and LMWH  Check Labs in AM    ICU care-- d/w ICU staff on multi disciplinary rounds and pt-- All concerns addressed including but not limited to diagnosis, treatment plan and overall prognosis

## 2021-08-15 NOTE — PROGRESS NOTE ADULT - SUBJECTIVE AND OBJECTIVE BOX
[facility] D # 25  ICU # 22    CC:  COVID     HPI:    56 y/o male with HLD, HTN and DM--Non Vacc--presents with 8 days onset of covid symptoms which included, cough, fevers, sob, hypoxia, fevers, diarrhea, chills and myalgias. Tested positive 7/15.  Rx with Rem/Dexa and then high dose steroids and full AC.  Pt tx to ICU on 7/24 for worsening hypoxia.       8/9:  Pt seen and examined in ICU.  Sitting in chair.  On HFNC 50/90% with O2 sat 80-84%. Slightly dysneic. Still eating.  Awake and alert.  Tm 98.6  WBC 19  FS high.  LE Dopp Negative for DVT.  L arm superficial thrombosis--NO DVT.      8/10: Pt seen and examined in ICU.  Case d/w Dr shah at bedside.  On HFNC at 50/90% with O2 sat 86%.  NAD.  Complete full sentences.  Ferritin lower.  Tm 98.8  WBC 16.  Eating well.  CXR-- Personally reviewed--slightly worsening infiltrates      8/11:  Pt seen and examined in ICU.  O2 sat not as high as yesterday.  On HFNC 50/95%.  Sitting in chair.  Taking longer to recover after care this morning.  He doesn't feel any different.  Tm 97.9  WBC 16  FS better.  Still eating well    8/12:  Pt seen and examined in ICU.  Self prone O/N.  Sitting in chair.  Clinically unchanged.  On HFNC 50/95%.  Awake and alert.  WOB unchanged.  Tm 97.9   FS high during night hours    8/13:  Pt seen and examined in ICU.  Had coughing spell O/N resulting in desaturation.  Sitting in chair.  Remains on HFNC and intermittent NRM. WOB unchanged. Tm 97.8  WBC 16  FS better    8/14:  Pt seen and examined in ICU.  Coughing O/N.  Awake and alert.  WOB stable.  Tm 98.8      8/15:  Pt seen and examined in ICU.  Slept well O/N.  Awake and alert.  Tm 98.9  FS OK    PMH:  As above.     PSH:  As above.     FH: Non Contributory other than those listed in HPI    Social History:      MEDICATIONS  (STANDING):  aspirin  chewable 81 milliGRAM(s) Oral daily  atorvastatin 80 milliGRAM(s) Oral at bedtime  budesonide 160 MICROgram(s)/formoterol 4.5 MICROgram(s) Inhaler 2 Puff(s) Inhalation two times a day  cholecalciferol 2000 Unit(s) Oral daily  dextrose 40% Gel 15 Gram(s) Oral once  dextrose 5%. 1000 milliLiter(s) (50 mL/Hr) IV Continuous <Continuous>  dextrose 5%. 1000 milliLiter(s) (100 mL/Hr) IV Continuous <Continuous>  dextrose 50% Injectable 25 Gram(s) IV Push once  dextrose 50% Injectable 12.5 Gram(s) IV Push once  dextrose 50% Injectable 25 Gram(s) IV Push once  enoxaparin Injectable 40 milliGRAM(s) SubCutaneous every 12 hours  famotidine    Tablet 20 milliGRAM(s) Oral daily  glucagon  Injectable 1 milliGRAM(s) IntraMuscular once  insulin glargine Injectable (LANTUS) 20 Unit(s) SubCutaneous every morning  insulin lispro (ADMELOG) corrective regimen sliding scale   SubCutaneous three times a day before meals  insulin lispro (ADMELOG) corrective regimen sliding scale   SubCutaneous at bedtime  labetalol 200 milliGRAM(s) Oral every 12 hours  losartan 50 milliGRAM(s) Oral daily  predniSONE   Tablet 20 milliGRAM(s) Oral daily    MEDICATIONS  (PRN):  acetaminophen   Tablet .. 650 milliGRAM(s) Oral every 4 hours PRN Temp greater or equal to 38C (100.4F), Mild Pain (1 - 3)  ALBUTerol    90 MICROgram(s) HFA Inhaler 2 Puff(s) Inhalation every 4 hours PRN Shortness of Breath and/or Wheezing  benzocaine 15 mG/menthol 3.6 mG (Sugar-Free) Lozenge 1 Lozenge Oral every 6 hours PRN Sore Throat  benzonatate 100 milliGRAM(s) Oral three times a day PRN Cough  hydrocodone/homatropine Syrup 5 milliLiter(s) Oral every 6 hours PRN Cough  melatonin 6 milliGRAM(s) Oral at bedtime PRN Insomnia      Allergies: NKDA    ROS:  SEE BELOW        ICU Vital Signs Last 24 Hrs  T(C): 36.7 (15 Aug 2021 09:00), Max: 37.2 (15 Aug 2021 05:00)  T(F): 98 (15 Aug 2021 09:00), Max: 98.9 (15 Aug 2021 05:00)  HR: 90 (15 Aug 2021 10:00) (68 - 92)  BP: 116/92 (15 Aug 2021 10:00) (113/72 - 150/89)  BP(mean): 96 (15 Aug 2021 10:00) (80 - 105)  ABP: --  ABP(mean): --  RR: 36 (15 Aug 2021 10:00) (19 - 36)  SpO2: 83% (15 Aug 2021 10:00) (74% - 97%)          I&O's Summary    14 Aug 2021 07:01  -  15 Aug 2021 07:00  --------------------------------------------------------  IN: 0 mL / OUT: 1350 mL / NET: -1350 mL        Physical Exam:  SEE BELOW                              DVT Prophylaxis:                                                            Contraindication:     Advanced Directives:    Discussed with:    Visit Information:  Time spent excluding procedure:      ** Time is exclusive of billed procedures and/or teaching and/or routine family updates.

## 2021-08-16 LAB
ANION GAP SERPL CALC-SCNC: 5 MMOL/L — SIGNIFICANT CHANGE UP (ref 5–17)
BUN SERPL-MCNC: 21 MG/DL — SIGNIFICANT CHANGE UP (ref 7–23)
CALCIUM SERPL-MCNC: 8.9 MG/DL — SIGNIFICANT CHANGE UP (ref 8.5–10.1)
CHLORIDE SERPL-SCNC: 100 MMOL/L — SIGNIFICANT CHANGE UP (ref 96–108)
CO2 SERPL-SCNC: 29 MMOL/L — SIGNIFICANT CHANGE UP (ref 22–31)
CREAT SERPL-MCNC: 0.85 MG/DL — SIGNIFICANT CHANGE UP (ref 0.5–1.3)
GLUCOSE SERPL-MCNC: 111 MG/DL — HIGH (ref 70–99)
HCT VFR BLD CALC: 27 % — LOW (ref 39–50)
HGB BLD-MCNC: 8.7 G/DL — LOW (ref 13–17)
MAGNESIUM SERPL-MCNC: 2 MG/DL — SIGNIFICANT CHANGE UP (ref 1.6–2.6)
MCHC RBC-ENTMCNC: 27.9 PG — SIGNIFICANT CHANGE UP (ref 27–34)
MCHC RBC-ENTMCNC: 32.2 GM/DL — SIGNIFICANT CHANGE UP (ref 32–36)
MCV RBC AUTO: 86.5 FL — SIGNIFICANT CHANGE UP (ref 80–100)
PHOSPHATE SERPL-MCNC: 3.6 MG/DL — SIGNIFICANT CHANGE UP (ref 2.5–4.5)
PLATELET # BLD AUTO: 222 K/UL — SIGNIFICANT CHANGE UP (ref 150–400)
POTASSIUM SERPL-MCNC: 3.9 MMOL/L — SIGNIFICANT CHANGE UP (ref 3.5–5.3)
POTASSIUM SERPL-SCNC: 3.9 MMOL/L — SIGNIFICANT CHANGE UP (ref 3.5–5.3)
RBC # BLD: 3.12 M/UL — LOW (ref 4.2–5.8)
RBC # FLD: 12.9 % — SIGNIFICANT CHANGE UP (ref 10.3–14.5)
SODIUM SERPL-SCNC: 134 MMOL/L — LOW (ref 135–145)
WBC # BLD: 15.94 K/UL — HIGH (ref 3.8–10.5)
WBC # FLD AUTO: 15.94 K/UL — HIGH (ref 3.8–10.5)

## 2021-08-16 PROCEDURE — 99291 CRITICAL CARE FIRST HOUR: CPT

## 2021-08-16 PROCEDURE — 71045 X-RAY EXAM CHEST 1 VIEW: CPT | Mod: 26

## 2021-08-16 RX ADMIN — FAMOTIDINE 20 MILLIGRAM(S): 10 INJECTION INTRAVENOUS at 11:24

## 2021-08-16 RX ADMIN — ENOXAPARIN SODIUM 40 MILLIGRAM(S): 100 INJECTION SUBCUTANEOUS at 23:09

## 2021-08-16 RX ADMIN — LOSARTAN POTASSIUM 50 MILLIGRAM(S): 100 TABLET, FILM COATED ORAL at 11:24

## 2021-08-16 RX ADMIN — Medication 6 MILLIGRAM(S): at 23:09

## 2021-08-16 RX ADMIN — ENOXAPARIN SODIUM 40 MILLIGRAM(S): 100 INJECTION SUBCUTANEOUS at 09:53

## 2021-08-16 RX ADMIN — INSULIN GLARGINE 20 UNIT(S): 100 INJECTION, SOLUTION SUBCUTANEOUS at 09:53

## 2021-08-16 RX ADMIN — Medication 10 MILLIGRAM(S): at 11:24

## 2021-08-16 RX ADMIN — Medication 200 MILLIGRAM(S): at 23:09

## 2021-08-16 RX ADMIN — ATORVASTATIN CALCIUM 80 MILLIGRAM(S): 80 TABLET, FILM COATED ORAL at 23:09

## 2021-08-16 RX ADMIN — Medication 81 MILLIGRAM(S): at 11:24

## 2021-08-16 RX ADMIN — Medication 2000 UNIT(S): at 11:24

## 2021-08-16 RX ADMIN — Medication 200 MILLIGRAM(S): at 11:24

## 2021-08-16 NOTE — PROGRESS NOTE ADULT - SUBJECTIVE AND OBJECTIVE BOX
Patient is a 55y old  Male who presents with a chief complaint of cough/sob (23 Jul 2021 14:03)      HPI:  55 M with pmh HLD, dm, htn presents with 8 days onset of covid symptoms which included, cough, fevers, sob, hypoxia, fevers, diarrhea, chills and myalgias. TEsted positive 7/15. Wife endorsed he was becoming more sob of recently. On arrival hypoxic to 80s and tachypneic. NRB placed, presently on 15% and saturating 95%. Na 126 s/p 1L NS. CXR: Frandy infiltrates. Last scr 1.4 in 2019-> today 1.9 unknown if underlying ckd.  Patient not vaccinated.   Last seen by me in 2019  History of ROBERT and uncontrolled hypertension  Treated with IV Remdesivir and Decadron, now on Toci  SaO2 is 80-85% despite being on 100% NRB  ABG pending    8/6  No acute pulmonary events occurred overnight   On HFNC 50LPM, 90%FiO2. Attempted BiPAP overnight  Critical Care performed POCUS on 8/4, noted to have pulmonary edema   Now pending CTPE study, in process of being transitioned to NRB to see if he can tolerate it prior to CT  8/7  covering for Dr Arceo  data discussed with the intensivist and critical care team this am  not stable for further imaging and transport to ct scan due to high FIO2 demand  he appears the same with high O2 requirements  'sitting upright in bed    8/16  Noted to have increased work of breathing today, on BiPAP 14/8 back up rate 12       Tobacco Usage:  · Tobacco Usage	non smoker  Fhx: none (21 Jul 2021 19:19)    MEDICATIONS  (STANDING):  aspirin  chewable 81 milliGRAM(s) Oral daily  atorvastatin 80 milliGRAM(s) Oral at bedtime  budesonide 160 MICROgram(s)/formoterol 4.5 MICROgram(s) Inhaler 2 Puff(s) Inhalation two times a day  cefepime   IVPB      cefepime   IVPB 2000 milliGRAM(s) IV Intermittent every 8 hours  cholecalciferol 2000 Unit(s) Oral daily  dextrose 40% Gel 15 Gram(s) Oral once  dextrose 5%. 1000 milliLiter(s) (50 mL/Hr) IV Continuous <Continuous>  dextrose 5%. 1000 milliLiter(s) (100 mL/Hr) IV Continuous <Continuous>  dextrose 50% Injectable 25 Gram(s) IV Push once  dextrose 50% Injectable 12.5 Gram(s) IV Push once  dextrose 50% Injectable 25 Gram(s) IV Push once  enoxaparin Injectable 40 milliGRAM(s) SubCutaneous every 12 hours  famotidine    Tablet 20 milliGRAM(s) Oral daily  glucagon  Injectable 1 milliGRAM(s) IntraMuscular once  insulin glargine Injectable (LANTUS) 10 Unit(s) SubCutaneous two times a day  insulin lispro (ADMELOG) corrective regimen sliding scale   SubCutaneous three times a day before meals  insulin lispro (ADMELOG) corrective regimen sliding scale   SubCutaneous at bedtime  labetalol 200 milliGRAM(s) Oral every 12 hours  losartan 50 milliGRAM(s) Oral daily  predniSONE   Tablet 10 milliGRAM(s) Oral daily  vancomycin  IVPB      vancomycin  IVPB 1250 milliGRAM(s) IV Intermittent every 12 hours        MEDICATIONS  (PRN):  acetaminophen   Tablet .. 650 milliGRAM(s) Oral once PRN Temp greater or equal to 38C (100.4F), Mild Pain (1 - 3)  acetaminophen   Tablet .. 650 milliGRAM(s) Oral every 4 hours PRN Temp greater or equal to 38C (100.4F), Mild Pain (1 - 3)  ALBUTerol    90 MICROgram(s) HFA Inhaler 2 Puff(s) Inhalation every 4 hours PRN Shortness of Breath and/or Wheezing  benzocaine 15 mG/menthol 3.6 mG (Sugar-Free) Lozenge 1 Lozenge Oral every 6 hours PRN Sore Throat  benzonatate 100 milliGRAM(s) Oral three times a day PRN Cough  melatonin 3 milliGRAM(s) Oral at bedtime PRN Insomnia    Vital Signs Last 24 Hrs  T(C): 37.2 (16 Aug 2021 04:00), Max: 37.2 (15 Aug 2021 20:00)  T(F): 98.9 (16 Aug 2021 04:00), Max: 98.9 (15 Aug 2021 20:00)  HR: 91 (16 Aug 2021 09:00) (76 - 103)  BP: 148/76 (16 Aug 2021 09:00) (108/46 - 161/100)  BP(mean): 90 (16 Aug 2021 09:00) (61 - 121)  RR: 18 (16 Aug 2021 09:00) (17 - 36)  SpO2: 86% (16 Aug 2021 09:00) (71% - 94%)  IN:    IV PiggyBack: 450 mL    Oral Fluid: 240 mL  Total IN: 690 mL    OUT:    Voided (mL): 2450 mL  Total OUT: 2450 mL    Total NET: -1760 mL      PHYSICAL EXAM  General Appearance: On BIPAP, increased work of breathing  Neck: Supple,   Lungs: coarse bilaterally  Heart: +S1,S2  Abdomen: Soft, non-tender, bowel sounds active   Extremities: no cyanosis or edema, no joint swelling  Skin: Skin color, texture normal, no rashes   Neurologic: Alert and oriented X3 , non focal    ECG:    LABS:                                         10.0   19.05 )-----------( 179      ( 08 Aug 2021 07:11 )             30.3   08-08    137  |  102  |  27<H>  ----------------------------<  121<H>  3.9   |  29  |  0.77    Ca    8.4<L>      08 Aug 2021 07:11  Phos  3.4     08-08  Mg     2.0     08-08                  10.0   19.05 )-----------( 179      ( 08 Aug 2021 07:11 )             30.3   08-08    137  |  102  |  27<H>  ----------------------------<  121<H>  3.9   |  29  |  0.77    Ca    8.4<L>      08 Aug 2021 07:11  Phos  3.4     08-08  Mg     2.0     08-08                 10.4   17.19 )-----------( 198      ( 07 Aug 2021 07:06 )             32.3   08-07    138  |  104  |  31<H>  ----------------------------<  107<H>  4.4   |  29  |  0.77    Ca    8.2<L>      07 Aug 2021 07:06  Phos  4.6     08-07  Mg     2.0     08-07    TPro  5.2<L>  /  Alb  2.3<L>  /  TBili  0.8  /  DBili  x   /  AST  81<H>  /  ALT  213<H>  /  AlkPhos  200<H>  08-06                            12.9   15.00 )-----------( 366      ( 26 Jul 2021 06:20 )             38.4   07-26    133<L>  |  104  |  37<H>  ----------------------------<  232<H>  4.2   |  23  |  1.17    Ca    8.4<L>      26 Jul 2021 06:20    TPro  6.3  /  Alb  2.2<L>  /  TBili  0.4  /  DBili  0.1  /  AST  103<H>  /  ALT  137<H>  /  AlkPhos  239<H>  07-26                          13.2   13.52 )-----------( 338      ( 24 Jul 2021 08:06 )             39.5     07-24    136  |  105  |  43<H>  ----------------------------<  227<H>  4.3   |  25  |  1.31<H>    Ca    8.7      24 Jul 2021 08:06    TPro  6.6  /  Alb  2.3<L>  /  TBili  0.4  /  DBili  x   /  AST  73<H>  /  ALT  64  /  AlkPhos  165<H>  07-24          Pro BNP  -- 07-24 @ 08:06  D Dimer  446 07-24 @ 08:06  Pro BNP  261 07-21 @ 14:42  D Dimer  460 07-21 @ 14:42              < from: Xray Chest 1 View- PORTABLE-Urgent (07.21.21 @ 15:33) >    PROCEDURE DATE:  07/21/2021          INTERPRETATION:  AP chest on July 21, 2021 at 3:27 PM. Patient is short of breath with cough and fever.    Heart magnified by technique.    There arescattered mid lower lung field infiltrates right greater than left possibly related: Pneumonia.    The lungs are clear on August 30, 2019.    IMPRESSION: Bilateral infiltrates as above.    < end of copied text >  < from: US Duplex Venous Lower Ext Complete, Bilateral (07.25.21 @ 08:42) >  COMPARISON: None available.    TECHNIQUE: Duplex sonography of the BILATERAL LOWER extremity veins with color and spectral Doppler, with and without compression.    FINDINGS:    RIGHT:  Normal compressibility of the RIGHT common femoral, femoral and popliteal veins.  Doppler examination shows normal spontaneous and phasic flow.  No RIGHT calf vein thrombosis is detected.    LEFT:  Normal compressibility of the LEFT common femoral, femoral and popliteal veins.  Doppler examination shows normal spontaneous and phasic flow.  No LEFT calf vein thrombosis is detected.    IMPRESSION:  No evidence of deep venous thrombosis in either lower extremity.    < end of copied text >  RADIOLOGY & ADDITIONAL STUDIES:    < from: Xray Chest 1 View- PORTABLE-Urgent (Xray Chest 1 View- PORTABLE-Urgent .) (07.26.21 @ 08:43) >    PROCEDURE DATE:  07/26/2021          INTERPRETATION:  Portable chest radiograph    CLINICAL INFORMATION: Pneumonia due to Covid 19.. Follow-up    TECHNIQUE:  Portable  AP view of the chest was obtained.    COMPARISON: 7/21/2021 chest available for review.    FINDINGS:    The lungs show stable bilateral  multifocal and diffuse ill-defined airspace opacities.. No pneumothorax.    The  heart is enlarged in transverse diameter. No hilar mass.     Visualized osseous structures are intact.    IMPRESSION:   Stable bilateral  multifocal and diffuse ill-defined airspace opacities..    < end of copied text >  < from: US Duplex Venous Lower Ext Complete, Bilateral (08.06.21 @ 17:16) >  FINDINGS:    RIGHT:  Normal compressibility of the RIGHT common femoral, femoral and popliteal veins.  Doppler examination shows normal spontaneous and phasic flow.  No RIGHT calf vein thrombosis is detected.    LEFT:  Normal compressibility of the LEFT common femoral, femoral and popliteal veins.  Doppler examination shows normal spontaneous and phasic flow.  No LEFT calf vein thrombosisis detected.    IMPRESSION:  No evidence of deep venous thrombosis in either lower extremity.    < end of copied text >  < from: Xray Chest 1 View- PORTABLE-Urgent (Xray Chest 1 View- PORTABLE-Urgent .) (08.06.21 @ 16:45) >  INTERPRETATION:  Portable chest radiograph    CLINICAL INFORMATION: Pneumonia due to Covid 19.    TECHNIQUE:  Portable  AP view of the chest was obtained.    COMPARISON: 8/1/2021 chest radiograph available for review.    FINDINGS:    The lungs show decreasing bilateral diffuse airspace disease.. No pneumothorax.    The  heart is mildly enlarged in transverse diameter. No hilar mass.   Visualized osseous structures are intact.    IMPRESSION:   Decreasing bilateral diffuse airspace disease..    < end of copied text >

## 2021-08-16 NOTE — PROGRESS NOTE ADULT - SUBJECTIVE AND OBJECTIVE BOX
Events Overnight: Patient breathing remains tenuous, on high flow overnight, with exertion o2 drops                            States he feels comfortable    HPI:        54 y/o male with HLD, HTN and DM-previously unvaccinated --presents with 8 days onset of covid symptoms which included, cough, fevers, sob, hypoxia, fevers, diarrhea, chills and myalgias. Tested positive 7/15.  Rx with Rem/Dexa and then high dose steroids and full AC.  Pt tx to ICU on 7/24 for worsening hypoxia.     Has been on high flow since. Still awake and alert, eating, dyspneic with exertion  last dopplers were negative 8/06.    PMH:  as above    ROS no chest pian, dyspneic with exertion, no calf pain, no abdominal pain       MEDICATIONS  (STANDING):  aspirin  chewable 81 milliGRAM(s) Oral daily  atorvastatin 80 milliGRAM(s) Oral at bedtime  budesonide 160 MICROgram(s)/formoterol 4.5 MICROgram(s) Inhaler 2 Puff(s) Inhalation two times a day  cholecalciferol 2000 Unit(s) Oral daily  dextrose 40% Gel 15 Gram(s) Oral once  dextrose 5%. 1000 milliLiter(s) (50 mL/Hr) IV Continuous <Continuous>  dextrose 5%. 1000 milliLiter(s) (100 mL/Hr) IV Continuous <Continuous>  dextrose 50% Injectable 25 Gram(s) IV Push once  dextrose 50% Injectable 12.5 Gram(s) IV Push once  dextrose 50% Injectable 25 Gram(s) IV Push once  enoxaparin Injectable 40 milliGRAM(s) SubCutaneous every 12 hours  famotidine    Tablet 20 milliGRAM(s) Oral daily  glucagon  Injectable 1 milliGRAM(s) IntraMuscular once  insulin glargine Injectable (LANTUS) 20 Unit(s) SubCutaneous every morning  insulin lispro (ADMELOG) corrective regimen sliding scale   SubCutaneous three times a day before meals  insulin lispro (ADMELOG) corrective regimen sliding scale   SubCutaneous at bedtime  labetalol 200 milliGRAM(s) Oral every 12 hours  losartan 50 milliGRAM(s) Oral daily  predniSONE   Tablet 10 milliGRAM(s) Oral daily    MEDICATIONS  (PRN):  acetaminophen   Tablet .. 650 milliGRAM(s) Oral every 4 hours PRN Temp greater or equal to 38C (100.4F), Mild Pain (1 - 3)  ALBUTerol    90 MICROgram(s) HFA Inhaler 2 Puff(s) Inhalation every 4 hours PRN Shortness of Breath and/or Wheezing  benzocaine 15 mG/menthol 3.6 mG (Sugar-Free) Lozenge 1 Lozenge Oral every 6 hours PRN Sore Throat  benzonatate 100 milliGRAM(s) Oral three times a day PRN Cough  hydrocodone/homatropine Syrup 5 milliLiter(s) Oral every 6 hours PRN Cough  melatonin 6 milliGRAM(s) Oral at bedtime PRN Insomnia      ICU Vital Signs Last 24 Hrs  T(C): 37.2 (16 Aug 2021 04:00), Max: 37.2 (15 Aug 2021 20:00)  T(F): 98.9 (16 Aug 2021 04:00), Max: 98.9 (15 Aug 2021 20:00)  HR: 84 (16 Aug 2021 06:30) (76 - 103)  BP: 146/75 (16 Aug 2021 06:00) (108/46 - 161/100)  BP(mean): 93 (16 Aug 2021 06:00) (61 - 121)  ABP: --  ABP(mean): --  RR: 33 (16 Aug 2021 06:30) (21 - 36)  SpO2: 72% (16 Aug 2021 06:30) (71% - 94%)    Physical exam  general - ill  Heent - nc/at  neck no jvd  lungs dyspneic, bilateral crackles  cv rrr  abdomen soft non tender  extremites no calf pain    I&O's Summary    15 Aug 2021 07:01  -  16 Aug 2021 07:00  --------------------------------------------------------  IN: 500 mL / OUT: 2300 mL / NET: -1800 mL    DVT Prophylaxis:  Lovenox 40 bid                                                              Advanced Directives: Full Code

## 2021-08-16 NOTE — PROGRESS NOTE ADULT - ASSESSMENT
Patient with severe Covid Pneumonia, ARDS at extremely high risk of intubation  will check f/u cxr  will give trial of bipap, discussed with patient possible need for intubation, he does not want at this time  still taking PO  on Prednisone will slowly titrate down  glucose under reasonalble control  lovonox for dvt prophylaxis  no evidence superimposed infection at this time Patient with severe Covid Pneumonia, ARDS at extremely high risk of intubation  will check f/u cxr  will give trial of bipap, discussed with patient possible need for intubation, he does not want at this time  still taking PO  on Prednisone will slowly titrate down  glucose under reasonalble control  lovonox for dvt prophylaxis  no evidence superimposed infection at this time    wife called and updated on patients status

## 2021-08-16 NOTE — PROGRESS NOTE ADULT - SUBJECTIVE AND OBJECTIVE BOX
HPI:    54 y/o male with pmhx of HLD, HTN, DM admitted on 7/21 with COVID-19 pna. tested positive on 7/15 outpatient. unvaccinated. Type 1 resp  failure Escalating fio2 requirements and development of ARDS now requiring HFNC and NRB.        Symptomatic day 7/15   Hosp day # 7/21  ICU day 7/24  Remdesivir  completed 10 days   Tocilizumab 7/23      Recent clinical changes:    Worsening oxygentation requiring on and off NIPPV   more tachypneic    Radiographic worsening       Vital signs/reviewed and physical exam performed where pertinent and urgently required.    Lab/radiology studies/ABG/meds reviewed and interpreted into the assessment and treatment plan.    Assessment/Plan/Therapeutic interventions      Neuro: no issues    Cor:  HD stable SR     Pulm:  Type 1 resp failure ARDS    ?? fibrotic phase of illness  clinical worsening   prednisone tapered to 10mg/day     GI:  Gi prophylaxis  Pepcid   Enteric feeds as tolerated.     Renal: Even to negative fluid balance as tolerated by hemodynamics and renal fx.      Heme:  Pharmacologic DVT P in addition to SCD's  following D- Dimer clinical course and doppler studied where appropriate.   Lovenox 40 BID.      ID: ABX discontinuation based on discussion with ID in conjunction with clinical features, culture data, and judicious procalcitonin monitoring.    Off ABX    ID following no fevers.      Endo Aggressive glycemic control to limit FS glucose to < 180mg/dl.        COVID 19 specific considerations and therapeutic options based on the available and rapidly changing medical literature.    Goals of care considerations:  Ongoing assessment for patient specific treatment options based on clinical progression or decline.  I have involved the family with updates and requests in guidance for medical decision making.      32  minutes of critical care time spent, (independent of any procedures),  in the management of this critically ill COVID-19 patient  with continuous assessments and interventions based on the interpretation of multiple databases.   HPI:    54 y/o male with pmhx of HLD, HTN, DM admitted on 7/21 with COVID-19 pna. tested positive on 7/15 outpatient. unvaccinated. Type 1 resp  failure Escalating fio2 requirements and development of ARDS now requiring HFNC and NRB.        Symptomatic day 7/15   Hosp day # 7/21  ICU day 7/24  Remdesivir  completed 10 days   Tocilizumab 7/23      Recent clinical changes:    Worsening oxygentation requiring on and off NIPPV   more tachypneic    Radiographic worsening       Vital signs/reviewed and physical exam performed where pertinent and urgently required.    Lab/radiology studies/ABG/meds reviewed and interpreted into the assessment and treatment plan.    Assessment/Plan/Therapeutic interventions      Neuro: no issues    Cor:  HD stable SR     Pulm:  Type 1 resp failure ARDS    ?? fibrotic phase of illness  clinical worsening   prednisone tapered to 10mg/day.    High risk for intubation.       GI:  Gi prophylaxis  Pepcid   Enteric feeds as tolerated.     Renal: Even to negative fluid balance as tolerated by hemodynamics and renal fx.      Heme:  Pharmacologic DVT P in addition to SCD's  following D- Dimer clinical course and doppler studied where appropriate.   Lovenox 40 BID.   Check doppler LE      ID: ABX discontinuation based on discussion with ID in conjunction with clinical features, culture data, and judicious procalcitonin monitoring.    Off ABX    ID following no fevers.      Endo Aggressive glycemic control to limit FS glucose to < 180mg/dl.        COVID 19 specific considerations and therapeutic options based on the available and rapidly changing medical literature.    Goals of care considerations:  Ongoing assessment for patient specific treatment options based on clinical progression or decline.  I have involved the family with updates and requests in guidance for medical decision making.      32  minutes of critical care time spent, (independent of any procedures),  in the management of this critically ill COVID-19 patient  with continuous assessments and interventions based on the interpretation of multiple databases.

## 2021-08-16 NOTE — PROGRESS NOTE ADULT - ASSESSMENT
1) COVID Pneumonia  2) Dyspnea  3) Abnormal CXR  4) Hypoxemia  5) Hypertension  6) ROBERT  7) Mild PH    55 M noted to have COVID 7/15  On arrival hypoxic to 80s and tachypneic. NRB placed,in the ER saturating 95%. Na 126 s/p 1L NS. CXR: Frandy infiltrates. Last scr 1.4 in 2019-> today 1.9 unknown if underlying ckd.  Patient not vaccinated.   Last seen by me in 2019 for ROBERT noted to have uncontrolled hypertension  Treated with IV Remdesivir and Decadron, Tocilizumab   SaO2 is 80-85% despite being on 100% NRB so was switched to HFNC  ABG/imaging reviewed  Given the obesity/hypertension and prior co-morbidities, he is at risk of intubation but continue HFNC, respiratory status remains critical  XR reviewed- pulmonary infiltrates are present  DDimer elevated, was on therapeutic lovenox, transitioned to 40mg q 12  Completed Solumedrol, Symbicort 160/4.5 BID (https://www.[email or web address]/journals/lanres/article/BDRD0362-9154, STOIC study)  Was on HFNC %FiO2 with BiPAP overnight, now on BiPAP   CTPE not performed  Respiratory status is tenuous and the last 24 hours noted to have increased work of breathing; at this point he is at high risk of intubation  Discussed plan with nursing service

## 2021-08-17 LAB
ALBUMIN SERPL ELPH-MCNC: 2.3 G/DL — LOW (ref 3.3–5)
ALP SERPL-CCNC: 168 U/L — HIGH (ref 40–120)
ALT FLD-CCNC: 73 U/L — SIGNIFICANT CHANGE UP (ref 12–78)
ANION GAP SERPL CALC-SCNC: 5 MMOL/L — SIGNIFICANT CHANGE UP (ref 5–17)
AST SERPL-CCNC: 32 U/L — SIGNIFICANT CHANGE UP (ref 15–37)
BILIRUB SERPL-MCNC: 0.6 MG/DL — SIGNIFICANT CHANGE UP (ref 0.2–1.2)
BUN SERPL-MCNC: 21 MG/DL — SIGNIFICANT CHANGE UP (ref 7–23)
CALCIUM SERPL-MCNC: 8.9 MG/DL — SIGNIFICANT CHANGE UP (ref 8.5–10.1)
CHLORIDE SERPL-SCNC: 102 MMOL/L — SIGNIFICANT CHANGE UP (ref 96–108)
CO2 SERPL-SCNC: 29 MMOL/L — SIGNIFICANT CHANGE UP (ref 22–31)
CREAT SERPL-MCNC: 1.01 MG/DL — SIGNIFICANT CHANGE UP (ref 0.5–1.3)
D DIMER BLD IA.RAPID-MCNC: 2157 NG/ML DDU — HIGH
GLUCOSE SERPL-MCNC: 78 MG/DL — SIGNIFICANT CHANGE UP (ref 70–99)
HCT VFR BLD CALC: 25.2 % — LOW (ref 39–50)
HGB BLD-MCNC: 8.3 G/DL — LOW (ref 13–17)
MCHC RBC-ENTMCNC: 28.8 PG — SIGNIFICANT CHANGE UP (ref 27–34)
MCHC RBC-ENTMCNC: 32.9 GM/DL — SIGNIFICANT CHANGE UP (ref 32–36)
MCV RBC AUTO: 87.5 FL — SIGNIFICANT CHANGE UP (ref 80–100)
PLATELET # BLD AUTO: 251 K/UL — SIGNIFICANT CHANGE UP (ref 150–400)
POTASSIUM SERPL-MCNC: 3.9 MMOL/L — SIGNIFICANT CHANGE UP (ref 3.5–5.3)
POTASSIUM SERPL-SCNC: 3.9 MMOL/L — SIGNIFICANT CHANGE UP (ref 3.5–5.3)
PROT SERPL-MCNC: 6.3 GM/DL — SIGNIFICANT CHANGE UP (ref 6–8.3)
RBC # BLD: 2.88 M/UL — LOW (ref 4.2–5.8)
RBC # FLD: 13 % — SIGNIFICANT CHANGE UP (ref 10.3–14.5)
SODIUM SERPL-SCNC: 136 MMOL/L — SIGNIFICANT CHANGE UP (ref 135–145)
WBC # BLD: 13.12 K/UL — HIGH (ref 3.8–10.5)
WBC # FLD AUTO: 13.12 K/UL — HIGH (ref 3.8–10.5)

## 2021-08-17 PROCEDURE — 93970 EXTREMITY STUDY: CPT | Mod: 26

## 2021-08-17 PROCEDURE — 99291 CRITICAL CARE FIRST HOUR: CPT

## 2021-08-17 PROCEDURE — 99292 CRITICAL CARE ADDL 30 MIN: CPT

## 2021-08-17 RX ADMIN — Medication 0.5 MILLIGRAM(S): at 12:15

## 2021-08-17 RX ADMIN — LOSARTAN POTASSIUM 50 MILLIGRAM(S): 100 TABLET, FILM COATED ORAL at 09:20

## 2021-08-17 RX ADMIN — Medication 2: at 12:44

## 2021-08-17 RX ADMIN — Medication 81 MILLIGRAM(S): at 09:20

## 2021-08-17 RX ADMIN — ENOXAPARIN SODIUM 40 MILLIGRAM(S): 100 INJECTION SUBCUTANEOUS at 09:21

## 2021-08-17 RX ADMIN — Medication 100 MILLIGRAM(S): at 09:47

## 2021-08-17 RX ADMIN — Medication 2000 UNIT(S): at 09:21

## 2021-08-17 RX ADMIN — Medication 10 MILLIGRAM(S): at 09:20

## 2021-08-17 RX ADMIN — FAMOTIDINE 20 MILLIGRAM(S): 10 INJECTION INTRAVENOUS at 09:20

## 2021-08-17 RX ADMIN — Medication 200 MILLIGRAM(S): at 09:20

## 2021-08-17 RX ADMIN — Medication 0.5 MILLIGRAM(S): at 18:12

## 2021-08-17 NOTE — PROGRESS NOTE ADULT - SUBJECTIVE AND OBJECTIVE BOX
HPI: 56 yo male with PMHx HTN, HLD, T2DM, admitted on 7/21 with COVID-19 viral pneumonia and acute hypoxemic respiratory failure, now complicated by worsening ARDS requiring ICU level of care since 7/24.         EVENTS: Worsening hypoxemia, patient was initially holding an SpO2 77% on BiPAP 14/8/1.00, decompensated to sustained SpO2 66%.   Augmented NIV to CPAP @ 15, FiO2 100%. SpO2 improved from 66% to 85%. Patient endorses he feels more comfortable on CPAP. Pulling adequate Tv ~850cc. RR stable ~32.    Patient maintains that he does not want to be intubated at this present time, however after goals of care conversation he revealed that in a strictly life-or-death situation, where he decompensates further, and is no longer responsive at immediate risk of death, he WOULD want to be intubated.          T(C): 36.3 (08-17-21 @ 20:00), Max: 37.2 (08-17-21 @ 17:00)  HR: 82 (08-17-21 @ 21:00) (77 - 103)  BP: 132/77 (08-17-21 @ 21:00) (109/72 - 156/87)  RR: 36 (08-17-21 @ 21:00) (10 - 38)  SpO2: 77% (08-17-21 @ 21:00) (65% - 94%)          08-16-21 @ 07:01  -  08-17-21 @ 07:00  --------------------------------------------------------  IN: 0 mL / OUT: 1500 mL / NET: -1500 mL    08-17-21 @ 07:01  -  08-17-21 @ 22:19  --------------------------------------------------------  IN: 0 mL / OUT: 400 mL / NET: -400 mL        Complete Blood Count (08.17.21 @ 06:36)    WBC Count: 13.12 K/uL    RBC Count: 2.88 M/uL    Hemoglobin: 8.3 g/dL    Hematocrit: 25.2 %    Mean Cell Volume: 87.5 fl    Mean Cell Hemoglobin: 28.8 pg    Mean Cell Hemoglobin Conc: 32.9 gm/dL    Red Cell Distrib Width: 13.0 %    Platelet Count - Automated: 251 K/uL    Comprehensive Metabolic Panel (08.17.21 @ 06:36)    Sodium, Serum: 136 mmol/L    Potassium, Serum: 3.9 mmol/L    Chloride, Serum: 102 mmol/L    Carbon Dioxide, Serum: 29 mmol/L    Anion Gap, Serum: 5 mmol/L    Blood Urea Nitrogen, Serum: 21 mg/dL    Creatinine, Serum: 1.01 mg/dL    Glucose, Serum: 78 mg/dL    Calcium, Total Serum: 8.9 mg/dL    Protein Total, Serum: 6.3 gm/dL    Albumin, Serum: 2.3 g/dL    Bilirubin Total, Serum: 0.6 mg/dL    Alkaline Phosphatase, Serum: 168 U/L    Aspartate Aminotransferase (AST/SGOT): 32 U/L    Alanine Aminotransferase (ALT/SGPT): 73 U/L    eGFR if Non : 85: Interpretative comment  The units for eGFR are mL/min/1.73M2 (normalized body surface area). The  eGFR is calculated from a serum creatinine using the CKD-EPI equation.  Other variables required for calculation are race, age and sex. Among  patients with chronic kidney disease (CKD), the eGFR is useful in  determining the stage of disease according to KDOQI CKD classification.  All eGFR results are reported numerically with the following  interpretation.          GFR                    With                 Without     (ml/min/1.73 m2)    Kidney Damage       Kidney Damage        >= 90                    Stage 1                     Normal        60-89                    Stage 2                     Decreased GFR        30-59     Stage 3                     Stage 3        15-29                    Stage 4                     Stage 4        < 15                      Stage 5                     Stage 5  Each stage of CKD assumes that the associated GFR level has been in  effect for at least 3 months. Determination of stages one and two (with  eGFR > 59 ml/min/m2) requires estimation of kidney damage for at least 3  months as defined by structural or functional abnormalities.  Limitations: All estimates of GFR will be less accurate for patients at  extremes of muscle mass (including but not limited to frail elderly,  critically ill, or cancer patients), those with unusual diets, and those  with conditions associated with reduced secretion or extrarenal  elimination of creatinine. The eGFR equation is not recommended for use  in patients with unstable creatinine levels. mL/min/1.73M2    eGFR if African American: 98 mL/min/1.73M2        < from: US Duplex Venous Lower Ext Complete, Bilateral (08.17.21 @ 08:58) >    EXAM:  US DPLX LWR EXT VEINS COMPL BI                            PROCEDURE DATE:  08/17/2021          INTERPRETATION:  CLINICAL INFORMATION: Bilateral leg swelling with COVID-19    COMPARISON: None available.    TECHNIQUE: Duplex sonography of the BILATERAL LOWER extremity veins with color and spectral Doppler, with and without compression.    FINDINGS:    RIGHT:  Normal compressibility of the RIGHT common femoral, femoral and popliteal veins.  Doppler examination shows normal spontaneous and phasic flow.  No RIGHT calf vein thrombosis is detected.    LEFT:  Normal compressibility of the LEFT common femoral, femoral and popliteal veins.  Doppler examination shows normal spontaneous and phasic flow.  No LEFT calf vein thrombosis is detected.    IMPRESSION:  No evidence of deep venous thrombosis in either lower extremity.    --- End of Report ---    DENICE GLYNN MD; Attending Radiologist  This document has been electronically signed. Aug 17 2021 10:47AM    < end of copied text >          Vital signs reviewed and physical exam performed where pertinent and urgently required.    Lab/radiology studies/ABG/meds reviewed and interpreted into the assessment and treatment plan.        Assessment/Plan/Therapeutic interventions:    Respiratory status is very tenuous, he remains profoundly hypoxemic. At the present time, he retains capacity to make his medical decisions and is adamantly refusing intubation.    Had been on HFNC, now NIV-dependent. Hypoxic on BiPAP, pulling adequate Tv with mild increased work of breathing but no significant respiratory distress. Changed to CPAP titrated to 15, FiO2 100% with marked improvement in oxygenation and patient comfort. Will continue to monitor closely and adjust NIV as needed.    s/p Remdesivir and Tocilizumab 7/23. Prolonged course of steroids. Would benefit from PJP ppx given immunosuppression on chronic steroid use, however is NIV-dependent now with a very tenuous respiratory status and is unable to take PO.    No stigmata of superimposed bacterial pneumonia at this point, observe off antibiotics    Maintain MAP >65. Monitoring dynamic end points of perfusion.    On Pepcid for stress ulcer GI ppx, will change to IV if unable to liberate from NIV. Encourage enteric feeds as tolerated.    Monitoring renal indices and fluid status, diuresing as needed to goal euvolemic to net negative fluid balance as tolerated by hemodynamics.    Aggressive glycemic control to keep FS glucose to <180mg/dl while critically ill.     Pharmacologic DVT ppx and SCDs.         He is very high risk for impending intubation. He is a FULL CODE and will be intubated should he decompensate further.        COVID 19 specific considerations and therapeutic options based on the available and rapidly changing literature.    45 minutes of critical care time spent in the management of this critically ill COVID-19 patient suffering from multisystem organ failure with continuous assessments and interventions based on the interpretation of multiple databases. Critical care time not inclusive of independent time spent on procedures performed.

## 2021-08-17 NOTE — PROGRESS NOTE ADULT - SUBJECTIVE AND OBJECTIVE BOX
Events Overnight: Patient remains with tenuous respiratory status    HPI:     66 y/o male with HLD, HTN and DM-previously unvaccinated --presents with 8 days onset of covid symptoms which included, cough, fevers, sob, hypoxia, fevers, diarrhea, chills and myalgias. Tested positive 7/15.  Rx with Rem/Dexa and then high dose steroids and full AC.  Pt tx to ICU on 7/24 for worsening hypoxia.     Has been on high flow since. Still awake and alert, eating, dyspneic with exertion  last dopplers were negative 8/17  inc ddimer  episodes of severe desaturation    PMH:  as above    MEDICATIONS  (STANDING):  aspirin  chewable 81 milliGRAM(s) Oral daily  atorvastatin 80 milliGRAM(s) Oral at bedtime  budesonide 160 MICROgram(s)/formoterol 4.5 MICROgram(s) Inhaler 2 Puff(s) Inhalation two times a day  cholecalciferol 2000 Unit(s) Oral daily  dextrose 40% Gel 15 Gram(s) Oral once  dextrose 5%. 1000 milliLiter(s) (50 mL/Hr) IV Continuous <Continuous>  dextrose 5%. 1000 milliLiter(s) (100 mL/Hr) IV Continuous <Continuous>  dextrose 50% Injectable 25 Gram(s) IV Push once  dextrose 50% Injectable 12.5 Gram(s) IV Push once  dextrose 50% Injectable 25 Gram(s) IV Push once  enoxaparin Injectable 40 milliGRAM(s) SubCutaneous every 12 hours  famotidine    Tablet 20 milliGRAM(s) Oral daily  glucagon  Injectable 1 milliGRAM(s) IntraMuscular once  insulin glargine Injectable (LANTUS) 20 Unit(s) SubCutaneous every morning  insulin lispro (ADMELOG) corrective regimen sliding scale   SubCutaneous three times a day before meals  insulin lispro (ADMELOG) corrective regimen sliding scale   SubCutaneous at bedtime  labetalol 200 milliGRAM(s) Oral every 12 hours  losartan 50 milliGRAM(s) Oral daily  predniSONE   Tablet 10 milliGRAM(s) Oral daily    MEDICATIONS  (PRN):  acetaminophen   Tablet .. 650 milliGRAM(s) Oral every 4 hours PRN Temp greater or equal to 38C (100.4F), Mild Pain (1 - 3)  ALBUTerol    90 MICROgram(s) HFA Inhaler 2 Puff(s) Inhalation every 4 hours PRN Shortness of Breath and/or Wheezing  benzocaine 15 mG/menthol 3.6 mG (Sugar-Free) Lozenge 1 Lozenge Oral every 6 hours PRN Sore Throat  benzonatate 100 milliGRAM(s) Oral three times a day PRN Cough  hydrocodone/homatropine Syrup 5 milliLiter(s) Oral every 6 hours PRN Cough  melatonin 6 milliGRAM(s) Oral at bedtime PRN Insomnia    ICU Vital Signs Last 24 Hrs  T(C): 36.7 (17 Aug 2021 06:00), Max: 36.8 (16 Aug 2021 20:00)  T(F): 98 (17 Aug 2021 06:00), Max: 98.2 (16 Aug 2021 20:00)  HR: 99 (17 Aug 2021 10:08) (77 - 100)  BP: 153/82 (17 Aug 2021 09:00) (102/60 - 156/87)  BP(mean): 100 (17 Aug 2021 09:00) (68 - 103)  ABP: --  ABP(mean): --  RR: 31 (17 Aug 2021 10:08) (22 - 34)  SpO2: 74% (17 Aug 2021 10:08) (71% - 94%)    I&O's Summary    16 Aug 2021 07:01  -  17 Aug 2021 07:00  --------------------------------------------------------  IN: 0 mL / OUT: 1500 mL / NET: -1500 mL    General - ashen, anxious  HEENT nc/at bipap on  neck no jvd  lungs scattered rhonchi  cv rrr  abdomen soft non tender  ext warm                        8.3    13.12 )-----------( 251      ( 17 Aug 2021 06:36 )             25.2       08-17    136  |  102  |  21  ----------------------------<  78  3.9   |  29  |  1.01    Ca    8.9      17 Aug 2021 06:36  Phos  3.6     08-16  Mg     2.0     08-16    TPro  6.3  /  Alb  2.3<L>  /  TBili  0.6  /  DBili  x   /  AST  32  /  ALT  73  /  AlkPhos  168<H>  08-17    DVT Prophylaxis:  Lovenox                                                               Advanced Directives: Full Code

## 2021-08-17 NOTE — PROGRESS NOTE ADULT - ASSESSMENT
1) COVID Pneumonia  2) Dyspnea  3) Abnormal CXR  4) Hypoxemia  5) Hypertension  6) ROBERT  7) Mild PH    55 M noted to have COVID 7/15  On arrival hypoxic to 80s and tachypneic. NRB placed,in the ER saturating 95%. Na 126 s/p 1L NS. CXR: Frandy infiltrates. Last scr 1.4 in 2019-> today 1.9 unknown if underlying ckd.  Patient not vaccinated.   Last seen by me in 2019 for ROBERT noted to have uncontrolled hypertension  Treated with IV Remdesivir and Decadron, Tocilizumab   SaO2 is 80-85% despite being on 100% NRB so was switched to HFNC  ABG/imaging reviewed  Given the obesity/hypertension and prior co-morbidities, he is at risk of intubation but continue HFNC, respiratory status remains critical  XR reviewed- pulmonary infiltrates are present  DDimer elevated, was on therapeutic lovenox, transitioned to 40mg q 12  Completed Solumedrol, Symbicort 160/4.5 BID (https://www.[email or web address]/journals/lanres/article/BJIE9476-5117, STOIC study)  Was on HFNC %FiO2 with BiPAP overnight, now on BiPAP   CTPE not performed  Respiratory status is tenuous and the last 24 hours noted to have increased work of breathing; at this point he is at high risk of intubation  Discussed plan with nursing service      1) COVID Pneumonia  2) Dyspnea  3) Abnormal CXR  4) Hypoxemia  5) Hypertension  6) ROBERT  7) Mild PH    55 M noted to have COVID 7/15  On arrival hypoxic to 80s and tachypneic. NRB placed,in the ER saturating 95%. Na 126 s/p 1L NS. CXR: Frandy infiltrates. Last scr 1.4 in 2019-> today 1.9 unknown if underlying ckd.  Patient not vaccinated.   Last seen by me in 2019 for ROBERT noted to have uncontrolled hypertension  Treated with IV Remdesivir and Decadron, Tocilizumab   SaO2 is 80-85% despite being on 100% NRB so was switched to HFNC  ABG/imaging reviewed  Given the obesity/hypertension and prior co-morbidities, he is at risk of intubation but continue HFNC, respiratory status remains critical  XR reviewed- pulmonary infiltrates are present  DDimer elevated, was on therapeutic lovenox, transitioned to 40mg q 12 now noted to be >2000  Completed Solumedrol, Symbicort 160/4.5 BID (https://www.[employer].com/journals/lanres/article/XCGT6776-6652, STOIC study)  Was on HFNC %FiO2 with BiPAP overnight, now on BiPAP   CTPE not performed  Respiratory status is tenuous and the last 24-48 hours noted to have increased work of breathing; at this point he is at high risk of intubation  Discussed plan with nursing service

## 2021-08-17 NOTE — PROGRESS NOTE ADULT - SUBJECTIVE AND OBJECTIVE BOX
Patient is a 55y old  Male who presents with a chief complaint of cough/sob (23 Jul 2021 14:03)      HPI:  55 M with pmh HLD, dm, htn presents with 8 days onset of covid symptoms which included, cough, fevers, sob, hypoxia, fevers, diarrhea, chills and myalgias. TEsted positive 7/15. Wife endorsed he was becoming more sob of recently. On arrival hypoxic to 80s and tachypneic. NRB placed, presently on 15% and saturating 95%. Na 126 s/p 1L NS. CXR: Frandy infiltrates. Last scr 1.4 in 2019-> today 1.9 unknown if underlying ckd.  Patient not vaccinated.   Last seen by me in 2019  History of ROBERT and uncontrolled hypertension  Treated with IV Remdesivir and Decadron, now on Toci  SaO2 is 80-85% despite being on 100% NRB  ABG pending    8/6  No acute pulmonary events occurred overnight   On HFNC 50LPM, 90%FiO2. Attempted BiPAP overnight  Critical Care performed POCUS on 8/4, noted to have pulmonary edema   Now pending CTPE study, in process of being transitioned to NRB to see if he can tolerate it prior to CT  8/7  covering for Dr Arceo  data discussed with the intensivist and critical care team this am  not stable for further imaging and transport to ct scan due to high FIO2 demand  he appears the same with high O2 requirements  'sitting upright in bed    8/16  Noted to have increased work of breathing today, on BiPAP 14/8 back up rate 12       Tobacco Usage:  · Tobacco Usage	non smoker  Fhx: none (21 Jul 2021 19:19)    MEDICATIONS  (STANDING):  aspirin  chewable 81 milliGRAM(s) Oral daily  atorvastatin 80 milliGRAM(s) Oral at bedtime  budesonide 160 MICROgram(s)/formoterol 4.5 MICROgram(s) Inhaler 2 Puff(s) Inhalation two times a day  cefepime   IVPB      cefepime   IVPB 2000 milliGRAM(s) IV Intermittent every 8 hours  cholecalciferol 2000 Unit(s) Oral daily  dextrose 40% Gel 15 Gram(s) Oral once  dextrose 5%. 1000 milliLiter(s) (50 mL/Hr) IV Continuous <Continuous>  dextrose 5%. 1000 milliLiter(s) (100 mL/Hr) IV Continuous <Continuous>  dextrose 50% Injectable 25 Gram(s) IV Push once  dextrose 50% Injectable 12.5 Gram(s) IV Push once  dextrose 50% Injectable 25 Gram(s) IV Push once  enoxaparin Injectable 40 milliGRAM(s) SubCutaneous every 12 hours  famotidine    Tablet 20 milliGRAM(s) Oral daily  glucagon  Injectable 1 milliGRAM(s) IntraMuscular once  insulin glargine Injectable (LANTUS) 10 Unit(s) SubCutaneous two times a day  insulin lispro (ADMELOG) corrective regimen sliding scale   SubCutaneous three times a day before meals  insulin lispro (ADMELOG) corrective regimen sliding scale   SubCutaneous at bedtime  labetalol 200 milliGRAM(s) Oral every 12 hours  losartan 50 milliGRAM(s) Oral daily  predniSONE   Tablet 10 milliGRAM(s) Oral daily  vancomycin  IVPB      vancomycin  IVPB 1250 milliGRAM(s) IV Intermittent every 12 hours        MEDICATIONS  (PRN):  acetaminophen   Tablet .. 650 milliGRAM(s) Oral once PRN Temp greater or equal to 38C (100.4F), Mild Pain (1 - 3)  acetaminophen   Tablet .. 650 milliGRAM(s) Oral every 4 hours PRN Temp greater or equal to 38C (100.4F), Mild Pain (1 - 3)  ALBUTerol    90 MICROgram(s) HFA Inhaler 2 Puff(s) Inhalation every 4 hours PRN Shortness of Breath and/or Wheezing  benzocaine 15 mG/menthol 3.6 mG (Sugar-Free) Lozenge 1 Lozenge Oral every 6 hours PRN Sore Throat  benzonatate 100 milliGRAM(s) Oral three times a day PRN Cough  melatonin 3 milliGRAM(s) Oral at bedtime PRN Insomnia    Vital Signs Last 24 Hrs  T(C): 37.2 (16 Aug 2021 04:00), Max: 37.2 (15 Aug 2021 20:00)  T(F): 98.9 (16 Aug 2021 04:00), Max: 98.9 (15 Aug 2021 20:00)  HR: 91 (16 Aug 2021 09:00) (76 - 103)  BP: 148/76 (16 Aug 2021 09:00) (108/46 - 161/100)  BP(mean): 90 (16 Aug 2021 09:00) (61 - 121)  RR: 18 (16 Aug 2021 09:00) (17 - 36)  SpO2: 86% (16 Aug 2021 09:00) (71% - 94%)  IN:    IV PiggyBack: 450 mL    Oral Fluid: 240 mL  Total IN: 690 mL    OUT:    Voided (mL): 2450 mL  Total OUT: 2450 mL    Total NET: -1760 mL      PHYSICAL EXAM  General Appearance: On BIPAP, increased work of breathing  Neck: Supple,   Lungs: coarse bilaterally  Heart: +S1,S2  Abdomen: Soft, non-tender, bowel sounds active   Extremities: no cyanosis or edema, no joint swelling  Skin: Skin color, texture normal, no rashes   Neurologic: Alert and oriented X3 , non focal    ECG:    LABS:                                         10.0   19.05 )-----------( 179      ( 08 Aug 2021 07:11 )             30.3   08-08    137  |  102  |  27<H>  ----------------------------<  121<H>  3.9   |  29  |  0.77    Ca    8.4<L>      08 Aug 2021 07:11  Phos  3.4     08-08  Mg     2.0     08-08                  10.0   19.05 )-----------( 179      ( 08 Aug 2021 07:11 )             30.3   08-08    137  |  102  |  27<H>  ----------------------------<  121<H>  3.9   |  29  |  0.77    Ca    8.4<L>      08 Aug 2021 07:11  Phos  3.4     08-08  Mg     2.0     08-08                 10.4   17.19 )-----------( 198      ( 07 Aug 2021 07:06 )             32.3   08-07    138  |  104  |  31<H>  ----------------------------<  107<H>  4.4   |  29  |  0.77    Ca    8.2<L>      07 Aug 2021 07:06  Phos  4.6     08-07  Mg     2.0     08-07    TPro  5.2<L>  /  Alb  2.3<L>  /  TBili  0.8  /  DBili  x   /  AST  81<H>  /  ALT  213<H>  /  AlkPhos  200<H>  08-06                            12.9   15.00 )-----------( 366      ( 26 Jul 2021 06:20 )             38.4   07-26    133<L>  |  104  |  37<H>  ----------------------------<  232<H>  4.2   |  23  |  1.17    Ca    8.4<L>      26 Jul 2021 06:20    TPro  6.3  /  Alb  2.2<L>  /  TBili  0.4  /  DBili  0.1  /  AST  103<H>  /  ALT  137<H>  /  AlkPhos  239<H>  07-26                          13.2   13.52 )-----------( 338      ( 24 Jul 2021 08:06 )             39.5     07-24    136  |  105  |  43<H>  ----------------------------<  227<H>  4.3   |  25  |  1.31<H>    Ca    8.7      24 Jul 2021 08:06    TPro  6.6  /  Alb  2.3<L>  /  TBili  0.4  /  DBili  x   /  AST  73<H>  /  ALT  64  /  AlkPhos  165<H>  07-24          Pro BNP  -- 07-24 @ 08:06  D Dimer  446 07-24 @ 08:06  Pro BNP  261 07-21 @ 14:42  D Dimer  460 07-21 @ 14:42              < from: Xray Chest 1 View- PORTABLE-Urgent (07.21.21 @ 15:33) >    PROCEDURE DATE:  07/21/2021          INTERPRETATION:  AP chest on July 21, 2021 at 3:27 PM. Patient is short of breath with cough and fever.    Heart magnified by technique.    There arescattered mid lower lung field infiltrates right greater than left possibly related: Pneumonia.    The lungs are clear on August 30, 2019.    IMPRESSION: Bilateral infiltrates as above.    < end of copied text >  < from: US Duplex Venous Lower Ext Complete, Bilateral (07.25.21 @ 08:42) >  COMPARISON: None available.    TECHNIQUE: Duplex sonography of the BILATERAL LOWER extremity veins with color and spectral Doppler, with and without compression.    FINDINGS:    RIGHT:  Normal compressibility of the RIGHT common femoral, femoral and popliteal veins.  Doppler examination shows normal spontaneous and phasic flow.  No RIGHT calf vein thrombosis is detected.    LEFT:  Normal compressibility of the LEFT common femoral, femoral and popliteal veins.  Doppler examination shows normal spontaneous and phasic flow.  No LEFT calf vein thrombosis is detected.    IMPRESSION:  No evidence of deep venous thrombosis in either lower extremity.    < end of copied text >  RADIOLOGY & ADDITIONAL STUDIES:    < from: Xray Chest 1 View- PORTABLE-Urgent (Xray Chest 1 View- PORTABLE-Urgent .) (07.26.21 @ 08:43) >    PROCEDURE DATE:  07/26/2021          INTERPRETATION:  Portable chest radiograph    CLINICAL INFORMATION: Pneumonia due to Covid 19.. Follow-up    TECHNIQUE:  Portable  AP view of the chest was obtained.    COMPARISON: 7/21/2021 chest available for review.    FINDINGS:    The lungs show stable bilateral  multifocal and diffuse ill-defined airspace opacities.. No pneumothorax.    The  heart is enlarged in transverse diameter. No hilar mass.     Visualized osseous structures are intact.    IMPRESSION:   Stable bilateral  multifocal and diffuse ill-defined airspace opacities..    < end of copied text >  < from: US Duplex Venous Lower Ext Complete, Bilateral (08.06.21 @ 17:16) >  FINDINGS:    RIGHT:  Normal compressibility of the RIGHT common femoral, femoral and popliteal veins.  Doppler examination shows normal spontaneous and phasic flow.  No RIGHT calf vein thrombosis is detected.    LEFT:  Normal compressibility of the LEFT common femoral, femoral and popliteal veins.  Doppler examination shows normal spontaneous and phasic flow.  No LEFT calf vein thrombosisis detected.    IMPRESSION:  No evidence of deep venous thrombosis in either lower extremity.    < end of copied text >  < from: Xray Chest 1 View- PORTABLE-Urgent (Xray Chest 1 View- PORTABLE-Urgent .) (08.06.21 @ 16:45) >  INTERPRETATION:  Portable chest radiograph    CLINICAL INFORMATION: Pneumonia due to Covid 19.    TECHNIQUE:  Portable  AP view of the chest was obtained.    COMPARISON: 8/1/2021 chest radiograph available for review.    FINDINGS:    The lungs show decreasing bilateral diffuse airspace disease.. No pneumothorax.    The  heart is mildly enlarged in transverse diameter. No hilar mass.   Visualized osseous structures are intact.    IMPRESSION:   Decreasing bilateral diffuse airspace disease..    < end of copied text >   Patient is a 55y old  Male who presents with a chief complaint of cough/sob (23 Jul 2021 14:03)      HPI:  55 M with pmh HLD, dm, htn presents with 8 days onset of covid symptoms which included, cough, fevers, sob, hypoxia, fevers, diarrhea, chills and myalgias. TEsted positive 7/15. Wife endorsed he was becoming more sob of recently. On arrival hypoxic to 80s and tachypneic. NRB placed, presently on 15% and saturating 95%. Na 126 s/p 1L NS. CXR: Fradny infiltrates. Last scr 1.4 in 2019-> today 1.9 unknown if underlying ckd.  Patient not vaccinated.   Last seen by me in 2019  History of ROBERT and uncontrolled hypertension  Treated with IV Remdesivir and Decadron, now on Toci  SaO2 is 80-85% despite being on 100% NRB  ABG pending    8/6  No acute pulmonary events occurred overnight   On HFNC 50LPM, 90%FiO2. Attempted BiPAP overnight  Critical Care performed POCUS on 8/4, noted to have pulmonary edema   Now pending CTPE study, in process of being transitioned to NRB to see if he can tolerate it prior to CT    8/17  Noted to have increased work of breathing today, on BiPAP 14/8 back up rate 12 then transitioned to HFNC with NRB  DDimer [id number]  Dopplers/CXR performed       Tobacco Usage:  · Tobacco Usage	non smoker  Fhx: none (21 Jul 2021 19:19)    MEDICATIONS  (STANDING):  aspirin  chewable 81 milliGRAM(s) Oral daily  atorvastatin 80 milliGRAM(s) Oral at bedtime  budesonide 160 MICROgram(s)/formoterol 4.5 MICROgram(s) Inhaler 2 Puff(s) Inhalation two times a day  cefepime   IVPB      cefepime   IVPB 2000 milliGRAM(s) IV Intermittent every 8 hours  cholecalciferol 2000 Unit(s) Oral daily  dextrose 40% Gel 15 Gram(s) Oral once  dextrose 5%. 1000 milliLiter(s) (50 mL/Hr) IV Continuous <Continuous>  dextrose 5%. 1000 milliLiter(s) (100 mL/Hr) IV Continuous <Continuous>  dextrose 50% Injectable 25 Gram(s) IV Push once  dextrose 50% Injectable 12.5 Gram(s) IV Push once  dextrose 50% Injectable 25 Gram(s) IV Push once  enoxaparin Injectable 40 milliGRAM(s) SubCutaneous every 12 hours  famotidine    Tablet 20 milliGRAM(s) Oral daily  glucagon  Injectable 1 milliGRAM(s) IntraMuscular once  insulin glargine Injectable (LANTUS) 10 Unit(s) SubCutaneous two times a day  insulin lispro (ADMELOG) corrective regimen sliding scale   SubCutaneous three times a day before meals  insulin lispro (ADMELOG) corrective regimen sliding scale   SubCutaneous at bedtime  labetalol 200 milliGRAM(s) Oral every 12 hours  losartan 50 milliGRAM(s) Oral daily  predniSONE   Tablet 10 milliGRAM(s) Oral daily  vancomycin  IVPB      vancomycin  IVPB 1250 milliGRAM(s) IV Intermittent every 12 hours        MEDICATIONS  (PRN):  acetaminophen   Tablet .. 650 milliGRAM(s) Oral once PRN Temp greater or equal to 38C (100.4F), Mild Pain (1 - 3)  acetaminophen   Tablet .. 650 milliGRAM(s) Oral every 4 hours PRN Temp greater or equal to 38C (100.4F), Mild Pain (1 - 3)  ALBUTerol    90 MICROgram(s) HFA Inhaler 2 Puff(s) Inhalation every 4 hours PRN Shortness of Breath and/or Wheezing  benzocaine 15 mG/menthol 3.6 mG (Sugar-Free) Lozenge 1 Lozenge Oral every 6 hours PRN Sore Throat  benzonatate 100 milliGRAM(s) Oral three times a day PRN Cough  melatonin 3 milliGRAM(s) Oral at bedtime PRN Insomnia    Vital Signs Last 24 Hrs  T(C): 37.2 (16 Aug 2021 04:00), Max: 37.2 (15 Aug 2021 20:00)  T(F): 98.9 (16 Aug 2021 04:00), Max: 98.9 (15 Aug 2021 20:00)  HR: 91 (16 Aug 2021 09:00) (76 - 103)  BP: 148/76 (16 Aug 2021 09:00) (108/46 - 161/100)  BP(mean): 90 (16 Aug 2021 09:00) (61 - 121)  RR: 18 (16 Aug 2021 09:00) (17 - 36)  SpO2: 86% (16 Aug 2021 09:00) (71% - 94%)  IN:    IV PiggyBack: 450 mL    Oral Fluid: 240 mL  Total IN: 690 mL    OUT:    Voided (mL): 2450 mL  Total OUT: 2450 mL    Total NET: -1760 mL      PHYSICAL EXAM  General Appearance: On BIPAP, increased work of breathing  Neck: Supple,   Lungs: coarse bilaterally  Heart: +S1,S2  Abdomen: Soft, non-tender, bowel sounds active   Extremities: no cyanosis or edema, no joint swelling  Skin: Skin color, texture normal, no rashes   Neurologic: Alert and oriented X3 , non focal    ECG:    LABS:                                         10.0   19.05 )-----------( 179      ( 08 Aug 2021 07:11 )             30.3   08-08    137  |  102  |  27<H>  ----------------------------<  121<H>  3.9   |  29  |  0.77    Ca    8.4<L>      08 Aug 2021 07:11  Phos  3.4     08-08  Mg     2.0     08-08                  10.0   19.05 )-----------( 179      ( 08 Aug 2021 07:11 )             30.3   08-08    137  |  102  |  27<H>  ----------------------------<  121<H>  3.9   |  29  |  0.77    Ca    8.4<L>      08 Aug 2021 07:11  Phos  3.4     08-08  Mg     2.0     08-08                 10.4   17.19 )-----------( 198      ( 07 Aug 2021 07:06 )             32.3   08-07    138  |  104  |  31<H>  ----------------------------<  107<H>  4.4   |  29  |  0.77    Ca    8.2<L>      07 Aug 2021 07:06  Phos  4.6     08-07  Mg     2.0     08-07    TPro  5.2<L>  /  Alb  2.3<L>  /  TBili  0.8  /  DBili  x   /  AST  81<H>  /  ALT  213<H>  /  AlkPhos  200<H>  08-06                            12.9   15.00 )-----------( 366      ( 26 Jul 2021 06:20 )             38.4   07-26    133<L>  |  104  |  37<H>  ----------------------------<  232<H>  4.2   |  23  |  1.17    Ca    8.4<L>      26 Jul 2021 06:20    TPro  6.3  /  Alb  2.2<L>  /  TBili  0.4  /  DBili  0.1  /  AST  103<H>  /  ALT  137<H>  /  AlkPhos  239<H>  07-26                          13.2   13.52 )-----------( 338      ( 24 Jul 2021 08:06 )             39.5     07-24    136  |  105  |  43<H>  ----------------------------<  227<H>  4.3   |  25  |  1.31<H>    Ca    8.7      24 Jul 2021 08:06    TPro  6.6  /  Alb  2.3<L>  /  TBili  0.4  /  DBili  x   /  AST  73<H>  /  ALT  64  /  AlkPhos  165<H>  07-24          Pro BNP  -- 07-24 @ 08:06  D Dimer  446 07-24 @ 08:06  Pro BNP  261 07-21 @ 14:42  D Dimer  460 07-21 @ 14:42              < from: Xray Chest 1 View- PORTABLE-Urgent (07.21.21 @ 15:33) >    PROCEDURE DATE:  07/21/2021          INTERPRETATION:  AP chest on July 21, 2021 at 3:27 PM. Patient is short of breath with cough and fever.    Heart magnified by technique.    There arescattered mid lower lung field infiltrates right greater than left possibly related: Pneumonia.    The lungs are clear on August 30, 2019.    IMPRESSION: Bilateral infiltrates as above.    < end of copied text >  < from: US Duplex Venous Lower Ext Complete, Bilateral (07.25.21 @ 08:42) >  COMPARISON: None available.    TECHNIQUE: Duplex sonography of the BILATERAL LOWER extremity veins with color and spectral Doppler, with and without compression.    FINDINGS:    RIGHT:  Normal compressibility of the RIGHT common femoral, femoral and popliteal veins.  Doppler examination shows normal spontaneous and phasic flow.  No RIGHT calf vein thrombosis is detected.    LEFT:  Normal compressibility of the LEFT common femoral, femoral and popliteal veins.  Doppler examination shows normal spontaneous and phasic flow.  No LEFT calf vein thrombosis is detected.    IMPRESSION:  No evidence of deep venous thrombosis in either lower extremity.    < end of copied text >  RADIOLOGY & ADDITIONAL STUDIES:    < from: Xray Chest 1 View- PORTABLE-Urgent (Xray Chest 1 View- PORTABLE-Urgent .) (07.26.21 @ 08:43) >    PROCEDURE DATE:  07/26/2021          INTERPRETATION:  Portable chest radiograph    CLINICAL INFORMATION: Pneumonia due to Covid 19.. Follow-up    TECHNIQUE:  Portable  AP view of the chest was obtained.    COMPARISON: 7/21/2021 chest available for review.    FINDINGS:    The lungs show stable bilateral  multifocal and diffuse ill-defined airspace opacities.. No pneumothorax.    The  heart is enlarged in transverse diameter. No hilar mass.     Visualized osseous structures are intact.    IMPRESSION:   Stable bilateral  multifocal and diffuse ill-defined airspace opacities..    < end of copied text >  < from: US Duplex Venous Lower Ext Complete, Bilateral (08.06.21 @ 17:16) >  FINDINGS:    RIGHT:  Normal compressibility of the RIGHT common femoral, femoral and popliteal veins.  Doppler examination shows normal spontaneous and phasic flow.  No RIGHT calf vein thrombosis is detected.    LEFT:  Normal compressibility of the LEFT common femoral, femoral and popliteal veins.  Doppler examination shows normal spontaneous and phasic flow.  No LEFT calf vein thrombosisis detected.    IMPRESSION:  No evidence of deep venous thrombosis in either lower extremity.    < end of copied text >  < from: Xray Chest 1 View- PORTABLE-Urgent (Xray Chest 1 View- PORTABLE-Urgent .) (08.06.21 @ 16:45) >  INTERPRETATION:  Portable chest radiograph    CLINICAL INFORMATION: Pneumonia due to Covid 19.    TECHNIQUE:  Portable  AP view of the chest was obtained.    COMPARISON: 8/1/2021 chest radiograph available for review.    FINDINGS:    The lungs show decreasing bilateral diffuse airspace disease.. No pneumothorax.    The  heart is mildly enlarged in transverse diameter. No hilar mass.   Visualized osseous structures are intact.    IMPRESSION:   Decreasing bilateral diffuse airspace disease..    < end of copied text >

## 2021-08-17 NOTE — PROGRESS NOTE ADULT - ASSESSMENT
Patient with ARDS from Covid Pneumonia  Patient iwth severe hypoxemia.  alterenating between bipap and Hiflow  I have recommended to Patientt intubation.  He refuses to allow at this time, understanding his life is at risk, but not DNI  will continue supportive care  steroids  repeat dopplers negative  he is still eating  sliding scale for Diabetes Mellitus

## 2021-08-18 LAB
ANION GAP SERPL CALC-SCNC: 5 MMOL/L — SIGNIFICANT CHANGE UP (ref 5–17)
BUN SERPL-MCNC: 24 MG/DL — HIGH (ref 7–23)
CALCIUM SERPL-MCNC: 8.8 MG/DL — SIGNIFICANT CHANGE UP (ref 8.5–10.1)
CHLORIDE SERPL-SCNC: 104 MMOL/L — SIGNIFICANT CHANGE UP (ref 96–108)
CO2 SERPL-SCNC: 29 MMOL/L — SIGNIFICANT CHANGE UP (ref 22–31)
CREAT SERPL-MCNC: 1.03 MG/DL — SIGNIFICANT CHANGE UP (ref 0.5–1.3)
GLUCOSE SERPL-MCNC: 78 MG/DL — SIGNIFICANT CHANGE UP (ref 70–99)
HCT VFR BLD CALC: 26.2 % — LOW (ref 39–50)
HGB BLD-MCNC: 8.5 G/DL — LOW (ref 13–17)
MCHC RBC-ENTMCNC: 28.6 PG — SIGNIFICANT CHANGE UP (ref 27–34)
MCHC RBC-ENTMCNC: 32.4 GM/DL — SIGNIFICANT CHANGE UP (ref 32–36)
MCV RBC AUTO: 88.2 FL — SIGNIFICANT CHANGE UP (ref 80–100)
PLATELET # BLD AUTO: 292 K/UL — SIGNIFICANT CHANGE UP (ref 150–400)
POTASSIUM SERPL-MCNC: 4.1 MMOL/L — SIGNIFICANT CHANGE UP (ref 3.5–5.3)
POTASSIUM SERPL-SCNC: 4.1 MMOL/L — SIGNIFICANT CHANGE UP (ref 3.5–5.3)
RBC # BLD: 2.97 M/UL — LOW (ref 4.2–5.8)
RBC # FLD: 13.1 % — SIGNIFICANT CHANGE UP (ref 10.3–14.5)
SODIUM SERPL-SCNC: 138 MMOL/L — SIGNIFICANT CHANGE UP (ref 135–145)
WBC # BLD: 14.05 K/UL — HIGH (ref 3.8–10.5)
WBC # FLD AUTO: 14.05 K/UL — HIGH (ref 3.8–10.5)

## 2021-08-18 PROCEDURE — 99291 CRITICAL CARE FIRST HOUR: CPT

## 2021-08-18 RX ORDER — DEXMEDETOMIDINE HYDROCHLORIDE IN 0.9% SODIUM CHLORIDE 4 UG/ML
0.2 INJECTION INTRAVENOUS
Qty: 400 | Refills: 0 | Status: DISCONTINUED | OUTPATIENT
Start: 2021-08-18 | End: 2021-09-06

## 2021-08-18 RX ORDER — PANTOPRAZOLE SODIUM 20 MG/1
40 TABLET, DELAYED RELEASE ORAL DAILY
Refills: 0 | Status: DISCONTINUED | OUTPATIENT
Start: 2021-08-18 | End: 2021-09-03

## 2021-08-18 RX ORDER — SODIUM CHLORIDE 9 MG/ML
1000 INJECTION INTRAMUSCULAR; INTRAVENOUS; SUBCUTANEOUS
Refills: 0 | Status: DISCONTINUED | OUTPATIENT
Start: 2021-08-18 | End: 2021-08-20

## 2021-08-18 RX ORDER — DEXMEDETOMIDINE HYDROCHLORIDE IN 0.9% SODIUM CHLORIDE 4 UG/ML
0.5 INJECTION INTRAVENOUS
Qty: 200 | Refills: 0 | Status: DISCONTINUED | OUTPATIENT
Start: 2021-08-18 | End: 2021-08-18

## 2021-08-18 RX ORDER — LABETALOL HCL 100 MG
10 TABLET ORAL ONCE
Refills: 0 | Status: COMPLETED | OUTPATIENT
Start: 2021-08-18 | End: 2021-08-18

## 2021-08-18 RX ORDER — INSULIN LISPRO 100/ML
VIAL (ML) SUBCUTANEOUS EVERY 6 HOURS
Refills: 0 | Status: DISCONTINUED | OUTPATIENT
Start: 2021-08-18 | End: 2021-08-18

## 2021-08-18 RX ORDER — DEXTROSE 50 % IN WATER 50 %
12.5 SYRINGE (ML) INTRAVENOUS ONCE
Refills: 0 | Status: COMPLETED | OUTPATIENT
Start: 2021-08-18 | End: 2021-08-18

## 2021-08-18 RX ADMIN — Medication 0.5 MILLIGRAM(S): at 00:46

## 2021-08-18 RX ADMIN — Medication 40 MILLIGRAM(S): at 07:40

## 2021-08-18 RX ADMIN — Medication 40 MILLIGRAM(S): at 23:36

## 2021-08-18 RX ADMIN — Medication 40 MILLIGRAM(S): at 13:44

## 2021-08-18 RX ADMIN — Medication 10 MILLIGRAM(S): at 06:47

## 2021-08-18 RX ADMIN — DEXMEDETOMIDINE HYDROCHLORIDE IN 0.9% SODIUM CHLORIDE 4.52 MICROGRAM(S)/KG/HR: 4 INJECTION INTRAVENOUS at 10:25

## 2021-08-18 RX ADMIN — Medication 0.5 MILLIGRAM(S): at 06:04

## 2021-08-18 RX ADMIN — ENOXAPARIN SODIUM 40 MILLIGRAM(S): 100 INJECTION SUBCUTANEOUS at 00:25

## 2021-08-18 RX ADMIN — DEXMEDETOMIDINE HYDROCHLORIDE IN 0.9% SODIUM CHLORIDE 4.52 MICROGRAM(S)/KG/HR: 4 INJECTION INTRAVENOUS at 17:10

## 2021-08-18 RX ADMIN — Medication 0.5 MILLIGRAM(S): at 06:55

## 2021-08-18 RX ADMIN — ENOXAPARIN SODIUM 40 MILLIGRAM(S): 100 INJECTION SUBCUTANEOUS at 10:16

## 2021-08-18 RX ADMIN — ENOXAPARIN SODIUM 40 MILLIGRAM(S): 100 INJECTION SUBCUTANEOUS at 23:36

## 2021-08-18 RX ADMIN — Medication 12.5 GRAM(S): at 00:38

## 2021-08-18 RX ADMIN — SODIUM CHLORIDE 75 MILLILITER(S): 9 INJECTION INTRAMUSCULAR; INTRAVENOUS; SUBCUTANEOUS at 15:29

## 2021-08-18 RX ADMIN — DEXMEDETOMIDINE HYDROCHLORIDE IN 0.9% SODIUM CHLORIDE 4.52 MICROGRAM(S)/KG/HR: 4 INJECTION INTRAVENOUS at 20:32

## 2021-08-18 RX ADMIN — BUDESONIDE AND FORMOTEROL FUMARATE DIHYDRATE 2 PUFF(S): 160; 4.5 AEROSOL RESPIRATORY (INHALATION) at 21:15

## 2021-08-18 RX ADMIN — PANTOPRAZOLE SODIUM 40 MILLIGRAM(S): 20 TABLET, DELAYED RELEASE ORAL at 10:16

## 2021-08-18 NOTE — PROGRESS NOTE ADULT - SUBJECTIVE AND OBJECTIVE BOX
Events Overnight: on cpap of 15, Patient is still refusing intubation    HPI:      64 y/o male with HLD, HTN and DM-previously unvaccinated --presents with 8 days onset of covid symptoms which included, cough, fevers, sob, hypoxia, fevers, diarrhea, chills and myalgias. Tested positive 7/15.  Rx with Rem/Dexa and then high dose steroids and full AC.  Pt tx to ICU on 7/24 for worsening hypoxia.     Has been on high flow since. Still awake and alert, eating, dyspneic with exertion  last dopplers were negative 8/17  inc ddimer  episodes of severe desaturation    PMH:  as above    ROS - shortness of breathe     MEDICATIONS  (STANDING):  aspirin  chewable 81 milliGRAM(s) Oral daily  atorvastatin 80 milliGRAM(s) Oral at bedtime  budesonide 160 MICROgram(s)/formoterol 4.5 MICROgram(s) Inhaler 2 Puff(s) Inhalation two times a day  cholecalciferol 2000 Unit(s) Oral daily  dextrose 40% Gel 15 Gram(s) Oral once  dextrose 5%. 1000 milliLiter(s) (50 mL/Hr) IV Continuous <Continuous>  dextrose 5%. 1000 milliLiter(s) (100 mL/Hr) IV Continuous <Continuous>  dextrose 50% Injectable 25 Gram(s) IV Push once  dextrose 50% Injectable 12.5 Gram(s) IV Push once  dextrose 50% Injectable 25 Gram(s) IV Push once  enoxaparin Injectable 40 milliGRAM(s) SubCutaneous every 12 hours  glucagon  Injectable 1 milliGRAM(s) IntraMuscular once  insulin lispro (ADMELOG) corrective regimen sliding scale   SubCutaneous three times a day before meals  insulin lispro (ADMELOG) corrective regimen sliding scale   SubCutaneous at bedtime  losartan 50 milliGRAM(s) Oral daily  methylPREDNISolone sodium succinate Injectable 40 milliGRAM(s) IV Push every 8 hours  pantoprazole  Injectable 40 milliGRAM(s) IV Push daily    MEDICATIONS  (PRN):  acetaminophen   Tablet .. 650 milliGRAM(s) Oral every 4 hours PRN Temp greater or equal to 38C (100.4F), Mild Pain (1 - 3)  ALBUTerol    90 MICROgram(s) HFA Inhaler 2 Puff(s) Inhalation every 4 hours PRN Shortness of Breath and/or Wheezing  benzocaine 15 mG/menthol 3.6 mG (Sugar-Free) Lozenge 1 Lozenge Oral every 6 hours PRN Sore Throat  benzonatate 100 milliGRAM(s) Oral three times a day PRN Cough  hydrocodone/homatropine Syrup 5 milliLiter(s) Oral every 6 hours PRN Cough  LORazepam   Injectable 0.5 milliGRAM(s) IV Push every 6 hours PRN Agitation  melatonin 6 milliGRAM(s) Oral at bedtime PRN Insomnia    ICU Vital Signs Last 24 Hrs  T(C): 36.6 (18 Aug 2021 03:00), Max: 37.2 (17 Aug 2021 17:00)  T(F): 97.8 (18 Aug 2021 03:00), Max: 99 (17 Aug 2021 17:00)  HR: 98 (18 Aug 2021 07:00) (75 - 112)  BP: 146/87 (18 Aug 2021 07:00) (116/73 - 172/98)  BP(mean): 99 (18 Aug 2021 07:00) (75 - 115)  ABP: --  ABP(mean): --  RR: 38 (18 Aug 2021 07:00) (10 - 38)  SpO2: 77% (18 Aug 2021 07:00) (65% - 89%)    Phycial Exam:          I&O's Summary    17 Aug 2021 07:01  -  18 Aug 2021 07:00  --------------------------------------------------------  IN: 0 mL / OUT: 1700 mL / NET: -1700 mL                                       8.5    14.05 )-----------( 292      ( 18 Aug 2021 07:01 )             26.2       08-18    138  |  104  |  24<H>  ----------------------------<  78  4.1   |  29  |  1.03    Ca    8.8      18 Aug 2021 07:01    TPro  6.3  /  Alb  2.3<L>  /  TBili  0.6  /  DBili  x   /  AST  32  /  ALT  73  /  AlkPhos  168<H>  08-17                    DVT Prophylaxis:                                                                 Advanced Directives:                      Events Overnight: on cpap of 15, Patient is still refusing intubation    HPI:      64 y/o male with HLD, HTN and DM-previously unvaccinated --presents with 8 days onset of covid symptoms which included, cough, fevers, sob, hypoxia, fevers, diarrhea, chills and myalgias. Tested positive 7/15.  Rx with Rem/Dexa and then high dose steroids and full AC.  Pt tx to ICU on 7/24 for worsening hypoxia.     Has been on high flow since. Still awake and alert, eating, dyspneic with exertion  last dopplers were negative 8/17  inc ddimer  episodes of severe desaturation    PMH:  as above    ROS - shortness of breathe     MEDICATIONS  (STANDING):  aspirin  chewable 81 milliGRAM(s) Oral daily  atorvastatin 80 milliGRAM(s) Oral at bedtime  budesonide 160 MICROgram(s)/formoterol 4.5 MICROgram(s) Inhaler 2 Puff(s) Inhalation two times a day  cholecalciferol 2000 Unit(s) Oral daily  dextrose 40% Gel 15 Gram(s) Oral once  dextrose 5%. 1000 milliLiter(s) (50 mL/Hr) IV Continuous <Continuous>  dextrose 5%. 1000 milliLiter(s) (100 mL/Hr) IV Continuous <Continuous>  dextrose 50% Injectable 25 Gram(s) IV Push once  dextrose 50% Injectable 12.5 Gram(s) IV Push once  dextrose 50% Injectable 25 Gram(s) IV Push once  enoxaparin Injectable 40 milliGRAM(s) SubCutaneous every 12 hours  glucagon  Injectable 1 milliGRAM(s) IntraMuscular once  insulin lispro (ADMELOG) corrective regimen sliding scale   SubCutaneous three times a day before meals  insulin lispro (ADMELOG) corrective regimen sliding scale   SubCutaneous at bedtime  losartan 50 milliGRAM(s) Oral daily  methylPREDNISolone sodium succinate Injectable 40 milliGRAM(s) IV Push every 8 hours  pantoprazole  Injectable 40 milliGRAM(s) IV Push daily    MEDICATIONS  (PRN):  acetaminophen   Tablet .. 650 milliGRAM(s) Oral every 4 hours PRN Temp greater or equal to 38C (100.4F), Mild Pain (1 - 3)  ALBUTerol    90 MICROgram(s) HFA Inhaler 2 Puff(s) Inhalation every 4 hours PRN Shortness of Breath and/or Wheezing  benzocaine 15 mG/menthol 3.6 mG (Sugar-Free) Lozenge 1 Lozenge Oral every 6 hours PRN Sore Throat  benzonatate 100 milliGRAM(s) Oral three times a day PRN Cough  hydrocodone/homatropine Syrup 5 milliLiter(s) Oral every 6 hours PRN Cough  LORazepam   Injectable 0.5 milliGRAM(s) IV Push every 6 hours PRN Agitation  melatonin 6 milliGRAM(s) Oral at bedtime PRN Insomnia    ICU Vital Signs Last 24 Hrs  T(C): 36.6 (18 Aug 2021 03:00), Max: 37.2 (17 Aug 2021 17:00)  T(F): 97.8 (18 Aug 2021 03:00), Max: 99 (17 Aug 2021 17:00)  HR: 98 (18 Aug 2021 07:00) (75 - 112)  BP: 146/87 (18 Aug 2021 07:00) (116/73 - 172/98)  BP(mean): 99 (18 Aug 2021 07:00) (75 - 115)  ABP: --  ABP(mean): --  RR: 38 (18 Aug 2021 07:00) (10 - 38)  SpO2: 77% (18 Aug 2021 07:00) (65% - 89%)    Phycial Exam:    General:  ill , dyspneic  Neuro - awake and alert  HEENT, nc/at  neck supple  lungs bilateral crackles  cv rrr  abdomen soft non tender  extremities no edema        I&O's Summary    17 Aug 2021 07:01  -  18 Aug 2021 07:00  --------------------------------------------------------  IN: 0 mL / OUT: 1700 mL / NET: -1700 mL                                       8.5    14.05 )-----------( 292      ( 18 Aug 2021 07:01 )             26.2       08-18    138  |  104  |  24<H>  ----------------------------<  78  4.1   |  29  |  1.03    Ca    8.8      18 Aug 2021 07:01    TPro  6.3  /  Alb  2.3<L>  /  TBili  0.6  /  DBili  x   /  AST  32  /  ALT  73  /  AlkPhos  168<            DVT Prophylaxis: Lovenox                                                                Advanced Directives: Full Code

## 2021-08-18 NOTE — PROGRESS NOTE ADULT - SUBJECTIVE AND OBJECTIVE BOX
HPI: 54 yo male with PMHx HTN, HLD, T2DM, admitted on 7/21 with COVID-19 viral pneumonia and acute hypoxemic respiratory failure, now complicated by worsening ARDS requiring ICU level of care since 7/24.         EVENTS: Called by RN for abrupt decompensation, SpO2 sustaining low 60's, more tachypenic with increased work of breathing. Sats had been better on CPAP @ 15, FiO2 100%, SpO2 as high as 88% overnight.  Patient evaluated, appeared acutely anxious and in respiratory distress. Had just received Ativan. HR sinus tachycardia, SBP >200, RR 40.   Patient admits to feeling anxious. Provided emotional support.   Trialed on BiPAP 16/12 with worsening oxygenation. Changed back to CPAP titrated up to 20 with improvement in oxygenation to mid 70's          T(C): 36.6 (08-18-21 @ 03:00), Max: 37.2 (08-17-21 @ 17:00)  HR: 112 (08-18-21 @ 06:00) (75 - 112)  BP: 172/98 (08-18-21 @ 06:00) (116/73 - 172/98)  RR: 29 (08-18-21 @ 06:00) (10 - 38)  SpO2: 77% (08-18-21 @ 06:00) (65% - 89%)          08-16-21 @ 07:01  -  08-17-21 @ 07:00  --------------------------------------------------------  IN: 0 mL / OUT: 1500 mL / NET: -1500 mL    08-17-21 @ 07:01  -  08-18-21 @ 06:46  --------------------------------------------------------  IN: 0 mL / OUT: 1400 mL / NET: -1400 mL                Vital signs reviewed and physical exam performed where pertinent and urgently required.    Lab/radiology studies/ABG/meds reviewed and interpreted into the assessment and treatment plan.        Assessment/Plan/Therapeutic interventions:    Respiratory status is very tenuous, he remains profoundly hypoxemic and oxygenation is overall worse than it has been    At the present time, he retains capacity to make his medical decisions and is adamantly refusing intubation.    NIV-dependent, uptitrated CPAP, failed BiPAP trial.     D-dimer elevated, but not stable to go for CTA to r/o PE    Continue steroids for interstitial pneumonitis    Give additional dose of Ativan now for anxiety, may benefit from low dose Precedex gtt      Very high risk for necessitating urgent intubation vs cardiac arrest from hypoxemia in the coming hours.          COVID 19 specific considerations and therapeutic options based on the available and rapidly changing literature.    38 minutes of critical care time spent in the management of this critically ill COVID-19 patient suffering from multisystem organ failure with continuous assessments and interventions based on the interpretation of multiple databases. Critical care time not inclusive of independent time spent on procedures performed. HPI: 54 yo male with PMHx HTN, HLD, T2DM, admitted on 7/21 with COVID-19 viral pneumonia and acute hypoxemic respiratory failure, now complicated by worsening ARDS requiring ICU level of care since 7/24.         EVENTS: Called by RN for abrupt decompensation, SpO2 sustaining low 60's, more tachypenic with increased work of breathing. Sats had been better on CPAP @ 15, FiO2 100%, SpO2 as high as 88% overnight.  Patient evaluated, appeared acutely anxious and in respiratory distress. Had just received Ativan. HR sinus tachycardia, SBP >200, RR 40.   Patient admits to feeling anxious. Provided emotional support.   Trialed on BiPAP 16/12 with worsening oxygenation. Changed back to CPAP titrated up to 20 with improvement in oxygenation to mid 70's          T(C): 36.6 (08-18-21 @ 03:00), Max: 37.2 (08-17-21 @ 17:00)  HR: 112 (08-18-21 @ 06:00) (75 - 112)  BP: 172/98 (08-18-21 @ 06:00) (116/73 - 172/98)  RR: 29 (08-18-21 @ 06:00) (10 - 38)  SpO2: 77% (08-18-21 @ 06:00) (65% - 89%)          08-16-21 @ 07:01  -  08-17-21 @ 07:00  --------------------------------------------------------  IN: 0 mL / OUT: 1500 mL / NET: -1500 mL    08-17-21 @ 07:01  -  08-18-21 @ 06:46  --------------------------------------------------------  IN: 0 mL / OUT: 1400 mL / NET: -1400 mL                Vital signs reviewed and physical exam performed where pertinent and urgently required.    Lab/radiology studies/ABG/meds reviewed and interpreted into the assessment and treatment plan.        Assessment/Plan/Therapeutic interventions:    Respiratory status is very tenuous, he remains profoundly hypoxemic and oxygenation is overall worse than it has been    At the present time, he retains capacity to make his medical decisions and is adamantly refusing intubation.    NIV-dependent, uptitrated CPAP, failed BiPAP trial.     D-dimer elevated, but not stable to go for CTA to r/o PE    Continue steroids for interstitial pneumonitis, will change to IV given that he cannot liberate from NIV to take PO meds    Give additional dose of Ativan now for anxiety, may benefit from low dose Precedex gtt    Hypoglycemic this morning, given D50 and Lantus held      Very high risk for necessitating urgent intubation vs cardiac arrest from hypoxemia in the coming hours.          COVID 19 specific considerations and therapeutic options based on the available and rapidly changing literature.    38 minutes of critical care time spent in the management of this critically ill COVID-19 patient suffering from multisystem organ failure with continuous assessments and interventions based on the interpretation of multiple databases. Critical care time not inclusive of independent time spent on procedures performed.

## 2021-08-18 NOTE — PROGRESS NOTE ADULT - SUBJECTIVE AND OBJECTIVE BOX
Patient is a 55y old  Male who presents with a chief complaint of cough/sob (23 Jul 2021 14:03)      HPI:  55 M with pmh HLD, dm, htn presents with 8 days onset of covid symptoms which included, cough, fevers, sob, hypoxia, fevers, diarrhea, chills and myalgias. TEsted positive 7/15. Wife endorsed he was becoming more sob of recently. On arrival hypoxic to 80s and tachypneic. NRB placed, presently on 15% and saturating 95%. Na 126 s/p 1L NS. CXR: Frandy infiltrates. Last scr 1.4 in 2019-> today 1.9 unknown if underlying ckd.  Patient not vaccinated.   Last seen by me in 2019  History of ROBERT and uncontrolled hypertension  Treated with IV Remdesivir and Decadron, now on Toci  SaO2 is 80-85% despite being on 100% NRB  ABG pending    8/6  No acute pulmonary events occurred overnight   On HFNC 50LPM, 90%FiO2. Attempted BiPAP overnight  Critical Care performed POCUS on 8/4, noted to have pulmonary edema   Now pending CTPE study, in process of being transitioned to NRB to see if he can tolerate it prior to CT    8/17  Noted to have increased work of breathing today, on BiPAP 14/8 back up rate 12 then transitioned to HFNC with NRB  DDimer [id number]  Dopplers/CXR performed    8/18  Covering for DR Arceo  Events of the last 3 days reviewed  Critical status discussed with the Intensivist and critical care team at bedside  Overnight further deterioration with worsened hypoxemia  pt declines elective intubation in view of all clinical signs with more labored breathing / use of accessory muscles as well as decline / worsened hypoxemia indication hypoxemic resp failure  ICU team is reconsidering intubation and mechanical ventilation plans for today after pt speaks with his wife again this am  His wife visited him and is made fully aware of grave prognosis without intubation     Tobacco Usage:  · Tobacco Usage	non smoker  Fhx: none (21 Jul 2021 19:19)    MEDICATIONS  (STANDING):  aspirin  chewable 81 milliGRAM(s) Oral daily  atorvastatin 80 milliGRAM(s) Oral at bedtime  budesonide 160 MICROgram(s)/formoterol 4.5 MICROgram(s) Inhaler 2 Puff(s) Inhalation two times a day  cefepime   IVPB      cefepime   IVPB 2000 milliGRAM(s) IV Intermittent every 8 hours  cholecalciferol 2000 Unit(s) Oral daily  dextrose 40% Gel 15 Gram(s) Oral once  dextrose 5%. 1000 milliLiter(s) (50 mL/Hr) IV Continuous <Continuous>  dextrose 5%. 1000 milliLiter(s) (100 mL/Hr) IV Continuous <Continuous>  dextrose 50% Injectable 25 Gram(s) IV Push once  dextrose 50% Injectable 12.5 Gram(s) IV Push once  dextrose 50% Injectable 25 Gram(s) IV Push once  enoxaparin Injectable 40 milliGRAM(s) SubCutaneous every 12 hours  famotidine    Tablet 20 milliGRAM(s) Oral daily  glucagon  Injectable 1 milliGRAM(s) IntraMuscular once  insulin glargine Injectable (LANTUS) 10 Unit(s) SubCutaneous two times a day  insulin lispro (ADMELOG) corrective regimen sliding scale   SubCutaneous three times a day before meals  insulin lispro (ADMELOG) corrective regimen sliding scale   SubCutaneous at bedtime  labetalol 200 milliGRAM(s) Oral every 12 hours  losartan 50 milliGRAM(s) Oral daily  predniSONE   Tablet 10 milliGRAM(s) Oral daily  vancomycin  IVPB      vancomycin  IVPB 1250 milliGRAM(s) IV Intermittent every 12 hours        MEDICATIONS  (PRN):  acetaminophen   Tablet .. 650 milliGRAM(s) Oral once PRN Temp greater or equal to 38C (100.4F), Mild Pain (1 - 3)  acetaminophen   Tablet .. 650 milliGRAM(s) Oral every 4 hours PRN Temp greater or equal to 38C (100.4F), Mild Pain (1 - 3)  ALBUTerol    90 MICROgram(s) HFA Inhaler 2 Puff(s) Inhalation every 4 hours PRN Shortness of Breath and/or Wheezing  benzocaine 15 mG/menthol 3.6 mG (Sugar-Free) Lozenge 1 Lozenge Oral every 6 hours PRN Sore Throat  benzonatate 100 milliGRAM(s) Oral three times a day PRN Cough  melatonin 3 milliGRAM(s) Oral at bedtime PRN Insomnia    ICU Vital Signs Last 24 Hrs  T(C): 36.6 (18 Aug 2021 03:00), Max: 37.2 (17 Aug 2021 17:00)  T(F): 97.8 (18 Aug 2021 03:00), Max: 99 (17 Aug 2021 17:00)  HR: 98 (18 Aug 2021 07:00) (75 - 112)  BP: 146/87 (18 Aug 2021 07:00) (116/73 - 172/98)  BP(mean): 99 (18 Aug 2021 07:00) (75 - 115)  ABP: --  ABP(mean): --  RR: 38 (18 Aug 2021 07:00) (10 - 38)  SpO2: 77% (18 Aug 2021 07:00) (65% - 89%)  I&O's Detail    17 Aug 2021 07:01  -  18 Aug 2021 07:00  --------------------------------------------------------  IN:  Total IN: 0 mL    OUT:    Voided (mL): 1700 mL  Total OUT: 1700 mL    Total NET: -1700 mL              PHYSICAL EXAM  General Appearance: On BIPAP, increased work of breathing  using accessory muscles to breath / declined intubation overnight  Lungs: coarse bilaterally  Heart: +S1,S2  Abdomen: Soft, non-tender, bowel sounds active   Extremities: no cyanosis or edema, no joint swelling  Skin: Skin color, texture normal, no rashes   Neurologic: Alert and oriented X3 , non focal, not disoriented    ECG:    LABS:                          8.5    14.05 )-----------( 292      ( 18 Aug 2021 07:01 )             26.2   08-18    138  |  104  |  24<H>  ----------------------------<  78  4.1   |  29  |  1.03    Ca    8.8      18 Aug 2021 07:01    TPro  6.3  /  Alb  2.3<L>  /  TBili  0.6  /  DBili  x   /  AST  32  /  ALT  73  /  AlkPhos  168<H>  08-17                                           10.0   19.05 )-----------( 179      ( 08 Aug 2021 07:11 )             30.3   08-08    137  |  102  |  27<H>  ----------------------------<  121<H>  3.9   |  29  |  0.77    Ca    8.4<L>      08 Aug 2021 07:11  Phos  3.4     08-08  Mg     2.0     08-08                  10.0   19.05 )-----------( 179      ( 08 Aug 2021 07:11 )             30.3   08-08    137  |  102  |  27<H>  ----------------------------<  121<H>  3.9   |  29  |  0.77    Ca    8.4<L>      08 Aug 2021 07:11  Phos  3.4     08-08  Mg     2.0     08-08                 10.4   17.19 )-----------( 198      ( 07 Aug 2021 07:06 )             32.3   08-07    138  |  104  |  31<H>  ----------------------------<  107<H>  4.4   |  29  |  0.77    Ca    8.2<L>      07 Aug 2021 07:06  Phos  4.6     08-07  Mg     2.0     08-07    TPro  5.2<L>  /  Alb  2.3<L>  /  TBili  0.8  /  DBili  x   /  AST  81<H>  /  ALT  213<H>  /  AlkPhos  200<H>  08-06                            12.9   15.00 )-----------( 366      ( 26 Jul 2021 06:20 )             38.4   07-26    133<L>  |  104  |  37<H>  ----------------------------<  232<H>  4.2   |  23  |  1.17    Ca    8.4<L>      26 Jul 2021 06:20    TPro  6.3  /  Alb  2.2<L>  /  TBili  0.4  /  DBili  0.1  /  AST  103<H>  /  ALT  137<H>  /  AlkPhos  239<H>  07-26                          13.2   13.52 )-----------( 338      ( 24 Jul 2021 08:06 )             39.5     07-24    136  |  105  |  43<H>  ----------------------------<  227<H>  4.3   |  25  |  1.31<H>    Ca    8.7      24 Jul 2021 08:06    TPro  6.6  /  Alb  2.3<L>  /  TBili  0.4  /  DBili  x   /  AST  73<H>  /  ALT  64  /  AlkPhos  165<H>  07-24          Pro BNP  -- 07-24 @ 08:06  D Dimer  446 07-24 @ 08:06  Pro BNP  261 07-21 @ 14:42  D Dimer  460 07-21 @ 14:42              < from: Xray Chest 1 View- PORTABLE-Urgent (07.21.21 @ 15:33) >    PROCEDURE DATE:  07/21/2021          INTERPRETATION:  AP chest on July 21, 2021 at 3:27 PM. Patient is short of breath with cough and fever.    Heart magnified by technique.    There arescattered mid lower lung field infiltrates right greater than left possibly related: Pneumonia.    The lungs are clear on August 30, 2019.    IMPRESSION: Bilateral infiltrates as above.    < end of copied text >  < from: US Duplex Venous Lower Ext Complete, Bilateral (07.25.21 @ 08:42) >  COMPARISON: None available.    TECHNIQUE: Duplex sonography of the BILATERAL LOWER extremity veins with color and spectral Doppler, with and without compression.    FINDINGS:    RIGHT:  Normal compressibility of the RIGHT common femoral, femoral and popliteal veins.  Doppler examination shows normal spontaneous and phasic flow.  No RIGHT calf vein thrombosis is detected.    LEFT:  Normal compressibility of the LEFT common femoral, femoral and popliteal veins.  Doppler examination shows normal spontaneous and phasic flow.  No LEFT calf vein thrombosis is detected.    IMPRESSION:  No evidence of deep venous thrombosis in either lower extremity.    < end of copied text >  RADIOLOGY & ADDITIONAL STUDIES:    < from: Xray Chest 1 View- PORTABLE-Urgent (Xray Chest 1 View- PORTABLE-Urgent .) (07.26.21 @ 08:43) >    PROCEDURE DATE:  07/26/2021          INTERPRETATION:  Portable chest radiograph    CLINICAL INFORMATION: Pneumonia due to Covid 19.. Follow-up    TECHNIQUE:  Portable  AP view of the chest was obtained.    COMPARISON: 7/21/2021 chest available for review.    FINDINGS:    The lungs show stable bilateral  multifocal and diffuse ill-defined airspace opacities.. No pneumothorax.    The  heart is enlarged in transverse diameter. No hilar mass.     Visualized osseous structures are intact.    IMPRESSION:   Stable bilateral  multifocal and diffuse ill-defined airspace opacities..    < end of copied text >  < from: US Duplex Venous Lower Ext Complete, Bilateral (08.06.21 @ 17:16) >  FINDINGS:    RIGHT:  Normal compressibility of the RIGHT common femoral, femoral and popliteal veins.  Doppler examination shows normal spontaneous and phasic flow.  No RIGHT calf vein thrombosis is detected.    LEFT:  Normal compressibility of the LEFT common femoral, femoral and popliteal veins.  Doppler examination shows normal spontaneous and phasic flow.  No LEFT calf vein thrombosisis detected.    IMPRESSION:  No evidence of deep venous thrombosis in either lower extremity.    < end of copied text >  < from: Xray Chest 1 View- PORTABLE-Urgent (Xray Chest 1 View- PORTABLE-Urgent .) (08.06.21 @ 16:45) >  INTERPRETATION:  Portable chest radiograph    CLINICAL INFORMATION: Pneumonia due to Covid 19.    TECHNIQUE:  Portable  AP view of the chest was obtained.    COMPARISON: 8/1/2021 chest radiograph available for review.    FINDINGS:    The lungs show decreasing bilateral diffuse airspace disease.. No pneumothorax.    The  heart is mildly enlarged in transverse diameter. No hilar mass.   Visualized osseous structures are intact.    IMPRESSION:   Decreasing bilateral diffuse airspace disease..    < end of copied text >  xr< from: US Duplex Venous Lower Ext Complete, Bilateral (08.17.21 @ 08:58) >  COMPARISON: None available.    TECHNIQUE: Duplex sonography of the BILATERAL LOWER extremity veins with color and spectral Doppler, with and without compression.    FINDINGS:    RIGHT:  Normal compressibility of the RIGHT common femoral, femoral and popliteal veins.  Doppler examination shows normal spontaneous and phasic flow.  No RIGHT calf vein thrombosis is detected.    LEFT:  Normal compressibility of the LEFT common femoral, femoral and popliteal veins.  Doppler examination shows normal spontaneous and phasic flow.  No LEFT calf vein thrombosis is detected.    IMPRESSION:  No evidence of deep venous thrombosis in either lower extremity.    < end of copied text >

## 2021-08-18 NOTE — PROGRESS NOTE ADULT - ASSESSMENT
55 M noted to have COVID 7/15  On arrival hypoxic to 80s and tachypneic. NRB placed,in the ER saturating 95%. Na 126 s/p 1L NS. CXR: Frandy infiltrates. Last scr 1.4 in 2019-> today 1.9 unknown if underlying ckd.  Treated with IV Remdesivir and Decadron, Tocilizumab     Given the obesity/hypertension and prior co-morbidities, he is at risk of intubation but continue HFNC, respiratory status remains critical  XR reviewed- pulmonary infiltrates are present  DDimer elevated, was on therapeutic lovenox, transitioned to 40mg q 12 now noted to be >2000 -  Completed Solumedrol, Symbicort 160/4.5 BID ([email or web address], STOIC study)  Was on HFNC %FiO2 with BiPAP overnight, now on BiPAP   CTPE not performed  Respiratory status is tenuous and the last 24-48 hours noted to have increased work of breathing; at this point he is at high risk of intubation    8/18  Covering for DR Arceo  Events of the last 3 days reviewed / Acute on chronic hypoxemic resp failure  / ARDS / Pulm fibrosis post COVID Pneumonia  Critical status discussed with the Intensivist and critical care team at bedside  Overnight further deterioration with worsened hypoxemia  pt declines elective intubation in view of all clinical signs with more labored breathing / use of accessory muscles as well as decline / worsened hypoxemia indication hypoxemic resp failure / pt is critically ill with prolonged stay in ICU followed by Dr Arceo  ICU team is reconsidering intubation and mechanical ventilation plans for today after pt speaks with his wife again this am  His wife visited him and is made fully aware of grave prognosis without intubation  In view of increased D Dimer / suggest resuming Full dose AC with Lovenox- discussed with Intensivist / awaiting intubation which can be difficult due to severe hypoxemia and High Fio2 demand  Full dose AC recommended / discussed with ICU team  repeat EKG and ECHO once pulm status stabilized  Post intubation BAL for cultures / PCP and eval after intubation needed  DR Arceo will resume in am

## 2021-08-18 NOTE — PROGRESS NOTE ADULT - ASSESSMENT
Patient with ARDS,  acute hypoxic respiratory failure  secondary to severe Covid 19 Pneumonia  on high flow, alternating with bipap  At high risk of deterioration and intubation  Patient refusing intubation  continue support  lovenox for DVT prophylaxis  Lantus and sliding scale for Diabetes, hyperglycemia

## 2021-08-19 PROCEDURE — 99291 CRITICAL CARE FIRST HOUR: CPT

## 2021-08-19 RX ORDER — HYDRALAZINE HCL 50 MG
5 TABLET ORAL EVERY 6 HOURS
Refills: 0 | Status: DISCONTINUED | OUTPATIENT
Start: 2021-08-19 | End: 2021-09-28

## 2021-08-19 RX ORDER — HYDRALAZINE HCL 50 MG
5 TABLET ORAL ONCE
Refills: 0 | Status: COMPLETED | OUTPATIENT
Start: 2021-08-19 | End: 2021-08-19

## 2021-08-19 RX ORDER — LOSARTAN POTASSIUM 100 MG/1
75 TABLET, FILM COATED ORAL DAILY
Refills: 0 | Status: DISCONTINUED | OUTPATIENT
Start: 2021-08-19 | End: 2021-10-05

## 2021-08-19 RX ORDER — ELECTROLYTE SOLUTION,INJ
1 VIAL (ML) INTRAVENOUS
Refills: 0 | Status: DISCONTINUED | OUTPATIENT
Start: 2021-08-19 | End: 2021-08-19

## 2021-08-19 RX ADMIN — Medication 4: at 13:00

## 2021-08-19 RX ADMIN — Medication 5 MILLIGRAM(S): at 21:51

## 2021-08-19 RX ADMIN — Medication 40 MILLIGRAM(S): at 06:34

## 2021-08-19 RX ADMIN — Medication 1 EACH: at 22:29

## 2021-08-19 RX ADMIN — DEXMEDETOMIDINE HYDROCHLORIDE IN 0.9% SODIUM CHLORIDE 4.52 MICROGRAM(S)/KG/HR: 4 INJECTION INTRAVENOUS at 10:15

## 2021-08-19 RX ADMIN — DEXMEDETOMIDINE HYDROCHLORIDE IN 0.9% SODIUM CHLORIDE 4.52 MICROGRAM(S)/KG/HR: 4 INJECTION INTRAVENOUS at 19:13

## 2021-08-19 RX ADMIN — Medication 5 MILLIGRAM(S): at 06:34

## 2021-08-19 RX ADMIN — ENOXAPARIN SODIUM 40 MILLIGRAM(S): 100 INJECTION SUBCUTANEOUS at 10:16

## 2021-08-19 RX ADMIN — PANTOPRAZOLE SODIUM 40 MILLIGRAM(S): 20 TABLET, DELAYED RELEASE ORAL at 10:15

## 2021-08-19 RX ADMIN — Medication 2: at 23:18

## 2021-08-19 RX ADMIN — DEXMEDETOMIDINE HYDROCHLORIDE IN 0.9% SODIUM CHLORIDE 4.52 MICROGRAM(S)/KG/HR: 4 INJECTION INTRAVENOUS at 16:00

## 2021-08-19 RX ADMIN — Medication 40 MILLIGRAM(S): at 17:44

## 2021-08-19 RX ADMIN — Medication 40 MILLIGRAM(S): at 23:18

## 2021-08-19 RX ADMIN — ENOXAPARIN SODIUM 40 MILLIGRAM(S): 100 INJECTION SUBCUTANEOUS at 21:54

## 2021-08-19 RX ADMIN — SODIUM CHLORIDE 75 MILLILITER(S): 9 INJECTION INTRAMUSCULAR; INTRAVENOUS; SUBCUTANEOUS at 06:34

## 2021-08-19 RX ADMIN — DEXMEDETOMIDINE HYDROCHLORIDE IN 0.9% SODIUM CHLORIDE 4.52 MICROGRAM(S)/KG/HR: 4 INJECTION INTRAVENOUS at 06:33

## 2021-08-19 RX ADMIN — DEXMEDETOMIDINE HYDROCHLORIDE IN 0.9% SODIUM CHLORIDE 4.52 MICROGRAM(S)/KG/HR: 4 INJECTION INTRAVENOUS at 20:14

## 2021-08-19 NOTE — CHART NOTE - NSCHARTNOTEFT_GEN_A_CORE
Clinical Nutrition PPN Note    **54yo male admitted with acute hypoxic resp failure from COVID-19 PNA; pt now on continuos CPAP and unable to eat x 2 days.  received verbal request for PPN.    *labs reviewed; 08-18    138  |  104  |  24<H>  ----------------------------<  78  4.1   |  29  |  1.03    Ca    8.8      18 Aug 2021 07:01    HbA1c: A1C with Estimated Average Glucose Result: 11.6 % (07-22-21 @ 07:57)  Glucose: POCT Blood Glucose.: 145 mg/dL (08-19-21 @ 06:36)    POCT Blood Glucose.: 145 mg/dL (19 Aug 2021 06:36)  POCT Blood Glucose.: 109 mg/dL (19 Aug 2021 00:08)  POCT Blood Glucose.: 99 mg/dL (18 Aug 2021 23:27)  POCT Blood Glucose.: 108 mg/dL (18 Aug 2021 16:27)  POCT Blood Glucose.: 92 mg/dL (18 Aug 2021 12:18)      *labs reviewed; lytes WNL.  obtain triglyceride level when feasible.  corrected Ca elevated, will not add to PPN bag.  bilirubin total WNL (8/17).    *I&O's Detail    18 Aug 2021 07:01  -  19 Aug 2021 07:00  --------------------------------------------------------  IN:    Dexmedetomidine: 258 mL    sodium chloride 0.9%: 1128.6 mL  Total IN: 1386.6 mL    OUT:    Voided (mL): 1800 mL  Total OUT: 1800 mL    Total NET: -413.4 mL    *negative fluid status; monitor and adjust IVF/PPN volume prn.    *pertinent meds;  sodium chloride 0.9% IVF, cholecalciferol, pantoprazole, admelog sliding scale.    *linda score of 17; no PU documented.  (+1) Lt/Rt foot edema documented.  BM (+) 8/15, 4 days ago, not on bowel regimen (not able to take meds by mouth).    *continues to meet criteria for severe malnutrition*    Diet, NPO (08-18-21 @ 06:22)    Estimated Needs: Based on 72Kg (adjusted IBW)   Calories: 6970-6321 Kcal (25-30 Kcal/Kg)  Protein:  115-130g (1.6-1.8 g/Kg)  Fluids:  1297-8959 mL (25-30 mL/Kg)    *Wt Hx:  82.5Kg (8/19): wt loss of ~7.9Kg in ~29days; (8.7%) clinically significant.  Height (cm): 167.6 (07-21-21 @ 14:34)  Weight (kg): 90.4 (07-21-21 @ 22:00)  BMI (kg/m2): 32.2 (07-21-21 @ 22:00)  BSA (m2): 2 (07-21-21 @ 22:00)    PPN RECOMMENDATIONS: via peripheral line  TOTAL VOLUME: 1800mL  65g Amino Acids  100g Dextrose  0g Lipids 20% (obtain triglyceride level)  98 mEq NaCl  14 mEq NaAcetate  20 mMol NaPhos  32 mEq KCl  18 mEq KAcetate  0 mMol KPhos  0 mEq Calcium Gluconate  8 mEq Magnesium Sulfate  100 mg Thiamine  1mL trace elements  10mL MVI    Total Calories: 600Kcal (meeting ~33% of estimated calorie needs and ~57% of estimated protein needs)(osmolarity of 872)    ADDITIONAL RECOMMENDATIONS:  1) obtain triglyceride and LFT's  2) daily lyte and magnesium and phos checks  3) POCT q6hrs  4) strict I/O's  5) daily wt checks to track/trend changes    *will monitor and adjust treatement plan prn*  Anita Arnold MA, RDN, CDN, CNSC [phone]

## 2021-08-19 NOTE — PROGRESS NOTE ADULT - SUBJECTIVE AND OBJECTIVE BOX
Events Overnight: remains on cpap of 15, on Precedex, still awake and alert, refusing intubation    HPI:    64 y/o male with HLD, HTN and DM-previously unvaccinated --presents with 8 days onset of covid symptoms which included, cough, fevers, sob, hypoxia, fevers, diarrhea, chills and     myalgias. Tested positive 7/15.  Rx with Rem/Dexa and then high dose steroids and full AC.  Pt tx to ICU on 7/24 for worsening hypoxia.     Has been on high flow since. Still awake and alert, eating, dyspneic with exertion  last dopplers were negative 8/17  inc ddimer  episodes of severe desaturation    PMH:  as above    ROS - shortness of breathe     MEDICATIONS  (STANDING):  aspirin  chewable 81 milliGRAM(s) Oral daily  atorvastatin 80 milliGRAM(s) Oral at bedtime  budesonide 160 MICROgram(s)/formoterol 4.5 MICROgram(s) Inhaler 2 Puff(s) Inhalation two times a day  cholecalciferol 2000 Unit(s) Oral daily  dexMEDEtomidine Infusion 0.2 MICROgram(s)/kG/Hr (4.52 mL/Hr) IV Continuous <Continuous>  dextrose 40% Gel 15 Gram(s) Oral once  dextrose 5%. 1000 milliLiter(s) (50 mL/Hr) IV Continuous <Continuous>  dextrose 5%. 1000 milliLiter(s) (100 mL/Hr) IV Continuous <Continuous>  dextrose 50% Injectable 25 Gram(s) IV Push once  dextrose 50% Injectable 12.5 Gram(s) IV Push once  dextrose 50% Injectable 25 Gram(s) IV Push once  enoxaparin Injectable 40 milliGRAM(s) SubCutaneous every 12 hours  glucagon  Injectable 1 milliGRAM(s) IntraMuscular once  insulin lispro (ADMELOG) corrective regimen sliding scale   SubCutaneous every 6 hours  losartan 50 milliGRAM(s) Oral daily  methylPREDNISolone sodium succinate Injectable 40 milliGRAM(s) IV Push every 8 hours  pantoprazole  Injectable 40 milliGRAM(s) IV Push daily  sodium chloride 0.9%. 1000 milliLiter(s) (75 mL/Hr) IV Continuous <Continuous>    MEDICATIONS  (PRN):  acetaminophen   Tablet .. 650 milliGRAM(s) Oral every 4 hours PRN Temp greater or equal to 38C (100.4F), Mild Pain (1 - 3)  ALBUTerol    90 MICROgram(s) HFA Inhaler 2 Puff(s) Inhalation every 4 hours PRN Shortness of Breath and/or Wheezing  benzocaine 15 mG/menthol 3.6 mG (Sugar-Free) Lozenge 1 Lozenge Oral every 6 hours PRN Sore Throat  benzonatate 100 milliGRAM(s) Oral three times a day PRN Cough  hydrocodone/homatropine Syrup 5 milliLiter(s) Oral every 6 hours PRN Cough  LORazepam   Injectable 0.5 milliGRAM(s) IV Push every 6 hours PRN Agitation  melatonin 6 milliGRAM(s) Oral at bedtime PRN Insomnia    ICU Vital Signs Last 24 Hrs  T(C): 36.7 (19 Aug 2021 08:00), Max: 37.8 (18 Aug 2021 10:00)  T(F): 98 (19 Aug 2021 08:00), Max: 100 (18 Aug 2021 10:00)  HR: 75 (19 Aug 2021 09:00) (66 - 119)  BP: 176/94 (19 Aug 2021 09:00) (139/90 - 194/113)  BP(mean): 116 (19 Aug 2021 09:00) (100 - 131)  ABP: --  ABP(mean): --  RR: 29 (19 Aug 2021 09:00) (17 - 40)  SpO2: 87% (19 Aug 2021 09:00) (79% - 94%)    Physical Exam:    General : weak, dyspneic  HEENT - nc/at  neck - supple   lungs bilateral crackles  cv rrr  abdomen soft, non tender  ext cool    I&O's Summary    18 Aug 2021 07:01  -  19 Aug 2021 07:00  --------------------------------------------------------  IN: 1386.6 mL / OUT: 1800 mL / NET: -413.4 mL                        8.5    14.05 )-----------( 292      ( 18 Aug 2021 07:01 )             26.2       08-18    138  |  104  |  24<H>  ----------------------------<  78  4.1   |  29  |  1.03    Ca    8.8      18 Aug 2021 07:01    DVT Prophylaxis:  Lovenox                                                               Advanced Directives: Full Code

## 2021-08-19 NOTE — PROGRESS NOTE ADULT - ASSESSMENT
55 M noted to have COVID 7/15  On arrival hypoxic to 80s and tachypneic. NRB placed,in the ER saturating 95%. Na 126 s/p 1L NS. CXR: Frandy infiltrates. Last scr 1.4 in 2019-> today 1.9 unknown if underlying ckd.  Treated with IV Remdesivir and Decadron, Tocilizumab   Given the obesity/hypertension and prior co-morbidities, he is at risk of intubation but continue HFNC, respiratory status remains critical  XR reviewed- pulmonary infiltrates are present  DDimer elevated, was on therapeutic lovenox, transitioned to 40mg q 12 now noted to be >2000 -  Completed Solumedrol, Symbicort 160/4.5 BID ([email or web address]/[email or web address], STOIC study)  Was on HFNC %FiO2 with BiPAP overnight, now on BiPAP   CTPE not performed  Respiratory status is tenuous and he continues to have an increased work of breathing; at this point he is at high risk of intubation  Events of the last 3 days reviewed / Acute on chronic hypoxemic resp failure  / ARDS / Pulm fibrosis post COVID Pneumonia  Critical status discussed nursing  ICU team is reconsidering intubation and mechanical ventilation  In view of increased D Dimer / suggest resuming Full dose AC with Lovenox, anticoagulation being managed by MICU  Currently on NIPPV, will likely need 18/8 back up rate of 12

## 2021-08-19 NOTE — PROGRESS NOTE ADULT - SUBJECTIVE AND OBJECTIVE BOX
Patient is a 55y old  Male who presents with a chief complaint of cough/sob (23 Jul 2021 14:03)      HPI:  55 M with pmh HLD, dm, htn presents with 8 days onset of covid symptoms which included, cough, fevers, sob, hypoxia, fevers, diarrhea, chills and myalgias. TEsted positive 7/15. Wife endorsed he was becoming more sob of recently. On arrival hypoxic to 80s and tachypneic. NRB placed, presently on 15% and saturating 95%. Na 126 s/p 1L NS. CXR: Frandy infiltrates. Last scr 1.4 in 2019-> today 1.9 unknown if underlying ckd.  Patient not vaccinated.   Last seen by me in 2019  History of ROBERT and uncontrolled hypertension  Treated with IV Remdesivir and Decadron, now on Toci  SaO2 is 80-85% despite being on 100% NRB  ABG pending        8/17  Noted to have increased work of breathing today, on BiPAP 14/8 back up rate 12 then transitioned to HFNC with NRB  DDimer [id number]  Dopplers/CXR performed    8/18  Covering for DR Arceo  Events of the last 3 days reviewed  Critical status discussed with the Intensivist and critical care team at bedside  Overnight further deterioration with worsened hypoxemia  pt declines elective intubation in view of all clinical signs with more labored breathing / use of accessory muscles as well as decline / worsened hypoxemia indication hypoxemic resp failure  ICU team is reconsidering intubation and mechanical ventilation plans for today after pt speaks with his wife again this am  His wife visited him and is made fully aware of grave prognosis without intubation    8/19  Patient still having an increased work of breathing, patient refused intubation and states that he would like to wait  ICU team has discussed with him extensively      Tobacco Usage:  · Tobacco Usage	non smoker  Fhx: none (21 Jul 2021 19:19)    MEDICATIONS  (STANDING):  aspirin  chewable 81 milliGRAM(s) Oral daily  atorvastatin 80 milliGRAM(s) Oral at bedtime  budesonide 160 MICROgram(s)/formoterol 4.5 MICROgram(s) Inhaler 2 Puff(s) Inhalation two times a day  cholecalciferol 2000 Unit(s) Oral daily  dexMEDEtomidine Infusion 0.2 MICROgram(s)/kG/Hr (4.52 mL/Hr) IV Continuous <Continuous>  dextrose 40% Gel 15 Gram(s) Oral once  dextrose 5%. 1000 milliLiter(s) (50 mL/Hr) IV Continuous <Continuous>  dextrose 5%. 1000 milliLiter(s) (100 mL/Hr) IV Continuous <Continuous>  dextrose 50% Injectable 25 Gram(s) IV Push once  dextrose 50% Injectable 12.5 Gram(s) IV Push once  dextrose 50% Injectable 25 Gram(s) IV Push once  enoxaparin Injectable 40 milliGRAM(s) SubCutaneous every 12 hours  glucagon  Injectable 1 milliGRAM(s) IntraMuscular once  insulin lispro (ADMELOG) corrective regimen sliding scale   SubCutaneous every 6 hours  losartan 50 milliGRAM(s) Oral daily  methylPREDNISolone sodium succinate Injectable 40 milliGRAM(s) IV Push every 8 hours  pantoprazole  Injectable 40 milliGRAM(s) IV Push daily  Parenteral Nutrition - Adult 1 Each (75 mL/Hr) TPN Continuous <Continuous>  sodium chloride 0.9%. 1000 milliLiter(s) (75 mL/Hr) IV Continuous <Continuous>    MEDICATIONS  (PRN):  acetaminophen   Tablet .. 650 milliGRAM(s) Oral every 4 hours PRN Temp greater or equal to 38C (100.4F), Mild Pain (1 - 3)  ALBUTerol    90 MICROgram(s) HFA Inhaler 2 Puff(s) Inhalation every 4 hours PRN Shortness of Breath and/or Wheezing  benzocaine 15 mG/menthol 3.6 mG (Sugar-Free) Lozenge 1 Lozenge Oral every 6 hours PRN Sore Throat  benzonatate 100 milliGRAM(s) Oral three times a day PRN Cough  hydrocodone/homatropine Syrup 5 milliLiter(s) Oral every 6 hours PRN Cough  LORazepam   Injectable 0.5 milliGRAM(s) IV Push every 6 hours PRN Agitation  melatonin 6 milliGRAM(s) Oral at bedtime PRN Insomnia      Vital Signs Last 24 Hrs  T(C): 36.8 (19 Aug 2021 12:00), Max: 36.8 (18 Aug 2021 20:00)  T(F): 98.2 (19 Aug 2021 12:00), Max: 98.3 (18 Aug 2021 20:00)  HR: 66 (19 Aug 2021 16:50) (66 - 119)  BP: 167/96 (19 Aug 2021 14:00) (152/91 - 198/125)  BP(mean): 114 (19 Aug 2021 14:00) (106 - 145)  RR: 17 (19 Aug 2021 14:00) (17 - 34)  SpO2: 99% (19 Aug 2021 16:50) (81% - 99%)    --------------------------------------------------------  IN:  Total IN: 0 mL    OUT:    Voided (mL): 1700 mL  Total OUT: 1700 mL    Total NET: -1700 mL              PHYSICAL EXAM  General Appearance: On BIPAP, increased work of breathing  using accessory muscles to breath / declined intubation overnight  Lungs: coarse bilaterally  Heart: +S1,S2  Abdomen: Soft, non-tender, bowel sounds active   Extremities: no cyanosis or edema, no joint swelling  Skin: Skin color, texture normal, no rashes   Neurologic: Alert and oriented X3 , non focal, not disoriented    ECG:    LABS:                          8.5    14.05 )-----------( 292      ( 18 Aug 2021 07:01 )             26.2   08-18    138  |  104  |  24<H>  ----------------------------<  78  4.1   |  29  |  1.03    Ca    8.8      18 Aug 2021 07:01    TPro  6.3  /  Alb  2.3<L>  /  TBili  0.6  /  DBili  x   /  AST  32  /  ALT  73  /  AlkPhos  168<H>  08-17                                           10.0   19.05 )-----------( 179      ( 08 Aug 2021 07:11 )             30.3   08-08    137  |  102  |  27<H>  ----------------------------<  121<H>  3.9   |  29  |  0.77    Ca    8.4<L>      08 Aug 2021 07:11  Phos  3.4     08-08  Mg     2.0     08-08                  10.0   19.05 )-----------( 179      ( 08 Aug 2021 07:11 )             30.3   08-08    137  |  102  |  27<H>  ----------------------------<  121<H>  3.9   |  29  |  0.77    Ca    8.4<L>      08 Aug 2021 07:11  Phos  3.4     08-08  Mg     2.0     08-08                 10.4   17.19 )-----------( 198      ( 07 Aug 2021 07:06 )             32.3   08-07    138  |  104  |  31<H>  ----------------------------<  107<H>  4.4   |  29  |  0.77    Ca    8.2<L>      07 Aug 2021 07:06  Phos  4.6     08-07  Mg     2.0     08-07    TPro  5.2<L>  /  Alb  2.3<L>  /  TBili  0.8  /  DBili  x   /  AST  81<H>  /  ALT  213<H>  /  AlkPhos  200<H>  08-06                            12.9   15.00 )-----------( 366      ( 26 Jul 2021 06:20 )             38.4   07-26    133<L>  |  104  |  37<H>  ----------------------------<  232<H>  4.2   |  23  |  1.17    Ca    8.4<L>      26 Jul 2021 06:20    TPro  6.3  /  Alb  2.2<L>  /  TBili  0.4  /  DBili  0.1  /  AST  103<H>  /  ALT  137<H>  /  AlkPhos  239<H>  07-26                          13.2   13.52 )-----------( 338      ( 24 Jul 2021 08:06 )             39.5     07-24    136  |  105  |  43<H>  ----------------------------<  227<H>  4.3   |  25  |  1.31<H>    Ca    8.7      24 Jul 2021 08:06    TPro  6.6  /  Alb  2.3<L>  /  TBili  0.4  /  DBili  x   /  AST  73<H>  /  ALT  64  /  AlkPhos  165<H>  07-24          Pro BNP  -- 07-24 @ 08:06  D Dimer  446 07-24 @ 08:06  Pro BNP  261 07-21 @ 14:42  D Dimer  460 07-21 @ 14:42              < from: Xray Chest 1 View- PORTABLE-Urgent (07.21.21 @ 15:33) >    PROCEDURE DATE:  07/21/2021          INTERPRETATION:  AP chest on July 21, 2021 at 3:27 PM. Patient is short of breath with cough and fever.    Heart magnified by technique.    There arescattered mid lower lung field infiltrates right greater than left possibly related: Pneumonia.    The lungs are clear on August 30, 2019.    IMPRESSION: Bilateral infiltrates as above.    < end of copied text >  < from: US Duplex Venous Lower Ext Complete, Bilateral (07.25.21 @ 08:42) >  COMPARISON: None available.    TECHNIQUE: Duplex sonography of the BILATERAL LOWER extremity veins with color and spectral Doppler, with and without compression.    FINDINGS:    RIGHT:  Normal compressibility of the RIGHT common femoral, femoral and popliteal veins.  Doppler examination shows normal spontaneous and phasic flow.  No RIGHT calf vein thrombosis is detected.    LEFT:  Normal compressibility of the LEFT common femoral, femoral and popliteal veins.  Doppler examination shows normal spontaneous and phasic flow.  No LEFT calf vein thrombosis is detected.    IMPRESSION:  No evidence of deep venous thrombosis in either lower extremity.    < end of copied text >  RADIOLOGY & ADDITIONAL STUDIES:    < from: Xray Chest 1 View- PORTABLE-Urgent (Xray Chest 1 View- PORTABLE-Urgent .) (07.26.21 @ 08:43) >    PROCEDURE DATE:  07/26/2021          INTERPRETATION:  Portable chest radiograph    CLINICAL INFORMATION: Pneumonia due to Covid 19.. Follow-up    TECHNIQUE:  Portable  AP view of the chest was obtained.    COMPARISON: 7/21/2021 chest available for review.    FINDINGS:    The lungs show stable bilateral  multifocal and diffuse ill-defined airspace opacities.. No pneumothorax.    The  heart is enlarged in transverse diameter. No hilar mass.     Visualized osseous structures are intact.    IMPRESSION:   Stable bilateral  multifocal and diffuse ill-defined airspace opacities..    < end of copied text >  < from: US Duplex Venous Lower Ext Complete, Bilateral (08.06.21 @ 17:16) >  FINDINGS:    RIGHT:  Normal compressibility of the RIGHT common femoral, femoral and popliteal veins.  Doppler examination shows normal spontaneous and phasic flow.  No RIGHT calf vein thrombosis is detected.    LEFT:  Normal compressibility of the LEFT common femoral, femoral and popliteal veins.  Doppler examination shows normal spontaneous and phasic flow.  No LEFT calf vein thrombosisis detected.    IMPRESSION:  No evidence of deep venous thrombosis in either lower extremity.    < end of copied text >  < from: Xray Chest 1 View- PORTABLE-Urgent (Xray Chest 1 View- PORTABLE-Urgent .) (08.06.21 @ 16:45) >  INTERPRETATION:  Portable chest radiograph    CLINICAL INFORMATION: Pneumonia due to Covid 19.    TECHNIQUE:  Portable  AP view of the chest was obtained.    COMPARISON: 8/1/2021 chest radiograph available for review.    FINDINGS:    The lungs show decreasing bilateral diffuse airspace disease.. No pneumothorax.    The  heart is mildly enlarged in transverse diameter. No hilar mass.   Visualized osseous structures are intact.    IMPRESSION:   Decreasing bilateral diffuse airspace disease..    < end of copied text >  xr< from: US Duplex Venous Lower Ext Complete, Bilateral (08.17.21 @ 08:58) >  COMPARISON: None available.    TECHNIQUE: Duplex sonography of the BILATERAL LOWER extremity veins with color and spectral Doppler, with and without compression.    FINDINGS:    RIGHT:  Normal compressibility of the RIGHT common femoral, femoral and popliteal veins.  Doppler examination shows normal spontaneous and phasic flow.  No RIGHT calf vein thrombosis is detected.    LEFT:  Normal compressibility of the LEFT common femoral, femoral and popliteal veins.  Doppler examination shows normal spontaneous and phasic flow.  No LEFT calf vein thrombosis is detected.    IMPRESSION:  No evidence of deep venous thrombosis in either lower extremity.    < end of copied text >

## 2021-08-19 NOTE — PROGRESS NOTE ADULT - SUBJECTIVE AND OBJECTIVE BOX
Patient is a 55y old  Male who presents with a chief complaint of cough/sob (19 Aug 2021 09:56)      BRIEF HOSPITAL COURSE: 55y Male  ***    Events last 24 hours: ***    PAST MEDICAL & SURGICAL HISTORY:  HTN (hypertension)    Diabetes          Hosp day #29d    Vent day #        Vital signs / Reviewed and Physical Exam Performed where pertinent and urgently required    Lab / Radiology  studies / ABG / Meds -  reviewed and interpreted into the assessment and treatment plan.      Assessment/Plan/Therapeutic interventions      Neuro - Sedation neuromuscular blockade to facilitate safe ventilation    CV -  Pressor support as needed to maintain MAP 65           Avoiding fluid challenges          QTC monitoring while on Azithromycin and Hydroxychloroquine.    Pulm -  ARDS-NET 4-6cc/kg IBW TV as able to maintain plateau pressures <30               Prone ventilation consideration as feasible  Pa02/Fi02 < 150 on Fi02 >60% and PEEP at least 5                 Vent bundle Reviewed     GI -  PPI  Enteric feeds as tolerated in tandem with NMB and prone ventilation    Renal - Even to negative fluid balance as tolerated by hemodynamics and renal fx.  Feeds to be provided in lieu of IVF.     Heme -  Pharmacologic DVT PPx  in addition to SCD's    ID - ABX discontinuation based on discussion with ID in conjunction with clinical features, culture data, and judicious procalcitonin monitoring.      Endo -  Aggressive glycemic control to limit FS glucose to < 180mg/dl.      COVID 19 specific considerations and therapeutic  options based on the available and rapidly changing literature    Goals of care considerations:  Ongoing assessment for patient specific treatment options based on progression or decline.  I have involved the family with updates and requests in guidance for medical decision making.          38  Minutes of critical care tiem spent in the management of this critically ill COVID-19 patient/PUI patient with continuous assessments and interventions based on the interpretation of multiple databases.   Patient is a 55y old  Male who presents with a chief complaint of cough/sob (19 Aug 2021 09:56)      BRIEF HOSPITAL COURSE:   55M with PMHx HLD, HTN, DM admitted with cough, SOB, hypoxia, COVID+. Pt unvaccinated. Progressive hypoxic respiratory failure requiring escalation to HFNC. Complicated by ARDS. sp Actemra, sp Remdesivir.     Events last 24 hours: afebrile, BP elevated 170s systolic, currently on CPAP 100%,  Denies CP, abd pain, N/V, on precedex for comfort.    PAST MEDICAL & SURGICAL HISTORY:  HTN (hypertension)    Diabetes      Hosp day #29d      Vital signs / Reviewed and Physical Exam Performed where pertinent and urgently required    Lab / Radiology  studies / ABG / Meds -  reviewed and interpreted into the assessment and treatment plan.    I&O's Summary    18 Aug 2021 07:01  -  19 Aug 2021 07:00  --------------------------------------------------------  IN: 1386.6 mL / OUT: 1800 mL / NET: -413.4 mL    19 Aug 2021 07:01  -  19 Aug 2021 22:15  --------------------------------------------------------  IN: 1002 mL / OUT: 0 mL / NET: 1002 mL    Impression:  1. COVID-19 Viral PNA  2. ARDS  3. acute hypoxemic respiratory failure  4. anemia  5. diabetes mellitus  6. severe protein malnutrition  7. essential HTN      Plan:  Neuro - nonfocal, cont precedex for pt comfort, cont melatonin for sleep hygiene    CV -  BP remains elevated          add hydralazine PRN for SBP>160          increase cozaar to 75mg daily with hold parameters    Pulm -  cont CPAP for alveolar recruitment             actively titrating FiO2 to keep sats>86%             cont IV stress steroids             currently pt is refusing intubation unless absolute last resort, pt is not willing to commit to DNI             sats remain >90%, however on 100%, if decompensates will need to move toward vent support    GI -  PPI  unable to take PO diet, starting PPN tonight as per RD recs     Renal - Cr stable, avoid nephrotoxins, strict I/Os, remains negative fluid balance, trend BMP     Heme -  chem DVT with lovenox, prior LE duplex negative, no signs of bleeding, SCDs, transfuse for Hbg<7 or signs of active bleeding.    ID - remains off abx, afebrile, WBC stable, cont stress steroids, Abx addition based on discussion with ID in conjunction with clinical features and         culture data    Endo -  Aggressive glycemic control to limit FS glucose to < 180mg/dl, A1c 11.6, BS stable, will monitor closely for insulin needs pending PPN              start.    COVID 19 specific considerations and therapeutic  options based on the available and rapidly changing literature.    35 minutes of critical care time spent in the management of this critically ill COVID-19 patient with continuous assessments and interventions based on the interpretation of multiple databases.

## 2021-08-19 NOTE — PROGRESS NOTE ADULT - ASSESSMENT
Patient with severe Covid Pneumonia  with ARDS  on cpap, dependent , cant remove  on precedex for coordination with cpap  Patient needs intubation, but adamantly refuses at this time  but not DNR  on Steroids  will start PPN  prognosis poor

## 2021-08-20 LAB
ALBUMIN SERPL ELPH-MCNC: 2.5 G/DL — LOW (ref 3.3–5)
ALP SERPL-CCNC: 152 U/L — HIGH (ref 40–120)
ALT FLD-CCNC: 62 U/L — SIGNIFICANT CHANGE UP (ref 12–78)
ANION GAP SERPL CALC-SCNC: 6 MMOL/L — SIGNIFICANT CHANGE UP (ref 5–17)
AST SERPL-CCNC: 24 U/L — SIGNIFICANT CHANGE UP (ref 15–37)
BILIRUB SERPL-MCNC: 0.5 MG/DL — SIGNIFICANT CHANGE UP (ref 0.2–1.2)
BUN SERPL-MCNC: 38 MG/DL — HIGH (ref 7–23)
CALCIUM SERPL-MCNC: 8.5 MG/DL — SIGNIFICANT CHANGE UP (ref 8.5–10.1)
CHLORIDE SERPL-SCNC: 104 MMOL/L — SIGNIFICANT CHANGE UP (ref 96–108)
CO2 SERPL-SCNC: 27 MMOL/L — SIGNIFICANT CHANGE UP (ref 22–31)
CREAT SERPL-MCNC: 0.94 MG/DL — SIGNIFICANT CHANGE UP (ref 0.5–1.3)
GLUCOSE SERPL-MCNC: 210 MG/DL — HIGH (ref 70–99)
HCT VFR BLD CALC: 27.8 % — LOW (ref 39–50)
HGB BLD-MCNC: 9 G/DL — LOW (ref 13–17)
MAGNESIUM SERPL-MCNC: 2.3 MG/DL — SIGNIFICANT CHANGE UP (ref 1.6–2.6)
MCHC RBC-ENTMCNC: 28 PG — SIGNIFICANT CHANGE UP (ref 27–34)
MCHC RBC-ENTMCNC: 32.4 GM/DL — SIGNIFICANT CHANGE UP (ref 32–36)
MCV RBC AUTO: 86.3 FL — SIGNIFICANT CHANGE UP (ref 80–100)
PHOSPHATE SERPL-MCNC: 4.2 MG/DL — SIGNIFICANT CHANGE UP (ref 2.5–4.5)
PLATELET # BLD AUTO: 399 K/UL — SIGNIFICANT CHANGE UP (ref 150–400)
POTASSIUM SERPL-MCNC: 4.3 MMOL/L — SIGNIFICANT CHANGE UP (ref 3.5–5.3)
POTASSIUM SERPL-SCNC: 4.3 MMOL/L — SIGNIFICANT CHANGE UP (ref 3.5–5.3)
PROT SERPL-MCNC: 6.9 GM/DL — SIGNIFICANT CHANGE UP (ref 6–8.3)
RBC # BLD: 3.22 M/UL — LOW (ref 4.2–5.8)
RBC # FLD: 13.1 % — SIGNIFICANT CHANGE UP (ref 10.3–14.5)
SODIUM SERPL-SCNC: 137 MMOL/L — SIGNIFICANT CHANGE UP (ref 135–145)
TRIGL SERPL-MCNC: 159 MG/DL — HIGH
WBC # BLD: 14.82 K/UL — HIGH (ref 3.8–10.5)
WBC # FLD AUTO: 14.82 K/UL — HIGH (ref 3.8–10.5)

## 2021-08-20 PROCEDURE — 71045 X-RAY EXAM CHEST 1 VIEW: CPT | Mod: 26

## 2021-08-20 PROCEDURE — 99291 CRITICAL CARE FIRST HOUR: CPT

## 2021-08-20 RX ORDER — MORPHINE SULFATE 50 MG/1
2 CAPSULE, EXTENDED RELEASE ORAL
Refills: 0 | Status: DISCONTINUED | OUTPATIENT
Start: 2021-08-20 | End: 2021-08-20

## 2021-08-20 RX ORDER — SENNA PLUS 8.6 MG/1
1 TABLET ORAL AT BEDTIME
Refills: 0 | Status: DISCONTINUED | OUTPATIENT
Start: 2021-08-20 | End: 2021-09-14

## 2021-08-20 RX ORDER — ELECTROLYTE SOLUTION,INJ
1 VIAL (ML) INTRAVENOUS
Refills: 0 | Status: DISCONTINUED | OUTPATIENT
Start: 2021-08-20 | End: 2021-08-20

## 2021-08-20 RX ORDER — MORPHINE SULFATE 50 MG/1
2 CAPSULE, EXTENDED RELEASE ORAL ONCE
Refills: 0 | Status: DISCONTINUED | OUTPATIENT
Start: 2021-08-20 | End: 2021-08-20

## 2021-08-20 RX ORDER — FUROSEMIDE 40 MG
40 TABLET ORAL ONCE
Refills: 0 | Status: COMPLETED | OUTPATIENT
Start: 2021-08-20 | End: 2021-08-20

## 2021-08-20 RX ORDER — METOPROLOL TARTRATE 50 MG
5 TABLET ORAL ONCE
Refills: 0 | Status: COMPLETED | OUTPATIENT
Start: 2021-08-20 | End: 2021-08-20

## 2021-08-20 RX ADMIN — DEXMEDETOMIDINE HYDROCHLORIDE IN 0.9% SODIUM CHLORIDE 4.52 MICROGRAM(S)/KG/HR: 4 INJECTION INTRAVENOUS at 17:58

## 2021-08-20 RX ADMIN — Medication 6: at 12:15

## 2021-08-20 RX ADMIN — Medication 6: at 22:55

## 2021-08-20 RX ADMIN — MORPHINE SULFATE 2 MILLIGRAM(S): 50 CAPSULE, EXTENDED RELEASE ORAL at 20:30

## 2021-08-20 RX ADMIN — MORPHINE SULFATE 2 MILLIGRAM(S): 50 CAPSULE, EXTENDED RELEASE ORAL at 20:15

## 2021-08-20 RX ADMIN — MORPHINE SULFATE 2 MILLIGRAM(S): 50 CAPSULE, EXTENDED RELEASE ORAL at 21:00

## 2021-08-20 RX ADMIN — DEXMEDETOMIDINE HYDROCHLORIDE IN 0.9% SODIUM CHLORIDE 4.52 MICROGRAM(S)/KG/HR: 4 INJECTION INTRAVENOUS at 12:14

## 2021-08-20 RX ADMIN — Medication 1 EACH: at 22:06

## 2021-08-20 RX ADMIN — Medication 6: at 05:08

## 2021-08-20 RX ADMIN — ENOXAPARIN SODIUM 40 MILLIGRAM(S): 100 INJECTION SUBCUTANEOUS at 22:54

## 2021-08-20 RX ADMIN — Medication 81 MILLIGRAM(S): at 17:55

## 2021-08-20 RX ADMIN — DEXMEDETOMIDINE HYDROCHLORIDE IN 0.9% SODIUM CHLORIDE 4.52 MICROGRAM(S)/KG/HR: 4 INJECTION INTRAVENOUS at 22:03

## 2021-08-20 RX ADMIN — Medication 5 MILLIGRAM(S): at 05:09

## 2021-08-20 RX ADMIN — Medication 40 MILLIGRAM(S): at 17:57

## 2021-08-20 RX ADMIN — Medication 5 MILLIGRAM(S): at 20:30

## 2021-08-20 RX ADMIN — Medication 40 MILLIGRAM(S): at 20:30

## 2021-08-20 RX ADMIN — Medication 5 MILLIGRAM(S): at 12:15

## 2021-08-20 RX ADMIN — Medication 8: at 17:57

## 2021-08-20 RX ADMIN — ENOXAPARIN SODIUM 40 MILLIGRAM(S): 100 INJECTION SUBCUTANEOUS at 09:12

## 2021-08-20 RX ADMIN — DEXMEDETOMIDINE HYDROCHLORIDE IN 0.9% SODIUM CHLORIDE 4.52 MICROGRAM(S)/KG/HR: 4 INJECTION INTRAVENOUS at 09:12

## 2021-08-20 RX ADMIN — PANTOPRAZOLE SODIUM 40 MILLIGRAM(S): 20 TABLET, DELAYED RELEASE ORAL at 09:12

## 2021-08-20 RX ADMIN — Medication 40 MILLIGRAM(S): at 05:10

## 2021-08-20 RX ADMIN — DEXMEDETOMIDINE HYDROCHLORIDE IN 0.9% SODIUM CHLORIDE 4.52 MICROGRAM(S)/KG/HR: 4 INJECTION INTRAVENOUS at 05:11

## 2021-08-20 RX ADMIN — Medication 40 MILLIGRAM(S): at 22:54

## 2021-08-20 NOTE — PROGRESS NOTE ADULT - SUBJECTIVE AND OBJECTIVE BOX
Events Overnight: on cpap overnight, still awake and alert he is on Precedex for anxieity, saturation in upper 90s                             PPN  HPI:   64 y/o male with HLD, HTN and DM-previously unvaccinated --presents with 8 days onset of covid symptoms which included, cough, fevers, sob, hypoxia, fevers, diarrhea, chills and          myalgias.   Tested positive 7/15.    Rx with Rem/Dexa and then high dose steroids     Pt tx to ICU on 7/24 for worsening hypoxia.     Patient was on high flow now on CPAP, remains tenuous  last dopplers were negative 8/17  inc ddimer  episodes of severe desaturation    PMH:  as above    ROS - shortness of breathe      MEDICATIONS  (STANDING):  aspirin  chewable 81 milliGRAM(s) Oral daily  atorvastatin 80 milliGRAM(s) Oral at bedtime  budesonide 160 MICROgram(s)/formoterol 4.5 MICROgram(s) Inhaler 2 Puff(s) Inhalation two times a day  cholecalciferol 2000 Unit(s) Oral daily  dexMEDEtomidine Infusion 0.2 MICROgram(s)/kG/Hr (4.52 mL/Hr) IV Continuous <Continuous>  dextrose 40% Gel 15 Gram(s) Oral once  dextrose 5%. 1000 milliLiter(s) (50 mL/Hr) IV Continuous <Continuous>  dextrose 5%. 1000 milliLiter(s) (100 mL/Hr) IV Continuous <Continuous>  dextrose 50% Injectable 25 Gram(s) IV Push once  dextrose 50% Injectable 12.5 Gram(s) IV Push once  dextrose 50% Injectable 25 Gram(s) IV Push once  enoxaparin Injectable 40 milliGRAM(s) SubCutaneous every 12 hours  glucagon  Injectable 1 milliGRAM(s) IntraMuscular once  insulin lispro (ADMELOG) corrective regimen sliding scale   SubCutaneous every 6 hours  losartan 75 milliGRAM(s) Oral daily  methylPREDNISolone sodium succinate Injectable 40 milliGRAM(s) IV Push every 8 hours  pantoprazole  Injectable 40 milliGRAM(s) IV Push daily  Parenteral Nutrition - Adult 1 Each (75 mL/Hr) TPN Continuous <Continuous>  senna 1 Tablet(s) Oral at bedtime    MEDICATIONS  (PRN):  acetaminophen   Tablet .. 650 milliGRAM(s) Oral every 4 hours PRN Temp greater or equal to 38C (100.4F), Mild Pain (1 - 3)  ALBUTerol    90 MICROgram(s) HFA Inhaler 2 Puff(s) Inhalation every 4 hours PRN Shortness of Breath and/or Wheezing  benzocaine 15 mG/menthol 3.6 mG (Sugar-Free) Lozenge 1 Lozenge Oral every 6 hours PRN Sore Throat  benzonatate 100 milliGRAM(s) Oral three times a day PRN Cough  hydrALAZINE Injectable 5 milliGRAM(s) IV Push every 6 hours PRN SBP>160  hydrocodone/homatropine Syrup 5 milliLiter(s) Oral every 6 hours PRN Cough  LORazepam   Injectable 0.5 milliGRAM(s) IV Push every 6 hours PRN Agitation  melatonin 6 milliGRAM(s) Oral at bedtime PRN Insomnia      ICU Vital Signs Last 24 Hrs  T(C): 37.3 (20 Aug 2021 05:00), Max: 37.3 (20 Aug 2021 05:00)  T(F): 99.2 (20 Aug 2021 05:00), Max: 99.2 (20 Aug 2021 05:00)  HR: 77 (20 Aug 2021 09:00) (62 - 96)  BP: 162/88 (20 Aug 2021 09:00) (154/88 - 184/103)  BP(mean): 106 (20 Aug 2021 09:00) (103 - 123)  ABP: --  ABP(mean): --  RR: 24 (20 Aug 2021 09:00) (17 - 33)  SpO2: 97% (20 Aug 2021 09:00) (88% - 99%)    Physical Exam:  General - ill , dyspneic obese  HEENT cpap mask on   neck supple  lungs dyspneic, bilateral crackles  cv rrr  abdomen soft , non tender  ext trace edema      I&O's Summary    19 Aug 2021 07:01  -  20 Aug 2021 07:00  --------------------------------------------------------  IN: 2730 mL / OUT: 1350 mL / NET: 1380 mL                          9.0    14.82 )-----------( 399      ( 20 Aug 2021 06:46 )             27.8       08-20    137  |  104  |  38<H>  ----------------------------<  210<H>  4.3   |  27  |  0.94    Ca    8.5      20 Aug 2021 06:46  Phos  4.2     08-20  Mg     2.3     08-20    TPro  6.9  /  Alb  2.5<L>  /  TBili  0.5  /  DBili  x   /  AST  24  /  ALT  62  /  AlkPhos  152<H>  08-20    DVT Prophylaxis:  Lovenox                                                               Advanced Directives: Full Code

## 2021-08-20 NOTE — PROGRESS NOTE ADULT - SUBJECTIVE AND OBJECTIVE BOX
Patient is a 55y old  Male who presents with a chief complaint of cough/sob (23 Jul 2021 14:03)      HPI:  55 M with pmh HLD, dm, htn presents with 8 days onset of covid symptoms which included, cough, fevers, sob, hypoxia, fevers, diarrhea, chills and myalgias. TEsted positive 7/15. Wife endorsed he was becoming more sob of recently. On arrival hypoxic to 80s and tachypneic. NRB placed, presently on 15% and saturating 95%. Na 126 s/p 1L NS. CXR: Frandy infiltrates. Last scr 1.4 in 2019-> today 1.9 unknown if underlying ckd.  Patient not vaccinated.   Last seen by me in 2019  History of ROBERT and uncontrolled hypertension  Treated with IV Remdesivir and Decadron, now on Toci  SaO2 is 80-85% despite being on 100% NRB  ABG pending        8/17  Noted to have increased work of breathing today, on BiPAP 14/8 back up rate 12 then transitioned to HFNC with NRB  DDimer 2157  Dopplers/CXR performed    8/18  Covering for DR Arceo  Events of the last 3 days reviewed  Critical status discussed with the Intensivist and critical care team at bedside  Overnight further deterioration with worsened hypoxemia  pt declines elective intubation in view of all clinical signs with more labored breathing / use of accessory muscles as well as decline / worsened hypoxemia indication hypoxemic resp failure  ICU team is reconsidering intubation and mechanical ventilation plans for today after pt speaks with his wife again this am  His wife visited him and is made fully aware of grave prognosis without intubation    8/19  Patient still having an increased work of breathing, patient refused intubation and states that he would like to wait  ICU team has discussed with him extensively     8/20  Patient on CPAP attempted transition to HFNC  SaO2 95%     Tobacco Usage:  · Tobacco Usage	non smoker  Fhx: none (21 Jul 2021 19:19)    MEDICATIONS  (STANDING):  aspirin  chewable 81 milliGRAM(s) Oral daily  atorvastatin 80 milliGRAM(s) Oral at bedtime  budesonide 160 MICROgram(s)/formoterol 4.5 MICROgram(s) Inhaler 2 Puff(s) Inhalation two times a day  cholecalciferol 2000 Unit(s) Oral daily  dexMEDEtomidine Infusion 0.2 MICROgram(s)/kG/Hr (4.52 mL/Hr) IV Continuous <Continuous>  dextrose 40% Gel 15 Gram(s) Oral once  dextrose 5%. 1000 milliLiter(s) (50 mL/Hr) IV Continuous <Continuous>  dextrose 5%. 1000 milliLiter(s) (100 mL/Hr) IV Continuous <Continuous>  dextrose 50% Injectable 25 Gram(s) IV Push once  dextrose 50% Injectable 12.5 Gram(s) IV Push once  dextrose 50% Injectable 25 Gram(s) IV Push once  enoxaparin Injectable 40 milliGRAM(s) SubCutaneous every 12 hours  glucagon  Injectable 1 milliGRAM(s) IntraMuscular once  insulin lispro (ADMELOG) corrective regimen sliding scale   SubCutaneous every 6 hours  losartan 50 milliGRAM(s) Oral daily  methylPREDNISolone sodium succinate Injectable 40 milliGRAM(s) IV Push every 8 hours  pantoprazole  Injectable 40 milliGRAM(s) IV Push daily  Parenteral Nutrition - Adult 1 Each (75 mL/Hr) TPN Continuous <Continuous>  sodium chloride 0.9%. 1000 milliLiter(s) (75 mL/Hr) IV Continuous <Continuous>    MEDICATIONS  (PRN):  acetaminophen   Tablet .. 650 milliGRAM(s) Oral every 4 hours PRN Temp greater or equal to 38C (100.4F), Mild Pain (1 - 3)  ALBUTerol    90 MICROgram(s) HFA Inhaler 2 Puff(s) Inhalation every 4 hours PRN Shortness of Breath and/or Wheezing  benzocaine 15 mG/menthol 3.6 mG (Sugar-Free) Lozenge 1 Lozenge Oral every 6 hours PRN Sore Throat  benzonatate 100 milliGRAM(s) Oral three times a day PRN Cough  hydrocodone/homatropine Syrup 5 milliLiter(s) Oral every 6 hours PRN Cough  LORazepam   Injectable 0.5 milliGRAM(s) IV Push every 6 hours PRN Agitation  melatonin 6 milliGRAM(s) Oral at bedtime PRN Insomnia    Vital Signs Last 24 Hrs  T(C): 36.1 (21 Aug 2021 06:00), Max: 36.8 (20 Aug 2021 16:00)  T(F): 97 (21 Aug 2021 06:00), Max: 98.2 (20 Aug 2021 16:00)  HR: 64 (21 Aug 2021 07:00) (61 - 150)  BP: 161/92 (21 Aug 2021 07:00) (117/73 - 174/116)  BP(mean): 110 (21 Aug 2021 07:00) (71 - 130)  RR: 24 (21 Aug 2021 07:00) (18 - 35)  SpO2: 100% (21 Aug 2021 07:00) (68% - 100%)  --------------------------------------------------------  IN:  Total IN: 0 mL    OUT:    Voided (mL): 1700 mL  Total OUT: 1700 mL    Total NET: -1700 mL              PHYSICAL EXAM  General Appearance: On BIPAP, increased work of breathing  using accessory muscles to breath / declined intubation overnight  Lungs: coarse bilaterally  Heart: +S1,S2  Abdomen: Soft, non-tender, bowel sounds active   Extremities: no cyanosis or edema, no joint swelling  Skin: Skin color, texture normal, no rashes   Neurologic: Alert and oriented X3 , non focal, not disoriented    ECG:    LABS:                          8.5    14.05 )-----------( 292      ( 18 Aug 2021 07:01 )             26.2   08-18    138  |  104  |  24<H>  ----------------------------<  78  4.1   |  29  |  1.03    Ca    8.8      18 Aug 2021 07:01    TPro  6.3  /  Alb  2.3<L>  /  TBili  0.6  /  DBili  x   /  AST  32  /  ALT  73  /  AlkPhos  168<H>  08-17                                           10.0   19.05 )-----------( 179      ( 08 Aug 2021 07:11 )             30.3   08-08    137  |  102  |  27<H>  ----------------------------<  121<H>  3.9   |  29  |  0.77    Ca    8.4<L>      08 Aug 2021 07:11  Phos  3.4     08-08  Mg     2.0     08-08                  10.0   19.05 )-----------( 179      ( 08 Aug 2021 07:11 )             30.3   08-08    137  |  102  |  27<H>  ----------------------------<  121<H>  3.9   |  29  |  0.77    Ca    8.4<L>      08 Aug 2021 07:11  Phos  3.4     08-08  Mg     2.0     08-08                 10.4   17.19 )-----------( 198      ( 07 Aug 2021 07:06 )             32.3   08-07    138  |  104  |  31<H>  ----------------------------<  107<H>  4.4   |  29  |  0.77    Ca    8.2<L>      07 Aug 2021 07:06  Phos  4.6     08-07  Mg     2.0     08-07    TPro  5.2<L>  /  Alb  2.3<L>  /  TBili  0.8  /  DBili  x   /  AST  81<H>  /  ALT  213<H>  /  AlkPhos  200<H>  08-06                            12.9   15.00 )-----------( 366      ( 26 Jul 2021 06:20 )             38.4   07-26    133<L>  |  104  |  37<H>  ----------------------------<  232<H>  4.2   |  23  |  1.17    Ca    8.4<L>      26 Jul 2021 06:20    TPro  6.3  /  Alb  2.2<L>  /  TBili  0.4  /  DBili  0.1  /  AST  103<H>  /  ALT  137<H>  /  AlkPhos  239<H>  07-26                          13.2   13.52 )-----------( 338      ( 24 Jul 2021 08:06 )             39.5     07-24    136  |  105  |  43<H>  ----------------------------<  227<H>  4.3   |  25  |  1.31<H>    Ca    8.7      24 Jul 2021 08:06    TPro  6.6  /  Alb  2.3<L>  /  TBili  0.4  /  DBili  x   /  AST  73<H>  /  ALT  64  /  AlkPhos  165<H>  07-24          Pro BNP  -- 07-24 @ 08:06  D Dimer  446 07-24 @ 08:06  Pro BNP  261 07-21 @ 14:42  D Dimer  460 07-21 @ 14:42              < from: Xray Chest 1 View- PORTABLE-Urgent (07.21.21 @ 15:33) >    PROCEDURE DATE:  07/21/2021          INTERPRETATION:  AP chest on July 21, 2021 at 3:27 PM. Patient is short of breath with cough and fever.    Heart magnified by technique.    There arescattered mid lower lung field infiltrates right greater than left possibly related: Pneumonia.    The lungs are clear on August 30, 2019.    IMPRESSION: Bilateral infiltrates as above.    < end of copied text >  < from: US Duplex Venous Lower Ext Complete, Bilateral (07.25.21 @ 08:42) >  COMPARISON: None available.    TECHNIQUE: Duplex sonography of the BILATERAL LOWER extremity veins with color and spectral Doppler, with and without compression.    FINDINGS:    RIGHT:  Normal compressibility of the RIGHT common femoral, femoral and popliteal veins.  Doppler examination shows normal spontaneous and phasic flow.  No RIGHT calf vein thrombosis is detected.    LEFT:  Normal compressibility of the LEFT common femoral, femoral and popliteal veins.  Doppler examination shows normal spontaneous and phasic flow.  No LEFT calf vein thrombosis is detected.    IMPRESSION:  No evidence of deep venous thrombosis in either lower extremity.    < end of copied text >  RADIOLOGY & ADDITIONAL STUDIES:    < from: Xray Chest 1 View- PORTABLE-Urgent (Xray Chest 1 View- PORTABLE-Urgent .) (07.26.21 @ 08:43) >    PROCEDURE DATE:  07/26/2021          INTERPRETATION:  Portable chest radiograph    CLINICAL INFORMATION: Pneumonia due to Covid 19.. Follow-up    TECHNIQUE:  Portable  AP view of the chest was obtained.    COMPARISON: 7/21/2021 chest available for review.    FINDINGS:    The lungs show stable bilateral  multifocal and diffuse ill-defined airspace opacities.. No pneumothorax.    The  heart is enlarged in transverse diameter. No hilar mass.     Visualized osseous structures are intact.    IMPRESSION:   Stable bilateral  multifocal and diffuse ill-defined airspace opacities..    < end of copied text >  < from: US Duplex Venous Lower Ext Complete, Bilateral (08.06.21 @ 17:16) >  FINDINGS:    RIGHT:  Normal compressibility of the RIGHT common femoral, femoral and popliteal veins.  Doppler examination shows normal spontaneous and phasic flow.  No RIGHT calf vein thrombosis is detected.    LEFT:  Normal compressibility of the LEFT common femoral, femoral and popliteal veins.  Doppler examination shows normal spontaneous and phasic flow.  No LEFT calf vein thrombosisis detected.    IMPRESSION:  No evidence of deep venous thrombosis in either lower extremity.    < end of copied text >  < from: Xray Chest 1 View- PORTABLE-Urgent (Xray Chest 1 View- PORTABLE-Urgent .) (08.06.21 @ 16:45) >  INTERPRETATION:  Portable chest radiograph    CLINICAL INFORMATION: Pneumonia due to Covid 19.    TECHNIQUE:  Portable  AP view of the chest was obtained.    COMPARISON: 8/1/2021 chest radiograph available for review.    FINDINGS:    The lungs show decreasing bilateral diffuse airspace disease.. No pneumothorax.    The  heart is mildly enlarged in transverse diameter. No hilar mass.   Visualized osseous structures are intact.    IMPRESSION:   Decreasing bilateral diffuse airspace disease..    < end of copied text >  xr< from: US Duplex Venous Lower Ext Complete, Bilateral (08.17.21 @ 08:58) >  COMPARISON: None available.    TECHNIQUE: Duplex sonography of the BILATERAL LOWER extremity veins with color and spectral Doppler, with and without compression.    FINDINGS:    RIGHT:  Normal compressibility of the RIGHT common femoral, femoral and popliteal veins.  Doppler examination shows normal spontaneous and phasic flow.  No RIGHT calf vein thrombosis is detected.    LEFT:  Normal compressibility of the LEFT common femoral, femoral and popliteal veins.  Doppler examination shows normal spontaneous and phasic flow.  No LEFT calf vein thrombosis is detected.    IMPRESSION:  No evidence of deep venous thrombosis in either lower extremity.    < end of copied text >

## 2021-08-20 NOTE — PROGRESS NOTE ADULT - SUBJECTIVE AND OBJECTIVE BOX
Emergently called to bedside by RN staff, upon arrival to patient's bedside he was hypoxic to 63, tachypneic to the 40s with accessory muscle use, diaphoretic and tachycardic to the 150s.  At this time patient given morhpine 2mg IVP x1 with some improvement, patient given additional morphine 2mg ivp with improvement of spo2 to the high 70s low 80s.  RR slowed to the 30s.  Patient sounded coarse with some wheezing b/l, given lasix 40mg IVP for diuresis, unable to given neb treatments due to hospital policy, inhaler via bipap not available as well.  Patient remained tachycardic to the 130s, given lopressor 2.5mg ivp, spo2 improved to the 110s-120s, spo2 maintaining low to mid 80s.  Patient refusing to be intubated at this time.      Called and updated patient's wife Sheree, she understands the severity of 's illness and the very real possibility of being intubated.      Critical Care time: 50 mins assessing presenting problems of acute illness that poses high probability of life threatening deterioration or end organ damage/dysfunction.  Medical decision making including Initiating plan of care, reviewing data, reviewing radiology, discussing with multidisciplinary team, non inclusive of procedures, discussing goals of care with patient/family  Emergently called to bedside by RN staff, upon arrival to patient's bedside he was hypoxic to 63, tachypneic to the 40s with accessory muscle use, diaphoretic and tachycardic to the 150s.  At this time patient given morhpine 2mg IVP x1 with some improvement, patient given additional morphine 2mg ivp with improvement of spo2 to the high 70s low 80s.  RR slowed to the 30s.  Patient sounded coarse with some wheezing b/l, given lasix 40mg IVP for diuresis, unable to given neb treatments due to hospital policy, inhaler via bipap not available as well.  Patient remained tachycardic to the 130s, given lopressor 2.5mg ivp, spo2 improved to the 110s-120s, spo2 maintaining low to mid 80s.  Patient refusing to be intubated at this time.      Called and updated patient's wife Sheree, she understands the severity of 's illness and the very real possibility of being intubated.      Critical Care time: 60 mins assessing presenting problems of acute illness that poses high probability of life threatening deterioration or end organ damage/dysfunction.  Medical decision making including Initiating plan of care, reviewing data, reviewing radiology, discussing with multidisciplinary team, non inclusive of procedures, discussing goals of care with patient/family

## 2021-08-20 NOTE — PROGRESS NOTE ADULT - ASSESSMENT
Patient with severe Covid Pneumonia  with ARDS  on cpap, dependent , sat 97%, will try high flow  on precedex for coordination with cpap  Patient has refused intbuation  but not DNR or DNI if emergent  on Steroids  will start PPN, not taking PO  prognosis poor

## 2021-08-20 NOTE — PROGRESS NOTE ADULT - ASSESSMENT
55 M noted to have COVID 7/15  On arrival hypoxic to 80s and tachypneic. NRB placed,in the ER saturating 95%. Na 126 s/p 1L NS. CXR: Frandy infiltrates. Last scr 1.4 in 2019-> today 1.9 unknown if underlying ckd.  Treated with IV Remdesivir and Decadron, Tocilizumab   Given the obesity/hypertension and prior co-morbidities, he is at risk of intubation but continue HFNC, respiratory status remains critical  XR reviewed- pulmonary infiltrates are present  DDimer elevated, was on therapeutic lovenox, transitioned to 40mg q 12 now noted to be >2000 -  Completed Solumedrol, Symbicort 160/4.5 BID (https://www.[employer].com/journals/lanres/article/AKAW3497-8787, [facility] study)  Was on HFNC %FiO2 with BiPAP overnight, now on BiPAP   CTPE not performed  Respiratory status is tenuous and he continues to have an increased work of breathing; at this point he is at high risk of intubation  Events of the last 3 days reviewed / Acute on chronic hypoxemic resp failure  / ARDS / Pulm fibrosis post COVID Pneumonia  Critical status discussed nursing  ICU team is reconsidering intubation and mechanical ventilation  In view of increased D Dimer / suggest resuming Full dose AC with Lovenox, anticoagulation being managed by MICU  Currently on NIPPV, will likely need 18/8 back up rate of 12 if hypoxemia persists

## 2021-08-20 NOTE — CHART NOTE - NSCHARTNOTEFT_GEN_A_CORE
Clinical Nutrition PPN Note    **56yo male admitted with acute hypoxic resp failure from COVID-19 PNA; pt now on continuos CPAP and unable to eat x 2 days.  received verbal request for PPN.      8/20- Hosp day #30d Patient needs intubation, but adamantly refuses at this time.   Advanced directives: full code   CPAP, dependent , cant remove, currently on Precedex.     *labs reviewed; 08-20    137  |  104  |  38<H>  ----------------------------<  210<H>  4.3   |  27  |  0.94    Ca    8.5      20 Aug 2021 06:46  Phos  4.2     08-20  Mg     2.3     08-20    TPro  6.9  /  Alb  2.5<L>  /  TBili  0.5  /  DBili  x   /  AST  24  /  ALT  62  /  AlkPhos  152<H>  08-20  BMI: BMI (kg/m2): 32.2 (07-21-21 @ 22:00)  HbA1c: A1C with Estimated Average Glucose Result: 11.6 % (07-22-21 @ 07:57)    Glucose: POCT Blood Glucose.: 262 mg/dL (08-20-21 @ 04:43)    BP: 162/85 (08-20-21 @ 07:00) (116/73 - 198/125)    POCT Blood Glucose.: 262 mg/dL (20 Aug 2021 04:43)  POCT Blood Glucose.: 196 mg/dL (19 Aug 2021 23:17)  POCT Blood Glucose.: 142 mg/dL (19 Aug 2021 17:48)  POCT Blood Glucose.: 204 mg/dL (19 Aug 2021 12:23)    ****Triglyceride level pending will initiate lipids pending results***    *labs reviewed; lytes BUN ^ will monitor (no additional fluids at this time in PPN 2/2 respiratory status).   corrected Ca 9.7 will add 8 mEq and monitor.   bilirubin total WNL (8/17).    *I&O's Detail    19 Aug 2021 07:01  -  20 Aug 2021 07:00  --------------------------------------------------------  IN:    Dexmedetomidine: 688 mL    PPN (Peripheral Parenteral Nutrition): 509 mL    sodium chloride 0.9%: 1533 mL  Total IN: 2730 mL    OUT:    Voided (mL): 1350 mL  Total OUT: 1350 mL    Total NET: 1380 mL    *positive fluid status; monitor and adjust IVF/PPN volume prn.    *pertinent meds;  sodium chloride 0.9% IVF, cholecalciferol, pantoprazole, insulin lispro (ADMELOG) corrective regimen sliding scale   SubCutaneous every 6 hours    *linda score of 14; no PU documented.  +1 generalized edema (+1) Lt/Rt foot edema documented.  BM (+) 8/15, 5 days ago, not on bowel regimen (not able to take meds by mouth).    *continues to meet criteria for severe malnutrition*    Diet, NPO (08-18-21 @ 06:22)    Estimated Needs: Based on 72Kg (adjusted IBW)   Calories: 0456-7281 Kcal (25-30 Kcal/Kg)  Protein:  115-130g (1.6-1.8 g/Kg)  Fluids:  0474-6637 mL (25-30 mL/Kg)    *Wt Hx: No additional weights documented on 8/20  82.5Kg (8/19): wt loss of ~7.9Kg in ~29days; (8.7%) clinically significant.  Height (cm): 167.6 (07-21-21 @ 14:34)  Weight (kg): 90.4 (07-21-21 @ 22:00)  BMI (kg/m2): 32.2 (07-21-21 @ 22:00)  BSA (m2): 2 (07-21-21 @ 22:00)      PPN RECOMMENDATIONS: via peripheral line  TOTAL VOLUME: 1800mL  65g Amino Acids  100g Dextrose  0g Lipids 20% (pending triglyceride level)  98 mEq NaCl  14 mEq NaAcetate  20 mMol NaPhos  32 mEq KCl  18 mEq KAcetate  0 mMol KPhos  8 mEq Calcium Gluconate  8 mEq Magnesium Sulfate  100 mg Thiamine  1mL trace elements  10mL MVI    Total Calories: 600Kcal (meeting ~33% of estimated calorie needs and ~57% of estimated protein needs)(osmolarity of 872)    ADDITIONAL RECOMMENDATIONS:  1) obtain triglyceride and LFT's  2) daily lyte and magnesium and phos checks  3) POCT q6hrs  4) strict I/O's  5) daily wt checks to track/trend changes    *will monitor and adjust treatement plan prn*  Claudia Bermudez RD office: [phone]   Cell# [phone]

## 2021-08-21 LAB
ADD ON TEST-SPECIMEN IN LAB: SIGNIFICANT CHANGE UP
ANION GAP SERPL CALC-SCNC: 4 MMOL/L — LOW (ref 5–17)
BUN SERPL-MCNC: 39 MG/DL — HIGH (ref 7–23)
CALCIUM SERPL-MCNC: 8.7 MG/DL — SIGNIFICANT CHANGE UP (ref 8.5–10.1)
CHLORIDE SERPL-SCNC: 102 MMOL/L — SIGNIFICANT CHANGE UP (ref 96–108)
CO2 SERPL-SCNC: 30 MMOL/L — SIGNIFICANT CHANGE UP (ref 22–31)
CREAT SERPL-MCNC: 0.9 MG/DL — SIGNIFICANT CHANGE UP (ref 0.5–1.3)
GLUCOSE SERPL-MCNC: 274 MG/DL — HIGH (ref 70–99)
MAGNESIUM SERPL-MCNC: 2.3 MG/DL — SIGNIFICANT CHANGE UP (ref 1.6–2.6)
PHOSPHATE SERPL-MCNC: 4.3 MG/DL — SIGNIFICANT CHANGE UP (ref 2.5–4.5)
POTASSIUM SERPL-MCNC: 4.3 MMOL/L — SIGNIFICANT CHANGE UP (ref 3.5–5.3)
POTASSIUM SERPL-SCNC: 4.3 MMOL/L — SIGNIFICANT CHANGE UP (ref 3.5–5.3)
SODIUM SERPL-SCNC: 136 MMOL/L — SIGNIFICANT CHANGE UP (ref 135–145)

## 2021-08-21 PROCEDURE — 99291 CRITICAL CARE FIRST HOUR: CPT

## 2021-08-21 RX ORDER — I.V. FAT EMULSION 20 G/100ML
0.99 EMULSION INTRAVENOUS
Qty: 90 | Refills: 0 | Status: DISCONTINUED | OUTPATIENT
Start: 2021-08-21 | End: 2021-08-21

## 2021-08-21 RX ORDER — INSULIN GLARGINE 100 [IU]/ML
15 INJECTION, SOLUTION SUBCUTANEOUS AT BEDTIME
Refills: 0 | Status: DISCONTINUED | OUTPATIENT
Start: 2021-08-21 | End: 2021-08-22

## 2021-08-21 RX ORDER — AMLODIPINE BESYLATE 2.5 MG/1
5 TABLET ORAL DAILY
Refills: 0 | Status: DISCONTINUED | OUTPATIENT
Start: 2021-08-21 | End: 2021-09-10

## 2021-08-21 RX ORDER — METOCLOPRAMIDE HCL 10 MG
5 TABLET ORAL ONCE
Refills: 0 | Status: COMPLETED | OUTPATIENT
Start: 2021-08-21 | End: 2021-08-21

## 2021-08-21 RX ORDER — ELECTROLYTE SOLUTION,INJ
1 VIAL (ML) INTRAVENOUS
Refills: 0 | Status: DISCONTINUED | OUTPATIENT
Start: 2021-08-21 | End: 2021-08-21

## 2021-08-21 RX ORDER — INSULIN LISPRO 100/ML
VIAL (ML) SUBCUTANEOUS EVERY 6 HOURS
Refills: 0 | Status: DISCONTINUED | OUTPATIENT
Start: 2021-08-21 | End: 2021-08-27

## 2021-08-21 RX ADMIN — ATORVASTATIN CALCIUM 80 MILLIGRAM(S): 80 TABLET, FILM COATED ORAL at 21:45

## 2021-08-21 RX ADMIN — DEXMEDETOMIDINE HYDROCHLORIDE IN 0.9% SODIUM CHLORIDE 4.52 MICROGRAM(S)/KG/HR: 4 INJECTION INTRAVENOUS at 06:18

## 2021-08-21 RX ADMIN — Medication 4: at 12:17

## 2021-08-21 RX ADMIN — Medication 5 MILLIGRAM(S): at 23:24

## 2021-08-21 RX ADMIN — INSULIN GLARGINE 15 UNIT(S): 100 INJECTION, SOLUTION SUBCUTANEOUS at 23:25

## 2021-08-21 RX ADMIN — DEXMEDETOMIDINE HYDROCHLORIDE IN 0.9% SODIUM CHLORIDE 4.52 MICROGRAM(S)/KG/HR: 4 INJECTION INTRAVENOUS at 09:21

## 2021-08-21 RX ADMIN — LOSARTAN POTASSIUM 75 MILLIGRAM(S): 100 TABLET, FILM COATED ORAL at 13:11

## 2021-08-21 RX ADMIN — Medication 40 MILLIGRAM(S): at 06:19

## 2021-08-21 RX ADMIN — I.V. FAT EMULSION 37.5 GM/KG/DAY: 20 EMULSION INTRAVENOUS at 23:03

## 2021-08-21 RX ADMIN — Medication 8: at 06:33

## 2021-08-21 RX ADMIN — ENOXAPARIN SODIUM 40 MILLIGRAM(S): 100 INJECTION SUBCUTANEOUS at 09:21

## 2021-08-21 RX ADMIN — Medication 650 MILLIGRAM(S): at 22:00

## 2021-08-21 RX ADMIN — ENOXAPARIN SODIUM 40 MILLIGRAM(S): 100 INJECTION SUBCUTANEOUS at 21:45

## 2021-08-21 RX ADMIN — Medication 650 MILLIGRAM(S): at 21:00

## 2021-08-21 RX ADMIN — SENNA PLUS 1 TABLET(S): 8.6 TABLET ORAL at 21:44

## 2021-08-21 RX ADMIN — Medication 8: at 23:25

## 2021-08-21 RX ADMIN — DEXMEDETOMIDINE HYDROCHLORIDE IN 0.9% SODIUM CHLORIDE 4.52 MICROGRAM(S)/KG/HR: 4 INJECTION INTRAVENOUS at 23:45

## 2021-08-21 RX ADMIN — Medication 12: at 18:53

## 2021-08-21 RX ADMIN — PANTOPRAZOLE SODIUM 40 MILLIGRAM(S): 20 TABLET, DELAYED RELEASE ORAL at 09:21

## 2021-08-21 RX ADMIN — Medication 81 MILLIGRAM(S): at 13:12

## 2021-08-21 RX ADMIN — BUDESONIDE AND FORMOTEROL FUMARATE DIHYDRATE 2 PUFF(S): 160; 4.5 AEROSOL RESPIRATORY (INHALATION) at 19:46

## 2021-08-21 RX ADMIN — Medication 10 MILLIGRAM(S): at 13:11

## 2021-08-21 RX ADMIN — Medication 2000 UNIT(S): at 13:12

## 2021-08-21 RX ADMIN — Medication 1 EACH: at 23:02

## 2021-08-21 RX ADMIN — DEXMEDETOMIDINE HYDROCHLORIDE IN 0.9% SODIUM CHLORIDE 4.52 MICROGRAM(S)/KG/HR: 4 INJECTION INTRAVENOUS at 18:31

## 2021-08-21 NOTE — PROGRESS NOTE ADULT - ASSESSMENT
55 M noted to have COVID 7/15  On arrival hypoxic to 80s and tachypneic. NRB placed,in the ER saturating 95%. Na 126 s/p 1L NS. CXR: Frandy infiltrates. Last scr 1.4 in 2019-> today 1.9 unknown if underlying ckd.  Treated with IV Remdesivir and Decadron, Tocilizumab   Given the obesity/hypertension and prior co-morbidities, he is at risk of intubation but continue HFNC, respiratory status remains critical  XR reviewed- pulmonary infiltrates are present  DDimer elevated, was on therapeutic lovenox, transitioned to 40mg q 12 now noted to be >2000 -  Completed Solumedrol, Symbicort 160/4.5 BID (https://www.[employer].com/journals/lanres/article/CDYG4742-9015, [facility] study). MICU service started Solumedrol 40mg q 8 hrs as of 8/18  Was on HFNC %FiO2 with BiPAP overnight, now on CPAP and transitions between HFNC/NIPPV  CTPE not performed  Respiratory status is tenuous and he continues to have an increased work of breathing; at this point he is at high risk of intubation  ICU team is reconsidering intubation and mechanical ventilation  In view of increased D Dimer / suggest resuming Full dose AC with Lovenox, anticoagulation being managed by MICU   Repeated DDImer and BNP  Will need repeat Echocardiogram  Currently on NIPPV with CPAP, will likely need 18/8 back up rate of 12 if hypoxemia persists

## 2021-08-21 NOTE — CHART NOTE - NSCHARTNOTEFT_GEN_A_CORE
Clinical Nutrition PPN Note    **54yo male admitted with acute hypoxic resp failure from COVID-19 PNA; CPAP dependent.  Pt not DNR or DNI.  has refused intubation, unless emergent.  *Pt was able to be placed on HIFLOW for a couple hours.  Was able to take a couple of foods in by mouth, meeting <25% of estimated nutr needs. c/w PPN as pt had panic attack overnight and now remains on CPAP continuous.    *labs reviewed; 08-21    136  |  102  |  39<H>  ----------------------------<  274<H>  4.3   |  30  |  0.90    Ca    8.7      21 Aug 2021 07:19  Phos  4.3     08-21  Mg     2.3     08-21    TPro  6.9  /  Alb  2.5<L>  /  TBili  0.5  /  DBili  x   /  AST  24  /  ALT  62  /  AlkPhos  152<H>  08-20    HbA1c: A1C with Estimated Average Glucose Result: 11.6 % (07-22-21 @ 07:57)  Glucose: POCT Blood Glucose.: 262 mg/dL (08-20-21 @ 04:43)    Lipid Panel: Date/Time: 08-20-21 @ 06:46  Triglycerides, Serum: 159    POCT Blood Glucose.: 308 mg/dL (21 Aug 2021 06:21)  POCT Blood Glucose.: 294 mg/dL (20 Aug 2021 22:53)  POCT Blood Glucose.: 325 mg/dL (20 Aug 2021 17:53)  POCT Blood Glucose.: 274 mg/dL (20 Aug 2021 12:19)      *labs reviewed; phos at upper level, will decrease in PN bag.  triglycerides within limits to add lipids to PN bag.  POCT elevated, will add insulin to PN bag for better glycemic control.    *I&O's Detail    20 Aug 2021 07:01  -  21 Aug 2021 07:00  --------------------------------------------------------  IN:    Dexmedetomidine: 917 mL    IV PiggyBack: 100 mL    PPN (Peripheral Parenteral Nutrition): 1869 mL    Sodium Chloride 0.9% Bolus: 500 mL  Total IN: 3386 mL    OUT:    Voided (mL): 3700 mL  Total OUT: 3700 mL    Total NET: -314 mL    *negative fluid status; monitor and adjust IVF/PPN volume prn.    *pertinent meds;  dexmedetomidine, atorvastatin, pantoprazole, senna, cholecalciferol, morphine, melatonin, hydrocodone    *linda score of 15; no PU documented.  +1 generalized edema documented.  BM (+) 8/15, 6 days ago, not on bowel regimen (not able to take meds by mouth).    *continues to meet criteria for severe malnutrition*    Diet, NPO (08-18-21 @ 06:22)    Estimated Needs: Based on 72Kg (adjusted IBW)   Calories: 0199-7303 Kcal (25-30 Kcal/Kg)  Protein:  115-130g (1.6-1.8 g/Kg)  Fluids:  3120-6366 mL (25-30 mL/Kg)    *Wt Hx: No additional weights documented on 8/20  82.5Kg (8/19): wt loss of ~7.9Kg in ~29days; (8.7%) clinically significant.  Height (cm): 167.6 (07-21-21 @ 14:34)  Weight (kg): 90.4 (07-21-21 @ 22:00)  BMI (kg/m2): 32.2 (07-21-21 @ 22:00)  BSA (m2): 2 (07-21-21 @ 22:00)      PPN RECOMMENDATIONS: via peripheral line  TOTAL VOLUME: 1800mL  65g Amino Acids  100g Dextrose  90g Lipids 20%   98 mEq NaCl  21 mEq NaAcetate  15 mMol NaPhos  33 mEq KCl  12 mEq KAcetate  0 mMol KPhos  8 mEq Calcium Gluconate  8 mEq Magnesium Sulfate  100 mg Thiamine  7 units Regular Insulin  1mL trace elements  10mL MVI    Total Calories: 1500Kcal (meeting ~83% of estimated calorie needs and ~57% of estimated protein needs)(osmolarity of 872)    ADDITIONAL RECOMMENDATIONS:  1) obtain triglyceride and LFT's  2) daily lyte and magnesium and phos checks  3) POCT q6hrs  4) strict I/O's  5) daily wt checks to track/trend changes    *will monitor and adjust treatement plan prn*  Anita Arnold MA, RDN, CDN, [facility] [phone]

## 2021-08-21 NOTE — PROGRESS NOTE ADULT - SUBJECTIVE AND OBJECTIVE BOX
Patient is a 55y old  Male who presents with a chief complaint of cough/sob (23 Jul 2021 14:03)      HPI:  55 M with pmh HLD, dm, htn presents with 8 days onset of covid symptoms which included, cough, fevers, sob, hypoxia, fevers, diarrhea, chills and myalgias. TEsted positive 7/15. Wife endorsed he was becoming more sob of recently. On arrival hypoxic to 80s and tachypneic. NRB placed, presently on 15% and saturating 95%. Na 126 s/p 1L NS. CXR: Frandy infiltrates. Last scr 1.4 in 2019-> today 1.9 unknown if underlying ckd.  Patient not vaccinated.   Last seen by me in 2019  History of ROBERT and uncontrolled hypertension  Treated with IV Remdesivir and Decadron, now on Toci  SaO2 is 80-85% despite being on 100% NRB  ABG pending        8/17  Noted to have increased work of breathing today, on BiPAP 14/8 back up rate 12 then transitioned to HFNC with NRB  DDimer 2157  Dopplers/CXR performed    8/18  Covering for DR Arceo  Events of the last 3 days reviewed  Critical status discussed with the Intensivist and critical care team at bedside  Overnight further deterioration with worsened hypoxemia  pt declines elective intubation in view of all clinical signs with more labored breathing / use of accessory muscles as well as decline / worsened hypoxemia indication hypoxemic resp failure  ICU team is reconsidering intubation and mechanical ventilation plans for today after pt speaks with his wife again this am  His wife visited him and is made fully aware of grave prognosis without intubation    8/19  Patient still having an increased work of breathing, patient refused intubation and states that he would like to wait  ICU team has discussed with him extensively     8/20  Patient on CPAP attempted transition to HFNC  SaO2 95%    8/21  Patient on CPAP now, was on HFNC yesterday until around 830PM when the patient developed an episode of tachycardia with desaturations in the 60's. Refused intubation.     MEDICATIONS  (STANDING):  aspirin  chewable 81 milliGRAM(s) Oral daily  atorvastatin 80 milliGRAM(s) Oral at bedtime  budesonide 160 MICROgram(s)/formoterol 4.5 MICROgram(s) Inhaler 2 Puff(s) Inhalation two times a day  cholecalciferol 2000 Unit(s) Oral daily  dexMEDEtomidine Infusion 0.2 MICROgram(s)/kG/Hr (4.52 mL/Hr) IV Continuous <Continuous>  dextrose 40% Gel 15 Gram(s) Oral once  dextrose 5%. 1000 milliLiter(s) (50 mL/Hr) IV Continuous <Continuous>  dextrose 5%. 1000 milliLiter(s) (100 mL/Hr) IV Continuous <Continuous>  dextrose 50% Injectable 25 Gram(s) IV Push once  dextrose 50% Injectable 12.5 Gram(s) IV Push once  dextrose 50% Injectable 25 Gram(s) IV Push once  enoxaparin Injectable 40 milliGRAM(s) SubCutaneous every 12 hours  glucagon  Injectable 1 milliGRAM(s) IntraMuscular once  insulin lispro (ADMELOG) corrective regimen sliding scale   SubCutaneous every 6 hours  losartan 75 milliGRAM(s) Oral daily  methylPREDNISolone sodium succinate Injectable 40 milliGRAM(s) IV Push every 8 hours  pantoprazole  Injectable 40 milliGRAM(s) IV Push daily  Parenteral Nutrition - Adult 1 Each (75 mL/Hr) TPN Continuous <Continuous>  senna 1 Tablet(s) Oral at bedtime    MEDICATIONS  (PRN):  acetaminophen   Tablet .. 650 milliGRAM(s) Oral every 4 hours PRN Temp greater or equal to 38C (100.4F), Mild Pain (1 - 3)  ALBUTerol    90 MICROgram(s) HFA Inhaler 2 Puff(s) Inhalation every 4 hours PRN Shortness of Breath and/or Wheezing  benzocaine 15 mG/menthol 3.6 mG (Sugar-Free) Lozenge 1 Lozenge Oral every 6 hours PRN Sore Throat  benzonatate 100 milliGRAM(s) Oral three times a day PRN Cough  hydrALAZINE Injectable 5 milliGRAM(s) IV Push every 6 hours PRN SBP>160  hydrocodone/homatropine Syrup 5 milliLiter(s) Oral every 6 hours PRN Cough  LORazepam   Injectable 0.5 milliGRAM(s) IV Push every 6 hours PRN Agitation  melatonin 6 milliGRAM(s) Oral at bedtime PRN Insomnia  morphine  - Injectable 2 milliGRAM(s) IV Push every 2 hours PRN SOB/Anxiety      Vital Signs Last 24 Hrs  T(C): 36.1 (21 Aug 2021 06:00), Max: 36.8 (20 Aug 2021 16:00)  T(F): 97 (21 Aug 2021 06:00), Max: 98.2 (20 Aug 2021 16:00)  HR: 64 (21 Aug 2021 07:00) (61 - 150)  BP: 161/92 (21 Aug 2021 07:00) (117/73 - 174/116)  BP(mean): 110 (21 Aug 2021 07:00) (71 - 130)  RR: 24 (21 Aug 2021 07:00) (18 - 35)  SpO2: 100% (21 Aug 2021 07:00) (68% - 100%)  --------------------------------------------------------  IN:  Total IN: 0 mL    OUT:    Voided (mL): 1700 mL  Total OUT: 1700 mL    Total NET: -1700 mL              PHYSICAL EXAM  General Appearance: On BIPAP, increased work of breathing  using accessory muscles to breath / declined intubation overnight  Lungs: coarse bilaterally  Heart: +S1,S2  Abdomen: Soft, non-tender, bowel sounds active   Extremities: no cyanosis or edema, no joint swelling  Skin: Skin color, texture normal, no rashes   Neurologic: Alert and oriented X3 , non focal, not disoriented    ECG:    LABS:                          8.5    14.05 )-----------( 292      ( 18 Aug 2021 07:01 )             26.2   08-18    138  |  104  |  24<H>  ----------------------------<  78  4.1   |  29  |  1.03    Ca    8.8      18 Aug 2021 07:01    TPro  6.3  /  Alb  2.3<L>  /  TBili  0.6  /  DBili  x   /  AST  32  /  ALT  73  /  AlkPhos  168<H>  08-17                                           10.0   19.05 )-----------( 179      ( 08 Aug 2021 07:11 )             30.3   08-08    137  |  102  |  27<H>  ----------------------------<  121<H>  3.9   |  29  |  0.77    Ca    8.4<L>      08 Aug 2021 07:11  Phos  3.4     08-08  Mg     2.0     08-08                  10.0   19.05 )-----------( 179      ( 08 Aug 2021 07:11 )             30.3   08-08    137  |  102  |  27<H>  ----------------------------<  121<H>  3.9   |  29  |  0.77    Ca    8.4<L>      08 Aug 2021 07:11  Phos  3.4     08-08  Mg     2.0     08-08                 10.4   17.19 )-----------( 198      ( 07 Aug 2021 07:06 )             32.3   08-07    138  |  104  |  31<H>  ----------------------------<  107<H>  4.4   |  29  |  0.77    Ca    8.2<L>      07 Aug 2021 07:06  Phos  4.6     08-07  Mg     2.0     08-07    TPro  5.2<L>  /  Alb  2.3<L>  /  TBili  0.8  /  DBili  x   /  AST  81<H>  /  ALT  213<H>  /  AlkPhos  200<H>  08-06                            12.9   15.00 )-----------( 366      ( 26 Jul 2021 06:20 )             38.4   07-26    133<L>  |  104  |  37<H>  ----------------------------<  232<H>  4.2   |  23  |  1.17    Ca    8.4<L>      26 Jul 2021 06:20    TPro  6.3  /  Alb  2.2<L>  /  TBili  0.4  /  DBili  0.1  /  AST  103<H>  /  ALT  137<H>  /  AlkPhos  239<H>  07-26                          13.2   13.52 )-----------( 338      ( 24 Jul 2021 08:06 )             39.5     07-24    136  |  105  |  43<H>  ----------------------------<  227<H>  4.3   |  25  |  1.31<H>    Ca    8.7      24 Jul 2021 08:06    TPro  6.6  /  Alb  2.3<L>  /  TBili  0.4  /  DBili  x   /  AST  73<H>  /  ALT  64  /  AlkPhos  165<H>  07-24          Pro BNP  -- 07-24 @ 08:06  D Dimer  446 07-24 @ 08:06  Pro BNP  261 07-21 @ 14:42  D Dimer  460 07-21 @ 14:42              < from: Xray Chest 1 View- PORTABLE-Urgent (07.21.21 @ 15:33) >    PROCEDURE DATE:  07/21/2021          INTERPRETATION:  AP chest on July 21, 2021 at 3:27 PM. Patient is short of breath with cough and fever.    Heart magnified by technique.    There arescattered mid lower lung field infiltrates right greater than left possibly related: Pneumonia.    The lungs are clear on August 30, 2019.    IMPRESSION: Bilateral infiltrates as above.    < end of copied text >  < from: US Duplex Venous Lower Ext Complete, Bilateral (07.25.21 @ 08:42) >  COMPARISON: None available.    TECHNIQUE: Duplex sonography of the BILATERAL LOWER extremity veins with color and spectral Doppler, with and without compression.    FINDINGS:    RIGHT:  Normal compressibility of the RIGHT common femoral, femoral and popliteal veins.  Doppler examination shows normal spontaneous and phasic flow.  No RIGHT calf vein thrombosis is detected.    LEFT:  Normal compressibility of the LEFT common femoral, femoral and popliteal veins.  Doppler examination shows normal spontaneous and phasic flow.  No LEFT calf vein thrombosis is detected.    IMPRESSION:  No evidence of deep venous thrombosis in either lower extremity.    < end of copied text >  RADIOLOGY & ADDITIONAL STUDIES:    < from: Xray Chest 1 View- PORTABLE-Urgent (Xray Chest 1 View- PORTABLE-Urgent .) (07.26.21 @ 08:43) >    PROCEDURE DATE:  07/26/2021          INTERPRETATION:  Portable chest radiograph    CLINICAL INFORMATION: Pneumonia due to Covid 19.. Follow-up    TECHNIQUE:  Portable  AP view of the chest was obtained.    COMPARISON: 7/21/2021 chest available for review.    FINDINGS:    The lungs show stable bilateral  multifocal and diffuse ill-defined airspace opacities.. No pneumothorax.    The  heart is enlarged in transverse diameter. No hilar mass.     Visualized osseous structures are intact.    IMPRESSION:   Stable bilateral  multifocal and diffuse ill-defined airspace opacities..    < end of copied text >  < from: US Duplex Venous Lower Ext Complete, Bilateral (08.06.21 @ 17:16) >  FINDINGS:    RIGHT:  Normal compressibility of the RIGHT common femoral, femoral and popliteal veins.  Doppler examination shows normal spontaneous and phasic flow.  No RIGHT calf vein thrombosis is detected.    LEFT:  Normal compressibility of the LEFT common femoral, femoral and popliteal veins.  Doppler examination shows normal spontaneous and phasic flow.  No LEFT calf vein thrombosisis detected.    IMPRESSION:  No evidence of deep venous thrombosis in either lower extremity.    < end of copied text >  < from: Xray Chest 1 View- PORTABLE-Urgent (Xray Chest 1 View- PORTABLE-Urgent .) (08.06.21 @ 16:45) >  INTERPRETATION:  Portable chest radiograph    CLINICAL INFORMATION: Pneumonia due to Covid 19.    TECHNIQUE:  Portable  AP view of the chest was obtained.    COMPARISON: 8/1/2021 chest radiograph available for review.    FINDINGS:    The lungs show decreasing bilateral diffuse airspace disease.. No pneumothorax.    The  heart is mildly enlarged in transverse diameter. No hilar mass.   Visualized osseous structures are intact.    IMPRESSION:   Decreasing bilateral diffuse airspace disease..    < end of copied text >  xr< from: US Duplex Venous Lower Ext Complete, Bilateral (08.17.21 @ 08:58) >  COMPARISON: None available.    TECHNIQUE: Duplex sonography of the BILATERAL LOWER extremity veins with color and spectral Doppler, with and without compression.    FINDINGS:    RIGHT:  Normal compressibility of the RIGHT common femoral, femoral and popliteal veins.  Doppler examination shows normal spontaneous and phasic flow.  No RIGHT calf vein thrombosis is detected.    LEFT:  Normal compressibility of the LEFT common femoral, femoral and popliteal veins.  Doppler examination shows normal spontaneous and phasic flow.  No LEFT calf vein thrombosis is detected.    IMPRESSION:  No evidence of deep venous thrombosis in either lower extremity.    < end of copied text >

## 2021-08-21 NOTE — PROGRESS NOTE ADULT - SUBJECTIVE AND OBJECTIVE BOX
Patient is a 55y old  Male who presents with a chief complaint of cough/sob (21 Aug 2021 09:19)      BRIEF HOSPITAL COURSE:   55M with PMHx HLD, HTN, DM admitted with cough, SOB, hypoxia, COVID+. Pt unvaccinated. Progressive hypoxic respiratory failure requiring escalation to HFNC. Complicated by ARDS. sp Actemra, sp Remdesivir.     Events last 24 hours: afebrile, BP remains 150s systolic, currently on HFNC 100%/60L +NRB, hyperglycemic today. Denies CP, abd pain, N/V, on precedex for comfort.      PAST MEDICAL & SURGICAL HISTORY:  HTN (hypertension)    Diabetes      Hosp day #31d      Vital signs / Reviewed and Physical Exam Performed where pertinent and urgently required    Lab / Radiology  studies / ABG / Meds -  reviewed and interpreted into the assessment and treatment plan.    I&O's Summary    20 Aug 2021 07:01  -  21 Aug 2021 07:00  --------------------------------------------------------  IN: 3386 mL / OUT: 3700 mL / NET: -314 mL    21 Aug 2021 07:01  -  21 Aug 2021 20:48  --------------------------------------------------------  IN: 1416 mL / OUT: 500 mL / NET: 916 mL      Impression:  1. COVID-19 Viral PNA  2. ARDS  3. acute hypoxemic respiratory failure  4. anemia  5. diabetes mellitus/hyperglycemia  6. severe protein malnutrition  7. essential HTN      Plan:  Neuro - nonfocal, cont precedex for pt comfort, cont melatonin for sleep hygiene, PRN ativan for anxiety    CV -  BP remains elevated          add norvasc 5mg daily, cont cozaar           long-term goal BP<130/80    Pulm -  cont CPAP for alveolar recruitment at HS, with trials of HFNC during daytime             actively titrating FiO2 to keep sats>86%             cont stress steroids             cont to remain high risk for decompensation with needs for escalation to intubation    GI -  PPI  PO diet as tolerates when on HFNC, cont PPN for now    Renal - Cr stable, avoid nephrotoxins, strict I/Os, trend BMP     Heme -  chem DVT with lovenox, prior LE duplex negative, D-dimer remains <500, no signs of bleeding, SCDs, transfuse for Hbg<7 or signs of                 active bleeding.    ID - remains off abx, afebrile, WBC stable, cont stress steroid taper, Abx addition based on discussion with ID in conjunction with clinical features         and culture data    Endo -  Aggressive glycemic control to limit FS glucose to < 180mg/dl, A1c 11.6, BS elevated, lantus added at HS, moderate ISS coverage scale,              adjustments based on BS ongoing.    COVID 19 specific considerations and therapeutic  options based on the available and rapidly changing literature.    35 minutes of critical care time spent in the management of this critically ill COVID-19 patient with continuous assessments and interventions based on the interpretation of multiple databases.

## 2021-08-21 NOTE — PROGRESS NOTE ADULT - SUBJECTIVE AND OBJECTIVE BOX
Events Overnight: Had pita night, got agitated and desaturated, on Precedex improved this am, eating a little bit, but poor appetitie    HPI:  64 y/o male with HLD, HTN and DM-previously unvaccinated --presents with 8 days onset of covid symptoms which included, cough, fevers, sob, hypoxia, fevers, diarrhea, chills and          myalgias.   Tested positive 7/15.    Rx with Rem/Dexa and then high dose steroids    Pt tx to ICU on 7/24 for worsening hypoxia.     Patient going back and forth from high flow to cpap  last dopplers were negative 8/17   ddimer 457 improved  episodes of severe desaturation - refusing intubation at this time    PMH:  as above    ROS - shortness of breathe , anxiety     MEDICATIONS  (STANDING):  aspirin  chewable 81 milliGRAM(s) Oral daily  atorvastatin 80 milliGRAM(s) Oral at bedtime  budesonide 160 MICROgram(s)/formoterol 4.5 MICROgram(s) Inhaler 2 Puff(s) Inhalation two times a day  cholecalciferol 2000 Unit(s) Oral daily  dexMEDEtomidine Infusion 0.2 MICROgram(s)/kG/Hr (4.52 mL/Hr) IV Continuous <Continuous>  dextrose 40% Gel 15 Gram(s) Oral once  dextrose 5%. 1000 milliLiter(s) (50 mL/Hr) IV Continuous <Continuous>  dextrose 5%. 1000 milliLiter(s) (100 mL/Hr) IV Continuous <Continuous>  dextrose 50% Injectable 25 Gram(s) IV Push once  dextrose 50% Injectable 12.5 Gram(s) IV Push once  dextrose 50% Injectable 25 Gram(s) IV Push once  enoxaparin Injectable 40 milliGRAM(s) SubCutaneous every 12 hours  glucagon  Injectable 1 milliGRAM(s) IntraMuscular once  insulin lispro (ADMELOG) corrective regimen sliding scale   SubCutaneous every 6 hours  losartan 75 milliGRAM(s) Oral daily  pantoprazole  Injectable 40 milliGRAM(s) IV Push daily  Parenteral Nutrition - Adult 1 Each (75 mL/Hr) TPN Continuous <Continuous>  predniSONE   Tablet 10 milliGRAM(s) Oral daily  senna 1 Tablet(s) Oral at bedtime    MEDICATIONS  (PRN):  acetaminophen   Tablet .. 650 milliGRAM(s) Oral every 4 hours PRN Temp greater or equal to 38C (100.4F), Mild Pain (1 - 3)  ALBUTerol    90 MICROgram(s) HFA Inhaler 2 Puff(s) Inhalation every 4 hours PRN Shortness of Breath and/or Wheezing  benzocaine 15 mG/menthol 3.6 mG (Sugar-Free) Lozenge 1 Lozenge Oral every 6 hours PRN Sore Throat  benzonatate 100 milliGRAM(s) Oral three times a day PRN Cough  hydrALAZINE Injectable 5 milliGRAM(s) IV Push every 6 hours PRN SBP>160  hydrocodone/homatropine Syrup 5 milliLiter(s) Oral every 6 hours PRN Cough  LORazepam   Injectable 0.5 milliGRAM(s) IV Push every 6 hours PRN Agitation  melatonin 6 milliGRAM(s) Oral at bedtime PRN Insomnia  morphine  - Injectable 2 milliGRAM(s) IV Push every 2 hours PRN SOB/Anxiety      ICU Vital Signs Last 24 Hrs  T(C): 36.1 (21 Aug 2021 06:00), Max: 36.8 (20 Aug 2021 16:00)  T(F): 97 (21 Aug 2021 06:00), Max: 98.2 (20 Aug 2021 16:00)  HR: 64 (21 Aug 2021 07:00) (61 - 150)  BP: 161/92 (21 Aug 2021 07:00) (117/73 - 174/116)  BP(mean): 110 (21 Aug 2021 07:00) (71 - 130)  ABP: --  ABP(mean): --  RR: 24 (21 Aug 2021 07:00) (18 - 35)  SpO2: 100% (21 Aug 2021 07:00) (68% - 100%)    Physical Exam:    General - ill , dyspneic obese  HEENT cpap mask on , abrasion to nose  neck supple  lungs dyspneic, bilateral crackles  cv rrr  abdomen soft , non tender  ext trace edema  Neuro non focal    I&O's Summary    20 Aug 2021 07:01  -  21 Aug 2021 07:00  --------------------------------------------------------  IN: 3386 mL / OUT: 3700 mL / NET: -314 mL                        9.0    14.82 )-----------( 399      ( 20 Aug 2021 06:46 )             27.8     08-21    136  |  102  |  39<H>  ----------------------------<  274<H>  4.3   |  30  |  0.90    Ca    8.7      21 Aug 2021 07:19  Phos  4.3     08-21  Mg     2.3     08-21    TPro  6.9  /  Alb  2.5<L>  /  TBili  0.5  /  DBili  x   /  AST  24  /  ALT  62  /  AlkPhos  152<H>  08-20    DVT Prophylaxis:  Lovenox                                                               Advanced Directives: Full Code

## 2021-08-21 NOTE — PROGRESS NOTE ADULT - ASSESSMENT
Patient with severe Covid Pneumonia  with ARDS  on cpap alternating high flow  con precedex for coordination with cpap and anxiety  Patient has refused intbuation  but not DNR or DNI if emergent  on Steroids will start titrating down  last cxr 8/20 - still bilateral interstitial infiltrates  will start PPN, not taking adequate PO  Ddimer 457, last venous dopplers negative, no indication for full AC, on prophylaxis    At very high risk of intubation, and decompensation

## 2021-08-22 LAB
ALBUMIN SERPL ELPH-MCNC: 2.5 G/DL — LOW (ref 3.3–5)
ALP SERPL-CCNC: 116 U/L — SIGNIFICANT CHANGE UP (ref 40–120)
ALT FLD-CCNC: 55 U/L — SIGNIFICANT CHANGE UP (ref 12–78)
ANION GAP SERPL CALC-SCNC: 5 MMOL/L — SIGNIFICANT CHANGE UP (ref 5–17)
AST SERPL-CCNC: 20 U/L — SIGNIFICANT CHANGE UP (ref 15–37)
BILIRUB SERPL-MCNC: 0.6 MG/DL — SIGNIFICANT CHANGE UP (ref 0.2–1.2)
BUN SERPL-MCNC: 36 MG/DL — HIGH (ref 7–23)
CALCIUM SERPL-MCNC: 8.6 MG/DL — SIGNIFICANT CHANGE UP (ref 8.5–10.1)
CHLORIDE SERPL-SCNC: 105 MMOL/L — SIGNIFICANT CHANGE UP (ref 96–108)
CO2 SERPL-SCNC: 28 MMOL/L — SIGNIFICANT CHANGE UP (ref 22–31)
CREAT SERPL-MCNC: 0.81 MG/DL — SIGNIFICANT CHANGE UP (ref 0.5–1.3)
GLUCOSE SERPL-MCNC: 179 MG/DL — HIGH (ref 70–99)
MAGNESIUM SERPL-MCNC: 2.2 MG/DL — SIGNIFICANT CHANGE UP (ref 1.6–2.6)
PHOSPHATE SERPL-MCNC: 2.4 MG/DL — LOW (ref 2.5–4.5)
POTASSIUM SERPL-MCNC: 3.7 MMOL/L — SIGNIFICANT CHANGE UP (ref 3.5–5.3)
POTASSIUM SERPL-SCNC: 3.7 MMOL/L — SIGNIFICANT CHANGE UP (ref 3.5–5.3)
PROT SERPL-MCNC: 6 GM/DL — SIGNIFICANT CHANGE UP (ref 6–8.3)
SODIUM SERPL-SCNC: 138 MMOL/L — SIGNIFICANT CHANGE UP (ref 135–145)

## 2021-08-22 PROCEDURE — 99291 CRITICAL CARE FIRST HOUR: CPT

## 2021-08-22 RX ORDER — ELECTROLYTE SOLUTION,INJ
1 VIAL (ML) INTRAVENOUS
Refills: 0 | Status: DISCONTINUED | OUTPATIENT
Start: 2021-08-22 | End: 2021-08-23

## 2021-08-22 RX ORDER — POTASSIUM CHLORIDE 20 MEQ
40 PACKET (EA) ORAL EVERY 4 HOURS
Refills: 0 | Status: COMPLETED | OUTPATIENT
Start: 2021-08-22 | End: 2021-08-22

## 2021-08-22 RX ORDER — I.V. FAT EMULSION 20 G/100ML
0.99 EMULSION INTRAVENOUS
Qty: 90 | Refills: 0 | Status: DISCONTINUED | OUTPATIENT
Start: 2021-08-22 | End: 2021-08-22

## 2021-08-22 RX ORDER — ALPRAZOLAM 0.25 MG
0.5 TABLET ORAL EVERY 6 HOURS
Refills: 0 | Status: DISCONTINUED | OUTPATIENT
Start: 2021-08-22 | End: 2021-08-29

## 2021-08-22 RX ORDER — METOPROLOL TARTRATE 50 MG
5 TABLET ORAL ONCE
Refills: 0 | Status: COMPLETED | OUTPATIENT
Start: 2021-08-22 | End: 2021-08-22

## 2021-08-22 RX ORDER — INSULIN GLARGINE 100 [IU]/ML
25 INJECTION, SOLUTION SUBCUTANEOUS AT BEDTIME
Refills: 0 | Status: DISCONTINUED | OUTPATIENT
Start: 2021-08-22 | End: 2021-08-25

## 2021-08-22 RX ORDER — POLYETHYLENE GLYCOL 3350 17 G/17G
17 POWDER, FOR SOLUTION ORAL DAILY
Refills: 0 | Status: DISCONTINUED | OUTPATIENT
Start: 2021-08-22 | End: 2021-10-05

## 2021-08-22 RX ORDER — POTASSIUM CHLORIDE 20 MEQ
10 PACKET (EA) ORAL
Refills: 0 | Status: COMPLETED | OUTPATIENT
Start: 2021-08-22 | End: 2021-08-22

## 2021-08-22 RX ADMIN — DEXMEDETOMIDINE HYDROCHLORIDE IN 0.9% SODIUM CHLORIDE 4.52 MICROGRAM(S)/KG/HR: 4 INJECTION INTRAVENOUS at 17:23

## 2021-08-22 RX ADMIN — BUDESONIDE AND FORMOTEROL FUMARATE DIHYDRATE 2 PUFF(S): 160; 4.5 AEROSOL RESPIRATORY (INHALATION) at 08:39

## 2021-08-22 RX ADMIN — DEXMEDETOMIDINE HYDROCHLORIDE IN 0.9% SODIUM CHLORIDE 4.52 MICROGRAM(S)/KG/HR: 4 INJECTION INTRAVENOUS at 11:14

## 2021-08-22 RX ADMIN — Medication 100 MILLIEQUIVALENT(S): at 12:41

## 2021-08-22 RX ADMIN — Medication 2000 UNIT(S): at 09:56

## 2021-08-22 RX ADMIN — Medication 2: at 06:11

## 2021-08-22 RX ADMIN — ENOXAPARIN SODIUM 40 MILLIGRAM(S): 100 INJECTION SUBCUTANEOUS at 09:56

## 2021-08-22 RX ADMIN — Medication 2: at 11:39

## 2021-08-22 RX ADMIN — AMLODIPINE BESYLATE 5 MILLIGRAM(S): 2.5 TABLET ORAL at 09:56

## 2021-08-22 RX ADMIN — DEXMEDETOMIDINE HYDROCHLORIDE IN 0.9% SODIUM CHLORIDE 4.52 MICROGRAM(S)/KG/HR: 4 INJECTION INTRAVENOUS at 09:55

## 2021-08-22 RX ADMIN — Medication 100 MILLIEQUIVALENT(S): at 11:14

## 2021-08-22 RX ADMIN — Medication 5 MILLIGRAM(S): at 02:30

## 2021-08-22 RX ADMIN — Medication 81 MILLIGRAM(S): at 09:55

## 2021-08-22 RX ADMIN — Medication 10 MILLIGRAM(S): at 09:56

## 2021-08-22 RX ADMIN — LOSARTAN POTASSIUM 75 MILLIGRAM(S): 100 TABLET, FILM COATED ORAL at 09:56

## 2021-08-22 RX ADMIN — Medication 0.5 MILLIGRAM(S): at 02:30

## 2021-08-22 RX ADMIN — BUDESONIDE AND FORMOTEROL FUMARATE DIHYDRATE 2 PUFF(S): 160; 4.5 AEROSOL RESPIRATORY (INHALATION) at 20:51

## 2021-08-22 RX ADMIN — Medication 40 MILLIEQUIVALENT(S): at 09:55

## 2021-08-22 RX ADMIN — ATORVASTATIN CALCIUM 80 MILLIGRAM(S): 80 TABLET, FILM COATED ORAL at 21:50

## 2021-08-22 RX ADMIN — Medication 1 EACH: at 22:15

## 2021-08-22 RX ADMIN — Medication 40 MILLIEQUIVALENT(S): at 14:03

## 2021-08-22 RX ADMIN — Medication 5 MILLIGRAM(S): at 02:00

## 2021-08-22 RX ADMIN — DEXMEDETOMIDINE HYDROCHLORIDE IN 0.9% SODIUM CHLORIDE 4.52 MICROGRAM(S)/KG/HR: 4 INJECTION INTRAVENOUS at 04:55

## 2021-08-22 RX ADMIN — PANTOPRAZOLE SODIUM 40 MILLIGRAM(S): 20 TABLET, DELAYED RELEASE ORAL at 09:55

## 2021-08-22 RX ADMIN — POLYETHYLENE GLYCOL 3350 17 GRAM(S): 17 POWDER, FOR SOLUTION ORAL at 14:03

## 2021-08-22 RX ADMIN — DEXMEDETOMIDINE HYDROCHLORIDE IN 0.9% SODIUM CHLORIDE 4.52 MICROGRAM(S)/KG/HR: 4 INJECTION INTRAVENOUS at 20:00

## 2021-08-22 RX ADMIN — Medication 2: at 17:22

## 2021-08-22 RX ADMIN — Medication 100 MILLIEQUIVALENT(S): at 09:56

## 2021-08-22 RX ADMIN — Medication 0.5 MILLIGRAM(S): at 23:02

## 2021-08-22 RX ADMIN — ENOXAPARIN SODIUM 40 MILLIGRAM(S): 100 INJECTION SUBCUTANEOUS at 21:50

## 2021-08-22 RX ADMIN — I.V. FAT EMULSION 37.5 GM/KG/DAY: 20 EMULSION INTRAVENOUS at 22:15

## 2021-08-22 RX ADMIN — DEXMEDETOMIDINE HYDROCHLORIDE IN 0.9% SODIUM CHLORIDE 4.52 MICROGRAM(S)/KG/HR: 4 INJECTION INTRAVENOUS at 23:02

## 2021-08-22 RX ADMIN — INSULIN GLARGINE 25 UNIT(S): 100 INJECTION, SOLUTION SUBCUTANEOUS at 23:04

## 2021-08-22 NOTE — PROGRESS NOTE ADULT - SUBJECTIVE AND OBJECTIVE BOX
Patient is a 55y old  Male who presents with a chief complaint of cough/sob (22 Aug 2021 10:50)      BRIEF HOSPITAL COURSE:   55M with PMHx HLD, HTN, DM admitted with cough, SOB, hypoxia, COVID+. Pt unvaccinated. Progressive hypoxic respiratory failure requiring escalation to HFNC. Complicated by ARDS. sp Actemra, sp Remdesivir.     Events last 24 hours: afebrile, BP better controlled today, currently on HFNC 100%/60L +NRB sats in high 80s, BS improved with lantus. Denies CP, abd pain, N/V, on precedex for comfort.      PAST MEDICAL & SURGICAL HISTORY:  HTN (hypertension)    Diabetes    Hosp day #32d          Vital signs / Reviewed and Physical Exam Performed where pertinent and urgently required    Lab / Radiology  studies / ABG / Meds -  reviewed and interpreted into the assessment and treatment plan.    I&O's Summary    21 Aug 2021 07:01  -  22 Aug 2021 07:00  --------------------------------------------------------  IN: 2756 mL / OUT: 1800 mL / NET: 956 mL    22 Aug 2021 07:01  -  22 Aug 2021 20:50  --------------------------------------------------------  IN: 1948.2 mL / OUT: 900 mL / NET: 1048.2 mL      Impression:  1. COVID-19 Viral PNA  2. ARDS  3. acute hypoxemic respiratory failure  4. anemia  5. diabetes mellitus/hyperglycemia  6. severe protein malnutrition  7. essential HTN  8. anxiety  9. hyperlipidemia      Plan:  Neuro - nonfocal, cont precedex for pt comfort, melatonin for sleep hygiene, xanax addition for longer acting anxiolysis    CV -  BP better controlled          cont cozaar and norvasc at current doses          cont high intensity statin          long-term goal BP<130/80    Pulm -  CPAP for alveolar recruitment at HS again tonight, daytime trials of HFNC with flow at 60L             cont to attempt to actively titrate FiO2 to keep sats>86%             cont stress steroids             standing inhaled steroids (symbicort), PRN bronchodialators             cont to remain high risk for decompensation with needs for escalation to intubation    GI -  PPI,  PO diet as tolerates when on HFNC, encouragement for glucerna shake supplementation as tolerates, cont PPN for now until PO intake           improves    Renal - Cr stable, avoid nephrotoxins, strict I/Os, BMP in am    Heme -  chem DVT with lovenox, prior LE duplex negative, D-dimer on repeat <500, no signs of bleeding, SCDs, transfuse for Hbg<7 or signs of                 active bleeding.    ID - remains off abx, afebrile,  cont stress steroid taper, Abx addition based on discussion with ID in conjunction with clinical features         and culture data    Endo -  Aggressive glycemic control to limit FS glucose to < 180mg/dl, A1c 11.6, BS better controlled, lantus increased at HS, moderate ISS               coverage scale, adjustments based on BS ongoing.    COVID 19 specific considerations and therapeutic  options based on the available and rapidly changing literature.    35 minutes of critical care time spent in the management of this critically ill COVID-19 patient with continuous assessments and interventions based on the interpretation of multiple databases.

## 2021-08-22 NOTE — PROGRESS NOTE ADULT - ASSESSMENT
Patient with severe Covid Pneumonia  with ARDS  on cpap at night  alternating high flow during day  con precedex for coordination with cpap and anxiety, will add xanax prn  Patient has refused intbuation  but not DNR or DNI if emergent  on Steroids will titrating down  last cxr 8/20 - still bilateral interstitial infiltrates  on  PPN, not taking adequate PO, but eating when on high flow  Ddimer 457, last venous dopplers negative, no indication for full AC, on prophylaxis lovenox 40 bid    At very high risk of intubation, and decompensation

## 2021-08-22 NOTE — CHART NOTE - NSCHARTNOTEFT_GEN_A_CORE
Clinical Nutrition PPN Note    **56yo male admitted with acute hypoxic resp failure from COVID-19 PNA; CPAP dependent.  Pt not DNR or DNI.  has refused intubation, unless emergent.  *Pt was able to be placed on HIFLOW for a couple hours.  Pt was able to consume 80% of one meal on 8/21.  c/w PPN as pt continues to not meet nutr needs due to breathing status and dependance on CPAP.    *labs reviewed; 08-22    138  |  105  |  36<H>  ----------------------------<  179<H>  3.7   |  28  |  0.81    Ca    8.6      22 Aug 2021 04:26  Phos  2.4     08-22  Mg     2.2     08-22    TPro  6.0  /  Alb  2.5<L>  /  TBili  0.6  /  DBili  x   /  AST  20  /  ALT  55  /  AlkPhos  116  08-22    HbA1c: A1C with Estimated Average Glucose Result: 11.6 % (07-22-21 @ 07:57)  Glucose: POCT Blood Glucose.: 262 mg/dL (08-20-21 @ 04:43)    Lipid Panel: Date/Time: 08-20-21 @ 06:46  Triglycerides, Serum: 159    POCT Blood Glucose.: 175 mg/dL (22 Aug 2021 06:07)  POCT Blood Glucose.: 228 mg/dL (22 Aug 2021 02:21)  POCT Blood Glucose.: 346 mg/dL (21 Aug 2021 23:08)  POCT Blood Glucose.: 441 mg/dL (21 Aug 2021 18:36)  POCT Blood Glucose.: 250 mg/dL (21 Aug 2021 11:31)    *labs reviewed; phos now low, will increase in PN bag. POCT elevated due to steroid use and PO intake, 7 units of insulin in PN bag covers dextrose in PN bag. pt started on 15 units lantus at bedtime to cover steroids and PO intake.  triglycerides within limits to add lipids to PN bag.  bilirubin total WNL.    *I&O's Detail    21 Aug 2021 07:01  -  22 Aug 2021 07:00  --------------------------------------------------------  IN:    Dexmedetomidine: 835 mL    Fat Emulsion (Fish Oil &amp; Plant Based) 20% Infusion: 253 mL    PPN (Peripheral Parenteral Nutrition): 1668 mL  Total IN: 2756 mL    OUT:    Voided (mL): 1800 mL  Total OUT: 1800 mL    Total NET: 956 mL    *positive fluid status; monitor and adjust IVF/PPN volume prn.    *pertinent meds;  dexmedetomidine, atorvastatin, lantus 15 units bedtime, admelog sliding scale, cholecalciferol, pantoprazole, senna, prednisone, zofran    *linda score of 16; no PU documented.  +1 generalized edema documented.  BM (+) 8/15, 7 days ago, pt given senna last night.    *continues to meet criteria for severe malnutrition*    Diet, Regular (08-20-21 @ 12:38)(allowed when on HIFLOW)    Estimated Needs: Based on 72Kg (adjusted IBW)   Calories: 5554-8271 Kcal (25-30 Kcal/Kg)  Protein:  115-130g (1.6-1.8 g/Kg)  Fluids:  0754-0043 mL (25-30 mL/Kg)    *Wt Hx: No additional weights documented on 8/20  82.5Kg (8/19): wt loss of ~7.9Kg in ~29days; (8.7%) clinically significant.  Height (cm): 167.6 (07-21-21 @ 14:34)  Weight (kg): 90.4 (07-21-21 @ 22:00)  BMI (kg/m2): 32.2 (07-21-21 @ 22:00)  BSA (m2): 2 (07-21-21 @ 22:00)      PPN RECOMMENDATIONS: via peripheral line  TOTAL VOLUME: 1800mL    65g Amino Acids  100g Dextrose  90g Lipids 20%   98 mEq NaCl  14 mEq NaAcetate  20 mMol NaPhos  30 mEq KCl  15 mEq KAcetate  0 mMol KPhos  8 mEq Calcium Gluconate  8 mEq Magnesium Sulfate  100 mg Thiamine  7 units Regular Insulin  1mL trace elements  10mL MVI    Total Calories: 1500Kcal (meeting ~83% of estimated calorie needs and ~57% of estimated protein needs)(osmolarity of 873)    ADDITIONAL RECOMMENDATIONS:  1) obtain triglyceride and LFT's  2) daily lyte and magnesium and phos checks  3) POCT q6hrs; adjust insulin regimen prn to maintain tight glycemic control  4) strict I/O's  5) daily wt checks to track/trend changes    *will monitor and adjust treatement plan prn*  Anita Arnold MA, RDN, CDN, [facility] [phone]

## 2021-08-22 NOTE — PROGRESS NOTE ADULT - SUBJECTIVE AND OBJECTIVE BOX
Events Overnight:  Patient had episode of desaturation, on cpap overnight , high flow during the day, afebrile    HPI:       66 y/o male with HLD, HTN and DM-previously unvaccinated --presents with 8 days onset of covid symptoms which included, cough, fevers, sob, hypoxia, fevers, diarrhea, chills and          myalgias.   Tested positive 7/15.    Rx with Rem/Dexa and then high dose steroids    Pt tx to ICU on 7/24 for worsening hypoxia.     Patient going back and forth from high flow to cpap  last dopplers were negative 8/17   ddimer 457 improved  episodes of severe desaturation - refusing intubation at this time    PMH:  as above    ROS - shortness of breathe , anxiety, still taking some PO    MEDICATIONS  (STANDING):  amLODIPine   Tablet 5 milliGRAM(s) Oral daily  aspirin  chewable 81 milliGRAM(s) Oral daily  atorvastatin 80 milliGRAM(s) Oral at bedtime  budesonide 160 MICROgram(s)/formoterol 4.5 MICROgram(s) Inhaler 2 Puff(s) Inhalation two times a day  cholecalciferol 2000 Unit(s) Oral daily  dexMEDEtomidine Infusion 0.2 MICROgram(s)/kG/Hr (4.52 mL/Hr) IV Continuous <Continuous>  dextrose 40% Gel 15 Gram(s) Oral once  dextrose 5%. 1000 milliLiter(s) (50 mL/Hr) IV Continuous <Continuous>  dextrose 5%. 1000 milliLiter(s) (100 mL/Hr) IV Continuous <Continuous>  dextrose 50% Injectable 25 Gram(s) IV Push once  dextrose 50% Injectable 12.5 Gram(s) IV Push once  dextrose 50% Injectable 25 Gram(s) IV Push once  enoxaparin Injectable 40 milliGRAM(s) SubCutaneous every 12 hours  fat emulsion (Fish Oil and Plant Based) 20% Infusion 0.99 Gm/kG/Day (37.5 mL/Hr) IV Continuous <Continuous>  glucagon  Injectable 1 milliGRAM(s) IntraMuscular once  insulin glargine Injectable (LANTUS) 25 Unit(s) SubCutaneous at bedtime  insulin lispro (ADMELOG) corrective regimen sliding scale   SubCutaneous every 6 hours  losartan 75 milliGRAM(s) Oral daily  pantoprazole  Injectable 40 milliGRAM(s) IV Push daily  Parenteral Nutrition - Adult 1 Each (75 mL/Hr) TPN Continuous <Continuous>  potassium chloride   Powder 40 milliEquivalent(s) Oral every 4 hours  potassium chloride  10 mEq/100 mL IVPB 10 milliEquivalent(s) IV Intermittent every 1 hour  predniSONE   Tablet 10 milliGRAM(s) Oral daily  senna 1 Tablet(s) Oral at bedtime    MEDICATIONS  (PRN):  acetaminophen   Tablet .. 650 milliGRAM(s) Oral every 4 hours PRN Temp greater or equal to 38C (100.4F), Mild Pain (1 - 3)  ALBUTerol    90 MICROgram(s) HFA Inhaler 2 Puff(s) Inhalation every 4 hours PRN Shortness of Breath and/or Wheezing  benzocaine 15 mG/menthol 3.6 mG (Sugar-Free) Lozenge 1 Lozenge Oral every 6 hours PRN Sore Throat  benzonatate 100 milliGRAM(s) Oral three times a day PRN Cough  hydrALAZINE Injectable 5 milliGRAM(s) IV Push every 6 hours PRN SBP>160  LORazepam   Injectable 0.5 milliGRAM(s) IV Push every 4 hours PRN Anxiety  melatonin 6 milliGRAM(s) Oral at bedtime PRN Insomnia  morphine  - Injectable 2 milliGRAM(s) IV Push every 2 hours PRN SOB/Anxiety    ICU Vital Signs Last 24 Hrs  T(C): 36.4 (22 Aug 2021 04:00), Max: 36.4 (22 Aug 2021 04:00)  T(F): 97.5 (22 Aug 2021 04:00), Max: 97.5 (22 Aug 2021 04:00)  HR: 61 (22 Aug 2021 09:00) (59 - 146)  BP: 127/79 (22 Aug 2021 09:00) (111/63 - 177/103)  BP(mean): 90 (22 Aug 2021 09:00) (74 - 123)  ABP: --  ABP(mean): --  RR: 29 (22 Aug 2021 09:00) (15 - 35)  SpO2: 96% (22 Aug 2021 09:00) (81% - 99%)    Physical Exam:    General: dyspneic  Neuro - conversant , awake and alert  respiratory - dyspneic, scattered rhonchi, on high flow  cv rrr  abdomen soft, non tender  extreimties warm   normal      I&O's Summary    21 Aug 2021 07:01  -  22 Aug 2021 07:00  --------------------------------------------------------  IN: 2756 mL / OUT: 1800 mL / NET: 956 mL    08-22    138  |  105  |  36<H>  ----------------------------<  179<H>  3.7   |  28  |  0.81    Ca    8.6      22 Aug 2021 04:26  Phos  2.4     08-22  Mg     2.2     08-22    TPro  6.0  /  Alb  2.5<L>  /  TBili  0.6  /  DBili  x   /  AST  20  /  ALT  55  /  AlkPhos  116  08-22    DVT Prophylaxis:   Lovenox 40 bid                                                              Advanced Directives: Ful Code

## 2021-08-23 LAB
ALBUMIN SERPL ELPH-MCNC: 2.3 G/DL — LOW (ref 3.3–5)
ALP SERPL-CCNC: 113 U/L — SIGNIFICANT CHANGE UP (ref 40–120)
ALT FLD-CCNC: 62 U/L — SIGNIFICANT CHANGE UP (ref 12–78)
ANION GAP SERPL CALC-SCNC: 3 MMOL/L — LOW (ref 5–17)
AST SERPL-CCNC: 31 U/L — SIGNIFICANT CHANGE UP (ref 15–37)
BILIRUB SERPL-MCNC: 0.7 MG/DL — SIGNIFICANT CHANGE UP (ref 0.2–1.2)
BUN SERPL-MCNC: 25 MG/DL — HIGH (ref 7–23)
CALCIUM SERPL-MCNC: 8.3 MG/DL — LOW (ref 8.5–10.1)
CHLORIDE SERPL-SCNC: 103 MMOL/L — SIGNIFICANT CHANGE UP (ref 96–108)
CO2 SERPL-SCNC: 29 MMOL/L — SIGNIFICANT CHANGE UP (ref 22–31)
CREAT SERPL-MCNC: 0.69 MG/DL — SIGNIFICANT CHANGE UP (ref 0.5–1.3)
D DIMER BLD IA.RAPID-MCNC: 1051 NG/ML DDU — HIGH
GLUCOSE SERPL-MCNC: 148 MG/DL — HIGH (ref 70–99)
HCT VFR BLD CALC: 28.6 % — LOW (ref 39–50)
HGB BLD-MCNC: 9.1 G/DL — LOW (ref 13–17)
MAGNESIUM SERPL-MCNC: 1.9 MG/DL — SIGNIFICANT CHANGE UP (ref 1.6–2.6)
MCHC RBC-ENTMCNC: 28 PG — SIGNIFICANT CHANGE UP (ref 27–34)
MCHC RBC-ENTMCNC: 31.8 GM/DL — LOW (ref 32–36)
MCV RBC AUTO: 88 FL — SIGNIFICANT CHANGE UP (ref 80–100)
PHOSPHATE SERPL-MCNC: 2.7 MG/DL — SIGNIFICANT CHANGE UP (ref 2.5–4.5)
PLATELET # BLD AUTO: 374 K/UL — SIGNIFICANT CHANGE UP (ref 150–400)
POTASSIUM SERPL-MCNC: 4.1 MMOL/L — SIGNIFICANT CHANGE UP (ref 3.5–5.3)
POTASSIUM SERPL-SCNC: 4.1 MMOL/L — SIGNIFICANT CHANGE UP (ref 3.5–5.3)
PROCALCITONIN SERPL-MCNC: 0.14 NG/ML — HIGH (ref 0.02–0.1)
PROT SERPL-MCNC: 5.9 GM/DL — LOW (ref 6–8.3)
RBC # BLD: 3.25 M/UL — LOW (ref 4.2–5.8)
RBC # FLD: 13.6 % — SIGNIFICANT CHANGE UP (ref 10.3–14.5)
SODIUM SERPL-SCNC: 135 MMOL/L — SIGNIFICANT CHANGE UP (ref 135–145)
WBC # BLD: 16.14 K/UL — HIGH (ref 3.8–10.5)
WBC # FLD AUTO: 16.14 K/UL — HIGH (ref 3.8–10.5)

## 2021-08-23 PROCEDURE — 93970 EXTREMITY STUDY: CPT | Mod: 26

## 2021-08-23 PROCEDURE — 99291 CRITICAL CARE FIRST HOUR: CPT

## 2021-08-23 PROCEDURE — 71045 X-RAY EXAM CHEST 1 VIEW: CPT | Mod: 26

## 2021-08-23 RX ORDER — ELECTROLYTE SOLUTION,INJ
1 VIAL (ML) INTRAVENOUS
Refills: 0 | Status: DISCONTINUED | OUTPATIENT
Start: 2021-08-23 | End: 2021-08-23

## 2021-08-23 RX ORDER — I.V. FAT EMULSION 20 G/100ML
0.99 EMULSION INTRAVENOUS
Qty: 90 | Refills: 0 | Status: DISCONTINUED | OUTPATIENT
Start: 2021-08-23 | End: 2021-08-23

## 2021-08-23 RX ADMIN — ENOXAPARIN SODIUM 40 MILLIGRAM(S): 100 INJECTION SUBCUTANEOUS at 09:22

## 2021-08-23 RX ADMIN — PANTOPRAZOLE SODIUM 40 MILLIGRAM(S): 20 TABLET, DELAYED RELEASE ORAL at 09:22

## 2021-08-23 RX ADMIN — Medication 2: at 23:04

## 2021-08-23 RX ADMIN — DEXMEDETOMIDINE HYDROCHLORIDE IN 0.9% SODIUM CHLORIDE 4.52 MICROGRAM(S)/KG/HR: 4 INJECTION INTRAVENOUS at 19:14

## 2021-08-23 RX ADMIN — AMLODIPINE BESYLATE 5 MILLIGRAM(S): 2.5 TABLET ORAL at 09:22

## 2021-08-23 RX ADMIN — LOSARTAN POTASSIUM 75 MILLIGRAM(S): 100 TABLET, FILM COATED ORAL at 09:22

## 2021-08-23 RX ADMIN — ENOXAPARIN SODIUM 40 MILLIGRAM(S): 100 INJECTION SUBCUTANEOUS at 21:40

## 2021-08-23 RX ADMIN — Medication 10 MILLIGRAM(S): at 09:22

## 2021-08-23 RX ADMIN — DEXMEDETOMIDINE HYDROCHLORIDE IN 0.9% SODIUM CHLORIDE 4.52 MICROGRAM(S)/KG/HR: 4 INJECTION INTRAVENOUS at 03:10

## 2021-08-23 RX ADMIN — DEXMEDETOMIDINE HYDROCHLORIDE IN 0.9% SODIUM CHLORIDE 4.52 MICROGRAM(S)/KG/HR: 4 INJECTION INTRAVENOUS at 09:22

## 2021-08-23 RX ADMIN — Medication 81 MILLIGRAM(S): at 09:22

## 2021-08-23 RX ADMIN — Medication 0.5 MILLIGRAM(S): at 17:23

## 2021-08-23 RX ADMIN — DEXMEDETOMIDINE HYDROCHLORIDE IN 0.9% SODIUM CHLORIDE 4.52 MICROGRAM(S)/KG/HR: 4 INJECTION INTRAVENOUS at 21:40

## 2021-08-23 RX ADMIN — I.V. FAT EMULSION 37.3 GM/KG/DAY: 20 EMULSION INTRAVENOUS at 22:01

## 2021-08-23 RX ADMIN — INSULIN GLARGINE 25 UNIT(S): 100 INJECTION, SOLUTION SUBCUTANEOUS at 22:16

## 2021-08-23 RX ADMIN — DEXMEDETOMIDINE HYDROCHLORIDE IN 0.9% SODIUM CHLORIDE 4.52 MICROGRAM(S)/KG/HR: 4 INJECTION INTRAVENOUS at 14:50

## 2021-08-23 RX ADMIN — DEXMEDETOMIDINE HYDROCHLORIDE IN 0.9% SODIUM CHLORIDE 4.52 MICROGRAM(S)/KG/HR: 4 INJECTION INTRAVENOUS at 12:12

## 2021-08-23 RX ADMIN — ATORVASTATIN CALCIUM 80 MILLIGRAM(S): 80 TABLET, FILM COATED ORAL at 21:40

## 2021-08-23 RX ADMIN — Medication 2: at 06:04

## 2021-08-23 RX ADMIN — Medication 2: at 12:53

## 2021-08-23 RX ADMIN — Medication 75 EACH: at 22:01

## 2021-08-23 RX ADMIN — Medication 2000 UNIT(S): at 09:22

## 2021-08-23 RX ADMIN — Medication 4: at 17:22

## 2021-08-23 NOTE — CHART NOTE - NSCHARTNOTEFT_GEN_A_CORE
Clinical Nutrition PPN Note    **54yo male admitted with acute hypoxic resp failure from COVID-19 PNA; CPAP dependent.  Pt not DNR or DNI.  has refused intubation, unless emergent.  *Pt is on HIFLOW due breathing status, not eating at this time.    *labs reviewed; 08-23    135  |  103  |  25<H>  ----------------------------<  148<H>  4.1   |  29  |  0.69    Ca    8.3<L>      23 Aug 2021 04:50  Phos  2.7     08-23  Mg     1.9     08-23    TPro  5.9<L>  /  Alb  2.3<L>  /  TBili  0.7  /  DBili  x   /  AST  31  /  ALT  62  /  AlkPhos  113  08-23    HbA1c: A1C with Estimated Average Glucose Result: 11.6 % (07-22-21 @ 07:57)  Glucose: POCT Blood Glucose.: 262 mg/dL (08-20-21 @ 04:43)    Lipid Panel: Date/Time: 08-20-21 @ 06:46  Triglycerides, Serum: 159    POCT Blood Glucose.: 161 mg/dL (23 Aug 2021 05:44)  POCT Blood Glucose.: 131 mg/dL (22 Aug 2021 23:09)  POCT Blood Glucose.: 195 mg/dL (22 Aug 2021 16:57)  POCT Blood Glucose.: 193 mg/dL (22 Aug 2021 11:20)      *labs reviewed; lytes WNL. POCT elevated due to steroid use and PO intake, 7 units of insulin in PN bag covers dextrose in PN bag. Pt receiving lantus and sliding scale to cover steroids and PO intake.  triglycerides within limits to add lipids to PN bag.  bilirubin total WNL.    *I&O's Detail    22 Aug 2021 07:01  -  23 Aug 2021 07:00  --------------------------------------------------------  IN:    Dexmedetomidine: 621.5 mL    Fat Emulsion (Fish Oil &amp; Plant Based) 20% Infusion: 356 mL    IV PiggyBack: 300 mL    Oral Fluid: 580 mL    PPN (Peripheral Parenteral Nutrition): 1760.7 mL  Total IN: 3618.2 mL    OUT:    Voided (mL): 3200 mL  Total OUT: 3200 mL    Total NET: 418.2 mL    *positive fluid status; monitor and adjust IVF/PPN volume prn.  high urine output.    *pertinent meds;  dexmedetomidine, atorvastatin, lantus 15 units bedtime, admelog sliding scale, cholecalciferol, pantoprazole, senna, prednisone, zofran    *linda score of 17; no PU documented.  +2 generalized and (+3) Lt arm edema documented.  BM (+) 8/22, diarrhea, after 7 days of constipation.    *continues to meet criteria for severe malnutrition*    Diet, Regular (08-20-21 @ 12:38)(allowed when on HIFLOW)    Estimated Needs: Based on 72Kg (adjusted IBW)   Calories: 6056-3739 Kcal (25-30 Kcal/Kg)  Protein:  115-130g (1.6-1.8 g/Kg)  Fluids:  9595-9277 mL (25-30 mL/Kg)    *Wt Hx: No additional weights documented on 8/20  82.5Kg (8/19): wt loss of ~7.9Kg in ~29days; (8.7%) clinically significant.  Height (cm): 167.6 (07-21-21 @ 14:34)  Weight (kg): 90.4 (07-21-21 @ 22:00)  BMI (kg/m2): 32.2 (07-21-21 @ 22:00)  BSA (m2): 2 (07-21-21 @ 22:00)      PPN RECOMMENDATIONS: via peripheral line  TOTAL VOLUME: 1800mL    65g Amino Acids  100g Dextrose  90g Lipids 20%   98 mEq NaCl  14 mEq NaAcetate  20 mMol NaPhos  30 mEq KCl  15 mEq KAcetate  0 mMol KPhos  8 mEq Calcium Gluconate  8 mEq Magnesium Sulfate  100 mg Thiamine  7 units Regular Insulin  1mL trace elements  10mL MVI    Total Calories: 1500Kcal (meeting ~83% of estimated calorie needs and ~57% of estimated protein needs)(osmolarity of 873)    ADDITIONAL RECOMMENDATIONS:  1) obtain triglyceride and LFT's weekly  2) daily lyte and magnesium and phos checks  3) POCT q6hrs; adjust insulin regimen prn to maintain tight glycemic control  4) strict I/O's  5) daily wt checks to track/trend changes    *will monitor and adjust treatement plan prn*  Anita Arnold MA, RDN, CDN, [facility] [phone]

## 2021-08-23 NOTE — PROGRESS NOTE ADULT - SUBJECTIVE AND OBJECTIVE BOX
Patient is a 55y old  Male who presents with a chief complaint of cough/sob (23 Aug 2021 17:42)      BRIEF HOSPITAL COURSE: 56 y/o male pmhx HTN, HLD, DM2 admitted w/ COVID-19 viral pneumonia on 7/21. Hospital course complicated by worsening hypoxic respiratory failure, ARDS. Received Actemra and Remdesivir.     Events last 24 hours: Currently on CPAP 100% FiO2 w/ back up rate 12. Precedex infusion for anxiety/ comfort.    PAST MEDICAL & SURGICAL HISTORY:  HTN (hypertension)    Diabetes          Hosp day #33d          Vital signs / Reviewed and Physical Exam Performed where pertinent and urgently required    Lab / Radiology  studies / ABG / Meds -  reviewed and interpreted into the assessment and treatment plan.      Assessment/Plan/Therapeutic interventions    Impression:  1. Acute hypoxic respiratory failure  2. ARDS  3. COVID-19 viral pneumonia  4. Hypertension  5. Severe protein malnutrition  6. Anxiety   7. DM2       Neuro - P    CV -  Pressor support as needed to maintain MAP 65           Avoiding fluid challenges          QTC monitoring while on Azithromycin and Hydroxychloroquine.    Pulm -  ARDS-NET 4-6cc/kg IBW TV as able to maintain plateau pressures <30               Prone ventilation consideration as feasible  Pa02/Fi02 < 150 on Fi02 >60% and PEEP at least 5                 Vent bundle Reviewed     GI -  PPI  Enteric feeds as tolerated in tandem with NMB and prone ventilation    Renal - Even to negative fluid balance as tolerated by hemodynamics and renal fx.  Feeds to be provided in lieu of IVF.     Heme -  Pharmacologic DVT PPx  in addition to SCD's    ID - ABX discontinuation based on discussion with ID in conjunction with clinical features, culture data, and judicious procalcitonin monitoring.      Endo -  Aggressive glycemic control to limit FS glucose to < 180mg/dl.      COVID 19 specific considerations and therapeutic  options based on the available and rapidly changing literature    Goals of care considerations:  Ongoing assessment for patient specific treatment options based on progression or decline.  I have involved the family with updates and requests in guidance for medical decision making.          38  Minutes of critical care tiem spent in the management of this critically ill COVID-19 patient/PUI patient with continuous assessments and interventions based on the interpretation of multiple databases.   Patient is a 55y old  Male who presents with a chief complaint of cough/sob (23 Aug 2021 17:42)      BRIEF HOSPITAL COURSE: 56 y/o male pmhx HTN, HLD, DM2 admitted w/ COVID-19 viral pneumonia on 7/21. Hospital course complicated by worsening hypoxic respiratory failure, ARDS. Received Actemra and Remdesivir.     Events last 24 hours: Currently on CPAP 100% FiO2 w/ back up rate 12. Precedex infusion for anxiety/ comfort.    PAST MEDICAL & SURGICAL HISTORY:  HTN (hypertension)    Diabetes          Hosp day #33d          Vital signs / Reviewed and Physical Exam Performed where pertinent and urgently required    Lab / Radiology  studies / ABG / Meds -  reviewed and interpreted into the assessment and treatment plan.      Assessment/Plan/Therapeutic interventions    Impression:  1. Acute hypoxic respiratory failure  2. ARDS  3. COVID-19 viral pneumonia  4. Hypertension  5. Severe protein malnutrition  6. Anxiety   7. DM2       Neuro - Precedex for anxiolytic therapy. Morphine PRN for increased work of breathing.     CV -  Maintaining MAP >65. Monitoring end points of perfusion  - HTN, better controlled in last couple days. Continue w/ Cozaar and Norvasc.  - High intesnity statin     Pulm -  CPAP for alveolar recruitment Titrating FIO2 to maintain O2 sat >86%. Requiring 100% FiO2 now. Alternating during the day w/ HFNC w/ NRB. Steroid taper. Respiratory status extremely tenuous He is high risk for requiring emergent intubation.      GI -  PPI. Started on PPN.     Renal -  Monitoring UO. Goal net negative fluid balance.     Heme -  Lovenox 40mg BID w/ SCDS for DVT ppx.     ID -  Afebrile. has completed course of abx. No signs of superimposed bacterial infection at this point, continue to monitor off abx. Cultures w/ no growth to date.     Endo -  Aggressive glycemic control to limit FS glucose to < 180mg/dl. Lantus w/ moderate ISS. Glucose better controlled now w/ steroid taper       CRITICAL CARE TIME SPENT:  40  minutes of critical care time spent providing medical care for patient's acute illness/conditions that impairs at least one vital organ system and/or poses a high risk of imminent or life threatening deterioration in the patient's condition. It includes time spent evaluating and treating the patient's acute illness as well as time spent reviewing labs, radiology, discussing goals of care with patient and/or patient's family, and discussing the case with a multidisciplinary team, including the eICU, in an effort to prevent further life threatening deterioration or end organ damage. This time is independent of any procedures performed.

## 2021-08-23 NOTE — PROGRESS NOTE ADULT - SUBJECTIVE AND OBJECTIVE BOX
Patient is a 55y old  Male who presents with a chief complaint of cough/sob (23 Jul 2021 14:03)      HPI:  55 M with pmh HLD, dm, htn presents with 8 days onset of covid symptoms which included, cough, fevers, sob, hypoxia, fevers, diarrhea, chills and myalgias. TEsted positive 7/15. Wife endorsed he was becoming more sob of recently. On arrival hypoxic to 80s and tachypneic. NRB placed, presently on 15% and saturating 95%. Na 126 s/p 1L NS. CXR: Frandy infiltrates. Last scr 1.4 in 2019-> today 1.9 unknown if underlying ckd.  Patient not vaccinated.   Last seen by me in 2019  History of ROBERT and uncontrolled hypertension  Treated with IV Remdesivir and Decadron, now on Toci  SaO2 is 80-85% despite being on 100% NRB  ABG pending        8/17  Noted to have increased work of breathing today, on BiPAP 14/8 back up rate 12 then transitioned to HFNC with NRB  DDimer [id number]  Dopplers/CXR performed    8/23      MEDICATIONS  (STANDING):  aspirin  chewable 81 milliGRAM(s) Oral daily  atorvastatin 80 milliGRAM(s) Oral at bedtime  budesonide 160 MICROgram(s)/formoterol 4.5 MICROgram(s) Inhaler 2 Puff(s) Inhalation two times a day  cholecalciferol 2000 Unit(s) Oral daily  dexMEDEtomidine Infusion 0.2 MICROgram(s)/kG/Hr (4.52 mL/Hr) IV Continuous <Continuous>  dextrose 40% Gel 15 Gram(s) Oral once  dextrose 5%. 1000 milliLiter(s) (50 mL/Hr) IV Continuous <Continuous>  dextrose 5%. 1000 milliLiter(s) (100 mL/Hr) IV Continuous <Continuous>  dextrose 50% Injectable 25 Gram(s) IV Push once  dextrose 50% Injectable 12.5 Gram(s) IV Push once  dextrose 50% Injectable 25 Gram(s) IV Push once  enoxaparin Injectable 40 milliGRAM(s) SubCutaneous every 12 hours  glucagon  Injectable 1 milliGRAM(s) IntraMuscular once  insulin lispro (ADMELOG) corrective regimen sliding scale   SubCutaneous every 6 hours  losartan 75 milliGRAM(s) Oral daily  methylPREDNISolone sodium succinate Injectable 40 milliGRAM(s) IV Push every 8 hours  pantoprazole  Injectable 40 milliGRAM(s) IV Push daily  Parenteral Nutrition - Adult 1 Each (75 mL/Hr) TPN Continuous <Continuous>  senna 1 Tablet(s) Oral at bedtime    MEDICATIONS  (PRN):  acetaminophen   Tablet .. 650 milliGRAM(s) Oral every 4 hours PRN Temp greater or equal to 38C (100.4F), Mild Pain (1 - 3)  ALBUTerol    90 MICROgram(s) HFA Inhaler 2 Puff(s) Inhalation every 4 hours PRN Shortness of Breath and/or Wheezing  benzocaine 15 mG/menthol 3.6 mG (Sugar-Free) Lozenge 1 Lozenge Oral every 6 hours PRN Sore Throat  benzonatate 100 milliGRAM(s) Oral three times a day PRN Cough  hydrALAZINE Injectable 5 milliGRAM(s) IV Push every 6 hours PRN SBP>160  hydrocodone/homatropine Syrup 5 milliLiter(s) Oral every 6 hours PRN Cough  LORazepam   Injectable 0.5 milliGRAM(s) IV Push every 6 hours PRN Agitation  melatonin 6 milliGRAM(s) Oral at bedtime PRN Insomnia  morphine  - Injectable 2 milliGRAM(s) IV Push every 2 hours PRN SOB/Anxiety    Vital Signs Last 24 Hrs  T(C): 36.6 (23 Aug 2021 06:00), Max: 36.6 (23 Aug 2021 06:00)  T(F): 97.8 (23 Aug 2021 06:00), Max: 97.8 (23 Aug 2021 06:00)  HR: 101 (23 Aug 2021 16:00) (64 - 124)  BP: 117/78 (23 Aug 2021 16:00) (105/86 - 185/104)  BP(mean): 86 (23 Aug 2021 16:00) (77 - 122)  RR: 28 (23 Aug 2021 16:00) (23 - 40)  SpO2: 81% (23 Aug 2021 16:00) (71% - 97%)  Total IN: 0 mL    OUT:    Voided (mL): 1700 mL  Total OUT: 1700 mL    Total NET: -1700 mL              PHYSICAL EXAM  General Appearance: On BIPAP, increased work of breathing  using accessory muscles to breath / declined intubation overnight  Lungs: coarse bilaterally  Heart: +S1,S2  Abdomen: Soft, non-tender, bowel sounds active   Extremities: no cyanosis or edema, no joint swelling  Skin: Skin color, texture normal, no rashes   Neurologic: Alert and oriented X3 , non focal, not disoriented    ECG:    LABS:                          8.5    14.05 )-----------( 292      ( 18 Aug 2021 07:01 )             26.2   08-18    138  |  104  |  24<H>  ----------------------------<  78  4.1   |  29  |  1.03    Ca    8.8      18 Aug 2021 07:01    TPro  6.3  /  Alb  2.3<L>  /  TBili  0.6  /  DBili  x   /  AST  32  /  ALT  73  /  AlkPhos  168<H>  08-17                                           10.0   19.05 )-----------( 179      ( 08 Aug 2021 07:11 )             30.3   08-08    137  |  102  |  27<H>  ----------------------------<  121<H>  3.9   |  29  |  0.77    Ca    8.4<L>      08 Aug 2021 07:11  Phos  3.4     08-08  Mg     2.0     08-08                  10.0   19.05 )-----------( 179      ( 08 Aug 2021 07:11 )             30.3   08-08    137  |  102  |  27<H>  ----------------------------<  121<H>  3.9   |  29  |  0.77    Ca    8.4<L>      08 Aug 2021 07:11  Phos  3.4     08-08  Mg     2.0     08-08                 10.4   17.19 )-----------( 198      ( 07 Aug 2021 07:06 )             32.3   08-07    138  |  104  |  31<H>  ----------------------------<  107<H>  4.4   |  29  |  0.77    Ca    8.2<L>      07 Aug 2021 07:06  Phos  4.6     08-07  Mg     2.0     08-07    TPro  5.2<L>  /  Alb  2.3<L>  /  TBili  0.8  /  DBili  x   /  AST  81<H>  /  ALT  213<H>  /  AlkPhos  200<H>  08-06                            12.9   15.00 )-----------( 366      ( 26 Jul 2021 06:20 )             38.4   07-26    133<L>  |  104  |  37<H>  ----------------------------<  232<H>  4.2   |  23  |  1.17    Ca    8.4<L>      26 Jul 2021 06:20    TPro  6.3  /  Alb  2.2<L>  /  TBili  0.4  /  DBili  0.1  /  AST  103<H>  /  ALT  137<H>  /  AlkPhos  239<H>  07-26                          13.2   13.52 )-----------( 338      ( 24 Jul 2021 08:06 )             39.5     07-24    136  |  105  |  43<H>  ----------------------------<  227<H>  4.3   |  25  |  1.31<H>    Ca    8.7      24 Jul 2021 08:06    TPro  6.6  /  Alb  2.3<L>  /  TBili  0.4  /  DBili  x   /  AST  73<H>  /  ALT  64  /  AlkPhos  165<H>  07-24          Pro BNP  -- 07-24 @ 08:06  D Dimer  446 07-24 @ 08:06  Pro BNP  261 07-21 @ 14:42  D Dimer  460 07-21 @ 14:42              < from: Xray Chest 1 View- PORTABLE-Urgent (07.21.21 @ 15:33) >    PROCEDURE DATE:  07/21/2021          INTERPRETATION:  AP chest on July 21, 2021 at 3:27 PM. Patient is short of breath with cough and fever.    Heart magnified by technique.    There arescattered mid lower lung field infiltrates right greater than left possibly related: Pneumonia.    The lungs are clear on August 30, 2019.    IMPRESSION: Bilateral infiltrates as above.    < end of copied text >  < from: US Duplex Venous Lower Ext Complete, Bilateral (07.25.21 @ 08:42) >  COMPARISON: None available.    TECHNIQUE: Duplex sonography of the BILATERAL LOWER extremity veins with color and spectral Doppler, with and without compression.    FINDINGS:    RIGHT:  Normal compressibility of the RIGHT common femoral, femoral and popliteal veins.  Doppler examination shows normal spontaneous and phasic flow.  No RIGHT calf vein thrombosis is detected.    LEFT:  Normal compressibility of the LEFT common femoral, femoral and popliteal veins.  Doppler examination shows normal spontaneous and phasic flow.  No LEFT calf vein thrombosis is detected.    IMPRESSION:  No evidence of deep venous thrombosis in either lower extremity.    < end of copied text >  RADIOLOGY & ADDITIONAL STUDIES:    < from: Xray Chest 1 View- PORTABLE-Urgent (Xray Chest 1 View- PORTABLE-Urgent .) (07.26.21 @ 08:43) >    PROCEDURE DATE:  07/26/2021          INTERPRETATION:  Portable chest radiograph    CLINICAL INFORMATION: Pneumonia due to Covid 19.. Follow-up    TECHNIQUE:  Portable  AP view of the chest was obtained.    COMPARISON: 7/21/2021 chest available for review.    FINDINGS:    The lungs show stable bilateral  multifocal and diffuse ill-defined airspace opacities.. No pneumothorax.    The  heart is enlarged in transverse diameter. No hilar mass.     Visualized osseous structures are intact.    IMPRESSION:   Stable bilateral  multifocal and diffuse ill-defined airspace opacities..    < end of copied text >  < from: US Duplex Venous Lower Ext Complete, Bilateral (08.06.21 @ 17:16) >  FINDINGS:    RIGHT:  Normal compressibility of the RIGHT common femoral, femoral and popliteal veins.  Doppler examination shows normal spontaneous and phasic flow.  No RIGHT calf vein thrombosis is detected.    LEFT:  Normal compressibility of the LEFT common femoral, femoral and popliteal veins.  Doppler examination shows normal spontaneous and phasic flow.  No LEFT calf vein thrombosisis detected.    IMPRESSION:  No evidence of deep venous thrombosis in either lower extremity.    < end of copied text >  < from: Xray Chest 1 View- PORTABLE-Urgent (Xray Chest 1 View- PORTABLE-Urgent .) (08.06.21 @ 16:45) >  INTERPRETATION:  Portable chest radiograph    CLINICAL INFORMATION: Pneumonia due to Covid 19.    TECHNIQUE:  Portable  AP view of the chest was obtained.    COMPARISON: 8/1/2021 chest radiograph available for review.    FINDINGS:    The lungs show decreasing bilateral diffuse airspace disease.. No pneumothorax.    The  heart is mildly enlarged in transverse diameter. No hilar mass.   Visualized osseous structures are intact.    IMPRESSION:   Decreasing bilateral diffuse airspace disease..    < end of copied text >  xr< from: US Duplex Venous Lower Ext Complete, Bilateral (08.17.21 @ 08:58) >  COMPARISON: None available.    TECHNIQUE: Duplex sonography of the BILATERAL LOWER extremity veins with color and spectral Doppler, with and without compression.    FINDINGS:    RIGHT:  Normal compressibility of the RIGHT common femoral, femoral and popliteal veins.  Doppler examination shows normal spontaneous and phasic flow.  No RIGHT calf vein thrombosis is detected.    LEFT:  Normal compressibility of the LEFT common femoral, femoral and popliteal veins.  Doppler examination shows normal spontaneous and phasic flow.  No LEFT calf vein thrombosis is detected.    IMPRESSION:  No evidence of deep venous thrombosis in either lower extremity.    < end of copied text >

## 2021-08-23 NOTE — PROGRESS NOTE ADULT - ASSESSMENT
55 M noted to have COVID 7/15  On arrival hypoxic to 80s and tachypneic. NRB placed,in the ER saturating 95%. Na 126 s/p 1L NS. CXR: Frandy infiltrates. Last scr 1.4 in 2019-> today 1.9 unknown if underlying ckd.  Treated with IV Remdesivir and Decadron, Tocilizumab   Given the obesity/hypertension and prior co-morbidities, he is at risk of intubation but continue HFNC, respiratory status remains critical  XR reviewed- pulmonary infiltrates are present  DDimer elevated, was on therapeutic lovenox, transitioned to 40mg q 12 now noted to be >2000 -  Completed Solumedrol, Symbicort 160/4.5 BID (https://[email or web address]/journals/lanres/article/FECB2138-1478, [facility] study). MICU service started Solumedrol 40mg q 8 hrs as of 8/18  Was on HFNC %FiO2 with BiPAP overnight, now on CPAP and transitions between HFNC/NIPPV  CTPE not performed  Respiratory status is tenuous and he continues to have an increased work of breathing; at this point he is at high risk of intubation  ICU team is reconsidering intubation and mechanical ventilation  In view of increased D Dimer / suggest resuming Full dose AC with Lovenox, anticoagulation being managed by MICU   Repeated DDImer and BNP  Will need repeat Echocardiogram  Currently on NIPPV with CPAP, will likely need 18/8 back up rate of 12 if hypoxemia persists. Transitioning between HFNC and nocturnal NIPPV

## 2021-08-23 NOTE — PROGRESS NOTE ADULT - SUBJECTIVE AND OBJECTIVE BOX
HPI:     55 M with pmh HLD, dm, htn presents with 8 days onset of covid symptoms which included, cough, fevers, sob, hypoxia, fevers, diarrhea, chills and myalgias. tested positive 7/15.  Patient was unvaccinated. He thinks he got it going to [facility] dance [facility] week of July 4 th.  Wife also had covid but her case was mild.  Admitted for Hypoxia to  on 7/21 and transferred to the ICU on 7/24 for HF NC O2.      7/26: Patient seen in bed, on HF NC O2 100% and 50L, along with a 100% NRB mask  7/27: Patient remains on HF NC O2 100% 50L as well as a %  7/28: He remains on 100% and 50 L HF NC and 100% NRB    7/29: Patient remains on 100% 50 L NF NC and 100% NRB  7/30: Patient now down to 80% FI O2 and 50L on HF NC, WBC rising.    7/31: Patient is on 75 % Fi O2 now, WBC remains elevated    8/23: Patient seen in bed and he is doing poorly.  He has an O2 sat in the 70s on HF NC along with 100% NRB.  O2 saturation was in the 80 while on NIV.  WBC slightly up.  Patient far weaker than the last time i saw him.            PAST MEDICAL/SURGICAL/FAMILY/SOCIAL HISTORY:    Past Medical History:  Diabetes    HTN (hypertension).  No surgeries     Tobacco Usage:  · Tobacco Usage	non smoker  Fhx: none (21 Jul 2021 19:19)       PAST MEDICAL & SURGICAL HISTORY:  HTN (hypertension)    Diabetes        FAMILY HISTORY:      Social Hx:    Allergies    No Known Allergies    Intolerances            ICU Vital Signs Last 24 Hrs  T(C): 36.6 (23 Aug 2021 06:00), Max: 37.4 (22 Aug 2021 16:00)  T(F): 97.8 (23 Aug 2021 06:00), Max: 99.4 (22 Aug 2021 16:00)  HR: 104 (23 Aug 2021 15:00) (64 - 124)  BP: 109/67 (23 Aug 2021 15:00) (105/86 - 185/104)  BP(mean): 77 (23 Aug 2021 15:00) (77 - 122)  ABP: --  ABP(mean): --  RR: 31 (23 Aug 2021 15:00) (21 - 40)  SpO2: 79% (23 Aug 2021 15:00) (71% - 97%)          I&O's Summary    22 Aug 2021 07:01  -  23 Aug 2021 07:00  --------------------------------------------------------  IN: 3618.2 mL / OUT: 3200 mL / NET: 418.2 mL                              9.1    16.14 )-----------( 374      ( 23 Aug 2021 04:50 )             28.6       08-23    135  |  103  |  25<H>  ----------------------------<  148<H>  4.1   |  29  |  0.69    Ca    8.3<L>      23 Aug 2021 04:50  Phos  2.7     08-23  Mg     1.9     08-23    TPro  5.9<L>  /  Alb  2.3<L>  /  TBili  0.7  /  DBili  x   /  AST  31  /  ALT  62  /  AlkPhos  113  08-23                    MEDICATIONS  (STANDING):  amLODIPine   Tablet 5 milliGRAM(s) Oral daily  aspirin  chewable 81 milliGRAM(s) Oral daily  atorvastatin 80 milliGRAM(s) Oral at bedtime  budesonide 160 MICROgram(s)/formoterol 4.5 MICROgram(s) Inhaler 2 Puff(s) Inhalation two times a day  cholecalciferol 2000 Unit(s) Oral daily  dexMEDEtomidine Infusion 0.2 MICROgram(s)/kG/Hr (4.52 mL/Hr) IV Continuous <Continuous>  dextrose 40% Gel 15 Gram(s) Oral once  dextrose 5%. 1000 milliLiter(s) (50 mL/Hr) IV Continuous <Continuous>  dextrose 5%. 1000 milliLiter(s) (100 mL/Hr) IV Continuous <Continuous>  dextrose 50% Injectable 25 Gram(s) IV Push once  dextrose 50% Injectable 12.5 Gram(s) IV Push once  dextrose 50% Injectable 25 Gram(s) IV Push once  enoxaparin Injectable 40 milliGRAM(s) SubCutaneous every 12 hours  fat emulsion (Fish Oil and Plant Based) 20% Infusion 0.99 Gm/kG/Day (37.3 mL/Hr) IV Continuous <Continuous>  fat emulsion (Fish Oil and Plant Based) 20% Infusion 0.99 Gm/kG/Day (37.5 mL/Hr) IV Continuous <Continuous>  glucagon  Injectable 1 milliGRAM(s) IntraMuscular once  insulin glargine Injectable (LANTUS) 25 Unit(s) SubCutaneous at bedtime  insulin lispro (ADMELOG) corrective regimen sliding scale   SubCutaneous every 6 hours  losartan 75 milliGRAM(s) Oral daily  pantoprazole  Injectable 40 milliGRAM(s) IV Push daily  Parenteral Nutrition - Adult 1 Each (75 mL/Hr) TPN Continuous <Continuous>  Parenteral Nutrition - Adult 1 Each (75 mL/Hr) TPN Continuous <Continuous>  predniSONE   Tablet 10 milliGRAM(s) Oral daily  senna 1 Tablet(s) Oral at bedtime    MEDICATIONS  (PRN):  acetaminophen   Tablet .. 650 milliGRAM(s) Oral every 4 hours PRN Temp greater or equal to 38C (100.4F), Mild Pain (1 - 3)  ALBUTerol    90 MICROgram(s) HFA Inhaler 2 Puff(s) Inhalation every 4 hours PRN Shortness of Breath and/or Wheezing  ALPRAZolam 0.5 milliGRAM(s) Oral every 6 hours PRN anxiety  benzocaine 15 mG/menthol 3.6 mG (Sugar-Free) Lozenge 1 Lozenge Oral every 6 hours PRN Sore Throat  benzonatate 100 milliGRAM(s) Oral three times a day PRN Cough  hydrALAZINE Injectable 5 milliGRAM(s) IV Push every 6 hours PRN SBP>160  melatonin 6 milliGRAM(s) Oral at bedtime PRN Insomnia  morphine  - Injectable 2 milliGRAM(s) IV Push every 2 hours PRN SOB/Anxiety  polyethylene glycol 3350 17 Gram(s) Oral daily PRN Constipation      DVT Prophylaxis: Lovenox    Advanced Directives:  Discussed with:    Visit Information: 30 min    ** Time is exclusive of billed procedures and/or teaching and/or routine family updates.

## 2021-08-24 LAB
ALBUMIN SERPL ELPH-MCNC: 2.2 G/DL — LOW (ref 3.3–5)
ALP SERPL-CCNC: 103 U/L — SIGNIFICANT CHANGE UP (ref 40–120)
ALT FLD-CCNC: 74 U/L — SIGNIFICANT CHANGE UP (ref 12–78)
ANION GAP SERPL CALC-SCNC: 4 MMOL/L — LOW (ref 5–17)
AST SERPL-CCNC: 32 U/L — SIGNIFICANT CHANGE UP (ref 15–37)
BILIRUB SERPL-MCNC: 0.5 MG/DL — SIGNIFICANT CHANGE UP (ref 0.2–1.2)
BUN SERPL-MCNC: 24 MG/DL — HIGH (ref 7–23)
CALCIUM SERPL-MCNC: 8.1 MG/DL — LOW (ref 8.5–10.1)
CHLORIDE SERPL-SCNC: 104 MMOL/L — SIGNIFICANT CHANGE UP (ref 96–108)
CO2 SERPL-SCNC: 29 MMOL/L — SIGNIFICANT CHANGE UP (ref 22–31)
CREAT SERPL-MCNC: 0.66 MG/DL — SIGNIFICANT CHANGE UP (ref 0.5–1.3)
GLUCOSE SERPL-MCNC: 183 MG/DL — HIGH (ref 70–99)
HCT VFR BLD CALC: 27.3 % — LOW (ref 39–50)
HGB BLD-MCNC: 8.6 G/DL — LOW (ref 13–17)
MAGNESIUM SERPL-MCNC: 2.1 MG/DL — SIGNIFICANT CHANGE UP (ref 1.6–2.6)
MCHC RBC-ENTMCNC: 28 PG — SIGNIFICANT CHANGE UP (ref 27–34)
MCHC RBC-ENTMCNC: 31.5 GM/DL — LOW (ref 32–36)
MCV RBC AUTO: 88.9 FL — SIGNIFICANT CHANGE UP (ref 80–100)
PHOSPHATE SERPL-MCNC: 3.8 MG/DL — SIGNIFICANT CHANGE UP (ref 2.5–4.5)
PLATELET # BLD AUTO: 281 K/UL — SIGNIFICANT CHANGE UP (ref 150–400)
POTASSIUM SERPL-MCNC: 4 MMOL/L — SIGNIFICANT CHANGE UP (ref 3.5–5.3)
POTASSIUM SERPL-SCNC: 4 MMOL/L — SIGNIFICANT CHANGE UP (ref 3.5–5.3)
PROT SERPL-MCNC: 5.6 GM/DL — LOW (ref 6–8.3)
RBC # BLD: 3.07 M/UL — LOW (ref 4.2–5.8)
RBC # FLD: 13.6 % — SIGNIFICANT CHANGE UP (ref 10.3–14.5)
SODIUM SERPL-SCNC: 137 MMOL/L — SIGNIFICANT CHANGE UP (ref 135–145)
TRIGL SERPL-MCNC: 278 MG/DL — HIGH
WBC # BLD: 14.03 K/UL — HIGH (ref 3.8–10.5)
WBC # FLD AUTO: 14.03 K/UL — HIGH (ref 3.8–10.5)

## 2021-08-24 PROCEDURE — 99291 CRITICAL CARE FIRST HOUR: CPT

## 2021-08-24 RX ORDER — ELECTROLYTE SOLUTION,INJ
1 VIAL (ML) INTRAVENOUS
Refills: 0 | Status: DISCONTINUED | OUTPATIENT
Start: 2021-08-24 | End: 2021-08-24

## 2021-08-24 RX ORDER — I.V. FAT EMULSION 20 G/100ML
1 EMULSION INTRAVENOUS
Qty: 90 | Refills: 0 | Status: DISCONTINUED | OUTPATIENT
Start: 2021-08-24 | End: 2021-08-25

## 2021-08-24 RX ADMIN — Medication 81 MILLIGRAM(S): at 10:46

## 2021-08-24 RX ADMIN — DEXMEDETOMIDINE HYDROCHLORIDE IN 0.9% SODIUM CHLORIDE 4.52 MICROGRAM(S)/KG/HR: 4 INJECTION INTRAVENOUS at 00:52

## 2021-08-24 RX ADMIN — ENOXAPARIN SODIUM 40 MILLIGRAM(S): 100 INJECTION SUBCUTANEOUS at 23:30

## 2021-08-24 RX ADMIN — I.V. FAT EMULSION 37.5 GM/KG/DAY: 20 EMULSION INTRAVENOUS at 23:26

## 2021-08-24 RX ADMIN — DEXMEDETOMIDINE HYDROCHLORIDE IN 0.9% SODIUM CHLORIDE 4.52 MICROGRAM(S)/KG/HR: 4 INJECTION INTRAVENOUS at 10:46

## 2021-08-24 RX ADMIN — Medication 0.5 MILLIGRAM(S): at 23:28

## 2021-08-24 RX ADMIN — Medication 4: at 23:43

## 2021-08-24 RX ADMIN — ENOXAPARIN SODIUM 40 MILLIGRAM(S): 100 INJECTION SUBCUTANEOUS at 10:46

## 2021-08-24 RX ADMIN — AMLODIPINE BESYLATE 5 MILLIGRAM(S): 2.5 TABLET ORAL at 10:46

## 2021-08-24 RX ADMIN — Medication 2000 UNIT(S): at 10:46

## 2021-08-24 RX ADMIN — INSULIN GLARGINE 25 UNIT(S): 100 INJECTION, SOLUTION SUBCUTANEOUS at 23:44

## 2021-08-24 RX ADMIN — DEXMEDETOMIDINE HYDROCHLORIDE IN 0.9% SODIUM CHLORIDE 4.52 MICROGRAM(S)/KG/HR: 4 INJECTION INTRAVENOUS at 23:46

## 2021-08-24 RX ADMIN — Medication 10 MILLIGRAM(S): at 10:46

## 2021-08-24 RX ADMIN — Medication 2: at 11:51

## 2021-08-24 RX ADMIN — Medication 2: at 06:20

## 2021-08-24 RX ADMIN — Medication 1 EACH: at 23:27

## 2021-08-24 RX ADMIN — LOSARTAN POTASSIUM 75 MILLIGRAM(S): 100 TABLET, FILM COATED ORAL at 10:46

## 2021-08-24 RX ADMIN — PANTOPRAZOLE SODIUM 40 MILLIGRAM(S): 20 TABLET, DELAYED RELEASE ORAL at 10:46

## 2021-08-24 RX ADMIN — DEXMEDETOMIDINE HYDROCHLORIDE IN 0.9% SODIUM CHLORIDE 4.52 MICROGRAM(S)/KG/HR: 4 INJECTION INTRAVENOUS at 04:58

## 2021-08-24 RX ADMIN — DEXMEDETOMIDINE HYDROCHLORIDE IN 0.9% SODIUM CHLORIDE 4.52 MICROGRAM(S)/KG/HR: 4 INJECTION INTRAVENOUS at 13:04

## 2021-08-24 RX ADMIN — DEXMEDETOMIDINE HYDROCHLORIDE IN 0.9% SODIUM CHLORIDE 4.52 MICROGRAM(S)/KG/HR: 4 INJECTION INTRAVENOUS at 17:49

## 2021-08-24 NOTE — PROGRESS NOTE ADULT - SUBJECTIVE AND OBJECTIVE BOX
Patient is a 55y old  Male who presents with a chief complaint of cough/sob (24 Aug 2021 16:27)      BRIEF HOSPITAL COURSE: 54 y/o male pmhx HTN, HLD, DM2 admitted w/ COVID-19 viral pneumonia on 7/21. Hospital course complicated by worsening hypoxic respiratory failure, ARDS. Received Actemra and Remdesivir    Events last 24 hours:  Tolerating HFNC w/ NRB for most of the day. Became tachypneic now back on CPAP 100% FiO2. Remains on high dose Precedex for anxiety/ comfort.     PAST MEDICAL & SURGICAL HISTORY:  HTN (hypertension)    Diabetes          Hosp day #34d          Vital signs / Reviewed and Physical Exam Performed where pertinent and urgently required    Lab / Radiology  studies / ABG / Meds -  reviewed and interpreted into the assessment and treatment plan.      Assessment/Plan/Therapeutic interventions    Impression:  1. Acute hypoxic respiratory failure  2. ARDS  3. COVID-19 viral pneumonia  4. Hypertension  5. Severe protein malnutrition  6. Anxiety   7. DM2       Neuro - Precedex for anxiolytic therapy. Morphine PRN for increased work of breathing.     CV -  Maintaining MAP >65. Monitoring end points of perfusion  - HTN, better controlled in last couple days. Continue w/ Cozaar and Norvasc.  - High intensity statin     Pulm -  CPAP for alveolar recruitment Titrating FIO2 to maintain O2 sat >86%. Requiring 100% FiO2 now. Alternating during the day w/ HFNC w/ NRB. Steroid taper. Respiratory status extremely tenuous He is high risk for requiring emergent intubation.      GI -  PPI. Started on PPN.     Renal -  Monitoring UO. Goal net negative fluid balance.     Heme -  Lovenox 40mg BID w/ SCDS for DVT ppx.     ID -  Afebrile. has completed course of abx. No signs of superimposed bacterial infection at this point, continue to monitor off abx. Cultures w/ no growth to date.     Endo -  Aggressive glycemic control to limit FS glucose to < 180mg/dl. Lantus w/ moderate ISS. Glucose better controlled now w/ steroid taper       CRITICAL CARE TIME SPENT:  40  minutes of critical care time spent providing medical care for patient's acute illness/conditions that impairs at least one vital organ system and/or poses a high risk of imminent or life threatening deterioration in the patient's condition. It includes time spent evaluating and treating the patient's acute illness as well as time spent reviewing labs, radiology, discussing goals of care with patient and/or patient's family, and discussing the case with a multidisciplinary team, including the eICU, in an effort to prevent further life threatening deterioration or end organ damage. This time is independent of any procedures performed.

## 2021-08-24 NOTE — PROGRESS NOTE ADULT - ASSESSMENT
A/P: 55 year old male with Acute Hypoxic Respiratory Failure from COVID-19 Pneumonia  HTN  DM II      Plan:  ICU    PULM: Continue CPAP as he can no longer oxygenate on HF NC.  Will eventually be intubated but will not allow it at this time    Cardio: Hemodynamics reasonable    Renal: Cr Stable    GI: H2 blocker, PPN    ENDO: Lantus 25 QHS and RISS.    ID: S/P Actemra, Completed REM and High Dose Solumedrol    DVT Prophylaxis with Lovenox    LE Dopplers on 7/26 were negative for DVT    OOB to Chair    D/W Patients wife daily

## 2021-08-24 NOTE — PROGRESS NOTE ADULT - SUBJECTIVE AND OBJECTIVE BOX
Patient is a 55y old  Male who presents with a chief complaint of cough/sob (23 Jul 2021 14:03)      HPI:  55 M with pmh HLD, dm, htn presents with 8 days onset of covid symptoms which included, cough, fevers, sob, hypoxia, fevers, diarrhea, chills and myalgias. TEsted positive 7/15. Wife endorsed he was becoming more sob of recently. On arrival hypoxic to 80s and tachypneic. NRB placed, presently on 15% and saturating 95%. Na 126 s/p 1L NS. CXR: Frandy infiltrates. Last scr 1.4 in 2019-> today 1.9 unknown if underlying ckd.  Patient not vaccinated.   Last seen by me in 2019  History of ROBERT and uncontrolled hypertension  Treated with IV Remdesivir and Decadron, now on Toci  SaO2 is 80-85% despite being on 100% NRB  ABG pending        8/24  Alternating between NIPPV and HFNC      MEDICATIONS  (STANDING):  aspirin  chewable 81 milliGRAM(s) Oral daily  atorvastatin 80 milliGRAM(s) Oral at bedtime  budesonide 160 MICROgram(s)/formoterol 4.5 MICROgram(s) Inhaler 2 Puff(s) Inhalation two times a day  cholecalciferol 2000 Unit(s) Oral daily  dexMEDEtomidine Infusion 0.2 MICROgram(s)/kG/Hr (4.52 mL/Hr) IV Continuous <Continuous>  dextrose 40% Gel 15 Gram(s) Oral once  dextrose 5%. 1000 milliLiter(s) (50 mL/Hr) IV Continuous <Continuous>  dextrose 5%. 1000 milliLiter(s) (100 mL/Hr) IV Continuous <Continuous>  dextrose 50% Injectable 25 Gram(s) IV Push once  dextrose 50% Injectable 12.5 Gram(s) IV Push once  dextrose 50% Injectable 25 Gram(s) IV Push once  enoxaparin Injectable 40 milliGRAM(s) SubCutaneous every 12 hours  glucagon  Injectable 1 milliGRAM(s) IntraMuscular once  insulin lispro (ADMELOG) corrective regimen sliding scale   SubCutaneous every 6 hours  losartan 75 milliGRAM(s) Oral daily  methylPREDNISolone sodium succinate Injectable 40 milliGRAM(s) IV Push every 8 hours  pantoprazole  Injectable 40 milliGRAM(s) IV Push daily  Parenteral Nutrition - Adult 1 Each (75 mL/Hr) TPN Continuous <Continuous>  senna 1 Tablet(s) Oral at bedtime    MEDICATIONS  (PRN):  acetaminophen   Tablet .. 650 milliGRAM(s) Oral every 4 hours PRN Temp greater or equal to 38C (100.4F), Mild Pain (1 - 3)  ALBUTerol    90 MICROgram(s) HFA Inhaler 2 Puff(s) Inhalation every 4 hours PRN Shortness of Breath and/or Wheezing  benzocaine 15 mG/menthol 3.6 mG (Sugar-Free) Lozenge 1 Lozenge Oral every 6 hours PRN Sore Throat  benzonatate 100 milliGRAM(s) Oral three times a day PRN Cough  hydrALAZINE Injectable 5 milliGRAM(s) IV Push every 6 hours PRN SBP>160  hydrocodone/homatropine Syrup 5 milliLiter(s) Oral every 6 hours PRN Cough  LORazepam   Injectable 0.5 milliGRAM(s) IV Push every 6 hours PRN Agitation  melatonin 6 milliGRAM(s) Oral at bedtime PRN Insomnia  morphine  - Injectable 2 milliGRAM(s) IV Push every 2 hours PRN SOB/Anxiety    Vital Signs Last 24 Hrs  T(C): 36.4 (24 Aug 2021 09:00), Max: 36.4 (24 Aug 2021 09:00)  T(F): 97.5 (24 Aug 2021 09:00), Max: 97.5 (24 Aug 2021 09:00)  HR: 71 (24 Aug 2021 16:00) (56 - 97)  BP: 153/74 (24 Aug 2021 16:00) (102/54 - 153/74)  BP(mean): 93 (24 Aug 2021 16:00) (63 - 93)  RR: 27 (24 Aug 2021 16:00) (18 - 41)  SpO2: 95% (24 Aug 2021 16:00) (72% - 100%)  OUT:    Voided (mL): 1700 mL  Total OUT: 1700 mL    Total NET: -1700 mL              PHYSICAL EXAM  General Appearance: On BIPAP, increased work of breathing  using accessory muscles to breath / declined intubation overnight  Lungs: coarse bilaterally  Heart: +S1,S2  Abdomen: Soft, non-tender, bowel sounds active   Extremities: no cyanosis or edema, no joint swelling  Skin: Skin color, texture normal, no rashes   Neurologic: Alert and oriented X3 , non focal, not disoriented    ECG:    LABS:                          8.5    14.05 )-----------( 292      ( 18 Aug 2021 07:01 )             26.2   08-18    138  |  104  |  24<H>  ----------------------------<  78  4.1   |  29  |  1.03    Ca    8.8      18 Aug 2021 07:01    TPro  6.3  /  Alb  2.3<L>  /  TBili  0.6  /  DBili  x   /  AST  32  /  ALT  73  /  AlkPhos  168<H>  08-17                                           10.0   19.05 )-----------( 179      ( 08 Aug 2021 07:11 )             30.3   08-08    137  |  102  |  27<H>  ----------------------------<  121<H>  3.9   |  29  |  0.77    Ca    8.4<L>      08 Aug 2021 07:11  Phos  3.4     08-08  Mg     2.0     08-08                  10.0   19.05 )-----------( 179      ( 08 Aug 2021 07:11 )             30.3   08-08    137  |  102  |  27<H>  ----------------------------<  121<H>  3.9   |  29  |  0.77    Ca    8.4<L>      08 Aug 2021 07:11  Phos  3.4     08-08  Mg     2.0     08-08                 10.4   17.19 )-----------( 198      ( 07 Aug 2021 07:06 )             32.3   08-07    138  |  104  |  31<H>  ----------------------------<  107<H>  4.4   |  29  |  0.77    Ca    8.2<L>      07 Aug 2021 07:06  Phos  4.6     08-07  Mg     2.0     08-07    TPro  5.2<L>  /  Alb  2.3<L>  /  TBili  0.8  /  DBili  x   /  AST  81<H>  /  ALT  213<H>  /  AlkPhos  200<H>  08-06                            12.9   15.00 )-----------( 366      ( 26 Jul 2021 06:20 )             38.4   07-26    133<L>  |  104  |  37<H>  ----------------------------<  232<H>  4.2   |  23  |  1.17    Ca    8.4<L>      26 Jul 2021 06:20    TPro  6.3  /  Alb  2.2<L>  /  TBili  0.4  /  DBili  0.1  /  AST  103<H>  /  ALT  137<H>  /  AlkPhos  239<H>  07-26                          13.2   13.52 )-----------( 338      ( 24 Jul 2021 08:06 )             39.5     07-24    136  |  105  |  43<H>  ----------------------------<  227<H>  4.3   |  25  |  1.31<H>    Ca    8.7      24 Jul 2021 08:06    TPro  6.6  /  Alb  2.3<L>  /  TBili  0.4  /  DBili  x   /  AST  73<H>  /  ALT  64  /  AlkPhos  165<H>  07-24          Pro BNP  -- 07-24 @ 08:06  D Dimer  446 07-24 @ 08:06  Pro BNP  261 07-21 @ 14:42  D Dimer  460 07-21 @ 14:42              < from: Xray Chest 1 View- PORTABLE-Urgent (07.21.21 @ 15:33) >    PROCEDURE DATE:  07/21/2021          INTERPRETATION:  AP chest on July 21, 2021 at 3:27 PM. Patient is short of breath with cough and fever.    Heart magnified by technique.    There arescattered mid lower lung field infiltrates right greater than left possibly related: Pneumonia.    The lungs are clear on August 30, 2019.    IMPRESSION: Bilateral infiltrates as above.    < end of copied text >  < from: US Duplex Venous Lower Ext Complete, Bilateral (07.25.21 @ 08:42) >  COMPARISON: None available.    TECHNIQUE: Duplex sonography of the BILATERAL LOWER extremity veins with color and spectral Doppler, with and without compression.    FINDINGS:    RIGHT:  Normal compressibility of the RIGHT common femoral, femoral and popliteal veins.  Doppler examination shows normal spontaneous and phasic flow.  No RIGHT calf vein thrombosis is detected.    LEFT:  Normal compressibility of the LEFT common femoral, femoral and popliteal veins.  Doppler examination shows normal spontaneous and phasic flow.  No LEFT calf vein thrombosis is detected.    IMPRESSION:  No evidence of deep venous thrombosis in either lower extremity.    < end of copied text >  RADIOLOGY & ADDITIONAL STUDIES:    < from: Xray Chest 1 View- PORTABLE-Urgent (Xray Chest 1 View- PORTABLE-Urgent .) (07.26.21 @ 08:43) >    PROCEDURE DATE:  07/26/2021          INTERPRETATION:  Portable chest radiograph    CLINICAL INFORMATION: Pneumonia due to Covid 19.. Follow-up    TECHNIQUE:  Portable  AP view of the chest was obtained.    COMPARISON: 7/21/2021 chest available for review.    FINDINGS:    The lungs show stable bilateral  multifocal and diffuse ill-defined airspace opacities.. No pneumothorax.    The  heart is enlarged in transverse diameter. No hilar mass.     Visualized osseous structures are intact.    IMPRESSION:   Stable bilateral  multifocal and diffuse ill-defined airspace opacities..    < end of copied text >  < from: US Duplex Venous Lower Ext Complete, Bilateral (08.06.21 @ 17:16) >  FINDINGS:    RIGHT:  Normal compressibility of the RIGHT common femoral, femoral and popliteal veins.  Doppler examination shows normal spontaneous and phasic flow.  No RIGHT calf vein thrombosis is detected.    LEFT:  Normal compressibility of the LEFT common femoral, femoral and popliteal veins.  Doppler examination shows normal spontaneous and phasic flow.  No LEFT calf vein thrombosisis detected.    IMPRESSION:  No evidence of deep venous thrombosis in either lower extremity.    < end of copied text >  < from: Xray Chest 1 View- PORTABLE-Urgent (Xray Chest 1 View- PORTABLE-Urgent .) (08.06.21 @ 16:45) >  INTERPRETATION:  Portable chest radiograph    CLINICAL INFORMATION: Pneumonia due to Covid 19.    TECHNIQUE:  Portable  AP view of the chest was obtained.    COMPARISON: 8/1/2021 chest radiograph available for review.    FINDINGS:    The lungs show decreasing bilateral diffuse airspace disease.. No pneumothorax.    The  heart is mildly enlarged in transverse diameter. No hilar mass.   Visualized osseous structures are intact.    IMPRESSION:   Decreasing bilateral diffuse airspace disease..    < end of copied text >  xr< from: US Duplex Venous Lower Ext Complete, Bilateral (08.17.21 @ 08:58) >  COMPARISON: None available.    TECHNIQUE: Duplex sonography of the BILATERAL LOWER extremity veins with color and spectral Doppler, with and without compression.    FINDINGS:    RIGHT:  Normal compressibility of the RIGHT common femoral, femoral and popliteal veins.  Doppler examination shows normal spontaneous and phasic flow.  No RIGHT calf vein thrombosis is detected.    LEFT:  Normal compressibility of the LEFT common femoral, femoral and popliteal veins.  Doppler examination shows normal spontaneous and phasic flow.  No LEFT calf vein thrombosis is detected.    IMPRESSION:  No evidence of deep venous thrombosis in either lower extremity.    < end of copied text >

## 2021-08-24 NOTE — CHART NOTE - NSCHARTNOTEFT_GEN_A_CORE
Clinical Nutrition PPN Note    **54yo male admitted with acute hypoxic resp failure from COVID-19 PNA; CPAP dependent.  Pt not DNR or DNI.  has refused intubation, unless emergent.  *Pt is on HIFLOW due breathing status, not eating at this time.    *labs reviewed; 08-24 08-24    137  |  104  |  24<H>  ----------------------------<  183<H>  4.0   |  29  |  0.66    Ca    8.1<L>      24 Aug 2021 05:40  Phos  3.8     08-24  Mg     2.1     08-24    TPro  5.6<L>  /  Alb  2.2<L>  /  TBili  0.5  /  DBili  x   /  AST  32  /  ALT  74  /  AlkPhos  103  08-24      HbA1c: A1C with Estimated Average Glucose Result: 11.6 % (07-22-21 @ 07:57)    Lipid Panel: Date/Time: 08-20-21 @ 06:46  Triglycerides, Serum: 159    CAPILLARY BLOOD GLUCOSE      POCT Blood Glucose.: 190 mg/dL (24 Aug 2021 06:12)  POCT Blood Glucose.: 180 mg/dL (23 Aug 2021 22:05)  POCT Blood Glucose.: 238 mg/dL (23 Aug 2021 17:02)  POCT Blood Glucose.: 184 mg/dL (23 Aug 2021 12:15)        *labs reviewed; marce WNL. POCT elevated due to steroid use and PO intake, 7 units of insulin in PN bag covers dextrose in PN bag. Pt receiving lantus and sliding scale to cover steroids and PO intake.  triglycerides within limits to add lipids to PN bag.  bilirubin total WNL.    I&O's Detail    23 Aug 2021 07:01  -  24 Aug 2021 07:00  --------------------------------------------------------  IN:    Dexmedetomidine: 702 mL    Fat Emulsion (Fish Oil &amp; Plant Based) 20% Infusion: 498 mL    Oral Fluid: 360 mL    PPN (Peripheral Parenteral Nutrition): 1824.7 mL  Total IN: 3384.7 mL    OUT:    Voided (mL): 1300 mL  Total OUT: 1300 mL    Total NET: 2084.7 mL          *positive fluid status; monitor and adjust IVF/PPN volume prn.  high urine output.    *pertinent meds;  dexmedetomidine, atorvastatin, lantus 15 units bedtime, admelog sliding scale, cholecalciferol, pantoprazole, senna, prednisone, zofran    *linda score of 15; no PU documented.  +1 generalized and (+2) Lt arm edema documented.  BM (+) 8/22, diarrhea, after 7 days of constipation.    *continues to meet criteria for severe malnutrition*    Diet, Regular (08-20-21 @ 12:38)(allowed when on HIFLOW)    Estimated Needs: Based on 72Kg (adjusted IBW)   Calories: 2235-2061 Kcal (25-30 Kcal/Kg)  Protein:  115-130g (1.6-1.8 g/Kg)  Fluids:  4129-5922 mL (25-30 mL/Kg)    *Wt Hx: No additional weights documented on 8/20  82.5Kg (8/19): wt loss of ~7.9Kg in ~29days; (8.7%) clinically significant.  Height (cm): 167.6 (07-21-21 @ 14:34)  Weight (kg): 90.4 (07-21-21 @ 22:00)  BMI (kg/m2): 32.2 (07-21-21 @ 22:00)  BSA (m2): 2 (07-21-21 @ 22:00)      PPN RECOMMENDATIONS: via peripheral line  TOTAL VOLUME: 1800mL    65g Amino Acids  100g Dextrose  90g Lipids 20%   98 mEq NaCl  14 mEq NaAcetate  20 mMol NaPhos  30 mEq KCl  15 mEq KAcetate  0 mMol KPhos  8 mEq Calcium Gluconate  8 mEq Magnesium Sulfate  100 mg Thiamine  7 units Regular Insulin  1mL trace elements  10mL MVI    Total Calories: 1500Kcal (meeting ~83% of estimated calorie needs and ~57% of estimated protein needs)(osmolarity of 873)    ADDITIONAL RECOMMENDATIONS:  1) obtain triglyceride and LFT's weekly  2) daily lyte and magnesium and phos checks  3) POCT q6hrs; adjust insulin regimen prn to maintain tight glycemic control  4) strict I/O's  5) daily wt checks to track/trend changes    *will monitor and adjust treatement plan prn*

## 2021-08-24 NOTE — PROGRESS NOTE ADULT - ASSESSMENT
55 M noted to have COVID 7/15  On arrival hypoxic to 80s and tachypneic. NRB placed,in the ER saturating 95%. Na 126 s/p 1L NS. CXR: Frandy infiltrates. Last scr 1.4 in 2019-> today 1.9 unknown if underlying ckd.  Treated with IV Remdesivir and Decadron, Tocilizumab   Given the obesity/hypertension and prior co-morbidities, he is at risk of intubation but continue HFNC, respiratory status remains critical  XR reviewed- pulmonary infiltrates are present  DDimer elevated, was on therapeutic lovenox, transitioned to 40mg q 12 now noted to be >2000 -  Completed Solumedrol, Symbicort 160/4.5 BID (https://[email or web address].com/journals/lanres/article/[email or web address], [facility] study). MICU service started Solumedrol 40mg q 8 hrs as of 8/18  Was on HFNC %FiO2 with BiPAP overnight, now on CPAP and transitions between HFNC/NIPPV  CTPE not performed  Respiratory status is tenuous and he continues to have an increased work of breathing; at this point he is at high risk of intubation  ICU team is reconsidering intubation and mechanical ventilation  Incrased DDimer noted; anticoagulation being managed by MICU   Repeat ddimer/BNP reviewed   Dopplers negative   Will need repeat Echocardiogram  Currently on NIPPV with CPAP, will likely need 18/8 back up rate of 12 if hypoxemia persists. Transitioning between HFNC and nocturnal NIPPV  Called patient's wife Sheree to discuss case  Dr Salazar to cover 8/25-8/31

## 2021-08-24 NOTE — PROGRESS NOTE ADULT - SUBJECTIVE AND OBJECTIVE BOX
HPI:     55 M with pmh HLD, dm, htn presents with 8 days onset of covid symptoms which included, cough, fevers, sob, hypoxia, fevers, diarrhea, chills and myalgias. tested positive 7/15.  Patient was unvaccinated. He [facility] 4 [facility].  Wife also had covid but her case was mild.  Admitted for Hypoxia to  on 7/21 and transferred to the ICU on 7/24 for HF NC O2.      7/26: Patient seen in bed, on HF NC O2 100% and 50L, along with a 100% NRB mask  7/27: Patient remains on HF NC O2 100% 50L as well as a %  7/28: He remains on 100% and 50 L HF NC and 100% NRB    7/29: Patient remains on 100% 50 L NF NC and 100% NRB  7/30: Patient now down to 80% FI O2 and 50L on HF NC, WBC rising.    7/31: Patient is on 75 % Fi O2 now, WBC remains elevated    8/23: Patient seen in bed and he is doing poorly.  He has an O2 sat in the 70s on HF NC along with 100% NRB.  O2 saturation was in the 80 while on NIV.  WBC slightly up.  Patient far weaker than the last time i saw him.    8/24: He continues to do poorly.  He was changed over to HF NC and was not able to tolerate it due to low O2 sats.      PAST MEDICAL/SURGICAL/FAMILY/SOCIAL HISTORY:    Past Medical History:  Diabetes    HTN (hypertension).  No surgeries     Tobacco Usage:  · Tobacco Usage	non smoker  Fhx: none (21 Jul 2021 19:19)       PAST MEDICAL & SURGICAL HISTORY:  HTN (hypertension)    Diabetes        FAMILY HISTORY:      Social Hx:    Allergies    No Known Allergies    Intolerances            ICU Vital Signs Last 24 Hrs  T(C): 36.4 (24 Aug 2021 09:00), Max: 36.4 (24 Aug 2021 09:00)  T(F): 97.5 (24 Aug 2021 09:00), Max: 97.5 (24 Aug 2021 09:00)  HR: 89 (24 Aug 2021 13:50) (56 - 104)  BP: 124/48 (24 Aug 2021 12:00) (102/54 - 129/46)  BP(mean): 63 (24 Aug 2021 12:00) (63 - 88)  ABP: --  ABP(mean): --  RR: 31 (24 Aug 2021 12:00) (18 - 41)  SpO2: 93% (24 Aug 2021 13:50) (72% - 100%)          I&O's Summary    23 Aug 2021 07:01  -  24 Aug 2021 07:00  --------------------------------------------------------  IN: 3384.7 mL / OUT: 1300 mL / NET: 2084.7 mL                              8.6    14.03 )-----------( 281      ( 24 Aug 2021 05:40 )             27.3       08-24    137  |  104  |  24<H>  ----------------------------<  183<H>  4.0   |  29  |  0.66    Ca    8.1<L>      24 Aug 2021 05:40  Phos  3.8     08-24  Mg     2.1     08-24    TPro  5.6<L>  /  Alb  2.2<L>  /  TBili  0.5  /  DBili  x   /  AST  32  /  ALT  74  /  AlkPhos  103  08-24                    MEDICATIONS  (STANDING):  amLODIPine   Tablet 5 milliGRAM(s) Oral daily  aspirin  chewable 81 milliGRAM(s) Oral daily  atorvastatin 80 milliGRAM(s) Oral at bedtime  budesonide 160 MICROgram(s)/formoterol 4.5 MICROgram(s) Inhaler 2 Puff(s) Inhalation two times a day  cholecalciferol 2000 Unit(s) Oral daily  dexMEDEtomidine Infusion 0.2 MICROgram(s)/kG/Hr (4.52 mL/Hr) IV Continuous <Continuous>  dextrose 40% Gel 15 Gram(s) Oral once  dextrose 5%. 1000 milliLiter(s) (50 mL/Hr) IV Continuous <Continuous>  dextrose 5%. 1000 milliLiter(s) (100 mL/Hr) IV Continuous <Continuous>  dextrose 50% Injectable 25 Gram(s) IV Push once  dextrose 50% Injectable 12.5 Gram(s) IV Push once  dextrose 50% Injectable 25 Gram(s) IV Push once  enoxaparin Injectable 40 milliGRAM(s) SubCutaneous every 12 hours  fat emulsion (Fish Oil and Plant Based) 20% Infusion 0.99 Gm/kG/Day (37.3 mL/Hr) IV Continuous <Continuous>  fat emulsion (Fish Oil and Plant Based) 20% Infusion 1 Gm/kG/Day (37.5 mL/Hr) IV Continuous <Continuous>  glucagon  Injectable 1 milliGRAM(s) IntraMuscular once  insulin glargine Injectable (LANTUS) 25 Unit(s) SubCutaneous at bedtime  insulin lispro (ADMELOG) corrective regimen sliding scale   SubCutaneous every 6 hours  losartan 75 milliGRAM(s) Oral daily  pantoprazole  Injectable 40 milliGRAM(s) IV Push daily  Parenteral Nutrition - Adult 1 Each (75 mL/Hr) TPN Continuous <Continuous>  Parenteral Nutrition - Adult 1 Each (75 mL/Hr) TPN Continuous <Continuous>  predniSONE   Tablet 10 milliGRAM(s) Oral daily  senna 1 Tablet(s) Oral at bedtime    MEDICATIONS  (PRN):  acetaminophen   Tablet .. 650 milliGRAM(s) Oral every 4 hours PRN Temp greater or equal to 38C (100.4F), Mild Pain (1 - 3)  ALBUTerol    90 MICROgram(s) HFA Inhaler 2 Puff(s) Inhalation every 4 hours PRN Shortness of Breath and/or Wheezing  ALPRAZolam 0.5 milliGRAM(s) Oral every 6 hours PRN anxiety  benzocaine 15 mG/menthol 3.6 mG (Sugar-Free) Lozenge 1 Lozenge Oral every 6 hours PRN Sore Throat  benzonatate 100 milliGRAM(s) Oral three times a day PRN Cough  hydrALAZINE Injectable 5 milliGRAM(s) IV Push every 6 hours PRN SBP>160  melatonin 6 milliGRAM(s) Oral at bedtime PRN Insomnia  morphine  - Injectable 2 milliGRAM(s) IV Push every 2 hours PRN SOB/Anxiety  polyethylene glycol 3350 17 Gram(s) Oral daily PRN Constipation      DVT Prophylaxis:    Advanced Directives:  Discussed with:    Visit Information: 30 min    ** Time is exclusive of billed procedures and/or teaching and/or routine family updates.

## 2021-08-25 LAB
ANION GAP SERPL CALC-SCNC: 3 MMOL/L — LOW (ref 5–17)
BUN SERPL-MCNC: 20 MG/DL — SIGNIFICANT CHANGE UP (ref 7–23)
CALCIUM SERPL-MCNC: 8.1 MG/DL — LOW (ref 8.5–10.1)
CHLORIDE SERPL-SCNC: 103 MMOL/L — SIGNIFICANT CHANGE UP (ref 96–108)
CO2 SERPL-SCNC: 32 MMOL/L — HIGH (ref 22–31)
CREAT SERPL-MCNC: 0.58 MG/DL — SIGNIFICANT CHANGE UP (ref 0.5–1.3)
GLUCOSE SERPL-MCNC: 143 MG/DL — HIGH (ref 70–99)
HCT VFR BLD CALC: 26.7 % — LOW (ref 39–50)
HGB BLD-MCNC: 8.5 G/DL — LOW (ref 13–17)
MAGNESIUM SERPL-MCNC: 2 MG/DL — SIGNIFICANT CHANGE UP (ref 1.6–2.6)
MCHC RBC-ENTMCNC: 28.5 PG — SIGNIFICANT CHANGE UP (ref 27–34)
MCHC RBC-ENTMCNC: 31.8 GM/DL — LOW (ref 32–36)
MCV RBC AUTO: 89.6 FL — SIGNIFICANT CHANGE UP (ref 80–100)
PHOSPHATE SERPL-MCNC: 3.7 MG/DL — SIGNIFICANT CHANGE UP (ref 2.5–4.5)
PLATELET # BLD AUTO: 269 K/UL — SIGNIFICANT CHANGE UP (ref 150–400)
POTASSIUM SERPL-MCNC: 3.7 MMOL/L — SIGNIFICANT CHANGE UP (ref 3.5–5.3)
POTASSIUM SERPL-SCNC: 3.7 MMOL/L — SIGNIFICANT CHANGE UP (ref 3.5–5.3)
RBC # BLD: 2.98 M/UL — LOW (ref 4.2–5.8)
RBC # FLD: 13.5 % — SIGNIFICANT CHANGE UP (ref 10.3–14.5)
SODIUM SERPL-SCNC: 138 MMOL/L — SIGNIFICANT CHANGE UP (ref 135–145)
WBC # BLD: 12.89 K/UL — HIGH (ref 3.8–10.5)
WBC # FLD AUTO: 12.89 K/UL — HIGH (ref 3.8–10.5)

## 2021-08-25 PROCEDURE — 99291 CRITICAL CARE FIRST HOUR: CPT

## 2021-08-25 RX ORDER — ELECTROLYTE SOLUTION,INJ
1 VIAL (ML) INTRAVENOUS
Refills: 0 | Status: DISCONTINUED | OUTPATIENT
Start: 2021-08-25 | End: 2021-08-25

## 2021-08-25 RX ORDER — INSULIN GLARGINE 100 [IU]/ML
28 INJECTION, SOLUTION SUBCUTANEOUS AT BEDTIME
Refills: 0 | Status: DISCONTINUED | OUTPATIENT
Start: 2021-08-25 | End: 2021-08-26

## 2021-08-25 RX ORDER — I.V. FAT EMULSION 20 G/100ML
0.55 EMULSION INTRAVENOUS
Qty: 50 | Refills: 0 | Status: DISCONTINUED | OUTPATIENT
Start: 2021-08-25 | End: 2021-08-25

## 2021-08-25 RX ADMIN — Medication 4: at 19:04

## 2021-08-25 RX ADMIN — DEXMEDETOMIDINE HYDROCHLORIDE IN 0.9% SODIUM CHLORIDE 4.52 MICROGRAM(S)/KG/HR: 4 INJECTION INTRAVENOUS at 11:01

## 2021-08-25 RX ADMIN — I.V. FAT EMULSION 37.5 GM/KG/DAY: 20 EMULSION INTRAVENOUS at 05:24

## 2021-08-25 RX ADMIN — PANTOPRAZOLE SODIUM 40 MILLIGRAM(S): 20 TABLET, DELAYED RELEASE ORAL at 11:00

## 2021-08-25 RX ADMIN — INSULIN GLARGINE 28 UNIT(S): 100 INJECTION, SOLUTION SUBCUTANEOUS at 23:36

## 2021-08-25 RX ADMIN — DEXMEDETOMIDINE HYDROCHLORIDE IN 0.9% SODIUM CHLORIDE 4.52 MICROGRAM(S)/KG/HR: 4 INJECTION INTRAVENOUS at 06:03

## 2021-08-25 RX ADMIN — DEXMEDETOMIDINE HYDROCHLORIDE IN 0.9% SODIUM CHLORIDE 4.52 MICROGRAM(S)/KG/HR: 4 INJECTION INTRAVENOUS at 23:09

## 2021-08-25 RX ADMIN — Medication 0.5 MILLIGRAM(S): at 23:09

## 2021-08-25 RX ADMIN — ENOXAPARIN SODIUM 40 MILLIGRAM(S): 100 INJECTION SUBCUTANEOUS at 23:10

## 2021-08-25 RX ADMIN — ENOXAPARIN SODIUM 40 MILLIGRAM(S): 100 INJECTION SUBCUTANEOUS at 11:00

## 2021-08-25 RX ADMIN — Medication 1 EACH: at 23:19

## 2021-08-25 RX ADMIN — Medication 2: at 13:13

## 2021-08-25 RX ADMIN — I.V. FAT EMULSION 20.7 GM/KG/DAY: 20 EMULSION INTRAVENOUS at 23:18

## 2021-08-25 RX ADMIN — Medication 81 MILLIGRAM(S): at 12:54

## 2021-08-25 RX ADMIN — BUDESONIDE AND FORMOTEROL FUMARATE DIHYDRATE 2 PUFF(S): 160; 4.5 AEROSOL RESPIRATORY (INHALATION) at 10:15

## 2021-08-25 RX ADMIN — DEXMEDETOMIDINE HYDROCHLORIDE IN 0.9% SODIUM CHLORIDE 4.52 MICROGRAM(S)/KG/HR: 4 INJECTION INTRAVENOUS at 18:38

## 2021-08-25 RX ADMIN — Medication 10 MILLIGRAM(S): at 12:54

## 2021-08-25 RX ADMIN — AMLODIPINE BESYLATE 5 MILLIGRAM(S): 2.5 TABLET ORAL at 12:54

## 2021-08-25 RX ADMIN — Medication 2000 UNIT(S): at 12:54

## 2021-08-25 RX ADMIN — LOSARTAN POTASSIUM 75 MILLIGRAM(S): 100 TABLET, FILM COATED ORAL at 12:54

## 2021-08-25 RX ADMIN — SENNA PLUS 1 TABLET(S): 8.6 TABLET ORAL at 23:09

## 2021-08-25 RX ADMIN — ATORVASTATIN CALCIUM 80 MILLIGRAM(S): 80 TABLET, FILM COATED ORAL at 23:09

## 2021-08-25 NOTE — PROGRESS NOTE ADULT - ASSESSMENT
55 M noted to have COVID 7/15  On arrival hypoxic to 80s and tachypneic. NRB placed,in the ER saturating 95%. Na 126 s/p 1L NS. CXR: Frandy infiltrates. Last scr 1.4 in 2019-> today 1.9 unknown if underlying ckd.  Treated with IV Remdesivir and Decadron, Tocilizumab   Given the obesity/hypertension and prior co-morbidities, he is at risk of intubation but continue HFNC, respiratory status remains critical  XR reviewed- pulmonary infiltrates are present  DDimer elevated, was on therapeutic lovenox, transitioned to 40mg q 12 now noted to be >2000 -  Completed Solumedrol, Symbicort 160/4.5 BID (https://[email or web address].com/journals/lanres/article/GFHE1580-9212, [facility] study). MICU service started Solumedrol 40mg q 8 hrs as of 8/18  Was on HFNC %FiO2 with BiPAP overnight, now on CPAP and transitions between HFNC/NIPPV  CTPE not performed      Covering for Dr Arceo  Respiratory status is tenuous and he continues to have an increased work of breathing; at this point he is at high risk of intubation  ICU team is reconsidering intubation and mechanical ventilation  Incrased DDimer noted; anticoagulation being managed by MICU   Repeat ddimer/BNP reviewed   Dopplers negative   Will need repeat Echocardiogram  Currently on NIPPV with CPAP, will likely need 18/8 back up rate of 12 if hypoxemia persists. Transitioning between HFNC and nocturnal NIPPV    escalating Bicarb compensatory for Co2 retention  Elevated D Dimer noted/ repeat leg dopplers negative  lab data reviewed  consideration for Full AC if escalating D Dimer in view of overall presentation  at high risk of intubation and mechanical ventilation  ICU level care appreciated

## 2021-08-25 NOTE — PROGRESS NOTE ADULT - ASSESSMENT
A/P: 55 year old male with Acute Hypoxic Respiratory Failure from COVID-19 Pneumonia  HTN  DM II      Plan:  ICU    PULM: Continue CPAP and HF NC to eat.  Still may require intubation    Cardio: Hemodynamics reasonable    Renal: Cr Stable    GI: H2 blocker, PPN, and PO Diet    ENDO: Lantus 25 QHS and RISS.    ID: S/P Actemra, Completed REM and High Dose Solumedrol    DVT Prophylaxis with Lovenox    LE Dopplers on 7/26 and 8/25 were negative for LE DVT    OOB to Chair    D/W Patients wife daily

## 2021-08-25 NOTE — PROGRESS NOTE ADULT - SUBJECTIVE AND OBJECTIVE BOX
HPI:    55M HTN obesity   Type 1 resp failure ARDS unvaccinated covid 19     Symptomatic day 7/15   Hosp day # 7/21  ICU day 7/24  Remdesivir  completed 10 days   Tocilizumab 7/23      Recent clinical changes:  no improvement in oxygenation  NIPPV dependent       Vital signs/reviewed and physical exam performed where pertinent and urgently required.    Lab/radiology studies/ABG/meds reviewed and interpreted into the assessment and treatment plan.    Assessment/Plan/Therapeutic interventions      Neuro: More withdrawn  precedex for anxiety   melatonin     Cor: HD stable SR    Pulm:  CPAP 18  70=80% fi02  HFNC with eating   bicarb elevating compensatory.  Fibrosing  component likely.  checking inflammatory markers fungitell    rapid desaturation with activity     GI:  Gi prophylaxis   Enteric feeds as tolerated.     Renal: Even to negative fluid balance as tolerated by hemodynamics and renal fx.      Heme:  Pharmacologic DVT P in addition to SCD's  following D- Dimer clinical course and doppler studied where appropriate.   Doppler negative d dimer recheck  lovenox 40 bid     ID: ABX discontinuation based on discussion with ID in conjunction with clinical features, culture data, and judicious procalcitonin monitoring.      Endo Aggressive glycemic control to limit FS glucose to < 180mg/dl.      COVID 19 specific considerations and therapeutic options based on the available and rapidly changing medical literature.    Goals of care considerations:  Ongoing assessment for patient specific treatment options based on clinical progression or decline.  I have involved the family with updates and requests in guidance for medical decision making.      32minutes of critical care time spent, (independent of any procedures),  in the management of this critically ill COVID-19 patient  with continuous assessments and interventions based on the interpretation of multiple databases.

## 2021-08-25 NOTE — CHART NOTE - NSCHARTNOTEFT_GEN_A_CORE
Clinical Nutrition PPN Note    **56yo male admitted with acute hypoxic resp failure from COVID-19 PNA; CPAP dependent.  Pt not DNR or DNI.  has refused intubation, unless emergent.  *Pt is on CPAP due breathing status, not eating at this time.    *labs reviewed;   08-25    138  |  103  |  20  ----------------------------<  143<H>  3.7   |  32<H>  |  0.58    Ca    8.1<L>      25 Aug 2021 07:10  Phos  3.7     08-25  Mg     2.0     08-25    TPro  5.6<L>  /  Alb  2.2<L>  /  TBili  0.5  /  DBili  x   /  AST  32  /  ALT  74  /  AlkPhos  103  08-24    HbA1c: A1C with Estimated Average Glucose Result: 11.6 % (07-22-21 @ 07:57)  Glucose: POCT Blood Glucose.: 134 mg/dL (08-25-21 @ 05:22)  Lipid Panel: Date/Time: 08-24-21 @ 05:40  Triglycerides, Serum: 278    POCT Blood Glucose.: 134 mg/dL (25 Aug 2021 05:22)  POCT Blood Glucose.: 216 mg/dL (24 Aug 2021 23:37)  POCT Blood Glucose.: 136 mg/dL (24 Aug 2021 17:51)  POCT Blood Glucose.: 192 mg/dL (24 Aug 2021 11:42)    *labs reviewed; lytes WNL. POCT elevated due to steroid use, 7 units of insulin in PN bag covers dextrose in PN bag. Pt receiving lantus and sliding scale to cover steroids and PO intake.  triglycerides elevated, will decrease lipids provided.  bilirubin total WNL. corrected Ca WNL.    *I&O's Detail    24 Aug 2021 07:01  -  25 Aug 2021 07:00  --------------------------------------------------------  IN:    Dexmedetomidine: 552.3 mL    Fat Emulsion (Fish Oil &amp; Plant Based) 20% Infusion: 157.2 mL    Fat Emulsion (Fish Oil &amp; Plant Based) 20% Infusion: 236 mL    Oral Fluid: 320 mL    PPN (Peripheral Parenteral Nutrition): 1698.4 mL  Total IN: 2963.9 mL    OUT:    Voided (mL): 3025 mL  Total OUT: 3025 mL    Total NET: -61.1 mL    *negative fluid status; monitor and adjust IVF/PPN volume prn.  high urine output.    *pertinent meds;  dexmedetomidine, atorvastatin, lantus 15 units bedtime, admelog sliding scale, cholecalciferol, pantoprazole, senna, prednisone, zofran    *linda score of 16; no PU documented.  +1 generalized and (+2) Lt arm edema documented.  BM (+) 8/23.    *continues to meet criteria for severe malnutrition*    Diet, Regular (08-20-21 @ 12:38)(allowed when on HIFLOW)    Estimated Needs: Based on 72Kg (adjusted IBW)   Calories: 9776-8502 Kcal (25-30 Kcal/Kg)  Protein:  115-130g (1.6-1.8 g/Kg)  Fluids:  4896-0901 mL (25-30 mL/Kg)    *Wt Hx:  92Kg (8/24)  82.5Kg (8/19): wt loss of ~7.9Kg in ~29days; (8.7%) clinically significant.  Height (cm): 167.6 (07-21-21 @ 14:34)  Weight (kg): 90.4 (07-21-21 @ 22:00)  BMI (kg/m2): 32.2 (07-21-21 @ 22:00)  BSA (m2): 2 (07-21-21 @ 22:00)      PPN RECOMMENDATIONS: via peripheral line  TOTAL VOLUME: 1800mL    65g Amino Acids  100g Dextrose  50g Lipids 20%   98 mEq NaCl  14 mEq NaAcetate  20 mMol NaPhos  30 mEq KCl  15 mEq KAcetate  0 mMol KPhos  8 mEq Calcium Gluconate  8 mEq Magnesium Sulfate  100 mg Thiamine  7 units Regular Insulin  1mL trace elements  10mL MVI    Total Calories: 1500Kcal (meeting ~83% of estimated calorie needs and ~57% of estimated protein needs)(osmolarity of 873)    ADDITIONAL RECOMMENDATIONS:  1) obtain triglyceride and LFT's weekly  2) daily lyte and magnesium and phos checks  3) POCT q6hrs; adjust insulin regimen prn to maintain tight glycemic control  4) strict I/O's  5) daily wt checks to track/trend changes    *will monitor and adjust treatement plan prn*  Anita Arnold MA, RDN, CDN, [facility] [phone]

## 2021-08-25 NOTE — PROGRESS NOTE ADULT - SUBJECTIVE AND OBJECTIVE BOX
Patient is a 55y old  Male who presents with a chief complaint of cough/sob (25 Aug 2021 13:40)      BRIEF HOSPITAL COURSE:   55M with PMHx HLD, HTN, DM admitted with cough, SOB, hypoxia, COVID+. Pt unvaccinated. Progressive hypoxic respiratory failure requiring escalation to HFNC. Complicated by ARDS. sp Actemra, sp Remdesivir.     Events last 24 hours: afebrile hemodynamics stable, currently on CPAP 90%, brief period of HFNC today but tolerating CPAP better, Denies CP, abd pain, N/V, on precedex for comfort.      PAST MEDICAL & SURGICAL HISTORY:  HTN (hypertension)    Diabetes          Hosp day #35d        Vital signs / Reviewed and Physical Exam Performed where pertinent and urgently required    Lab / Radiology  studies / ABG / Meds -  reviewed and interpreted into the assessment and treatment plan.    I&O's Summary    24 Aug 2021 07:01  -  25 Aug 2021 07:00  --------------------------------------------------------  IN: 2963.9 mL / OUT: 3025 mL / NET: -61.1 mL    25 Aug 2021 07:01  -  25 Aug 2021 21:07  --------------------------------------------------------  IN: 1519 mL / OUT: 1600 mL / NET: -81 mL      Impression:  1. COVID-19 Viral PNA  2. ARDS  3. acute hypoxemic respiratory failure  4. anemia  5. diabetes mellitus/hyperglycemia  6. severe protein malnutrition  7. essential HTN  8. anxiety  9. hyperlipidemia      Plan:  Neuro - nonfocal, cont precedex for pt comfort, melatonin for sleep hygiene, xanax standing for anxiolysis    CV -  BP better controlled          cont cozaar and norvasc at current doses          cont high intensity statin          long-term goal BP<130/80    Pulm -  CPAP for alveolar recruitment at HS again tonight, daytime trials of HFNC with flow at 60L as tolerates             cont to attempt to actively titrate FiO2 to keep sats>86%, will wean to 80%, sats remain >90%             cont stress steroids             standing inhaled steroids (symbicort), PRN bronchodialators             cont to remain high risk for decompensation with needs for escalation to intubation    GI -  PPI,  PO diet as tolerates when on HFNC, encouragement for glucerna shake supplementation as tolerates, cont PPN for now until PO intake           improves    Renal - Cr stable, avoid nephrotoxins, strict I/Os, BMP in am    Heme -  chem DVT with lovenox, repeat LE duplex negative,  no signs of bleeding, SCDs, transfuse for Hbg<7 or signs of                 active bleeding.    ID - remains off abx, afebrile,  cont stress steroid taper, Abx addition based on discussion with ID in conjunction with clinical features         and culture data    Endo -  Aggressive glycemic control to limit FS glucose to < 180mg/dl, A1c 11.6, BS borderline with few hyperglycemic episodes, increase lantus              to 29U HS, cont moderate ISS coverage scale, further adjustments based on BS ongoing.    COVID 19 specific considerations and therapeutic  options based on the available and rapidly changing literature.    35 minutes of critical care time spent in the management of this critically ill COVID-19 patient with continuous assessments and interventions based on the interpretation of multiple databases.

## 2021-08-25 NOTE — PROGRESS NOTE ADULT - SUBJECTIVE AND OBJECTIVE BOX
HPI:     55 M with pmh HLD, dm, htn presents with 8 days onset of covid symptoms which included, cough, fevers, sob, hypoxia, fevers, diarrhea, chills and myalgias. tested positive 7/15.  Patient was unvaccinated. He thinks he got it going to [facility] dance [facility] week of July 4 th.  Wife also had covid but her case was mild.  Admitted for Hypoxia to  on 7/21 and transferred to the ICU on 7/24 for HF NC O2.      7/26: Patient seen in bed, on HF NC O2 100% and 50L, along with a 100% NRB mask  7/27: Patient remains on HF NC O2 100% 50L as well as a %  7/28: He remains on 100% and 50 L HF NC and 100% NRB    7/29: Patient remains on 100% 50 L NF NC and 100% NRB  7/30: Patient now down to 80% FI O2 and 50L on HF NC, WBC rising.    7/31: Patient is on 75 % Fi O2 now, WBC remains elevated    8/23: Patient seen in bed and he is doing poorly.  He has an O2 sat in the 70s on HF NC along with 100% NRB.  O2 saturation was in the 80 while on NIV.  WBC slightly up.  Patient far weaker than the last time i saw him.    8/24: He continues to do poorly.  He was changed over to HF NC and was not able to tolerate it due to low O2 sats.    8/25: WBC down today, feeling better today, taking PO, saturating better on CPAP      PAST MEDICAL/SURGICAL/FAMILY/SOCIAL HISTORY:    Past Medical History:  Diabetes    HTN (hypertension).  No surgeries     Tobacco Usage:  · Tobacco Usage	non smoker  Fhx: none (21 Jul 2021 19:19)       PAST MEDICAL & SURGICAL HISTORY:  HTN (hypertension)    Diabetes        FAMILY HISTORY:      Social Hx:    Allergies    No Known Allergies    Intolerances            ICU Vital Signs Last 24 Hrs  T(C): 36.3 (25 Aug 2021 05:00), Max: 36.7 (24 Aug 2021 22:00)  T(F): 97.3 (25 Aug 2021 05:00), Max: 98 (24 Aug 2021 22:00)  HR: 79 (25 Aug 2021 10:00) (61 - 101)  BP: 129/82 (25 Aug 2021 10:00) (105/62 - 153/74)  BP(mean): 93 (25 Aug 2021 10:00) (71 - 95)  ABP: --  ABP(mean): --  RR: 25 (25 Aug 2021 10:00) (21 - 33)  SpO2: 95% (25 Aug 2021 10:00) (73% - 100%)          I&O's Summary    24 Aug 2021 07:01  -  25 Aug 2021 07:00  --------------------------------------------------------  IN: 2963.9 mL / OUT: 3025 mL / NET: -61.1 mL                              8.5    12.89 )-----------( 269      ( 25 Aug 2021 07:10 )             26.7       08-25    138  |  103  |  20  ----------------------------<  143<H>  3.7   |  32<H>  |  0.58    Ca    8.1<L>      25 Aug 2021 07:10  Phos  3.7     08-25  Mg     2.0     08-25    TPro  5.6<L>  /  Alb  2.2<L>  /  TBili  0.5  /  DBili  x   /  AST  32  /  ALT  74  /  AlkPhos  103  08-24                    MEDICATIONS  (STANDING):  amLODIPine   Tablet 5 milliGRAM(s) Oral daily  aspirin  chewable 81 milliGRAM(s) Oral daily  atorvastatin 80 milliGRAM(s) Oral at bedtime  budesonide 160 MICROgram(s)/formoterol 4.5 MICROgram(s) Inhaler 2 Puff(s) Inhalation two times a day  cholecalciferol 2000 Unit(s) Oral daily  dexMEDEtomidine Infusion 0.2 MICROgram(s)/kG/Hr (4.52 mL/Hr) IV Continuous <Continuous>  dextrose 40% Gel 15 Gram(s) Oral once  dextrose 5%. 1000 milliLiter(s) (50 mL/Hr) IV Continuous <Continuous>  dextrose 5%. 1000 milliLiter(s) (100 mL/Hr) IV Continuous <Continuous>  dextrose 50% Injectable 25 Gram(s) IV Push once  dextrose 50% Injectable 12.5 Gram(s) IV Push once  dextrose 50% Injectable 25 Gram(s) IV Push once  enoxaparin Injectable 40 milliGRAM(s) SubCutaneous every 12 hours  fat emulsion (Fish Oil and Plant Based) 20% Infusion 1 Gm/kG/Day (37.5 mL/Hr) IV Continuous <Continuous>  fat emulsion (Fish Oil and Plant Based) 20% Infusion 0.55 Gm/kG/Day (20.7 mL/Hr) IV Continuous <Continuous>  glucagon  Injectable 1 milliGRAM(s) IntraMuscular once  insulin glargine Injectable (LANTUS) 25 Unit(s) SubCutaneous at bedtime  insulin lispro (ADMELOG) corrective regimen sliding scale   SubCutaneous every 6 hours  losartan 75 milliGRAM(s) Oral daily  pantoprazole  Injectable 40 milliGRAM(s) IV Push daily  Parenteral Nutrition - Adult 1 Each (75 mL/Hr) TPN Continuous <Continuous>  Parenteral Nutrition - Adult 1 Each (75 mL/Hr) TPN Continuous <Continuous>  predniSONE   Tablet 10 milliGRAM(s) Oral daily  senna 1 Tablet(s) Oral at bedtime    MEDICATIONS  (PRN):  acetaminophen   Tablet .. 650 milliGRAM(s) Oral every 4 hours PRN Temp greater or equal to 38C (100.4F), Mild Pain (1 - 3)  ALBUTerol    90 MICROgram(s) HFA Inhaler 2 Puff(s) Inhalation every 4 hours PRN Shortness of Breath and/or Wheezing  ALPRAZolam 0.5 milliGRAM(s) Oral every 6 hours PRN anxiety  benzocaine 15 mG/menthol 3.6 mG (Sugar-Free) Lozenge 1 Lozenge Oral every 6 hours PRN Sore Throat  benzonatate 100 milliGRAM(s) Oral three times a day PRN Cough  hydrALAZINE Injectable 5 milliGRAM(s) IV Push every 6 hours PRN SBP>160  melatonin 6 milliGRAM(s) Oral at bedtime PRN Insomnia  morphine  - Injectable 2 milliGRAM(s) IV Push every 2 hours PRN SOB/Anxiety  polyethylene glycol 3350 17 Gram(s) Oral daily PRN Constipation      DVT Prophylaxis:    Advanced Directives:  Discussed with:    Visit Information: 30 min    ** Time is exclusive of billed procedures and/or teaching and/or routine family updates.

## 2021-08-25 NOTE — PROGRESS NOTE ADULT - SUBJECTIVE AND OBJECTIVE BOX
Patient is a 55y old  Male who presents with a chief complaint of cough/sob (23 Jul 2021 14:03)      HPI:  55 M with pmh HLD, dm, htn presents with 8 days onset of covid symptoms which included, cough, fevers, sob, hypoxia, fevers, diarrhea, chills and myalgias. TEsted positive 7/15. Wife endorsed he was becoming more sob of recently. On arrival hypoxic to 80s and tachypneic. NRB placed, presently on 15% and saturating 95%. Na 126 s/p 1L NS. CXR: Frandy infiltrates. Last scr 1.4 in 2019-> today 1.9 unknown if underlying ckd.  Patient not vaccinated.   Last seen by me in 2019  History of ROBERT and uncontrolled hypertension  Treated with IV Remdesivir and Decadron, now on Toci  SaO2 is 80-85% despite being on 100% NRB  ABG pending        8/24  Alternating between NIPPV and HFNC  8/25  covering for DR Arceo  remains on High Fio2 demand for ARDS post Covid pneumonia    MEDICATIONS  (STANDING):  aspirin  chewable 81 milliGRAM(s) Oral daily  atorvastatin 80 milliGRAM(s) Oral at bedtime  budesonide 160 MICROgram(s)/formoterol 4.5 MICROgram(s) Inhaler 2 Puff(s) Inhalation two times a day  cholecalciferol 2000 Unit(s) Oral daily  dexMEDEtomidine Infusion 0.2 MICROgram(s)/kG/Hr (4.52 mL/Hr) IV Continuous <Continuous>  dextrose 40% Gel 15 Gram(s) Oral once  dextrose 5%. 1000 milliLiter(s) (50 mL/Hr) IV Continuous <Continuous>  dextrose 5%. 1000 milliLiter(s) (100 mL/Hr) IV Continuous <Continuous>  dextrose 50% Injectable 25 Gram(s) IV Push once  dextrose 50% Injectable 12.5 Gram(s) IV Push once  dextrose 50% Injectable 25 Gram(s) IV Push once  enoxaparin Injectable 40 milliGRAM(s) SubCutaneous every 12 hours  glucagon  Injectable 1 milliGRAM(s) IntraMuscular once  insulin lispro (ADMELOG) corrective regimen sliding scale   SubCutaneous every 6 hours  losartan 75 milliGRAM(s) Oral daily  methylPREDNISolone sodium succinate Injectable 40 milliGRAM(s) IV Push every 8 hours  pantoprazole  Injectable 40 milliGRAM(s) IV Push daily  Parenteral Nutrition - Adult 1 Each (75 mL/Hr) TPN Continuous <Continuous>  senna 1 Tablet(s) Oral at bedtime    MEDICATIONS  (PRN):  acetaminophen   Tablet .. 650 milliGRAM(s) Oral every 4 hours PRN Temp greater or equal to 38C (100.4F), Mild Pain (1 - 3)  ALBUTerol    90 MICROgram(s) HFA Inhaler 2 Puff(s) Inhalation every 4 hours PRN Shortness of Breath and/or Wheezing  benzocaine 15 mG/menthol 3.6 mG (Sugar-Free) Lozenge 1 Lozenge Oral every 6 hours PRN Sore Throat  benzonatate 100 milliGRAM(s) Oral three times a day PRN Cough  hydrALAZINE Injectable 5 milliGRAM(s) IV Push every 6 hours PRN SBP>160  hydrocodone/homatropine Syrup 5 milliLiter(s) Oral every 6 hours PRN Cough  LORazepam   Injectable 0.5 milliGRAM(s) IV Push every 6 hours PRN Agitation  melatonin 6 milliGRAM(s) Oral at bedtime PRN Insomnia  morphine  - Injectable 2 milliGRAM(s) IV Push every 2 hours PRN SOB/Anxiety    ICU Vital Signs Last 24 Hrs  T(C): 36.3 (25 Aug 2021 05:00), Max: 36.9 (24 Aug 2021 13:00)  T(F): 97.3 (25 Aug 2021 05:00), Max: 98.4 (24 Aug 2021 13:00)  HR: 66 (25 Aug 2021 07:00) (58 - 101)  BP: 115/72 (25 Aug 2021 07:00) (105/62 - 153/74)  BP(mean): 82 (25 Aug 2021 07:00) (63 - 95)  ABP: --  ABP(mean): --  RR: 21 (25 Aug 2021 07:00) (18 - 33)  SpO2: 93% (25 Aug 2021 07:00) (72% - 100%)    I&O's Detail    24 Aug 2021 07:01  -  25 Aug 2021 07:00  --------------------------------------------------------  IN:    Dexmedetomidine: 552.3 mL    Fat Emulsion (Fish Oil &amp; Plant Based) 20% Infusion: 157.2 mL    Fat Emulsion (Fish Oil &amp; Plant Based) 20% Infusion: 236 mL    Oral Fluid: 320 mL    PPN (Peripheral Parenteral Nutrition): 1698.4 mL  Total IN: 2963.9 mL    OUT:    Voided (mL): 3025 mL  Total OUT: 3025 mL    Total NET: -61.1 mL          PHYSICAL EXAM  General Appearance: On BIPAP, increased work of breathing  using accessory muscles to breath / declined intubation overnight  Lungs: coarse bilaterally  Heart: +S1,S2  Abdomen: Soft, non-tender, bowel sounds active   Extremities: no cyanosis or edema, no joint swelling  Skin: Skin color, texture normal, no rashes   Neurologic: Alert and oriented X3 , non focal, not disoriented    ECG:    LABS:                        8.5    12.89 )-----------( 269      ( 25 Aug 2021 07:10 )             26.7   08-25    138  |  103  |  20  ----------------------------<  143<H>  3.7   |  32<H>  |  0.58    Ca    8.1<L>      25 Aug 2021 07:10  Phos  3.7     08-25  Mg     2.0     08-25    TPro  5.6<L>  /  Alb  2.2<L>  /  TBili  0.5  /  DBili  x   /  AST  32  /  ALT  74  /  AlkPhos  103  08-24                          8.5    14.05 )-----------( 292      ( 18 Aug 2021 07:01 )             26.2   08-18    138  |  104  |  24<H>  ----------------------------<  78  4.1   |  29  |  1.03    Ca    8.8      18 Aug 2021 07:01    TPro  6.3  /  Alb  2.3<L>  /  TBili  0.6  /  DBili  x   /  AST  32  /  ALT  73  /  AlkPhos  168<H>  08-17                                           10.0   19.05 )-----------( 179      ( 08 Aug 2021 07:11 )             30.3   08-08    137  |  102  |  27<H>  ----------------------------<  121<H>  3.9   |  29  |  0.77    Ca    8.4<L>      08 Aug 2021 07:11  Phos  3.4     08-08  Mg     2.0     08-08                  10.0   19.05 )-----------( 179      ( 08 Aug 2021 07:11 )             30.3   08-08    137  |  102  |  27<H>  ----------------------------<  121<H>  3.9   |  29  |  0.77    Ca    8.4<L>      08 Aug 2021 07:11  Phos  3.4     08-08  Mg     2.0     08-08                 10.4   17.19 )-----------( 198      ( 07 Aug 2021 07:06 )             32.3   08-07    138  |  104  |  31<H>  ----------------------------<  107<H>  4.4   |  29  |  0.77    Ca    8.2<L>      07 Aug 2021 07:06  Phos  4.6     08-07  Mg     2.0     08-07    TPro  5.2<L>  /  Alb  2.3<L>  /  TBili  0.8  /  DBili  x   /  AST  81<H>  /  ALT  213<H>  /  AlkPhos  200<H>  08-06                            12.9   15.00 )-----------( 366      ( 26 Jul 2021 06:20 )             38.4   07-26    133<L>  |  104  |  37<H>  ----------------------------<  232<H>  4.2   |  23  |  1.17    Ca    8.4<L>      26 Jul 2021 06:20    TPro  6.3  /  Alb  2.2<L>  /  TBili  0.4  /  DBili  0.1  /  AST  103<H>  /  ALT  137<H>  /  AlkPhos  239<H>  07-26                          13.2   13.52 )-----------( 338      ( 24 Jul 2021 08:06 )             39.5     07-24    136  |  105  |  43<H>  ----------------------------<  227<H>  4.3   |  25  |  1.31<H>    Ca    8.7      24 Jul 2021 08:06    TPro  6.6  /  Alb  2.3<L>  /  TBili  0.4  /  DBili  x   /  AST  73<H>  /  ALT  64  /  AlkPhos  165<H>  07-24          Pro BNP  -- 07-24 @ 08:06  D Dimer  446 07-24 @ 08:06  Pro BNP  261 07-21 @ 14:42  D Dimer  460 07-21 @ 14:42              < from: Xray Chest 1 View- PORTABLE-Urgent (07.21.21 @ 15:33) >    PROCEDURE DATE:  07/21/2021          INTERPRETATION:  AP chest on July 21, 2021 at 3:27 PM. Patient is short of breath with cough and fever.    Heart magnified by technique.    There arescattered mid lower lung field infiltrates right greater than left possibly related: Pneumonia.    The lungs are clear on August 30, 2019.    IMPRESSION: Bilateral infiltrates as above.    < end of copied text >  < from: US Duplex Venous Lower Ext Complete, Bilateral (07.25.21 @ 08:42) >  COMPARISON: None available.    TECHNIQUE: Duplex sonography of the BILATERAL LOWER extremity veins with color and spectral Doppler, with and without compression.    FINDINGS:    RIGHT:  Normal compressibility of the RIGHT common femoral, femoral and popliteal veins.  Doppler examination shows normal spontaneous and phasic flow.  No RIGHT calf vein thrombosis is detected.    LEFT:  Normal compressibility of the LEFT common femoral, femoral and popliteal veins.  Doppler examination shows normal spontaneous and phasic flow.  No LEFT calf vein thrombosis is detected.    IMPRESSION:  No evidence of deep venous thrombosis in either lower extremity.    < end of copied text >  RADIOLOGY & ADDITIONAL STUDIES:    < from: Xray Chest 1 View- PORTABLE-Urgent (Xray Chest 1 View- PORTABLE-Urgent .) (07.26.21 @ 08:43) >    PROCEDURE DATE:  07/26/2021          INTERPRETATION:  Portable chest radiograph    CLINICAL INFORMATION: Pneumonia due to Covid 19.. Follow-up    TECHNIQUE:  Portable  AP view of the chest was obtained.    COMPARISON: 7/21/2021 chest available for review.    FINDINGS:    The lungs show stable bilateral  multifocal and diffuse ill-defined airspace opacities.. No pneumothorax.    The  heart is enlarged in transverse diameter. No hilar mass.     Visualized osseous structures are intact.    IMPRESSION:   Stable bilateral  multifocal and diffuse ill-defined airspace opacities..    < end of copied text >  < from: US Duplex Venous Lower Ext Complete, Bilateral (08.06.21 @ 17:16) >  FINDINGS:    RIGHT:  Normal compressibility of the RIGHT common femoral, femoral and popliteal veins.  Doppler examination shows normal spontaneous and phasic flow.  No RIGHT calf vein thrombosis is detected.    LEFT:  Normal compressibility of the LEFT common femoral, femoral and popliteal veins.  Doppler examination shows normal spontaneous and phasic flow.  No LEFT calf vein thrombosisis detected.    IMPRESSION:  No evidence of deep venous thrombosis in either lower extremity.    < end of copied text >  < from: Xray Chest 1 View- PORTABLE-Urgent (Xray Chest 1 View- PORTABLE-Urgent .) (08.06.21 @ 16:45) >  INTERPRETATION:  Portable chest radiograph    CLINICAL INFORMATION: Pneumonia due to Covid 19.    TECHNIQUE:  Portable  AP view of the chest was obtained.    COMPARISON: 8/1/2021 chest radiograph available for review.    FINDINGS:    The lungs show decreasing bilateral diffuse airspace disease.. No pneumothorax.    The  heart is mildly enlarged in transverse diameter. No hilar mass.   Visualized osseous structures are intact.    IMPRESSION:   Decreasing bilateral diffuse airspace disease..    < end of copied text >  xr< from: US Duplex Venous Lower Ext Complete, Bilateral (08.17.21 @ 08:58) >  COMPARISON: None available.    TECHNIQUE: Duplex sonography of the BILATERAL LOWER extremity veins with color and spectral Doppler, with and without compression.    FINDINGS:    RIGHT:  Normal compressibility of the RIGHT common femoral, femoral and popliteal veins.  Doppler examination shows normal spontaneous and phasic flow.  No RIGHT calf vein thrombosis is detected.    LEFT:  Normal compressibility of the LEFT common femoral, femoral and popliteal veins.  Doppler examination shows normal spontaneous and phasic flow.  No LEFT calf vein thrombosis is detected.    IMPRESSION:  No evidence of deep venous thrombosis in either lower extremity.    < end of copied text >  r< from: US Duplex Venous Lower Ext Complete, Bilateral (08.23.21 @ 12:53) >  PROCEDURE DATE:  08/23/2021          INTERPRETATION:  CLINICAL INFORMATION: 55 years  Male with r/o DVT    evaluate for DVT. Covid positive. Elevated d-dimer.    COMPARISON: None available.    TECHNIQUE: Duplex sonography of the BILATERAL LOWER extremity veins with color and spectral Doppler, with and without compression.    FINDINGS:    RIGHT:  Normal compressibility of the RIGHT common femoral, femoral and popliteal veins.  Doppler examination shows normal spontaneous and phasic flow.  No RIGHT calf vein thrombosis is detected.    LEFT:  Normal compressibility of the LEFT common femoral, femoral and popliteal veins.  Doppler examination shows normal spontaneous and phasic flow.  No LEFT calf vein thrombosis is detected.    IMPRESSION:  No evidence of deep venous thrombosis in either lower extremity.    < end of copied text >  < from: Xray Chest 1 View- PORTABLE-Urgent (Xray Chest 1 View- PORTABLE-Urgent .) (08.23.21 @ 11:06) >    PROCEDURE DATE:  08/23/2021          INTERPRETATION:  History: Dyspnea, Covid    Chest:  one view.    Comparison: 08/20/2021    AP radiograph of the chest demonstrates moderate diffuse alveolar infiltrates unchanged. The cardiac silhouette is normal in size. Osseous structures are intact.    Impression:moderate diffuse alveolar infiltrates unchanged.    < end of copied text >

## 2021-08-26 LAB
ALBUMIN SERPL ELPH-MCNC: 2.1 G/DL — LOW (ref 3.3–5)
ALP SERPL-CCNC: 79 U/L — SIGNIFICANT CHANGE UP (ref 40–120)
ALT FLD-CCNC: 51 U/L — SIGNIFICANT CHANGE UP (ref 12–78)
ANION GAP SERPL CALC-SCNC: 0 MMOL/L — LOW (ref 5–17)
AST SERPL-CCNC: 23 U/L — SIGNIFICANT CHANGE UP (ref 15–37)
BILIRUB SERPL-MCNC: 0.5 MG/DL — SIGNIFICANT CHANGE UP (ref 0.2–1.2)
BUN SERPL-MCNC: 18 MG/DL — SIGNIFICANT CHANGE UP (ref 7–23)
CALCIUM SERPL-MCNC: 8.3 MG/DL — LOW (ref 8.5–10.1)
CHLORIDE SERPL-SCNC: 102 MMOL/L — SIGNIFICANT CHANGE UP (ref 96–108)
CO2 SERPL-SCNC: 34 MMOL/L — HIGH (ref 22–31)
CREAT SERPL-MCNC: 0.56 MG/DL — SIGNIFICANT CHANGE UP (ref 0.5–1.3)
GLUCOSE SERPL-MCNC: 87 MG/DL — SIGNIFICANT CHANGE UP (ref 70–99)
HCT VFR BLD CALC: 26.1 % — LOW (ref 39–50)
HGB BLD-MCNC: 8 G/DL — LOW (ref 13–17)
MAGNESIUM SERPL-MCNC: 2 MG/DL — SIGNIFICANT CHANGE UP (ref 1.6–2.6)
MCHC RBC-ENTMCNC: 27.5 PG — SIGNIFICANT CHANGE UP (ref 27–34)
MCHC RBC-ENTMCNC: 30.7 GM/DL — LOW (ref 32–36)
MCV RBC AUTO: 89.7 FL — SIGNIFICANT CHANGE UP (ref 80–100)
PHOSPHATE SERPL-MCNC: 3.6 MG/DL — SIGNIFICANT CHANGE UP (ref 2.5–4.5)
PLATELET # BLD AUTO: 278 K/UL — SIGNIFICANT CHANGE UP (ref 150–400)
POTASSIUM SERPL-MCNC: 3.7 MMOL/L — SIGNIFICANT CHANGE UP (ref 3.5–5.3)
POTASSIUM SERPL-SCNC: 3.7 MMOL/L — SIGNIFICANT CHANGE UP (ref 3.5–5.3)
PROT SERPL-MCNC: 5.5 GM/DL — LOW (ref 6–8.3)
RBC # BLD: 2.91 M/UL — LOW (ref 4.2–5.8)
RBC # FLD: 13.5 % — SIGNIFICANT CHANGE UP (ref 10.3–14.5)
SODIUM SERPL-SCNC: 136 MMOL/L — SIGNIFICANT CHANGE UP (ref 135–145)
WBC # BLD: 11.96 K/UL — HIGH (ref 3.8–10.5)
WBC # FLD AUTO: 11.96 K/UL — HIGH (ref 3.8–10.5)

## 2021-08-26 PROCEDURE — 99291 CRITICAL CARE FIRST HOUR: CPT

## 2021-08-26 RX ORDER — ELECTROLYTE SOLUTION,INJ
1 VIAL (ML) INTRAVENOUS
Refills: 0 | Status: DISCONTINUED | OUTPATIENT
Start: 2021-08-26 | End: 2021-08-26

## 2021-08-26 RX ORDER — I.V. FAT EMULSION 20 G/100ML
0.55 EMULSION INTRAVENOUS
Qty: 50 | Refills: 0 | Status: DISCONTINUED | OUTPATIENT
Start: 2021-08-26 | End: 2021-08-26

## 2021-08-26 RX ORDER — INSULIN GLARGINE 100 [IU]/ML
14 INJECTION, SOLUTION SUBCUTANEOUS
Refills: 0 | Status: DISCONTINUED | OUTPATIENT
Start: 2021-08-26 | End: 2021-08-27

## 2021-08-26 RX ADMIN — Medication 2000 UNIT(S): at 11:02

## 2021-08-26 RX ADMIN — ENOXAPARIN SODIUM 40 MILLIGRAM(S): 100 INJECTION SUBCUTANEOUS at 11:03

## 2021-08-26 RX ADMIN — I.V. FAT EMULSION 20.83 GM/KG/DAY: 20 EMULSION INTRAVENOUS at 22:20

## 2021-08-26 RX ADMIN — Medication 1 EACH: at 22:19

## 2021-08-26 RX ADMIN — DEXMEDETOMIDINE HYDROCHLORIDE IN 0.9% SODIUM CHLORIDE 4.52 MICROGRAM(S)/KG/HR: 4 INJECTION INTRAVENOUS at 11:02

## 2021-08-26 RX ADMIN — Medication 0.5 MILLIGRAM(S): at 22:21

## 2021-08-26 RX ADMIN — Medication 6 MILLIGRAM(S): at 22:21

## 2021-08-26 RX ADMIN — BUDESONIDE AND FORMOTEROL FUMARATE DIHYDRATE 2 PUFF(S): 160; 4.5 AEROSOL RESPIRATORY (INHALATION) at 11:25

## 2021-08-26 RX ADMIN — ENOXAPARIN SODIUM 40 MILLIGRAM(S): 100 INJECTION SUBCUTANEOUS at 22:20

## 2021-08-26 RX ADMIN — PANTOPRAZOLE SODIUM 40 MILLIGRAM(S): 20 TABLET, DELAYED RELEASE ORAL at 11:03

## 2021-08-26 RX ADMIN — Medication 10 MILLIGRAM(S): at 11:03

## 2021-08-26 RX ADMIN — LOSARTAN POTASSIUM 75 MILLIGRAM(S): 100 TABLET, FILM COATED ORAL at 17:00

## 2021-08-26 RX ADMIN — Medication 81 MILLIGRAM(S): at 11:02

## 2021-08-26 RX ADMIN — DEXMEDETOMIDINE HYDROCHLORIDE IN 0.9% SODIUM CHLORIDE 4.52 MICROGRAM(S)/KG/HR: 4 INJECTION INTRAVENOUS at 06:20

## 2021-08-26 RX ADMIN — INSULIN GLARGINE 14 UNIT(S): 100 INJECTION, SOLUTION SUBCUTANEOUS at 22:22

## 2021-08-26 RX ADMIN — SENNA PLUS 1 TABLET(S): 8.6 TABLET ORAL at 22:21

## 2021-08-26 RX ADMIN — DEXMEDETOMIDINE HYDROCHLORIDE IN 0.9% SODIUM CHLORIDE 4.52 MICROGRAM(S)/KG/HR: 4 INJECTION INTRAVENOUS at 07:55

## 2021-08-26 RX ADMIN — DEXMEDETOMIDINE HYDROCHLORIDE IN 0.9% SODIUM CHLORIDE 4.52 MICROGRAM(S)/KG/HR: 4 INJECTION INTRAVENOUS at 22:20

## 2021-08-26 RX ADMIN — DEXMEDETOMIDINE HYDROCHLORIDE IN 0.9% SODIUM CHLORIDE 4.52 MICROGRAM(S)/KG/HR: 4 INJECTION INTRAVENOUS at 15:51

## 2021-08-26 RX ADMIN — AMLODIPINE BESYLATE 5 MILLIGRAM(S): 2.5 TABLET ORAL at 11:02

## 2021-08-26 RX ADMIN — Medication 4: at 18:35

## 2021-08-26 RX ADMIN — ATORVASTATIN CALCIUM 80 MILLIGRAM(S): 80 TABLET, FILM COATED ORAL at 22:21

## 2021-08-26 NOTE — CHART NOTE - NSCHARTNOTEFT_GEN_A_CORE
Clinical Nutrition PPN Note    **56yo male admitted with acute hypoxic resp failure from COVID-19 PNA; CPAP dependent.  Pt not DNR or DNI.  has refused intubation, unless emergent.  *Pt is on CPAP due breathing status, mostly not eating; able to be on HIFLOW and eat 1 meal per day.    *labs reviewed;   08-26    136  |  102  |  18  ----------------------------<  87  3.7   |  34<H>  |  0.56    Ca    8.3<L>      26 Aug 2021 07:03  Phos  3.6     08-26  Mg     2.0     08-26    TPro  5.5<L>  /  Alb  2.1<L>  /  TBili  0.5  /  DBili  x   /  AST  23  /  ALT  51  /  AlkPhos  79  08-26    HbA1c: A1C with Estimated Average Glucose Result: 11.6 % (07-22-21 @ 07:57)  Glucose: POCT Blood Glucose.: 134 mg/dL (08-25-21 @ 05:22)  Lipid Panel: Date/Time: 08-24-21 @ 05:40  Triglycerides, Serum: 278    POCT Blood Glucose.: 91 mg/dL (26 Aug 2021 05:32)  POCT Blood Glucose.: 106 mg/dL (25 Aug 2021 23:24)  POCT Blood Glucose.: 239 mg/dL (25 Aug 2021 18:43)  POCT Blood Glucose.: 194 mg/dL (25 Aug 2021 13:07)      *labs reviewed; lytes WNL. POCT elevated due to steroid use, 7 units of insulin in PN bag covers dextrose in PN bag. Pt receiving lantus and sliding scale to cover steroids and PO intake.  triglycerides elevated, will decrease lipids provided.  bilirubin total WNL. corrected Ca WNL.  Per MD, as labs have been stable tomorrow no labs will be drawn to give lab break.    *I&O's Detail    25 Aug 2021 07:01  -  26 Aug 2021 07:00  --------------------------------------------------------  IN:    Dexmedetomidine: 551 mL    Fat Emulsion (Fish Oil &amp; Plant Based) 20% Infusion: 392 mL    PPN (Peripheral Parenteral Nutrition): 1838 mL  Total IN: 2781 mL    OUT:    Voided (mL): 2600 mL  Total OUT: 2600 mL    Total NET: 181 mL    *positive fluid status; monitor and adjust IVF/PPN volume prn.  high urine output.    *pertinent meds;  dexmedetomidine, atorvastatin, lantus 28 units bedtime, admelog sliding scale, cholecalciferol, pantoprazole, senna, prednisone, zofran    *linda score of 15; no PU documented.  +1 generalized, Lt arm edema documented.  BM (+) 8/23 (3 days no BM).    *continues to meet criteria for severe malnutrition*    Diet, Regular (08-20-21 @ 12:38)(allowed when on HIFLOW)    Estimated Needs: Based on 72Kg (adjusted IBW)   Calories: 1664-9417 Kcal (25-30 Kcal/Kg)  Protein:  115-130g (1.6-1.8 g/Kg)  Fluids:  0633-8439 mL (25-30 mL/Kg)    *Wt Hx:  92Kg (8/24)  82.5Kg (8/19): wt loss of ~7.9Kg in ~29days; (8.7%) clinically significant.  Height (cm): 167.6 (07-21-21 @ 14:34)  Weight (kg): 90.4 (07-21-21 @ 22:00)  BMI (kg/m2): 32.2 (07-21-21 @ 22:00)  BSA (m2): 2 (07-21-21 @ 22:00)    *no changes to PPN at this time*, will continue to monitor and adjust prn*      PPN RECOMMENDATIONS: via peripheral line  TOTAL VOLUME: 1800mL    65g Amino Acids  100g Dextrose  50g Lipids 20%   98 mEq NaCl  14 mEq NaAcetate  20 mMol NaPhos  30 mEq KCl  15 mEq KAcetate  0 mMol KPhos  8 mEq Calcium Gluconate  8 mEq Magnesium Sulfate  100 mg Thiamine  7 units Regular Insulin  1mL trace elements  10mL MVI    Total Calories: 1500Kcal (meeting ~83% of estimated calorie needs and ~57% of estimated protein needs)(osmolarity of 873)    ADDITIONAL RECOMMENDATIONS:  1) obtain triglyceride and LFT's weekly  2) daily lyte and magnesium and phos checks  3) POCT q6hrs; adjust insulin regimen prn to maintain tight glycemic control  4) strict I/O's  5) daily wt checks to track/trend changes    *will monitor and adjust treatement plan prn*  Anita Arnold MA, RDN, CDN, [facility] [phone]

## 2021-08-26 NOTE — PROGRESS NOTE ADULT - SUBJECTIVE AND OBJECTIVE BOX
Patient is a 55y old  Male who presents with a chief complaint of cough/sob (23 Jul 2021 14:03)      HPI:  55 M with pmh HLD, dm, htn presents with 8 days onset of covid symptoms which included, cough, fevers, sob, hypoxia, fevers, diarrhea, chills and myalgias. TEsted positive 7/15. Wife endorsed he was becoming more sob of recently. On arrival hypoxic to 80s and tachypneic. NRB placed, presently on 15% and saturating 95%. Na 126 s/p 1L NS. CXR: Frandy infiltrates. Last scr 1.4 in 2019-> today 1.9 unknown if underlying ckd.  Patient not vaccinated.   Last seen by me in 2019  History of ROBERT and uncontrolled hypertension  Treated with IV Remdesivir and Decadron, now on Toci  SaO2 is 80-85% despite being on 100% NRB  ABG pending        8/24  Alternating between NIPPV and HFNC  8/25-8/26  covering for DR Arceo  remains on High Fio2 demand for ARDS post Covid pneumonia    MEDICATIONS  (STANDING):  aspirin  chewable 81 milliGRAM(s) Oral daily  atorvastatin 80 milliGRAM(s) Oral at bedtime  budesonide 160 MICROgram(s)/formoterol 4.5 MICROgram(s) Inhaler 2 Puff(s) Inhalation two times a day  cholecalciferol 2000 Unit(s) Oral daily  dexMEDEtomidine Infusion 0.2 MICROgram(s)/kG/Hr (4.52 mL/Hr) IV Continuous <Continuous>  dextrose 40% Gel 15 Gram(s) Oral once  dextrose 5%. 1000 milliLiter(s) (50 mL/Hr) IV Continuous <Continuous>  dextrose 5%. 1000 milliLiter(s) (100 mL/Hr) IV Continuous <Continuous>  dextrose 50% Injectable 25 Gram(s) IV Push once  dextrose 50% Injectable 12.5 Gram(s) IV Push once  dextrose 50% Injectable 25 Gram(s) IV Push once  enoxaparin Injectable 40 milliGRAM(s) SubCutaneous every 12 hours  glucagon  Injectable 1 milliGRAM(s) IntraMuscular once  insulin lispro (ADMELOG) corrective regimen sliding scale   SubCutaneous every 6 hours  losartan 75 milliGRAM(s) Oral daily  methylPREDNISolone sodium succinate Injectable 40 milliGRAM(s) IV Push every 8 hours  pantoprazole  Injectable 40 milliGRAM(s) IV Push daily  Parenteral Nutrition - Adult 1 Each (75 mL/Hr) TPN Continuous <Continuous>  senna 1 Tablet(s) Oral at bedtime    MEDICATIONS  (PRN):  acetaminophen   Tablet .. 650 milliGRAM(s) Oral every 4 hours PRN Temp greater or equal to 38C (100.4F), Mild Pain (1 - 3)  ALBUTerol    90 MICROgram(s) HFA Inhaler 2 Puff(s) Inhalation every 4 hours PRN Shortness of Breath and/or Wheezing  benzocaine 15 mG/menthol 3.6 mG (Sugar-Free) Lozenge 1 Lozenge Oral every 6 hours PRN Sore Throat  benzonatate 100 milliGRAM(s) Oral three times a day PRN Cough  hydrALAZINE Injectable 5 milliGRAM(s) IV Push every 6 hours PRN SBP>160  hydrocodone/homatropine Syrup 5 milliLiter(s) Oral every 6 hours PRN Cough  LORazepam   Injectable 0.5 milliGRAM(s) IV Push every 6 hours PRN Agitation  melatonin 6 milliGRAM(s) Oral at bedtime PRN Insomnia  morphine  - Injectable 2 milliGRAM(s) IV Push every 2 hours PRN SOB/Anxiety    ICU Vital Signs Last 24 Hrs  T(C): 35.9 (26 Aug 2021 08:00), Max: 36.6 (25 Aug 2021 11:00)  T(F): 96.7 (26 Aug 2021 08:00), Max: 97.8 (25 Aug 2021 11:00)  HR: 66 (26 Aug 2021 08:00) (62 - 106)  BP: 106/71 (26 Aug 2021 08:00) (102/56 - 139/87)  BP(mean): 79 (26 Aug 2021 08:00) (67 - 99)  ABP: --  ABP(mean): --  RR: 21 (26 Aug 2021 08:00) (21 - 33)  SpO2: 92% (26 Aug 2021 08:00) (80% - 100%)  I&O's Detail    25 Aug 2021 07:01  -  26 Aug 2021 07:00  --------------------------------------------------------  IN:    Dexmedetomidine: 551 mL    Fat Emulsion (Fish Oil &amp; Plant Based) 20% Infusion: 392 mL    PPN (Peripheral Parenteral Nutrition): 1838 mL  Total IN: 2781 mL    OUT:    Voided (mL): 2600 mL  Total OUT: 2600 mL    Total NET: 181 mL        PHYSICAL EXAM  General Appearance: On BIPAP, increased work of breathing  using accessory muscles to breath / declined intubation overnight  Lungs: coarse bilaterally  Heart: +S1,S2  Abdomen: Soft, non-tender, bowel sounds active   Extremities: no cyanosis or edema, no joint swelling  Skin: Skin color, texture normal, no rashes   Neurologic: Alert and oriented X3 , non focal, not disoriented    ECG:    LABS:                          8.0    11.96 )-----------( 278      ( 26 Aug 2021 07:03 )             26.1   08-26    136  |  102  |  18  ----------------------------<  87  3.7   |  34<H>  |  0.56    Ca    8.3<L>      26 Aug 2021 07:03  Phos  3.6     08-26  Mg     2.0     08-26    TPro  5.5<L>  /  Alb  2.1<L>  /  TBili  0.5  /  DBili  x   /  AST  23  /  ALT  51  /  AlkPhos  79  08-26                          8.5    12.89 )-----------( 269      ( 25 Aug 2021 07:10 )             26.7   08-25    138  |  103  |  20  ----------------------------<  143<H>  3.7   |  32<H>  |  0.58    Ca    8.1<L>      25 Aug 2021 07:10  Phos  3.7     08-25  Mg     2.0     08-25    TPro  5.6<L>  /  Alb  2.2<L>  /  TBili  0.5  /  DBili  x   /  AST  32  /  ALT  74  /  AlkPhos  103  08-24                          8.5    14.05 )-----------( 292      ( 18 Aug 2021 07:01 )             26.2   08-18    138  |  104  |  24<H>  ----------------------------<  78  4.1   |  29  |  1.03    Ca    8.8      18 Aug 2021 07:01    TPro  6.3  /  Alb  2.3<L>  /  TBili  0.6  /  DBili  x   /  AST  32  /  ALT  73  /  AlkPhos  168<H>  08-17                                           10.0   19.05 )-----------( 179      ( 08 Aug 2021 07:11 )             30.3   08-08    137  |  102  |  27<H>  ----------------------------<  121<H>  3.9   |  29  |  0.77    Ca    8.4<L>      08 Aug 2021 07:11  Phos  3.4     08-08  Mg     2.0     08-08                  10.0   19.05 )-----------( 179      ( 08 Aug 2021 07:11 )             30.3   08-08    137  |  102  |  27<H>  ----------------------------<  121<H>  3.9   |  29  |  0.77    Ca    8.4<L>      08 Aug 2021 07:11  Phos  3.4     08-08  Mg     2.0     08-08                 10.4   17.19 )-----------( 198      ( 07 Aug 2021 07:06 )             32.3   08-07    138  |  104  |  31<H>  ----------------------------<  107<H>  4.4   |  29  |  0.77    Ca    8.2<L>      07 Aug 2021 07:06  Phos  4.6     08-07  Mg     2.0     08-07    TPro  5.2<L>  /  Alb  2.3<L>  /  TBili  0.8  /  DBili  x   /  AST  81<H>  /  ALT  213<H>  /  AlkPhos  200<H>  08-06                            12.9   15.00 )-----------( 366      ( 26 Jul 2021 06:20 )             38.4   07-26    133<L>  |  104  |  37<H>  ----------------------------<  232<H>  4.2   |  23  |  1.17    Ca    8.4<L>      26 Jul 2021 06:20    TPro  6.3  /  Alb  2.2<L>  /  TBili  0.4  /  DBili  0.1  /  AST  103<H>  /  ALT  137<H>  /  AlkPhos  239<H>  07-26                          13.2   13.52 )-----------( 338      ( 24 Jul 2021 08:06 )             39.5     07-24    136  |  105  |  43<H>  ----------------------------<  227<H>  4.3   |  25  |  1.31<H>    Ca    8.7      24 Jul 2021 08:06    TPro  6.6  /  Alb  2.3<L>  /  TBili  0.4  /  DBili  x   /  AST  73<H>  /  ALT  64  /  AlkPhos  165<H>  07-24          Pro BNP  -- 07-24 @ 08:06  D Dimer  446 07-24 @ 08:06  Pro BNP  261 07-21 @ 14:42  D Dimer  460 07-21 @ 14:42              < from: Xray Chest 1 View- PORTABLE-Urgent (07.21.21 @ 15:33) >    PROCEDURE DATE:  07/21/2021          INTERPRETATION:  AP chest on July 21, 2021 at 3:27 PM. Patient is short of breath with cough and fever.    Heart magnified by technique.    There arescattered mid lower lung field infiltrates right greater than left possibly related: Pneumonia.    The lungs are clear on August 30, 2019.    IMPRESSION: Bilateral infiltrates as above.    < end of copied text >  < from: US Duplex Venous Lower Ext Complete, Bilateral (07.25.21 @ 08:42) >  COMPARISON: None available.    TECHNIQUE: Duplex sonography of the BILATERAL LOWER extremity veins with color and spectral Doppler, with and without compression.    FINDINGS:    RIGHT:  Normal compressibility of the RIGHT common femoral, femoral and popliteal veins.  Doppler examination shows normal spontaneous and phasic flow.  No RIGHT calf vein thrombosis is detected.    LEFT:  Normal compressibility of the LEFT common femoral, femoral and popliteal veins.  Doppler examination shows normal spontaneous and phasic flow.  No LEFT calf vein thrombosis is detected.    IMPRESSION:  No evidence of deep venous thrombosis in either lower extremity.    < end of copied text >  RADIOLOGY & ADDITIONAL STUDIES:    < from: Xray Chest 1 View- PORTABLE-Urgent (Xray Chest 1 View- PORTABLE-Urgent .) (07.26.21 @ 08:43) >    PROCEDURE DATE:  07/26/2021          INTERPRETATION:  Portable chest radiograph    CLINICAL INFORMATION: Pneumonia due to Covid 19.. Follow-up    TECHNIQUE:  Portable  AP view of the chest was obtained.    COMPARISON: 7/21/2021 chest available for review.    FINDINGS:    The lungs show stable bilateral  multifocal and diffuse ill-defined airspace opacities.. No pneumothorax.    The  heart is enlarged in transverse diameter. No hilar mass.     Visualized osseous structures are intact.    IMPRESSION:   Stable bilateral  multifocal and diffuse ill-defined airspace opacities..    < end of copied text >  < from: US Duplex Venous Lower Ext Complete, Bilateral (08.06.21 @ 17:16) >  FINDINGS:    RIGHT:  Normal compressibility of the RIGHT common femoral, femoral and popliteal veins.  Doppler examination shows normal spontaneous and phasic flow.  No RIGHT calf vein thrombosis is detected.    LEFT:  Normal compressibility of the LEFT common femoral, femoral and popliteal veins.  Doppler examination shows normal spontaneous and phasic flow.  No LEFT calf vein thrombosisis detected.    IMPRESSION:  No evidence of deep venous thrombosis in either lower extremity.    < end of copied text >  < from: Xray Chest 1 View- PORTABLE-Urgent (Xray Chest 1 View- PORTABLE-Urgent .) (08.06.21 @ 16:45) >  INTERPRETATION:  Portable chest radiograph    CLINICAL INFORMATION: Pneumonia due to Covid 19.    TECHNIQUE:  Portable  AP view of the chest was obtained.    COMPARISON: 8/1/2021 chest radiograph available for review.    FINDINGS:    The lungs show decreasing bilateral diffuse airspace disease.. No pneumothorax.    The  heart is mildly enlarged in transverse diameter. No hilar mass.   Visualized osseous structures are intact.    IMPRESSION:   Decreasing bilateral diffuse airspace disease..    < end of copied text >  xr< from: US Duplex Venous Lower Ext Complete, Bilateral (08.17.21 @ 08:58) >  COMPARISON: None available.    TECHNIQUE: Duplex sonography of the BILATERAL LOWER extremity veins with color and spectral Doppler, with and without compression.    FINDINGS:    RIGHT:  Normal compressibility of the RIGHT common femoral, femoral and popliteal veins.  Doppler examination shows normal spontaneous and phasic flow.  No RIGHT calf vein thrombosis is detected.    LEFT:  Normal compressibility of the LEFT common femoral, femoral and popliteal veins.  Doppler examination shows normal spontaneous and phasic flow.  No LEFT calf vein thrombosis is detected.    IMPRESSION:  No evidence of deep venous thrombosis in either lower extremity.    < end of copied text >  r< from: US Duplex Venous Lower Ext Complete, Bilateral (08.23.21 @ 12:53) >  PROCEDURE DATE:  08/23/2021          INTERPRETATION:  CLINICAL INFORMATION: 55 years  Male with r/o DVT    evaluate for DVT. Covid positive. Elevated d-dimer.    COMPARISON: None available.    TECHNIQUE: Duplex sonography of the BILATERAL LOWER extremity veins with color and spectral Doppler, with and without compression.    FINDINGS:    RIGHT:  Normal compressibility of the RIGHT common femoral, femoral and popliteal veins.  Doppler examination shows normal spontaneous and phasic flow.  No RIGHT calf vein thrombosis is detected.    LEFT:  Normal compressibility of the LEFT common femoral, femoral and popliteal veins.  Doppler examination shows normal spontaneous and phasic flow.  No LEFT calf vein thrombosis is detected.    IMPRESSION:  No evidence of deep venous thrombosis in either lower extremity.    < end of copied text >  < from: Xray Chest 1 View- PORTABLE-Urgent (Xray Chest 1 View- PORTABLE-Urgent .) (08.23.21 @ 11:06) >    PROCEDURE DATE:  08/23/2021          INTERPRETATION:  History: Dyspnea, Covid    Chest:  one view.    Comparison: 08/20/2021    AP radiograph of the chest demonstrates moderate diffuse alveolar infiltrates unchanged. The cardiac silhouette is normal in size. Osseous structures are intact.    Impression:moderate diffuse alveolar infiltrates unchanged.    < end of copied text >

## 2021-08-26 NOTE — PROGRESS NOTE ADULT - SUBJECTIVE AND OBJECTIVE BOX
Patient is a 55y old  Male who presents with a chief complaint of cough/sob (26 Aug 2021 11:14)      BRIEF HOSPITAL COURSE:   55M with PMHx HLD, HTN, DM admitted with cough, SOB, hypoxia, COVID+. Pt unvaccinated. Progressive hypoxic respiratory failure requiring escalation to HFNC. Complicated by ARDS. sp Actemra, sp Remdesivir.     Events last 24 hours: afebrile hemodynamics stable, currently on CPAP 80%, BS on low side in am with escalations in pm. Denies CP, abd pain, N/V, on precedex for comfort.    PAST MEDICAL & SURGICAL HISTORY:  HTN (hypertension)    Diabetes          Hosp day #36d      Vital signs / Reviewed and Physical Exam Performed where pertinent and urgently required    Lab / Radiology  studies / ABG / Meds -  reviewed and interpreted into the assessment and treatment plan.    I&O's Summary    25 Aug 2021 07:01  -  26 Aug 2021 07:00  --------------------------------------------------------  IN: 2781 mL / OUT: 2600 mL / NET: 181 mL    26 Aug 2021 07:01  -  26 Aug 2021 21:31  --------------------------------------------------------  IN: 1502 mL / OUT: 1200 mL / NET: 302 mL        Impression:  1. COVID-19 Viral PNA  2. ARDS  3. acute hypoxemic respiratory failure  4. anemia  5. diabetes mellitus/hyperglycemia  6. severe protein malnutrition  7. essential HTN  8. anxiety  9. hyperlipidemia      Plan:  Neuro - nonfocal, cont precedex for pt comfort, melatonin for sleep hygiene, xanax standing for anxiolysis    CV -  BP better controlled          cont cozaar and norvasc at current doses          cont high intensity statin          long-term goal BP<130/80    Pulm -  CPAP for alveolar recruitment as tolerates with transition to HFNC in daytime as tolerates             cont to attempt to actively titrate FiO2 to keep sats>86%, will wean to 70%, sats remain >90%             cont stress steroids             standing inhaled steroids (symbicort), PRN bronchodialators             cont to remain high risk for decompensation with needs for escalation to intubation    GI -  PPI,  PO diet as tolerates when on HFNC, encouragement for glucerna shake supplementation as tolerates, cont PPN for now until PO intake           improves    Renal - Cr stable, avoid nephrotoxins, strict I/Os, BMP in am    Heme -  chem DVT with lovenox, repeat LE duplex negative,  no signs of bleeding, SCDs, transfuse for Hbg<7 or signs of                 active bleeding.    ID - remains off abx, afebrile,  cont stress steroid taper, Abx addition based on discussion with ID in conjunction with clinical features         and culture data    Endo -  Aggressive glycemic control to limit FS glucose to < 180mg/dl, A1c 11.6, low BS in am and higher in pm, change lantus to 14U BID, cont              moderate ISS coverage scale, further adjustments based on BS ongoing.    COVID-19 specific considerations and therapeutic  options based on the available and rapidly changing literature.    35 minutes of critical care time spent in the management of this critically ill COVID-19 patient with continuous assessments and interventions based on the interpretation of multiple databases.

## 2021-08-26 NOTE — PROGRESS NOTE ADULT - ASSESSMENT
55 M noted to have COVID 7/15  On arrival hypoxic to 80s and tachypneic. NRB placed,in the ER saturating 95%. Na 126 s/p 1L NS. CXR: Frandy infiltrates. Last scr 1.4 in 2019-> today 1.9 unknown if underlying ckd.  Treated with IV Remdesivir and Decadron, Tocilizumab   Given the obesity/hypertension and prior co-morbidities, he is at risk of intubation but continue HFNC, respiratory status remains critical  XR reviewed- pulmonary infiltrates are present  DDimer elevated, was on therapeutic lovenox, transitioned to 40mg q 12 now noted to be >2000 -  Completed Solumedrol, Symbicort 160/4.5 BID (https://[email or web address].com/journals/lanres/article/NHSW4097-4506, [facility] study). MICU service started Solumedrol 40mg q 8 hrs as of 8/18  Was on HFNC %FiO2 with BiPAP overnight, now on CPAP and transitions between HFNC/NIPPV  CTPE not performed      Covering for Dr Arceo  Respiratory status is tenuous and he continues to have an increased work of breathing; at this point he is at high risk of intubation  ICU team is reconsidering intubation and mechanical ventilation  Incrased DDimer noted; anticoagulation being managed by MICU   Repeat ddimer/BNP reviewed   Dopplers negative   Will need repeat Echocardiogram  Currently on NIPPV with CPAP, will likely need 18/8 back up rate of 12 if hypoxemia persists. Transitioning between HFNC and nocturnal NIPPV    escalating Bicarb compensatory for Co2 retention  Elevated D Dimer noted/ repeat leg dopplers negative  lab data reviewed  consideration for Full AC if escalating D Dimer in view of overall presentation  at high risk of intubation and mechanical ventilation  ICU level care appreciated    elevated Bicarb likely indication to switch to BIPAP for night time use  consider ABG in am for eval

## 2021-08-26 NOTE — PROGRESS NOTE ADULT - SUBJECTIVE AND OBJECTIVE BOX
HPI:     55 M with pmh HLD, dm, htn presents with 8 days onset of covid symptoms which included, cough, fevers, sob, hypoxia, fevers, diarrhea, chills and myalgias. tested positive 7/15.  Patient was unvaccinated. He thinks he got it going to [facility] week of July 4 th.  Wife also had covid but her case was mild.  Admitted for Hypoxia to  on 7/21 and transferred to the ICU on 7/24 for HF NC O2.      7/26: Patient seen in bed, on HF NC O2 100% and 50L, along with a 100% NRB mask  7/27: Patient remains on HF NC O2 100% 50L as well as a %  7/28: He remains on 100% and 50 L HF NC and 100% NRB    7/29: Patient remains on 100% 50 L NF NC and 100% NRB  7/30: Patient now down to 80% FI O2 and 50L on HF NC, WBC rising.    7/31: Patient is on 75 % Fi O2 now, WBC remains elevated    8/23: Patient seen in bed and he is doing poorly.  He has an O2 sat in the 70s on HF NC along with 100% NRB.  O2 saturation was in the 80 while on NIV.  WBC slightly up.  Patient far weaker than the last time i saw him.    8/24: He continues to do poorly.  He was changed over to HF NC and was not able to tolerate it due to low O2 sats.    8/25: WBC down today, feeling better today, taking PO, saturating better on CPAP  8/26: He continues on CPAP and HF NC to eat.  Fi O2 is down to 80% and maintaining an O2 sat in the 90s    PAST MEDICAL/SURGICAL/FAMILY/SOCIAL HISTORY:    Past Medical History:  Diabetes    HTN (hypertension).  No surgeries     Tobacco Usage:  · Tobacco Usage	non smoker  Fhx: none (21 Jul 2021 19:19)       PAST MEDICAL & SURGICAL HISTORY:  HTN (hypertension)    Diabetes        FAMILY HISTORY:      Social Hx:    Allergies    No Known Allergies    Intolerances            ICU Vital Signs Last 24 Hrs  T(C): 35.9 (26 Aug 2021 08:00), Max: 36.6 (25 Aug 2021 22:00)  T(F): 96.7 (26 Aug 2021 08:00), Max: 97.8 (25 Aug 2021 22:00)  HR: 76 (26 Aug 2021 09:15) (64 - 106)  BP: 106/71 (26 Aug 2021 08:00) (102/56 - 139/87)  BP(mean): 79 (26 Aug 2021 08:00) (67 - 99)  ABP: --  ABP(mean): --  RR: 21 (26 Aug 2021 08:00) (21 - 33)  SpO2: 96% (26 Aug 2021 09:15) (80% - 100%)          I&O's Summary    25 Aug 2021 07:01  -  26 Aug 2021 07:00  --------------------------------------------------------  IN: 2781 mL / OUT: 2600 mL / NET: 181 mL                              8.0    11.96 )-----------( 278      ( 26 Aug 2021 07:03 )             26.1       08-26    136  |  102  |  18  ----------------------------<  87  3.7   |  34<H>  |  0.56    Ca    8.3<L>      26 Aug 2021 07:03  Phos  3.6     08-26  Mg     2.0     08-26    TPro  5.5<L>  /  Alb  2.1<L>  /  TBili  0.5  /  DBili  x   /  AST  23  /  ALT  51  /  AlkPhos  79  08-26                    MEDICATIONS  (STANDING):  amLODIPine   Tablet 5 milliGRAM(s) Oral daily  aspirin  chewable 81 milliGRAM(s) Oral daily  atorvastatin 80 milliGRAM(s) Oral at bedtime  budesonide 160 MICROgram(s)/formoterol 4.5 MICROgram(s) Inhaler 2 Puff(s) Inhalation two times a day  cholecalciferol 2000 Unit(s) Oral daily  dexMEDEtomidine Infusion 0.2 MICROgram(s)/kG/Hr (4.52 mL/Hr) IV Continuous <Continuous>  dextrose 40% Gel 15 Gram(s) Oral once  dextrose 5%. 1000 milliLiter(s) (50 mL/Hr) IV Continuous <Continuous>  dextrose 5%. 1000 milliLiter(s) (100 mL/Hr) IV Continuous <Continuous>  dextrose 50% Injectable 25 Gram(s) IV Push once  dextrose 50% Injectable 12.5 Gram(s) IV Push once  dextrose 50% Injectable 25 Gram(s) IV Push once  enoxaparin Injectable 40 milliGRAM(s) SubCutaneous every 12 hours  glucagon  Injectable 1 milliGRAM(s) IntraMuscular once  insulin glargine Injectable (LANTUS) 28 Unit(s) SubCutaneous at bedtime  insulin lispro (ADMELOG) corrective regimen sliding scale   SubCutaneous every 6 hours  losartan 75 milliGRAM(s) Oral daily  pantoprazole  Injectable 40 milliGRAM(s) IV Push daily  Parenteral Nutrition - Adult 1 Each (75 mL/Hr) TPN Continuous <Continuous>  predniSONE   Tablet 10 milliGRAM(s) Oral daily  senna 1 Tablet(s) Oral at bedtime    MEDICATIONS  (PRN):  acetaminophen   Tablet .. 650 milliGRAM(s) Oral every 4 hours PRN Temp greater or equal to 38C (100.4F), Mild Pain (1 - 3)  ALBUTerol    90 MICROgram(s) HFA Inhaler 2 Puff(s) Inhalation every 4 hours PRN Shortness of Breath and/or Wheezing  ALPRAZolam 0.5 milliGRAM(s) Oral every 6 hours PRN anxiety  benzocaine 15 mG/menthol 3.6 mG (Sugar-Free) Lozenge 1 Lozenge Oral every 6 hours PRN Sore Throat  benzonatate 100 milliGRAM(s) Oral three times a day PRN Cough  hydrALAZINE Injectable 5 milliGRAM(s) IV Push every 6 hours PRN SBP>160  melatonin 6 milliGRAM(s) Oral at bedtime PRN Insomnia  morphine  - Injectable 2 milliGRAM(s) IV Push every 2 hours PRN SOB/Anxiety  polyethylene glycol 3350 17 Gram(s) Oral daily PRN Constipation      DVT Prophylaxis:    Advanced Directives:  Discussed with:    Visit Information: 30    ** Time is exclusive of billed procedures and/or teaching and/or routine family updates.

## 2021-08-27 PROCEDURE — 99291 CRITICAL CARE FIRST HOUR: CPT

## 2021-08-27 RX ORDER — ELECTROLYTE SOLUTION,INJ
1 VIAL (ML) INTRAVENOUS
Refills: 0 | Status: DISCONTINUED | OUTPATIENT
Start: 2021-08-27 | End: 2021-08-27

## 2021-08-27 RX ORDER — SODIUM CHLORIDE 9 MG/ML
1000 INJECTION, SOLUTION INTRAVENOUS
Refills: 0 | Status: DISCONTINUED | OUTPATIENT
Start: 2021-08-27 | End: 2021-09-03

## 2021-08-27 RX ORDER — DEXTROSE 50 % IN WATER 50 %
12.5 SYRINGE (ML) INTRAVENOUS ONCE
Refills: 0 | Status: DISCONTINUED | OUTPATIENT
Start: 2021-08-27 | End: 2021-09-03

## 2021-08-27 RX ORDER — INSULIN GLARGINE 100 [IU]/ML
5 INJECTION, SOLUTION SUBCUTANEOUS
Refills: 0 | Status: DISCONTINUED | OUTPATIENT
Start: 2021-08-27 | End: 2021-08-30

## 2021-08-27 RX ORDER — INSULIN LISPRO 100/ML
VIAL (ML) SUBCUTANEOUS
Refills: 0 | Status: DISCONTINUED | OUTPATIENT
Start: 2021-08-27 | End: 2021-09-03

## 2021-08-27 RX ORDER — DEXTROSE 50 % IN WATER 50 %
25 SYRINGE (ML) INTRAVENOUS ONCE
Refills: 0 | Status: DISCONTINUED | OUTPATIENT
Start: 2021-08-27 | End: 2021-09-03

## 2021-08-27 RX ORDER — DEXTROSE 50 % IN WATER 50 %
15 SYRINGE (ML) INTRAVENOUS ONCE
Refills: 0 | Status: DISCONTINUED | OUTPATIENT
Start: 2021-08-27 | End: 2021-09-03

## 2021-08-27 RX ORDER — INSULIN LISPRO 100/ML
VIAL (ML) SUBCUTANEOUS AT BEDTIME
Refills: 0 | Status: DISCONTINUED | OUTPATIENT
Start: 2021-08-27 | End: 2021-09-03

## 2021-08-27 RX ORDER — I.V. FAT EMULSION 20 G/100ML
20.8 EMULSION INTRAVENOUS
Qty: 50 | Refills: 0 | Status: DISCONTINUED | OUTPATIENT
Start: 2021-08-27 | End: 2021-08-27

## 2021-08-27 RX ORDER — GLUCAGON INJECTION, SOLUTION 0.5 MG/.1ML
1 INJECTION, SOLUTION SUBCUTANEOUS ONCE
Refills: 0 | Status: DISCONTINUED | OUTPATIENT
Start: 2021-08-27 | End: 2021-09-03

## 2021-08-27 RX ADMIN — ENOXAPARIN SODIUM 40 MILLIGRAM(S): 100 INJECTION SUBCUTANEOUS at 11:51

## 2021-08-27 RX ADMIN — I.V. FAT EMULSION 20.8 ML/HR: 20 EMULSION INTRAVENOUS at 22:01

## 2021-08-27 RX ADMIN — INSULIN GLARGINE 5 UNIT(S): 100 INJECTION, SOLUTION SUBCUTANEOUS at 15:18

## 2021-08-27 RX ADMIN — ATORVASTATIN CALCIUM 80 MILLIGRAM(S): 80 TABLET, FILM COATED ORAL at 21:24

## 2021-08-27 RX ADMIN — Medication 6 MILLIGRAM(S): at 21:24

## 2021-08-27 RX ADMIN — SENNA PLUS 1 TABLET(S): 8.6 TABLET ORAL at 21:24

## 2021-08-27 RX ADMIN — LOSARTAN POTASSIUM 75 MILLIGRAM(S): 100 TABLET, FILM COATED ORAL at 11:52

## 2021-08-27 RX ADMIN — DEXMEDETOMIDINE HYDROCHLORIDE IN 0.9% SODIUM CHLORIDE 4.52 MICROGRAM(S)/KG/HR: 4 INJECTION INTRAVENOUS at 13:54

## 2021-08-27 RX ADMIN — ENOXAPARIN SODIUM 40 MILLIGRAM(S): 100 INJECTION SUBCUTANEOUS at 21:24

## 2021-08-27 RX ADMIN — PANTOPRAZOLE SODIUM 40 MILLIGRAM(S): 20 TABLET, DELAYED RELEASE ORAL at 11:51

## 2021-08-27 RX ADMIN — Medication 2000 UNIT(S): at 11:52

## 2021-08-27 RX ADMIN — Medication 10 MILLIGRAM(S): at 11:52

## 2021-08-27 RX ADMIN — Medication 0.5 MILLIGRAM(S): at 19:43

## 2021-08-27 RX ADMIN — DEXMEDETOMIDINE HYDROCHLORIDE IN 0.9% SODIUM CHLORIDE 4.52 MICROGRAM(S)/KG/HR: 4 INJECTION INTRAVENOUS at 21:24

## 2021-08-27 RX ADMIN — AMLODIPINE BESYLATE 5 MILLIGRAM(S): 2.5 TABLET ORAL at 11:52

## 2021-08-27 RX ADMIN — Medication 81 MILLIGRAM(S): at 11:52

## 2021-08-27 RX ADMIN — DEXMEDETOMIDINE HYDROCHLORIDE IN 0.9% SODIUM CHLORIDE 4.52 MICROGRAM(S)/KG/HR: 4 INJECTION INTRAVENOUS at 08:33

## 2021-08-27 RX ADMIN — Medication 1 EACH: at 22:01

## 2021-08-27 NOTE — PROGRESS NOTE ADULT - SUBJECTIVE AND OBJECTIVE BOX
Patient is a 55y old  Male who presents with a chief complaint of cough/sob (27 Aug 2021 07:56)    HPI:  55 M with pmh HLD, dm, htn presents with 8 days onset of covid symptoms which included, cough, fevers, sob, hypoxia, fevers, diarrhea, chills and myalgias. TEsted positive 7/15. Wife endorsed he was becoming more sob of recently. On arrival hypoxic to 80s and tachypneic. NRB placed, presently on 15% and saturating 95%. Na 126 s/p 1L NS. CXR: Frandy infiltrates. Last scr 1.4 in 2019-> today 1.9 unknown if underlying ckd. Dimer 450 - normal for age  however with covid and increasing fibrinogen, will need further eval.   Denies chets pain. LHC performed 2019 revealed normal coronaries.         PAST MEDICAL/SURGICAL/FAMILY/SOCIAL HISTORY:    Past Medical History:  Diabetes    HTN (hypertension).  No surgeries     Tobacco Usage:  · Tobacco Usage	non smoker  Fhx: none (21 Jul 2021 19:19)    24 hour events: ***    PAST MEDICAL & SURGICAL HISTORY:  HTN (hypertension)    Diabetes      REVIEW OF SYSTEMS  Constitutional: No fever, chills, fatigue  Neuro: No headache, numbness, weakness  Resp: No cough, wheezing, shortness of breath  CVS: No chest pain, palpitations, leg swelling  GI: No abdominal pain, nausea, vomiting, diarrhea   : No dysuria, frequency, incontinence  Skin: No itching, burning, rashes, or lesions   Msk: No joint pain or swelling  Psych: No depression, anxiety, mood swings  Heme: No bleeding    T(F): 97.4 (08-27-21 @ 08:00), Max: 99.6 (08-27-21 @ 06:00)  HR: 73 (08-27-21 @ 09:00) (59 - 106)  BP: 125/77 (08-27-21 @ 09:00) (98/65 - 132/88)  RR: 28 (08-27-21 @ 09:00) (18 - 34)  SpO2: 89% (08-27-21 @ 09:00) (80% - 99%)  Wt(kg): --      CAPILLARY BLOOD GLUCOSE  POCT Blood Glucose.: 96 mg/dL (27 Aug 2021 08:35)  POCT Blood Glucose.: 85 mg/dL (27 Aug 2021 05:14)  POCT Blood Glucose.: 77 mg/dL (26 Aug 2021 23:57)  POCT Blood Glucose.: 230 mg/dL (26 Aug 2021 17:45)  POCT Blood Glucose.: 97 mg/dL (26 Aug 2021 12:15)    I&O's Summary    08-26 @ 07:01  -  08-27 @ 07:00  --------------------------------------------------------  IN: 2861 mL / OUT: 1700 mL / NET: 1161 mL    PHYSICAL EXAM  General:   CNS:   HEENT:   Resp:   CVS:   Abd:   Ext:   Skin:     MEDICATIONS  amLODIPine   Tablet Oral  hydrALAZINE Injectable IV Push PRN  losartan Oral  atorvastatin Oral  dextrose 40% Gel Oral  dextrose 50% Injectable IV Push  dextrose 50% Injectable IV Push  dextrose 50% Injectable IV Push  glucagon  Injectable IntraMuscular  insulin glargine Injectable (LANTUS) SubCutaneous  insulin lispro (ADMELOG) corrective regimen sliding scale SubCutaneous  predniSONE   Tablet Oral  ALBUTerol    90 MICROgram(s) HFA Inhaler Inhalation PRN  benzonatate Oral PRN  budesonide 160 MICROgram(s)/formoterol 4.5 MICROgram(s) Inhaler Inhalation  acetaminophen   Tablet .. Oral PRN  ALPRAZolam Oral PRN  dexMEDEtomidine Infusion IV Continuous  melatonin Oral PRN  morphine  - Injectable IV Push PRN  aspirin  chewable Oral  enoxaparin Injectable SubCutaneous  pantoprazole  Injectable IV Push  polyethylene glycol 3350 Oral PRN  senna Oral  cholecalciferol Oral  dextrose 5%. IV Continuous  dextrose 5%. IV Continuous  fat emulsion (Fish Oil and Plant Based) 20% Infusion IV Continuous  Parenteral Nutrition - Adult TPN Continuous  benzocaine 15 mG/menthol 3.6 mG (Sugar-Free) Lozenge Oral PRN                          8.0    11.96 )-----------( 278      ( 26 Aug 2021 07:03 )             26.1       08-26    136  |  102  |  18  ----------------------------<  87  3.7   |  34<H>  |  0.56    Ca    8.3<L>      26 Aug 2021 07:03  Phos  3.6     08-26  Mg     2.0     08-26    TPro  5.5<L>  /  Alb  2.1<L>  /  TBili  0.5  /  DBili  x   /  AST  23  /  ALT  51  /  AlkPhos  79  08-26                  Radiology: ***    CENTRAL LINE: Y/N          DATE INSERTED:              REMOVE: Y/N  GOFF: Y/N                        DATE INSERTED:              REMOVE: Y/N  A-LINE: Y/N                       DATE INSERTED:              REMOVE: Y/N    GLOBAL ISSUE/BEST PRACTICE  Analgesia:   Sedation:   CAM-ICU:   HOB elevation: yes  Stress ulcer prophylaxis:   VTE prophylaxis:   Glycemic control:   Nutrition:     CODE STATUS: ***  [facility] discussion: Y Patient is a 55y old  Male who presents with a chief complaint of cough/sob (27 Aug 2021 07:56)    HPI:  55 M with pmh HLD, dm, htn presents with 8 days onset of covid symptoms which included, cough, fevers, sob, hypoxia, fevers, diarrhea, chills and myalgias. TEsted positive 7/15. Wife endorsed he was becoming more sob of recently. On arrival hypoxic to 80s and tachypneic. NRB placed, presently on 15% and saturating 95%. Na 126 s/p 1L NS. CXR: Frandy infiltrates. Last scr 1.4 in 2019-> today 1.9 unknown if underlying ckd. Dimer 450 - normal for age  however with covid and increasing fibrinogen, will need further eval.   Denies chets pain. LHC performed 2019 revealed normal coronaries.         PAST MEDICAL/SURGICAL/FAMILY/SOCIAL HISTORY:    Past Medical History:  Diabetes    HTN (hypertension).  No surgeries     Tobacco Usage:  · Tobacco Usage	non smoker  Fhx: none (21 Jul 2021 19:19)    24 hour events: ***    PAST MEDICAL & SURGICAL HISTORY:  HTN (hypertension)    Diabetes      REVIEW OF SYSTEMS  Constitutional: No fever, chills, fatigue  Neuro: No headache, numbness, weakness  Resp: No cough, wheezing, shortness of breath  CVS: No chest pain, palpitations, leg swelling  GI: No abdominal pain, nausea, vomiting, diarrhea   : No dysuria, frequency, incontinence  Skin: No itching, burning, rashes, or lesions   Msk: No joint pain or swelling  Psych: No depression, anxiety, mood swings  Heme: No bleeding    T(F): 97.4 (08-27-21 @ 08:00), Max: 99.6 (08-27-21 @ 06:00)  HR: 73 (08-27-21 @ 09:00) (59 - 106)  BP: 125/77 (08-27-21 @ 09:00) (98/65 - 132/88)  RR: 28 (08-27-21 @ 09:00) (18 - 34)  SpO2: 89% (08-27-21 @ 09:00) (80% - 99%)  Wt(kg): --      CAPILLARY BLOOD GLUCOSE  POCT Blood Glucose.: 96 mg/dL (27 Aug 2021 08:35)  POCT Blood Glucose.: 85 mg/dL (27 Aug 2021 05:14)  POCT Blood Glucose.: 77 mg/dL (26 Aug 2021 23:57)  POCT Blood Glucose.: 230 mg/dL (26 Aug 2021 17:45)  POCT Blood Glucose.: 97 mg/dL (26 Aug 2021 12:15)    I&O's Summary    08-26 @ 07:01  -  08-27 @ 07:00  --------------------------------------------------------  IN: 2861 mL / OUT: 1700 mL / NET: 1161 mL    PHYSICAL EXAM  General: in bed on CPAP, mild resp distress  CNS: AAOx3, no focal deficits   HEENT: PERRL  Resp: b/l rhonchi, good AE   CVS: S1S2 regular   Abd: soft, NT, +BS  Ext: no edema  Skin: warm     MEDICATIONS  amLODIPine   Tablet Oral  hydrALAZINE Injectable IV Push PRN  losartan Oral  atorvastatin Oral  dextrose 40% Gel Oral  dextrose 50% Injectable IV Push  dextrose 50% Injectable IV Push  dextrose 50% Injectable IV Push  glucagon  Injectable IntraMuscular  insulin glargine Injectable (LANTUS) SubCutaneous  insulin lispro (ADMELOG) corrective regimen sliding scale SubCutaneous  predniSONE   Tablet Oral  ALBUTerol    90 MICROgram(s) HFA Inhaler Inhalation PRN  benzonatate Oral PRN  budesonide 160 MICROgram(s)/formoterol 4.5 MICROgram(s) Inhaler Inhalation  acetaminophen   Tablet .. Oral PRN  ALPRAZolam Oral PRN  dexMEDEtomidine Infusion IV Continuous  melatonin Oral PRN  morphine  - Injectable IV Push PRN  aspirin  chewable Oral  enoxaparin Injectable SubCutaneous  pantoprazole  Injectable IV Push  polyethylene glycol 3350 Oral PRN  senna Oral  cholecalciferol Oral  dextrose 5%. IV Continuous  dextrose 5%. IV Continuous  fat emulsion (Fish Oil and Plant Based) 20% Infusion IV Continuous  Parenteral Nutrition - Adult TPN Continuous  benzocaine 15 mG/menthol 3.6 mG (Sugar-Free) Lozenge Oral PRN                          8.0    11.96 )-----------( 278      ( 26 Aug 2021 07:03 )             26.1       08-26    136  |  102  |  18  ----------------------------<  87  3.7   |  34<H>  |  0.56    Ca    8.3<L>      26 Aug 2021 07:03  Phos  3.6     08-26  Mg     2.0     08-26    TPro  5.5<L>  /  Alb  2.1<L>  /  TBili  0.5  /  DBili  x   /  AST  23  /  ALT  51  /  AlkPhos  79  08-26                  Radiology: ***    CENTRAL LINE: Y/N          DATE INSERTED:              REMOVE: Y/N  GOFF: Y/N                        DATE INSERTED:              REMOVE: Y/N  A-LINE: Y/N                       DATE INSERTED:              REMOVE: Y/N    GLOBAL ISSUE/BEST PRACTICE  Analgesia:   Sedation:   CAM-ICU:   HOB elevation: yes  Stress ulcer prophylaxis:   VTE prophylaxis:   Glycemic control:   Nutrition:     CODE STATUS: ***  [facility] discussion: Y Patient is a 55y old  Male who presents with a chief complaint of cough/sob (27 Aug 2021 07:56)    HPI:  55 M with pmh HLD, dm, htn presents with 8 days onset of covid symptoms which included, cough, fevers, sob, hypoxia, fevers, diarrhea, chills and myalgias. TEsted positive 7/15. Wife endorsed he was becoming more sob of recently. On arrival hypoxic to 80s and tachypneic. NRB placed, presently on 15% and saturating 95%. Na 126 s/p 1L NS. CXR: Frandy infiltrates. Last scr 1.4 in 2019-> today 1.9 unknown if underlying ckd. Dimer 450 - normal for age  however with covid and increasing fibrinogen, will need further eval.   Denies chets pain. LHC performed 2019 revealed normal coronaries.         PAST MEDICAL/SURGICAL/FAMILY/SOCIAL HISTORY:    Past Medical History:  Diabetes    HTN (hypertension).  No surgeries     Tobacco Usage:  · Tobacco Usage	non smoker  Fhx: none (21 Jul 2021 19:19)    24 hour events: mostly CPAP dependent     PAST MEDICAL & SURGICAL HISTORY:  HTN (hypertension)    Diabetes      REVIEW OF SYSTEMS  Constitutional: No fever, chills, fatigue  Neuro: No headache, numbness, weakness  Resp: +shortness of breath  CVS: No chest pain, palpitations, leg swelling  GI: No abdominal pain, nausea, vomiting, diarrhea   : No dysuria, frequency, incontinence  Skin: No itching, burning, rashes, or lesions   Msk: No joint pain or swelling  Psych: No depression, anxiety, mood swings  Heme: No bleeding    T(F): 97.4 (08-27-21 @ 08:00), Max: 99.6 (08-27-21 @ 06:00)  HR: 73 (08-27-21 @ 09:00) (59 - 106)  BP: 125/77 (08-27-21 @ 09:00) (98/65 - 132/88)  RR: 28 (08-27-21 @ 09:00) (18 - 34)  SpO2: 89% (08-27-21 @ 09:00) (80% - 99%)  Wt(kg): --      CAPILLARY BLOOD GLUCOSE  POCT Blood Glucose.: 96 mg/dL (27 Aug 2021 08:35)  POCT Blood Glucose.: 85 mg/dL (27 Aug 2021 05:14)  POCT Blood Glucose.: 77 mg/dL (26 Aug 2021 23:57)  POCT Blood Glucose.: 230 mg/dL (26 Aug 2021 17:45)  POCT Blood Glucose.: 97 mg/dL (26 Aug 2021 12:15)    I&O's Summary    08-26 @ 07:01  -  08-27 @ 07:00  --------------------------------------------------------  IN: 2861 mL / OUT: 1700 mL / NET: 1161 mL    PHYSICAL EXAM  General: in bed on CPAP, mild resp distress  CNS: AAOx3, no focal deficits   HEENT: PERRL  Resp: b/l rhonchi, good AE   CVS: S1S2 regular   Abd: soft, NT, +BS  Ext: no edema  Skin: warm     MEDICATIONS  amLODIPine   Tablet Oral  hydrALAZINE Injectable IV Push PRN  losartan Oral  atorvastatin Oral  dextrose 40% Gel Oral  dextrose 50% Injectable IV Push  dextrose 50% Injectable IV Push  dextrose 50% Injectable IV Push  glucagon  Injectable IntraMuscular  insulin glargine Injectable (LANTUS) SubCutaneous  insulin lispro (ADMELOG) corrective regimen sliding scale SubCutaneous  predniSONE   Tablet Oral  ALBUTerol    90 MICROgram(s) HFA Inhaler Inhalation PRN  benzonatate Oral PRN  budesonide 160 MICROgram(s)/formoterol 4.5 MICROgram(s) Inhaler Inhalation  acetaminophen   Tablet .. Oral PRN  ALPRAZolam Oral PRN  dexMEDEtomidine Infusion IV Continuous  melatonin Oral PRN  morphine  - Injectable IV Push PRN  aspirin  chewable Oral  enoxaparin Injectable SubCutaneous  pantoprazole  Injectable IV Push  polyethylene glycol 3350 Oral PRN  senna Oral  cholecalciferol Oral  dextrose 5%. IV Continuous  dextrose 5%. IV Continuous  fat emulsion (Fish Oil and Plant Based) 20% Infusion IV Continuous  Parenteral Nutrition - Adult TPN Continuous  benzocaine 15 mG/menthol 3.6 mG (Sugar-Free) Lozenge Oral PRN                          8.0    11.96 )-----------( 278      ( 26 Aug 2021 07:03 )             26.1       08-26    136  |  102  |  18  ----------------------------<  87  3.7   |  34<H>  |  0.56    Ca    8.3<L>      26 Aug 2021 07:03  Phos  3.6     08-26  Mg     2.0     08-26    TPro  5.5<L>  /  Alb  2.1<L>  /  TBili  0.5  /  DBili  x   /  AST  23  /  ALT  51  /  AlkPhos  79  08-26

## 2021-08-27 NOTE — PROGRESS NOTE ADULT - ASSESSMENT
55 M noted to have COVID 7/15  On arrival hypoxic to 80s and tachypneic. NRB placed,in the ER saturating 95%. Na 126 s/p 1L NS. CXR: Frandy infiltrates. Last scr 1.4 in 2019-> today 1.9 unknown if underlying ckd.  Treated with IV Remdesivir and Decadron, Tocilizumab   Given the obesity/hypertension and prior co-morbidities, he is at risk of intubation but continue HFNC, respiratory status remains critical  XR reviewed- pulmonary infiltrates are present  DDimer elevated, was on therapeutic lovenox, transitioned to 40mg q 12 now noted to be >2000 -  Completed Solumedrol, Symbicort 160/4.5 BID (https://www.theAgFlow.com/journals/lanres/article/[email or web address], [facility] study). MICU service started Solumedrol 40mg q 8 hrs as of 8/18  Was on HFNC %FiO2 with BiPAP overnight, now on CPAP and transitions between HFNC/NIPPV  CTPE not performed      Covering for Dr Arceo  Respiratory status is tenuous and he continues to have an increased work of breathing; at this point he is at high risk of intubation  ICU team is reconsidering intubation and mechanical ventilation  Incrased DDimer noted; anticoagulation being managed by MICU   Repeat ddimer/BNP reviewed   Dopplers negative   Currently on NIPPV with CPAP, will likely need 18/8 back up rate of 12 if hypoxemia persists. Transitioning between HFNC and nocturnal NIPPV    escalating Bicarb compensatory for Co2 retention  Elevated D Dimer noted/ repeat leg dopplers negative    consideration for Full AC if escalating D Dimer in view of overall presentation  at high risk of intubation and mechanical ventilation  ICU level care appreciated    elevated Bicarb likely indication to switch to BIPAP for night time use  consider ABG in am for eval

## 2021-08-27 NOTE — PROGRESS NOTE ADULT - SUBJECTIVE AND OBJECTIVE BOX
Patient is a 55y old  Male who presents with a chief complaint of cough/sob (23 Jul 2021 14:03)      HPI:  55 M with pmh HLD, dm, htn presents with 8 days onset of covid symptoms which included, cough, fevers, sob, hypoxia, fevers, diarrhea, chills and myalgias. TEsted positive 7/15. Wife endorsed he was becoming more sob of recently. On arrival hypoxic to 80s and tachypneic. NRB placed, presently on 15% and saturating 95%. Na 126 s/p 1L NS. CXR: Frandy infiltrates. Last scr 1.4 in 2019-> today 1.9 unknown if underlying ckd.  Patient not vaccinated.   Last seen by me in 2019  History of ROBERT and uncontrolled hypertension  Treated with IV Remdesivir and Decadron, now on Toci  SaO2 is 80-85% despite being on 100% NRB  ABG pending        8/24  Alternating between NIPPV and HFNC  8/25-8/26  covering for DR Arceo  remains on High Fio2 demand for ARDS post Covid pneumonia  8/27  uneventful overnight  remains on cpap setting overnight  high fio2 requirement still  o2 sat appears improved  not as labored with his breathing this am    MEDICATIONS  (STANDING):  amLODIPine   Tablet 5 milliGRAM(s) Oral daily  aspirin  chewable 81 milliGRAM(s) Oral daily  atorvastatin 80 milliGRAM(s) Oral at bedtime  budesonide 160 MICROgram(s)/formoterol 4.5 MICROgram(s) Inhaler 2 Puff(s) Inhalation two times a day  cholecalciferol 2000 Unit(s) Oral daily  dexMEDEtomidine Infusion 0.2 MICROgram(s)/kG/Hr (4.52 mL/Hr) IV Continuous <Continuous>  dextrose 40% Gel 15 Gram(s) Oral once  dextrose 5%. 1000 milliLiter(s) (50 mL/Hr) IV Continuous <Continuous>  dextrose 5%. 1000 milliLiter(s) (100 mL/Hr) IV Continuous <Continuous>  dextrose 50% Injectable 25 Gram(s) IV Push once  dextrose 50% Injectable 12.5 Gram(s) IV Push once  dextrose 50% Injectable 25 Gram(s) IV Push once  enoxaparin Injectable 40 milliGRAM(s) SubCutaneous every 12 hours  fat emulsion (Fish Oil and Plant Based) 20% Infusion 0.55 Gm/kG/Day (20.83 mL/Hr) IV Continuous <Continuous>  glucagon  Injectable 1 milliGRAM(s) IntraMuscular once  insulin glargine Injectable (LANTUS) 14 Unit(s) SubCutaneous two times a day  insulin lispro (ADMELOG) corrective regimen sliding scale   SubCutaneous every 6 hours  losartan 75 milliGRAM(s) Oral daily  pantoprazole  Injectable 40 milliGRAM(s) IV Push daily  Parenteral Nutrition - Adult 1 Each (75 mL/Hr) TPN Continuous <Continuous>  predniSONE   Tablet 10 milliGRAM(s) Oral daily  senna 1 Tablet(s) Oral at bedtime      MEDICATIONS  (PRN):  acetaminophen   Tablet .. 650 milliGRAM(s) Oral every 4 hours PRN Temp greater or equal to 38C (100.4F), Mild Pain (1 - 3)  ALBUTerol    90 MICROgram(s) HFA Inhaler 2 Puff(s) Inhalation every 4 hours PRN Shortness of Breath and/or Wheezing  benzocaine 15 mG/menthol 3.6 mG (Sugar-Free) Lozenge 1 Lozenge Oral every 6 hours PRN Sore Throat  benzonatate 100 milliGRAM(s) Oral three times a day PRN Cough  hydrALAZINE Injectable 5 milliGRAM(s) IV Push every 6 hours PRN SBP>160  hydrocodone/homatropine Syrup 5 milliLiter(s) Oral every 6 hours PRN Cough  LORazepam   Injectable 0.5 milliGRAM(s) IV Push every 6 hours PRN Agitation  melatonin 6 milliGRAM(s) Oral at bedtime PRN Insomnia  morphine  - Injectable 2 milliGRAM(s) IV Push every 2 hours PRN SOB/Anxiety    ICU Vital Signs Last 24 Hrs  T(C): 37.6 (27 Aug 2021 06:00), Max: 37.6 (27 Aug 2021 06:00)  T(F): 99.6 (27 Aug 2021 06:00), Max: 99.6 (27 Aug 2021 06:00)  HR: 66 (27 Aug 2021 07:00) (59 - 106)  BP: 117/73 (27 Aug 2021 07:00) (93/42 - 132/88)  BP(mean): 83 (27 Aug 2021 07:00) (53 - 98)  ABP: --  ABP(mean): --  RR: 29 (27 Aug 2021 07:00) (18 - 34)  SpO2: 91% (27 Aug 2021 07:00) (80% - 99%)  I&O's Detail    26 Aug 2021 07:01  -  27 Aug 2021 07:00  --------------------------------------------------------  IN:    Dexmedetomidine: 529 mL    Fat Emulsion (Fish Oil &amp; Plant Based) 20% Infusion: 99 mL    Fat Emulsion (Fish Oil &amp; Plant Based) 20% Infusion: 159 mL    Oral Fluid: 300 mL    PPN (Peripheral Parenteral Nutrition): 1774 mL  Total IN: 2861 mL    OUT:    Voided (mL): 1700 mL  Total OUT: 1700 mL    Total NET: 1161 mL        PHYSICAL EXAM  General Appearance: On CPAP, increased work of breathing- improved  Lungs: coarse bilaterally  Heart: +S1,S2  Abdomen: Soft, non-tender, bowel sounds active   Extremities: no cyanosis or edema, no joint swelling  Skin: Skin color, texture normal, no rashes   Neurologic: Alert and oriented X3 , non focal, not disoriented    ECG:    LABS:             08-26    136  |  102  |  18  ----------------------------<  87  3.7   |  34<H>  |  0.56    Ca    8.3<L>      26 Aug 2021 07:03  Phos  3.6     08-26  Mg     2.0     08-26    TPro  5.5<L>  /  Alb  2.1<L>  /  TBili  0.5  /  DBili  x   /  AST  23  /  ALT  51  /  AlkPhos  79  08-26                          8.0    11.96 )-----------( 278      ( 26 Aug 2021 07:03 )             26.1   08-26    136  |  102  |  18  ----------------------------<  87  3.7   |  34<H>  |  0.56    Ca    8.3<L>      26 Aug 2021 07:03  Phos  3.6     08-26  Mg     2.0     08-26    TPro  5.5<L>  /  Alb  2.1<L>  /  TBili  0.5  /  DBili  x   /  AST  23  /  ALT  51  /  AlkPhos  79  08-26                          8.5    12.89 )-----------( 269      ( 25 Aug 2021 07:10 )             26.7   08-25    138  |  103  |  20  ----------------------------<  143<H>  3.7   |  32<H>  |  0.58    Ca    8.1<L>      25 Aug 2021 07:10  Phos  3.7     08-25  Mg     2.0     08-25    TPro  5.6<L>  /  Alb  2.2<L>  /  TBili  0.5  /  DBili  x   /  AST  32  /  ALT  74  /  AlkPhos  103  08-24                          8.5    14.05 )-----------( 292      ( 18 Aug 2021 07:01 )             26.2   08-18    138  |  104  |  24<H>  ----------------------------<  78  4.1   |  29  |  1.03    Ca    8.8      18 Aug 2021 07:01    TPro  6.3  /  Alb  2.3<L>  /  TBili  0.6  /  DBili  x   /  AST  32  /  ALT  73  /  AlkPhos  168<H>  08-17                                           10.0   19.05 )-----------( 179      ( 08 Aug 2021 07:11 )             30.3   08-08    137  |  102  |  27<H>  ----------------------------<  121<H>  3.9   |  29  |  0.77    Ca    8.4<L>      08 Aug 2021 07:11  Phos  3.4     08-08  Mg     2.0     08-08                  10.0   19.05 )-----------( 179      ( 08 Aug 2021 07:11 )             30.3   08-08    137  |  102  |  27<H>  ----------------------------<  121<H>  3.9   |  29  |  0.77    Ca    8.4<L>      08 Aug 2021 07:11  Phos  3.4     08-08  Mg     2.0     08-08                 10.4   17.19 )-----------( 198      ( 07 Aug 2021 07:06 )             32.3   08-07    138  |  104  |  31<H>  ----------------------------<  107<H>  4.4   |  29  |  0.77    Ca    8.2<L>      07 Aug 2021 07:06  Phos  4.6     08-07  Mg     2.0     08-07    TPro  5.2<L>  /  Alb  2.3<L>  /  TBili  0.8  /  DBili  x   /  AST  81<H>  /  ALT  213<H>  /  AlkPhos  200<H>  08-06                            12.9   15.00 )-----------( 366      ( 26 Jul 2021 06:20 )             38.4   07-26    133<L>  |  104  |  37<H>  ----------------------------<  232<H>  4.2   |  23  |  1.17    Ca    8.4<L>      26 Jul 2021 06:20    TPro  6.3  /  Alb  2.2<L>  /  TBili  0.4  /  DBili  0.1  /  AST  103<H>  /  ALT  137<H>  /  AlkPhos  239<H>  07-26                          13.2   13.52 )-----------( 338      ( 24 Jul 2021 08:06 )             39.5     07-24    136  |  105  |  43<H>  ----------------------------<  227<H>  4.3   |  25  |  1.31<H>    Ca    8.7      24 Jul 2021 08:06    TPro  6.6  /  Alb  2.3<L>  /  TBili  0.4  /  DBili  x   /  AST  73<H>  /  ALT  64  /  AlkPhos  165<H>  07-24          Pro BNP  -- 07-24 @ 08:06  D Dimer  446 07-24 @ 08:06  Pro BNP  261 07-21 @ 14:42  D Dimer  460 07-21 @ 14:42              < from: Xray Chest 1 View- PORTABLE-Urgent (07.21.21 @ 15:33) >    PROCEDURE DATE:  07/21/2021          INTERPRETATION:  AP chest on July 21, 2021 at 3:27 PM. Patient is short of breath with cough and fever.    Heart magnified by technique.    There arescattered mid lower lung field infiltrates right greater than left possibly related: Pneumonia.    The lungs are clear on August 30, 2019.    IMPRESSION: Bilateral infiltrates as above.    < end of copied text >  < from: US Duplex Venous Lower Ext Complete, Bilateral (07.25.21 @ 08:42) >  COMPARISON: None available.    TECHNIQUE: Duplex sonography of the BILATERAL LOWER extremity veins with color and spectral Doppler, with and without compression.    FINDINGS:    RIGHT:  Normal compressibility of the RIGHT common femoral, femoral and popliteal veins.  Doppler examination shows normal spontaneous and phasic flow.  No RIGHT calf vein thrombosis is detected.    LEFT:  Normal compressibility of the LEFT common femoral, femoral and popliteal veins.  Doppler examination shows normal spontaneous and phasic flow.  No LEFT calf vein thrombosis is detected.    IMPRESSION:  No evidence of deep venous thrombosis in either lower extremity.    < end of copied text >  RADIOLOGY & ADDITIONAL STUDIES:    < from: Xray Chest 1 View- PORTABLE-Urgent (Xray Chest 1 View- PORTABLE-Urgent .) (07.26.21 @ 08:43) >    PROCEDURE DATE:  07/26/2021          INTERPRETATION:  Portable chest radiograph    CLINICAL INFORMATION: Pneumonia due to Covid 19.. Follow-up    TECHNIQUE:  Portable  AP view of the chest was obtained.    COMPARISON: 7/21/2021 chest available for review.    FINDINGS:    The lungs show stable bilateral  multifocal and diffuse ill-defined airspace opacities.. No pneumothorax.    The  heart is enlarged in transverse diameter. No hilar mass.     Visualized osseous structures are intact.    IMPRESSION:   Stable bilateral  multifocal and diffuse ill-defined airspace opacities..    < end of copied text >  < from: US Duplex Venous Lower Ext Complete, Bilateral (08.06.21 @ 17:16) >  FINDINGS:    RIGHT:  Normal compressibility of the RIGHT common femoral, femoral and popliteal veins.  Doppler examination shows normal spontaneous and phasic flow.  No RIGHT calf vein thrombosis is detected.    LEFT:  Normal compressibility of the LEFT common femoral, femoral and popliteal veins.  Doppler examination shows normal spontaneous and phasic flow.  No LEFT calf vein thrombosisis detected.    IMPRESSION:  No evidence of deep venous thrombosis in either lower extremity.    < end of copied text >  < from: Xray Chest 1 View- PORTABLE-Urgent (Xray Chest 1 View- PORTABLE-Urgent .) (08.06.21 @ 16:45) >  INTERPRETATION:  Portable chest radiograph    CLINICAL INFORMATION: Pneumonia due to Covid 19.    TECHNIQUE:  Portable  AP view of the chest was obtained.    COMPARISON: 8/1/2021 chest radiograph available for review.    FINDINGS:    The lungs show decreasing bilateral diffuse airspace disease.. No pneumothorax.    The  heart is mildly enlarged in transverse diameter. No hilar mass.   Visualized osseous structures are intact.    IMPRESSION:   Decreasing bilateral diffuse airspace disease..    < end of copied text >  xr< from: US Duplex Venous Lower Ext Complete, Bilateral (08.17.21 @ 08:58) >  COMPARISON: None available.    TECHNIQUE: Duplex sonography of the BILATERAL LOWER extremity veins with color and spectral Doppler, with and without compression.    FINDINGS:    RIGHT:  Normal compressibility of the RIGHT common femoral, femoral and popliteal veins.  Doppler examination shows normal spontaneous and phasic flow.  No RIGHT calf vein thrombosis is detected.    LEFT:  Normal compressibility of the LEFT common femoral, femoral and popliteal veins.  Doppler examination shows normal spontaneous and phasic flow.  No LEFT calf vein thrombosis is detected.    IMPRESSION:  No evidence of deep venous thrombosis in either lower extremity.    < end of copied text >  r< from: US Duplex Venous Lower Ext Complete, Bilateral (08.23.21 @ 12:53) >  PROCEDURE DATE:  08/23/2021          INTERPRETATION:  CLINICAL INFORMATION: 55 years  Male with r/o DVT    evaluate for DVT. Covid positive. Elevated d-dimer.    COMPARISON: None available.    TECHNIQUE: Duplex sonography of the BILATERAL LOWER extremity veins with color and spectral Doppler, with and without compression.    FINDINGS:    RIGHT:  Normal compressibility of the RIGHT common femoral, femoral and popliteal veins.  Doppler examination shows normal spontaneous and phasic flow.  No RIGHT calf vein thrombosis is detected.    LEFT:  Normal compressibility of the LEFT common femoral, femoral and popliteal veins.  Doppler examination shows normal spontaneous and phasic flow.  No LEFT calf vein thrombosis is detected.    IMPRESSION:  No evidence of deep venous thrombosis in either lower extremity.    < end of copied text >  < from: Xray Chest 1 View- PORTABLE-Urgent (Xray Chest 1 View- PORTABLE-Urgent .) (08.23.21 @ 11:06) >    PROCEDURE DATE:  08/23/2021          INTERPRETATION:  History: Dyspnea, Covid    Chest:  one view.    Comparison: 08/20/2021    AP radiograph of the chest demonstrates moderate diffuse alveolar infiltrates unchanged. The cardiac silhouette is normal in size. Osseous structures are intact.    Impression:moderate diffuse alveolar infiltrates unchanged.    < end of copied text >

## 2021-08-27 NOTE — PROGRESS NOTE ADULT - ASSESSMENT
54 y/o male with     Acute hypoxic resp failure and ARDS due to COVID-19 pneumonia (unvaccinated)   Severe protein calorie malnutrition     Hx HTN, DM2    Plan:   Neuro stable   On precedex and xanax for anxiety/CPAP compliance  HD stable, continue antihypertensives   Resp status remains tenuous despite improvement in fio2, will continue low dose prednisone for now, continue CPAP with intermittent HFNC if tolerates   Got OOB to chair today, encourage daily   Repeat ferritin, procal and fungitell in am   Currently, there are no obvious signs or symptoms suggestive of a bacterial infection, monitor off abx   Hypoglycemia - decrease lantus to 5 units at noon, d/c insulin in PPN   Continue PPN as po intake is very poor due to resp failure, repeat TGs in am, consider holding fat emulsion in PPN if rising   On lovenox for DVT ppx

## 2021-08-27 NOTE — CHART NOTE - NSCHARTNOTEFT_GEN_A_CORE
Clinical Nutrition PPN Note    **56yo male admitted with acute hypoxic resp failure from COVID-19 PNA; CPAP dependent.  Pt not DNR or DNI.  has refused intubation, unless emergent.  *Pt is on CPAP due breathing status, mostly not eating; able to be on HIFLOW and eat 1 meal per day.    *labs reviewed;   08-26    136  |  102  |  18  ----------------------------<  87  3.7   |  34<H>  |  0.56    Ca    8.3<L>      26 Aug 2021 07:03  Phos  3.6     08-26  Mg     2.0     08-26    TPro  5.5<L>  /  Alb  2.1<L>  /  TBili  0.5  /  DBili  x   /  AST  23  /  ALT  51  /  AlkPhos  79  08-26    HbA1c: A1C with Estimated Average Glucose Result: 11.6 % (07-22-21 @ 07:57)  Glucose: POCT Blood Glucose.: 134 mg/dL (08-25-21 @ 05:22)  Lipid Panel: Date/Time: 08-24-21 @ 05:40  Triglycerides, Serum: 278  CAPILLARY BLOOD GLUCOSE      POCT Blood Glucose.: 96 mg/dL (27 Aug 2021 08:35)  POCT Blood Glucose.: 85 mg/dL (27 Aug 2021 05:14)  POCT Blood Glucose.: 77 mg/dL (26 Aug 2021 23:57)  POCT Blood Glucose.: 230 mg/dL (26 Aug 2021 17:45)  POCT Blood Glucose.: 97 mg/dL (26 Aug 2021 12:15)      *labs reviewed; 8/26  lytes WNL. POCT elevated due to steroid use, 7 units of insulin in PN bag covers dextrose in PN bag. Pt receiving lantus and sliding scale to cover steroids and PO intake.  triglycerides elevated, will decrease lipids provided.  bilirubin total WNL. corrected Ca WNL.  Per MD, as labs have been stable tomorrow no labs will be drawn to give lab break.    I&O's Detail    26 Aug 2021 07:01  -  27 Aug 2021 07:00  --------------------------------------------------------  IN:    Dexmedetomidine: 529 mL    Fat Emulsion (Fish Oil &amp; Plant Based) 20% Infusion: 99 mL    Fat Emulsion (Fish Oil &amp; Plant Based) 20% Infusion: 159 mL    Oral Fluid: 300 mL    PPN (Peripheral Parenteral Nutrition): 1774 mL  Total IN: 2861 mL    OUT:    Voided (mL): 1700 mL  Total OUT: 1700 mL    Total NET: 1161 mL          *positive fluid status; monitor and adjust IVF/PPN volume prn.  high urine output.    *pertinent meds;  dexmedetomidine, atorvastatin, lantus 28 units bedtime, admelog sliding scale, cholecalciferol, pantoprazole, senna, prednisone, zofran    *linda score of 15; no PU documented.  No edema noted .  BM (+) 8/23 (4 days no BM).    *continues to meet criteria for severe malnutrition*    Diet, Regular (08-20-21 @ 12:38)(allowed when on HIFLOW)    Estimated Needs: Based on 72Kg (adjusted IBW)   Calories: 9194-1097 Kcal (25-30 Kcal/Kg)  Protein:  115-130g (1.6-1.8 g/Kg)  Fluids:  5633-0956 mL (25-30 mL/Kg)    *Wt Hx:  92Kg (8/24)  82.5Kg (8/19): wt loss of ~7.9Kg in ~29days; (8.7%) clinically significant.  Height (cm): 167.6 (07-21-21 @ 14:34)  Weight (kg): 90.4 (07-21-21 @ 22:00)  BMI (kg/m2): 32.2 (07-21-21 @ 22:00)  BSA (m2): 2 (07-21-21 @ 22:00)    *no changes to PPN at this time*, will continue to monitor and adjust prn*      PPN recommendations unchanged from 8/26  PPN RECOMMENDATIONS: via peripheral line  TOTAL VOLUME: 1800mL    65g Amino Acids  100g Dextrose  50g Lipids 20%   98 mEq NaCl  14 mEq NaAcetate  20 mMol NaPhos  30 mEq KCl  15 mEq KAcetate  0 mMol KPhos  8 mEq Calcium Gluconate  8 mEq Magnesium Sulfate  100 mg Thiamine  7 units Regular Insulin  1mL trace elements  10mL MVI    Total Calories: 1500Kcal (meeting ~83% of estimated calorie needs and ~57% of estimated protein needs)(osmolarity of 873)    ADDITIONAL RECOMMENDATIONS:  1) obtain triglyceride and LFT's weekly  2) daily lyte and magnesium and phos checks  3) POCT q6hrs; adjust insulin regimen prn to maintain tight glycemic control  4) strict I/O's  5) daily wt checks to track/trend changes

## 2021-08-27 NOTE — PROGRESS NOTE ADULT - SUBJECTIVE AND OBJECTIVE BOX
Note written last PM not saved.      HPI:    54 y/o male with pmhx of HLD, HTN, DM admitted on 7/21 with COVID-19 pna. tested positive on 7/15 outpatient. unvaccinated. Type 1 resp  failure Escalating fio2 requirements and development of ARDS now requiring HFNC and NRB.      Symptomatic day 7/15   Hosp day # 7/21  ICU day 7/24  Remdesivir  completed 10 days   Tocilizumab 7/23  Lonf course of high dose steroids.  Tapered to prednisone 10mg/day      Recent clinical changes:  NIPPV dependent, more withdrawn tachypneic at times.  Desaturates with any activity    Still high risk for intubation.        Vital signs/reviewed and physical exam performed where pertinent and urgently required.    Lab/radiology studies/ABG/meds reviewed and interpreted into the assessment and treatment plan.    Assessment/Plan/Therapeutic interventions      Neuro: Precedex for anxiety   melatonin      Cor:  HD stable SR.      Pulm: CPAP 18 70-80% fio2  HFNC when eating.  Desaturates easily with movement.  Likely  fibrosing as evidenced by increasing bicarb. compensatory for imparied ventilation.      GI:  Gi prophylaxis   Enteric feeds as tolerated.   PPN to supplement.      Renal: Even to negative fluid balance as tolerated by hemodynamics and renal fx.      Heme:  Pharmacologic DVT P in addition to SCD's  following D- Dimer clinical course and doppler studied where appropriate.   Doppler LE negative 8/27.  Lovenox 40 bid.  D dimer is elevated     ID: ABX discontinuation based on discussion with ID in conjunction with clinical features, culture data, and judicious procalcitonin monitoring.   Off ABX   Fungal studies resent.   No suspicion for superimposed bacterial process.       Endo Aggressive glycemic control to limit FS glucose to < 180mg/dl.        COVID 19 specific considerations and therapeutic options based on the available and rapidly changing medical literature.    Goals of care considerations:  Ongoing assessment for patient specific treatment options based on clinical progression or decline.  I have involved the family with updates and requests in guidance for medical decision making.      32  minutes of critical care time spent, (independent of any procedures),  in the management of this critically ill COVID-19 patient  with continuous assessments and interventions based on the interpretation of multiple databases.

## 2021-08-28 LAB
BASOPHILS # BLD AUTO: 0.04 K/UL — SIGNIFICANT CHANGE UP (ref 0–0.2)
BASOPHILS NFR BLD AUTO: 0.3 % — SIGNIFICANT CHANGE UP (ref 0–2)
BLD GP AB SCN SERPL QL: SIGNIFICANT CHANGE UP
EOSINOPHIL # BLD AUTO: 0.16 K/UL — SIGNIFICANT CHANGE UP (ref 0–0.5)
EOSINOPHIL NFR BLD AUTO: 1.3 % — SIGNIFICANT CHANGE UP (ref 0–6)
HCT VFR BLD CALC: 23.5 % — LOW (ref 39–50)
HGB BLD-MCNC: 7.9 G/DL — LOW (ref 13–17)
IMM GRANULOCYTES NFR BLD AUTO: 2 % — HIGH (ref 0–1.5)
LYMPHOCYTES # BLD AUTO: 0.94 K/UL — LOW (ref 1–3.3)
LYMPHOCYTES # BLD AUTO: 7.8 % — LOW (ref 13–44)
MCHC RBC-ENTMCNC: 30.7 PG — SIGNIFICANT CHANGE UP (ref 27–34)
MCHC RBC-ENTMCNC: 33.6 GM/DL — SIGNIFICANT CHANGE UP (ref 32–36)
MCV RBC AUTO: 91.4 FL — SIGNIFICANT CHANGE UP (ref 80–100)
MONOCYTES # BLD AUTO: 1.04 K/UL — HIGH (ref 0–0.9)
MONOCYTES NFR BLD AUTO: 8.6 % — SIGNIFICANT CHANGE UP (ref 2–14)
NEUTROPHILS # BLD AUTO: 9.68 K/UL — HIGH (ref 1.8–7.4)
NEUTROPHILS NFR BLD AUTO: 80 % — HIGH (ref 43–77)
NT-PROBNP SERPL-SCNC: 154 PG/ML — HIGH (ref 0–125)
PLATELET # BLD AUTO: 230 K/UL — SIGNIFICANT CHANGE UP (ref 150–400)
RBC # BLD: 2.57 M/UL — LOW (ref 4.2–5.8)
RBC # FLD: 13.4 % — SIGNIFICANT CHANGE UP (ref 10.3–14.5)
WBC # BLD: 12.1 K/UL — HIGH (ref 3.8–10.5)
WBC # FLD AUTO: 12.1 K/UL — HIGH (ref 3.8–10.5)

## 2021-08-28 PROCEDURE — 99291 CRITICAL CARE FIRST HOUR: CPT

## 2021-08-28 RX ORDER — FUROSEMIDE 40 MG
20 TABLET ORAL DAILY
Refills: 0 | Status: DISCONTINUED | OUTPATIENT
Start: 2021-08-28 | End: 2021-08-29

## 2021-08-28 RX ORDER — ELECTROLYTE SOLUTION,INJ
1 VIAL (ML) INTRAVENOUS
Refills: 0 | Status: DISCONTINUED | OUTPATIENT
Start: 2021-08-28 | End: 2021-08-28

## 2021-08-28 RX ORDER — I.V. FAT EMULSION 20 G/100ML
20.8 EMULSION INTRAVENOUS
Qty: 50 | Refills: 0 | Status: DISCONTINUED | OUTPATIENT
Start: 2021-08-28 | End: 2021-09-04

## 2021-08-28 RX ORDER — POTASSIUM CHLORIDE 20 MEQ
10 PACKET (EA) ORAL DAILY
Refills: 0 | Status: COMPLETED | OUTPATIENT
Start: 2021-08-28 | End: 2021-08-30

## 2021-08-28 RX ADMIN — AMLODIPINE BESYLATE 5 MILLIGRAM(S): 2.5 TABLET ORAL at 09:33

## 2021-08-28 RX ADMIN — PANTOPRAZOLE SODIUM 40 MILLIGRAM(S): 20 TABLET, DELAYED RELEASE ORAL at 09:33

## 2021-08-28 RX ADMIN — Medication 1: at 18:38

## 2021-08-28 RX ADMIN — I.V. FAT EMULSION 20.8 ML/HR: 20 EMULSION INTRAVENOUS at 23:24

## 2021-08-28 RX ADMIN — Medication 20 MILLIGRAM(S): at 09:32

## 2021-08-28 RX ADMIN — ENOXAPARIN SODIUM 40 MILLIGRAM(S): 100 INJECTION SUBCUTANEOUS at 09:33

## 2021-08-28 RX ADMIN — ATORVASTATIN CALCIUM 80 MILLIGRAM(S): 80 TABLET, FILM COATED ORAL at 21:00

## 2021-08-28 RX ADMIN — DEXMEDETOMIDINE HYDROCHLORIDE IN 0.9% SODIUM CHLORIDE 4.52 MICROGRAM(S)/KG/HR: 4 INJECTION INTRAVENOUS at 08:08

## 2021-08-28 RX ADMIN — Medication 1 EACH: at 23:24

## 2021-08-28 RX ADMIN — Medication 81 MILLIGRAM(S): at 09:32

## 2021-08-28 RX ADMIN — INSULIN GLARGINE 5 UNIT(S): 100 INJECTION, SOLUTION SUBCUTANEOUS at 14:05

## 2021-08-28 RX ADMIN — LOSARTAN POTASSIUM 75 MILLIGRAM(S): 100 TABLET, FILM COATED ORAL at 09:33

## 2021-08-28 RX ADMIN — Medication 10 MILLIEQUIVALENT(S): at 14:06

## 2021-08-28 RX ADMIN — Medication 2000 UNIT(S): at 09:33

## 2021-08-28 RX ADMIN — DEXMEDETOMIDINE HYDROCHLORIDE IN 0.9% SODIUM CHLORIDE 4.52 MICROGRAM(S)/KG/HR: 4 INJECTION INTRAVENOUS at 19:15

## 2021-08-28 RX ADMIN — Medication 2: at 14:06

## 2021-08-28 RX ADMIN — Medication 10 MILLIGRAM(S): at 09:33

## 2021-08-28 RX ADMIN — DEXMEDETOMIDINE HYDROCHLORIDE IN 0.9% SODIUM CHLORIDE 4.52 MICROGRAM(S)/KG/HR: 4 INJECTION INTRAVENOUS at 01:42

## 2021-08-28 RX ADMIN — Medication 0.5 MILLIGRAM(S): at 21:01

## 2021-08-28 RX ADMIN — ENOXAPARIN SODIUM 40 MILLIGRAM(S): 100 INJECTION SUBCUTANEOUS at 21:01

## 2021-08-28 RX ADMIN — SENNA PLUS 1 TABLET(S): 8.6 TABLET ORAL at 21:01

## 2021-08-28 NOTE — CHART NOTE - NSCHARTNOTEFT_GEN_A_CORE
Per Critical Care Attending 8/27  Plan:   Neuro stable   On precedex and xanax for anxiety/CPAP compliance  HD stable, continue antihypertensives   Resp status remains tenuous despite improvement in fio2, will continue low dose prednisone for now, continue CPAP with intermittent HFNC if tolerates   Got OOB to chair today, encourage daily   Repeat ferritin, procal and fungitell in am   Currently, there are no obvious signs or symptoms suggestive of a bacterial infection, monitor off abx   Hypoglycemia - decrease lantus to 5 units at noon, d/c insulin in PPN   Continue PPN as po intake is very poor due to resp failure, repeat TGs in am, consider holding fat emulsion in PPN if rising   On lovenox for DVT ppx         Clinical Nutrition PPN Note        **56yo male admitted with acute hypoxic resp failure from COVID-19 PNA; CPAP dependent.  Pt not DNR or DNI.  has refused intubation, unless emergent.  *Pt is on CPAP due breathing status, mostly not eating; able to be on HIFLOW and eat 1 meal per day.    *labs reviewed;   08-26    136  |  102  |  18  ----------------------------<  87  3.7   |  34<H>  |  0.56    Ca    8.3<L>      26 Aug 2021 07:03  Phos  3.6     08-26  Mg     2.0     08-26    TPro  5.5<L>  /  Alb  2.1<L>  /  TBili  0.5  /  DBili  x   /  AST  23  /  ALT  51  /  AlkPhos  79  08-26    HbA1c: A1C with Estimated Average Glucose Result: 11.6 % (07-22-21 @ 07:57)  Glucose: POCT Blood Glucose.: 134 mg/dL (08-25-21 @ 05:22)  Lipid Panel: Date/Time: 08-24-21 @ 05:40  Triglycerides, Serum: 278  CAPILLARY BLOOD GLUCOSE      CAPILLARY BLOOD GLUCOSE      POCT Blood Glucose.: 212 mg/dL (27 Aug 2021 21:31)  POCT Blood Glucose.: 104 mg/dL (27 Aug 2021 17:45)  POCT Blood Glucose.: 81 mg/dL (27 Aug 2021 15:05)  POCT Blood Glucose.: 96 mg/dL (27 Aug 2021 11:55)        *labs reviewed; 8/28  marce WNL. POCT elevated due to steroid use, POCTs were trending down, insulin removed from PPN bag.  Slight trend up now for BGs.  Pt receiving lantus and sliding scale to cover steroids and PO intake.  Triglycerides elevated, will decrease lipids provided.  bilirubin total WNL. corrected Ca WNL.  Per MD, as labs have been stable tomorrow no labs will be drawn to give lab break.    I&O's Detail    27 Aug 2021 07:01  -  28 Aug 2021 07:00  --------------------------------------------------------  IN:    Dexmedetomidine: 518 mL    Fat Emulsion (Fish Oil &amp; Plant Based) 20% Infusion: 263 mL    Oral Fluid: 300 mL    PPN (Peripheral Parenteral Nutrition): 1526 mL  Total IN: 2607 mL    OUT:    Voided (mL): 1625 mL  Total OUT: 1625 mL    Total NET: 982 mL              *positive fluid status; monitor and adjust IVF/PPN volume prn.  high urine output.    *pertinent meds;  dexmedetomidine, atorvastatin, lantus 5 units subQ  noon, admelog sliding scale, cholecalciferol, pantoprazole, senna, prednisone, zofran    *linda score of 15; no PU documented.  No edema noted .  BM (+) 8/23 (4 days no BM).    *continues to meet criteria for severe malnutrition*    Diet, Regular (08-20-21 @ 12:38)(allowed when on HIFLOW)    Estimated Needs: Based on 72Kg (adjusted IBW)   Calories: 8647-8172 Kcal (25-30 Kcal/Kg)  Protein:  115-130g (1.6-1.8 g/Kg)  Fluids:  7025-8148 mL (25-30 mL/Kg)    *Wt Hx:  92Kg (8/24)  82.5Kg (8/19): wt loss of ~7.9Kg in ~29days; (8.7%) clinically significant.  Height (cm): 167.6 (07-21-21 @ 14:34)  Weight (kg): 90.4 (07-21-21 @ 22:00)  BMI (kg/m2): 32.2 (07-21-21 @ 22:00)  BSA (m2): 2 (07-21-21 @ 22:00)    *no changes to PPN at this time*, will continue to monitor and adjust prn*      PPN recommendations unchanged from 8/26  PPN RECOMMENDATIONS: via peripheral line  TOTAL VOLUME: 1800mL    65g Amino Acids  100g Dextrose  50g Lipids 20%   98 mEq NaCl  14 mEq NaAcetate  20 mMol NaPhos  30 mEq KCl  15 mEq KAcetate  0 mMol KPhos  8 mEq Calcium Gluconate  8 mEq Magnesium Sulfate  100 mg Thiamine  7 units Regular Insulin  1mL trace elements  10mL MVI    Total Calories: 1500Kcal (meeting ~83% of estimated calorie needs and ~57% of estimated protein needs)(osmolarity of 873)    ADDITIONAL RECOMMENDATIONS:  1) obtain triglyceride and LFT's weekly  2) daily lyte and magnesium and phos checks  3) POCT q6hrs; adjust insulin regimen prn to maintain tight glycemic control  4) strict I/O's  5) daily wt checks to track/trend changes.

## 2021-08-28 NOTE — PROGRESS NOTE ADULT - ASSESSMENT
A/P: 55 year old male with Acute Hypoxic Respiratory Failure from COVID-19 Pneumonia  HTN  DM II      Plan:  ICU    PULM: Continue CPAP and HF NC to eat.  Still may require intubation    Cardio: Hemodynamics reasonable    Renal: Cr Stable    GI: H2 blocker, PPN, and PO Diet    ENDO: Lantus 5 QHS and RISS.    ID: S/P Actemra, Completed REM and High Dose Solumedrol    DVT Prophylaxis with Lovenox    LE Dopplers on 7/26, 8/25, 8/26 were negative for LE DVT    OOB to Chair    D/W Patients wife daily

## 2021-08-28 NOTE — PROGRESS NOTE ADULT - ASSESSMENT
55 M noted to have COVID 7/15  On arrival hypoxic to 80s and tachypneic. NRB placed,in the ER saturating 95%. Na 126 s/p 1L NS. CXR: Frandy infiltrates. Last scr 1.4 in 2019-> today 1.9 unknown if underlying ckd.  Treated with IV Remdesivir and Decadron, Tocilizumab   Given the obesity/hypertension and prior co-morbidities, he is at risk of intubation but continue HFNC, respiratory status remains critical  XR reviewed- pulmonary infiltrates are present  DDimer elevated, was on therapeutic lovenox, transitioned to 40mg q 12 now noted to be >2000 -  Completed Solumedrol, Symbicort 160/4.5 BID ([email or web address], [facility] study). MICU service started Solumedrol 40mg q 8 hrs as of 8/18  Was on HFNC %FiO2 with BiPAP overnight, now on CPAP and transitions between HFNC/NIPPV  CTPE not performed      Covering for Dr Arceo  Respiratory status is tenuous and he continues to have an increased work of breathing; at this point he is at high risk of intubation  ICU team is reconsidering intubation and mechanical ventilation  Incrased DDimer noted; anticoagulation being managed by MICU   Repeat ddimer/BNP reviewed   Dopplers negative   Currently on NIPPV with CPAP, will likely need 18/8 back up rate of 12 if hypoxemia persists. Transitioning between HFNC and nocturnal NIPPV    escalating Bicarb compensatory for Co2 retention  Elevated D Dimer noted/ repeat leg dopplers negative    consideration for Full AC if escalating D Dimer in view of overall presentation  at high risk of intubation and mechanical ventilation  ICU level care appreciated    elevated Bicarb likely indication to switch to BIPAP for night time use  consider ABG in am for eval    Given CXR appearance as well as elevated Pro BNP and I>O will proceed with gentle diuresis  awaiting repeat labs  monitor creat closely and replete lytes as needed  lasix 20 mg IVPB Qdaily x 3 days  monitor BP and hold if SBP<100

## 2021-08-28 NOTE — PROGRESS NOTE ADULT - SUBJECTIVE AND OBJECTIVE BOX
Patient is a 55y old  Male who presents with a chief complaint of cough/sob (23 Jul 2021 14:03)      HPI:  55 M with pmh HLD, dm, htn presents with 8 days onset of covid symptoms which included, cough, fevers, sob, hypoxia, fevers, diarrhea, chills and myalgias. TEsted positive 7/15. Wife endorsed he was becoming more sob of recently. On arrival hypoxic to 80s and tachypneic. NRB placed, presently on 15% and saturating 95%. Na 126 s/p 1L NS. CXR: Frandy infiltrates. Last scr 1.4 in 2019-> today 1.9 unknown if underlying ckd.  Patient not vaccinated.   Last seen by me in 2019  History of ROBERT and uncontrolled hypertension  Treated with IV Remdesivir and Decadron, now on Toci  SaO2 is 80-85% despite being on 100% NRB  ABG pending        8/24  Alternating between NIPPV and HFNC  8/25-8/26  covering for DR Arceo  remains on High Fio2 demand for ARDS post Covid pneumonia  8/27  uneventful overnight  remains on cpap setting overnight  high fio2 requirement still  o2 sat appears improved  not as labored with his breathing this am  8/28  he remains on HIGH FIO2 demand  data discussed with Intensivist Dr Espinoza and ICU RN  last cxr and all recent data discussed  repeat labs pending    MEDICATIONS  (STANDING):  amLODIPine   Tablet 5 milliGRAM(s) Oral daily  aspirin  chewable 81 milliGRAM(s) Oral daily  atorvastatin 80 milliGRAM(s) Oral at bedtime  budesonide 160 MICROgram(s)/formoterol 4.5 MICROgram(s) Inhaler 2 Puff(s) Inhalation two times a day  cholecalciferol 2000 Unit(s) Oral daily  dexMEDEtomidine Infusion 0.2 MICROgram(s)/kG/Hr (4.52 mL/Hr) IV Continuous <Continuous>  dextrose 40% Gel 15 Gram(s) Oral once  dextrose 5%. 1000 milliLiter(s) (100 mL/Hr) IV Continuous <Continuous>  dextrose 5%. 1000 milliLiter(s) (50 mL/Hr) IV Continuous <Continuous>  dextrose 50% Injectable 25 Gram(s) IV Push once  dextrose 50% Injectable 12.5 Gram(s) IV Push once  dextrose 50% Injectable 25 Gram(s) IV Push once  enoxaparin Injectable 40 milliGRAM(s) SubCutaneous every 12 hours  fat emulsion (Fish Oil and Plant Based) 20% Infusion 20.8 mL/Hr (20.8 mL/Hr) IV Continuous <Continuous>  glucagon  Injectable 1 milliGRAM(s) IntraMuscular once  insulin glargine Injectable (LANTUS) 5 Unit(s) SubCutaneous <User Schedule>  insulin lispro (ADMELOG) corrective regimen sliding scale   SubCutaneous three times a day before meals  insulin lispro (ADMELOG) corrective regimen sliding scale   SubCutaneous at bedtime  losartan 75 milliGRAM(s) Oral daily  pantoprazole  Injectable 40 milliGRAM(s) IV Push daily  Parenteral Nutrition - Adult 1 Each (75 mL/Hr) TPN Continuous <Continuous>  predniSONE   Tablet 10 milliGRAM(s) Oral daily  senna 1 Tablet(s) Oral at bedtime      MEDICATIONS  (PRN):  acetaminophen   Tablet .. 650 milliGRAM(s) Oral every 4 hours PRN Temp greater or equal to 38C (100.4F), Mild Pain (1 - 3)  ALBUTerol    90 MICROgram(s) HFA Inhaler 2 Puff(s) Inhalation every 4 hours PRN Shortness of Breath and/or Wheezing  benzocaine 15 mG/menthol 3.6 mG (Sugar-Free) Lozenge 1 Lozenge Oral every 6 hours PRN Sore Throat  benzonatate 100 milliGRAM(s) Oral three times a day PRN Cough  hydrALAZINE Injectable 5 milliGRAM(s) IV Push every 6 hours PRN SBP>160  hydrocodone/homatropine Syrup 5 milliLiter(s) Oral every 6 hours PRN Cough  LORazepam   Injectable 0.5 milliGRAM(s) IV Push every 6 hours PRN Agitation  melatonin 6 milliGRAM(s) Oral at bedtime PRN Insomnia  morphine  - Injectable 2 milliGRAM(s) IV Push every 2 hours PRN SOB/Anxiety  ICU Vital Signs Last 24 Hrs  T(C): 36.2 (28 Aug 2021 05:00), Max: 37.1 (27 Aug 2021 20:00)  T(F): 97.1 (28 Aug 2021 05:00), Max: 98.8 (27 Aug 2021 20:00)  HR: 69 (28 Aug 2021 07:00) (58 - 115)  BP: 102/65 (28 Aug 2021 07:00) (93/60 - 150/95)  BP(mean): 74 (28 Aug 2021 07:00) (66 - 108)  ABP: --  ABP(mean): --  RR: 27 (28 Aug 2021 07:00) (23 - 34)  SpO2: 86% (28 Aug 2021 07:00) (81% - 100%)      I&O's Detail    27 Aug 2021 07:01  -  28 Aug 2021 07:00  --------------------------------------------------------  IN:    Dexmedetomidine: 518 mL    Fat Emulsion (Fish Oil &amp; Plant Based) 20% Infusion: 263 mL    Oral Fluid: 300 mL    PPN (Peripheral Parenteral Nutrition): 1526 mL  Total IN: 2607 mL    OUT:    Voided (mL): 1625 mL  Total OUT: 1625 mL    Total NET: 982 mL          PHYSICAL EXAM  General Appearance: On CPAP, increased work of breathing- improved  Lungs: coarse bilaterally  Heart: +S1,S2  Abdomen: Soft, non-tender, bowel sounds active   Extremities: no cyanosis or edema, no joint swelling  Skin: Skin color, texture normal, no rashes   Neurologic: Alert and oriented X3 , non focal, not disoriented    ECG:    LABS:    Pro BNP 2446             08-26    136  |  102  |  18  ----------------------------<  87  3.7   |  34<H>  |  0.56    Ca    8.3<L>      26 Aug 2021 07:03  Phos  3.6     08-26  Mg     2.0     08-26    TPro  5.5<L>  /  Alb  2.1<L>  /  TBili  0.5  /  DBili  x   /  AST  23  /  ALT  51  /  AlkPhos  79  08-26                          8.0    11.96 )-----------( 278      ( 26 Aug 2021 07:03 )             26.1   08-26    136  |  102  |  18  ----------------------------<  87  3.7   |  34<H>  |  0.56    Ca    8.3<L>      26 Aug 2021 07:03  Phos  3.6     08-26  Mg     2.0     08-26    TPro  5.5<L>  /  Alb  2.1<L>  /  TBili  0.5  /  DBili  x   /  AST  23  /  ALT  51  /  AlkPhos  79  08-26                          8.5    12.89 )-----------( 269      ( 25 Aug 2021 07:10 )             26.7   08-25    138  |  103  |  20  ----------------------------<  143<H>  3.7   |  32<H>  |  0.58    Ca    8.1<L>      25 Aug 2021 07:10  Phos  3.7     08-25  Mg     2.0     08-25    TPro  5.6<L>  /  Alb  2.2<L>  /  TBili  0.5  /  DBili  x   /  AST  32  /  ALT  74  /  AlkPhos  103  08-24                          8.5    14.05 )-----------( 292      ( 18 Aug 2021 07:01 )             26.2   08-18    138  |  104  |  24<H>  ----------------------------<  78  4.1   |  29  |  1.03    Ca    8.8      18 Aug 2021 07:01    TPro  6.3  /  Alb  2.3<L>  /  TBili  0.6  /  DBili  x   /  AST  32  /  ALT  73  /  AlkPhos  168<H>  08-17                                           10.0   19.05 )-----------( 179      ( 08 Aug 2021 07:11 )             30.3   08-08    137  |  102  |  27<H>  ----------------------------<  121<H>  3.9   |  29  |  0.77    Ca    8.4<L>      08 Aug 2021 07:11  Phos  3.4     08-08  Mg     2.0     08-08                  10.0   19.05 )-----------( 179      ( 08 Aug 2021 07:11 )             30.3   08-08    137  |  102  |  27<H>  ----------------------------<  121<H>  3.9   |  29  |  0.77    Ca    8.4<L>      08 Aug 2021 07:11  Phos  3.4     08-08  Mg     2.0     08-08                 10.4   17.19 )-----------( 198      ( 07 Aug 2021 07:06 )             32.3   08-07    138  |  104  |  31<H>  ----------------------------<  107<H>  4.4   |  29  |  0.77    Ca    8.2<L>      07 Aug 2021 07:06  Phos  4.6     08-07  Mg     2.0     08-07    TPro  5.2<L>  /  Alb  2.3<L>  /  TBili  0.8  /  DBili  x   /  AST  81<H>  /  ALT  213<H>  /  AlkPhos  200<H>  08-06                            12.9   15.00 )-----------( 366      ( 26 Jul 2021 06:20 )             38.4   07-26    133<L>  |  104  |  37<H>  ----------------------------<  232<H>  4.2   |  23  |  1.17    Ca    8.4<L>      26 Jul 2021 06:20    TPro  6.3  /  Alb  2.2<L>  /  TBili  0.4  /  DBili  0.1  /  AST  103<H>  /  ALT  137<H>  /  AlkPhos  239<H>  07-26                          13.2   13.52 )-----------( 338      ( 24 Jul 2021 08:06 )             39.5     07-24    136  |  105  |  43<H>  ----------------------------<  227<H>  4.3   |  25  |  1.31<H>    Ca    8.7      24 Jul 2021 08:06    TPro  6.6  /  Alb  2.3<L>  /  TBili  0.4  /  DBili  x   /  AST  73<H>  /  ALT  64  /  AlkPhos  165<H>  07-24          Pro BNP  -- 07-24 @ 08:06  D Dimer  446 07-24 @ 08:06  Pro BNP  261 07-21 @ 14:42  D Dimer  460 07-21 @ 14:42              < from: Xray Chest 1 View- PORTABLE-Urgent (07.21.21 @ 15:33) >    PROCEDURE DATE:  07/21/2021          INTERPRETATION:  AP chest on July 21, 2021 at 3:27 PM. Patient is short of breath with cough and fever.    Heart magnified by technique.    There arescattered mid lower lung field infiltrates right greater than left possibly related: Pneumonia.    The lungs are clear on August 30, 2019.    IMPRESSION: Bilateral infiltrates as above.    < end of copied text >  < from: US Duplex Venous Lower Ext Complete, Bilateral (07.25.21 @ 08:42) >  COMPARISON: None available.    TECHNIQUE: Duplex sonography of the BILATERAL LOWER extremity veins with color and spectral Doppler, with and without compression.    FINDINGS:    RIGHT:  Normal compressibility of the RIGHT common femoral, femoral and popliteal veins.  Doppler examination shows normal spontaneous and phasic flow.  No RIGHT calf vein thrombosis is detected.    LEFT:  Normal compressibility of the LEFT common femoral, femoral and popliteal veins.  Doppler examination shows normal spontaneous and phasic flow.  No LEFT calf vein thrombosis is detected.    IMPRESSION:  No evidence of deep venous thrombosis in either lower extremity.    < end of copied text >  RADIOLOGY & ADDITIONAL STUDIES:    < from: Xray Chest 1 View- PORTABLE-Urgent (Xray Chest 1 View- PORTABLE-Urgent .) (07.26.21 @ 08:43) >    PROCEDURE DATE:  07/26/2021          INTERPRETATION:  Portable chest radiograph    CLINICAL INFORMATION: Pneumonia due to Covid 19.. Follow-up    TECHNIQUE:  Portable  AP view of the chest was obtained.    COMPARISON: 7/21/2021 chest available for review.    FINDINGS:    The lungs show stable bilateral  multifocal and diffuse ill-defined airspace opacities.. No pneumothorax.    The  heart is enlarged in transverse diameter. No hilar mass.     Visualized osseous structures are intact.    IMPRESSION:   Stable bilateral  multifocal and diffuse ill-defined airspace opacities..    < end of copied text >  < from: US Duplex Venous Lower Ext Complete, Bilateral (08.06.21 @ 17:16) >  FINDINGS:    RIGHT:  Normal compressibility of the RIGHT common femoral, femoral and popliteal veins.  Doppler examination shows normal spontaneous and phasic flow.  No RIGHT calf vein thrombosis is detected.    LEFT:  Normal compressibility of the LEFT common femoral, femoral and popliteal veins.  Doppler examination shows normal spontaneous and phasic flow.  No LEFT calf vein thrombosisis detected.    IMPRESSION:  No evidence of deep venous thrombosis in either lower extremity.    < end of copied text >  < from: Xray Chest 1 View- PORTABLE-Urgent (Xray Chest 1 View- PORTABLE-Urgent .) (08.06.21 @ 16:45) >  INTERPRETATION:  Portable chest radiograph    CLINICAL INFORMATION: Pneumonia due to Covid 19.    TECHNIQUE:  Portable  AP view of the chest was obtained.    COMPARISON: 8/1/2021 chest radiograph available for review.    FINDINGS:    The lungs show decreasing bilateral diffuse airspace disease.. No pneumothorax.    The  heart is mildly enlarged in transverse diameter. No hilar mass.   Visualized osseous structures are intact.    IMPRESSION:   Decreasing bilateral diffuse airspace disease..    < end of copied text >  xr< from: US Duplex Venous Lower Ext Complete, Bilateral (08.17.21 @ 08:58) >  COMPARISON: None available.    TECHNIQUE: Duplex sonography of the BILATERAL LOWER extremity veins with color and spectral Doppler, with and without compression.    FINDINGS:    RIGHT:  Normal compressibility of the RIGHT common femoral, femoral and popliteal veins.  Doppler examination shows normal spontaneous and phasic flow.  No RIGHT calf vein thrombosis is detected.    LEFT:  Normal compressibility of the LEFT common femoral, femoral and popliteal veins.  Doppler examination shows normal spontaneous and phasic flow.  No LEFT calf vein thrombosis is detected.    IMPRESSION:  No evidence of deep venous thrombosis in either lower extremity.    < end of copied text >  r< from: US Duplex Venous Lower Ext Complete, Bilateral (08.23.21 @ 12:53) >  PROCEDURE DATE:  08/23/2021          INTERPRETATION:  CLINICAL INFORMATION: 55 years  Male with r/o DVT    evaluate for DVT. Covid positive. Elevated d-dimer.    COMPARISON: None available.    TECHNIQUE: Duplex sonography of the BILATERAL LOWER extremity veins with color and spectral Doppler, with and without compression.    FINDINGS:    RIGHT:  Normal compressibility of the RIGHT common femoral, femoral and popliteal veins.  Doppler examination shows normal spontaneous and phasic flow.  No RIGHT calf vein thrombosis is detected.    LEFT:  Normal compressibility of the LEFT common femoral, femoral and popliteal veins.  Doppler examination shows normal spontaneous and phasic flow.  No LEFT calf vein thrombosis is detected.    IMPRESSION:  No evidence of deep venous thrombosis in either lower extremity.    < end of copied text >  < from: Xray Chest 1 View- PORTABLE-Urgent (Xray Chest 1 View- PORTABLE-Urgent .) (08.23.21 @ 11:06) >    PROCEDURE DATE:  08/23/2021          INTERPRETATION:  History: Dyspnea, Covid    Chest:  one view.    Comparison: 08/20/2021    AP radiograph of the chest demonstrates moderate diffuse alveolar infiltrates unchanged. The cardiac silhouette is normal in size. Osseous structures are intact.    Impression:moderate diffuse alveolar infiltrates unchanged.    < end of copied text >

## 2021-08-28 NOTE — PROGRESS NOTE ADULT - SUBJECTIVE AND OBJECTIVE BOX
HPI:     55 M with pmh HLD, dm, htn presents with 8 days onset of covid symptoms which included, cough, fevers, sob, hypoxia, fevers, diarrhea, chills and myalgias. tested positive 7/15.  Patient was unvaccinated. He thinks he got it going to [facility] dance [facility] week of July 4 th.  Wife also had covid but her case was mild.  Admitted for Hypoxia to  on 7/21 and transferred to the ICU on 7/24 for HF NC O2.      7/26: Patient seen in bed, on HF NC O2 100% and 50L, along with a 100% NRB mask  7/27: Patient remains on HF NC O2 100% 50L as well as a %  7/28: He remains on 100% and 50 L HF NC and 100% NRB    7/29: Patient remains on 100% 50 L NF NC and 100% NRB  7/30: Patient now down to 80% FI O2 and 50L on HF NC, WBC rising.    7/31: Patient is on 75 % Fi O2 now, WBC remains elevated    8/23: Patient seen in bed and he is doing poorly.  He has an O2 sat in the 70s on HF NC along with 100% NRB.  O2 saturation was in the 80 while on NIV.  WBC slightly up.  Patient far weaker than the last time i saw him.    8/24: He continues to do poorly.  He was changed over to HF NC and was not able to tolerate it due to low O2 sats.    8/25: WBC down today, feeling better today, taking PO, saturating better on CPAP  8/26: He continues on CPAP and HF NC to eat.  Fi O2 is down to 80% and maintaining an O2 sat in the 90s  8/28: On CPP 18 Fi O2 70% now      PAST MEDICAL/SURGICAL/FAMILY/SOCIAL HISTORY:    Past Medical History:  Diabetes    HTN (hypertension).  No surgeries     Tobacco Usage:  · Tobacco Usage	non smoker  Fhx: none (21 Jul 2021 19:19)       PAST MEDICAL & SURGICAL HISTORY:  HTN (hypertension)    Diabetes        FAMILY HISTORY:      Social Hx:    Allergies    No Known Allergies    Intolerances            ICU Vital Signs Last 24 Hrs  T(C): 36.1 (28 Aug 2021 08:00), Max: 37.1 (27 Aug 2021 20:00)  T(F): 97 (28 Aug 2021 08:00), Max: 98.8 (27 Aug 2021 20:00)  HR: 77 (28 Aug 2021 10:00) (58 - 115)  BP: 120/48 (28 Aug 2021 10:00) (93/60 - 150/95)  BP(mean): 61 (28 Aug 2021 10:00) (61 - 108)  ABP: --  ABP(mean): --  RR: 19 (28 Aug 2021 10:00) (19 - 34)  SpO2: 84% (28 Aug 2021 10:00) (81% - 100%)          I&O's Summary    27 Aug 2021 07:01  -  28 Aug 2021 07:00  --------------------------------------------------------  IN: 2607 mL / OUT: 1625 mL / NET: 982 mL                              7.9    12.10 )-----------( 230      ( 28 Aug 2021 07:06 )             23.5       08-28    138  |  102  |  18  ----------------------------<  182<H>  3.7   |  31  |  0.60    Ca    8.5      28 Aug 2021 09:29  Phos  3.3     08-28  Mg     2.0     08-28    TPro  6.1  /  Alb  2.3<L>  /  TBili  0.6  /  DBili  x   /  AST  24  /  ALT  46  /  AlkPhos  81  08-28                    MEDICATIONS  (STANDING):  amLODIPine   Tablet 5 milliGRAM(s) Oral daily  aspirin  chewable 81 milliGRAM(s) Oral daily  atorvastatin 80 milliGRAM(s) Oral at bedtime  budesonide 160 MICROgram(s)/formoterol 4.5 MICROgram(s) Inhaler 2 Puff(s) Inhalation two times a day  cholecalciferol 2000 Unit(s) Oral daily  dexMEDEtomidine Infusion 0.2 MICROgram(s)/kG/Hr (4.52 mL/Hr) IV Continuous <Continuous>  dextrose 40% Gel 15 Gram(s) Oral once  dextrose 5%. 1000 milliLiter(s) (50 mL/Hr) IV Continuous <Continuous>  dextrose 5%. 1000 milliLiter(s) (100 mL/Hr) IV Continuous <Continuous>  dextrose 50% Injectable 25 Gram(s) IV Push once  dextrose 50% Injectable 12.5 Gram(s) IV Push once  dextrose 50% Injectable 25 Gram(s) IV Push once  enoxaparin Injectable 40 milliGRAM(s) SubCutaneous every 12 hours  fat emulsion (Fish Oil and Plant Based) 20% Infusion 20.8 mL/Hr (20.8 mL/Hr) IV Continuous <Continuous>  fat emulsion (Fish Oil and Plant Based) 20% Infusion 20.8 mL/Hr (20.8 mL/Hr) IV Continuous <Continuous>  furosemide   Injectable 20 milliGRAM(s) IV Push daily  glucagon  Injectable 1 milliGRAM(s) IntraMuscular once  insulin glargine Injectable (LANTUS) 5 Unit(s) SubCutaneous <User Schedule>  insulin lispro (ADMELOG) corrective regimen sliding scale   SubCutaneous three times a day before meals  insulin lispro (ADMELOG) corrective regimen sliding scale   SubCutaneous at bedtime  losartan 75 milliGRAM(s) Oral daily  pantoprazole  Injectable 40 milliGRAM(s) IV Push daily  Parenteral Nutrition - Adult 1 Each (75 mL/Hr) TPN Continuous <Continuous>  Parenteral Nutrition - Adult 1 Each (75 mL/Hr) TPN Continuous <Continuous>  potassium chloride    Tablet ER 10 milliEquivalent(s) Oral daily  predniSONE   Tablet 10 milliGRAM(s) Oral daily  senna 1 Tablet(s) Oral at bedtime    MEDICATIONS  (PRN):  acetaminophen   Tablet .. 650 milliGRAM(s) Oral every 4 hours PRN Temp greater or equal to 38C (100.4F), Mild Pain (1 - 3)  ALBUTerol    90 MICROgram(s) HFA Inhaler 2 Puff(s) Inhalation every 4 hours PRN Shortness of Breath and/or Wheezing  ALPRAZolam 0.5 milliGRAM(s) Oral every 6 hours PRN anxiety  benzocaine 15 mG/menthol 3.6 mG (Sugar-Free) Lozenge 1 Lozenge Oral every 6 hours PRN Sore Throat  benzonatate 100 milliGRAM(s) Oral three times a day PRN Cough  hydrALAZINE Injectable 5 milliGRAM(s) IV Push every 6 hours PRN SBP>160  melatonin 6 milliGRAM(s) Oral at bedtime PRN Insomnia  polyethylene glycol 3350 17 Gram(s) Oral daily PRN Constipation      DVT Prophylaxis:    Advanced Directives:  Discussed with:    Visit Information: 30 min    ** Time is exclusive of billed procedures and/or teaching and/or routine family updates.

## 2021-08-29 LAB
ANION GAP SERPL CALC-SCNC: 3 MMOL/L — LOW (ref 5–17)
BUN SERPL-MCNC: 19 MG/DL — SIGNIFICANT CHANGE UP (ref 7–23)
CALCIUM SERPL-MCNC: 8.7 MG/DL — SIGNIFICANT CHANGE UP (ref 8.5–10.1)
CHLORIDE SERPL-SCNC: 97 MMOL/L — SIGNIFICANT CHANGE UP (ref 96–108)
CO2 SERPL-SCNC: 35 MMOL/L — HIGH (ref 22–31)
CREAT SERPL-MCNC: 0.66 MG/DL — SIGNIFICANT CHANGE UP (ref 0.5–1.3)
GLUCOSE SERPL-MCNC: 138 MG/DL — HIGH (ref 70–99)
HCT VFR BLD CALC: 26.1 % — LOW (ref 39–50)
HGB BLD-MCNC: 8.2 G/DL — LOW (ref 13–17)
MAGNESIUM SERPL-MCNC: 1.8 MG/DL — SIGNIFICANT CHANGE UP (ref 1.6–2.6)
MCHC RBC-ENTMCNC: 27.8 PG — SIGNIFICANT CHANGE UP (ref 27–34)
MCHC RBC-ENTMCNC: 31.4 GM/DL — LOW (ref 32–36)
MCV RBC AUTO: 88.5 FL — SIGNIFICANT CHANGE UP (ref 80–100)
PHOSPHATE SERPL-MCNC: 3.1 MG/DL — SIGNIFICANT CHANGE UP (ref 2.5–4.5)
PLATELET # BLD AUTO: 292 K/UL — SIGNIFICANT CHANGE UP (ref 150–400)
POTASSIUM SERPL-MCNC: 3.5 MMOL/L — SIGNIFICANT CHANGE UP (ref 3.5–5.3)
POTASSIUM SERPL-SCNC: 3.5 MMOL/L — SIGNIFICANT CHANGE UP (ref 3.5–5.3)
RBC # BLD: 2.95 M/UL — LOW (ref 4.2–5.8)
RBC # FLD: 13.3 % — SIGNIFICANT CHANGE UP (ref 10.3–14.5)
SODIUM SERPL-SCNC: 135 MMOL/L — SIGNIFICANT CHANGE UP (ref 135–145)
WBC # BLD: 15.93 K/UL — HIGH (ref 3.8–10.5)
WBC # FLD AUTO: 15.93 K/UL — HIGH (ref 3.8–10.5)

## 2021-08-29 PROCEDURE — 99291 CRITICAL CARE FIRST HOUR: CPT

## 2021-08-29 RX ORDER — ELECTROLYTE SOLUTION,INJ
1 VIAL (ML) INTRAVENOUS
Refills: 0 | Status: DISCONTINUED | OUTPATIENT
Start: 2021-08-29 | End: 2021-08-29

## 2021-08-29 RX ORDER — I.V. FAT EMULSION 20 G/100ML
20.83 EMULSION INTRAVENOUS
Qty: 50 | Refills: 0 | Status: DISCONTINUED | OUTPATIENT
Start: 2021-08-29 | End: 2021-08-29

## 2021-08-29 RX ADMIN — Medication 2000 UNIT(S): at 14:40

## 2021-08-29 RX ADMIN — Medication 0.5 MILLIGRAM(S): at 22:22

## 2021-08-29 RX ADMIN — Medication 10 MILLIEQUIVALENT(S): at 14:40

## 2021-08-29 RX ADMIN — LOSARTAN POTASSIUM 75 MILLIGRAM(S): 100 TABLET, FILM COATED ORAL at 14:40

## 2021-08-29 RX ADMIN — ENOXAPARIN SODIUM 40 MILLIGRAM(S): 100 INJECTION SUBCUTANEOUS at 11:37

## 2021-08-29 RX ADMIN — AMLODIPINE BESYLATE 5 MILLIGRAM(S): 2.5 TABLET ORAL at 14:40

## 2021-08-29 RX ADMIN — Medication 81 MILLIGRAM(S): at 14:40

## 2021-08-29 RX ADMIN — INSULIN GLARGINE 5 UNIT(S): 100 INJECTION, SOLUTION SUBCUTANEOUS at 12:05

## 2021-08-29 RX ADMIN — Medication 1 EACH: at 22:21

## 2021-08-29 RX ADMIN — Medication 10 MILLIGRAM(S): at 14:40

## 2021-08-29 RX ADMIN — Medication 3: at 18:43

## 2021-08-29 RX ADMIN — ENOXAPARIN SODIUM 40 MILLIGRAM(S): 100 INJECTION SUBCUTANEOUS at 22:22

## 2021-08-29 RX ADMIN — I.V. FAT EMULSION 20.8 ML/HR: 20 EMULSION INTRAVENOUS at 22:21

## 2021-08-29 RX ADMIN — Medication 20 MILLIGRAM(S): at 05:32

## 2021-08-29 RX ADMIN — DEXMEDETOMIDINE HYDROCHLORIDE IN 0.9% SODIUM CHLORIDE 4.52 MICROGRAM(S)/KG/HR: 4 INJECTION INTRAVENOUS at 14:40

## 2021-08-29 RX ADMIN — DEXMEDETOMIDINE HYDROCHLORIDE IN 0.9% SODIUM CHLORIDE 4.52 MICROGRAM(S)/KG/HR: 4 INJECTION INTRAVENOUS at 06:00

## 2021-08-29 RX ADMIN — ATORVASTATIN CALCIUM 80 MILLIGRAM(S): 80 TABLET, FILM COATED ORAL at 22:22

## 2021-08-29 RX ADMIN — Medication 1: at 12:28

## 2021-08-29 RX ADMIN — PANTOPRAZOLE SODIUM 40 MILLIGRAM(S): 20 TABLET, DELAYED RELEASE ORAL at 11:38

## 2021-08-29 RX ADMIN — SENNA PLUS 1 TABLET(S): 8.6 TABLET ORAL at 22:22

## 2021-08-29 NOTE — PROGRESS NOTE ADULT - SUBJECTIVE AND OBJECTIVE BOX
HPI:     55 M with pmh HLD, dm, htn presents with 8 days onset of covid symptoms which included, cough, fevers, sob, hypoxia, fevers, diarrhea, chills and myalgias. tested positive 7/15.  Patient was unvaccinated. He thinks he got it going to [facility] dance [facility] week of July 4 th.  Wife also had covid but her case was mild.  Admitted for Hypoxia to  on 7/21 and transferred to the ICU on 7/24 for HF NC O2.      7/26: Patient seen in bed, on HF NC O2 100% and 50L, along with a 100% NRB mask  7/27: Patient remains on HF NC O2 100% 50L as well as a %  7/28: He remains on 100% and 50 L HF NC and 100% NRB    7/29: Patient remains on 100% 50 L NF NC and 100% NRB  7/30: Patient now down to 80% FI O2 and 50L on HF NC, WBC rising.    7/31: Patient is on 75 % Fi O2 now, WBC remains elevated    8/23: Patient seen in bed and he is doing poorly.  He has an O2 sat in the 70s on HF NC along with 100% NRB.  O2 saturation was in the 80 while on NIV.  WBC slightly up.  Patient far weaker than the last time i saw him.    8/24: He continues to do poorly.  He was changed over to HF NC and was not able to tolerate it due to low O2 sats.    8/25: WBC down today, feeling better today, taking PO, saturating better on CPAP  8/26: He continues on CPAP and HF NC to eat.  Fi O2 is down to 80% and maintaining an O2 sat in the 90s  8/28: On CPP 18 Fi O2 70% now  8/29: He remains on CPAP 10 70%      PAST MEDICAL/SURGICAL/FAMILY/SOCIAL HISTORY:    Past Medical History:  Diabetes    HTN (hypertension).  No surgeries     Tobacco Usage:  · Tobacco Usage	non smoker  Fhx: none (21 Jul 2021 19:19)       PAST MEDICAL & SURGICAL HISTORY:  HTN (hypertension)    Diabetes        FAMILY HISTORY:      Social Hx:    Allergies    No Known Allergies    Intolerances            ICU Vital Signs Last 24 Hrs  T(C): 36.6 (29 Aug 2021 10:00), Max: 36.7 (28 Aug 2021 22:00)  T(F): 97.8 (29 Aug 2021 10:00), Max: 98 (28 Aug 2021 22:00)  HR: 79 (29 Aug 2021 11:00) (72 - 116)  BP: 136/70 (29 Aug 2021 11:00) (101/69 - 150/85)  BP(mean): 84 (29 Aug 2021 11:00) (76 - 100)  ABP: --  ABP(mean): --  RR: 23 (29 Aug 2021 11:00) (22 - 33)  SpO2: 92% (29 Aug 2021 11:00) (86% - 100%)          I&O's Summary    28 Aug 2021 07:01  -  29 Aug 2021 07:00  --------------------------------------------------------  IN: 2696.9 mL / OUT: 3000 mL / NET: -303.1 mL                              8.2    15.93 )-----------( 292      ( 29 Aug 2021 06:27 )             26.1       08-29    135  |  97  |  19  ----------------------------<  138<H>  3.5   |  35<H>  |  0.66    Ca    8.7      29 Aug 2021 06:27  Phos  3.1     08-29  Mg     1.8     08-29    TPro  6.1  /  Alb  2.3<L>  /  TBili  0.6  /  DBili  x   /  AST  24  /  ALT  46  /  AlkPhos  81  08-28                    MEDICATIONS  (STANDING):  amLODIPine   Tablet 5 milliGRAM(s) Oral daily  aspirin  chewable 81 milliGRAM(s) Oral daily  atorvastatin 80 milliGRAM(s) Oral at bedtime  budesonide 160 MICROgram(s)/formoterol 4.5 MICROgram(s) Inhaler 2 Puff(s) Inhalation two times a day  cholecalciferol 2000 Unit(s) Oral daily  dexMEDEtomidine Infusion 0.2 MICROgram(s)/kG/Hr (4.52 mL/Hr) IV Continuous <Continuous>  dextrose 40% Gel 15 Gram(s) Oral once  dextrose 5%. 1000 milliLiter(s) (50 mL/Hr) IV Continuous <Continuous>  dextrose 5%. 1000 milliLiter(s) (100 mL/Hr) IV Continuous <Continuous>  dextrose 50% Injectable 25 Gram(s) IV Push once  dextrose 50% Injectable 12.5 Gram(s) IV Push once  dextrose 50% Injectable 25 Gram(s) IV Push once  enoxaparin Injectable 40 milliGRAM(s) SubCutaneous every 12 hours  fat emulsion (Fish Oil and Plant Based) 20% Infusion 20.8 mL/Hr (20.8 mL/Hr) IV Continuous <Continuous>  fat emulsion (Fish Oil and Plant Based) 20% Infusion 20.83 mL/Hr (20.8 mL/Hr) IV Continuous <Continuous>  glucagon  Injectable 1 milliGRAM(s) IntraMuscular once  insulin glargine Injectable (LANTUS) 5 Unit(s) SubCutaneous <User Schedule>  insulin lispro (ADMELOG) corrective regimen sliding scale   SubCutaneous three times a day before meals  insulin lispro (ADMELOG) corrective regimen sliding scale   SubCutaneous at bedtime  losartan 75 milliGRAM(s) Oral daily  pantoprazole  Injectable 40 milliGRAM(s) IV Push daily  Parenteral Nutrition - Adult 1 Each (75 mL/Hr) TPN Continuous <Continuous>  Parenteral Nutrition - Adult 1 Each (75 mL/Hr) TPN Continuous <Continuous>  potassium chloride    Tablet ER 10 milliEquivalent(s) Oral daily  predniSONE   Tablet 10 milliGRAM(s) Oral daily  senna 1 Tablet(s) Oral at bedtime    MEDICATIONS  (PRN):  acetaminophen   Tablet .. 650 milliGRAM(s) Oral every 4 hours PRN Temp greater or equal to 38C (100.4F), Mild Pain (1 - 3)  ALBUTerol    90 MICROgram(s) HFA Inhaler 2 Puff(s) Inhalation every 4 hours PRN Shortness of Breath and/or Wheezing  ALPRAZolam 0.5 milliGRAM(s) Oral every 6 hours PRN anxiety  benzocaine 15 mG/menthol 3.6 mG (Sugar-Free) Lozenge 1 Lozenge Oral every 6 hours PRN Sore Throat  benzonatate 100 milliGRAM(s) Oral three times a day PRN Cough  hydrALAZINE Injectable 5 milliGRAM(s) IV Push every 6 hours PRN SBP>160  melatonin 6 milliGRAM(s) Oral at bedtime PRN Insomnia  polyethylene glycol 3350 17 Gram(s) Oral daily PRN Constipation      DVT Prophylaxis:    Advanced Directives:  Discussed with:    Visit Information: 30 min    ** Time is exclusive of billed procedures and/or teaching and/or routine family updates.

## 2021-08-29 NOTE — PROGRESS NOTE ADULT - ASSESSMENT
55 M noted to have COVID 7/15  On arrival hypoxic to 80s and tachypneic. NRB placed,in the ER saturating 95%. Na 126 s/p 1L NS. CXR: Frandy infiltrates. Last scr 1.4 in 2019-> today 1.9 unknown if underlying ckd.  Treated with IV Remdesivir and Decadron, Tocilizumab   Given the obesity/hypertension and prior co-morbidities, he is at risk of intubation but continue HFNC, respiratory status remains critical  XR reviewed- pulmonary infiltrates are present  DDimer elevated, was on therapeutic lovenox, transitioned to 40mg q 12 now noted to be >2000 -  Completed Solumedrol, Symbicort 160/4.5 BID (https://www.theJustPark.com/journals/lanres/article/[email or web address], [facility] study). MICU service started Solumedrol 40mg q 8 hrs as of 8/18  Was on HFNC %FiO2 with BiPAP overnight, now on CPAP and transitions between HFNC/NIPPV  CTPE not performed      Covering for Dr Arceo  Respiratory status is tenuous and he continues to have an increased work of breathing; at this point he is at high risk of intubation  ICU team is reconsidering intubation and mechanical ventilation  Incrased DDimer noted; anticoagulation being managed by MICU   Repeat ddimer/BNP reviewed   Dopplers negative   Currently on NIPPV with CPAP, will likely need 18/8 back up rate of 12 if hypoxemia persists. Transitioning between HFNC and nocturnal NIPPV    escalating Bicarb compensatory for Co2 retention  Elevated D Dimer noted/ repeat leg dopplers negative    consideration for Full AC if escalating D Dimer in view of overall presentation  at high risk of intubation and mechanical ventilation  ICU level care appreciated    elevated Bicarb likely indication to switch to BIPAP for night time use  consider ABG in am for eval    Given CXR appearance as well as elevated Pro BNP and I>O will proceed with gentle diuresis  awaiting repeat labs  monitor creat closely and replete lytes as needed  lasix 20 mg IVPB Qdaily x 3 days - stopped after second dose  I's and O's discussed with intensivist  monitor BP and hold if SBP<100  fu cxr after diuresis suggested  overall prognosis remains guarded

## 2021-08-29 NOTE — PROGRESS NOTE ADULT - ASSESSMENT
A/P: 55 year old male with Acute Hypoxic Respiratory Failure from COVID-19 Pneumonia  HTN  DM II      Plan:  ICU    PULM: Continue CPAP and HF NC to eat.  Still may require intubation    Cardio: Hemodynamics reasonable    Renal: Cr Stable    GI: H2 blocker, PPN, and PO Diet    ENDO: Lantus 5 QHS and RISS.    ID: S/P Actemra, Completed REM and High Dose Solumedrol    DVT Prophylaxis with Lovenox    LE Dopplers on 7/26, 8/25, 8/26 were negative for LE DVT    OOB to Chair

## 2021-08-29 NOTE — CHART NOTE - NSCHARTNOTEFT_GEN_A_CORE
Per Critical Care Attending 8/27  Plan:   Neuro stable   On precedex and xanax for anxiety/CPAP compliance  HD stable, continue antihypertensives   Resp status remains tenuous despite improvement in fio2, will continue low dose prednisone for now, continue CPAP with intermittent HFNC if tolerates   Got OOB to chair today, encourage daily   Repeat ferritin, procal and fungitell in am   Currently, there are no obvious signs or symptoms suggestive of a bacterial infection, monitor off abx   Hypoglycemia - decrease lantus to 5 units at noon, d/c insulin in PPN   Continue PPN as po intake is very poor due to resp failure, repeat TGs in am, consider holding fat emulsion in PPN if rising   On lovenox for DVT ppx         Clinical Nutrition PPN Note        **56yo male admitted with acute hypoxic resp failure from COVID-19 PNA; CPAP dependent.  Pt not DNR or DNI.  has refused intubation, unless emergent.  *Pt is on CPAP due breathing status, mostly not eating; able to be on HIFLOW and eat 1 meal per day.    *Labs reviewed  08-29    135  |  97  |  19  ----------------------------<  138<H>  3.5   |  35<H>  |  0.66    Ca    8.7      29 Aug 2021 06:27  Phos  3.1     08-29  Mg     1.8     08-29    TPro  6.1  /  Alb  2.3<L>  /  TBili  0.6  /  DBili  x   /  AST  24  /  ALT  46  /  AlkPhos  81  08-28      Triglycerides, Serum (08.28.21 @ 09:29)    Triglycerides, Serum: 176 mg/dL    CAPILLARY BLOOD GLUCOSE      POCT Blood Glucose.: 180 mg/dL (28 Aug 2021 21:05)  POCT Blood Glucose.: 176 mg/dL (28 Aug 2021 18:34)  POCT Blood Glucose.: 202 mg/dL (28 Aug 2021 14:03)          *labs reviewed; 8/29  lytes WNL. POCT elevated due to steroid use, POCTs were trending down, insulin removed from PPN bag.  Slight trend up now for BGs.  Pt receiving lantus and sliding scale to cover steroids and PO intake.  Triglycerides WDL.   bilirubin total WNL. corrected Ca WNL.  Per MD, as labs have been stable tomorrow no labs will be drawn to give lab break.    I&O's Detail    28 Aug 2021 07:01  -  29 Aug 2021 07:00  --------------------------------------------------------  IN:    Dexmedetomidine: 379.7 mL    Fat Emulsion (Fish Oil &amp; Plant Based) 20% Infusion: 125.5 mL    Fat Emulsion (Fish Oil &amp; Plant Based) 20% Infusion: 81.7 mL    Oral Fluid: 450 mL    PPN (Peripheral Parenteral Nutrition): 1660 mL  Total IN: 2696.9 mL    OUT:    Voided (mL): 3000 mL  Total OUT: 3000 mL    Total NET: -303.1 mL                  *slight negative fluid status monitor and adjust IVF/PPN volume prn. Urine voided 3000 ml      *linda score of 17; no PU documented.  8/28 Edema  1+ gen, 2+ right and left foot.  BM (+) 8/24     *continues to meet criteria for severe malnutrition*    Diet, Regular (08-20-21 @ 12:38)(allowed when on HIFLOW)    Estimated Needs: Based on 72Kg (adjusted IBW)   Calories: 1302-2320 Kcal (25-30 Kcal/Kg)  Protein:  115-130g (1.6-1.8 g/Kg)  Fluids:  8059-3779 mL (25-30 mL/Kg)    *Wt Hx:  92Kg (8/24)  82.5Kg (8/19): wt loss of ~7.9Kg in ~29days; (8.7%) clinically significant.  Height (cm): 167.6 (07-21-21 @ 14:34)  Weight (kg): 90.4 (07-21-21 @ 22:00)  BMI (kg/m2): 32.2 (07-21-21 @ 22:00)  BSA (m2): 2 (07-21-21 @ 22:00)    *no changes to PPN at this time*, will continue to monitor and adjust prn*        PPN RECOMMENDATIONS: via peripheral line  TOTAL VOLUME: 1800mL    65g Amino Acids  100g Dextrose  50g Lipids 20%   98 mEq NaCl  14 mEq NaAcetate  20 mMol NaPhos  30 mEq KCl  15 mEq KAcetate  0 mMol KPhos  8 mEq Calcium Gluconate  8 mEq Magnesium Sulfate  100 mg Thiamine  0 units Regular Insulin  1mL trace elements  10mL MVI    Total Calories: 1500Kcal (meeting ~83% of estimated calorie needs and ~57% of estimated protein needs)(osmolarity of 873)    ADDITIONAL RECOMMENDATIONS:  1) obtain triglyceride and LFT's weekly  2) daily lyte and magnesium and phos checks  3) POCT q6hrs; adjust insulin regimen prn to maintain tight glycemic control  4) strict I/O's  5) daily wt checks to track/trend changes.

## 2021-08-29 NOTE — PROGRESS NOTE ADULT - SUBJECTIVE AND OBJECTIVE BOX
Patient is a 55y old  Male who presents with a chief complaint of cough/sob (23 Jul 2021 14:03)      HPI:  55 M with pmh HLD, dm, htn presents with 8 days onset of covid symptoms which included, cough, fevers, sob, hypoxia, fevers, diarrhea, chills and myalgias. TEsted positive 7/15. Wife endorsed he was becoming more sob of recently. On arrival hypoxic to 80s and tachypneic. NRB placed, presently on 15% and saturating 95%. Na 126 s/p 1L NS. CXR: Frandy infiltrates. Last scr 1.4 in 2019-> today 1.9 unknown if underlying ckd.  Patient not vaccinated.   Last seen by me in 2019  History of ROBERT and uncontrolled hypertension  Treated with IV Remdesivir and Decadron, now on Toci  SaO2 is 80-85% despite being on 100% NRB  ABG pending        8/24  Alternating between NIPPV and HFNC  8/25-8/26  covering for DR Arceo  remains on High Fio2 demand for ARDS post Covid pneumonia  8/27  uneventful overnight  remains on cpap setting overnight  high fio2 requirement still  o2 sat appears improved  not as labored with his breathing this am  8/28  he remains on HIGH FIO2 demand  data discussed with Intensivist Dr Espinoza and ICU RN  last cxr and all recent data discussed  repeat labs pending  8/29  data discussed with DR Espinoza  lasix, low dose stopped after second dose  remains on high fio2 but lowered 70%    MEDICATIONS  (STANDING):  amLODIPine   Tablet 5 milliGRAM(s) Oral daily  aspirin  chewable 81 milliGRAM(s) Oral daily  atorvastatin 80 milliGRAM(s) Oral at bedtime  budesonide 160 MICROgram(s)/formoterol 4.5 MICROgram(s) Inhaler 2 Puff(s) Inhalation two times a day  cholecalciferol 2000 Unit(s) Oral daily  dexMEDEtomidine Infusion 0.2 MICROgram(s)/kG/Hr (4.52 mL/Hr) IV Continuous <Continuous>  dextrose 40% Gel 15 Gram(s) Oral once  dextrose 5%. 1000 milliLiter(s) (100 mL/Hr) IV Continuous <Continuous>  dextrose 5%. 1000 milliLiter(s) (50 mL/Hr) IV Continuous <Continuous>  dextrose 50% Injectable 25 Gram(s) IV Push once  dextrose 50% Injectable 12.5 Gram(s) IV Push once  dextrose 50% Injectable 25 Gram(s) IV Push once  enoxaparin Injectable 40 milliGRAM(s) SubCutaneous every 12 hours  fat emulsion (Fish Oil and Plant Based) 20% Infusion 20.8 mL/Hr (20.8 mL/Hr) IV Continuous <Continuous>  glucagon  Injectable 1 milliGRAM(s) IntraMuscular once  insulin glargine Injectable (LANTUS) 5 Unit(s) SubCutaneous <User Schedule>  insulin lispro (ADMELOG) corrective regimen sliding scale   SubCutaneous three times a day before meals  insulin lispro (ADMELOG) corrective regimen sliding scale   SubCutaneous at bedtime  losartan 75 milliGRAM(s) Oral daily  pantoprazole  Injectable 40 milliGRAM(s) IV Push daily  Parenteral Nutrition - Adult 1 Each (75 mL/Hr) TPN Continuous <Continuous>  predniSONE   Tablet 10 milliGRAM(s) Oral daily  senna 1 Tablet(s) Oral at bedtime      MEDICATIONS  (PRN):  acetaminophen   Tablet .. 650 milliGRAM(s) Oral every 4 hours PRN Temp greater or equal to 38C (100.4F), Mild Pain (1 - 3)  ALBUTerol    90 MICROgram(s) HFA Inhaler 2 Puff(s) Inhalation every 4 hours PRN Shortness of Breath and/or Wheezing  benzocaine 15 mG/menthol 3.6 mG (Sugar-Free) Lozenge 1 Lozenge Oral every 6 hours PRN Sore Throat  benzonatate 100 milliGRAM(s) Oral three times a day PRN Cough  hydrALAZINE Injectable 5 milliGRAM(s) IV Push every 6 hours PRN SBP>160  hydrocodone/homatropine Syrup 5 milliLiter(s) Oral every 6 hours PRN Cough  LORazepam   Injectable 0.5 milliGRAM(s) IV Push every 6 hours PRN Agitation  melatonin 6 milliGRAM(s) Oral at bedtime PRN Insomnia  morphine  - Injectable 2 milliGRAM(s) IV Push every 2 hours PRN SOB/Anxiety  ICU Vital Signs Last 24 Hrs  T(C): 36.6 (29 Aug 2021 06:00), Max: 36.9 (28 Aug 2021 12:00)  T(F): 97.9 (29 Aug 2021 06:00), Max: 98.4 (28 Aug 2021 12:00)  HR: 72 (29 Aug 2021 09:00) (72 - 116)  BP: 123/72 (29 Aug 2021 09:00) (101/69 - 150/85)  BP(mean): 83 (29 Aug 2021 09:00) (76 - 100)  ABP: --  ABP(mean): --  RR: 27 (29 Aug 2021 09:00) (22 - 33)  SpO2: 95% (29 Aug 2021 09:00) (86% - 100%)  I&O's Detail    28 Aug 2021 07:01  -  29 Aug 2021 07:00  --------------------------------------------------------  IN:    Dexmedetomidine: 379.7 mL    Fat Emulsion (Fish Oil &amp; Plant Based) 20% Infusion: 125.5 mL    Fat Emulsion (Fish Oil &amp; Plant Based) 20% Infusion: 81.7 mL    Oral Fluid: 450 mL    PPN (Peripheral Parenteral Nutrition): 1660 mL  Total IN: 2696.9 mL    OUT:    Voided (mL): 3000 mL  Total OUT: 3000 mL    Total NET: -303.1 mL              PHYSICAL EXAM  General Appearance: On CPAP, increased work of breathing- improved  Lungs: coarse bilaterally  Heart: +S1,S2  Abdomen: Soft, non-tender, bowel sounds active   Extremities: no cyanosis or edema, no joint swelling  Skin: Skin color, texture normal, no rashes   Neurologic: Alert and oriented X3 , non focal, not disoriented    ECG:    LABS:    Pro BNP 2446  repeat              08-26    136  |  102  |  18  ----------------------------<  87  3.7   |  34<H>  |  0.56    Ca    8.3<L>      26 Aug 2021 07:03  Phos  3.6     08-26  Mg     2.0     08-26    TPro  5.5<L>  /  Alb  2.1<L>  /  TBili  0.5  /  DBili  x   /  AST  23  /  ALT  51  /  AlkPhos  79  08-26                          8.0    11.96 )-----------( 278      ( 26 Aug 2021 07:03 )             26.1   08-26    136  |  102  |  18  ----------------------------<  87  3.7   |  34<H>  |  0.56    Ca    8.3<L>      26 Aug 2021 07:03  Phos  3.6     08-26  Mg     2.0     08-26    TPro  5.5<L>  /  Alb  2.1<L>  /  TBili  0.5  /  DBili  x   /  AST  23  /  ALT  51  /  AlkPhos  79  08-26                          8.5    12.89 )-----------( 269      ( 25 Aug 2021 07:10 )             26.7   08-25    138  |  103  |  20  ----------------------------<  143<H>  3.7   |  32<H>  |  0.58    Ca    8.1<L>      25 Aug 2021 07:10  Phos  3.7     08-25  Mg     2.0     08-25    TPro  5.6<L>  /  Alb  2.2<L>  /  TBili  0.5  /  DBili  x   /  AST  32  /  ALT  74  /  AlkPhos  103  08-24                          8.5    14.05 )-----------( 292      ( 18 Aug 2021 07:01 )             26.2   08-18    138  |  104  |  24<H>  ----------------------------<  78  4.1   |  29  |  1.03    Ca    8.8      18 Aug 2021 07:01    TPro  6.3  /  Alb  2.3<L>  /  TBili  0.6  /  DBili  x   /  AST  32  /  ALT  73  /  AlkPhos  168<H>  08-17                                           10.0   19.05 )-----------( 179      ( 08 Aug 2021 07:11 )             30.3   08-08    137  |  102  |  27<H>  ----------------------------<  121<H>  3.9   |  29  |  0.77    Ca    8.4<L>      08 Aug 2021 07:11  Phos  3.4     08-08  Mg     2.0     08-08                  10.0   19.05 )-----------( 179      ( 08 Aug 2021 07:11 )             30.3   08-08    137  |  102  |  27<H>  ----------------------------<  121<H>  3.9   |  29  |  0.77    Ca    8.4<L>      08 Aug 2021 07:11  Phos  3.4     08-08  Mg     2.0     08-08                 10.4   17.19 )-----------( 198      ( 07 Aug 2021 07:06 )             32.3   08-07    138  |  104  |  31<H>  ----------------------------<  107<H>  4.4   |  29  |  0.77    Ca    8.2<L>      07 Aug 2021 07:06  Phos  4.6     08-07  Mg     2.0     08-07    TPro  5.2<L>  /  Alb  2.3<L>  /  TBili  0.8  /  DBili  x   /  AST  81<H>  /  ALT  213<H>  /  AlkPhos  200<H>  08-06                            12.9   15.00 )-----------( 366      ( 26 Jul 2021 06:20 )             38.4   07-26    133<L>  |  104  |  37<H>  ----------------------------<  232<H>  4.2   |  23  |  1.17    Ca    8.4<L>      26 Jul 2021 06:20    TPro  6.3  /  Alb  2.2<L>  /  TBili  0.4  /  DBili  0.1  /  AST  103<H>  /  ALT  137<H>  /  AlkPhos  239<H>  07-26                          13.2   13.52 )-----------( 338      ( 24 Jul 2021 08:06 )             39.5     07-24    136  |  105  |  43<H>  ----------------------------<  227<H>  4.3   |  25  |  1.31<H>    Ca    8.7      24 Jul 2021 08:06    TPro  6.6  /  Alb  2.3<L>  /  TBili  0.4  /  DBili  x   /  AST  73<H>  /  ALT  64  /  AlkPhos  165<H>  07-24          Pro BNP  -- 07-24 @ 08:06  D Dimer  446 07-24 @ 08:06  Pro BNP  261 07-21 @ 14:42  D Dimer  460 07-21 @ 14:42              < from: Xray Chest 1 View- PORTABLE-Urgent (07.21.21 @ 15:33) >    PROCEDURE DATE:  07/21/2021          INTERPRETATION:  AP chest on July 21, 2021 at 3:27 PM. Patient is short of breath with cough and fever.    Heart magnified by technique.    There arescattered mid lower lung field infiltrates right greater than left possibly related: Pneumonia.    The lungs are clear on August 30, 2019.    IMPRESSION: Bilateral infiltrates as above.    < end of copied text >  < from: US Duplex Venous Lower Ext Complete, Bilateral (07.25.21 @ 08:42) >  COMPARISON: None available.    TECHNIQUE: Duplex sonography of the BILATERAL LOWER extremity veins with color and spectral Doppler, with and without compression.    FINDINGS:    RIGHT:  Normal compressibility of the RIGHT common femoral, femoral and popliteal veins.  Doppler examination shows normal spontaneous and phasic flow.  No RIGHT calf vein thrombosis is detected.    LEFT:  Normal compressibility of the LEFT common femoral, femoral and popliteal veins.  Doppler examination shows normal spontaneous and phasic flow.  No LEFT calf vein thrombosis is detected.    IMPRESSION:  No evidence of deep venous thrombosis in either lower extremity.    < end of copied text >  RADIOLOGY & ADDITIONAL STUDIES:    < from: Xray Chest 1 View- PORTABLE-Urgent (Xray Chest 1 View- PORTABLE-Urgent .) (07.26.21 @ 08:43) >    PROCEDURE DATE:  07/26/2021          INTERPRETATION:  Portable chest radiograph    CLINICAL INFORMATION: Pneumonia due to Covid 19.. Follow-up    TECHNIQUE:  Portable  AP view of the chest was obtained.    COMPARISON: 7/21/2021 chest available for review.    FINDINGS:    The lungs show stable bilateral  multifocal and diffuse ill-defined airspace opacities.. No pneumothorax.    The  heart is enlarged in transverse diameter. No hilar mass.     Visualized osseous structures are intact.    IMPRESSION:   Stable bilateral  multifocal and diffuse ill-defined airspace opacities..    < end of copied text >  < from: US Duplex Venous Lower Ext Complete, Bilateral (08.06.21 @ 17:16) >  FINDINGS:    RIGHT:  Normal compressibility of the RIGHT common femoral, femoral and popliteal veins.  Doppler examination shows normal spontaneous and phasic flow.  No RIGHT calf vein thrombosis is detected.    LEFT:  Normal compressibility of the LEFT common femoral, femoral and popliteal veins.  Doppler examination shows normal spontaneous and phasic flow.  No LEFT calf vein thrombosisis detected.    IMPRESSION:  No evidence of deep venous thrombosis in either lower extremity.    < end of copied text >  < from: Xray Chest 1 View- PORTABLE-Urgent (Xray Chest 1 View- PORTABLE-Urgent .) (08.06.21 @ 16:45) >  INTERPRETATION:  Portable chest radiograph    CLINICAL INFORMATION: Pneumonia due to Covid 19.    TECHNIQUE:  Portable  AP view of the chest was obtained.    COMPARISON: 8/1/2021 chest radiograph available for review.    FINDINGS:    The lungs show decreasing bilateral diffuse airspace disease.. No pneumothorax.    The  heart is mildly enlarged in transverse diameter. No hilar mass.   Visualized osseous structures are intact.    IMPRESSION:   Decreasing bilateral diffuse airspace disease..    < end of copied text >  xr< from: US Duplex Venous Lower Ext Complete, Bilateral (08.17.21 @ 08:58) >  COMPARISON: None available.    TECHNIQUE: Duplex sonography of the BILATERAL LOWER extremity veins with color and spectral Doppler, with and without compression.    FINDINGS:    RIGHT:  Normal compressibility of the RIGHT common femoral, femoral and popliteal veins.  Doppler examination shows normal spontaneous and phasic flow.  No RIGHT calf vein thrombosis is detected.    LEFT:  Normal compressibility of the LEFT common femoral, femoral and popliteal veins.  Doppler examination shows normal spontaneous and phasic flow.  No LEFT calf vein thrombosis is detected.    IMPRESSION:  No evidence of deep venous thrombosis in either lower extremity.    < end of copied text >  r< from: US Duplex Venous Lower Ext Complete, Bilateral (08.23.21 @ 12:53) >  PROCEDURE DATE:  08/23/2021          INTERPRETATION:  CLINICAL INFORMATION: 55 years  Male with r/o DVT    evaluate for DVT. Covid positive. Elevated d-dimer.    COMPARISON: None available.    TECHNIQUE: Duplex sonography of the BILATERAL LOWER extremity veins with color and spectral Doppler, with and without compression.    FINDINGS:    RIGHT:  Normal compressibility of the RIGHT common femoral, femoral and popliteal veins.  Doppler examination shows normal spontaneous and phasic flow.  No RIGHT calf vein thrombosis is detected.    LEFT:  Normal compressibility of the LEFT common femoral, femoral and popliteal veins.  Doppler examination shows normal spontaneous and phasic flow.  No LEFT calf vein thrombosis is detected.    IMPRESSION:  No evidence of deep venous thrombosis in either lower extremity.    < end of copied text >  < from: Xray Chest 1 View- PORTABLE-Urgent (Xray Chest 1 View- PORTABLE-Urgent .) (08.23.21 @ 11:06) >    PROCEDURE DATE:  08/23/2021          INTERPRETATION:  History: Dyspnea, Covid    Chest:  one view.    Comparison: 08/20/2021    AP radiograph of the chest demonstrates moderate diffuse alveolar infiltrates unchanged. The cardiac silhouette is normal in size. Osseous structures are intact.    Impression:moderate diffuse alveolar infiltrates unchanged.    < end of copied text >  r< from: Xray Chest 1 View- PORTABLE-Urgent (Xray Chest 1 View- PORTABLE-Urgent .) (08.23.21 @ 11:06) >  PROCEDURE DATE:  08/23/2021          INTERPRETATION:  History: Dyspnea, Covid    Chest:  one view.    Comparison: 08/20/2021    AP radiograph of the chest demonstrates moderate diffuse alveolar infiltrates unchanged. The cardiac silhouette is normal in size. Osseous structures are intact.    Impression:moderate diffuse alveolar infiltrates unchanged.    < end of copied text >

## 2021-08-30 LAB
ANION GAP SERPL CALC-SCNC: 3 MMOL/L — LOW (ref 5–17)
BUN SERPL-MCNC: 20 MG/DL — SIGNIFICANT CHANGE UP (ref 7–23)
CALCIUM SERPL-MCNC: 8.5 MG/DL — SIGNIFICANT CHANGE UP (ref 8.5–10.1)
CHLORIDE SERPL-SCNC: 100 MMOL/L — SIGNIFICANT CHANGE UP (ref 96–108)
CO2 SERPL-SCNC: 35 MMOL/L — HIGH (ref 22–31)
CREAT SERPL-MCNC: 0.68 MG/DL — SIGNIFICANT CHANGE UP (ref 0.5–1.3)
GLUCOSE SERPL-MCNC: 179 MG/DL — HIGH (ref 70–99)
HCT VFR BLD CALC: 23.6 % — LOW (ref 39–50)
HGB BLD-MCNC: 7.5 G/DL — LOW (ref 13–17)
MAGNESIUM SERPL-MCNC: 2 MG/DL — SIGNIFICANT CHANGE UP (ref 1.6–2.6)
MCHC RBC-ENTMCNC: 28.1 PG — SIGNIFICANT CHANGE UP (ref 27–34)
MCHC RBC-ENTMCNC: 31.8 GM/DL — LOW (ref 32–36)
MCV RBC AUTO: 88.4 FL — SIGNIFICANT CHANGE UP (ref 80–100)
PHOSPHATE SERPL-MCNC: 3.3 MG/DL — SIGNIFICANT CHANGE UP (ref 2.5–4.5)
PLATELET # BLD AUTO: 248 K/UL — SIGNIFICANT CHANGE UP (ref 150–400)
POTASSIUM SERPL-MCNC: 3.5 MMOL/L — SIGNIFICANT CHANGE UP (ref 3.5–5.3)
POTASSIUM SERPL-SCNC: 3.5 MMOL/L — SIGNIFICANT CHANGE UP (ref 3.5–5.3)
RBC # BLD: 2.67 M/UL — LOW (ref 4.2–5.8)
RBC # FLD: 13.5 % — SIGNIFICANT CHANGE UP (ref 10.3–14.5)
SODIUM SERPL-SCNC: 138 MMOL/L — SIGNIFICANT CHANGE UP (ref 135–145)
WBC # BLD: 11.23 K/UL — HIGH (ref 3.8–10.5)
WBC # FLD AUTO: 11.23 K/UL — HIGH (ref 3.8–10.5)

## 2021-08-30 PROCEDURE — 99291 CRITICAL CARE FIRST HOUR: CPT

## 2021-08-30 PROCEDURE — 71045 X-RAY EXAM CHEST 1 VIEW: CPT | Mod: 26

## 2021-08-30 RX ORDER — INSULIN GLARGINE 100 [IU]/ML
8 INJECTION, SOLUTION SUBCUTANEOUS
Refills: 0 | Status: DISCONTINUED | OUTPATIENT
Start: 2021-08-30 | End: 2021-09-03

## 2021-08-30 RX ORDER — ELECTROLYTE SOLUTION,INJ
1 VIAL (ML) INTRAVENOUS
Refills: 0 | Status: DISCONTINUED | OUTPATIENT
Start: 2021-08-30 | End: 2021-08-30

## 2021-08-30 RX ORDER — I.V. FAT EMULSION 20 G/100ML
0.55 EMULSION INTRAVENOUS
Qty: 50 | Refills: 0 | Status: DISCONTINUED | OUTPATIENT
Start: 2021-08-30 | End: 2021-08-30

## 2021-08-30 RX ORDER — ALPRAZOLAM 0.25 MG
0.5 TABLET ORAL ONCE
Refills: 0 | Status: DISCONTINUED | OUTPATIENT
Start: 2021-08-30 | End: 2021-08-30

## 2021-08-30 RX ADMIN — Medication 1 EACH: at 22:29

## 2021-08-30 RX ADMIN — LOSARTAN POTASSIUM 75 MILLIGRAM(S): 100 TABLET, FILM COATED ORAL at 10:02

## 2021-08-30 RX ADMIN — Medication 81 MILLIGRAM(S): at 10:01

## 2021-08-30 RX ADMIN — I.V. FAT EMULSION 20.8 ML/HR: 20 EMULSION INTRAVENOUS at 22:29

## 2021-08-30 RX ADMIN — Medication 100 MILLIGRAM(S): at 21:29

## 2021-08-30 RX ADMIN — Medication 1: at 11:54

## 2021-08-30 RX ADMIN — ATORVASTATIN CALCIUM 80 MILLIGRAM(S): 80 TABLET, FILM COATED ORAL at 21:29

## 2021-08-30 RX ADMIN — PANTOPRAZOLE SODIUM 40 MILLIGRAM(S): 20 TABLET, DELAYED RELEASE ORAL at 10:02

## 2021-08-30 RX ADMIN — Medication 0.5 MILLIGRAM(S): at 22:48

## 2021-08-30 RX ADMIN — INSULIN GLARGINE 5 UNIT(S): 100 INJECTION, SOLUTION SUBCUTANEOUS at 11:53

## 2021-08-30 RX ADMIN — Medication 100 MILLIGRAM(S): at 10:02

## 2021-08-30 RX ADMIN — ENOXAPARIN SODIUM 40 MILLIGRAM(S): 100 INJECTION SUBCUTANEOUS at 21:29

## 2021-08-30 RX ADMIN — Medication 10 MILLIGRAM(S): at 10:01

## 2021-08-30 RX ADMIN — Medication 2: at 18:00

## 2021-08-30 RX ADMIN — Medication 2: at 08:47

## 2021-08-30 RX ADMIN — Medication 10 MILLIEQUIVALENT(S): at 10:01

## 2021-08-30 RX ADMIN — DEXMEDETOMIDINE HYDROCHLORIDE IN 0.9% SODIUM CHLORIDE 4.52 MICROGRAM(S)/KG/HR: 4 INJECTION INTRAVENOUS at 10:01

## 2021-08-30 RX ADMIN — AMLODIPINE BESYLATE 5 MILLIGRAM(S): 2.5 TABLET ORAL at 10:02

## 2021-08-30 RX ADMIN — Medication 2000 UNIT(S): at 10:01

## 2021-08-30 RX ADMIN — DEXMEDETOMIDINE HYDROCHLORIDE IN 0.9% SODIUM CHLORIDE 4.52 MICROGRAM(S)/KG/HR: 4 INJECTION INTRAVENOUS at 21:29

## 2021-08-30 RX ADMIN — ENOXAPARIN SODIUM 40 MILLIGRAM(S): 100 INJECTION SUBCUTANEOUS at 10:02

## 2021-08-30 RX ADMIN — BUDESONIDE AND FORMOTEROL FUMARATE DIHYDRATE 2 PUFF(S): 160; 4.5 AEROSOL RESPIRATORY (INHALATION) at 20:47

## 2021-08-30 RX ADMIN — BUDESONIDE AND FORMOTEROL FUMARATE DIHYDRATE 2 PUFF(S): 160; 4.5 AEROSOL RESPIRATORY (INHALATION) at 11:40

## 2021-08-30 NOTE — PROGRESS NOTE ADULT - ASSESSMENT
55 M noted to have COVID 7/15  On arrival hypoxic to 80s and tachypneic. NRB placed,in the ER saturating 95%. Na 126 s/p 1L NS. CXR: Frandy infiltrates. Last scr 1.4 in 2019-> today 1.9 unknown if underlying ckd.  Treated with IV Remdesivir and Decadron, Tocilizumab   Given the obesity/hypertension and prior co-morbidities, he is at risk of intubation but continue HFNC, respiratory status remains critical  XR reviewed- pulmonary infiltrates are present  DDimer elevated, was on therapeutic lovenox, transitioned to 40mg q 12 now noted to be >2000 -  Completed Solumedrol, Symbicort 160/4.5 BID (https://www.[email or web address]/journals/lanres/article/IKBD0844-6471, [facility] study). MICU service started Solumedrol 40mg q 8 hrs as of 8/18  Was on HFNC %FiO2 with BiPAP overnight, now on CPAP and transitions between HFNC/NIPPV  CTPE not performed      Covering for Dr Arceo  Respiratory status is tenuous and he continues to have an increased work of breathing; at this point he is at high risk of intubation  ICU team is reconsidering intubation and mechanical ventilation  Incrased DDimer noted; anticoagulation being managed by MICU , remains on Lovenox 40 mg BID  Repeat ddimer/BNP reviewed   Dopplers negative   Currently on NIPPV with CPAP, will likely need 18/8 back up rate of 12 if hypoxemia persists. Transitioning between HFNC and nocturnal NIPPV    escalating Bicarb compensatory for Co2 retention  Elevated D Dimer noted/ repeat leg dopplers negative    consideration for Full AC if escalating D Dimer in view of overall presentation  at high risk of intubation and mechanical ventilation  ICU level care appreciated    elevated Bicarb likely indication to switch to BIPAP for night time use  consider ABG in am for eval    Given CXR appearance as well as elevated Pro BNP and I>O will proceed with gentle diuresis - stopped by intensivist after second dose  awaiting repeat labs  monitor creat closely and replete lytes as needed  lasix 20 mg IVPB Qdaily x 3 days - stopped after second dose  I's and O's discussed with intensivist  monitor BP and hold if SBP<100  fu cxr after diuresis suggested  overall prognosis remains guarded  8/30  covering for Dr Arceo  doing about the same, Improved urine output O>I's last 2 days on PPN  o2 sat 93-94% at bedside this am  Dr rAceo will resume in am  monitor anemia with drop in Hgb 7.5  replete lytes to keep K>4.o  metabolic alkalosis post diuresis and also compensatory mechanism for Co2 retention  repeat ABG if any decompensation

## 2021-08-30 NOTE — PROGRESS NOTE ADULT - ASSESSMENT
A/P: 55 year old male with Acute Hypoxic Respiratory Failure from COVID-19 Pneumonia  HTN  DM II      Plan:  ICU    PULM: Continue CPAP QHS and HF NC during the day.     Cardio: Hemodynamics reasonable    Renal: Cr Stable    GI: H2 blocker, PPN, and PO Diet    ENDO: Lantus 5 QHS and RISS.    ID: S/P Actemra, Completed REM and High Dose Solumedrol    DVT Prophylaxis with Lovenox    LE Dopplers on 7/26, 8/25, 8/26 were negative for LE DVT    OOB to Chair    D/W Wife

## 2021-08-30 NOTE — CHART NOTE - NSCHARTNOTEFT_GEN_A_CORE
**  55 M with pmh HLD, dm, htn presents with 8 days onset of covid symptoms which included, cough, fevers, sob, hypoxia, fevers, diarrhea, chills and myalgias. tested positive 7/15.  Patient was unvaccinated. He thinks he got it going to daughters dance [facility] week of July 4 th.  Wife also had covid but her case was mild.  Admitted for Hypoxia to  on 7/21 and transferred to the ICU on 7/24 for HF NC O2.        *Pt is on CPAP due breathing status, mostly not eating; able to be on HIFLOW and eat 1 meal per day. Pt on PPN x 11 days not meeting ENN suggest PICC line placement for TPN (meet 100% of ENN)  Unstable POCT with range of 176-200mg/dl Lantus 5 QHS and RISS.        *Labs reviewed- pending         POCT Blood Glucose.: 200 mg/dL (29 Aug 2021 22:28)  POCT Blood Glucose.: 272 mg/dL (29 Aug 2021 18:07)  POCT Blood Glucose.: 179 mg/dL (29 Aug 2021 12:15)          I&O's Detail    29 Aug 2021 07:01  -  30 Aug 2021 07:00  --------------------------------------------------------  IN:    Dexmedetomidine: 112 mL    Fat Emulsion (Fish Oil &amp; Plant Based) 20% Infusion: 138 mL    PPN (Peripheral Parenteral Nutrition): 526 mL  Total IN: 776 mL    OUT:    Voided (mL): 1600 mL  Total OUT: 1600 mL    Total NET: -824 mL          *negative fluid status monitor and adjust IVF/PPN volume prn. Urine voided 1600 ml (weight gain noted however no recent weights obtained)       *linda score of 16; no PU documented.  Edema  1+ gen, 2+ right and left foot.  BM (+) 8/29     *continues to meet criteria for severe malnutrition*    Diet, Regular (08-20-21 @ 12:38)(allowed when on HIFLOW)    Estimated Needs: Based on 72Kg (adjusted IBW)   Calories: 5915-0445 Kcal (25-30 Kcal/Kg)  Protein:  115-130g (1.6-1.8 g/Kg)  Fluids:  8453-1938 mL (25-30 mL/Kg)    *Wt Hx: no new weights obtained since 8/24  92Kg (8/24)  82.5Kg (8/19): wt loss of ~7.9Kg in ~29days; (8.7%) clinically significant.  Height (cm): 167.6 (07-21-21 @ 14:34)  Weight (kg): 90.4 (07-21-21 @ 22:00)  BMI (kg/m2): 32.2 (07-21-21 @ 22:00)  BSA (m2): 2 (07-21-21 @ 22:00)        PPN RECOMMENDATIONS: via peripheral line  TOTAL VOLUME: 1800mL    65g Amino Acids  100g Dextrose  50g Lipids 20%   98 mEq NaCl  14 mEq NaAcetate  20 mMol NaPhos  30 mEq KCl  15 mEq KAcetate  0 mMol KPhos  8 mEq Calcium Gluconate  8 mEq Magnesium Sulfate  100 mg Thiamine  0 units Regular Insulin  1mL trace elements  10mL MVI    Total Calories: 1500Kcal (meeting ~83% of estimated calorie needs and ~57% of estimated protein needs)(osmolarity of 873)    ADDITIONAL RECOMMENDATIONS:  1) obtain triglyceride and LFT's weekly  2) daily lyte and magnesium and phos checks  3) POCT q6hrs; adjust insulin regimen prn to maintain tight glycemic control  4) strict I/O's  5) daily wt checks to track/trend changes.  6) place PICC line for TPN (PPN not meeting Estimated Energy/protein needs x 11 days ) Continue with po diet as ordered (regular)           *will continue to monitor and adjust prn daily*  Claudia Bermudez RD office: [phone]   Cell# [phone] **  55 M with pmh HLD, dm, htn presents with 8 days onset of covid symptoms which included, cough, fevers, sob, hypoxia, fevers, diarrhea, chills and myalgias. tested positive 7/15.  Patient was unvaccinated. He thinks he got it going to [facility] dance [facility] week of July 4 th.  Wife also had covid but her case was mild.  Admitted for Hypoxia to  on 7/21 and transferred to the ICU on 7/24 for HF NC O2.        *Pt is on CPAP due breathing status, mostly not eating; able to be on HIFLOW and eat 1 meal per day. Pt on PPN x 11 days not meeting ENN suggest PICC line placement for TPN (meet 100% of ENN)  Unstable POCT with range of 176-200mg/dl Lantus 5 QHS and RISS.        *Labs reviewed no significant changes at this time. Will continue with current TPN order.   08-30    138  |  100  |  20  ----------------------------<  179<H>  3.5   |  35<H>  |  0.68    Ca    8.5      30 Aug 2021 07:04  Phos  3.3     08-30  Mg     2.0     08-30    TPro  6.1  /  Alb  2.3<L>  /  TBili  0.6  /  DBili  x   /  AST  24  /  ALT  46  /  AlkPhos  81  08-28  BMI: BMI (kg/m2): 32.2 (07-21-21 @ 22:00)  HbA1c: A1C with Estimated Average Glucose Result: 11.6 % (07-22-21 @ 07:57)    Glucose: POCT Blood Glucose.: 200 mg/dL (08-29-21 @ 22:28)    BP: 113/72 (08-30-21 @ 08:00) (93/60 - 150/95)  Lipid Panel: Date/Time: 08-28-21 @ 09:29  Cholesterol, Serum: --  Direct LDL: --  HDL Cholesterol, Serum: --  Total Cholesterol/HDL Ration Measurement: --  Triglycerides, Serum: 176          POCT Blood Glucose.: 200 mg/dL (29 Aug 2021 22:28)  POCT Blood Glucose.: 272 mg/dL (29 Aug 2021 18:07)  POCT Blood Glucose.: 179 mg/dL (29 Aug 2021 12:15)          I&O's Detail    29 Aug 2021 07:01  -  30 Aug 2021 07:00  --------------------------------------------------------  IN:    Dexmedetomidine: 112 mL    Fat Emulsion (Fish Oil &amp; Plant Based) 20% Infusion: 138 mL    PPN (Peripheral Parenteral Nutrition): 526 mL  Total IN: 776 mL    OUT:    Voided (mL): 1600 mL  Total OUT: 1600 mL    Total NET: -824 mL          *negative fluid status monitor and adjust IVF/PPN volume prn. Urine voided 1600 ml (weight gain noted however no recent weights obtained)       *linda score of 16; no PU documented.  Edema  1+ gen, 2+ right and left foot.  BM (+) 8/29     *continues to meet criteria for severe malnutrition*    Diet, Regular (08-20-21 @ 12:38)(allowed when on HIFLOW)    Estimated Needs: Based on 72Kg (adjusted IBW)   Calories: 4903-2605 Kcal (25-30 Kcal/Kg)  Protein:  115-130g (1.6-1.8 g/Kg)  Fluids:  9674-9506 mL (25-30 mL/Kg)    *Wt Hx: no new weights obtained since 8/24  92Kg (8/24)  82.5Kg (8/19): wt loss of ~7.9Kg in ~29days; (8.7%) clinically significant.  Height (cm): 167.6 (07-21-21 @ 14:34)  Weight (kg): 90.4 (07-21-21 @ 22:00)  BMI (kg/m2): 32.2 (07-21-21 @ 22:00)  BSA (m2): 2 (07-21-21 @ 22:00)        PPN RECOMMENDATIONS: via peripheral line  TOTAL VOLUME: 1800mL    65g Amino Acids  100g Dextrose  50g Lipids 20%   98 mEq NaCl  14 mEq NaAcetate  20 mMol NaPhos  30 mEq KCl  15 mEq KAcetate  0 mMol KPhos  8 mEq Calcium Gluconate  8 mEq Magnesium Sulfate  100 mg Thiamine  0 units Regular Insulin  1mL trace elements  10mL MVI    Total Calories: 1500Kcal (meeting ~83% of estimated calorie needs and ~57% of estimated protein needs)(osmolarity of 873)    ADDITIONAL RECOMMENDATIONS:  1) obtain triglyceride and LFT's weekly  2) daily lyte and magnesium and phos checks  3) POCT q6hrs; adjust insulin regimen prn to maintain tight glycemic control  4) strict I/O's  5) daily wt checks to track/trend changes.  6) place PICC line for TPN (PPN not meeting Estimated Energy/protein needs x 11 days ) Continue with po diet as ordered (regular)           *will continue to monitor and adjust prn daily*  Claudia Bermudez RD office: [phone]   Cell# [phone]

## 2021-08-30 NOTE — PROGRESS NOTE ADULT - SUBJECTIVE AND OBJECTIVE BOX
Patient is a 55y old  Male who presents with a chief complaint of cough/sob (30 Aug 2021 13:59)      BRIEF HOSPITAL COURSE:   55M with PMHx HLD, HTN, DM admitted with cough, SOB, hypoxia, COVID+. Pt unvaccinated. Progressive hypoxic respiratory failure requiring escalation to HFNC. Complicated by ARDS. sp Actemra, sp Remdesivir.     Events last 24 hours: afebrile hemodynamics stable, has been able to stay off CPAP and tolerate HFNC without NRB addition, currently on 80%/60L comfortable, tolerating diet now, remains on low dose precedex for comfort and anxiety suppression       PAST MEDICAL & SURGICAL HISTORY:  HTN (hypertension)    Diabetes    Hosp day #40d      Vital signs / Reviewed and Physical Exam Performed where pertinent and urgently required    Lab / Radiology  studies / ABG / Meds -  reviewed and interpreted into the assessment and treatment plan.    I&O's Summary    29 Aug 2021 07:01  -  30 Aug 2021 07:00  --------------------------------------------------------  IN: 776 mL / OUT: 1600 mL / NET: -824 mL    30 Aug 2021 07:01  -  30 Aug 2021 22:49  --------------------------------------------------------  IN: 1135 mL / OUT: 1300 mL / NET: -165 mL      Impression:  1. COVID-19 Viral PNA  2. ARDS  3. acute hypoxemic respiratory failure  4. anemia  5. diabetes mellitus/hyperglycemia  6. severe protein malnutrition  7. essential HTN  8. anxiety  9. hyperlipidemia      Plan:  Neuro - nonfocal, cont precedex for pt comfort, melatonin for sleep hygiene, xanax standing for anxiolysis    CV -  BP better controlled          cont cozaar and norvasc at current doses          cont high intensity statin          long-term goal BP<130/80    Pulm -  cont HFNC will attempt at HS tonight, keep flow at 60L for alveolar recruitment              CPAP at bedside on backup if needed             cont to attempt to actively titrate FiO2 to keep sats>86%             cont stress steroids             standing inhaled steroids (symbicort), PRN bronchodialators             cont to remain high risk for decompensation    GI -  PPI,  PO diet as tolerates when on HFNC, encouragement for glucerna shake supplementation as tolerates, cont PPN for now, PO intake          improved, check calorie count for 24hours if adequate will d/c PPN    Renal - Cr stable, avoid nephrotoxins, strict I/Os, BMP in am    Heme -  chem DVT with lovenox, repeat LE duplex negative,  no signs of bleeding, SCDs, transfuse for Hbg<7 or signs of                 active bleeding.    ID - remains off abx, afebrile,  cont stress steroid taper, Abx addition based on discussion with ID in conjunction with clinical features         and culture data    Endo -  Aggressive glycemic control to limit FS glucose to < 180mg/dl, A1c 11.6, BS intermittently >200, no hypoglycemia on current time baed               lantus, increase to 8Units, cont moderate ISS coverage scale, further adjustments based on BS ongoing.    COVID-19 specific considerations and therapeutic  options based on the available and rapidly changing literature.    35 minutes of critical care time spent in the management of this critically ill COVID-19 patient with continuous assessments and interventions based on the interpretation of multiple databases.

## 2021-08-30 NOTE — PROGRESS NOTE ADULT - SUBJECTIVE AND OBJECTIVE BOX
HPI:     55 M with pmh HLD, dm, htn presents with 8 days onset of covid symptoms which included, cough, fevers, sob, hypoxia, fevers, diarrhea, chills and myalgias. tested positive 7/15.  Patient was unvaccinated. He thinks he got it going to [facility] week of July 4 th.  Wife also had covid but her case was mild.  Admitted for Hypoxia to  on 7/21 and transferred to the ICU on 7/24 for HF NC O2.      7/26: Patient seen in bed, on HF NC O2 100% and 50L, along with a 100% NRB mask  7/27: Patient remains on HF NC O2 100% 50L as well as a %  7/28: He remains on 100% and 50 L HF NC and 100% NRB    7/29: Patient remains on 100% 50 L NF NC and 100% NRB  7/30: Patient now down to 80% FI O2 and 50L on HF NC, WBC rising.    7/31: Patient is on 75 % Fi O2 now, WBC remains elevated    8/23: Patient seen in bed and he is doing poorly.  He has an O2 sat in the 70s on HF NC along with 100% NRB.  O2 saturation was in the 80 while on NIV.  WBC slightly up.  Patient far weaker than the last time i saw him.    8/24: He continues to do poorly.  He was changed over to HF NC and was not able to tolerate it due to low O2 sats.    8/25: WBC down today, feeling better today, taking PO, saturating better on CPAP  8/26: He continues on CPAP and HF NC to eat.  Fi O2 is down to 80% and maintaining an O2 sat in the 90s  8/28: On CPP 18 Fi O2 70% now  8/29: He remains on CPAP 10 70%  8/30: Patient has been doing better on HF NC during the day.  Fi O2 to 80 and O2 sat has been in the low 90.  He is still using CPAP over night, Taking PO again    PAST MEDICAL/SURGICAL/FAMILY/SOCIAL HISTORY:    Past Medical History:  Diabetes    HTN (hypertension).  No surgeries     Tobacco Usage:  · Tobacco Usage	non smoker  Fhx: none (21 Jul 2021 19:19)       PAST MEDICAL & SURGICAL HISTORY:  HTN (hypertension)    Diabetes        FAMILY HISTORY:      Social Hx:    Allergies    No Known Allergies    Intolerances            ICU Vital Signs Last 24 Hrs  T(C): 36.7 (30 Aug 2021 05:00), Max: 36.7 (29 Aug 2021 22:00)  T(F): 98 (30 Aug 2021 05:00), Max: 98 (29 Aug 2021 22:00)  HR: 95 (30 Aug 2021 13:00) (56 - 95)  BP: 112/59 (30 Aug 2021 13:00) (100/68 - 139/51)  BP(mean): 71 (30 Aug 2021 13:00) (61 - 97)  ABP: --  ABP(mean): --  RR: 28 (30 Aug 2021 13:00) (20 - 32)  SpO2: 88% (30 Aug 2021 13:00) (80% - 100%)          I&O's Summary    29 Aug 2021 07:01  -  30 Aug 2021 07:00  --------------------------------------------------------  IN: 776 mL / OUT: 1600 mL / NET: -824 mL                              7.5    11.23 )-----------( 248      ( 30 Aug 2021 07:04 )             23.6       08-30    138  |  100  |  20  ----------------------------<  179<H>  3.5   |  35<H>  |  0.68    Ca    8.5      30 Aug 2021 07:04  Phos  3.3     08-30  Mg     2.0     08-30                      MEDICATIONS  (STANDING):  amLODIPine   Tablet 5 milliGRAM(s) Oral daily  aspirin  chewable 81 milliGRAM(s) Oral daily  atorvastatin 80 milliGRAM(s) Oral at bedtime  budesonide 160 MICROgram(s)/formoterol 4.5 MICROgram(s) Inhaler 2 Puff(s) Inhalation two times a day  cholecalciferol 2000 Unit(s) Oral daily  dexMEDEtomidine Infusion 0.2 MICROgram(s)/kG/Hr (4.52 mL/Hr) IV Continuous <Continuous>  dextrose 40% Gel 15 Gram(s) Oral once  dextrose 5%. 1000 milliLiter(s) (50 mL/Hr) IV Continuous <Continuous>  dextrose 5%. 1000 milliLiter(s) (100 mL/Hr) IV Continuous <Continuous>  dextrose 50% Injectable 25 Gram(s) IV Push once  dextrose 50% Injectable 12.5 Gram(s) IV Push once  dextrose 50% Injectable 25 Gram(s) IV Push once  enoxaparin Injectable 40 milliGRAM(s) SubCutaneous every 12 hours  fat emulsion (Fish Oil and Plant Based) 20% Infusion 20.8 mL/Hr (20.8 mL/Hr) IV Continuous <Continuous>  fat emulsion (Fish Oil and Plant Based) 20% Infusion 20.83 mL/Hr (20.8 mL/Hr) IV Continuous <Continuous>  fat emulsion (Fish Oil and Plant Based) 20% Infusion 0.55 Gm/kG/Day (20.83 mL/Hr) IV Continuous <Continuous>  glucagon  Injectable 1 milliGRAM(s) IntraMuscular once  insulin glargine Injectable (LANTUS) 5 Unit(s) SubCutaneous <User Schedule>  insulin lispro (ADMELOG) corrective regimen sliding scale   SubCutaneous three times a day before meals  insulin lispro (ADMELOG) corrective regimen sliding scale   SubCutaneous at bedtime  losartan 75 milliGRAM(s) Oral daily  pantoprazole  Injectable 40 milliGRAM(s) IV Push daily  Parenteral Nutrition - Adult 1 Each (75 mL/Hr) TPN Continuous <Continuous>  Parenteral Nutrition - Adult 1 Each (75 mL/Hr) TPN Continuous <Continuous>  predniSONE   Tablet 10 milliGRAM(s) Oral daily  senna 1 Tablet(s) Oral at bedtime    MEDICATIONS  (PRN):  acetaminophen   Tablet .. 650 milliGRAM(s) Oral every 4 hours PRN Temp greater or equal to 38C (100.4F), Mild Pain (1 - 3)  ALBUTerol    90 MICROgram(s) HFA Inhaler 2 Puff(s) Inhalation every 4 hours PRN Shortness of Breath and/or Wheezing  benzocaine 15 mG/menthol 3.6 mG (Sugar-Free) Lozenge 1 Lozenge Oral every 6 hours PRN Sore Throat  benzonatate 100 milliGRAM(s) Oral three times a day PRN Cough  hydrALAZINE Injectable 5 milliGRAM(s) IV Push every 6 hours PRN SBP>160  melatonin 6 milliGRAM(s) Oral at bedtime PRN Insomnia  polyethylene glycol 3350 17 Gram(s) Oral daily PRN Constipation      DVT Prophylaxis:    Advanced Directives:  Discussed with:    Visit Information: 30 min    ** Time is exclusive of billed procedures and/or teaching and/or routine family updates.

## 2021-08-30 NOTE — PROGRESS NOTE ADULT - SUBJECTIVE AND OBJECTIVE BOX
Patient is a 55y old  Male who presents with a chief complaint of cough/sob (23 Jul 2021 14:03)      HPI:  55 M with pmh HLD, dm, htn presents with 8 days onset of covid symptoms which included, cough, fevers, sob, hypoxia, fevers, diarrhea, chills and myalgias. TEsted positive 7/15. Wife endorsed he was becoming more sob of recently. On arrival hypoxic to 80s and tachypneic. NRB placed, presently on 15% and saturating 95%. Na 126 s/p 1L NS. CXR: Frandy infiltrates. Last scr 1.4 in 2019-> today 1.9 unknown if underlying ckd.  Patient not vaccinated.   Last seen by me in 2019  History of ROBERT and uncontrolled hypertension  Treated with IV Remdesivir and Decadron, now on Toci  SaO2 is 80-85% despite being on 100% NRB  ABG pending        8/24  Alternating between NIPPV and HFNC  8/25-8/26  covering for DR Arceo  remains on High Fio2 demand for ARDS post Covid pneumonia  8/27  uneventful overnight  remains on cpap setting overnight  high fio2 requirement still  o2 sat appears improved  not as labored with his breathing this am  8/28  he remains on HIGH FIO2 demand  data discussed with Intensivist Dr Espinoza and ICU RN  last cxr and all recent data discussed  repeat labs pending  8/29  data discussed with DR Espinoza  lasix, low dose stopped after second dose  remains on high fio2 but lowered 70%  8/30  covering for Dr Arceo  doing about the same  o2 sat 93-94% at bedside this am  Dr Arceo will resume in am    MEDICATIONS  (STANDING):  amLODIPine   Tablet 5 milliGRAM(s) Oral daily  aspirin  chewable 81 milliGRAM(s) Oral daily  atorvastatin 80 milliGRAM(s) Oral at bedtime  budesonide 160 MICROgram(s)/formoterol 4.5 MICROgram(s) Inhaler 2 Puff(s) Inhalation two times a day  cholecalciferol 2000 Unit(s) Oral daily  dexMEDEtomidine Infusion 0.2 MICROgram(s)/kG/Hr (4.52 mL/Hr) IV Continuous <Continuous>  dextrose 40% Gel 15 Gram(s) Oral once  dextrose 5%. 1000 milliLiter(s) (50 mL/Hr) IV Continuous <Continuous>  dextrose 5%. 1000 milliLiter(s) (100 mL/Hr) IV Continuous <Continuous>  dextrose 50% Injectable 25 Gram(s) IV Push once  dextrose 50% Injectable 12.5 Gram(s) IV Push once  dextrose 50% Injectable 25 Gram(s) IV Push once  enoxaparin Injectable 40 milliGRAM(s) SubCutaneous every 12 hours  fat emulsion (Fish Oil and Plant Based) 20% Infusion 20.8 mL/Hr (20.8 mL/Hr) IV Continuous <Continuous>  fat emulsion (Fish Oil and Plant Based) 20% Infusion 20.83 mL/Hr (20.8 mL/Hr) IV Continuous <Continuous>  glucagon  Injectable 1 milliGRAM(s) IntraMuscular once  insulin glargine Injectable (LANTUS) 5 Unit(s) SubCutaneous <User Schedule>  insulin lispro (ADMELOG) corrective regimen sliding scale   SubCutaneous three times a day before meals  insulin lispro (ADMELOG) corrective regimen sliding scale   SubCutaneous at bedtime  losartan 75 milliGRAM(s) Oral daily  pantoprazole  Injectable 40 milliGRAM(s) IV Push daily  Parenteral Nutrition - Adult 1 Each (75 mL/Hr) TPN Continuous <Continuous>  potassium chloride    Tablet ER 10 milliEquivalent(s) Oral daily  predniSONE   Tablet 10 milliGRAM(s) Oral daily  senna 1 Tablet(s) Oral at bedtime      MEDICATIONS  (PRN):  acetaminophen   Tablet .. 650 milliGRAM(s) Oral every 4 hours PRN Temp greater or equal to 38C (100.4F), Mild Pain (1 - 3)  ALBUTerol    90 MICROgram(s) HFA Inhaler 2 Puff(s) Inhalation every 4 hours PRN Shortness of Breath and/or Wheezing  benzocaine 15 mG/menthol 3.6 mG (Sugar-Free) Lozenge 1 Lozenge Oral every 6 hours PRN Sore Throat  benzonatate 100 milliGRAM(s) Oral three times a day PRN Cough  hydrALAZINE Injectable 5 milliGRAM(s) IV Push every 6 hours PRN SBP>160  hydrocodone/homatropine Syrup 5 milliLiter(s) Oral every 6 hours PRN Cough  LORazepam   Injectable 0.5 milliGRAM(s) IV Push every 6 hours PRN Agitation  melatonin 6 milliGRAM(s) Oral at bedtime PRN Insomnia  morphine  - Injectable 2 milliGRAM(s) IV Push every 2 hours PRN SOB/Anxiety  ICU Vital Signs Last 24 Hrs  T(C): 36.7 (30 Aug 2021 05:00), Max: 36.7 (29 Aug 2021 22:00)  T(F): 98 (30 Aug 2021 05:00), Max: 98 (29 Aug 2021 22:00)  HR: 62 (30 Aug 2021 08:00) (56 - 93)  BP: 113/72 (30 Aug 2021 08:00) (100/68 - 136/81)  BP(mean): 81 (30 Aug 2021 08:00) (61 - 93)  ABP: --  ABP(mean): --  RR: 28 (30 Aug 2021 08:00) (20 - 32)  SpO2: 94% (30 Aug 2021 08:00) (80% - 100%)    I&O's Detail    29 Aug 2021 07:01  -  30 Aug 2021 07:00  --------------------------------------------------------  IN:    Dexmedetomidine: 112 mL    Fat Emulsion (Fish Oil &amp; Plant Based) 20% Infusion: 138 mL    PPN (Peripheral Parenteral Nutrition): 526 mL  Total IN: 776 mL    OUT:    Voided (mL): 1600 mL  Total OUT: 1600 mL    Total NET: -824 mL              PHYSICAL EXAM  General Appearance: On CPAP, increased work of breathing- improved  Lungs: coarse bilaterally  Heart: +S1,S2  Abdomen: Soft, non-tender, bowel sounds active   Extremities: no cyanosis or edema, no joint swelling  Skin: Skin color, texture normal, no rashes   Neurologic: Alert and oriented X3 , non focal, not disoriented    ECG:    LABS:                          7.5    11.23 )-----------( 248      ( 30 Aug 2021 07:04 )             23.6   08-30    138  |  100  |  20  ----------------------------<  179<H>  3.5   |  35<H>  |  0.68    Ca    8.5      30 Aug 2021 07:04  Phos  3.3     08-30  Mg     2.0     08-30    TPro  6.1  /  Alb  2.3<L>  /  TBili  0.6  /  DBili  x   /  AST  24  /  ALT  46  /  AlkPhos  81  08-28      Pro BNP 2446  repeat              08-26    136  |  102  |  18  ----------------------------<  87  3.7   |  34<H>  |  0.56    Ca    8.3<L>      26 Aug 2021 07:03  Phos  3.6     08-26  Mg     2.0     08-26    TPro  5.5<L>  /  Alb  2.1<L>  /  TBili  0.5  /  DBili  x   /  AST  23  /  ALT  51  /  AlkPhos  79  08-26                          8.0    11.96 )-----------( 278      ( 26 Aug 2021 07:03 )             26.1   08-26    136  |  102  |  18  ----------------------------<  87  3.7   |  34<H>  |  0.56    Ca    8.3<L>      26 Aug 2021 07:03  Phos  3.6     08-26  Mg     2.0     08-26    TPro  5.5<L>  /  Alb  2.1<L>  /  TBili  0.5  /  DBili  x   /  AST  23  /  ALT  51  /  AlkPhos  79  08-26                          8.5    12.89 )-----------( 269      ( 25 Aug 2021 07:10 )             26.7   08-25    138  |  103  |  20  ----------------------------<  143<H>  3.7   |  32<H>  |  0.58    Ca    8.1<L>      25 Aug 2021 07:10  Phos  3.7     08-25  Mg     2.0     08-25    TPro  5.6<L>  /  Alb  2.2<L>  /  TBili  0.5  /  DBili  x   /  AST  32  /  ALT  74  /  AlkPhos  103  08-24                          8.5    14.05 )-----------( 292      ( 18 Aug 2021 07:01 )             26.2   08-18    138  |  104  |  24<H>  ----------------------------<  78  4.1   |  29  |  1.03    Ca    8.8      18 Aug 2021 07:01    TPro  6.3  /  Alb  2.3<L>  /  TBili  0.6  /  DBili  x   /  AST  32  /  ALT  73  /  AlkPhos  168<H>  08-17                                           10.0   19.05 )-----------( 179      ( 08 Aug 2021 07:11 )             30.3   08-08    137  |  102  |  27<H>  ----------------------------<  121<H>  3.9   |  29  |  0.77    Ca    8.4<L>      08 Aug 2021 07:11  Phos  3.4     08-08  Mg     2.0     08-08                  10.0   19.05 )-----------( 179      ( 08 Aug 2021 07:11 )             30.3   08-08    137  |  102  |  27<H>  ----------------------------<  121<H>  3.9   |  29  |  0.77    Ca    8.4<L>      08 Aug 2021 07:11  Phos  3.4     08-08  Mg     2.0     08-08                 10.4   17.19 )-----------( 198      ( 07 Aug 2021 07:06 )             32.3   08-07    138  |  104  |  31<H>  ----------------------------<  107<H>  4.4   |  29  |  0.77    Ca    8.2<L>      07 Aug 2021 07:06  Phos  4.6     08-07  Mg     2.0     08-07    TPro  5.2<L>  /  Alb  2.3<L>  /  TBili  0.8  /  DBili  x   /  AST  81<H>  /  ALT  213<H>  /  AlkPhos  200<H>  08-06                            12.9   15.00 )-----------( 366      ( 26 Jul 2021 06:20 )             38.4   07-26    133<L>  |  104  |  37<H>  ----------------------------<  232<H>  4.2   |  23  |  1.17    Ca    8.4<L>      26 Jul 2021 06:20    TPro  6.3  /  Alb  2.2<L>  /  TBili  0.4  /  DBili  0.1  /  AST  103<H>  /  ALT  137<H>  /  AlkPhos  239<H>  07-26                          13.2   13.52 )-----------( 338      ( 24 Jul 2021 08:06 )             39.5     07-24    136  |  105  |  43<H>  ----------------------------<  227<H>  4.3   |  25  |  1.31<H>    Ca    8.7      24 Jul 2021 08:06    TPro  6.6  /  Alb  2.3<L>  /  TBili  0.4  /  DBili  x   /  AST  73<H>  /  ALT  64  /  AlkPhos  165<H>  07-24          Pro BNP  -- 07-24 @ 08:06  D Dimer  446 07-24 @ 08:06  Pro BNP  261 07-21 @ 14:42  D Dimer  460 07-21 @ 14:42              < from: Xray Chest 1 View- PORTABLE-Urgent (07.21.21 @ 15:33) >    PROCEDURE DATE:  07/21/2021          INTERPRETATION:  AP chest on July 21, 2021 at 3:27 PM. Patient is short of breath with cough and fever.    Heart magnified by technique.    There arescattered mid lower lung field infiltrates right greater than left possibly related: Pneumonia.    The lungs are clear on August 30, 2019.    IMPRESSION: Bilateral infiltrates as above.    < end of copied text >  < from: US Duplex Venous Lower Ext Complete, Bilateral (07.25.21 @ 08:42) >  COMPARISON: None available.    TECHNIQUE: Duplex sonography of the BILATERAL LOWER extremity veins with color and spectral Doppler, with and without compression.    FINDINGS:    RIGHT:  Normal compressibility of the RIGHT common femoral, femoral and popliteal veins.  Doppler examination shows normal spontaneous and phasic flow.  No RIGHT calf vein thrombosis is detected.    LEFT:  Normal compressibility of the LEFT common femoral, femoral and popliteal veins.  Doppler examination shows normal spontaneous and phasic flow.  No LEFT calf vein thrombosis is detected.    IMPRESSION:  No evidence of deep venous thrombosis in either lower extremity.    < end of copied text >  RADIOLOGY & ADDITIONAL STUDIES:    < from: Xray Chest 1 View- PORTABLE-Urgent (Xray Chest 1 View- PORTABLE-Urgent .) (07.26.21 @ 08:43) >    PROCEDURE DATE:  07/26/2021          INTERPRETATION:  Portable chest radiograph    CLINICAL INFORMATION: Pneumonia due to Covid 19.. Follow-up    TECHNIQUE:  Portable  AP view of the chest was obtained.    COMPARISON: 7/21/2021 chest available for review.    FINDINGS:    The lungs show stable bilateral  multifocal and diffuse ill-defined airspace opacities.. No pneumothorax.    The  heart is enlarged in transverse diameter. No hilar mass.     Visualized osseous structures are intact.    IMPRESSION:   Stable bilateral  multifocal and diffuse ill-defined airspace opacities..    < end of copied text >  < from: US Duplex Venous Lower Ext Complete, Bilateral (08.06.21 @ 17:16) >  FINDINGS:    RIGHT:  Normal compressibility of the RIGHT common femoral, femoral and popliteal veins.  Doppler examination shows normal spontaneous and phasic flow.  No RIGHT calf vein thrombosis is detected.    LEFT:  Normal compressibility of the LEFT common femoral, femoral and popliteal veins.  Doppler examination shows normal spontaneous and phasic flow.  No LEFT calf vein thrombosisis detected.    IMPRESSION:  No evidence of deep venous thrombosis in either lower extremity.    < end of copied text >  < from: Xray Chest 1 View- PORTABLE-Urgent (Xray Chest 1 View- PORTABLE-Urgent .) (08.06.21 @ 16:45) >  INTERPRETATION:  Portable chest radiograph    CLINICAL INFORMATION: Pneumonia due to Covid 19.    TECHNIQUE:  Portable  AP view of the chest was obtained.    COMPARISON: 8/1/2021 chest radiograph available for review.    FINDINGS:    The lungs show decreasing bilateral diffuse airspace disease.. No pneumothorax.    The  heart is mildly enlarged in transverse diameter. No hilar mass.   Visualized osseous structures are intact.    IMPRESSION:   Decreasing bilateral diffuse airspace disease..    < end of copied text >  xr< from: US Duplex Venous Lower Ext Complete, Bilateral (08.17.21 @ 08:58) >  COMPARISON: None available.    TECHNIQUE: Duplex sonography of the BILATERAL LOWER extremity veins with color and spectral Doppler, with and without compression.    FINDINGS:    RIGHT:  Normal compressibility of the RIGHT common femoral, femoral and popliteal veins.  Doppler examination shows normal spontaneous and phasic flow.  No RIGHT calf vein thrombosis is detected.    LEFT:  Normal compressibility of the LEFT common femoral, femoral and popliteal veins.  Doppler examination shows normal spontaneous and phasic flow.  No LEFT calf vein thrombosis is detected.    IMPRESSION:  No evidence of deep venous thrombosis in either lower extremity.    < end of copied text >  r< from: US Duplex Venous Lower Ext Complete, Bilateral (08.23.21 @ 12:53) >  PROCEDURE DATE:  08/23/2021          INTERPRETATION:  CLINICAL INFORMATION: 55 years  Male with r/o DVT    evaluate for DVT. Covid positive. Elevated d-dimer.    COMPARISON: None available.    TECHNIQUE: Duplex sonography of the BILATERAL LOWER extremity veins with color and spectral Doppler, with and without compression.    FINDINGS:    RIGHT:  Normal compressibility of the RIGHT common femoral, femoral and popliteal veins.  Doppler examination shows normal spontaneous and phasic flow.  No RIGHT calf vein thrombosis is detected.    LEFT:  Normal compressibility of the LEFT common femoral, femoral and popliteal veins.  Doppler examination shows normal spontaneous and phasic flow.  No LEFT calf vein thrombosis is detected.    IMPRESSION:  No evidence of deep venous thrombosis in either lower extremity.    < end of copied text >  < from: Xray Chest 1 View- PORTABLE-Urgent (Xray Chest 1 View- PORTABLE-Urgent .) (08.23.21 @ 11:06) >    PROCEDURE DATE:  08/23/2021          INTERPRETATION:  History: Dyspnea, Covid    Chest:  one view.    Comparison: 08/20/2021    AP radiograph of the chest demonstrates moderate diffuse alveolar infiltrates unchanged. The cardiac silhouette is normal in size. Osseous structures are intact.    Impression:moderate diffuse alveolar infiltrates unchanged.    < end of copied text >  r< from: Xray Chest 1 View- PORTABLE-Urgent (Xray Chest 1 View- PORTABLE-Urgent .) (08.23.21 @ 11:06) >  PROCEDURE DATE:  08/23/2021          INTERPRETATION:  History: Dyspnea, Covid    Chest:  one view.    Comparison: 08/20/2021    AP radiograph of the chest demonstrates moderate diffuse alveolar infiltrates unchanged. The cardiac silhouette is normal in size. Osseous structures are intact.    Impression:moderate diffuse alveolar infiltrates unchanged.    < end of copied text >

## 2021-08-31 LAB
ANION GAP SERPL CALC-SCNC: 4 MMOL/L — LOW (ref 5–17)
BUN SERPL-MCNC: 16 MG/DL — SIGNIFICANT CHANGE UP (ref 7–23)
CALCIUM SERPL-MCNC: 8.5 MG/DL — SIGNIFICANT CHANGE UP (ref 8.5–10.1)
CHLORIDE SERPL-SCNC: 103 MMOL/L — SIGNIFICANT CHANGE UP (ref 96–108)
CO2 SERPL-SCNC: 31 MMOL/L — SIGNIFICANT CHANGE UP (ref 22–31)
CREAT SERPL-MCNC: 0.68 MG/DL — SIGNIFICANT CHANGE UP (ref 0.5–1.3)
FUNGITELL: SIGNIFICANT CHANGE UP PG/ML
GLUCOSE SERPL-MCNC: 158 MG/DL — HIGH (ref 70–99)
MAGNESIUM SERPL-MCNC: 2 MG/DL — SIGNIFICANT CHANGE UP (ref 1.6–2.6)
PHOSPHATE SERPL-MCNC: 4 MG/DL — SIGNIFICANT CHANGE UP (ref 2.5–4.5)
POTASSIUM SERPL-MCNC: 3.9 MMOL/L — SIGNIFICANT CHANGE UP (ref 3.5–5.3)
POTASSIUM SERPL-SCNC: 3.9 MMOL/L — SIGNIFICANT CHANGE UP (ref 3.5–5.3)
SODIUM SERPL-SCNC: 138 MMOL/L — SIGNIFICANT CHANGE UP (ref 135–145)
TRIGL SERPL-MCNC: 202 MG/DL — HIGH

## 2021-08-31 PROCEDURE — 99233 SBSQ HOSP IP/OBS HIGH 50: CPT

## 2021-08-31 RX ORDER — ALPRAZOLAM 0.25 MG
0.5 TABLET ORAL EVERY 6 HOURS
Refills: 0 | Status: DISCONTINUED | OUTPATIENT
Start: 2021-08-31 | End: 2021-09-07

## 2021-08-31 RX ORDER — I.V. FAT EMULSION 20 G/100ML
0.55 EMULSION INTRAVENOUS
Qty: 50 | Refills: 0 | Status: DISCONTINUED | OUTPATIENT
Start: 2021-08-31 | End: 2021-08-31

## 2021-08-31 RX ORDER — ELECTROLYTE SOLUTION,INJ
1 VIAL (ML) INTRAVENOUS
Refills: 0 | Status: DISCONTINUED | OUTPATIENT
Start: 2021-08-31 | End: 2021-08-31

## 2021-08-31 RX ADMIN — Medication 2000 UNIT(S): at 10:22

## 2021-08-31 RX ADMIN — DEXMEDETOMIDINE HYDROCHLORIDE IN 0.9% SODIUM CHLORIDE 4.52 MICROGRAM(S)/KG/HR: 4 INJECTION INTRAVENOUS at 22:26

## 2021-08-31 RX ADMIN — Medication 0.5 MILLIGRAM(S): at 23:27

## 2021-08-31 RX ADMIN — BUDESONIDE AND FORMOTEROL FUMARATE DIHYDRATE 2 PUFF(S): 160; 4.5 AEROSOL RESPIRATORY (INHALATION) at 20:35

## 2021-08-31 RX ADMIN — Medication 1 EACH: at 22:25

## 2021-08-31 RX ADMIN — BUDESONIDE AND FORMOTEROL FUMARATE DIHYDRATE 2 PUFF(S): 160; 4.5 AEROSOL RESPIRATORY (INHALATION) at 10:03

## 2021-08-31 RX ADMIN — ENOXAPARIN SODIUM 40 MILLIGRAM(S): 100 INJECTION SUBCUTANEOUS at 22:26

## 2021-08-31 RX ADMIN — INSULIN GLARGINE 8 UNIT(S): 100 INJECTION, SOLUTION SUBCUTANEOUS at 12:51

## 2021-08-31 RX ADMIN — Medication 1: at 12:52

## 2021-08-31 RX ADMIN — ATORVASTATIN CALCIUM 80 MILLIGRAM(S): 80 TABLET, FILM COATED ORAL at 22:26

## 2021-08-31 RX ADMIN — LOSARTAN POTASSIUM 75 MILLIGRAM(S): 100 TABLET, FILM COATED ORAL at 10:23

## 2021-08-31 RX ADMIN — Medication 1: at 10:21

## 2021-08-31 RX ADMIN — ENOXAPARIN SODIUM 40 MILLIGRAM(S): 100 INJECTION SUBCUTANEOUS at 10:24

## 2021-08-31 RX ADMIN — SENNA PLUS 1 TABLET(S): 8.6 TABLET ORAL at 22:26

## 2021-08-31 RX ADMIN — I.V. FAT EMULSION 20.83 GM/KG/DAY: 20 EMULSION INTRAVENOUS at 22:25

## 2021-08-31 RX ADMIN — Medication 2: at 17:28

## 2021-08-31 RX ADMIN — Medication 81 MILLIGRAM(S): at 10:23

## 2021-08-31 RX ADMIN — AMLODIPINE BESYLATE 5 MILLIGRAM(S): 2.5 TABLET ORAL at 10:22

## 2021-08-31 RX ADMIN — DEXMEDETOMIDINE HYDROCHLORIDE IN 0.9% SODIUM CHLORIDE 4.52 MICROGRAM(S)/KG/HR: 4 INJECTION INTRAVENOUS at 04:43

## 2021-08-31 RX ADMIN — Medication 10 MILLIGRAM(S): at 10:23

## 2021-08-31 RX ADMIN — PANTOPRAZOLE SODIUM 40 MILLIGRAM(S): 20 TABLET, DELAYED RELEASE ORAL at 10:23

## 2021-08-31 NOTE — PROGRESS NOTE ADULT - SUBJECTIVE AND OBJECTIVE BOX
Patient is a 55y old  Male who presents with a chief complaint of cough/sob (23 Jul 2021 14:03)      HPI:  55 M with pmh HLD, dm, htn presents with 8 days onset of covid symptoms which included, cough, fevers, sob, hypoxia, fevers, diarrhea, chills and myalgias. TEsted positive 7/15. Wife endorsed he was becoming more sob of recently. On arrival hypoxic to 80s and tachypneic. NRB placed, presently on 15% and saturating 95%. Na 126 s/p 1L NS. CXR: Frandy infiltrates. Last scr 1.4 in 2019-> today 1.9 unknown if underlying ckd.  Patient not vaccinated.   Last seen by me in 2019  History of ROBERT and uncontrolled hypertension  Treated with IV Remdesivir and Decadron, now on Toci  SaO2 is 80-85% despite being on 100% NRB  ABG pending        8/31  Patient out of bed  No acute pulmonary events occurred overnight  Using HFNC    MEDICATIONS  (STANDING):  amLODIPine   Tablet 5 milliGRAM(s) Oral daily  aspirin  chewable 81 milliGRAM(s) Oral daily  atorvastatin 80 milliGRAM(s) Oral at bedtime  budesonide 160 MICROgram(s)/formoterol 4.5 MICROgram(s) Inhaler 2 Puff(s) Inhalation two times a day  cholecalciferol 2000 Unit(s) Oral daily  dexMEDEtomidine Infusion 0.2 MICROgram(s)/kG/Hr (4.52 mL/Hr) IV Continuous <Continuous>  dextrose 40% Gel 15 Gram(s) Oral once  dextrose 5%. 1000 milliLiter(s) (50 mL/Hr) IV Continuous <Continuous>  dextrose 5%. 1000 milliLiter(s) (100 mL/Hr) IV Continuous <Continuous>  dextrose 50% Injectable 25 Gram(s) IV Push once  dextrose 50% Injectable 12.5 Gram(s) IV Push once  dextrose 50% Injectable 25 Gram(s) IV Push once  enoxaparin Injectable 40 milliGRAM(s) SubCutaneous every 12 hours  fat emulsion (Fish Oil and Plant Based) 20% Infusion 20.8 mL/Hr (20.8 mL/Hr) IV Continuous <Continuous>  fat emulsion (Fish Oil and Plant Based) 20% Infusion 20.83 mL/Hr (20.8 mL/Hr) IV Continuous <Continuous>  glucagon  Injectable 1 milliGRAM(s) IntraMuscular once  insulin glargine Injectable (LANTUS) 5 Unit(s) SubCutaneous <User Schedule>  insulin lispro (ADMELOG) corrective regimen sliding scale   SubCutaneous three times a day before meals  insulin lispro (ADMELOG) corrective regimen sliding scale   SubCutaneous at bedtime  losartan 75 milliGRAM(s) Oral daily  pantoprazole  Injectable 40 milliGRAM(s) IV Push daily  Parenteral Nutrition - Adult 1 Each (75 mL/Hr) TPN Continuous <Continuous>  potassium chloride    Tablet ER 10 milliEquivalent(s) Oral daily  predniSONE   Tablet 10 milliGRAM(s) Oral daily  senna 1 Tablet(s) Oral at bedtime      MEDICATIONS  (PRN):  acetaminophen   Tablet .. 650 milliGRAM(s) Oral every 4 hours PRN Temp greater or equal to 38C (100.4F), Mild Pain (1 - 3)  ALBUTerol    90 MICROgram(s) HFA Inhaler 2 Puff(s) Inhalation every 4 hours PRN Shortness of Breath and/or Wheezing  benzocaine 15 mG/menthol 3.6 mG (Sugar-Free) Lozenge 1 Lozenge Oral every 6 hours PRN Sore Throat  benzonatate 100 milliGRAM(s) Oral three times a day PRN Cough  hydrALAZINE Injectable 5 milliGRAM(s) IV Push every 6 hours PRN SBP>160  hydrocodone/homatropine Syrup 5 milliLiter(s) Oral every 6 hours PRN Cough  LORazepam   Injectable 0.5 milliGRAM(s) IV Push every 6 hours PRN Agitation  melatonin 6 milliGRAM(s) Oral at bedtime PRN Insomnia  morphine  - Injectable 2 milliGRAM(s) IV Push every 2 hours PRN SOB/Anxiety      Vital Signs Last 24 Hrs  T(C): 36.9 (31 Aug 2021 09:00), Max: 36.9 (31 Aug 2021 09:00)  T(F): 98.4 (31 Aug 2021 09:00), Max: 98.4 (31 Aug 2021 09:00)  HR: 87 (31 Aug 2021 12:00) (61 - 105)  BP: 123/81 (31 Aug 2021 12:00) (108/76 - 136/75)  BP(mean): 89 (31 Aug 2021 12:00) (81 - 96)  RR: 19 (31 Aug 2021 12:00) (10 - 33)  SpO2: 82% (31 Aug 2021 12:00) (80% - 100%)    I&O's Detail    29 Aug 2021 07:01  -  30 Aug 2021 07:00  --------------------------------------------------------  IN:    Dexmedetomidine: 112 mL    Fat Emulsion (Fish Oil &amp; Plant Based) 20% Infusion: 138 mL    PPN (Peripheral Parenteral Nutrition): 526 mL  Total IN: 776 mL    OUT:    Voided (mL): 1600 mL  Total OUT: 1600 mL    Total NET: -824 mL              PHYSICAL EXAM  General Appearance: On CPAP, increased work of breathing- improved  Lungs: coarse bilaterally  Heart: +S1,S2  Abdomen: Soft, non-tender, bowel sounds active   Extremities: no cyanosis or edema, no joint swelling  Skin: Skin color, texture normal, no rashes   Neurologic: Alert and oriented X3 , non focal, not disoriented    ECG:    LABS:                          7.5    11.23 )-----------( 248      ( 30 Aug 2021 07:04 )             23.6   08-30    138  |  100  |  20  ----------------------------<  179<H>  3.5   |  35<H>  |  0.68    Ca    8.5      30 Aug 2021 07:04  Phos  3.3     08-30  Mg     2.0     08-30    TPro  6.1  /  Alb  2.3<L>  /  TBili  0.6  /  DBili  x   /  AST  24  /  ALT  46  /  AlkPhos  81  08-28      Pro BNP 2446  repeat              08-26    136  |  102  |  18  ----------------------------<  87  3.7   |  34<H>  |  0.56    Ca    8.3<L>      26 Aug 2021 07:03  Phos  3.6     08-26  Mg     2.0     08-26    TPro  5.5<L>  /  Alb  2.1<L>  /  TBili  0.5  /  DBili  x   /  AST  23  /  ALT  51  /  AlkPhos  79  08-26                          8.0    11.96 )-----------( 278      ( 26 Aug 2021 07:03 )             26.1   08-26    136  |  102  |  18  ----------------------------<  87  3.7   |  34<H>  |  0.56    Ca    8.3<L>      26 Aug 2021 07:03  Phos  3.6     08-26  Mg     2.0     08-26    TPro  5.5<L>  /  Alb  2.1<L>  /  TBili  0.5  /  DBili  x   /  AST  23  /  ALT  51  /  AlkPhos  79  08-26                          8.5    12.89 )-----------( 269      ( 25 Aug 2021 07:10 )             26.7   08-25    138  |  103  |  20  ----------------------------<  143<H>  3.7   |  32<H>  |  0.58    Ca    8.1<L>      25 Aug 2021 07:10  Phos  3.7     08-25  Mg     2.0     08-25    TPro  5.6<L>  /  Alb  2.2<L>  /  TBili  0.5  /  DBili  x   /  AST  32  /  ALT  74  /  AlkPhos  103  08-24                          8.5    14.05 )-----------( 292      ( 18 Aug 2021 07:01 )             26.2   08-18    138  |  104  |  24<H>  ----------------------------<  78  4.1   |  29  |  1.03    Ca    8.8      18 Aug 2021 07:01    TPro  6.3  /  Alb  2.3<L>  /  TBili  0.6  /  DBili  x   /  AST  32  /  ALT  73  /  AlkPhos  168<H>  08-17                                           10.0   19.05 )-----------( 179      ( 08 Aug 2021 07:11 )             30.3   08-08    137  |  102  |  27<H>  ----------------------------<  121<H>  3.9   |  29  |  0.77    Ca    8.4<L>      08 Aug 2021 07:11  Phos  3.4     08-08  Mg     2.0     08-08                  10.0   19.05 )-----------( 179      ( 08 Aug 2021 07:11 )             30.3   08-08    137  |  102  |  27<H>  ----------------------------<  121<H>  3.9   |  29  |  0.77    Ca    8.4<L>      08 Aug 2021 07:11  Phos  3.4     08-08  Mg     2.0     08-08                 10.4   17.19 )-----------( 198      ( 07 Aug 2021 07:06 )             32.3   08-07    138  |  104  |  31<H>  ----------------------------<  107<H>  4.4   |  29  |  0.77    Ca    8.2<L>      07 Aug 2021 07:06  Phos  4.6     08-07  Mg     2.0     08-07    TPro  5.2<L>  /  Alb  2.3<L>  /  TBili  0.8  /  DBili  x   /  AST  81<H>  /  ALT  213<H>  /  AlkPhos  200<H>  08-06                            12.9   15.00 )-----------( 366      ( 26 Jul 2021 06:20 )             38.4   07-26    133<L>  |  104  |  37<H>  ----------------------------<  232<H>  4.2   |  23  |  1.17    Ca    8.4<L>      26 Jul 2021 06:20    TPro  6.3  /  Alb  2.2<L>  /  TBili  0.4  /  DBili  0.1  /  AST  103<H>  /  ALT  137<H>  /  AlkPhos  239<H>  07-26                          13.2   13.52 )-----------( 338      ( 24 Jul 2021 08:06 )             39.5     07-24    136  |  105  |  43<H>  ----------------------------<  227<H>  4.3   |  25  |  1.31<H>    Ca    8.7      24 Jul 2021 08:06    TPro  6.6  /  Alb  2.3<L>  /  TBili  0.4  /  DBili  x   /  AST  73<H>  /  ALT  64  /  AlkPhos  165<H>  07-24          Pro BNP  -- 07-24 @ 08:06  D Dimer  446 07-24 @ 08:06  Pro BNP  261 07-21 @ 14:42  D Dimer  460 07-21 @ 14:42              < from: Xray Chest 1 View- PORTABLE-Urgent (07.21.21 @ 15:33) >    PROCEDURE DATE:  07/21/2021          INTERPRETATION:  AP chest on July 21, 2021 at 3:27 PM. Patient is short of breath with cough and fever.    Heart magnified by technique.    There arescattered mid lower lung field infiltrates right greater than left possibly related: Pneumonia.    The lungs are clear on August 30, 2019.    IMPRESSION: Bilateral infiltrates as above.    < end of copied text >  < from: US Duplex Venous Lower Ext Complete, Bilateral (07.25.21 @ 08:42) >  COMPARISON: None available.    TECHNIQUE: Duplex sonography of the BILATERAL LOWER extremity veins with color and spectral Doppler, with and without compression.    FINDINGS:    RIGHT:  Normal compressibility of the RIGHT common femoral, femoral and popliteal veins.  Doppler examination shows normal spontaneous and phasic flow.  No RIGHT calf vein thrombosis is detected.    LEFT:  Normal compressibility of the LEFT common femoral, femoral and popliteal veins.  Doppler examination shows normal spontaneous and phasic flow.  No LEFT calf vein thrombosis is detected.    IMPRESSION:  No evidence of deep venous thrombosis in either lower extremity.    < end of copied text >  RADIOLOGY & ADDITIONAL STUDIES:    < from: Xray Chest 1 View- PORTABLE-Urgent (Xray Chest 1 View- PORTABLE-Urgent .) (07.26.21 @ 08:43) >    PROCEDURE DATE:  07/26/2021          INTERPRETATION:  Portable chest radiograph    CLINICAL INFORMATION: Pneumonia due to Covid 19.. Follow-up    TECHNIQUE:  Portable  AP view of the chest was obtained.    COMPARISON: 7/21/2021 chest available for review.    FINDINGS:    The lungs show stable bilateral  multifocal and diffuse ill-defined airspace opacities.. No pneumothorax.    The  heart is enlarged in transverse diameter. No hilar mass.     Visualized osseous structures are intact.    IMPRESSION:   Stable bilateral  multifocal and diffuse ill-defined airspace opacities..    < end of copied text >  < from: US Duplex Venous Lower Ext Complete, Bilateral (08.06.21 @ 17:16) >  FINDINGS:    RIGHT:  Normal compressibility of the RIGHT common femoral, femoral and popliteal veins.  Doppler examination shows normal spontaneous and phasic flow.  No RIGHT calf vein thrombosis is detected.    LEFT:  Normal compressibility of the LEFT common femoral, femoral and popliteal veins.  Doppler examination shows normal spontaneous and phasic flow.  No LEFT calf vein thrombosisis detected.    IMPRESSION:  No evidence of deep venous thrombosis in either lower extremity.    < end of copied text >  < from: Xray Chest 1 View- PORTABLE-Urgent (Xray Chest 1 View- PORTABLE-Urgent .) (08.06.21 @ 16:45) >  INTERPRETATION:  Portable chest radiograph    CLINICAL INFORMATION: Pneumonia due to Covid 19.    TECHNIQUE:  Portable  AP view of the chest was obtained.    COMPARISON: 8/1/2021 chest radiograph available for review.    FINDINGS:    The lungs show decreasing bilateral diffuse airspace disease.. No pneumothorax.    The  heart is mildly enlarged in transverse diameter. No hilar mass.   Visualized osseous structures are intact.    IMPRESSION:   Decreasing bilateral diffuse airspace disease..    < end of copied text >  xr< from: US Duplex Venous Lower Ext Complete, Bilateral (08.17.21 @ 08:58) >  COMPARISON: None available.    TECHNIQUE: Duplex sonography of the BILATERAL LOWER extremity veins with color and spectral Doppler, with and without compression.    FINDINGS:    RIGHT:  Normal compressibility of the RIGHT common femoral, femoral and popliteal veins.  Doppler examination shows normal spontaneous and phasic flow.  No RIGHT calf vein thrombosis is detected.    LEFT:  Normal compressibility of the LEFT common femoral, femoral and popliteal veins.  Doppler examination shows normal spontaneous and phasic flow.  No LEFT calf vein thrombosis is detected.    IMPRESSION:  No evidence of deep venous thrombosis in either lower extremity.    < end of copied text >  r< from: US Duplex Venous Lower Ext Complete, Bilateral (08.23.21 @ 12:53) >  PROCEDURE DATE:  08/23/2021          INTERPRETATION:  CLINICAL INFORMATION: 55 years  Male with r/o DVT    evaluate for DVT. Covid positive. Elevated d-dimer.    COMPARISON: None available.    TECHNIQUE: Duplex sonography of the BILATERAL LOWER extremity veins with color and spectral Doppler, with and without compression.    FINDINGS:    RIGHT:  Normal compressibility of the RIGHT common femoral, femoral and popliteal veins.  Doppler examination shows normal spontaneous and phasic flow.  No RIGHT calf vein thrombosis is detected.    LEFT:  Normal compressibility of the LEFT common femoral, femoral and popliteal veins.  Doppler examination shows normal spontaneous and phasic flow.  No LEFT calf vein thrombosis is detected.    IMPRESSION:  No evidence of deep venous thrombosis in either lower extremity.    < end of copied text >  < from: Xray Chest 1 View- PORTABLE-Urgent (Xray Chest 1 View- PORTABLE-Urgent .) (08.23.21 @ 11:06) >    PROCEDURE DATE:  08/23/2021          INTERPRETATION:  History: Dyspnea, Covid    Chest:  one view.    Comparison: 08/20/2021    AP radiograph of the chest demonstrates moderate diffuse alveolar infiltrates unchanged. The cardiac silhouette is normal in size. Osseous structures are intact.    Impression:moderate diffuse alveolar infiltrates unchanged.    < end of copied text >  r< from: Xray Chest 1 View- PORTABLE-Urgent (Xray Chest 1 View- PORTABLE-Urgent .) (08.23.21 @ 11:06) >  PROCEDURE DATE:  08/23/2021          INTERPRETATION:  History: Dyspnea, Covid    Chest:  one view.    Comparison: 08/20/2021    AP radiograph of the chest demonstrates moderate diffuse alveolar infiltrates unchanged. The cardiac silhouette is normal in size. Osseous structures are intact.    Impression:moderate diffuse alveolar infiltrates unchanged.    < end of copied text >

## 2021-08-31 NOTE — PROGRESS NOTE ADULT - SUBJECTIVE AND OBJECTIVE BOX
HPI:    56 y/o male with pmhx of HLD, HTN, DM admitted on 7/21 with COVID-19 pna. tested positive on 7/15 outpatient. unvaccinated. Type 1 resp  failure Escalating fio2 requirements and development of ARDS now requiring HFNC and NRB.      Symptomatic day 7/15   Hosp day # 7/21  ICU day 7/24  Remdesivir  completed 10 days   Tocilizumab 7/23  Lonf course of high dose steroids.  Tapered to prednisone 10mg/day      Recent clinical changes:  Slow improvement HFNC   NIPPV at night     Vital signs/reviewed and physical exam performed where pertinent and urgently required.    Lab/radiology studies/ABG/meds reviewed and interpreted into the assessment and treatment plan.    Assessment/Plan/Therapeutic interventions      Neuro: Precedex for anxiety   melatonin    will need to taper slowly to prevent withdrawal     Cor:  HD stable SR.      Pulm: CPAP 18 70-80% fio2  HFNC when eating.  Desaturates easily with movement.    Prednisone 10 qd       GI:  Gi prophylaxis   Enteric feeds as tolerated.   PPN to supplement.      Renal: Even to negative fluid balance as tolerated by hemodynamics and renal fx.      Heme:  Pharmacologic DVT P in addition to SCD's  following D- Dimer clinical course and doppler studied where appropriate.   Doppler LE negative 8/27.  Lovenox 40 bid.  D dimer is elevated     ID: ABX discontinuation based on discussion with ID in conjunction with clinical features, culture data, and judicious procalcitonin monitoring.   Off ABX   Fungal studies resent.   No suspicion for superimposed bacterial process.       Endo Aggressive glycemic control to limit FS glucose to < 180mg/dl.        COVID 19 specific considerations and therapeutic options based on the available and rapidly changing medical literature.    Goals of care considerations:  Ongoing assessment for patient specific treatment options based on clinical progression or decline.  I have involved the family with updates and requests in guidance for medical decision making.

## 2021-08-31 NOTE — PROGRESS NOTE ADULT - ASSESSMENT
55 M noted to have COVID 7/15  On arrival hypoxic to 80s and tachypneic. NRB placed,in the ER saturating 95%. Na 126 s/p 1L NS. CXR: Frandy infiltrates. Last scr 1.4 in 2019-> today 1.9 unknown if underlying ckd.  Treated with IV Remdesivir and Decadron, Tocilizumab   Given the obesity/hypertension and prior co-morbidities, he is at risk of intubation but continue HFNC, respiratory status remains critical  XR reviewed- pulmonary infiltrates are present  DDimer elevated, was on therapeutic lovenox, transitioned to 40mg q 12 now noted to be >2000 -  Completed Solumedrol, Symbicort 160/4.5 BID ([email or web address], [facility] study). MICU service started Solumedrol 40mg q 8 hrs as of 8/18  Was on HFNC %FiO2 with BiPAP overnight, now on CPAP and transitions between HFNC/NIPPV  Respiratory status is tenuous and he continues to have an increased work of breathing; at this point he is at high risk of intubation  ICU team is reconsidering intubation and mechanical ventilation  Incrased DDimer noted; anticoagulation being managed by MICU , remains on Lovenox 40 mg BID  Repeat ddimer/BNP reviewed   Dopplers negative   consideration for Full AC if escalating D Dimer in view of overall presentation  at high risk of intubation and mechanical ventilation  ICU level care appreciated  lasix 20 mg IVPB Qdaily x 3 days - stopped after second dose  I's and O's discussed with intensivist  monitor BP and hold if SBP<100  fu cxr after diuresis suggested  overall prognosis remains guarded  SaO2 80-85%  monitor anemia with drop in Hgb 7.5  repeat ABG if any decompensation  Dr Salazar to cover 9/1, will resume 9/2 onwards

## 2021-08-31 NOTE — CHART NOTE - NSCHARTNOTEFT_GEN_A_CORE
**  55 M with pmh HLD, dm, htn presents with 8 days onset of covid symptoms which included, cough, fevers, sob, hypoxia, fevers, diarrhea, chills and myalgias. tested positive 7/15.  Patient was unvaccinated. He thinks he got it going to [facility] dance [facility] week of July 4 th.  Wife also had covid but her case was mild.  Admitted for Hypoxia to  on 7/21 and transferred to the ICU on 7/24 for HF NC O2.        *Pt is on CPAP due breathing status, mostly not eating; able to be on HIFLOW and eat 1 meal per day. Pt on PPN x 11 days not meeting ENN suggest PICC line placement for TPN (meet 100% of ENN)  Unstable POCT with range of 176-200mg/dl Lantus 5 QHS and RISS.    ***Calorie count started x 24 hours if po meets >75% of ENN will D/C PN support**        *Labs reviewed no significant changes at this time. Will continue with current TPN order.   08-31    138  |  103  |  16  ----------------------------<  158<H>  3.9   |  31  |  0.68    Ca    8.5      31 Aug 2021 06:51  Phos  4.0     08-31  Mg     2.0     08-31    BMI: BMI (kg/m2): 32.2 (07-21-21 @ 22:00)  HbA1c: A1C with Estimated Average Glucose Result: 11.6 % (07-22-21 @ 07:57)    Glucose: POCT Blood Glucose.: 173 mg/dL (08-31-21 @ 08:53)    BP: 116/80 (08-31-21 @ 08:00) (100/68 - 150/85)  Lipid Panel: Date/Time: 08-28-21 @ 09:29  Cholesterol, Serum: --  Direct LDL: --  HDL Cholesterol, Serum: --  Total Cholesterol/HDL Ration Measurement: --  Triglycerides, Serum: 176    POCT Blood Glucose.: 173 mg/dL (31 Aug 2021 08:53)  POCT Blood Glucose.: 222 mg/dL (30 Aug 2021 21:32)  POCT Blood Glucose.: 240 mg/dL (30 Aug 2021 17:39)  POCT Blood Glucose.: 179 mg/dL (30 Aug 2021 11:15)          I&O's Detail    30 Aug 2021 07:01  -  31 Aug 2021 07:00  --------------------------------------------------------  IN:    Dexmedetomidine: 348 mL    Fat Emulsion (Fish Oil &amp; Plant Based) 20% Infusion: 239 mL    PPN (Peripheral Parenteral Nutrition): 1584 mL  Total IN: 2171 mL    OUT:    Voided (mL): 1750 mL  Total OUT: 1750 mL    Total NET: 421 mL        *positive fluid status monitor and adjust IVF/PPN volume prn. Urine voided 1750 ml (weight gain noted however no recent weights obtained)       *linda score of 16; no PU documented.  Edema  1+ gen, 2+ right and left foot.  BM (+) 8/29     *continues to meet criteria for severe malnutrition*    Diet, Regular (08-20-21 @ 12:38)(allowed when on HIFLOW)    Estimated Needs: Based on 72Kg (adjusted IBW)   Calories: 5173-1136 Kcal (25-30 Kcal/Kg)  Protein:  115-130g (1.6-1.8 g/Kg)  Fluids:  7270-3130 mL (25-30 mL/Kg)    *Wt Hx: no new weights obtained since 8/24  92Kg (8/24)  82.5Kg (8/19): wt loss of ~7.9Kg in ~29days; (8.7%) clinically significant.  Height (cm): 167.6 (07-21-21 @ 14:34)  Weight (kg): 90.4 (07-21-21 @ 22:00)  BMI (kg/m2): 32.2 (07-21-21 @ 22:00)  BSA (m2): 2 (07-21-21 @ 22:00)        PPN RECOMMENDATIONS: via peripheral line  TOTAL VOLUME: 1800mL    65g Amino Acids  100g Dextrose  50g Lipids 20%   98 mEq NaCl  14 mEq NaAcetate  20 mMol NaPhos  30 mEq KCl  15 mEq KAcetate  0 mMol KPhos  8 mEq Calcium Gluconate  8 mEq Magnesium Sulfate  100 mg Thiamine  0 units Regular Insulin  1mL trace elements  10mL MVI    Total Calories: 1500Kcal (meeting ~83% of estimated calorie needs and ~57% of estimated protein needs)(osmolarity of 873)    ADDITIONAL RECOMMENDATIONS:  1) obtain triglyceride and LFT's weekly  2) daily lyte and magnesium and phos checks  3) POCT q6hrs; adjust insulin regimen prn to maintain tight glycemic control  4) strict I/O's  5) daily wt checks to track/trend changes.  6) monitor 24hr calorie count and D/C PN support if consuming >75% of meals/ENN.           *will continue to monitor and adjust prn daily*  Claudia Bermudez RD office: [phone]   Cell# [phone]

## 2021-08-31 NOTE — PROGRESS NOTE ADULT - SUBJECTIVE AND OBJECTIVE BOX
Patient is a 55y old  Male who presents with a chief complaint of cough/sob (30 Aug 2021 22:48)    HPI:  55 M with pmh HLD, dm, htn presents with 8 days onset of covid symptoms which included, cough, fevers, sob, hypoxia, fevers, diarrhea, chills and myalgias. TEsted positive 7/15. Wife endorsed he was becoming more sob of recently. On arrival hypoxic to 80s and tachypneic. NRB placed, presently on 15% and saturating 95%. Na 126 s/p 1L NS. CXR: Frandy infiltrates. Last scr 1.4 in 2019-> today 1.9 unknown if underlying ckd. Dimer 450 - normal for age  however with covid and increasing fibrinogen, will need further eval.   Denies chets pain. LHC performed 2019 revealed normal coronaries.         PAST MEDICAL/SURGICAL/FAMILY/SOCIAL HISTORY:    Past Medical History:  Diabetes    HTN (hypertension).  No surgeries     Tobacco Usage:  · Tobacco Usage	non smoker  Fhx: none (21 Jul 2021 19:19)    24 hour events: improving Fio2 requirement, appetite     PAST MEDICAL & SURGICAL HISTORY:  HTN (hypertension)    Diabetes      REVIEW OF SYSTEMS  Constitutional: No fever, chills, fatigue  Neuro: No headache, numbness, weakness  Resp: +shortness of breath, No cough, wheezing  CVS: No chest pain, palpitations, leg swelling  GI: No abdominal pain, nausea, vomiting, diarrhea   : No dysuria, frequency, incontinence  Skin: No itching, burning, rashes, or lesions   Msk: No joint pain or swelling  Psych: +anxiety  Heme: No bleeding    T(F): 98.4 (08-31-21 @ 09:00), Max: 98.4 (08-31-21 @ 09:00)  HR: 87 (08-31-21 @ 12:00) (61 - 105)  BP: 123/81 (08-31-21 @ 12:00) (108/76 - 136/75)  RR: 19 (08-31-21 @ 12:00) (10 - 33)  SpO2: 82% (08-31-21 @ 12:00) (80% - 100%)  Wt(kg): --      CAPILLARY BLOOD GLUCOSE  POCT Blood Glucose.: 215 mg/dL (31 Aug 2021 17:09)  POCT Blood Glucose.: 164 mg/dL (31 Aug 2021 11:47)  POCT Blood Glucose.: 173 mg/dL (31 Aug 2021 08:53)  POCT Blood Glucose.: 222 mg/dL (30 Aug 2021 21:32)  POCT Blood Glucose.: 240 mg/dL (30 Aug 2021 17:39)    I&O's Summary    08-30 @ 07:01  -  08-31 @ 07:00  --------------------------------------------------------  IN: 2171 mL / OUT: 1750 mL / NET: 421 mL    PHYSICAL EXAM  General: sitting in chair, NAD   CNS: AAOx3, no focal deficits   HEENT: PERRL, dry mucosa   Resp: mostly clear, limited air entry b/l  CVS: S1S2 regular   Abd: soft, NT, +BS  Ext: no edema   Skin: warm     MEDICATIONS  amLODIPine   Tablet Oral  hydrALAZINE Injectable IV Push PRN  losartan Oral  atorvastatin Oral  dextrose 40% Gel Oral  dextrose 50% Injectable IV Push  dextrose 50% Injectable IV Push  dextrose 50% Injectable IV Push  glucagon  Injectable IntraMuscular  insulin glargine Injectable (LANTUS) SubCutaneous  insulin lispro (ADMELOG) corrective regimen sliding scale SubCutaneous  insulin lispro (ADMELOG) corrective regimen sliding scale SubCutaneous  predniSONE   Tablet Oral  ALBUTerol    90 MICROgram(s) HFA Inhaler Inhalation PRN  benzonatate Oral PRN  budesonide 160 MICROgram(s)/formoterol 4.5 MICROgram(s) Inhaler Inhalation  acetaminophen   Tablet .. Oral PRN  ALPRAZolam Oral PRN  dexMEDEtomidine Infusion IV Continuous  melatonin Oral PRN  aspirin  chewable Oral  enoxaparin Injectable SubCutaneous  pantoprazole  Injectable IV Push  polyethylene glycol 3350 Oral PRN  senna Oral  cholecalciferol Oral  dextrose 5%. IV Continuous  dextrose 5%. IV Continuous  fat emulsion (Fish Oil and Plant Based) 20% Infusion IV Continuous  fat emulsion (Fish Oil and Plant Based) 20% Infusion IV Continuous  Parenteral Nutrition - Adult TPN Continuous  Parenteral Nutrition - Adult TPN Continuous  benzocaine 15 mG/menthol 3.6 mG (Sugar-Free) Lozenge Oral PRN                          7.5    11.23 )-----------( 248      ( 30 Aug 2021 07:04 )             23.6       08-31    138  |  103  |  16  ----------------------------<  158<H>  3.9   |  31  |  0.68    Ca    8.5      31 Aug 2021 06:51  Phos  4.0     08-31  Mg     2.0     08-31

## 2021-08-31 NOTE — PROGRESS NOTE ADULT - TIME BILLING
56 y/o male admitted with:    Acute hypoxic resp failure and ARDS due to COVID-19 pneumonia (unvaccinated)   Severe protein calorie malnutrition     Hx HTN, DM2    Plan:   Neuro - wean Precedex, on Xanax for anxiety   CV - BP stable, on amlodipine, losartan, aspirin, statin  Resp - on HFNC 60L, 70%, slowly improving, continue CPAP at night, continue prednisone 10 mg daily for now, will start very slow taper in 1-2 days if continues to do well, continue Symbicort   GI - po diet + PPN, will evaluate discontinuation of PPN in 1-2 days if po intake continues to improve  Renal - stable   ID - not currently infected, not on abx   Endo - on Lantus 8 units + sliding scale insulin   PPx - Lovenox   MSK - increase activity   Dispo - keep in ICU

## 2021-09-01 LAB
ANION GAP SERPL CALC-SCNC: 0 MMOL/L — LOW (ref 5–17)
BUN SERPL-MCNC: 14 MG/DL — SIGNIFICANT CHANGE UP (ref 7–23)
CALCIUM SERPL-MCNC: 8.4 MG/DL — LOW (ref 8.5–10.1)
CHLORIDE SERPL-SCNC: 102 MMOL/L — SIGNIFICANT CHANGE UP (ref 96–108)
CO2 SERPL-SCNC: 36 MMOL/L — HIGH (ref 22–31)
CREAT SERPL-MCNC: 0.7 MG/DL — SIGNIFICANT CHANGE UP (ref 0.5–1.3)
GLUCOSE SERPL-MCNC: 153 MG/DL — HIGH (ref 70–99)
MAGNESIUM SERPL-MCNC: 1.9 MG/DL — SIGNIFICANT CHANGE UP (ref 1.6–2.6)
PHOSPHATE SERPL-MCNC: 4.7 MG/DL — HIGH (ref 2.5–4.5)
POTASSIUM SERPL-MCNC: 4.5 MMOL/L — SIGNIFICANT CHANGE UP (ref 3.5–5.3)
POTASSIUM SERPL-SCNC: 4.5 MMOL/L — SIGNIFICANT CHANGE UP (ref 3.5–5.3)
SODIUM SERPL-SCNC: 138 MMOL/L — SIGNIFICANT CHANGE UP (ref 135–145)

## 2021-09-01 PROCEDURE — 99233 SBSQ HOSP IP/OBS HIGH 50: CPT

## 2021-09-01 RX ORDER — ELECTROLYTE SOLUTION,INJ
1 VIAL (ML) INTRAVENOUS
Refills: 0 | Status: DISCONTINUED | OUTPATIENT
Start: 2021-09-01 | End: 2021-09-01

## 2021-09-01 RX ORDER — I.V. FAT EMULSION 20 G/100ML
0.55 EMULSION INTRAVENOUS
Qty: 50 | Refills: 0 | Status: DISCONTINUED | OUTPATIENT
Start: 2021-09-01 | End: 2021-09-01

## 2021-09-01 RX ADMIN — ENOXAPARIN SODIUM 40 MILLIGRAM(S): 100 INJECTION SUBCUTANEOUS at 11:41

## 2021-09-01 RX ADMIN — INSULIN GLARGINE 8 UNIT(S): 100 INJECTION, SOLUTION SUBCUTANEOUS at 11:41

## 2021-09-01 RX ADMIN — Medication 0.5 MILLIGRAM(S): at 21:19

## 2021-09-01 RX ADMIN — DEXMEDETOMIDINE HYDROCHLORIDE IN 0.9% SODIUM CHLORIDE 4.52 MICROGRAM(S)/KG/HR: 4 INJECTION INTRAVENOUS at 06:24

## 2021-09-01 RX ADMIN — Medication 2: at 19:01

## 2021-09-01 RX ADMIN — ATORVASTATIN CALCIUM 80 MILLIGRAM(S): 80 TABLET, FILM COATED ORAL at 21:19

## 2021-09-01 RX ADMIN — Medication 10 MILLIGRAM(S): at 11:41

## 2021-09-01 RX ADMIN — I.V. FAT EMULSION 20.7 GM/KG/DAY: 20 EMULSION INTRAVENOUS at 22:56

## 2021-09-01 RX ADMIN — BUDESONIDE AND FORMOTEROL FUMARATE DIHYDRATE 2 PUFF(S): 160; 4.5 AEROSOL RESPIRATORY (INHALATION) at 11:32

## 2021-09-01 RX ADMIN — Medication 1 EACH: at 22:56

## 2021-09-01 RX ADMIN — DEXMEDETOMIDINE HYDROCHLORIDE IN 0.9% SODIUM CHLORIDE 4.52 MICROGRAM(S)/KG/HR: 4 INJECTION INTRAVENOUS at 21:19

## 2021-09-01 RX ADMIN — DEXMEDETOMIDINE HYDROCHLORIDE IN 0.9% SODIUM CHLORIDE 4.52 MICROGRAM(S)/KG/HR: 4 INJECTION INTRAVENOUS at 19:03

## 2021-09-01 RX ADMIN — PANTOPRAZOLE SODIUM 40 MILLIGRAM(S): 20 TABLET, DELAYED RELEASE ORAL at 11:41

## 2021-09-01 RX ADMIN — SENNA PLUS 1 TABLET(S): 8.6 TABLET ORAL at 21:19

## 2021-09-01 RX ADMIN — ENOXAPARIN SODIUM 40 MILLIGRAM(S): 100 INJECTION SUBCUTANEOUS at 21:19

## 2021-09-01 RX ADMIN — AMLODIPINE BESYLATE 5 MILLIGRAM(S): 2.5 TABLET ORAL at 11:41

## 2021-09-01 RX ADMIN — Medication 2: at 10:18

## 2021-09-01 RX ADMIN — Medication 2000 UNIT(S): at 11:41

## 2021-09-01 RX ADMIN — LOSARTAN POTASSIUM 75 MILLIGRAM(S): 100 TABLET, FILM COATED ORAL at 11:40

## 2021-09-01 RX ADMIN — Medication 81 MILLIGRAM(S): at 11:40

## 2021-09-01 NOTE — PROGRESS NOTE ADULT - SUBJECTIVE AND OBJECTIVE BOX
Patient is a 55y old  Male who presents with a chief complaint of cough/sob (01 Sep 2021 07:47)    HPI:  55 M with pmh HLD, dm, htn presents with 8 days onset of covid symptoms which included, cough, fevers, sob, hypoxia, fevers, diarrhea, chills and myalgias. TEsted positive 7/15. Wife endorsed he was becoming more sob of recently. On arrival hypoxic to 80s and tachypneic. NRB placed, presently on 15% and saturating 95%. Na 126 s/p 1L NS. CXR: Frandy infiltrates. Last scr 1.4 in 2019-> today 1.9 unknown if underlying ckd. Dimer 450 - normal for age  however with covid and increasing fibrinogen, will need further eval.   Denies chets pain. LHC performed 2019 revealed normal coronaries.         PAST MEDICAL/SURGICAL/FAMILY/SOCIAL HISTORY:    Past Medical History:  Diabetes    HTN (hypertension).  No surgeries     Tobacco Usage:  · Tobacco Usage	non smoker  Fhx: none (21 Jul 2021 19:19)    24 hour events: no new events  continues to be on CPAP alternating with HFNC     PAST MEDICAL & SURGICAL HISTORY:  HTN (hypertension)    Diabetes      REVIEW OF SYSTEMS  Constitutional: No fever, chills, fatigue  Neuro: No headache, numbness, weakness  Resp: +shortness of breath  CVS: No chest pain, palpitations, leg swelling  GI: No abdominal pain, nausea, vomiting, diarrhea   : No dysuria, frequency, incontinence  Skin: No itching, burning, rashes, or lesions   Msk: No joint pain or swelling  Psych: +anxiety  Heme: No bleeding    T(F): 96.9 (09-01-21 @ 10:00), Max: 97.8 (08-31-21 @ 22:00)  HR: 79 (09-01-21 @ 12:00) (58 - 98)  BP: 118/78 (09-01-21 @ 12:00) (104/59 - 146/86)  RR: 22 (09-01-21 @ 12:00) (15 - 35)  SpO2: 100% (09-01-21 @ 12:00) (83% - 100%)  Wt(kg): --      CAPILLARY BLOOD GLUCOSE  POCT Blood Glucose.: 204 mg/dL (01 Sep 2021 10:15)  POCT Blood Glucose.: 240 mg/dL (31 Aug 2021 22:37)  POCT Blood Glucose.: 215 mg/dL (31 Aug 2021 17:09)    I&O's Summary    08-31 @ 07:01 - 09-01 @ 07:00  --------------------------------------------------------  IN: 2462 mL / OUT: 1150 mL / NET: 1312 mL    09-01 @ 07:01 - 09-01 @ 12:49  --------------------------------------------------------  IN: 103 mL / OUT: 0 mL / NET: 103 mL    PHYSICAL EXAM  General: sitting in chair, NAD   CNS: AAOx3, no focal deficits   HEENT: PERRL, dry mucosa   Resp: good AE b/l, scattered rales, no wheeze/stridor   CVS: S1S2 regular   Abd: soft, NT, +BS  Ext: no edema   Skin: warm     MEDICATIONS  amLODIPine   Tablet Oral  hydrALAZINE Injectable IV Push PRN  losartan Oral  atorvastatin Oral  dextrose 40% Gel Oral  dextrose 50% Injectable IV Push  dextrose 50% Injectable IV Push  dextrose 50% Injectable IV Push  glucagon  Injectable IntraMuscular  insulin glargine Injectable (LANTUS) SubCutaneous  insulin lispro (ADMELOG) corrective regimen sliding scale SubCutaneous  insulin lispro (ADMELOG) corrective regimen sliding scale SubCutaneous  predniSONE   Tablet Oral  ALBUTerol    90 MICROgram(s) HFA Inhaler Inhalation PRN  benzonatate Oral PRN  budesonide 160 MICROgram(s)/formoterol 4.5 MICROgram(s) Inhaler Inhalation  acetaminophen   Tablet .. Oral PRN  ALPRAZolam Oral PRN  dexMEDEtomidine Infusion IV Continuous  melatonin Oral PRN  aspirin  chewable Oral  enoxaparin Injectable SubCutaneous  pantoprazole  Injectable IV Push  polyethylene glycol 3350 Oral PRN  senna Oral  cholecalciferol Oral  dextrose 5%. IV Continuous  dextrose 5%. IV Continuous  fat emulsion (Fish Oil and Plant Based) 20% Infusion IV Continuous  fat emulsion (Fish Oil and Plant Based) 20% Infusion IV Continuous  fat emulsion (Fish Oil and Plant Based) 20% Infusion IV Continuous  Parenteral Nutrition - Adult TPN Continuous  Parenteral Nutrition - Adult TPN Continuous  benzocaine 15 mG/menthol 3.6 mG (Sugar-Free) Lozenge Oral PRN        09-01    138  |  102  |  14  ----------------------------<  153<H>  4.5   |  36<H>  |  0.70    Ca    8.4<L>      01 Sep 2021 05:47  Phos  4.7     09-01  Mg     1.9     09-01

## 2021-09-01 NOTE — PROGRESS NOTE ADULT - SUBJECTIVE AND OBJECTIVE BOX
Patient is a 55y old  Male who presents with a chief complaint of cough/sob (23 Jul 2021 14:03)      HPI:  55 M with pmh HLD, dm, htn presents with 8 days onset of covid symptoms which included, cough, fevers, sob, hypoxia, fevers, diarrhea, chills and myalgias. TEsted positive 7/15. Wife endorsed he was becoming more sob of recently. On arrival hypoxic to 80s and tachypneic. NRB placed, presently on 15% and saturating 95%. Na 126 s/p 1L NS. CXR: Frandy infiltrates. Last scr 1.4 in 2019-> today 1.9 unknown if underlying ckd.  Patient not vaccinated.   Last seen by me in 2019  History of ROBERT and uncontrolled hypertension  Treated with IV Remdesivir and Decadron, now on Toci  SaO2 is 80-85% despite being on 100% NRB  ABG pending        8/24  Alternating between NIPPV and HFNC  8/25-8/26  covering for DR Arceo  remains on High Fio2 demand for ARDS post Covid pneumonia  8/27  uneventful overnight  remains on cpap setting overnight  high fio2 requirement still  o2 sat appears improved  not as labored with his breathing this am  8/28  he remains on HIGH FIO2 demand  data discussed with Intensivist Dr Espinoza and ICU RN  last cxr and all recent data discussed  repeat labs pending  8/29  data discussed with DR Espinoza  lasix, low dose stopped after second dose  remains on high fio2 but lowered 70%  8/30  covering for Dr Arceo  doing about the same  o2 sat 93-94% at bedside this am  Dr Arceo will resume in am  9/1  covering for Dr Arceo today  pt's status appears unchanged  still with reported desats on any movements  remains on PPN  recent data all reveiwd  RN staff at bedside    MEDICATIONS  (STANDING):  amLODIPine   Tablet 5 milliGRAM(s) Oral daily  aspirin  chewable 81 milliGRAM(s) Oral daily  atorvastatin 80 milliGRAM(s) Oral at bedtime  budesonide 160 MICROgram(s)/formoterol 4.5 MICROgram(s) Inhaler 2 Puff(s) Inhalation two times a day  cholecalciferol 2000 Unit(s) Oral daily  dexMEDEtomidine Infusion 0.2 MICROgram(s)/kG/Hr (4.52 mL/Hr) IV Continuous <Continuous>  dextrose 40% Gel 15 Gram(s) Oral once  dextrose 5%. 1000 milliLiter(s) (50 mL/Hr) IV Continuous <Continuous>  dextrose 5%. 1000 milliLiter(s) (100 mL/Hr) IV Continuous <Continuous>  dextrose 50% Injectable 25 Gram(s) IV Push once  dextrose 50% Injectable 12.5 Gram(s) IV Push once  dextrose 50% Injectable 25 Gram(s) IV Push once  enoxaparin Injectable 40 milliGRAM(s) SubCutaneous every 12 hours  fat emulsion (Fish Oil and Plant Based) 20% Infusion 20.8 mL/Hr (20.8 mL/Hr) IV Continuous <Continuous>  fat emulsion (Fish Oil and Plant Based) 20% Infusion 0.55 Gm/kG/Day (20.83 mL/Hr) IV Continuous <Continuous>  glucagon  Injectable 1 milliGRAM(s) IntraMuscular once  insulin glargine Injectable (LANTUS) 8 Unit(s) SubCutaneous <User Schedule>  insulin lispro (ADMELOG) corrective regimen sliding scale   SubCutaneous three times a day before meals  insulin lispro (ADMELOG) corrective regimen sliding scale   SubCutaneous at bedtime  losartan 75 milliGRAM(s) Oral daily  pantoprazole  Injectable 40 milliGRAM(s) IV Push daily  Parenteral Nutrition - Adult 1 Each (75 mL/Hr) TPN Continuous <Continuous>  predniSONE   Tablet 10 milliGRAM(s) Oral daily  senna 1 Tablet(s) Oral at bedtime      MEDICATIONS  (PRN):  acetaminophen   Tablet .. 650 milliGRAM(s) Oral every 4 hours PRN Temp greater or equal to 38C (100.4F), Mild Pain (1 - 3)  ALBUTerol    90 MICROgram(s) HFA Inhaler 2 Puff(s) Inhalation every 4 hours PRN Shortness of Breath and/or Wheezing  benzocaine 15 mG/menthol 3.6 mG (Sugar-Free) Lozenge 1 Lozenge Oral every 6 hours PRN Sore Throat  benzonatate 100 milliGRAM(s) Oral three times a day PRN Cough  hydrALAZINE Injectable 5 milliGRAM(s) IV Push every 6 hours PRN SBP>160  hydrocodone/homatropine Syrup 5 milliLiter(s) Oral every 6 hours PRN Cough  LORazepam   Injectable 0.5 milliGRAM(s) IV Push every 6 hours PRN Agitation  melatonin 6 milliGRAM(s) Oral at bedtime PRN Insomnia  morphine  - Injectable 2 milliGRAM(s) IV Push every 2 hours PRN SOB/Anxiety    ICU Vital Signs Last 24 Hrs  T(C): 36.1 (01 Sep 2021 06:00), Max: 36.9 (31 Aug 2021 09:00)  T(F): 96.9 (01 Sep 2021 06:00), Max: 98.4 (31 Aug 2021 09:00)  HR: 68 (01 Sep 2021 06:00) (61 - 97)  BP: 129/68 (01 Sep 2021 06:00) (111/61 - 146/86)  BP(mean): 83 (01 Sep 2021 06:00) (72 - 100)  ABP: --  ABP(mean): --  RR: 27 (01 Sep 2021 06:00) (15 - 35)  SpO2: 96% (01 Sep 2021 06:00) (82% - 100%)      I&O's Detail    31 Aug 2021 07:01  -  01 Sep 2021 07:00  --------------------------------------------------------  IN:    Dexmedetomidine: 371.9 mL    Fat Emulsion (Fish Oil &amp; Plant Based) 20% Infusion: 273.4 mL    PPN (Peripheral Parenteral Nutrition): 1816.7 mL  Total IN: 2462 mL    OUT:    Voided (mL): 1150 mL  Total OUT: 1150 mL    Total NET: 1312 mL      PHYSICAL EXAM  General Appearance: On CPAP, increased work of breathing- about the same  Lungs: coarse bilaterally  Heart: +S1,S2  Abdomen: Soft, non-tender, bowel sounds active   Extremities: no cyanosis or edema, no joint swelling  Skin: Skin color, texture normal, no rashes   Neurologic: Alert and oriented X3 , non focal, not disoriented    ECG:    LABS:                          7.5    11.23 )-----------( 248      ( 30 Aug 2021 07:04 )             23.6   08-30 09-01    138  |  102  |  14  ----------------------------<  153<H>  4.5   |  36<H>  |  0.70    Ca    8.4<L>      01 Sep 2021 05:47  Phos  4.7     09-01  Mg     1.9     09-01      138  |  100  |  20  ----------------------------<  179<H>  3.5   |  35<H>  |  0.68    Ca    8.5      30 Aug 2021 07:04  Phos  3.3     08-30  Mg     2.0     08-30    TPro  6.1  /  Alb  2.3<L>  /  TBili  0.6  /  DBili  x   /  AST  24  /  ALT  46  /  AlkPhos  81  08-28      Pro BNP 2446  repeat              08-26    136  |  102  |  18  ----------------------------<  87  3.7   |  34<H>  |  0.56    Ca    8.3<L>      26 Aug 2021 07:03  Phos  3.6     08-26  Mg     2.0     08-26    TPro  5.5<L>  /  Alb  2.1<L>  /  TBili  0.5  /  DBili  x   /  AST  23  /  ALT  51  /  AlkPhos  79  08-26                          8.0    11.96 )-----------( 278      ( 26 Aug 2021 07:03 )             26.1   08-26    136  |  102  |  18  ----------------------------<  87  3.7   |  34<H>  |  0.56    Ca    8.3<L>      26 Aug 2021 07:03  Phos  3.6     08-26  Mg     2.0     08-26    TPro  5.5<L>  /  Alb  2.1<L>  /  TBili  0.5  /  DBili  x   /  AST  23  /  ALT  51  /  AlkPhos  79  08-26                          8.5    12.89 )-----------( 269      ( 25 Aug 2021 07:10 )             26.7   08-25    138  |  103  |  20  ----------------------------<  143<H>  3.7   |  32<H>  |  0.58    Ca    8.1<L>      25 Aug 2021 07:10  Phos  3.7     08-25  Mg     2.0     08-25    TPro  5.6<L>  /  Alb  2.2<L>  /  TBili  0.5  /  DBili  x   /  AST  32  /  ALT  74  /  AlkPhos  103  08-24                          8.5    14.05 )-----------( 292      ( 18 Aug 2021 07:01 )             26.2   08-18    138  |  104  |  24<H>  ----------------------------<  78  4.1   |  29  |  1.03    Ca    8.8      18 Aug 2021 07:01    TPro  6.3  /  Alb  2.3<L>  /  TBili  0.6  /  DBili  x   /  AST  32  /  ALT  73  /  AlkPhos  168<H>  08-17                                           10.0   19.05 )-----------( 179      ( 08 Aug 2021 07:11 )             30.3   08-08    137  |  102  |  27<H>  ----------------------------<  121<H>  3.9   |  29  |  0.77    Ca    8.4<L>      08 Aug 2021 07:11  Phos  3.4     08-08  Mg     2.0     08-08           TPro  5.2<L>  /  Alb  2.3<L>  /  TBili  0.8  /  DBili  x   /  AST  81<H>  /  ALT  213<H>  /  AlkPhos  200<H>  08-06                            12.9   15.00 )-----------( 366      ( 26 Jul 2021 06:20 )             38.4   07-26          < from: Xray Chest 1 View- PORTABLE-Urgent (07.21.21 @ 15:33) >    PROCEDURE DATE:  07/21/2021          INTERPRETATION:  AP chest on July 21, 2021 at 3:27 PM. Patient is short of breath with cough and fever.    Heart magnified by technique.    There arescattered mid lower lung field infiltrates right greater than left possibly related: Pneumonia.    The lungs are clear on August 30, 2019.    IMPRESSION: Bilateral infiltrates as above.    < end of copied text >  < from: US Duplex Venous Lower Ext Complete, Bilateral (07.25.21 @ 08:42) >  COMPARISON: None available.    TECHNIQUE: Duplex sonography of the BILATERAL LOWER extremity veins with color and spectral Doppler, with and without compression.    FINDINGS:    RIGHT:  Normal compressibility of the RIGHT common femoral, femoral and popliteal veins.  Doppler examination shows normal spontaneous and phasic flow.  No RIGHT calf vein thrombosis is detected.    LEFT:  Normal compressibility of the LEFT common femoral, femoral and popliteal veins.  Doppler examination shows normal spontaneous and phasic flow.  No LEFT calf vein thrombosis is detected.    IMPRESSION:  No evidence of deep venous thrombosis in either lower extremity.    < end of copied text >  RADIOLOGY & ADDITIONAL STUDIES:    < from: Xray Chest 1 View- PORTABLE-Urgent (Xray Chest 1 View- PORTABLE-Urgent .) (07.26.21 @ 08:43) >    PROCEDURE DATE:  07/26/2021          INTERPRETATION:  Portable chest radiograph    CLINICAL INFORMATION: Pneumonia due to Covid 19.. Follow-up    TECHNIQUE:  Portable  AP view of the chest was obtained.    COMPARISON: 7/21/2021 chest available for review.    FINDINGS:    The lungs show stable bilateral  multifocal and diffuse ill-defined airspace opacities.. No pneumothorax.    The  heart is enlarged in transverse diameter. No hilar mass.     Visualized osseous structures are intact.    IMPRESSION:   Stable bilateral  multifocal and diffuse ill-defined airspace opacities..    < end of copied text >  < from: US Duplex Venous Lower Ext Complete, Bilateral (08.06.21 @ 17:16) >  FINDINGS:    RIGHT:  Normal compressibility of the RIGHT common femoral, femoral and popliteal veins.  Doppler examination shows normal spontaneous and phasic flow.  No RIGHT calf vein thrombosis is detected.    LEFT:  Normal compressibility of the LEFT common femoral, femoral and popliteal veins.  Doppler examination shows normal spontaneous and phasic flow.  No LEFT calf vein thrombosisis detected.    IMPRESSION:  No evidence of deep venous thrombosis in either lower extremity.    < end of copied text >  < from: Xray Chest 1 View- PORTABLE-Urgent (Xray Chest 1 View- PORTABLE-Urgent .) (08.06.21 @ 16:45) >  INTERPRETATION:  Portable chest radiograph    CLINICAL INFORMATION: Pneumonia due to Covid 19.    TECHNIQUE:  Portable  AP view of the chest was obtained.    COMPARISON: 8/1/2021 chest radiograph available for review.    FINDINGS:    The lungs show decreasing bilateral diffuse airspace disease.. No pneumothorax.    The  heart is mildly enlarged in transverse diameter. No hilar mass.   Visualized osseous structures are intact.    IMPRESSION:   Decreasing bilateral diffuse airspace disease..    < end of copied text >  xr< from: US Duplex Venous Lower Ext Complete, Bilateral (08.17.21 @ 08:58) >  COMPARISON: None available.    TECHNIQUE: Duplex sonography of the BILATERAL LOWER extremity veins with color and spectral Doppler, with and without compression.    FINDINGS:    RIGHT:  Normal compressibility of the RIGHT common femoral, femoral and popliteal veins.  Doppler examination shows normal spontaneous and phasic flow.  No RIGHT calf vein thrombosis is detected.    LEFT:  Normal compressibility of the LEFT common femoral, femoral and popliteal veins.  Doppler examination shows normal spontaneous and phasic flow.  No LEFT calf vein thrombosis is detected.    IMPRESSION:  No evidence of deep venous thrombosis in either lower extremity.    < end of copied text >  r< from: US Duplex Venous Lower Ext Complete, Bilateral (08.23.21 @ 12:53) >  PROCEDURE DATE:  08/23/2021          INTERPRETATION:  CLINICAL INFORMATION: 55 years  Male with r/o DVT    evaluate for DVT. Covid positive. Elevated d-dimer.    COMPARISON: None available.    TECHNIQUE: Duplex sonography of the BILATERAL LOWER extremity veins with color and spectral Doppler, with and without compression.    FINDINGS:    RIGHT:  Normal compressibility of the RIGHT common femoral, femoral and popliteal veins.  Doppler examination shows normal spontaneous and phasic flow.  No RIGHT calf vein thrombosis is detected.    LEFT:  Normal compressibility of the LEFT common femoral, femoral and popliteal veins.  Doppler examination shows normal spontaneous and phasic flow.  No LEFT calf vein thrombosis is detected.    IMPRESSION:  No evidence of deep venous thrombosis in either lower extremity.    < end of copied text >  < from: Xray Chest 1 View- PORTABLE-Urgent (Xray Chest 1 View- PORTABLE-Urgent .) (08.23.21 @ 11:06) >    PROCEDURE DATE:  08/23/2021          INTERPRETATION:  History: Dyspnea, Covid    Chest:  one view.    Comparison: 08/20/2021    AP radiograph of the chest demonstrates moderate diffuse alveolar infiltrates unchanged. The cardiac silhouette is normal in size. Osseous structures are intact.    Impression:moderate diffuse alveolar infiltrates unchanged.    < end of copied text >  r< from: Xray Chest 1 View- PORTABLE-Urgent (Xray Chest 1 View- PORTABLE-Urgent .) (08.23.21 @ 11:06) >  PROCEDURE DATE:  08/23/2021          INTERPRETATION:  History: Dyspnea, Covid    Chest:  one view.    Comparison: 08/20/2021    AP radiograph of the chest demonstrates moderate diffuse alveolar infiltrates unchanged. The cardiac silhouette is normal in size. Osseous structures are intact.    Impression:moderate diffuse alveolar infiltrates unchanged.    < end of copied text >

## 2021-09-01 NOTE — PROGRESS NOTE ADULT - ASSESSMENT
55 M noted to have COVID 7/15  On arrival hypoxic to 80s and tachypneic. NRB placed,in the ER saturating 95%. Na 126 s/p 1L NS. CXR: Frandy infiltrates. Last scr 1.4 in 2019-> today 1.9 unknown if underlying ckd.  Treated with IV Remdesivir and Decadron, Tocilizumab   Given the obesity/hypertension and prior co-morbidities, he is at risk of intubation but continue HFNC, respiratory status remains critical  XR reviewed- pulmonary infiltrates are present  DDimer elevated, was on therapeutic lovenox, transitioned to 40mg q 12 now noted to be >2000 -  Completed Solumedrol, Symbicort 160/4.5 BID ([email or web address]://[email or web address], [facility] study). MICU service started Solumedrol 40mg q 8 hrs as of 8/18  Was on HFNC %FiO2 with BiPAP overnight, now on CPAP and transitions between HFNC/NIPPV  CTPE not performed      Covering for Dr Arceo  Respiratory status is tenuous and he continues to have an increased work of breathing; at this point he is at high risk of intubation  ICU team is reconsidering intubation and mechanical ventilation  Incrased DDimer noted; anticoagulation being managed by MICU , remains on Lovenox 40 mg BID  Repeat ddimer/BNP reviewed   Dopplers negative   Currently on NIPPV with CPAP, will likely need 18/8 back up rate of 12 if hypoxemia persists. Transitioning between HFNC and nocturnal NIPPV    escalating Bicarb compensatory for Co2 retention  Elevated D Dimer noted/ repeat leg dopplers negative    consideration for Full AC if escalating D Dimer in view of overall presentation  at high risk of intubation and mechanical ventilation  ICU level care appreciated    elevated Bicarb likely indication to switch to BIPAP for night time use  consider ABG in am for eval    Given CXR appearance as well as elevated Pro BNP and I>O will proceed with gentle diuresis - stopped by intensivist after second dose  awaiting repeat labs  monitor creat closely and replete lytes as needed  lasix 20 mg IVPB Qdaily x 3 days - stopped after second dose  I's and O's discussed with intensivist  monitor BP and hold if SBP<100  fu cxr after diuresis suggested  overall prognosis remains guarded  8/30  covering for Dr Arceo  doing about the same, Improved urine output O>I's last 2 days on PPN  o2 sat 93-94% at bedside this am  Dr Arceo will resume in am  monitor anemia with drop in Hgb 7.5  replete lytes to keep K>4.o  metabolic alkalosis post diuresis and also compensatory mechanism for Co2 retention  repeat ABG if any decompensation    9/1  POST COVID pneumonia with ARDS, now getting into Fibrotic phase of Dz  escalating Bicarb is noted on blood work  low dose prednisone continued  suggest matching his I's and Os and gentle diuresis TIW with lasix 20 mg would be helpful  ABG in am in view of increased Bicarb requested  (7.45 / 28 / 63 / 92% July/24 on 100% O2)  may need BIPAP set up instead of CPAP if increased CO2 retention on ABG  Fu imaging in next few days  Dr Arceo will resume and cont to see him starting 9/2

## 2021-09-01 NOTE — PROGRESS NOTE ADULT - TIME BILLING
54 y/o male admitted with:    Acute hypoxic resp failure and ARDS due to COVID-19 pneumonia (unvaccinated)   Severe protein calorie malnutrition     Hx HTN, DM2    Plan:   Neuro - wean Precedex, on Xanax for anxiety   CV - BP stable, on amlodipine, losartan, aspirin, statin  Resp - on HFNC 60L, 70%, slowly improving, continue CPAP at night, continue prednisone 10 mg daily for now, continue Symbicort   GI - po diet + PPN, will evaluate discontinuation of PPN in 1-2 days if po intake continues to improve  Renal - stable   ID - not currently infected, not on abx   Endo - on Lantus 8 units + sliding scale insulin   PPx - Lovenox   MSK - increase activity   Dispo - keep in ICU

## 2021-09-01 NOTE — CHART NOTE - NSCHARTNOTEFT_GEN_A_CORE
**  55 M with pmh HLD, dm, htn presents with 8 days onset of covid symptoms which included, cough, fevers, sob, hypoxia, fevers, diarrhea, chills and myalgias. tested positive 7/15.  Patient was unvaccinated. He thinks he got it going to daughters dance [facility] week of July 4 th.  Wife also had covid but her case was mild.  Admitted for Hypoxia to  on 7/21 and transferred to the ICU on 7/24 for HF NC O2.        *Pt is on CPAP due breathing status, mostly not eating; able to be on HIFLOW and eat 1 meal per day. Pt on PPN x 11 days not meeting ENN suggest PICC line placement for TPN (meet 100% of ENN)  Unstable POCT with range of 176-200mg/dl Lantus 5 QHS and RISS.    ***Calorie count started x 24 hours question accuracy due to no lunch documented and minimal intake noted.  x 24 hr calorie count results: 1 coffee w/ 5 packs of sugar, 80% of Hamburger, and 100% of Ensure enlive shake. Discussed Calorie count with Dr. Tineo will continue with PPN and Calorie count x 24 hr then reevaluate on 9/2.         *Labs reviewed Hyperphosphatemia noted will decrease Sodium phophate to 15 mmol. Potassium titrating up will decrease potassium to 40 mEq. Will continue with current PPN order. Adjusted Calcium- 9.8 [facility]  09-01    138  |  102  |  14  ----------------------------<  153<H>  4.5   |  36<H>  |  0.70    Ca    8.4<L>      01 Sep 2021 05:47  Phos  4.7     09-01  Mg     1.9     09-01    BMI: BMI (kg/m2): 32.2 (07-21-21 @ 22:00)  HbA1c: A1C with Estimated Average Glucose Result: 11.6 % (07-22-21 @ 07:57)    Glucose: POCT Blood Glucose.: 240 mg/dL (08-31-21 @ 22:37)    BP: 121/84 (09-01-21 @ 09:00) (100/68 - 146/86)  Lipid Panel: Date/Time: 08-31-21 @ 06:51  Cholesterol, Serum: --  Direct LDL: --  HDL Cholesterol, Serum: --  Total Cholesterol/HDL Ration Measurement: --  Triglycerides, Serum: 202    POCT Blood Glucose.: 240 mg/dL (31 Aug 2021 22:37)  POCT Blood Glucose.: 215 mg/dL (31 Aug 2021 17:09)  POCT Blood Glucose.: 164 mg/dL (31 Aug 2021 11:47)        I&O's Detail    31 Aug 2021 07:01  -  01 Sep 2021 07:00  --------------------------------------------------------  IN:    Dexmedetomidine: 371.9 mL    Fat Emulsion (Fish Oil &amp; Plant Based) 20% Infusion: 273.4 mL    PPN (Peripheral Parenteral Nutrition): 1816.7 mL  Total IN: 2462 mL    OUT:    Voided (mL): 1150 mL  Total OUT: 1150 mL    Total NET: 1312 mL      *positive fluid status monitor and adjust IVF/PPN volume prn. Urine voided 1150 ml (weight gain noted however no recent weights obtained)       *linda score of 16; no PU documented.  Edema  1+ gen, 2+ right and left foot.  BM (+) 8/29     *continues to meet criteria for severe malnutrition*    Diet, Regular (08-20-21 @ 12:38)(allowed when on HIFLOW)    Estimated Needs: Based on 72Kg (adjusted IBW)   Calories: 2055-4561 Kcal (25-30 Kcal/Kg)  Protein:  115-130g (1.6-1.8 g/Kg)  Fluids:  4473-2400 mL (25-30 mL/Kg)    *Wt Hx: no new weights obtained since 8/24  92Kg (8/24)  82.5Kg (8/19): wt loss of ~7.9Kg in ~29days; (8.7%) clinically significant.  Height (cm): 167.6 (07-21-21 @ 14:34)  Weight (kg): 90.4 (07-21-21 @ 22:00)  BMI (kg/m2): 32.2 (07-21-21 @ 22:00)  BSA (m2): 2 (07-21-21 @ 22:00)        PPN RECOMMENDATIONS: via peripheral line  TOTAL VOLUME: 1800mL    65g Amino Acids  100g Dextrose  50g Lipids 20%   98 mEq NaCl  19 mEq NaAcetate  15 mMol NaPhos  30 mEq KCl  10 mEq KAcetate  0 mMol KPhos  8 mEq Calcium Gluconate  8 mEq Magnesium Sulfate  100 mg Thiamine  0 units Regular Insulin  1mL trace elements  10mL MVI    Total Calories: 1500Kcal (meeting ~83% of estimated calorie needs and ~57% of estimated protein needs)(osmolarity of 873)    ADDITIONAL RECOMMENDATIONS:  1) obtain triglyceride and LFT's weekly  2) daily lyte and magnesium and phos checks  3) POCT q6hrs; adjust insulin regimen prn to maintain tight glycemic control  4) strict I/O's  5) daily wt checks to track/trend changes.  6) monitor 48hr calorie count and D/C PN support if consuming >75% of meals/ENN.       *will continue to monitor and adjust prn daily*  Claudia Bermudez RD office: [phone]   Cell# [phone]

## 2021-09-02 LAB
ANION GAP SERPL CALC-SCNC: 2 MMOL/L — LOW (ref 5–17)
BASE EXCESS BLDA CALC-SCNC: 7.3 MMOL/L — HIGH (ref -2–2)
BLOOD GAS COMMENTS ARTERIAL: SIGNIFICANT CHANGE UP
BUN SERPL-MCNC: 16 MG/DL — SIGNIFICANT CHANGE UP (ref 7–23)
CALCIUM SERPL-MCNC: 9 MG/DL — SIGNIFICANT CHANGE UP (ref 8.5–10.1)
CHLORIDE SERPL-SCNC: 101 MMOL/L — SIGNIFICANT CHANGE UP (ref 96–108)
CO2 SERPL-SCNC: 35 MMOL/L — HIGH (ref 22–31)
CREAT SERPL-MCNC: 0.65 MG/DL — SIGNIFICANT CHANGE UP (ref 0.5–1.3)
GAS PNL BLDA: SIGNIFICANT CHANGE UP
GLUCOSE SERPL-MCNC: 164 MG/DL — HIGH (ref 70–99)
HCO3 BLDA-SCNC: 33 MMOL/L — HIGH (ref 21–29)
MAGNESIUM SERPL-MCNC: 1.9 MG/DL — SIGNIFICANT CHANGE UP (ref 1.6–2.6)
PCO2 BLDA: 60 MMHG — HIGH (ref 32–46)
PH BLDA: 7.36 — SIGNIFICANT CHANGE UP (ref 7.35–7.45)
PHOSPHATE SERPL-MCNC: 4.1 MG/DL — SIGNIFICANT CHANGE UP (ref 2.5–4.5)
PO2 BLDA: 91 MMHG — SIGNIFICANT CHANGE UP (ref 74–108)
POTASSIUM SERPL-MCNC: 3.8 MMOL/L — SIGNIFICANT CHANGE UP (ref 3.5–5.3)
POTASSIUM SERPL-SCNC: 3.8 MMOL/L — SIGNIFICANT CHANGE UP (ref 3.5–5.3)
SAO2 % BLDA: 97 % — HIGH (ref 92–96)
SODIUM SERPL-SCNC: 138 MMOL/L — SIGNIFICANT CHANGE UP (ref 135–145)
TRIGL SERPL-MCNC: 215 MG/DL — HIGH

## 2021-09-02 PROCEDURE — 99233 SBSQ HOSP IP/OBS HIGH 50: CPT

## 2021-09-02 RX ORDER — ELECTROLYTE SOLUTION,INJ
1 VIAL (ML) INTRAVENOUS
Refills: 0 | Status: DISCONTINUED | OUTPATIENT
Start: 2021-09-02 | End: 2021-09-02

## 2021-09-02 RX ORDER — I.V. FAT EMULSION 20 G/100ML
0.55 EMULSION INTRAVENOUS
Qty: 50 | Refills: 0 | Status: DISCONTINUED | OUTPATIENT
Start: 2021-09-02 | End: 2021-09-02

## 2021-09-02 RX ADMIN — AMLODIPINE BESYLATE 5 MILLIGRAM(S): 2.5 TABLET ORAL at 12:21

## 2021-09-02 RX ADMIN — SENNA PLUS 1 TABLET(S): 8.6 TABLET ORAL at 21:51

## 2021-09-02 RX ADMIN — ENOXAPARIN SODIUM 40 MILLIGRAM(S): 100 INJECTION SUBCUTANEOUS at 12:21

## 2021-09-02 RX ADMIN — BUDESONIDE AND FORMOTEROL FUMARATE DIHYDRATE 2 PUFF(S): 160; 4.5 AEROSOL RESPIRATORY (INHALATION) at 21:17

## 2021-09-02 RX ADMIN — Medication 10 MILLIGRAM(S): at 12:20

## 2021-09-02 RX ADMIN — INSULIN GLARGINE 8 UNIT(S): 100 INJECTION, SOLUTION SUBCUTANEOUS at 12:45

## 2021-09-02 RX ADMIN — POLYETHYLENE GLYCOL 3350 17 GRAM(S): 17 POWDER, FOR SOLUTION ORAL at 12:21

## 2021-09-02 RX ADMIN — PANTOPRAZOLE SODIUM 40 MILLIGRAM(S): 20 TABLET, DELAYED RELEASE ORAL at 12:46

## 2021-09-02 RX ADMIN — ENOXAPARIN SODIUM 40 MILLIGRAM(S): 100 INJECTION SUBCUTANEOUS at 21:51

## 2021-09-02 RX ADMIN — Medication 81 MILLIGRAM(S): at 12:20

## 2021-09-02 RX ADMIN — Medication 0.5 MILLIGRAM(S): at 21:59

## 2021-09-02 RX ADMIN — Medication 1: at 12:46

## 2021-09-02 RX ADMIN — DEXMEDETOMIDINE HYDROCHLORIDE IN 0.9% SODIUM CHLORIDE 4.52 MICROGRAM(S)/KG/HR: 4 INJECTION INTRAVENOUS at 07:02

## 2021-09-02 RX ADMIN — I.V. FAT EMULSION 20.83 GM/KG/DAY: 20 EMULSION INTRAVENOUS at 22:42

## 2021-09-02 RX ADMIN — Medication 1 EACH: at 22:41

## 2021-09-02 RX ADMIN — Medication 2: at 11:03

## 2021-09-02 RX ADMIN — LOSARTAN POTASSIUM 75 MILLIGRAM(S): 100 TABLET, FILM COATED ORAL at 12:20

## 2021-09-02 RX ADMIN — ATORVASTATIN CALCIUM 80 MILLIGRAM(S): 80 TABLET, FILM COATED ORAL at 21:51

## 2021-09-02 RX ADMIN — Medication 2000 UNIT(S): at 12:20

## 2021-09-02 RX ADMIN — Medication 3: at 17:43

## 2021-09-02 NOTE — CHART NOTE - NSCHARTNOTEFT_GEN_A_CORE
**  55 M with pmh HLD, dm, htn presents with 8 days onset of covid symptoms which included, cough, fevers, sob, hypoxia, fevers, diarrhea, chills and myalgias. tested positive 7/15.  Patient was unvaccinated. He thinks [facility]  Wife also had covid but her case was mild.  Admitted for Hypoxia to  on 7/21 and transferred to the ICU on 7/24 for HF NC O2.        ***Calorie count continues x 48 hr- results: Breakfast: 75% of fresh fruit, Ensure enlive 50%, Bagel 100%, eggs 100%, no lunch, Dinner: Pizza 10%, Soup 100%. Total intake= 670 calories/ 40 gm protein.   Total daily intake including PPN= 2170 calories/ 106gm protein meeting 100% of energy needs and 92% of protein needs. Discussed during IDR will continue with current PN regimen along with po diet since patient is unable to meet ENN via po diet only.         *Labs reviewed: no significant changes in lab values. POCT- hyperglycemia noted will cover current Dextrose 100gm with 5 units of Regular insulin and adjust prn.     09-02    138  |  101  |  16  ----------------------------<  164<H>  3.8   |  35<H>  |  0.65    Ca    9.0      02 Sep 2021 06:35  Phos  4.1     09-02  Mg     1.9     09-02    BMI: BMI (kg/m2): 32.2 (07-21-21 @ 22:00)  HbA1c: A1C with Estimated Average Glucose Result: 11.6 % (07-22-21 @ 07:57)    Glucose: POCT Blood Glucose.: 214 mg/dL (09-02-21 @ 10:23)    BP: 130/82 (09-02-21 @ 09:00) (102/81 - 146/86)  Lipid Panel: Date/Time: 08-31-21 @ 06:51  Cholesterol, Serum: --  Direct LDL: --  HDL Cholesterol, Serum: --  Total Cholesterol/HDL Ration Measurement: --  Triglycerides, Serum: 202  POCT Blood Glucose.: 214 mg/dL (02 Sep 2021 10:23)  POCT Blood Glucose.: 228 mg/dL (01 Sep 2021 21:25)  POCT Blood Glucose.: 240 mg/dL (01 Sep 2021 18:26)        I&O's Detail    01 Sep 2021 07:01  -  02 Sep 2021 07:00  --------------------------------------------------------  IN:    Dexmedetomidine: 367 mL    Fat Emulsion (Fish Oil &amp; Plant Based) 20% Infusion: 232.3 mL    Oral Fluid: 240 mL    PPN (Peripheral Parenteral Nutrition): 1705 mL  Total IN: 2544.3 mL    OUT:    Voided (mL): 2850 mL  Total OUT: 2850 mL    Total NET: -305.7 mL      *negative fluid status monitor and adjust IVF/PPN volume prn. Urine voided 2850 ml (weight gain noted however no recent weights obtained)       *linda score of 16; no PU documented.  Edema  1+ gen, 2+ right and left foot.  BM (+) 8/30 x 2      *continues to meet criteria for severe malnutrition*    Diet, Regular (08-20-21 @ 12:38)(allowed when on HIFLOW)    Estimated Needs: Based on 72Kg (adjusted IBW)   Calories: 2696-0260 Kcal (25-30 Kcal/Kg)  Protein:  115-130g (1.6-1.8 g/Kg)  Fluids:  7136-3043 mL (25-30 mL/Kg)    *Wt Hx: no new weights obtained since 8/24  92Kg (8/24)  82.5Kg (8/19): wt loss of ~7.9Kg in ~29days; (8.7%) clinically significant.  Height (cm): 167.6 (07-21-21 @ 14:34)  Weight (kg): 90.4 (07-21-21 @ 22:00)  BMI (kg/m2): 32.2 (07-21-21 @ 22:00)  BSA (m2): 2 (07-21-21 @ 22:00)        PPN RECOMMENDATIONS: via peripheral line  TOTAL VOLUME: 1800mL    65g Amino Acids  100g Dextrose  50g Lipids 20%   98 mEq NaCl  19 mEq NaAcetate  15 mMol NaPhos  30 mEq KCl  10 mEq KAcetate  0 mMol KPhos  8 mEq Calcium Gluconate  8 mEq Magnesium Sulfate  100 mg Thiamine  5 units Regular Insulin  1mL trace elements  10mL MVI    Total Calories: 1500Kcal (meeting ~83% of estimated calorie needs and ~57% of estimated protein needs)(osmolarity of 873)    ADDITIONAL RECOMMENDATIONS:  1) obtain triglyceride and LFT's weekly  2) daily lyte and magnesium and phos checks  3) POCT q6hrs; adjust insulin regimen prn to maintain tight glycemic control  4) strict I/O's  5) daily wt checks to track/trend changes.  6) monitor 48hr calorie count and D/C PN support if consuming >75% of meals/ENN.       *will continue to monitor and adjust prn daily*  Claudia Bermudez RD office: [phone]   Cell# [phone]

## 2021-09-02 NOTE — PROGRESS NOTE ADULT - SUBJECTIVE AND OBJECTIVE BOX
Patient is a 55y old  Male who presents with a chief complaint of cough/sob (23 Jul 2021 14:03)      HPI:  55 M with pmh HLD, dm, htn presents with 8 days onset of covid symptoms which included, cough, fevers, sob, hypoxia, fevers, diarrhea, chills and myalgias. TEsted positive 7/15. Wife endorsed he was becoming more sob of recently. On arrival hypoxic to 80s and tachypneic. NRB placed, presently on 15% and saturating 95%. Na 126 s/p 1L NS. CXR: Frandy infiltrates. Last scr 1.4 in 2019-> today 1.9 unknown if underlying ckd.  Patient not vaccinated.   Last seen by me in 2019  History of ROBERT and uncontrolled hypertension  Treated with IV Remdesivir and Decadron, now on Toci  SaO2 is 80-85% despite being on 100% NRB  ABG pending        8/24  Alternating between NIPPV and HFNC  8/25-8/26  covering for DR Arceo  remains on High Fio2 demand for ARDS post Covid pneumonia  8/27  uneventful overnight  remains on cpap setting overnight  high fio2 requirement still  o2 sat appears improved  not as labored with his breathing this am  8/28  he remains on HIGH FIO2 demand  data discussed with Intensivist Dr Espinoza and ICU RN  last cxr and all recent data discussed  repeat labs pending  8/29  data discussed with DR Espinoza  lasix, low dose stopped after second dose  remains on high fio2 but lowered 70%  8/30  covering for Dr Arceo  doing about the same  o2 sat 93-94% at bedside this am  Dr Arceo will resume in am  9/1  covering for Dr Arceo today  pt's status appears unchanged  still with reported desats on any movements  remains on PPN  recent data all reveiwd  RN staff at bedside    9/2  Patient on BiPAP  SaO2 85-95%    MEDICATIONS  (STANDING):  amLODIPine   Tablet 5 milliGRAM(s) Oral daily  aspirin  chewable 81 milliGRAM(s) Oral daily  atorvastatin 80 milliGRAM(s) Oral at bedtime  budesonide 160 MICROgram(s)/formoterol 4.5 MICROgram(s) Inhaler 2 Puff(s) Inhalation two times a day  cholecalciferol 2000 Unit(s) Oral daily  dexMEDEtomidine Infusion 0.2 MICROgram(s)/kG/Hr (4.52 mL/Hr) IV Continuous <Continuous>  dextrose 40% Gel 15 Gram(s) Oral once  dextrose 5%. 1000 milliLiter(s) (50 mL/Hr) IV Continuous <Continuous>  dextrose 5%. 1000 milliLiter(s) (100 mL/Hr) IV Continuous <Continuous>  dextrose 50% Injectable 25 Gram(s) IV Push once  dextrose 50% Injectable 12.5 Gram(s) IV Push once  dextrose 50% Injectable 25 Gram(s) IV Push once  enoxaparin Injectable 40 milliGRAM(s) SubCutaneous every 12 hours  fat emulsion (Fish Oil and Plant Based) 20% Infusion 20.8 mL/Hr (20.8 mL/Hr) IV Continuous <Continuous>  fat emulsion (Fish Oil and Plant Based) 20% Infusion 0.55 Gm/kG/Day (20.83 mL/Hr) IV Continuous <Continuous>  glucagon  Injectable 1 milliGRAM(s) IntraMuscular once  insulin glargine Injectable (LANTUS) 8 Unit(s) SubCutaneous <User Schedule>  insulin lispro (ADMELOG) corrective regimen sliding scale   SubCutaneous three times a day before meals  insulin lispro (ADMELOG) corrective regimen sliding scale   SubCutaneous at bedtime  losartan 75 milliGRAM(s) Oral daily  pantoprazole  Injectable 40 milliGRAM(s) IV Push daily  Parenteral Nutrition - Adult 1 Each (75 mL/Hr) TPN Continuous <Continuous>  predniSONE   Tablet 10 milliGRAM(s) Oral daily  senna 1 Tablet(s) Oral at bedtime      MEDICATIONS  (PRN):  acetaminophen   Tablet .. 650 milliGRAM(s) Oral every 4 hours PRN Temp greater or equal to 38C (100.4F), Mild Pain (1 - 3)  ALBUTerol    90 MICROgram(s) HFA Inhaler 2 Puff(s) Inhalation every 4 hours PRN Shortness of Breath and/or Wheezing  benzocaine 15 mG/menthol 3.6 mG (Sugar-Free) Lozenge 1 Lozenge Oral every 6 hours PRN Sore Throat  benzonatate 100 milliGRAM(s) Oral three times a day PRN Cough  hydrALAZINE Injectable 5 milliGRAM(s) IV Push every 6 hours PRN SBP>160  hydrocodone/homatropine Syrup 5 milliLiter(s) Oral every 6 hours PRN Cough  LORazepam   Injectable 0.5 milliGRAM(s) IV Push every 6 hours PRN Agitation  melatonin 6 milliGRAM(s) Oral at bedtime PRN Insomnia  morphine  - Injectable 2 milliGRAM(s) IV Push every 2 hours PRN SOB/Anxiety    ICU Vital Signs Last 24 Hrs  T(C): 36.1 (01 Sep 2021 06:00), Max: 36.9 (31 Aug 2021 09:00)  T(F): 96.9 (01 Sep 2021 06:00), Max: 98.4 (31 Aug 2021 09:00)  HR: 68 (01 Sep 2021 06:00) (61 - 97)  BP: 129/68 (01 Sep 2021 06:00) (111/61 - 146/86)  BP(mean): 83 (01 Sep 2021 06:00) (72 - 100)  ABP: --  ABP(mean): --  RR: 27 (01 Sep 2021 06:00) (15 - 35)  SpO2: 96% (01 Sep 2021 06:00) (82% - 100%)      I&O's Detail    31 Aug 2021 07:01  -  01 Sep 2021 07:00  --------------------------------------------------------  IN:    Dexmedetomidine: 371.9 mL    Fat Emulsion (Fish Oil &amp; Plant Based) 20% Infusion: 273.4 mL    PPN (Peripheral Parenteral Nutrition): 1816.7 mL  Total IN: 2462 mL    OUT:    Voided (mL): 1150 mL  Total OUT: 1150 mL    Total NET: 1312 mL      PHYSICAL EXAM  General Appearance: On CPAP, increased work of breathing- about the same  Lungs: coarse bilaterally  Heart: +S1,S2  Abdomen: Soft, non-tender, bowel sounds active   Extremities: no cyanosis or edema, no joint swelling  Skin: Skin color, texture normal, no rashes   Neurologic: Alert and oriented X3 , non focal, not disoriented    ECG:    LABS:                          7.5    11.23 )-----------( 248      ( 30 Aug 2021 07:04 )             23.6   08-30 09-01    138  |  102  |  14  ----------------------------<  153<H>  4.5   |  36<H>  |  0.70    Ca    8.4<L>      01 Sep 2021 05:47  Phos  4.7     09-01  Mg     1.9     09-01      138  |  100  |  20  ----------------------------<  179<H>  3.5   |  35<H>  |  0.68    Ca    8.5      30 Aug 2021 07:04  Phos  3.3     08-30  Mg     2.0     08-30    TPro  6.1  /  Alb  2.3<L>  /  TBili  0.6  /  DBili  x   /  AST  24  /  ALT  46  /  AlkPhos  81  08-28      Pro BNP 2446  repeat              08-26    136  |  102  |  18  ----------------------------<  87  3.7   |  34<H>  |  0.56    Ca    8.3<L>      26 Aug 2021 07:03  Phos  3.6     08-26  Mg     2.0     08-26    TPro  5.5<L>  /  Alb  2.1<L>  /  TBili  0.5  /  DBili  x   /  AST  23  /  ALT  51  /  AlkPhos  79  08-26                          8.0    11.96 )-----------( 278      ( 26 Aug 2021 07:03 )             26.1   08-26    136  |  102  |  18  ----------------------------<  87  3.7   |  34<H>  |  0.56    Ca    8.3<L>      26 Aug 2021 07:03  Phos  3.6     08-26  Mg     2.0     08-26    TPro  5.5<L>  /  Alb  2.1<L>  /  TBili  0.5  /  DBili  x   /  AST  23  /  ALT  51  /  AlkPhos  79  08-26                          8.5    12.89 )-----------( 269      ( 25 Aug 2021 07:10 )             26.7   08-25    138  |  103  |  20  ----------------------------<  143<H>  3.7   |  32<H>  |  0.58    Ca    8.1<L>      25 Aug 2021 07:10  Phos  3.7     08-25  Mg     2.0     08-25    TPro  5.6<L>  /  Alb  2.2<L>  /  TBili  0.5  /  DBili  x   /  AST  32  /  ALT  74  /  AlkPhos  103  08-24                          8.5    14.05 )-----------( 292      ( 18 Aug 2021 07:01 )             26.2   08-18    138  |  104  |  24<H>  ----------------------------<  78  4.1   |  29  |  1.03    Ca    8.8      18 Aug 2021 07:01    TPro  6.3  /  Alb  2.3<L>  /  TBili  0.6  /  DBili  x   /  AST  32  /  ALT  73  /  AlkPhos  168<H>  08-17                                           10.0   19.05 )-----------( 179      ( 08 Aug 2021 07:11 )             30.3   08-08    137  |  102  |  27<H>  ----------------------------<  121<H>  3.9   |  29  |  0.77    Ca    8.4<L>      08 Aug 2021 07:11  Phos  3.4     08-08  Mg     2.0     08-08           TPro  5.2<L>  /  Alb  2.3<L>  /  TBili  0.8  /  DBili  x   /  AST  81<H>  /  ALT  213<H>  /  AlkPhos  200<H>  08-06                            12.9   15.00 )-----------( 366      ( 26 Jul 2021 06:20 )             38.4   07-26          < from: Xray Chest 1 View- PORTABLE-Urgent (07.21.21 @ 15:33) >    PROCEDURE DATE:  07/21/2021          INTERPRETATION:  AP chest on July 21, 2021 at 3:27 PM. Patient is short of breath with cough and fever.    Heart magnified by technique.    There arescattered mid lower lung field infiltrates right greater than left possibly related: Pneumonia.    The lungs are clear on August 30, 2019.    IMPRESSION: Bilateral infiltrates as above.    < end of copied text >  < from: US Duplex Venous Lower Ext Complete, Bilateral (07.25.21 @ 08:42) >  COMPARISON: None available.    TECHNIQUE: Duplex sonography of the BILATERAL LOWER extremity veins with color and spectral Doppler, with and without compression.    FINDINGS:    RIGHT:  Normal compressibility of the RIGHT common femoral, femoral and popliteal veins.  Doppler examination shows normal spontaneous and phasic flow.  No RIGHT calf vein thrombosis is detected.    LEFT:  Normal compressibility of the LEFT common femoral, femoral and popliteal veins.  Doppler examination shows normal spontaneous and phasic flow.  No LEFT calf vein thrombosis is detected.    IMPRESSION:  No evidence of deep venous thrombosis in either lower extremity.    < end of copied text >  RADIOLOGY & ADDITIONAL STUDIES:    < from: Xray Chest 1 View- PORTABLE-Urgent (Xray Chest 1 View- PORTABLE-Urgent .) (07.26.21 @ 08:43) >    PROCEDURE DATE:  07/26/2021          INTERPRETATION:  Portable chest radiograph    CLINICAL INFORMATION: Pneumonia due to Covid 19.. Follow-up    TECHNIQUE:  Portable  AP view of the chest was obtained.    COMPARISON: 7/21/2021 chest available for review.    FINDINGS:    The lungs show stable bilateral  multifocal and diffuse ill-defined airspace opacities.. No pneumothorax.    The  heart is enlarged in transverse diameter. No hilar mass.     Visualized osseous structures are intact.    IMPRESSION:   Stable bilateral  multifocal and diffuse ill-defined airspace opacities..    < end of copied text >  < from: US Duplex Venous Lower Ext Complete, Bilateral (08.06.21 @ 17:16) >  FINDINGS:    RIGHT:  Normal compressibility of the RIGHT common femoral, femoral and popliteal veins.  Doppler examination shows normal spontaneous and phasic flow.  No RIGHT calf vein thrombosis is detected.    LEFT:  Normal compressibility of the LEFT common femoral, femoral and popliteal veins.  Doppler examination shows normal spontaneous and phasic flow.  No LEFT calf vein thrombosisis detected.    IMPRESSION:  No evidence of deep venous thrombosis in either lower extremity.    < end of copied text >  < from: Xray Chest 1 View- PORTABLE-Urgent (Xray Chest 1 View- PORTABLE-Urgent .) (08.06.21 @ 16:45) >  INTERPRETATION:  Portable chest radiograph    CLINICAL INFORMATION: Pneumonia due to Covid 19.    TECHNIQUE:  Portable  AP view of the chest was obtained.    COMPARISON: 8/1/2021 chest radiograph available for review.    FINDINGS:    The lungs show decreasing bilateral diffuse airspace disease.. No pneumothorax.    The  heart is mildly enlarged in transverse diameter. No hilar mass.   Visualized osseous structures are intact.    IMPRESSION:   Decreasing bilateral diffuse airspace disease..    < end of copied text >  xr< from: US Duplex Venous Lower Ext Complete, Bilateral (08.17.21 @ 08:58) >  COMPARISON: None available.    TECHNIQUE: Duplex sonography of the BILATERAL LOWER extremity veins with color and spectral Doppler, with and without compression.    FINDINGS:    RIGHT:  Normal compressibility of the RIGHT common femoral, femoral and popliteal veins.  Doppler examination shows normal spontaneous and phasic flow.  No RIGHT calf vein thrombosis is detected.    LEFT:  Normal compressibility of the LEFT common femoral, femoral and popliteal veins.  Doppler examination shows normal spontaneous and phasic flow.  No LEFT calf vein thrombosis is detected.    IMPRESSION:  No evidence of deep venous thrombosis in either lower extremity.    < end of copied text >  r< from: US Duplex Venous Lower Ext Complete, Bilateral (08.23.21 @ 12:53) >  PROCEDURE DATE:  08/23/2021          INTERPRETATION:  CLINICAL INFORMATION: 55 years  Male with r/o DVT    evaluate for DVT. Covid positive. Elevated d-dimer.    COMPARISON: None available.    TECHNIQUE: Duplex sonography of the BILATERAL LOWER extremity veins with color and spectral Doppler, with and without compression.    FINDINGS:    RIGHT:  Normal compressibility of the RIGHT common femoral, femoral and popliteal veins.  Doppler examination shows normal spontaneous and phasic flow.  No RIGHT calf vein thrombosis is detected.    LEFT:  Normal compressibility of the LEFT common femoral, femoral and popliteal veins.  Doppler examination shows normal spontaneous and phasic flow.  No LEFT calf vein thrombosis is detected.    IMPRESSION:  No evidence of deep venous thrombosis in either lower extremity.    < end of copied text >  < from: Xray Chest 1 View- PORTABLE-Urgent (Xray Chest 1 View- PORTABLE-Urgent .) (08.23.21 @ 11:06) >    PROCEDURE DATE:  08/23/2021          INTERPRETATION:  History: Dyspnea, Covid    Chest:  one view.    Comparison: 08/20/2021    AP radiograph of the chest demonstrates moderate diffuse alveolar infiltrates unchanged. The cardiac silhouette is normal in size. Osseous structures are intact.    Impression:moderate diffuse alveolar infiltrates unchanged.    < end of copied text >  r< from: Xray Chest 1 View- PORTABLE-Urgent (Xray Chest 1 View- PORTABLE-Urgent .) (08.23.21 @ 11:06) >  PROCEDURE DATE:  08/23/2021          INTERPRETATION:  History: Dyspnea, Covid    Chest:  one view.    Comparison: 08/20/2021    AP radiograph of the chest demonstrates moderate diffuse alveolar infiltrates unchanged. The cardiac silhouette is normal in size. Osseous structures are intact.    Impression:moderate diffuse alveolar infiltrates unchanged.    < end of copied text >

## 2021-09-02 NOTE — PROGRESS NOTE ADULT - TIME BILLING
54 y/o male admitted with:    Acute hypoxic resp failure and ARDS due to COVID-19 pneumonia (unvaccinated)   Severe protein calorie malnutrition     Hx HTN, DM2    Plan:   Neuro - wean Precedex, on Xanax for anxiety   CV - BP stable, on amlodipine, losartan, aspirin, statin  Resp - on HFNC 60L, 65%, slowly improving, continue CPAP at night, continue prednisone 10 mg daily for now, continue Symbicort   GI - po diet + PPN, will evaluate discontinuation of PPN in 1-2 days if po intake continues to improve  Renal - stable   ID - not currently infected, not on abx   Endo - on Lantus 8 units + sliding scale insulin   PPx - Lovenox   MSK - increase activity   Dispo - keep in ICU

## 2021-09-02 NOTE — PROGRESS NOTE ADULT - ASSESSMENT
55 M noted to have COVID 7/15  On arrival hypoxic to 80s and tachypneic. NRB placed,in the ER saturating 95%. Na 126 s/p 1L NS. CXR: Frandy infiltrates. Last scr 1.4 in 2019-> today 1.9 unknown if underlying ckd.  Treated with IV Remdesivir and Decadron, Tocilizumab   Given the obesity/hypertension and prior co-morbidities, he is at risk of intubation but continue HFNC, respiratory status remains critical  DDimer elevated, was on therapeutic lovenox, transitioned to 40mg q 12  Completed Solumedrol, Symbicort 160/4.5 BID ([email or web address], [facility] study). MICU service started Solumedrol 40mg q 8 hrs as of 8/18  Was on HFNC %FiO2 with BiPAP overnight, now on CPAP and transitions between HFNC/NIPPV  Respiratory status is tenuous and he continues to have an increased work of breathing; at this point he is at high risk of intubation  at high risk of intubation and mechanical ventilation  ICU level care appreciated  lasix 20 mg IVPB Qdaily x 3 days - stopped after second dose  Reviewed CXR  overall prognosis remains guarded but continuing to improve  SaO2 80-85%  Hgb 7.5-->7.9  repeat ABG 7.36/60/91 reviewed

## 2021-09-02 NOTE — PROGRESS NOTE ADULT - SUBJECTIVE AND OBJECTIVE BOX
Patient is a 55y old  Male who presents with a chief complaint of cough/sob (01 Sep 2021 12:49)    HPI:  55 M with pmh HLD, dm, htn presents with 8 days onset of covid symptoms which included, cough, fevers, sob, hypoxia, fevers, diarrhea, chills and myalgias. TEsted positive 7/15. Wife endorsed he was becoming more sob of recently. On arrival hypoxic to 80s and tachypneic. NRB placed, presently on 15% and saturating 95%. Na 126 s/p 1L NS. CXR: Frandy infiltrates. Last scr 1.4 in 2019-> today 1.9 unknown if underlying ckd. Dimer 450 - normal for age  however with covid and increasing fibrinogen, will need further eval.   Denies chets pain. LHC performed 2019 revealed normal coronaries.         PAST MEDICAL/SURGICAL/FAMILY/SOCIAL HISTORY:    Past Medical History:  Diabetes    HTN (hypertension).  No surgeries     Tobacco Usage:  · Tobacco Usage	non smoker  Fhx: none (21 Jul 2021 19:19)    24 hour events: ***    PAST MEDICAL & SURGICAL HISTORY:  HTN (hypertension)    Diabetes      REVIEW OF SYSTEMS  Constitutional: No fever, chills, fatigue  Neuro: No headache, numbness, weakness  Resp: No cough, wheezing, shortness of breath  CVS: No chest pain, palpitations, leg swelling  GI: No abdominal pain, nausea, vomiting, diarrhea   : No dysuria, frequency, incontinence  Skin: No itching, burning, rashes, or lesions   Msk: No joint pain or swelling  Psych: No depression, anxiety, mood swings  Heme: No bleeding    T(F): 97.1 (09-02-21 @ 14:00), Max: 98 (09-01-21 @ 18:00)  HR: 99 (09-02-21 @ 15:00) (59 - 99)  BP: 142/78 (09-02-21 @ 15:00) (107/91 - 142/78)  RR: 28 (09-02-21 @ 15:00) (17 - 33)  SpO2: 92% (09-02-21 @ 15:00) (86% - 100%)  Wt(kg): --      CAPILLARY BLOOD GLUCOSE  POCT Blood Glucose.: 175 mg/dL (02 Sep 2021 12:33)  POCT Blood Glucose.: 214 mg/dL (02 Sep 2021 10:23)  POCT Blood Glucose.: 228 mg/dL (01 Sep 2021 21:25)  POCT Blood Glucose.: 240 mg/dL (01 Sep 2021 18:26)    I&O's Summary    09-01 @ 07:01 - 09-02 @ 07:00  --------------------------------------------------------  IN: 2544.3 mL / OUT: 2850 mL / NET: -305.7 mL    09-02 @ 07:01 - 09-02 @ 16:33  --------------------------------------------------------  IN: 0 mL / OUT: 950 mL / NET: -950 mL    PHYSICAL EXAM  General:   CNS:   HEENT:   Resp:   CVS:   Abd:   Ext:   Skin:     MEDICATIONS  amLODIPine   Tablet Oral  hydrALAZINE Injectable IV Push PRN  losartan Oral  atorvastatin Oral  dextrose 40% Gel Oral  dextrose 50% Injectable IV Push  dextrose 50% Injectable IV Push  dextrose 50% Injectable IV Push  glucagon  Injectable IntraMuscular  insulin glargine Injectable (LANTUS) SubCutaneous  insulin lispro (ADMELOG) corrective regimen sliding scale SubCutaneous  insulin lispro (ADMELOG) corrective regimen sliding scale SubCutaneous  predniSONE   Tablet Oral  ALBUTerol    90 MICROgram(s) HFA Inhaler Inhalation PRN  benzonatate Oral PRN  budesonide 160 MICROgram(s)/formoterol 4.5 MICROgram(s) Inhaler Inhalation  acetaminophen   Tablet .. Oral PRN  ALPRAZolam Oral PRN  dexMEDEtomidine Infusion IV Continuous  melatonin Oral PRN  aspirin  chewable Oral  enoxaparin Injectable SubCutaneous  pantoprazole  Injectable IV Push  polyethylene glycol 3350 Oral PRN  senna Oral  cholecalciferol Oral  dextrose 5%. IV Continuous  dextrose 5%. IV Continuous  fat emulsion (Fish Oil and Plant Based) 20% Infusion IV Continuous  fat emulsion (Fish Oil and Plant Based) 20% Infusion IV Continuous  Parenteral Nutrition - Adult TPN Continuous  Parenteral Nutrition - Adult TPN Continuous  benzocaine 15 mG/menthol 3.6 mG (Sugar-Free) Lozenge Oral PRN        09-02    138  |  101  |  16  ----------------------------<  164<H>  3.8   |  35<H>  |  0.65    Ca    9.0      02 Sep 2021 06:35  Phos  4.1     09-02  Mg     1.9     09-02                    Radiology: ***    CENTRAL LINE: Y/N          DATE INSERTED:              REMOVE: Y/N  GOFF: Y/N                        DATE INSERTED:              REMOVE: Y/N  A-LINE: Y/N                       DATE INSERTED:              REMOVE: Y/N    GLOBAL ISSUE/BEST PRACTICE  Analgesia:   Sedation:   CAM-ICU:   HOB elevation: yes  Stress ulcer prophylaxis:   VTE prophylaxis:   Glycemic control:   Nutrition:     CODE STATUS: ***  [facility] discussion: Y Patient is a 55y old  Male who presents with a chief complaint of cough/sob (01 Sep 2021 12:49)    HPI:  55 M with pmh HLD, dm, htn presents with 8 days onset of covid symptoms which included, cough, fevers, sob, hypoxia, fevers, diarrhea, chills and myalgias. TEsted positive 7/15. Wife endorsed he was becoming more sob of recently. On arrival hypoxic to 80s and tachypneic. NRB placed, presently on 15% and saturating 95%. Na 126 s/p 1L NS. CXR: Frandy infiltrates. Last scr 1.4 in 2019-> today 1.9 unknown if underlying ckd. Dimer 450 - normal for age  however with covid and increasing fibrinogen, will need further eval.   Denies chets pain. LHC performed 2019 revealed normal coronaries.         PAST MEDICAL/SURGICAL/FAMILY/SOCIAL HISTORY:    Past Medical History:  Diabetes    HTN (hypertension).  No surgeries     Tobacco Usage:  · Tobacco Usage	non smoker  Fhx: none (21 Jul 2021 19:19)    24 hour events: doing well     PAST MEDICAL & SURGICAL HISTORY:  HTN (hypertension)    Diabetes      REVIEW OF SYSTEMS  Constitutional: No fever, chills, fatigue  Neuro: No headache, numbness, weakness  Resp: +shortness of breath  CVS: No chest pain, palpitations, leg swelling  GI: No abdominal pain, nausea, vomiting, diarrhea   : No dysuria, frequency, incontinence  Skin: No itching, burning, rashes, or lesions   Msk: No joint pain or swelling  Psych: No depression, anxiety, mood swings  Heme: No bleeding    T(F): 97.1 (09-02-21 @ 14:00), Max: 98 (09-01-21 @ 18:00)  HR: 99 (09-02-21 @ 15:00) (59 - 99)  BP: 142/78 (09-02-21 @ 15:00) (107/91 - 142/78)  RR: 28 (09-02-21 @ 15:00) (17 - 33)  SpO2: 92% (09-02-21 @ 15:00) (86% - 100%)  Wt(kg): --      CAPILLARY BLOOD GLUCOSE  POCT Blood Glucose.: 175 mg/dL (02 Sep 2021 12:33)  POCT Blood Glucose.: 214 mg/dL (02 Sep 2021 10:23)  POCT Blood Glucose.: 228 mg/dL (01 Sep 2021 21:25)  POCT Blood Glucose.: 240 mg/dL (01 Sep 2021 18:26)    I&O's Summary    09-01 @ 07:01 - 09-02 @ 07:00  --------------------------------------------------------  IN: 2544.3 mL / OUT: 2850 mL / NET: -305.7 mL    09-02 @ 07:01 - 09-02 @ 16:33  --------------------------------------------------------  IN: 0 mL / OUT: 950 mL / NET: -950 mL    PHYSICAL EXAM  General: sitting in chair, NAD   CNS: AAOx3, no focal deficits   HEENT: PERRL, dry mucosa   Resp: good AE b/l, scattered rales, no wheeze/stridor   CVS: S1S2 regular   Abd: soft, NT, +BS  Ext: no edema   Skin: warm     MEDICATIONS  amLODIPine   Tablet Oral  hydrALAZINE Injectable IV Push PRN  losartan Oral  atorvastatin Oral  dextrose 40% Gel Oral  dextrose 50% Injectable IV Push  dextrose 50% Injectable IV Push  dextrose 50% Injectable IV Push  glucagon  Injectable IntraMuscular  insulin glargine Injectable (LANTUS) SubCutaneous  insulin lispro (ADMELOG) corrective regimen sliding scale SubCutaneous  insulin lispro (ADMELOG) corrective regimen sliding scale SubCutaneous  predniSONE   Tablet Oral  ALBUTerol    90 MICROgram(s) HFA Inhaler Inhalation PRN  benzonatate Oral PRN  budesonide 160 MICROgram(s)/formoterol 4.5 MICROgram(s) Inhaler Inhalation  acetaminophen   Tablet .. Oral PRN  ALPRAZolam Oral PRN  dexMEDEtomidine Infusion IV Continuous  melatonin Oral PRN  aspirin  chewable Oral  enoxaparin Injectable SubCutaneous  pantoprazole  Injectable IV Push  polyethylene glycol 3350 Oral PRN  senna Oral  cholecalciferol Oral  dextrose 5%. IV Continuous  dextrose 5%. IV Continuous  fat emulsion (Fish Oil and Plant Based) 20% Infusion IV Continuous  fat emulsion (Fish Oil and Plant Based) 20% Infusion IV Continuous  Parenteral Nutrition - Adult TPN Continuous  Parenteral Nutrition - Adult TPN Continuous  benzocaine 15 mG/menthol 3.6 mG (Sugar-Free) Lozenge Oral PRN        09-02    138  |  101  |  16  ----------------------------<  164<H>  3.8   |  35<H>  |  0.65    Ca    9.0      02 Sep 2021 06:35  Phos  4.1     09-02  Mg     1.9     09-02

## 2021-09-03 LAB
ALBUMIN SERPL ELPH-MCNC: 2.6 G/DL — LOW (ref 3.3–5)
ALP SERPL-CCNC: 70 U/L — SIGNIFICANT CHANGE UP (ref 40–120)
ALT FLD-CCNC: 62 U/L — SIGNIFICANT CHANGE UP (ref 12–78)
ANION GAP SERPL CALC-SCNC: 3 MMOL/L — LOW (ref 5–17)
AST SERPL-CCNC: 27 U/L — SIGNIFICANT CHANGE UP (ref 15–37)
BASOPHILS # BLD AUTO: 0.08 K/UL — SIGNIFICANT CHANGE UP (ref 0–0.2)
BASOPHILS NFR BLD AUTO: 0.6 % — SIGNIFICANT CHANGE UP (ref 0–2)
BILIRUB SERPL-MCNC: 0.4 MG/DL — SIGNIFICANT CHANGE UP (ref 0.2–1.2)
BUN SERPL-MCNC: 14 MG/DL — SIGNIFICANT CHANGE UP (ref 7–23)
CALCIUM SERPL-MCNC: 8.7 MG/DL — SIGNIFICANT CHANGE UP (ref 8.5–10.1)
CHLORIDE SERPL-SCNC: 102 MMOL/L — SIGNIFICANT CHANGE UP (ref 96–108)
CO2 SERPL-SCNC: 34 MMOL/L — HIGH (ref 22–31)
CREAT SERPL-MCNC: 0.64 MG/DL — SIGNIFICANT CHANGE UP (ref 0.5–1.3)
EOSINOPHIL # BLD AUTO: 0.29 K/UL — SIGNIFICANT CHANGE UP (ref 0–0.5)
EOSINOPHIL NFR BLD AUTO: 2.1 % — SIGNIFICANT CHANGE UP (ref 0–6)
GLUCOSE SERPL-MCNC: 139 MG/DL — HIGH (ref 70–99)
HCT VFR BLD CALC: 25.4 % — LOW (ref 39–50)
HGB BLD-MCNC: 7.9 G/DL — LOW (ref 13–17)
IMM GRANULOCYTES NFR BLD AUTO: 1.3 % — SIGNIFICANT CHANGE UP (ref 0–1.5)
LYMPHOCYTES # BLD AUTO: 1.52 K/UL — SIGNIFICANT CHANGE UP (ref 1–3.3)
LYMPHOCYTES # BLD AUTO: 10.9 % — LOW (ref 13–44)
MAGNESIUM SERPL-MCNC: 1.9 MG/DL — SIGNIFICANT CHANGE UP (ref 1.6–2.6)
MCHC RBC-ENTMCNC: 27.4 PG — SIGNIFICANT CHANGE UP (ref 27–34)
MCHC RBC-ENTMCNC: 31.1 GM/DL — LOW (ref 32–36)
MCV RBC AUTO: 88.2 FL — SIGNIFICANT CHANGE UP (ref 80–100)
MONOCYTES # BLD AUTO: 0.74 K/UL — SIGNIFICANT CHANGE UP (ref 0–0.9)
MONOCYTES NFR BLD AUTO: 5.3 % — SIGNIFICANT CHANGE UP (ref 2–14)
NEUTROPHILS # BLD AUTO: 11.08 K/UL — HIGH (ref 1.8–7.4)
NEUTROPHILS NFR BLD AUTO: 79.8 % — HIGH (ref 43–77)
PHOSPHATE SERPL-MCNC: 4 MG/DL — SIGNIFICANT CHANGE UP (ref 2.5–4.5)
PLATELET # BLD AUTO: 262 K/UL — SIGNIFICANT CHANGE UP (ref 150–400)
POTASSIUM SERPL-MCNC: 3.8 MMOL/L — SIGNIFICANT CHANGE UP (ref 3.5–5.3)
POTASSIUM SERPL-SCNC: 3.8 MMOL/L — SIGNIFICANT CHANGE UP (ref 3.5–5.3)
PROT SERPL-MCNC: 6.3 GM/DL — SIGNIFICANT CHANGE UP (ref 6–8.3)
RBC # BLD: 2.88 M/UL — LOW (ref 4.2–5.8)
RBC # FLD: 14 % — SIGNIFICANT CHANGE UP (ref 10.3–14.5)
SODIUM SERPL-SCNC: 139 MMOL/L — SIGNIFICANT CHANGE UP (ref 135–145)
TRIGL SERPL-MCNC: 200 MG/DL — HIGH
WBC # BLD: 13.89 K/UL — HIGH (ref 3.8–10.5)
WBC # FLD AUTO: 13.89 K/UL — HIGH (ref 3.8–10.5)

## 2021-09-03 PROCEDURE — 99233 SBSQ HOSP IP/OBS HIGH 50: CPT

## 2021-09-03 RX ORDER — INSULIN LISPRO 100/ML
VIAL (ML) SUBCUTANEOUS AT BEDTIME
Refills: 0 | Status: DISCONTINUED | OUTPATIENT
Start: 2021-09-03 | End: 2021-09-03

## 2021-09-03 RX ORDER — INSULIN GLARGINE 100 [IU]/ML
15 INJECTION, SOLUTION SUBCUTANEOUS AT BEDTIME
Refills: 0 | Status: DISCONTINUED | OUTPATIENT
Start: 2021-09-03 | End: 2021-09-04

## 2021-09-03 RX ORDER — DEXTROSE 50 % IN WATER 50 %
12.5 SYRINGE (ML) INTRAVENOUS ONCE
Refills: 0 | Status: DISCONTINUED | OUTPATIENT
Start: 2021-09-03 | End: 2021-09-04

## 2021-09-03 RX ORDER — GLUCAGON INJECTION, SOLUTION 0.5 MG/.1ML
1 INJECTION, SOLUTION SUBCUTANEOUS ONCE
Refills: 0 | Status: DISCONTINUED | OUTPATIENT
Start: 2021-09-03 | End: 2021-09-04

## 2021-09-03 RX ORDER — DEXTROSE 50 % IN WATER 50 %
25 SYRINGE (ML) INTRAVENOUS ONCE
Refills: 0 | Status: DISCONTINUED | OUTPATIENT
Start: 2021-09-03 | End: 2021-09-04

## 2021-09-03 RX ORDER — INSULIN GLARGINE 100 [IU]/ML
4 INJECTION, SOLUTION SUBCUTANEOUS ONCE
Refills: 0 | Status: COMPLETED | OUTPATIENT
Start: 2021-09-03 | End: 2021-09-03

## 2021-09-03 RX ORDER — DEXTROSE 50 % IN WATER 50 %
15 SYRINGE (ML) INTRAVENOUS ONCE
Refills: 0 | Status: DISCONTINUED | OUTPATIENT
Start: 2021-09-03 | End: 2021-09-03

## 2021-09-03 RX ORDER — DEXTROSE 50 % IN WATER 50 %
25 SYRINGE (ML) INTRAVENOUS ONCE
Refills: 0 | Status: DISCONTINUED | OUTPATIENT
Start: 2021-09-03 | End: 2021-09-03

## 2021-09-03 RX ORDER — DEXTROSE 50 % IN WATER 50 %
15 SYRINGE (ML) INTRAVENOUS ONCE
Refills: 0 | Status: DISCONTINUED | OUTPATIENT
Start: 2021-09-03 | End: 2021-09-04

## 2021-09-03 RX ORDER — SODIUM CHLORIDE 9 MG/ML
1000 INJECTION, SOLUTION INTRAVENOUS
Refills: 0 | Status: DISCONTINUED | OUTPATIENT
Start: 2021-09-03 | End: 2021-09-04

## 2021-09-03 RX ORDER — INSULIN LISPRO 100/ML
VIAL (ML) SUBCUTANEOUS
Refills: 0 | Status: DISCONTINUED | OUTPATIENT
Start: 2021-09-03 | End: 2021-09-04

## 2021-09-03 RX ORDER — DEXTROSE 50 % IN WATER 50 %
12.5 SYRINGE (ML) INTRAVENOUS ONCE
Refills: 0 | Status: DISCONTINUED | OUTPATIENT
Start: 2021-09-03 | End: 2021-09-03

## 2021-09-03 RX ORDER — INSULIN LISPRO 100/ML
VIAL (ML) SUBCUTANEOUS
Refills: 0 | Status: DISCONTINUED | OUTPATIENT
Start: 2021-09-03 | End: 2021-09-03

## 2021-09-03 RX ADMIN — DEXMEDETOMIDINE HYDROCHLORIDE IN 0.9% SODIUM CHLORIDE 4.52 MICROGRAM(S)/KG/HR: 4 INJECTION INTRAVENOUS at 10:48

## 2021-09-03 RX ADMIN — INSULIN GLARGINE 8 UNIT(S): 100 INJECTION, SOLUTION SUBCUTANEOUS at 13:47

## 2021-09-03 RX ADMIN — ENOXAPARIN SODIUM 40 MILLIGRAM(S): 100 INJECTION SUBCUTANEOUS at 10:47

## 2021-09-03 RX ADMIN — LOSARTAN POTASSIUM 75 MILLIGRAM(S): 100 TABLET, FILM COATED ORAL at 10:48

## 2021-09-03 RX ADMIN — INSULIN GLARGINE 4 UNIT(S): 100 INJECTION, SOLUTION SUBCUTANEOUS at 17:52

## 2021-09-03 RX ADMIN — Medication 10 MILLIGRAM(S): at 10:48

## 2021-09-03 RX ADMIN — Medication 2: at 22:20

## 2021-09-03 RX ADMIN — DEXMEDETOMIDINE HYDROCHLORIDE IN 0.9% SODIUM CHLORIDE 4.52 MICROGRAM(S)/KG/HR: 4 INJECTION INTRAVENOUS at 19:29

## 2021-09-03 RX ADMIN — PANTOPRAZOLE SODIUM 40 MILLIGRAM(S): 20 TABLET, DELAYED RELEASE ORAL at 10:47

## 2021-09-03 RX ADMIN — Medication 2000 UNIT(S): at 10:48

## 2021-09-03 RX ADMIN — BUDESONIDE AND FORMOTEROL FUMARATE DIHYDRATE 2 PUFF(S): 160; 4.5 AEROSOL RESPIRATORY (INHALATION) at 14:00

## 2021-09-03 RX ADMIN — Medication 6: at 17:51

## 2021-09-03 RX ADMIN — ENOXAPARIN SODIUM 40 MILLIGRAM(S): 100 INJECTION SUBCUTANEOUS at 22:05

## 2021-09-03 RX ADMIN — AMLODIPINE BESYLATE 5 MILLIGRAM(S): 2.5 TABLET ORAL at 10:48

## 2021-09-03 RX ADMIN — SENNA PLUS 1 TABLET(S): 8.6 TABLET ORAL at 22:05

## 2021-09-03 RX ADMIN — Medication 81 MILLIGRAM(S): at 10:48

## 2021-09-03 RX ADMIN — BUDESONIDE AND FORMOTEROL FUMARATE DIHYDRATE 2 PUFF(S): 160; 4.5 AEROSOL RESPIRATORY (INHALATION) at 21:20

## 2021-09-03 RX ADMIN — Medication 0.5 MILLIGRAM(S): at 22:58

## 2021-09-03 RX ADMIN — ATORVASTATIN CALCIUM 80 MILLIGRAM(S): 80 TABLET, FILM COATED ORAL at 22:05

## 2021-09-03 RX ADMIN — Medication 2: at 13:46

## 2021-09-03 NOTE — PROGRESS NOTE ADULT - SUBJECTIVE AND OBJECTIVE BOX
Patient is a 55y old  Male who presents with a chief complaint of cough/sob (03 Sep 2021 15:47)      BRIEF HOSPITAL COURSE:   54 yo m pmhx DM2, HLD, HTN, unvaccinated admitted 7/21 with sob, hypoxic respiratory failure 2/2 COVID 19 complicated by ARDS s/p actemra, remdesivir and steroid taper.    Events last 24 hours:   On HFNC 60LPM and 55%, weaning as tolerated.  Hyperglycemic this evening. Given lantus 25U, ISS increased.       PAST MEDICAL & SURGICAL HISTORY:  HTN (hypertension)  Diabetes      Allergies  No Known Allergies      FAMILY HISTORY:  Unknown       Social History:   From home, , has children       Review of Systems:  +sob    Physical Examination:    General: Adult male, sitting in chair    PULM: Symmetrical thorax expansion upon respiration.     CVS: NSR    NEURO: Alert, oriented, interactive, nonfocal      Medications:  amLODIPine   Tablet 5 milliGRAM(s) Oral daily  hydrALAZINE Injectable 5 milliGRAM(s) IV Push every 6 hours PRN  losartan 75 milliGRAM(s) Oral daily  ALBUTerol    90 MICROgram(s) HFA Inhaler 2 Puff(s) Inhalation every 4 hours PRN  benzonatate 100 milliGRAM(s) Oral three times a day PRN  budesonide 160 MICROgram(s)/formoterol 4.5 MICROgram(s) Inhaler 2 Puff(s) Inhalation two times a day  acetaminophen   Tablet .. 650 milliGRAM(s) Oral every 4 hours PRN  ALPRAZolam 0.5 milliGRAM(s) Oral every 6 hours PRN  dexMEDEtomidine Infusion 0.2 MICROgram(s)/kG/Hr IV Continuous <Continuous>  melatonin 6 milliGRAM(s) Oral at bedtime PRN  aspirin  chewable 81 milliGRAM(s) Oral daily  enoxaparin Injectable 40 milliGRAM(s) SubCutaneous every 12 hours  polyethylene glycol 3350 17 Gram(s) Oral daily PRN  senna 1 Tablet(s) Oral at bedtime  atorvastatin 80 milliGRAM(s) Oral at bedtime  dextrose 40% Gel 15 Gram(s) Oral once  dextrose 50% Injectable 25 Gram(s) IV Push once  dextrose 50% Injectable 12.5 Gram(s) IV Push once  dextrose 50% Injectable 25 Gram(s) IV Push once  glucagon  Injectable 1 milliGRAM(s) IntraMuscular once  insulin glargine Injectable (LANTUS) 15 Unit(s) SubCutaneous at bedtime  insulin lispro (ADMELOG) corrective regimen sliding scale   SubCutaneous Before meals and at bedtime  cholecalciferol 2000 Unit(s) Oral daily  dextrose 5%. 1000 milliLiter(s) IV Continuous <Continuous>  dextrose 5%. 1000 milliLiter(s) IV Continuous <Continuous>  fat emulsion (Fish Oil and Plant Based) 20% Infusion 20.8 mL/Hr IV Continuous <Continuous>  Parenteral Nutrition - Adult 1 Each TPN Continuous <Continuous>  benzocaine 15 mG/menthol 3.6 mG (Sugar-Free) Lozenge 1 Lozenge Oral every 6 hours PRN      ICU Vital Signs Last 24 Hrs  T(C): 37.2 (03 Sep 2021 20:00), Max: 37.2 (03 Sep 2021 04:00)  T(F): 98.9 (03 Sep 2021 20:00), Max: 98.9 (03 Sep 2021 04:00)  HR: 89 (03 Sep 2021 23:27) (73 - 105)  BP: 147/102 (03 Sep 2021 23:00) (115/86 - 149/86)  BP(mean): 110 (03 Sep 2021 23:00) (88 - 110)  ABP: --  ABP(mean): --  RR: 29 (03 Sep 2021 23:00) (21 - 41)  SpO2: 92% (03 Sep 2021 23:27) (86% - 100%)    Vital Signs Last 24 Hrs  T(C): 37.2 (03 Sep 2021 20:00), Max: 37.2 (03 Sep 2021 04:00)  T(F): 98.9 (03 Sep 2021 20:00), Max: 98.9 (03 Sep 2021 04:00)  HR: 89 (03 Sep 2021 23:27) (73 - 105)  BP: 147/102 (03 Sep 2021 23:00) (115/86 - 149/86)  BP(mean): 110 (03 Sep 2021 23:00) (88 - 110)  RR: 29 (03 Sep 2021 23:00) (21 - 41)  SpO2: 92% (03 Sep 2021 23:27) (86% - 100%)    ABG - ( 02 Sep 2021 07:02 )  pH, Arterial: 7.36  pH, Blood: x     /  pCO2: 60    /  pO2: 91    / HCO3: 33    / Base Excess: 7.3   /  SaO2: 97          I&O's Detail    02 Sep 2021 07:01  -  03 Sep 2021 07:00  --------------------------------------------------------  IN:    Dexmedetomidine: 289 mL    Fat Emulsion (Fish Oil &amp; Plant Based) 20% Infusion: 125 mL    Fat Emulsion (Fish Oil &amp; Plant Based) 20% Infusion: 130 mL    Oral Fluid: 1730 mL    PPN (Peripheral Parenteral Nutrition): 1653 mL  Total IN: 3927 mL    OUT:    Voided (mL): 4800 mL  Total OUT: 4800 mL  Total NET: -873 mL      03 Sep 2021 07:01  -  03 Sep 2021 23:37  --------------------------------------------------------  IN:    Dexmedetomidine: 149 mL    Fat Emulsion (Fish Oil &amp; Plant Based) 20% Infusion: 250 mL    PPN (Peripheral Parenteral Nutrition): 1031 mL  Total IN: 1430 mL    OUT:    Voided (mL): 1600 mL  Total OUT: 1600 mL  Total NET: -170 mL      LABS:                        7.9    13.89 )-----------( 262      ( 03 Sep 2021 06:53 )             25.4     09-03    139  |  102  |  14  ----------------------------<  139<H>  3.8   |  34<H>  |  0.64    Ca    8.7      03 Sep 2021 06:53  Phos  4.0     09-03  Mg     1.9     09-03    TPro  6.3  /  Alb  2.6<L>  /  TBili  0.4  /  DBili  x   /  AST  27  /  ALT  62  /  AlkPhos  70  09-03      CAPILLARY BLOOD GLUCOSE  POCT Blood Glucose.: 323 mg/dL (03 Sep 2021 22:10)      CULTURES:  COVID+      RADIOLOGY:   < from: Xray Chest 1 View AP/PA. (08.30.21 @ 06:58) >  EXAM:  XR CHEST 1 VIEW                          PROCEDURE DATE:  08/30/2021      INTERPRETATION:  Chest one view    HISTORY: Covid-19    COMPARISON STUDY: 8/23/2021    Frontal expiratory view of the chest shows the heart to be similar in size. The lungs show partial clearing and there is no evidence of pneumothorax nor pleural effusion.    IMPRESSION:  Resolving infiltrates.    Thank you for the courtesy of this referral.    --- End of Report ---    NEHAL LANIER MD; Attending Interventional Radiologist  This document has been electronically signed. Aug 30 2021  3:19PM    < end of copied text >      SUPPLEMENTAL O2: HFNC/CPAP  LINES: Peripheral   IVF: N  GOFF: N  PPx: Lovenox  CONTACT: Y, COVID

## 2021-09-03 NOTE — CHART NOTE - NSCHARTNOTEFT_GEN_A_CORE
**  55 M with pmh HLD, dm, htn presents with 8 days onset of covid symptoms which included, cough, fevers, sob, hypoxia, fevers, diarrhea, chills and myalgias. tested positive 7/15.  Patient was unvaccinated. He thinks he got it going to daughters dance [facility] week of July 4 th.  Wife also had covid but her case was mild.  Admitted for Hypoxia to  on 7/21 and transferred to the ICU on 7/24 for HF NC O2.      ***Calorie count continues x 48 hr- results: Breakfast: 75% of fresh fruit, Ensure enlive 50%, Bagel 100%, eggs 100%, no lunch, Dinner: Pizza 10%, Soup 100%. Total intake= 670 calories/ 40 gm protein.   Total daily intake including PPN= 2170 calories/ 106gm protein meeting 100% of energy needs and 92% of protein needs. Discussed during IDR will continue with current PN regimen along with po diet since patient is unable to meet ENN via po diet only.         *Labs reviewed: no significant changes in lab values. POCT- hyperglycemia noted will cover current Dextrose 100gm with 5 units of Regular insulin and adjust prn.     09-03    139  |  102  |  14  ----------------------------<  139<H>  3.8   |  34<H>  |  0.64    Ca    8.7      03 Sep 2021 06:53  Phos  4.0     09-03  Mg     1.9     09-03    TPro  6.3  /  Alb  2.6<L>  /  TBili  0.4  /  DBili  x   /  AST  27  /  ALT  62  /  AlkPhos  70  09-03      BMI: BMI (kg/m2): 32.2 (07-21-21 @ 22:00)  HbA1c: A1C with Estimated Average Glucose Result: 11.6 % (07-22-21 @ 07:57)    Glucose: POCT Blood Glucose.: 201 mg/dL (09-02-21 @ 21:53)    BP: 137/90 (09-03-21 @ 08:00) (104/59 - 146/86)  Lipid Panel: Date/Time: 09-02-21 @ 06:35  Cholesterol, Serum: --  Direct LDL: --  HDL Cholesterol, Serum: --  Total Cholesterol/HDL Ration Measurement: --  Triglycerides, Serum: 215    POCT Blood Glucose.: 201 mg/dL (02 Sep 2021 21:53)  POCT Blood Glucose.: 296 mg/dL (02 Sep 2021 17:35)  POCT Blood Glucose.: 175 mg/dL (02 Sep 2021 12:33)  POCT Blood Glucose.: 214 mg/dL (02 Sep 2021 10:23)    I&O's Detail    02 Sep 2021 07:01  -  03 Sep 2021 07:00  --------------------------------------------------------  IN:    Dexmedetomidine: 289 mL    Fat Emulsion (Fish Oil &amp; Plant Based) 20% Infusion: 125 mL    Fat Emulsion (Fish Oil &amp; Plant Based) 20% Infusion: 130 mL    Oral Fluid: 1730 mL    PPN (Peripheral Parenteral Nutrition): 1653 mL  Total IN: 3927 mL    OUT:    Voided (mL): 4800 mL  Total OUT: 4800 mL    Total NET: -873 mL      *negative fluid status monitor and adjust IVF/PPN volume prn. Urine voided 4800 ml.      *linda score of 16; no PU documented.  Edema 2+ right and left foot.  BM (+) 8/30 x 2      *continues to meet criteria for severe malnutrition*    Diet, Regular (08-20-21 @ 12:38)(allowed when on HIFLOW)    Estimated Needs: Based on 72Kg (adjusted IBW)   Calories: 4266-0998 Kcal (25-30 Kcal/Kg)  Protein:  115-130g (1.6-1.8 g/Kg)  Fluids:  3303-0961 mL (25-30 mL/Kg)    *Wt Hx: no new weights obtained since 8/24  92Kg (8/24)  82.5Kg (8/19): wt loss of ~7.9Kg in ~29days; (8.7%) clinically significant.  Height (cm): 167.6 (07-21-21 @ 14:34)  Weight (kg): 90.4 (07-21-21 @ 22:00)  BMI (kg/m2): 32.2 (07-21-21 @ 22:00)  BSA (m2): 2 (07-21-21 @ 22:00)      PPN RECOMMENDATIONS: via peripheral line  TOTAL VOLUME: 1800mL    65g Amino Acids  100g Dextrose  50g Lipids 20%   98 mEq NaCl  19 mEq NaAcetate  15 mMol NaPhos  30 mEq KCl  10 mEq KAcetate  0 mMol KPhos  8 mEq Calcium Gluconate  8 mEq Magnesium Sulfate  100 mg Thiamine  5 units Regular Insulin  1mL trace elements  10mL MVI    Total Calories: 1050 Kcal (meeting ~58% of estimated calorie needs and ~57% of estimated protein needs)(osmolarity of 873)    ADDITIONAL RECOMMENDATIONS:  1) obtain triglyceride and LFT's weekly  2) daily lyte and magnesium and phos checks  3) POCT q6hrs; adjust insulin regimen prn to maintain tight glycemic control  4) strict I/O's  5) daily wt checks to track/trend changes.  6) monitor 48hr calorie count and D/C PN support if consuming >75% of meals/ENN.       *will continue to monitor and adjust prn daily*

## 2021-09-03 NOTE — PROGRESS NOTE ADULT - TIME BILLING
56 y/o male admitted with:    Acute hypoxic resp failure and ARDS due to COVID-19 pneumonia (unvaccinated)   Severe protein calorie malnutrition     Hx HTN, DM2    Plan:   Neuro - wean Precedex, on Xanax for anxiety   CV - BP stable, on amlodipine, losartan, aspirin, statin  Resp - weaning fio2 on HFNC and CPAP, decrease prednisone to 7.5 mg daily from tomorrow, continue Symbicort   GI - hold PPN for 1-2 days, encourage po diet. D/c PPI   Renal - stable   ID - WBC increased but no fever or any other signs of infection, monitor off abx   Endo - glucose uncontrolled - increase Lantus to 12 units daily    PPx - Lovenox   MSK - increase activity   Dispo - keep in ICU

## 2021-09-03 NOTE — PROGRESS NOTE ADULT - ASSESSMENT
56 yo m pmhx DM2, HLD, HTN, unvaccinated admitted 7/21 with sob, hypoxic respiratory failure 2/2 COVID 19.    NEURO: Precedex and PRN xanax for anxiolytic therapy.   CV: Amlodipine and losartan for BP control. Hydralazine prn. HLD on statin therapy.   RESP: Hypoxic Respiratory Failure on HFNC 60LPM and 55%, spo2 86-94%, CPAP qHS. inhaler prn. prednisone.   RENAL: Monitor lytes, replace as needed.   GI: Diet changed to Consistent Carb diet.   ENDO: Hyperglycemic, started on Lantus 15U qHS and increased ISS.   ID: COVID weaned to Prednisone 7.5mg PO daily.   HEME: Lovenox for VTE ppx  DISPO: Full code.

## 2021-09-03 NOTE — PROGRESS NOTE ADULT - SUBJECTIVE AND OBJECTIVE BOX
Patient is a 55y old  Male who presents with a chief complaint of cough/sob (02 Sep 2021 16:33)    HPI:  55 M with pmh HLD, dm, htn presents with 8 days onset of covid symptoms which included, cough, fevers, sob, hypoxia, fevers, diarrhea, chills and myalgias. TEsted positive 7/15. Wife endorsed he was becoming more sob of recently. On arrival hypoxic to 80s and tachypneic. NRB placed, presently on 15% and saturating 95%. Na 126 s/p 1L NS. CXR: Frandy infiltrates. Last scr 1.4 in 2019-> today 1.9 unknown if underlying ckd. Dimer 450 - normal for age  however with covid and increasing fibrinogen, will need further eval.   Denies chets pain. LHC performed 2019 revealed normal coronaries.         PAST MEDICAL/SURGICAL/FAMILY/SOCIAL HISTORY:    Past Medical History:  Diabetes    HTN (hypertension).  No surgeries     Tobacco Usage:  · Tobacco Usage	non smoker  Fhx: none (21 Jul 2021 19:19)    24 hour events: slowly improving   weaning fio2    PAST MEDICAL & SURGICAL HISTORY:  HTN (hypertension)    Diabetes      REVIEW OF SYSTEMS  Constitutional: No fever, chills, fatigue  Neuro: No headache, numbness, weakness  Resp: +shortness of breath  CVS: No chest pain, palpitations, leg swelling  GI: No abdominal pain, nausea, vomiting, diarrhea   : No dysuria, frequency, incontinence  Skin: No itching, burning, rashes, or lesions   Msk: No joint pain or swelling  Psych: +anxiety, No depression, mood swings  Heme: No bleeding    T(F): 98.9 (09-03-21 @ 04:00), Max: 98.9 (09-02-21 @ 20:00)  HR: 102 (09-03-21 @ 15:00) (73 - 105)  BP: 146/92 (09-03-21 @ 15:00) (115/86 - 149/86)  RR: 28 (09-03-21 @ 15:00) (21 - 38)  SpO2: 87% (09-03-21 @ 15:00) (86% - 100%)  Wt(kg): --      CAPILLARY BLOOD GLUCOSE  POCT Blood Glucose.: 231 mg/dL (03 Sep 2021 13:45)  POCT Blood Glucose.: 201 mg/dL (02 Sep 2021 21:53)  POCT Blood Glucose.: 296 mg/dL (02 Sep 2021 17:35)    I&O's Summary    09-02 @ 07:01  -  09-03 @ 07:00  --------------------------------------------------------  IN: 3927 mL / OUT: 4800 mL / NET: -873 mL    PHYSICAL EXAM  General: sitting in chair, NAD   CNS: AAOx3, no focal deficits   HEENT: PERRL, dry mucosa   Resp: good AE b/l, scattered rales, no wheeze/stridor   CVS: S1S2 regular   Abd: soft, NT, +BS  Ext: no edema   Skin: warm     MEDICATIONS  amLODIPine   Tablet Oral  hydrALAZINE Injectable IV Push PRN  losartan Oral  atorvastatin Oral  dextrose 40% Gel Oral  dextrose 50% Injectable IV Push  dextrose 50% Injectable IV Push  dextrose 50% Injectable IV Push  glucagon  Injectable IntraMuscular  insulin glargine Injectable (LANTUS) SubCutaneous  insulin lispro (ADMELOG) corrective regimen sliding scale SubCutaneous  insulin lispro (ADMELOG) corrective regimen sliding scale SubCutaneous  ALBUTerol    90 MICROgram(s) HFA Inhaler Inhalation PRN  benzonatate Oral PRN  budesonide 160 MICROgram(s)/formoterol 4.5 MICROgram(s) Inhaler Inhalation  acetaminophen   Tablet .. Oral PRN  ALPRAZolam Oral PRN  dexMEDEtomidine Infusion IV Continuous  melatonin Oral PRN  aspirin  chewable Oral  enoxaparin Injectable SubCutaneous  polyethylene glycol 3350 Oral PRN  senna Oral  cholecalciferol Oral  dextrose 5%. IV Continuous  dextrose 5%. IV Continuous  fat emulsion (Fish Oil and Plant Based) 20% Infusion IV Continuous  fat emulsion (Fish Oil and Plant Based) 20% Infusion IV Continuous  Parenteral Nutrition - Adult TPN Continuous  benzocaine 15 mG/menthol 3.6 mG (Sugar-Free) Lozenge Oral PRN                          7.9    13.89 )-----------( 262      ( 03 Sep 2021 06:53 )             25.4       09-03    139  |  102  |  14  ----------------------------<  139<H>  3.8   |  34<H>  |  0.64    Ca    8.7      03 Sep 2021 06:53  Phos  4.0     09-03  Mg     1.9     09-03    TPro  6.3  /  Alb  2.6<L>  /  TBili  0.4  /  DBili  x   /  AST  27  /  ALT  62  /  AlkPhos  70  09-03

## 2021-09-04 LAB
ANION GAP SERPL CALC-SCNC: 5 MMOL/L — SIGNIFICANT CHANGE UP (ref 5–17)
BASOPHILS # BLD AUTO: 0.09 K/UL — SIGNIFICANT CHANGE UP (ref 0–0.2)
BASOPHILS NFR BLD AUTO: 0.6 % — SIGNIFICANT CHANGE UP (ref 0–2)
BUN SERPL-MCNC: 14 MG/DL — SIGNIFICANT CHANGE UP (ref 7–23)
CALCIUM SERPL-MCNC: 9 MG/DL — SIGNIFICANT CHANGE UP (ref 8.5–10.1)
CHLORIDE SERPL-SCNC: 103 MMOL/L — SIGNIFICANT CHANGE UP (ref 96–108)
CO2 SERPL-SCNC: 31 MMOL/L — SIGNIFICANT CHANGE UP (ref 22–31)
CREAT SERPL-MCNC: 0.6 MG/DL — SIGNIFICANT CHANGE UP (ref 0.5–1.3)
EOSINOPHIL # BLD AUTO: 0.3 K/UL — SIGNIFICANT CHANGE UP (ref 0–0.5)
EOSINOPHIL NFR BLD AUTO: 2 % — SIGNIFICANT CHANGE UP (ref 0–6)
GLUCOSE SERPL-MCNC: 108 MG/DL — HIGH (ref 70–99)
HCT VFR BLD CALC: 26.1 % — LOW (ref 39–50)
HGB BLD-MCNC: 7.9 G/DL — LOW (ref 13–17)
IMM GRANULOCYTES NFR BLD AUTO: 1.2 % — SIGNIFICANT CHANGE UP (ref 0–1.5)
LYMPHOCYTES # BLD AUTO: 1.88 K/UL — SIGNIFICANT CHANGE UP (ref 1–3.3)
LYMPHOCYTES # BLD AUTO: 12.6 % — LOW (ref 13–44)
MCHC RBC-ENTMCNC: 27 PG — SIGNIFICANT CHANGE UP (ref 27–34)
MCHC RBC-ENTMCNC: 30.3 GM/DL — LOW (ref 32–36)
MCV RBC AUTO: 89.1 FL — SIGNIFICANT CHANGE UP (ref 80–100)
MONOCYTES # BLD AUTO: 0.96 K/UL — HIGH (ref 0–0.9)
MONOCYTES NFR BLD AUTO: 6.5 % — SIGNIFICANT CHANGE UP (ref 2–14)
NEUTROPHILS # BLD AUTO: 11.47 K/UL — HIGH (ref 1.8–7.4)
NEUTROPHILS NFR BLD AUTO: 77.1 % — HIGH (ref 43–77)
PLATELET # BLD AUTO: 266 K/UL — SIGNIFICANT CHANGE UP (ref 150–400)
POTASSIUM SERPL-MCNC: 3.6 MMOL/L — SIGNIFICANT CHANGE UP (ref 3.5–5.3)
POTASSIUM SERPL-SCNC: 3.6 MMOL/L — SIGNIFICANT CHANGE UP (ref 3.5–5.3)
RBC # BLD: 2.93 M/UL — LOW (ref 4.2–5.8)
RBC # FLD: 14.2 % — SIGNIFICANT CHANGE UP (ref 10.3–14.5)
SODIUM SERPL-SCNC: 139 MMOL/L — SIGNIFICANT CHANGE UP (ref 135–145)
WBC # BLD: 14.88 K/UL — HIGH (ref 3.8–10.5)
WBC # FLD AUTO: 14.88 K/UL — HIGH (ref 3.8–10.5)

## 2021-09-04 PROCEDURE — 71045 X-RAY EXAM CHEST 1 VIEW: CPT | Mod: 26

## 2021-09-04 PROCEDURE — 99233 SBSQ HOSP IP/OBS HIGH 50: CPT

## 2021-09-04 RX ORDER — INSULIN GLARGINE 100 [IU]/ML
12 INJECTION, SOLUTION SUBCUTANEOUS
Refills: 0 | Status: DISCONTINUED | OUTPATIENT
Start: 2021-09-05 | End: 2021-09-18

## 2021-09-04 RX ORDER — DEXTROSE 50 % IN WATER 50 %
25 SYRINGE (ML) INTRAVENOUS ONCE
Refills: 0 | Status: DISCONTINUED | OUTPATIENT
Start: 2021-09-04 | End: 2021-09-24

## 2021-09-04 RX ORDER — DEXTROSE 50 % IN WATER 50 %
12.5 SYRINGE (ML) INTRAVENOUS ONCE
Refills: 0 | Status: DISCONTINUED | OUTPATIENT
Start: 2021-09-04 | End: 2021-09-24

## 2021-09-04 RX ORDER — INSULIN LISPRO 100/ML
VIAL (ML) SUBCUTANEOUS AT BEDTIME
Refills: 0 | Status: DISCONTINUED | OUTPATIENT
Start: 2021-09-04 | End: 2021-09-24

## 2021-09-04 RX ORDER — DEXTROSE 50 % IN WATER 50 %
15 SYRINGE (ML) INTRAVENOUS ONCE
Refills: 0 | Status: DISCONTINUED | OUTPATIENT
Start: 2021-09-04 | End: 2021-09-24

## 2021-09-04 RX ORDER — SODIUM CHLORIDE 9 MG/ML
1000 INJECTION, SOLUTION INTRAVENOUS
Refills: 0 | Status: DISCONTINUED | OUTPATIENT
Start: 2021-09-04 | End: 2021-09-24

## 2021-09-04 RX ORDER — INSULIN LISPRO 100/ML
VIAL (ML) SUBCUTANEOUS
Refills: 0 | Status: DISCONTINUED | OUTPATIENT
Start: 2021-09-04 | End: 2021-09-24

## 2021-09-04 RX ORDER — GLUCAGON INJECTION, SOLUTION 0.5 MG/.1ML
1 INJECTION, SOLUTION SUBCUTANEOUS ONCE
Refills: 0 | Status: DISCONTINUED | OUTPATIENT
Start: 2021-09-04 | End: 2021-10-05

## 2021-09-04 RX ADMIN — DEXMEDETOMIDINE HYDROCHLORIDE IN 0.9% SODIUM CHLORIDE 4.52 MICROGRAM(S)/KG/HR: 4 INJECTION INTRAVENOUS at 12:39

## 2021-09-04 RX ADMIN — SENNA PLUS 1 TABLET(S): 8.6 TABLET ORAL at 23:03

## 2021-09-04 RX ADMIN — DEXMEDETOMIDINE HYDROCHLORIDE IN 0.9% SODIUM CHLORIDE 4.52 MICROGRAM(S)/KG/HR: 4 INJECTION INTRAVENOUS at 04:49

## 2021-09-04 RX ADMIN — AMLODIPINE BESYLATE 5 MILLIGRAM(S): 2.5 TABLET ORAL at 09:19

## 2021-09-04 RX ADMIN — DEXMEDETOMIDINE HYDROCHLORIDE IN 0.9% SODIUM CHLORIDE 4.52 MICROGRAM(S)/KG/HR: 4 INJECTION INTRAVENOUS at 23:04

## 2021-09-04 RX ADMIN — Medication 2000 UNIT(S): at 09:19

## 2021-09-04 RX ADMIN — ENOXAPARIN SODIUM 40 MILLIGRAM(S): 100 INJECTION SUBCUTANEOUS at 09:20

## 2021-09-04 RX ADMIN — BUDESONIDE AND FORMOTEROL FUMARATE DIHYDRATE 2 PUFF(S): 160; 4.5 AEROSOL RESPIRATORY (INHALATION) at 08:30

## 2021-09-04 RX ADMIN — Medication 7.5 MILLIGRAM(S): at 09:20

## 2021-09-04 RX ADMIN — Medication 0.5 MILLIGRAM(S): at 23:25

## 2021-09-04 RX ADMIN — ENOXAPARIN SODIUM 40 MILLIGRAM(S): 100 INJECTION SUBCUTANEOUS at 23:02

## 2021-09-04 RX ADMIN — Medication 3: at 17:52

## 2021-09-04 RX ADMIN — Medication 81 MILLIGRAM(S): at 09:20

## 2021-09-04 RX ADMIN — LOSARTAN POTASSIUM 75 MILLIGRAM(S): 100 TABLET, FILM COATED ORAL at 09:20

## 2021-09-04 RX ADMIN — Medication 100 MILLIGRAM(S): at 23:03

## 2021-09-04 RX ADMIN — INSULIN GLARGINE 15 UNIT(S): 100 INJECTION, SOLUTION SUBCUTANEOUS at 00:53

## 2021-09-04 RX ADMIN — ATORVASTATIN CALCIUM 80 MILLIGRAM(S): 80 TABLET, FILM COATED ORAL at 23:02

## 2021-09-04 RX ADMIN — BUDESONIDE AND FORMOTEROL FUMARATE DIHYDRATE 2 PUFF(S): 160; 4.5 AEROSOL RESPIRATORY (INHALATION) at 21:05

## 2021-09-04 NOTE — PROGRESS NOTE ADULT - SUBJECTIVE AND OBJECTIVE BOX
Patient is a 55y old  Male who presents with a chief complaint of cough/sob (23 Jul 2021 14:03)      HPI:  55 M with pmh HLD, dm, htn presents with 8 days onset of covid symptoms which included, cough, fevers, sob, hypoxia, fevers, diarrhea, chills and myalgias. TEsted positive 7/15. Wife endorsed he was becoming more sob of recently. On arrival hypoxic to 80s and tachypneic. NRB placed, presently on 15% and saturating 95%. Na 126 s/p 1L NS. CXR: Frandy infiltrates. Last scr 1.4 in 2019-> today 1.9 unknown if underlying ckd.  Patient not vaccinated.   Last seen by me in 2019  History of ROBERT and uncontrolled hypertension  Treated with IV Remdesivir and Decadron, now on Toci  SaO2 is 80-85% despite being on 100% NRB  ABG pending        8/24  Alternating between NIPPV and HFNC  8/25-8/26  covering for DR Arceo  remains on High Fio2 demand for ARDS post Covid pneumonia  8/27  uneventful overnight  remains on cpap setting overnight  high fio2 requirement still  o2 sat appears improved  not as labored with his breathing this am  8/28  he remains on HIGH FIO2 demand  data discussed with Intensivist Dr Espinoza and ICU RN  last cxr and all recent data discussed  repeat labs pending  8/29  data discussed with DR Espinoza  lasix, low dose stopped after second dose  remains on high fio2 but lowered 70%  8/30  covering for Dr Arceo  doing about the same  o2 sat 93-94% at bedside this am  Dr Arceo will resume in am  9/1  covering for Dr Arceo today  pt's status appears unchanged  still with reported desats on any movements  remains on PPN  recent data all reveiwd  RN staff at bedside    9/2  Patient on BiPAP  SaO2 85-95%    9/4  No acute pulmonary events occurred overnight    MEDICATIONS  (STANDING):  amLODIPine   Tablet 5 milliGRAM(s) Oral daily  aspirin  chewable 81 milliGRAM(s) Oral daily  atorvastatin 80 milliGRAM(s) Oral at bedtime  budesonide 160 MICROgram(s)/formoterol 4.5 MICROgram(s) Inhaler 2 Puff(s) Inhalation two times a day  cholecalciferol 2000 Unit(s) Oral daily  dexMEDEtomidine Infusion 0.2 MICROgram(s)/kG/Hr (4.52 mL/Hr) IV Continuous <Continuous>  dextrose 40% Gel 15 Gram(s) Oral once  dextrose 5%. 1000 milliLiter(s) (50 mL/Hr) IV Continuous <Continuous>  dextrose 5%. 1000 milliLiter(s) (100 mL/Hr) IV Continuous <Continuous>  dextrose 50% Injectable 25 Gram(s) IV Push once  dextrose 50% Injectable 12.5 Gram(s) IV Push once  dextrose 50% Injectable 25 Gram(s) IV Push once  enoxaparin Injectable 40 milliGRAM(s) SubCutaneous every 12 hours  fat emulsion (Fish Oil and Plant Based) 20% Infusion 20.8 mL/Hr (20.8 mL/Hr) IV Continuous <Continuous>  fat emulsion (Fish Oil and Plant Based) 20% Infusion 0.55 Gm/kG/Day (20.83 mL/Hr) IV Continuous <Continuous>  glucagon  Injectable 1 milliGRAM(s) IntraMuscular once  insulin glargine Injectable (LANTUS) 8 Unit(s) SubCutaneous <User Schedule>  insulin lispro (ADMELOG) corrective regimen sliding scale   SubCutaneous three times a day before meals  insulin lispro (ADMELOG) corrective regimen sliding scale   SubCutaneous at bedtime  losartan 75 milliGRAM(s) Oral daily  pantoprazole  Injectable 40 milliGRAM(s) IV Push daily  Parenteral Nutrition - Adult 1 Each (75 mL/Hr) TPN Continuous <Continuous>  predniSONE   Tablet 10 milliGRAM(s) Oral daily  senna 1 Tablet(s) Oral at bedtime      MEDICATIONS  (PRN):  acetaminophen   Tablet .. 650 milliGRAM(s) Oral every 4 hours PRN Temp greater or equal to 38C (100.4F), Mild Pain (1 - 3)  ALBUTerol    90 MICROgram(s) HFA Inhaler 2 Puff(s) Inhalation every 4 hours PRN Shortness of Breath and/or Wheezing  benzocaine 15 mG/menthol 3.6 mG (Sugar-Free) Lozenge 1 Lozenge Oral every 6 hours PRN Sore Throat  benzonatate 100 milliGRAM(s) Oral three times a day PRN Cough  hydrALAZINE Injectable 5 milliGRAM(s) IV Push every 6 hours PRN SBP>160  hydrocodone/homatropine Syrup 5 milliLiter(s) Oral every 6 hours PRN Cough  LORazepam   Injectable 0.5 milliGRAM(s) IV Push every 6 hours PRN Agitation  melatonin 6 milliGRAM(s) Oral at bedtime PRN Insomnia  morphine  - Injectable 2 milliGRAM(s) IV Push every 2 hours PRN SOB/Anxiety    ICU Vital Signs Last 24 Hrs  T(C): 36.1 (01 Sep 2021 06:00), Max: 36.9 (31 Aug 2021 09:00)  T(F): 96.9 (01 Sep 2021 06:00), Max: 98.4 (31 Aug 2021 09:00)  HR: 68 (01 Sep 2021 06:00) (61 - 97)  BP: 129/68 (01 Sep 2021 06:00) (111/61 - 146/86)  BP(mean): 83 (01 Sep 2021 06:00) (72 - 100)  ABP: --  ABP(mean): --  RR: 27 (01 Sep 2021 06:00) (15 - 35)  SpO2: 96% (01 Sep 2021 06:00) (82% - 100%)      I&O's Detail    31 Aug 2021 07:01  -  01 Sep 2021 07:00  --------------------------------------------------------  IN:    Dexmedetomidine: 371.9 mL    Fat Emulsion (Fish Oil &amp; Plant Based) 20% Infusion: 273.4 mL    PPN (Peripheral Parenteral Nutrition): 1816.7 mL  Total IN: 2462 mL    OUT:    Voided (mL): 1150 mL  Total OUT: 1150 mL    Total NET: 1312 mL      PHYSICAL EXAM  General Appearance: On CPAP, increased work of breathing- about the same  Lungs: coarse bilaterally  Heart: +S1,S2  Abdomen: Soft, non-tender, bowel sounds active   Extremities: no cyanosis or edema, no joint swelling  Skin: Skin color, texture normal, no rashes   Neurologic: Alert and oriented X3 , non focal, not disoriented    ECG:    LABS:                          7.5    11.23 )-----------( 248      ( 30 Aug 2021 07:04 )             23.6   08-30 09-01    138  |  102  |  14  ----------------------------<  153<H>  4.5   |  36<H>  |  0.70    Ca    8.4<L>      01 Sep 2021 05:47  Phos  4.7     09-01  Mg     1.9     09-01      138  |  100  |  20  ----------------------------<  179<H>  3.5   |  35<H>  |  0.68    Ca    8.5      30 Aug 2021 07:04  Phos  3.3     08-30  Mg     2.0     08-30    TPro  6.1  /  Alb  2.3<L>  /  TBili  0.6  /  DBili  x   /  AST  24  /  ALT  46  /  AlkPhos  81  08-28      Pro BNP 2446  repeat              08-26    136  |  102  |  18  ----------------------------<  87  3.7   |  34<H>  |  0.56    Ca    8.3<L>      26 Aug 2021 07:03  Phos  3.6     08-26  Mg     2.0     08-26    TPro  5.5<L>  /  Alb  2.1<L>  /  TBili  0.5  /  DBili  x   /  AST  23  /  ALT  51  /  AlkPhos  79  08-26                          8.0    11.96 )-----------( 278      ( 26 Aug 2021 07:03 )             26.1   08-26    136  |  102  |  18  ----------------------------<  87  3.7   |  34<H>  |  0.56    Ca    8.3<L>      26 Aug 2021 07:03  Phos  3.6     08-26  Mg     2.0     08-26    TPro  5.5<L>  /  Alb  2.1<L>  /  TBili  0.5  /  DBili  x   /  AST  23  /  ALT  51  /  AlkPhos  79  08-26                          8.5    12.89 )-----------( 269      ( 25 Aug 2021 07:10 )             26.7   08-25    138  |  103  |  20  ----------------------------<  143<H>  3.7   |  32<H>  |  0.58    Ca    8.1<L>      25 Aug 2021 07:10  Phos  3.7     08-25  Mg     2.0     08-25    TPro  5.6<L>  /  Alb  2.2<L>  /  TBili  0.5  /  DBili  x   /  AST  32  /  ALT  74  /  AlkPhos  103  08-24                          8.5    14.05 )-----------( 292      ( 18 Aug 2021 07:01 )             26.2   08-18    138  |  104  |  24<H>  ----------------------------<  78  4.1   |  29  |  1.03    Ca    8.8      18 Aug 2021 07:01    TPro  6.3  /  Alb  2.3<L>  /  TBili  0.6  /  DBili  x   /  AST  32  /  ALT  73  /  AlkPhos  168<H>  08-17                                           10.0   19.05 )-----------( 179      ( 08 Aug 2021 07:11 )             30.3   08-08    137  |  102  |  27<H>  ----------------------------<  121<H>  3.9   |  29  |  0.77    Ca    8.4<L>      08 Aug 2021 07:11  Phos  3.4     08-08  Mg     2.0     08-08           TPro  5.2<L>  /  Alb  2.3<L>  /  TBili  0.8  /  DBili  x   /  AST  81<H>  /  ALT  213<H>  /  AlkPhos  200<H>  08-06                            12.9   15.00 )-----------( 366      ( 26 Jul 2021 06:20 )             38.4   07-26          < from: Xray Chest 1 View- PORTABLE-Urgent (07.21.21 @ 15:33) >    PROCEDURE DATE:  07/21/2021          INTERPRETATION:  AP chest on July 21, 2021 at 3:27 PM. Patient is short of breath with cough and fever.    Heart magnified by technique.    There arescattered mid lower lung field infiltrates right greater than left possibly related: Pneumonia.    The lungs are clear on August 30, 2019.    IMPRESSION: Bilateral infiltrates as above.    < end of copied text >  < from: US Duplex Venous Lower Ext Complete, Bilateral (07.25.21 @ 08:42) >  COMPARISON: None available.    TECHNIQUE: Duplex sonography of the BILATERAL LOWER extremity veins with color and spectral Doppler, with and without compression.    FINDINGS:    RIGHT:  Normal compressibility of the RIGHT common femoral, femoral and popliteal veins.  Doppler examination shows normal spontaneous and phasic flow.  No RIGHT calf vein thrombosis is detected.    LEFT:  Normal compressibility of the LEFT common femoral, femoral and popliteal veins.  Doppler examination shows normal spontaneous and phasic flow.  No LEFT calf vein thrombosis is detected.    IMPRESSION:  No evidence of deep venous thrombosis in either lower extremity.    < end of copied text >  RADIOLOGY & ADDITIONAL STUDIES:    < from: Xray Chest 1 View- PORTABLE-Urgent (Xray Chest 1 View- PORTABLE-Urgent .) (07.26.21 @ 08:43) >    PROCEDURE DATE:  07/26/2021          INTERPRETATION:  Portable chest radiograph    CLINICAL INFORMATION: Pneumonia due to Covid 19.. Follow-up    TECHNIQUE:  Portable  AP view of the chest was obtained.    COMPARISON: 7/21/2021 chest available for review.    FINDINGS:    The lungs show stable bilateral  multifocal and diffuse ill-defined airspace opacities.. No pneumothorax.    The  heart is enlarged in transverse diameter. No hilar mass.     Visualized osseous structures are intact.    IMPRESSION:   Stable bilateral  multifocal and diffuse ill-defined airspace opacities..    < end of copied text >  < from: US Duplex Venous Lower Ext Complete, Bilateral (08.06.21 @ 17:16) >  FINDINGS:    RIGHT:  Normal compressibility of the RIGHT common femoral, femoral and popliteal veins.  Doppler examination shows normal spontaneous and phasic flow.  No RIGHT calf vein thrombosis is detected.    LEFT:  Normal compressibility of the LEFT common femoral, femoral and popliteal veins.  Doppler examination shows normal spontaneous and phasic flow.  No LEFT calf vein thrombosisis detected.    IMPRESSION:  No evidence of deep venous thrombosis in either lower extremity.    < end of copied text >  < from: Xray Chest 1 View- PORTABLE-Urgent (Xray Chest 1 View- PORTABLE-Urgent .) (08.06.21 @ 16:45) >  INTERPRETATION:  Portable chest radiograph    CLINICAL INFORMATION: Pneumonia due to Covid 19.    TECHNIQUE:  Portable  AP view of the chest was obtained.    COMPARISON: 8/1/2021 chest radiograph available for review.    FINDINGS:    The lungs show decreasing bilateral diffuse airspace disease.. No pneumothorax.    The  heart is mildly enlarged in transverse diameter. No hilar mass.   Visualized osseous structures are intact.    IMPRESSION:   Decreasing bilateral diffuse airspace disease..    < end of copied text >  xr< from: US Duplex Venous Lower Ext Complete, Bilateral (08.17.21 @ 08:58) >  COMPARISON: None available.    TECHNIQUE: Duplex sonography of the BILATERAL LOWER extremity veins with color and spectral Doppler, with and without compression.    FINDINGS:    RIGHT:  Normal compressibility of the RIGHT common femoral, femoral and popliteal veins.  Doppler examination shows normal spontaneous and phasic flow.  No RIGHT calf vein thrombosis is detected.    LEFT:  Normal compressibility of the LEFT common femoral, femoral and popliteal veins.  Doppler examination shows normal spontaneous and phasic flow.  No LEFT calf vein thrombosis is detected.    IMPRESSION:  No evidence of deep venous thrombosis in either lower extremity.    < end of copied text >  r< from: US Duplex Venous Lower Ext Complete, Bilateral (08.23.21 @ 12:53) >  PROCEDURE DATE:  08/23/2021          INTERPRETATION:  CLINICAL INFORMATION: 55 years  Male with r/o DVT    evaluate for DVT. Covid positive. Elevated d-dimer.    COMPARISON: None available.    TECHNIQUE: Duplex sonography of the BILATERAL LOWER extremity veins with color and spectral Doppler, with and without compression.    FINDINGS:    RIGHT:  Normal compressibility of the RIGHT common femoral, femoral and popliteal veins.  Doppler examination shows normal spontaneous and phasic flow.  No RIGHT calf vein thrombosis is detected.    LEFT:  Normal compressibility of the LEFT common femoral, femoral and popliteal veins.  Doppler examination shows normal spontaneous and phasic flow.  No LEFT calf vein thrombosis is detected.    IMPRESSION:  No evidence of deep venous thrombosis in either lower extremity.    < end of copied text >  < from: Xray Chest 1 View- PORTABLE-Urgent (Xray Chest 1 View- PORTABLE-Urgent .) (08.23.21 @ 11:06) >    PROCEDURE DATE:  08/23/2021          INTERPRETATION:  History: Dyspnea, Covid    Chest:  one view.    Comparison: 08/20/2021    AP radiograph of the chest demonstrates moderate diffuse alveolar infiltrates unchanged. The cardiac silhouette is normal in size. Osseous structures are intact.    Impression:moderate diffuse alveolar infiltrates unchanged.    < end of copied text >  r< from: Xray Chest 1 View- PORTABLE-Urgent (Xray Chest 1 View- PORTABLE-Urgent .) (08.23.21 @ 11:06) >  PROCEDURE DATE:  08/23/2021          INTERPRETATION:  History: Dyspnea, Covid    Chest:  one view.    Comparison: 08/20/2021    AP radiograph of the chest demonstrates moderate diffuse alveolar infiltrates unchanged. The cardiac silhouette is normal in size. Osseous structures are intact.    Impression:moderate diffuse alveolar infiltrates unchanged.    < end of copied text >

## 2021-09-04 NOTE — PROGRESS NOTE ADULT - SUBJECTIVE AND OBJECTIVE BOX
Patient is a 55y old  Male who presents with a chief complaint of cough/sob (04 Sep 2021 17:12)      BRIEF HOSPITAL COURSE:   54 yo m pmhx DM2, HLD, HTN, unvaccinated admitted 7/21 with sob, hypoxic respiratory failure 2/2 COVID 19 complicated by ARDS s/p actemra, remdesivir and steroid taper.    9/3: On HFNC 60LPM and 55%, weaning as tolerated.  Hyperglycemic this evening. Given lantus 15U, ISS increased.     Events last 24 hours:   Patient requiring 60LPM and 60% fio2, able to tolerate some PO intake. WBC slowly increasing, remains afebrile, monitoring off abx.       PAST MEDICAL & SURGICAL HISTORY:  HTN (hypertension)  Diabetes      Allergies  No Known Allergies      FAMILY HISTORY:  Unknown      Social History:   , has children, from home      Review of Systems:  No complaints at this time      Physical Examination:    General: Adult male, sitting in chair, appears comfortable    PULM: Symmetrical thorax expansion upon respiration.     CVS: NSR    NEURO: Alert, oriented, interactive      Medications:  amLODIPine   Tablet 5 milliGRAM(s) Oral daily  hydrALAZINE Injectable 5 milliGRAM(s) IV Push every 6 hours PRN  losartan 75 milliGRAM(s) Oral daily  ALBUTerol    90 MICROgram(s) HFA Inhaler 2 Puff(s) Inhalation every 4 hours PRN  budesonide 160 MICROgram(s)/formoterol 4.5 MICROgram(s) Inhaler 2 Puff(s) Inhalation two times a day  guaiFENesin Oral Liquid (Sugar-Free) 100 milliGRAM(s) Oral every 6 hours PRN  acetaminophen   Tablet .. 650 milliGRAM(s) Oral every 4 hours PRN  ALPRAZolam 0.5 milliGRAM(s) Oral every 6 hours PRN  dexMEDEtomidine Infusion 0.2 MICROgram(s)/kG/Hr IV Continuous <Continuous>  aspirin  chewable 81 milliGRAM(s) Oral daily  enoxaparin Injectable 40 milliGRAM(s) SubCutaneous every 12 hours  polyethylene glycol 3350 17 Gram(s) Oral daily PRN  senna 1 Tablet(s) Oral at bedtime  atorvastatin 80 milliGRAM(s) Oral at bedtime  dextrose 40% Gel 15 Gram(s) Oral once  dextrose 50% Injectable 25 Gram(s) IV Push once  dextrose 50% Injectable 12.5 Gram(s) IV Push once  dextrose 50% Injectable 25 Gram(s) IV Push once  glucagon  Injectable 1 milliGRAM(s) IntraMuscular once  insulin glargine Injectable (LANTUS) 12 Unit(s) SubCutaneous <User Schedule>  insulin lispro (ADMELOG) corrective regimen sliding scale   SubCutaneous three times a day before meals  insulin lispro (ADMELOG) corrective regimen sliding scale   SubCutaneous at bedtime  predniSONE   Tablet 7.5 milliGRAM(s) Oral daily  cholecalciferol 2000 Unit(s) Oral daily  dextrose 5%. 1000 milliLiter(s) IV Continuous <Continuous>  dextrose 5%. 1000 milliLiter(s) IV Continuous <Continuous>      ICU Vital Signs Last 24 Hrs  T(C): 37 (04 Sep 2021 23:00), Max: 37.2 (04 Sep 2021 19:00)  T(F): 98.6 (04 Sep 2021 23:00), Max: 99 (04 Sep 2021 19:00)  HR: 78 (05 Sep 2021 01:00) (69 - 99)  BP: 108/69 (05 Sep 2021 01:00) (107/76 - 148/83)  BP(mean): 77 (05 Sep 2021 01:00) (75 - 100)  ABP: --  ABP(mean): --  RR: 37 (05 Sep 2021 01:00) (20 - 37)  SpO2: 93% (05 Sep 2021 01:00) (86% - 100%)    Vital Signs Last 24 Hrs  T(C): 37 (04 Sep 2021 23:00), Max: 37.2 (04 Sep 2021 19:00)  T(F): 98.6 (04 Sep 2021 23:00), Max: 99 (04 Sep 2021 19:00)  HR: 78 (05 Sep 2021 01:00) (69 - 99)  BP: 108/69 (05 Sep 2021 01:00) (107/76 - 148/83)  BP(mean): 77 (05 Sep 2021 01:00) (75 - 100)  RR: 37 (05 Sep 2021 01:00) (20 - 37)  SpO2: 93% (05 Sep 2021 01:00) (86% - 100%)      I&O's Detail    03 Sep 2021 07:01  -  04 Sep 2021 07:00  --------------------------------------------------------  IN:    Dexmedetomidine: 264 mL    Fat Emulsion (Fish Oil &amp; Plant Based) 20% Infusion: 250 mL    PPN (Peripheral Parenteral Nutrition): 1404 mL  Total IN: 1918 mL    OUT:    Voided (mL): 2600 mL  Total OUT: 2600 mL  Total NET: -682 mL      04 Sep 2021 07:01  -  05 Sep 2021 01:18  --------------------------------------------------------  IN:    Dexmedetomidine: 124 mL    Oral Fluid: 960 mL  Total IN: 1084 mL    OUT:    Incontinent per Retracted Penis Pouch (mL): 1800 mL  Total OUT: 1800 mL  Total NET: -716 mL    LABS:                        7.9    14.88 )-----------( 266      ( 04 Sep 2021 07:34 )             26.1     09-04    139  |  103  |  14  ----------------------------<  108<H>  3.6   |  31  |  0.60    Ca    9.0      04 Sep 2021 07:34  Phos  4.0     09-03  Mg     1.9     09-03    TPro  6.3  /  Alb  2.6<L>  /  TBili  0.4  /  DBili  x   /  AST  27  /  ALT  62  /  AlkPhos  70  09-03      CAPILLARY BLOOD GLUCOSE  POCT Blood Glucose.: 124 mg/dL (04 Sep 2021 23:09)      CULTURES:  +COVID      RADIOLOGY:   < from: Xray Chest 1 View- PORTABLE-Urgent (Xray Chest 1 View- PORTABLE-Urgent .) (09.04.21 @ 11:50) >  EXAM:  XR CHEST PORTABLE URGENT 1V                          PROCEDURE DATE:  09/04/2021      INTERPRETATION:  AP chest on September 4, 2021 11:40 AM. Patient is being followed for infiltration.    Heart could be enlarged.    Diffuse advanced interstitial infiltrates are again noted.    Chest is similar to August 30.    IMPRESSION: Persistent advanced infiltrates.    --- End of Report ---    TERRI ALSTON MD; Attending Radiologist  This document has been electronically signed. Sep  45714  3:23PM    < end of copied text >      SUPPLEMENTAL O2: HFNC/CPAP  LINES: Peripheral   IVF: N  GOFF: N  PPx: Lovenox, PPI  CONTACT: Y, COVID

## 2021-09-04 NOTE — PROGRESS NOTE ADULT - ASSESSMENT
55 M noted to have COVID 7/15  On arrival hypoxic to 80s and tachypneic. NRB placed,in the ER saturating 95%. Na 126 s/p 1L NS. CXR: Frandy infiltrates. Last scr 1.4 in 2019-> today 1.9 unknown if underlying ckd.  Treated with IV Remdesivir and Decadron, Tocilizumab   Given the obesity/hypertension and prior co-morbidities, he is at risk of intubation but continue HFNC, respiratory status remains critical  DDimer elevated, was on therapeutic lovenox, transitioned to 40mg q 12  Completed Solumedrol, Symbicort 160/4.5 BID ([email or web address]/[email or web address], [facility] study). MICU service started Solumedrol 40mg q 8 hrs as of 8/18  Was on HFNC %FiO2 with BiPAP overnight, now on CPAP and transitions between HFNC/NIPPV  Respiratory status is tenuous and he continues to have an increased work of breathing; at this point he is at high risk of intubation  at high risk of intubation and mechanical ventilation  ICU level care appreciated  lasix 20 mg IVPB Qdaily x 3 days - stopped after second dose  Reviewed CXR  overall prognosis remains guarded but continuing to improve  SaO2 80-85%  Hgb 7.5-->7.9  repeat ABG 7.36/60/91 reviewed

## 2021-09-04 NOTE — PROGRESS NOTE ADULT - TIME BILLING
54 y/o male admitted with:    Acute hypoxic resp failure and ARDS due to COVID-19 pneumonia (unvaccinated)   Severe protein calorie malnutrition     Hx HTN, DM2    Plan:   Neuro - wean Precedex, on Xanax for anxiety   CV - BP stable, on amlodipine, losartan, aspirin, statin  Resp - HFNC weaned to 60L, 60%, continue nocturnal CPAP. Prednisone decreased to 7.5mg daily. Continue Symbicort. Add Robitussin    GI - improving po intake, continue to hold PPN   Renal - stable   ID - WBC increased further, CXR shows persistent infiltrates but no significant consolidation, he is not febrile and feels and looks better, his HD status is stable, will continue to monitor off abx for now, repeat CBC w diff in am along with procal. May consider CT chest if he tolerates    Endo - glucose improved significantly, lower dose and change timing to am     PPx - Lovenox   MSK - increase activity   Dispo - keep in ICU

## 2021-09-04 NOTE — PROGRESS NOTE ADULT - SUBJECTIVE AND OBJECTIVE BOX
Patient is a 55y old  Male who presents with a chief complaint of cough/sob (04 Sep 2021 09:39)    24 hour events: c/o cough, feels like he wants to expectorate but he is unable to     PAST MEDICAL & SURGICAL HISTORY:  HTN (hypertension)    Diabetes      REVIEW OF SYSTEMS  Constitutional: No fever, chills, fatigue  Neuro: No headache, numbness, weakness  Resp: +cough, shortness of breath  CVS: No chest pain, palpitations, leg swelling  GI: No abdominal pain, nausea, vomiting, diarrhea   : No dysuria, frequency, incontinence  Skin: No itching, burning, rashes, or lesions   Msk: No joint pain or swelling  Psych: No depression, anxiety, mood swings  Heme: No bleeding    T(F): 98.4 (09-04-21 @ 14:00), Max: 98.9 (09-03-21 @ 20:00)  HR: 88 (09-04-21 @ 16:00) (69 - 102)  BP: 117/61 (09-04-21 @ 16:00) (109/70 - 149/76)  RR: 28 (09-04-21 @ 16:00) (22 - 36)  SpO2: 94% (09-04-21 @ 16:00) (90% - 100%)  Wt(kg): --      CAPILLARY BLOOD GLUCOSE  POCT Blood Glucose.: 133 mg/dL (04 Sep 2021 12:45)  POCT Blood Glucose.: 115 mg/dL (04 Sep 2021 07:37)  POCT Blood Glucose.: 323 mg/dL (03 Sep 2021 22:10)  POCT Blood Glucose.: 280 mg/dL (03 Sep 2021 17:43)    I&O's Summary    09-03 @ 07:01  -  09-04 @ 07:00  --------------------------------------------------------  IN: 1918 mL / OUT: 2600 mL / NET: -682 mL    09-04 @ 07:01  -  09-04 @ 17:12  --------------------------------------------------------  IN: 720 mL / OUT: 300 mL / NET: 420 mL    PHYSICAL EXAM  General: sitting in chair, NAD   CNS: AAOx3, no focal deficits   HEENT: PERRL  Resp: good AE b/l, clear breath sounds, no wheeze/rhonchi    CVS: S1S2 regular   Abd: soft, NT, +BS  Ext: no edema   Skin: warm     MEDICATIONS  amLODIPine   Tablet Oral  hydrALAZINE Injectable IV Push PRN  losartan Oral  atorvastatin Oral  dextrose 40% Gel Oral  dextrose 50% Injectable IV Push  dextrose 50% Injectable IV Push  dextrose 50% Injectable IV Push  glucagon  Injectable IntraMuscular  insulin lispro (ADMELOG) corrective regimen sliding scale SubCutaneous  insulin lispro (ADMELOG) corrective regimen sliding scale SubCutaneous  predniSONE   Tablet Oral  ALBUTerol    90 MICROgram(s) HFA Inhaler Inhalation PRN  budesonide 160 MICROgram(s)/formoterol 4.5 MICROgram(s) Inhaler Inhalation  guaiFENesin Oral Liquid (Sugar-Free) Oral PRN  acetaminophen   Tablet .. Oral PRN  ALPRAZolam Oral PRN  dexMEDEtomidine Infusion IV Continuous  aspirin  chewable Oral  enoxaparin Injectable SubCutaneous  polyethylene glycol 3350 Oral PRN  senna Oral  cholecalciferol Oral  dextrose 5%. IV Continuous  dextrose 5%. IV Continuous                          7.9    14.88 )-----------( 266      ( 04 Sep 2021 07:34 )             26.1       09-04    139  |  103  |  14  ----------------------------<  108<H>  3.6   |  31  |  0.60    Ca    9.0      04 Sep 2021 07:34  Phos  4.0     09-03  Mg     1.9     09-03    TPro  6.3  /  Alb  2.6<L>  /  TBili  0.4  /  DBili  x   /  AST  27  /  ALT  62  /  AlkPhos  70  09-03

## 2021-09-04 NOTE — PROGRESS NOTE ADULT - ASSESSMENT
54 yo m pmhx DM2, HLD, HTN, unvaccinated admitted 7/21 with sob, hypoxic respiratory failure 2/2 COVID 19.    NEURO: Precedex and PRN xanax for anxiolytic therapy.   CV: Amlodipine and losartan for BP control. Hydralazine prn. HLD on statin therapy.   RESP: Hypoxic Respiratory Failure on HFNC 60LPM and 60%, spo2 86-92%, CPAP 60% qHS. inhaler prn. prednisone.   RENAL: Monitor lytes, replace as needed.   GI: Diet changed to Consistent Carb diet.   ENDO: POCT maintaining low 100s. Lantus changed to 10U, and low dose ISS.   ID: COVID weaned to Prednisone 7.5mg PO daily.   HEME: Lovenox for VTE ppx  DISPO: Full code.

## 2021-09-05 LAB
ANION GAP SERPL CALC-SCNC: 1 MMOL/L — LOW (ref 5–17)
BASOPHILS # BLD AUTO: 0.12 K/UL — SIGNIFICANT CHANGE UP (ref 0–0.2)
BASOPHILS NFR BLD AUTO: 0.8 % — SIGNIFICANT CHANGE UP (ref 0–2)
BUN SERPL-MCNC: 13 MG/DL — SIGNIFICANT CHANGE UP (ref 7–23)
CALCIUM SERPL-MCNC: 8.8 MG/DL — SIGNIFICANT CHANGE UP (ref 8.5–10.1)
CHLORIDE SERPL-SCNC: 103 MMOL/L — SIGNIFICANT CHANGE UP (ref 96–108)
CO2 SERPL-SCNC: 34 MMOL/L — HIGH (ref 22–31)
CREAT SERPL-MCNC: 0.67 MG/DL — SIGNIFICANT CHANGE UP (ref 0.5–1.3)
EOSINOPHIL # BLD AUTO: 0.46 K/UL — SIGNIFICANT CHANGE UP (ref 0–0.5)
EOSINOPHIL NFR BLD AUTO: 3.1 % — SIGNIFICANT CHANGE UP (ref 0–6)
GLUCOSE SERPL-MCNC: 102 MG/DL — HIGH (ref 70–99)
HCT VFR BLD CALC: 25.6 % — LOW (ref 39–50)
HGB BLD-MCNC: 8 G/DL — LOW (ref 13–17)
IMM GRANULOCYTES NFR BLD AUTO: 1 % — SIGNIFICANT CHANGE UP (ref 0–1.5)
LYMPHOCYTES # BLD AUTO: 15.1 % — SIGNIFICANT CHANGE UP (ref 13–44)
LYMPHOCYTES # BLD AUTO: 2.27 K/UL — SIGNIFICANT CHANGE UP (ref 1–3.3)
MAGNESIUM SERPL-MCNC: 1.8 MG/DL — SIGNIFICANT CHANGE UP (ref 1.6–2.6)
MCHC RBC-ENTMCNC: 28.1 PG — SIGNIFICANT CHANGE UP (ref 27–34)
MCHC RBC-ENTMCNC: 31.3 GM/DL — LOW (ref 32–36)
MCV RBC AUTO: 89.8 FL — SIGNIFICANT CHANGE UP (ref 80–100)
MONOCYTES # BLD AUTO: 1.18 K/UL — HIGH (ref 0–0.9)
MONOCYTES NFR BLD AUTO: 7.9 % — SIGNIFICANT CHANGE UP (ref 2–14)
NEUTROPHILS # BLD AUTO: 10.84 K/UL — HIGH (ref 1.8–7.4)
NEUTROPHILS NFR BLD AUTO: 72.1 % — SIGNIFICANT CHANGE UP (ref 43–77)
PHOSPHATE SERPL-MCNC: 4 MG/DL — SIGNIFICANT CHANGE UP (ref 2.5–4.5)
PLATELET # BLD AUTO: 253 K/UL — SIGNIFICANT CHANGE UP (ref 150–400)
POTASSIUM SERPL-MCNC: 3.4 MMOL/L — LOW (ref 3.5–5.3)
POTASSIUM SERPL-SCNC: 3.4 MMOL/L — LOW (ref 3.5–5.3)
PROCALCITONIN SERPL-MCNC: 0.08 NG/ML — SIGNIFICANT CHANGE UP (ref 0.02–0.1)
RBC # BLD: 2.85 M/UL — LOW (ref 4.2–5.8)
RBC # FLD: 14.2 % — SIGNIFICANT CHANGE UP (ref 10.3–14.5)
SODIUM SERPL-SCNC: 138 MMOL/L — SIGNIFICANT CHANGE UP (ref 135–145)
WBC # BLD: 15.02 K/UL — HIGH (ref 3.8–10.5)
WBC # FLD AUTO: 15.02 K/UL — HIGH (ref 3.8–10.5)

## 2021-09-05 PROCEDURE — 99233 SBSQ HOSP IP/OBS HIGH 50: CPT

## 2021-09-05 RX ORDER — POTASSIUM CHLORIDE 20 MEQ
40 PACKET (EA) ORAL ONCE
Refills: 0 | Status: COMPLETED | OUTPATIENT
Start: 2021-09-05 | End: 2021-09-05

## 2021-09-05 RX ADMIN — ATORVASTATIN CALCIUM 80 MILLIGRAM(S): 80 TABLET, FILM COATED ORAL at 23:23

## 2021-09-05 RX ADMIN — Medication 2000 UNIT(S): at 09:20

## 2021-09-05 RX ADMIN — Medication 40 MILLIEQUIVALENT(S): at 09:20

## 2021-09-05 RX ADMIN — DEXMEDETOMIDINE HYDROCHLORIDE IN 0.9% SODIUM CHLORIDE 4.52 MICROGRAM(S)/KG/HR: 4 INJECTION INTRAVENOUS at 06:22

## 2021-09-05 RX ADMIN — LOSARTAN POTASSIUM 75 MILLIGRAM(S): 100 TABLET, FILM COATED ORAL at 09:20

## 2021-09-05 RX ADMIN — AMLODIPINE BESYLATE 5 MILLIGRAM(S): 2.5 TABLET ORAL at 09:20

## 2021-09-05 RX ADMIN — ENOXAPARIN SODIUM 40 MILLIGRAM(S): 100 INJECTION SUBCUTANEOUS at 23:22

## 2021-09-05 RX ADMIN — Medication 7.5 MILLIGRAM(S): at 09:25

## 2021-09-05 RX ADMIN — DEXMEDETOMIDINE HYDROCHLORIDE IN 0.9% SODIUM CHLORIDE 4.52 MICROGRAM(S)/KG/HR: 4 INJECTION INTRAVENOUS at 09:22

## 2021-09-05 RX ADMIN — INSULIN GLARGINE 12 UNIT(S): 100 INJECTION, SOLUTION SUBCUTANEOUS at 09:20

## 2021-09-05 RX ADMIN — Medication 0.5 MILLIGRAM(S): at 23:23

## 2021-09-05 RX ADMIN — ENOXAPARIN SODIUM 40 MILLIGRAM(S): 100 INJECTION SUBCUTANEOUS at 09:22

## 2021-09-05 RX ADMIN — Medication 81 MILLIGRAM(S): at 09:20

## 2021-09-05 RX ADMIN — Medication 1: at 17:25

## 2021-09-05 RX ADMIN — Medication 100 MILLIGRAM(S): at 23:23

## 2021-09-05 RX ADMIN — BUDESONIDE AND FORMOTEROL FUMARATE DIHYDRATE 2 PUFF(S): 160; 4.5 AEROSOL RESPIRATORY (INHALATION) at 13:38

## 2021-09-05 NOTE — PROGRESS NOTE ADULT - SUBJECTIVE AND OBJECTIVE BOX
Patient is a 55y old  Male who presents with a chief complaint of cough/sob (05 Sep 2021 08:35)    24 hour events: ***    PAST MEDICAL & SURGICAL HISTORY:  HTN (hypertension)    Diabetes      REVIEW OF SYSTEMS  Constitutional: No fever, chills, fatigue  Neuro: No headache, numbness, weakness  Resp: No cough, wheezing, shortness of breath  CVS: No chest pain, palpitations, leg swelling  GI: No abdominal pain, nausea, vomiting, diarrhea   : No dysuria, frequency, incontinence  Skin: No itching, burning, rashes, or lesions   Msk: No joint pain or swelling  Psych: No depression, anxiety, mood swings  Heme: No bleeding    T(F): 98.4 (09-05-21 @ 14:00), Max: 99 (09-04-21 @ 19:00)  HR: 92 (09-05-21 @ 14:00) (71 - 99)  BP: 119/79 (09-05-21 @ 14:00) (98/62 - 134/76)  RR: 25 (09-05-21 @ 14:00) (20 - 37)  SpO2: 100% (09-05-21 @ 14:00) (86% - 100%)  Wt(kg): --      CAPILLARY BLOOD GLUCOSE  POCT Blood Glucose.: 190 mg/dL (05 Sep 2021 12:11)  POCT Blood Glucose.: 105 mg/dL (05 Sep 2021 09:30)  POCT Blood Glucose.: 124 mg/dL (04 Sep 2021 23:09)  POCT Blood Glucose.: 299 mg/dL (04 Sep 2021 17:19)    I&O's Summary    09-04 @ 07:01  -  09-05 @ 07:00  --------------------------------------------------------  IN: 1460 mL / OUT: 2400 mL / NET: -940 mL    09-05 @ 07:01  -  09-05 @ 14:32  --------------------------------------------------------  IN: 480 mL / OUT: 0 mL / NET: 480 mL    PHYSICAL EXAM  General:   CNS:   HEENT:   Resp:   CVS:   Abd:   Ext:   Skin:     MEDICATIONS  amLODIPine   Tablet Oral  hydrALAZINE Injectable IV Push PRN  losartan Oral  atorvastatin Oral  dextrose 40% Gel Oral  dextrose 50% Injectable IV Push  dextrose 50% Injectable IV Push  dextrose 50% Injectable IV Push  glucagon  Injectable IntraMuscular  insulin glargine Injectable (LANTUS) SubCutaneous  insulin lispro (ADMELOG) corrective regimen sliding scale SubCutaneous  insulin lispro (ADMELOG) corrective regimen sliding scale SubCutaneous  predniSONE   Tablet Oral  ALBUTerol    90 MICROgram(s) HFA Inhaler Inhalation PRN  budesonide 160 MICROgram(s)/formoterol 4.5 MICROgram(s) Inhaler Inhalation  guaiFENesin Oral Liquid (Sugar-Free) Oral PRN  acetaminophen   Tablet .. Oral PRN  ALPRAZolam Oral PRN  dexMEDEtomidine Infusion IV Continuous  aspirin  chewable Oral  enoxaparin Injectable SubCutaneous  polyethylene glycol 3350 Oral PRN  senna Oral  cholecalciferol Oral  dextrose 5%. IV Continuous  dextrose 5%. IV Continuous                          8.0    15.02 )-----------( 253      ( 05 Sep 2021 05:42 )             25.6       09-05    138  |  103  |  13  ----------------------------<  102<H>  3.4<L>   |  34<H>  |  0.67    Ca    8.8      05 Sep 2021 05:42  Phos  4.0     09-05  Mg     1.8     09-05                     Patient is a 55y old  Male who presents with a chief complaint of cough/sob (05 Sep 2021 08:35)    24 hour events: no new events     PAST MEDICAL & SURGICAL HISTORY:  HTN (hypertension)    Diabetes      REVIEW OF SYSTEMS  Constitutional: No fever, chills, fatigue  Neuro: No headache, numbness, weakness  Resp: +shortness of breath, +improving cough   CVS: No chest pain, palpitations, leg swelling  GI: No abdominal pain, nausea, vomiting, diarrhea   : No dysuria, frequency, incontinence  Skin: No itching, burning, rashes, or lesions   Msk: No joint pain or swelling  Psych: No depression, anxiety, mood swings  Heme: No bleeding    T(F): 98.4 (09-05-21 @ 14:00), Max: 99 (09-04-21 @ 19:00)  HR: 92 (09-05-21 @ 14:00) (71 - 99)  BP: 119/79 (09-05-21 @ 14:00) (98/62 - 134/76)  RR: 25 (09-05-21 @ 14:00) (20 - 37)  SpO2: 100% (09-05-21 @ 14:00) (86% - 100%)  Wt(kg): --      CAPILLARY BLOOD GLUCOSE  POCT Blood Glucose.: 190 mg/dL (05 Sep 2021 12:11)  POCT Blood Glucose.: 105 mg/dL (05 Sep 2021 09:30)  POCT Blood Glucose.: 124 mg/dL (04 Sep 2021 23:09)  POCT Blood Glucose.: 299 mg/dL (04 Sep 2021 17:19)    I&O's Summary    09-04 @ 07:01  -  09-05 @ 07:00  --------------------------------------------------------  IN: 1460 mL / OUT: 2400 mL / NET: -940 mL    09-05 @ 07:01  -  09-05 @ 14:32  --------------------------------------------------------  IN: 480 mL / OUT: 0 mL / NET: 480 mL    PHYSICAL EXAM  General: NAD   CNS: AAOx3, no focal deficits   HEENT: PERRL  Resp: good AE b/l, some rales     CVS: S1S2 regular   Abd: soft, NT, +BS  Ext: no edema   Skin: warm     MEDICATIONS  amLODIPine   Tablet Oral  hydrALAZINE Injectable IV Push PRN  losartan Oral  atorvastatin Oral  dextrose 40% Gel Oral  dextrose 50% Injectable IV Push  dextrose 50% Injectable IV Push  dextrose 50% Injectable IV Push  glucagon  Injectable IntraMuscular  insulin glargine Injectable (LANTUS) SubCutaneous  insulin lispro (ADMELOG) corrective regimen sliding scale SubCutaneous  insulin lispro (ADMELOG) corrective regimen sliding scale SubCutaneous  predniSONE   Tablet Oral  ALBUTerol    90 MICROgram(s) HFA Inhaler Inhalation PRN  budesonide 160 MICROgram(s)/formoterol 4.5 MICROgram(s) Inhaler Inhalation  guaiFENesin Oral Liquid (Sugar-Free) Oral PRN  acetaminophen   Tablet .. Oral PRN  ALPRAZolam Oral PRN  dexMEDEtomidine Infusion IV Continuous  aspirin  chewable Oral  enoxaparin Injectable SubCutaneous  polyethylene glycol 3350 Oral PRN  senna Oral  cholecalciferol Oral  dextrose 5%. IV Continuous  dextrose 5%. IV Continuous                          8.0    15.02 )-----------( 253      ( 05 Sep 2021 05:42 )             25.6       09-05    138  |  103  |  13  ----------------------------<  102<H>  3.4<L>   |  34<H>  |  0.67    Ca    8.8      05 Sep 2021 05:42  Phos  4.0     09-05  Mg     1.8     09-05

## 2021-09-05 NOTE — PROGRESS NOTE ADULT - TIME BILLING
54 y/o male admitted with:    Acute hypoxic resp failure and ARDS due to COVID-19 pneumonia (unvaccinated)   Severe protein calorie malnutrition     Hx HTN, DM2    Plan:   Neuro - wean Precedex, on Xanax for anxiety   CV - BP stable, on amlodipine, losartan, aspirin, statin  Resp - HFNC weaned to 60L, 60%, continue nocturnal CPAP. Prednisone decreased to 7.5mg daily. Continue Symbicort. Continue Robitussin    GI - improving po intake, continue to hold PPN   Renal - stable   ID - WBC slightly increased but the trend is improving, procal normal, patient continues to do well clinically - monitor off abx for now  Endo - continue Lantus      PPx - Lovenox   MSK - increase activity   Dispo - keep in ICU

## 2021-09-05 NOTE — PROGRESS NOTE ADULT - SUBJECTIVE AND OBJECTIVE BOX

## 2021-09-05 NOTE — PROGRESS NOTE ADULT - ASSESSMENT
55 M noted to have COVID 7/15  On arrival hypoxic to 80s and tachypneic. NRB placed,in the ER saturating 95%. Na 126 s/p 1L NS. CXR: Frandy infiltrates. Last scr 1.4 in 2019-> today 1.9 unknown if underlying ckd.  Treated with IV Remdesivir and Decadron, Tocilizumab   Given the obesity/hypertension and prior co-morbidities, he is at risk of intubation but continue HFNC, respiratory status remains critical  DDimer elevated, was on therapeutic lovenox, transitioned to 40mg q 12  Completed Solumedrol, Symbicort 160/4.5 BID ([email or web address]/[email or web address]/[email or web address], [facility] study). MICU service started Solumedrol 40mg q 8 hrs as of 8/18  Was on HFNC %FiO2 with BiPAP overnight, now on CPAP and transitions between HFNC/NIPPV  Respiratory status is tenuous and he continues to have an increased work of breathing; at this point he is at high risk of intubation  at high risk of intubation and mechanical ventilation  ICU level care appreciated  lasix 20 mg IVPB Qdaily x 3 days - stopped after second dose  Reviewed CXR  overall prognosis remains guarded but continuing to improve  SaO2 80-85%  Hgb 7.5-->7.9-->8.0  repeat ABG 7.36/60/91 reviewed  Discussed with Dr Tineo; will continue to monitor

## 2021-09-06 LAB
ANION GAP SERPL CALC-SCNC: 2 MMOL/L — LOW (ref 5–17)
BUN SERPL-MCNC: 17 MG/DL — SIGNIFICANT CHANGE UP (ref 7–23)
CALCIUM SERPL-MCNC: 8.9 MG/DL — SIGNIFICANT CHANGE UP (ref 8.5–10.1)
CHLORIDE SERPL-SCNC: 105 MMOL/L — SIGNIFICANT CHANGE UP (ref 96–108)
CO2 SERPL-SCNC: 33 MMOL/L — HIGH (ref 22–31)
CREAT SERPL-MCNC: 0.62 MG/DL — SIGNIFICANT CHANGE UP (ref 0.5–1.3)
GLUCOSE SERPL-MCNC: 105 MG/DL — HIGH (ref 70–99)
HCT VFR BLD CALC: 25.4 % — LOW (ref 39–50)
HGB BLD-MCNC: 7.8 G/DL — LOW (ref 13–17)
MAGNESIUM SERPL-MCNC: 2 MG/DL — SIGNIFICANT CHANGE UP (ref 1.6–2.6)
MCHC RBC-ENTMCNC: 27.6 PG — SIGNIFICANT CHANGE UP (ref 27–34)
MCHC RBC-ENTMCNC: 30.7 GM/DL — LOW (ref 32–36)
MCV RBC AUTO: 89.8 FL — SIGNIFICANT CHANGE UP (ref 80–100)
PHOSPHATE SERPL-MCNC: 3.6 MG/DL — SIGNIFICANT CHANGE UP (ref 2.5–4.5)
PLATELET # BLD AUTO: 250 K/UL — SIGNIFICANT CHANGE UP (ref 150–400)
POTASSIUM SERPL-MCNC: 3.6 MMOL/L — SIGNIFICANT CHANGE UP (ref 3.5–5.3)
POTASSIUM SERPL-SCNC: 3.6 MMOL/L — SIGNIFICANT CHANGE UP (ref 3.5–5.3)
RBC # BLD: 2.83 M/UL — LOW (ref 4.2–5.8)
RBC # FLD: 14.3 % — SIGNIFICANT CHANGE UP (ref 10.3–14.5)
SODIUM SERPL-SCNC: 140 MMOL/L — SIGNIFICANT CHANGE UP (ref 135–145)
WBC # BLD: 10.09 K/UL — SIGNIFICANT CHANGE UP (ref 3.8–10.5)
WBC # FLD AUTO: 10.09 K/UL — SIGNIFICANT CHANGE UP (ref 3.8–10.5)

## 2021-09-06 PROCEDURE — 99233 SBSQ HOSP IP/OBS HIGH 50: CPT

## 2021-09-06 RX ADMIN — Medication 100 MILLIGRAM(S): at 11:13

## 2021-09-06 RX ADMIN — INSULIN GLARGINE 12 UNIT(S): 100 INJECTION, SOLUTION SUBCUTANEOUS at 12:00

## 2021-09-06 RX ADMIN — Medication 0.5 MILLIGRAM(S): at 22:43

## 2021-09-06 RX ADMIN — AMLODIPINE BESYLATE 5 MILLIGRAM(S): 2.5 TABLET ORAL at 11:13

## 2021-09-06 RX ADMIN — Medication 81 MILLIGRAM(S): at 11:13

## 2021-09-06 RX ADMIN — BUDESONIDE AND FORMOTEROL FUMARATE DIHYDRATE 2 PUFF(S): 160; 4.5 AEROSOL RESPIRATORY (INHALATION) at 08:45

## 2021-09-06 RX ADMIN — ATORVASTATIN CALCIUM 80 MILLIGRAM(S): 80 TABLET, FILM COATED ORAL at 22:43

## 2021-09-06 RX ADMIN — Medication 7.5 MILLIGRAM(S): at 11:12

## 2021-09-06 RX ADMIN — ENOXAPARIN SODIUM 40 MILLIGRAM(S): 100 INJECTION SUBCUTANEOUS at 11:14

## 2021-09-06 RX ADMIN — Medication 2000 UNIT(S): at 11:12

## 2021-09-06 RX ADMIN — LOSARTAN POTASSIUM 75 MILLIGRAM(S): 100 TABLET, FILM COATED ORAL at 11:12

## 2021-09-06 RX ADMIN — ENOXAPARIN SODIUM 40 MILLIGRAM(S): 100 INJECTION SUBCUTANEOUS at 22:43

## 2021-09-06 NOTE — PROGRESS NOTE ADULT - ASSESSMENT
55 M noted to have COVID 7/15  On arrival hypoxic to 80s and tachypneic. NRB placed,in the ER saturating 95%. Na 126 s/p 1L NS. CXR: Frandy infiltrates. Last scr 1.4 in 2019-> today 1.9 unknown if underlying ckd.  Treated with IV Remdesivir and Decadron, Tocilizumab   Given the obesity/hypertension and prior co-morbidities, he is at risk of intubation but continue HFNC, respiratory status remains critical  DDimer elevated, was on therapeutic lovenox, transitioned to 40mg q 12  Completed Solumedrol, Symbicort 160/4.5 BID (https://[email or web address]/journals/lanres/article/[email or web address], [facility] study). MICU service started Solumedrol 40mg q 8 hrs as of 8/18  Was on HFNC %FiO2 with BiPAP overnight, now on CPAP and transitions between HFNC/NIPPV  Respiratory status is tenuous and he continues to have an increased work of breathing; at this point he is at high risk of intubation  at high risk of intubation and mechanical ventilation  ICU level care appreciated  lasix 20 mg IVPB Qdaily x 3 days - stopped after second dose  Reviewed CXR  overall prognosis remains guarded but continuing to improve  SaO2 80-85%  Hgb 7.5-->7.9-->8.0  repeat ABG 7.36/60/91 reviewed  Discussed with Dr Tineo; will continue to monitor

## 2021-09-06 NOTE — PROGRESS NOTE ADULT - SUBJECTIVE AND OBJECTIVE BOX
Patient is a 55y old  Male who presents with a chief complaint of cough/sob (06 Sep 2021 12:12)      BRIEF HOSPITAL COURSE:   Patient is a 55 year old male with a pmhx of DM2, HLD, HTN, unvaccinated admitted 7/21 with sob, hypoxic respiratory failure 2/2 COVID 19 complicated by ARDS s/p actemra, remdesivir and steroid taper. Was admitted to MICU on continuos CPAP. Now weaned to HFNC during day and CPAP at night         Events last 24 hours:   - Precedex was d/c  - had desaturation with removal of HFNC to mid 60s  - CPAP HS      PAST MEDICAL & SURGICAL HISTORY:  HTN (hypertension)    Diabetes          Hosp day #47d    not vented         Vital signs / Reviewed and Physical Exam Performed where pertinent and urgently required    Lab / Radiology  studies / ABG / Meds -  reviewed and interpreted into the assessment and treatment plan.      Assessment/Plan/Therapeutic interventions      1. Acute hypoxic resp failure  2. ARDS  3. Covid PNA   4. Severe Prot Ted malnutrition     Neuro - Precedex was d/c this afternoon after being on for 2-3 weeks. Will monitor closely for Precedex w/d. If suspected would start clonidine 0.1mg BID. Tonight patient endorsing diaphoresis. Then noted tachycardia.  Will give bedtime xanax now and re-eval for clonidine need     CV -  Sinus tachycardia may be related to anxiety vs Precedex d/c  will monitor. Cont losartan, norvasc and statin. clonidine if needed as above    Pulm -  Pt weaned to HFNC 50% fi02 and flow of 60. Will cont to wean further if tolerated to goal sp02 of 88-95%. Avoiding hyperoxia in covid setting, especially given prolonged oxygen requirement in this patient. Tonight had desaturation after removing HFNC and sp02 dropped to 65%. Pt then became tachypneic. Immediately placed NRB on patient. Will utilize CPAP HS and prn. Encouraged proning as tolerated in bed. He remains very high risk of decompensation given rapid desat, shunting and prolonged oxygen support.    GI -  Diet as tolerated    Renal - strict I/Os, renally dose meds    Heme -  lovenox 40mg BID sq + SCDs. cont aspirin     ID - s/p Actemra, remdesivir    Endo -  Aggressive glycemic control to limit FS glucose to < 180mg/dl with ISS AC/HS and lantus 12 units in morning. Prednisone taper     COVID 19 specific considerations and therapeutic  options based on the available and rapidly changing literature    Goals of care considerations:  Ongoing assessment for patient specific treatment options based on progression or decline.  I have involved the family with updates and requests in guidance for medical decision making.          39  Minutes of critical care team spent in the management of this critically ill COVID-19 Patient / PUI patient with continuous assessments and interventions based on the interpretation of multiple databases.

## 2021-09-06 NOTE — PROGRESS NOTE ADULT - SUBJECTIVE AND OBJECTIVE BOX
Hospital D # 47  ICU # 44    CC:  COVID     HPI:    54 y/o male with HLD, HTN and DM--Non Vacc--presents with 8 days onset of covid symptoms which included, cough, fevers, sob, hypoxia, fevers, diarrhea, chills and myalgias. Tested positive 7/15.  Rx with Rem/Dexa and then high dose steroids and full AC.  Pt tx to ICU on 7/24 for worsening hypoxia.       8/9:  Pt seen and examined in ICU.  Sitting in chair.  On HFNC 50/90% with O2 sat 80-84%. Slightly dysneic. Still eating.  Awake and alert.  Tm 98.6  WBC 19  FS high.  LE Dopp Negative for DVT.  L arm superficial thrombosis--NO DVT.      8/10: Pt seen and examined in ICU.  Case d/w Dr shah at bedside.  On HFNC at 50/90% with O2 sat 86%.  NAD.  Complete full sentences.  Ferritin lower.  Tm 98.8  WBC 16.  Eating well.  CXR-- Personally reviewed--slightly worsening infiltrates      8/11:  Pt seen and examined in ICU.  O2 sat not as high as yesterday.  On HFNC 50/95%.  Sitting in chair.  Taking longer to recover after care this morning.  He doesn't feel any different.  Tm 97.9  WBC 16  FS better.  Still eating well    8/12:  Pt seen and examined in ICU.  Self prone O/N.  Sitting in chair.  Clinically unchanged.  On HFNC 50/95%.  Awake and alert.  WOB unchanged.  Tm 97.9   FS high during night hours    8/13:  Pt seen and examined in ICU.  Had coughing spell O/N resulting in desaturation.  Sitting in chair.  Remains on HFNC and intermittent NRM. WOB unchanged. Tm 97.8  WBC 16  FS better    8/14:  Pt seen and examined in ICU.  Coughing O/N.  Awake and alert.  WOB stable.  Tm 98.8      8/15:  Pt seen and examined in ICU.  Slept well O/N.  Awake and alert.  Tm 98.9  FS OK    9/6:  Pt seen and examined in ICU.  Has stayed off vent support.  On HFNC and CPAP qhs.  Sitting in chair.  Tm 98.6  WBC 10    PMH:  As above.     PSH:  As above.     FH: Non Contributory other than those listed in HPI    Social History:  NC    MEDICATIONS  (STANDING):  amLODIPine   Tablet 5 milliGRAM(s) Oral daily  aspirin  chewable 81 milliGRAM(s) Oral daily  atorvastatin 80 milliGRAM(s) Oral at bedtime  budesonide 160 MICROgram(s)/formoterol 4.5 MICROgram(s) Inhaler 2 Puff(s) Inhalation two times a day  cholecalciferol 2000 Unit(s) Oral daily  dexMEDEtomidine Infusion 0.2 MICROgram(s)/kG/Hr (4.52 mL/Hr) IV Continuous <Continuous>  dextrose 40% Gel 15 Gram(s) Oral once  dextrose 5%. 1000 milliLiter(s) (50 mL/Hr) IV Continuous <Continuous>  dextrose 5%. 1000 milliLiter(s) (100 mL/Hr) IV Continuous <Continuous>  dextrose 50% Injectable 25 Gram(s) IV Push once  dextrose 50% Injectable 12.5 Gram(s) IV Push once  dextrose 50% Injectable 25 Gram(s) IV Push once  enoxaparin Injectable 40 milliGRAM(s) SubCutaneous every 12 hours  glucagon  Injectable 1 milliGRAM(s) IntraMuscular once  insulin glargine Injectable (LANTUS) 12 Unit(s) SubCutaneous <User Schedule>  insulin lispro (ADMELOG) corrective regimen sliding scale   SubCutaneous three times a day before meals  insulin lispro (ADMELOG) corrective regimen sliding scale   SubCutaneous at bedtime  losartan 75 milliGRAM(s) Oral daily  predniSONE   Tablet 7.5 milliGRAM(s) Oral daily  senna 1 Tablet(s) Oral at bedtime    MEDICATIONS  (PRN):  acetaminophen   Tablet .. 650 milliGRAM(s) Oral every 4 hours PRN Temp greater or equal to 38C (100.4F), Mild Pain (1 - 3)  ALBUTerol    90 MICROgram(s) HFA Inhaler 2 Puff(s) Inhalation every 4 hours PRN Shortness of Breath and/or Wheezing  ALPRAZolam 0.5 milliGRAM(s) Oral every 6 hours PRN anxiety  guaiFENesin Oral Liquid (Sugar-Free) 100 milliGRAM(s) Oral every 6 hours PRN Cough  hydrALAZINE Injectable 5 milliGRAM(s) IV Push every 6 hours PRN SBP>160  polyethylene glycol 3350 17 Gram(s) Oral daily PRN Constipation      Allergies: NKDA    ROS:  SEE BELOW        ICU Vital Signs Last 24 Hrs  T(C): 37 (06 Sep 2021 04:00), Max: 37 (05 Sep 2021 20:00)  T(F): 98.6 (06 Sep 2021 04:00), Max: 98.6 (05 Sep 2021 20:00)  HR: 91 (06 Sep 2021 12:00) (66 - 98)  BP: 125/92 (06 Sep 2021 12:00) (90/62 - 127/85)  BP(mean): 99 (06 Sep 2021 12:00) (62 - 99)  ABP: --  ABP(mean): --  RR: 27 (06 Sep 2021 12:00) (15 - 33)  SpO2: 93% (06 Sep 2021 12:00) (83% - 100%)          I&O's Summary    05 Sep 2021 07:01  -  06 Sep 2021 07:00  --------------------------------------------------------  IN: 1208 mL / OUT: 1300 mL / NET: -92 mL        Physical Exam:  SEE BELOW                          7.8    10.09 )-----------( 250      ( 06 Sep 2021 07:21 )             25.4       09-06    140  |  105  |  17  ----------------------------<  105<H>  3.6   |  33<H>  |  0.62    Ca    8.9      06 Sep 2021 07:21  Phos  3.6     09-06  Mg     2.0     09-06                      DVT Prophylaxis:                                                            Contraindication:     Advanced Directives:    Discussed with:    Visit Information:  Time spent excluding procedure:      ** Time is exclusive of billed procedures and/or teaching and/or routine family updates.

## 2021-09-06 NOTE — PROGRESS NOTE ADULT - ASSESSMENT
IMP:    54 y/o male with HLD, HTN and DM--Non Vacc-- admitted with Viral PNA sepsis secondary to COVID--Acute type 1 resp failure and ARDS.      Remains high risk for intubation    Severe Prot Ted malnutrition     Critically ill.  High risk for acute decompensation and deterioration including death   Patient requires critical care for support of one or more vital organ systems with a high probability of imminent or life threatening deterioration in his/her condition     Plan:    HFNC--keep oxygen saturation in mid 80s accepting "Happy Hypoxia" to avoid/limit oxygen toxicity.  NIV QHS.  Prone as tolerated  Prednisone 7.5 daily  FS with insulin coverage--keep FS < 180  Encourage nutritional support  OOB  DVT prophy--SCD and LMWH  No labs in AM    ICU care-- d/w ICU staff on multi disciplinary rounds and pt-- All concerns addressed including but not limited to diagnosis, treatment plan and overall prognosis

## 2021-09-07 PROCEDURE — 99291 CRITICAL CARE FIRST HOUR: CPT

## 2021-09-07 RX ORDER — METOPROLOL TARTRATE 50 MG
25 TABLET ORAL EVERY 12 HOURS
Refills: 0 | Status: DISCONTINUED | OUTPATIENT
Start: 2021-09-07 | End: 2021-09-07

## 2021-09-07 RX ADMIN — BUDESONIDE AND FORMOTEROL FUMARATE DIHYDRATE 2 PUFF(S): 160; 4.5 AEROSOL RESPIRATORY (INHALATION) at 20:34

## 2021-09-07 RX ADMIN — Medication 0.5 MILLIGRAM(S): at 09:00

## 2021-09-07 RX ADMIN — ENOXAPARIN SODIUM 40 MILLIGRAM(S): 100 INJECTION SUBCUTANEOUS at 11:05

## 2021-09-07 RX ADMIN — ATORVASTATIN CALCIUM 80 MILLIGRAM(S): 80 TABLET, FILM COATED ORAL at 21:36

## 2021-09-07 RX ADMIN — Medication 0.1 MILLIGRAM(S): at 21:36

## 2021-09-07 RX ADMIN — BUDESONIDE AND FORMOTEROL FUMARATE DIHYDRATE 2 PUFF(S): 160; 4.5 AEROSOL RESPIRATORY (INHALATION) at 01:48

## 2021-09-07 RX ADMIN — AMLODIPINE BESYLATE 5 MILLIGRAM(S): 2.5 TABLET ORAL at 19:21

## 2021-09-07 RX ADMIN — Medication 81 MILLIGRAM(S): at 11:05

## 2021-09-07 RX ADMIN — Medication 0.5 MILLIGRAM(S): at 21:36

## 2021-09-07 RX ADMIN — BUDESONIDE AND FORMOTEROL FUMARATE DIHYDRATE 2 PUFF(S): 160; 4.5 AEROSOL RESPIRATORY (INHALATION) at 09:50

## 2021-09-07 RX ADMIN — Medication 2000 UNIT(S): at 11:05

## 2021-09-07 RX ADMIN — LOSARTAN POTASSIUM 75 MILLIGRAM(S): 100 TABLET, FILM COATED ORAL at 11:06

## 2021-09-07 RX ADMIN — Medication 0: at 19:21

## 2021-09-07 RX ADMIN — Medication 0.1 MILLIGRAM(S): at 11:05

## 2021-09-07 RX ADMIN — Medication 7.5 MILLIGRAM(S): at 11:06

## 2021-09-07 RX ADMIN — ENOXAPARIN SODIUM 40 MILLIGRAM(S): 100 INJECTION SUBCUTANEOUS at 21:36

## 2021-09-07 NOTE — PROGRESS NOTE ADULT - ASSESSMENT
IMP:    56 y/o male with HLD, HTN and DM--Non Vacc--admitted with Viral PNA sepsis secondary to COVID--Acute type 1 resp failure and ARDS.  Rapidly desaturates and recover period still prolong    Remains high risk for intubation    Severe Prot Ted malnutrition     Critically ill.  High risk for acute decompensation and deterioration including death   Patient requires critical care for support of one or more vital organ systems with a high probability of imminent or life threatening deterioration in his/her condition     Plan:    HFNC--keep oxygen saturation in mid 80s accepting "Happy Hypoxia" to avoid/limit oxygen toxicity.  NIV QHS  Prednisone 7.5 daily  FS with insulin coverage--keep FS < 180  Encourage nutritional support  OOB  DVT prophy--SCD and LMWH  No labs in AM    ICU care-- d/w ICU staff on multi disciplinary rounds and pt-- All concerns addressed including but not limited to diagnosis, treatment plan and overall prognosis

## 2021-09-07 NOTE — PROGRESS NOTE ADULT - SUBJECTIVE AND OBJECTIVE BOX
Patient is a 56y old  Male who presents with a chief complaint of cough/sob (07 Sep 2021 10:48)      BRIEF HOSPITAL COURSE:   Patient is a 55 year old male with a pmhx of DM2, HLD, HTN, unvaccinated admitted 7/21 with sob, hypoxic respiratory failure 2/2 COVID 19 complicated by ARDS s/p actemra, remdesivir and steroid taper. Was admitted to MICU on continuos CPAP. Now weaned to HFNC during day and CPAP at night       Events last 24 hours:   - titrated down flow to 50  - on 50%fi02 and flow of 50  - started on clonidine for precedex w/d    PAST MEDICAL & SURGICAL HISTORY:  HTN (hypertension)    Diabetes          Hosp day #48d    not vented         Vital signs / Reviewed and Physical Exam Performed where pertinent and urgently required    Lab / Radiology  studies / ABG / Meds -  reviewed and interpreted into the assessment and treatment plan.      Assessment/Plan/Therapeutic interventions      1. Acute hypoxic resp failure  2. ARDS  3. Covid PNA   4. Severe Prot Ted malnutrition     Neuro - Pt now with Precedex w/d and will be put on clonidine 0.1mg BID. Precedex drip was on for 3 weeks. Will also cont xanax prn for anxiety.    CV -  Sinus tachycardia may be related to anxiety vs Precedex d/c  will monitor. Cont losartan, norvasc and statin. clonidine as above    Pulm -  Pt weaned to HFNC 50% fi02 and flow of 5. Will cont to wean further if tolerated to goal sp02 of 88-95%. Avoiding hyperoxia in covid setting, especially given prolonged oxygen requirement in this patient. Will utilize CPAP HS and prn. Encouraged proning as tolerated in bed. He remains very high risk of decompensation given rapid desat, shunting and prolonged oxygen support.    GI -  Diet as tolerated    Renal - strict I/Os, renally dose meds    Heme -  lovenox 40mg BID sq + SCDs. cont aspirin     ID - s/p Actemra, remdesivir    Endo -  Aggressive glycemic control to limit FS glucose to < 180mg/dl with ISS AC/HS and lantus 12 units in morning. Prednisone taper       COVID 19 specific considerations and therapeutic  options based on the available and rapidly changing literature    Goals of care considerations:  Ongoing assessment for patient specific treatment options based on progression or decline.  I have involved the family with updates and requests in guidance for medical decision making.          36 Minutes of critical care team spent in the management of this critically ill COVID-19 Patient / PUI patient with continuous assessments and interventions based on the interpretation of multiple databases.

## 2021-09-07 NOTE — PROGRESS NOTE ADULT - SUBJECTIVE AND OBJECTIVE BOX
Hospital D # 48  ICU # 45    CC:  COVID     HPI:    56 y/o male with HLD, HTN and DM--Non Vacc--presents with 8 days onset of covid symptoms which included, cough, fevers, sob, hypoxia, fevers, diarrhea, chills and myalgias. Tested positive 7/15.  Rx with Rem/Dexa and then high dose steroids and full AC.  Pt tx to ICU on 7/24 for worsening hypoxia.       8/9:  Pt seen and examined in ICU.  Sitting in chair.  On HFNC 50/90% with O2 sat 80-84%. Slightly dysneic. Still eating.  Awake and alert.  Tm 98.6  WBC 19  FS high.  LE Dopp Negative for DVT.  L arm superficial thrombosis--NO DVT.      8/10: Pt seen and examined in ICU.  Case d/w Dr shah at bedside.  On HFNC at 50/90% with O2 sat 86%.  NAD.  Complete full sentences.  Ferritin lower.  Tm 98.8  WBC 16.  Eating well.  CXR-- Personally reviewed--slightly worsening infiltrates      8/11:  Pt seen and examined in ICU.  O2 sat not as high as yesterday.  On HFNC 50/95%.  Sitting in chair.  Taking longer to recover after care this morning.  He doesn't feel any different.  Tm 97.9  WBC 16  FS better.  Still eating well    8/12:  Pt seen and examined in ICU.  Self prone O/N.  Sitting in chair.  Clinically unchanged.  On HFNC 50/95%.  Awake and alert.  WOB unchanged.  Tm 97.9   FS high during night hours    8/13:  Pt seen and examined in ICU.  Had coughing spell O/N resulting in desaturation.  Sitting in chair.  Remains on HFNC and intermittent NRM. WOB unchanged. Tm 97.8  WBC 16  FS better    8/14:  Pt seen and examined in ICU.  Coughing O/N.  Awake and alert.  WOB stable.  Tm 98.8      8/15:  Pt seen and examined in ICU.  Slept well O/N.  Awake and alert.  Tm 98.9  FS OK    9/6:  Pt seen and examined in ICU.  Has stayed off vent support.  On HFNC and CPAP qhs.  Sitting in chair.  Tm 98.6  WBC 10    9/7:  Pt seen and examined in ICU.  Tachycardic, diaphoresis and could not sleep last night.  Tm 99.6  FS OK.  Still desats rapidly and recover period still prolong     PMH:  As above.     PSH:  As above.     FH: Non Contributory other than those listed in HPI    Social History:  NC     MEDICATIONS  (STANDING):  amLODIPine   Tablet 5 milliGRAM(s) Oral daily  aspirin  chewable 81 milliGRAM(s) Oral daily  atorvastatin 80 milliGRAM(s) Oral at bedtime  budesonide 160 MICROgram(s)/formoterol 4.5 MICROgram(s) Inhaler 2 Puff(s) Inhalation two times a day  cholecalciferol 2000 Unit(s) Oral daily  cloNIDine 0.1 milliGRAM(s) Oral every 12 hours  dextrose 40% Gel 15 Gram(s) Oral once  dextrose 5%. 1000 milliLiter(s) (50 mL/Hr) IV Continuous <Continuous>  dextrose 5%. 1000 milliLiter(s) (100 mL/Hr) IV Continuous <Continuous>  dextrose 50% Injectable 25 Gram(s) IV Push once  dextrose 50% Injectable 12.5 Gram(s) IV Push once  dextrose 50% Injectable 25 Gram(s) IV Push once  enoxaparin Injectable 40 milliGRAM(s) SubCutaneous every 12 hours  glucagon  Injectable 1 milliGRAM(s) IntraMuscular once  insulin glargine Injectable (LANTUS) 12 Unit(s) SubCutaneous <User Schedule>  insulin lispro (ADMELOG) corrective regimen sliding scale   SubCutaneous three times a day before meals  insulin lispro (ADMELOG) corrective regimen sliding scale   SubCutaneous at bedtime  losartan 75 milliGRAM(s) Oral daily  predniSONE   Tablet 7.5 milliGRAM(s) Oral daily  senna 1 Tablet(s) Oral at bedtime    MEDICATIONS  (PRN):  acetaminophen   Tablet .. 650 milliGRAM(s) Oral every 4 hours PRN Temp greater or equal to 38C (100.4F), Mild Pain (1 - 3)  ALBUTerol    90 MICROgram(s) HFA Inhaler 2 Puff(s) Inhalation every 4 hours PRN Shortness of Breath and/or Wheezing  ALPRAZolam 0.5 milliGRAM(s) Oral every 6 hours PRN anxiety  guaiFENesin Oral Liquid (Sugar-Free) 100 milliGRAM(s) Oral every 6 hours PRN Cough  hydrALAZINE Injectable 5 milliGRAM(s) IV Push every 6 hours PRN SBP>160  polyethylene glycol 3350 17 Gram(s) Oral daily PRN Constipation      Allergies: NKDA    ROS:  SEE BELOW        ICU Vital Signs Last 24 Hrs  T(C): 36 (07 Sep 2021 04:00), Max: 37.6 (07 Sep 2021 00:00)  T(F): 96.8 (07 Sep 2021 04:00), Max: 99.6 (07 Sep 2021 00:00)  HR: 110 (07 Sep 2021 09:52) (79 - 122)  BP: 148/98 (07 Sep 2021 09:00) (107/74 - 169/89)  BP(mean): 109 (07 Sep 2021 09:00) (76 - 116)  ABP: --  ABP(mean): --  RR: 29 (07 Sep 2021 09:20) (19 - 39)  SpO2: 90% (07 Sep 2021 09:52) (72% - 100%)          I&O's Summary    06 Sep 2021 07:01  -  07 Sep 2021 07:00  --------------------------------------------------------  IN: 60 mL / OUT: 2500 mL / NET: -2440 mL        Physical Exam:  SEE BELOW                          7.8    10.09 )-----------( 250      ( 06 Sep 2021 07:21 )             25.4       09-06    140  |  105  |  17  ----------------------------<  105<H>  3.6   |  33<H>  |  0.62    Ca    8.9      06 Sep 2021 07:21  Phos  3.6     09-06  Mg     2.0     09-06                      DVT Prophylaxis:                                                            Contraindication:     Advanced Directives:    Discussed with:    Visit Information:  Time spent excluding procedure:      ** Time is exclusive of billed procedures and/or teaching and/or routine family updates.

## 2021-09-08 PROCEDURE — 99291 CRITICAL CARE FIRST HOUR: CPT

## 2021-09-08 RX ORDER — ALPRAZOLAM 0.25 MG
0.5 TABLET ORAL EVERY 6 HOURS
Refills: 0 | Status: DISCONTINUED | OUTPATIENT
Start: 2021-09-08 | End: 2021-09-09

## 2021-09-08 RX ADMIN — Medication 81 MILLIGRAM(S): at 09:53

## 2021-09-08 RX ADMIN — Medication 100 MILLIGRAM(S): at 21:32

## 2021-09-08 RX ADMIN — INSULIN GLARGINE 12 UNIT(S): 100 INJECTION, SOLUTION SUBCUTANEOUS at 11:00

## 2021-09-08 RX ADMIN — Medication 2000 UNIT(S): at 09:53

## 2021-09-08 RX ADMIN — LOSARTAN POTASSIUM 75 MILLIGRAM(S): 100 TABLET, FILM COATED ORAL at 09:54

## 2021-09-08 RX ADMIN — AMLODIPINE BESYLATE 5 MILLIGRAM(S): 2.5 TABLET ORAL at 09:53

## 2021-09-08 RX ADMIN — ENOXAPARIN SODIUM 40 MILLIGRAM(S): 100 INJECTION SUBCUTANEOUS at 21:32

## 2021-09-08 RX ADMIN — Medication 100 MILLIGRAM(S): at 10:00

## 2021-09-08 RX ADMIN — Medication 1: at 16:01

## 2021-09-08 RX ADMIN — Medication 1: at 12:49

## 2021-09-08 RX ADMIN — ATORVASTATIN CALCIUM 80 MILLIGRAM(S): 80 TABLET, FILM COATED ORAL at 21:32

## 2021-09-08 RX ADMIN — Medication 5 MILLIGRAM(S): at 09:54

## 2021-09-08 RX ADMIN — ENOXAPARIN SODIUM 40 MILLIGRAM(S): 100 INJECTION SUBCUTANEOUS at 09:54

## 2021-09-08 RX ADMIN — Medication 0.1 MILLIGRAM(S): at 21:32

## 2021-09-08 RX ADMIN — Medication 0.1 MILLIGRAM(S): at 09:54

## 2021-09-08 RX ADMIN — SENNA PLUS 1 TABLET(S): 8.6 TABLET ORAL at 21:32

## 2021-09-08 RX ADMIN — Medication 0.5 MILLIGRAM(S): at 23:30

## 2021-09-08 RX ADMIN — Medication 0.5 MILLIGRAM(S): at 10:00

## 2021-09-08 RX ADMIN — BUDESONIDE AND FORMOTEROL FUMARATE DIHYDRATE 2 PUFF(S): 160; 4.5 AEROSOL RESPIRATORY (INHALATION) at 20:44

## 2021-09-08 NOTE — PROGRESS NOTE ADULT - SUBJECTIVE AND OBJECTIVE BOX
Hospital D # 49  ICU # 46    CC:  COVID     HPI:    56 y/o male with HLD, HTN and DM--Non Vacc--presents with 8 days onset of covid symptoms which included, cough, fevers, sob, hypoxia, fevers, diarrhea, chills and myalgias. Tested positive 7/15.  Rx with Rem/Dexa and then high dose steroids and full AC.  Pt tx to ICU on 7/24 for worsening hypoxia.       8/9:  Pt seen and examined in ICU.  Sitting in chair.  On HFNC 50/90% with O2 sat 80-84%. Slightly dysneic. Still eating.  Awake and alert.  Tm 98.6  WBC 19  FS high.  LE Dopp Negative for DVT.  L arm superficial thrombosis--NO DVT.      8/10: Pt seen and examined in ICU.  Case d/w Dr shah at bedside.  On HFNC at 50/90% with O2 sat 86%.  NAD.  Complete full sentences.  Ferritin lower.  Tm 98.8  WBC 16.  Eating well.  CXR-- Personally reviewed--slightly worsening infiltrates      8/11:  Pt seen and examined in ICU.  O2 sat not as high as yesterday.  On HFNC 50/95%.  Sitting in chair.  Taking longer to recover after care this morning.  He doesn't feel any different.  Tm 97.9  WBC 16  FS better.  Still eating well    8/12:  Pt seen and examined in ICU.  Self prone O/N.  Sitting in chair.  Clinically unchanged.  On HFNC 50/95%.  Awake and alert.  WOB unchanged.  Tm 97.9   FS high during night hours    8/13:  Pt seen and examined in ICU.  Had coughing spell O/N resulting in desaturation.  Sitting in chair.  Remains on HFNC and intermittent NRM. WOB unchanged. Tm 97.8  WBC 16  FS better    8/14:  Pt seen and examined in ICU.  Coughing O/N.  Awake and alert.  WOB stable.  Tm 98.8      8/15:  Pt seen and examined in ICU.  Slept well O/N.  Awake and alert.  Tm 98.9  FS OK    9/6:  Pt seen and examined in ICU.  Has stayed off vent support.  On HFNC and CPAP qhs.  Sitting in chair.  Tm 98.6  WBC 10    9/7:  Pt seen and examined in ICU.  Tachycardic, diaphoresis and could not sleep last night.  Tm 99.6  FS OK.  Still desats rapidly and recover period still prolong     9/8:  Pt seen and examined in ICU.  Had diarrhea yesterday after eating food brought in by wife--Resolved.  Slept well.  Tm 99.6  FS OK    PMH:  As above.     PSH:  As above.     FH: Non Contributory other than those listed in HPI    Social History:  NC    MEDICATIONS  (STANDING):  amLODIPine   Tablet 5 milliGRAM(s) Oral daily  aspirin  chewable 81 milliGRAM(s) Oral daily  atorvastatin 80 milliGRAM(s) Oral at bedtime  budesonide 160 MICROgram(s)/formoterol 4.5 MICROgram(s) Inhaler 2 Puff(s) Inhalation two times a day  cholecalciferol 2000 Unit(s) Oral daily  cloNIDine 0.1 milliGRAM(s) Oral every 12 hours  dextrose 40% Gel 15 Gram(s) Oral once  dextrose 5%. 1000 milliLiter(s) (50 mL/Hr) IV Continuous <Continuous>  dextrose 5%. 1000 milliLiter(s) (100 mL/Hr) IV Continuous <Continuous>  dextrose 50% Injectable 25 Gram(s) IV Push once  dextrose 50% Injectable 12.5 Gram(s) IV Push once  dextrose 50% Injectable 25 Gram(s) IV Push once  enoxaparin Injectable 40 milliGRAM(s) SubCutaneous every 12 hours  glucagon  Injectable 1 milliGRAM(s) IntraMuscular once  insulin glargine Injectable (LANTUS) 12 Unit(s) SubCutaneous <User Schedule>  insulin lispro (ADMELOG) corrective regimen sliding scale   SubCutaneous three times a day before meals  insulin lispro (ADMELOG) corrective regimen sliding scale   SubCutaneous at bedtime  losartan 75 milliGRAM(s) Oral daily  predniSONE   Tablet 5 milliGRAM(s) Oral daily  senna 1 Tablet(s) Oral at bedtime    MEDICATIONS  (PRN):  acetaminophen   Tablet .. 650 milliGRAM(s) Oral every 4 hours PRN Temp greater or equal to 38C (100.4F), Mild Pain (1 - 3)  ALBUTerol    90 MICROgram(s) HFA Inhaler 2 Puff(s) Inhalation every 4 hours PRN Shortness of Breath and/or Wheezing  ALPRAZolam 0.5 milliGRAM(s) Oral every 6 hours PRN anxiety  guaiFENesin Oral Liquid (Sugar-Free) 100 milliGRAM(s) Oral every 6 hours PRN Cough  hydrALAZINE Injectable 5 milliGRAM(s) IV Push every 6 hours PRN SBP>160  polyethylene glycol 3350 17 Gram(s) Oral daily PRN Constipation      Allergies: NKDA    ROS:  SEE BELOW        ICU Vital Signs Last 24 Hrs  T(C): 36.1 (08 Sep 2021 04:00), Max: 37.5 (07 Sep 2021 16:00)  T(F): 97 (08 Sep 2021 04:00), Max: 99.5 (07 Sep 2021 16:00)  HR: 105 (08 Sep 2021 09:30) (82 - 122)  BP: 127/79 (08 Sep 2021 09:30) (99/58 - 150/85)  BP(mean): 89 (08 Sep 2021 09:30) (57 - 98)  ABP: --  ABP(mean): --  RR: 19 (08 Sep 2021 09:30) (15 - 34)  SpO2: 93% (08 Sep 2021 09:30) (81% - 100%)          I&O's Summary    07 Sep 2021 07:01  -  08 Sep 2021 07:00  --------------------------------------------------------  IN: 0 mL / OUT: 600 mL / NET: -600 mL        Physical Exam:  SEE BELOW                              DVT Prophylaxis:                                                            Contraindication:     Advanced Directives:    Discussed with:    Visit Information:  Time spent excluding procedure:      ** Time is exclusive of billed procedures and/or teaching and/or routine family updates.

## 2021-09-08 NOTE — PROGRESS NOTE ADULT - ASSESSMENT
55 M noted to have COVID 7/15  On arrival hypoxic to 80s and tachypneic. NRB placed,in the ER saturating 95%. Na 126 s/p 1L NS. CXR: Frandy infiltrates. Last scr 1.4 in 2019-> today 1.9 unknown if underlying ckd.  Treated with IV Remdesivir and Decadron, Tocilizumab   Given the obesity/hypertension and prior co-morbidities, he is at risk of intubation but continue HFNC, respiratory status remains critical  DDimer elevated, was on therapeutic lovenox, transitioned to 40mg q 12  Completed Solumedrol, Symbicort 160/4.5 BID ([email or web address], [facility] study). MICU service started Solumedrol 40mg q 8 hrs as of 8/18  Was on HFNC %FiO2 with BiPAP overnight, now on CPAP and transitions between HFNC/NIPPV  Respiratory status is tenuous and he continues to have an increased work of breathing; at this point he is at high risk of intubation  at high risk of intubation and mechanical ventilation  ICU level care appreciated  lasix 20 mg IVPB Qdaily x 3 days - stopped after second dose  Reviewed CXR  overall prognosis remains guarded but continuing to improve  SaO2 80-85%  Hgb improving  repeat ABG 7.36/60/91 reviewed  Patient sitting at bedside, using HFNC, just transitioned from BiPAP to 50LPM, 55%FiO2  Discussed with Marion; will continue to monitor

## 2021-09-08 NOTE — PROGRESS NOTE ADULT - ASSESSMENT
IMP:    56 y/o male with HLD, HTN and DM--Non Vacc--admitted with Viral PNA sepsis secondary to COVID--Acute type 1 resp failure and ARDS.  Rapidly desaturates and recover period still prolong    Remains high risk for intubation    Severe Prot Ted malnutrition     Critically ill.  High risk for acute decompensation and deterioration including death   Patient requires critical care for support of one or more vital organ systems with a high probability of imminent or life threatening deterioration in his/her condition     Plan:    HFNC--keep oxygen saturation in mid 80s accepting "Happy Hypoxia" to avoid/limit oxygen toxicity.  NIV QHS.  Titrate down Flow/FIO2 as tolerated   Prednisone to 5 daily   FS with insulin coverage--keep FS < 180  Encourage nutritional support  OOB  DVT prophy--SCD and LMWH  No labs in AM    ICU care-- d/w ICU staff on multi disciplinary rounds and pt-- All concerns addressed including but not limited to diagnosis, treatment plan and overall prognosis

## 2021-09-08 NOTE — PROGRESS NOTE ADULT - SUBJECTIVE AND OBJECTIVE BOX

## 2021-09-09 PROCEDURE — 99291 CRITICAL CARE FIRST HOUR: CPT

## 2021-09-09 RX ADMIN — Medication 0.1 MILLIGRAM(S): at 10:05

## 2021-09-09 RX ADMIN — ENOXAPARIN SODIUM 40 MILLIGRAM(S): 100 INJECTION SUBCUTANEOUS at 22:09

## 2021-09-09 RX ADMIN — ENOXAPARIN SODIUM 40 MILLIGRAM(S): 100 INJECTION SUBCUTANEOUS at 10:07

## 2021-09-09 RX ADMIN — Medication 5 MILLIGRAM(S): at 10:08

## 2021-09-09 RX ADMIN — SENNA PLUS 1 TABLET(S): 8.6 TABLET ORAL at 22:09

## 2021-09-09 RX ADMIN — ATORVASTATIN CALCIUM 80 MILLIGRAM(S): 80 TABLET, FILM COATED ORAL at 22:09

## 2021-09-09 RX ADMIN — BUDESONIDE AND FORMOTEROL FUMARATE DIHYDRATE 2 PUFF(S): 160; 4.5 AEROSOL RESPIRATORY (INHALATION) at 20:34

## 2021-09-09 RX ADMIN — Medication 100 MILLIGRAM(S): at 19:28

## 2021-09-09 RX ADMIN — LOSARTAN POTASSIUM 75 MILLIGRAM(S): 100 TABLET, FILM COATED ORAL at 10:06

## 2021-09-09 RX ADMIN — AMLODIPINE BESYLATE 5 MILLIGRAM(S): 2.5 TABLET ORAL at 10:06

## 2021-09-09 RX ADMIN — Medication 81 MILLIGRAM(S): at 10:05

## 2021-09-09 RX ADMIN — Medication 2000 UNIT(S): at 10:06

## 2021-09-09 RX ADMIN — Medication 0.5 MILLIGRAM(S): at 22:09

## 2021-09-09 RX ADMIN — INSULIN GLARGINE 12 UNIT(S): 100 INJECTION, SOLUTION SUBCUTANEOUS at 10:06

## 2021-09-09 NOTE — PROGRESS NOTE ADULT - SUBJECTIVE AND OBJECTIVE BOX
Patient is a 55y old  Male who presents with a chief complaint of cough/sob (23 Jul 2021 14:03)      HPI:  55 M with pmh HLD, dm, htn presents with 8 days onset of covid symptoms which included, cough, fevers, sob, hypoxia, fevers, diarrhea, chills and myalgias. TEsted positive 7/15. Wife endorsed he was becoming more sob of recently. On arrival hypoxic to 80s and tachypneic. NRB placed, presently on 15% and saturating 95%. Na 126 s/p 1L NS. CXR: Frandy infiltrates. Last scr 1.4 in 2019-> today 1.9 unknown if underlying ckd.  Patient not vaccinated.   Last seen by me in 2019  History of ROBERT and uncontrolled hypertension  Treated with IV Remdesivir and Decadron, now on Toci  SaO2 is 80-85% despite being on 100% NRB  ABG pending        9/9  No acute pulmonary events occurred overnight  Using HFNC  50LPM, 55%FiO2    MEDICATIONS  (STANDING):  amLODIPine   Tablet 5 milliGRAM(s) Oral daily  aspirin  chewable 81 milliGRAM(s) Oral daily  atorvastatin 80 milliGRAM(s) Oral at bedtime  budesonide 160 MICROgram(s)/formoterol 4.5 MICROgram(s) Inhaler 2 Puff(s) Inhalation two times a day  cholecalciferol 2000 Unit(s) Oral daily  dexMEDEtomidine Infusion 0.2 MICROgram(s)/kG/Hr (4.52 mL/Hr) IV Continuous <Continuous>  dextrose 40% Gel 15 Gram(s) Oral once  dextrose 5%. 1000 milliLiter(s) (50 mL/Hr) IV Continuous <Continuous>  dextrose 5%. 1000 milliLiter(s) (100 mL/Hr) IV Continuous <Continuous>  dextrose 50% Injectable 25 Gram(s) IV Push once  dextrose 50% Injectable 12.5 Gram(s) IV Push once  dextrose 50% Injectable 25 Gram(s) IV Push once  enoxaparin Injectable 40 milliGRAM(s) SubCutaneous every 12 hours  fat emulsion (Fish Oil and Plant Based) 20% Infusion 20.8 mL/Hr (20.8 mL/Hr) IV Continuous <Continuous>  fat emulsion (Fish Oil and Plant Based) 20% Infusion 0.55 Gm/kG/Day (20.83 mL/Hr) IV Continuous <Continuous>  glucagon  Injectable 1 milliGRAM(s) IntraMuscular once  insulin glargine Injectable (LANTUS) 8 Unit(s) SubCutaneous <User Schedule>  insulin lispro (ADMELOG) corrective regimen sliding scale   SubCutaneous three times a day before meals  insulin lispro (ADMELOG) corrective regimen sliding scale   SubCutaneous at bedtime  losartan 75 milliGRAM(s) Oral daily  pantoprazole  Injectable 40 milliGRAM(s) IV Push daily  Parenteral Nutrition - Adult 1 Each (75 mL/Hr) TPN Continuous <Continuous>  predniSONE   Tablet 10 milliGRAM(s) Oral daily  senna 1 Tablet(s) Oral at bedtime      MEDICATIONS  (PRN):  acetaminophen   Tablet .. 650 milliGRAM(s) Oral every 4 hours PRN Temp greater or equal to 38C (100.4F), Mild Pain (1 - 3)  ALBUTerol    90 MICROgram(s) HFA Inhaler 2 Puff(s) Inhalation every 4 hours PRN Shortness of Breath and/or Wheezing  benzocaine 15 mG/menthol 3.6 mG (Sugar-Free) Lozenge 1 Lozenge Oral every 6 hours PRN Sore Throat  benzonatate 100 milliGRAM(s) Oral three times a day PRN Cough  hydrALAZINE Injectable 5 milliGRAM(s) IV Push every 6 hours PRN SBP>160  hydrocodone/homatropine Syrup 5 milliLiter(s) Oral every 6 hours PRN Cough  LORazepam   Injectable 0.5 milliGRAM(s) IV Push every 6 hours PRN Agitation  melatonin 6 milliGRAM(s) Oral at bedtime PRN Insomnia  morphine  - Injectable 2 milliGRAM(s) IV Push every 2 hours PRN SOB/Anxiety    Vital Signs Last 24 Hrs  T(C): 37 (05 Sep 2021 05:00), Max: 37.2 (04 Sep 2021 19:00)  T(F): 98.6 (05 Sep 2021 05:00), Max: 99 (04 Sep 2021 19:00)  HR: 77 (05 Sep 2021 08:00) (71 - 99)  BP: 98/62 (05 Sep 2021 08:00) (98/62 - 148/83)  BP(mean): 70 (05 Sep 2021 08:00) (70 - 98)  RR: 26 (05 Sep 2021 08:00) (20 - 37)  SpO2: 98% (05 Sep 2021 08:00) (86% - 100%)    I&O's Detail    31 Aug 2021 07:01  -  01 Sep 2021 07:00  --------------------------------------------------------  IN:    Dexmedetomidine: 371.9 mL    Fat Emulsion (Fish Oil &amp; Plant Based) 20% Infusion: 273.4 mL    PPN (Peripheral Parenteral Nutrition): 1816.7 mL  Total IN: 2462 mL    OUT:    Voided (mL): 1150 mL  Total OUT: 1150 mL    Total NET: 1312 mL      PHYSICAL EXAM  General Appearance: On CPAP, increased work of breathing- about the same  Lungs: coarse bilaterally  Heart: +S1,S2  Abdomen: Soft, non-tender, bowel sounds active   Extremities: no cyanosis or edema, no joint swelling  Skin: Skin color, texture normal, no rashes   Neurologic: Alert and oriented X3 , non focal, not disoriented    ECG:    LABS:                          7.5    11.23 )-----------( 248      ( 30 Aug 2021 07:04 )             23.6   08-30  09-01    138  |  102  |  14  ----------------------------<  153<H>  4.5   |  36<H>  |  0.70    Ca    8.4<L>      01 Sep 2021 05:47  Phos  4.7     09-01  Mg     1.9     09-01      138  |  100  |  20  ----------------------------<  179<H>  3.5   |  35<H>  |  0.68    Ca    8.5      30 Aug 2021 07:04  Phos  3.3     08-30  Mg     2.0     08-30    TPro  6.1  /  Alb  2.3<L>  /  TBili  0.6  /  DBili  x   /  AST  24  /  ALT  46  /  AlkPhos  81  08-28      Pro BNP 2446  repeat              08-26    136  |  102  |  18  ----------------------------<  87  3.7   |  34<H>  |  0.56    Ca    8.3<L>      26 Aug 2021 07:03  Phos  3.6     08-26  Mg     2.0     08-26    TPro  5.5<L>  /  Alb  2.1<L>  /  TBili  0.5  /  DBili  x   /  AST  23  /  ALT  51  /  AlkPhos  79  08-26                          8.0    11.96 )-----------( 278      ( 26 Aug 2021 07:03 )             26.1   08-26    136  |  102  |  18  ----------------------------<  87  3.7   |  34<H>  |  0.56    Ca    8.3<L>      26 Aug 2021 07:03  Phos  3.6     08-26  Mg     2.0     08-26    TPro  5.5<L>  /  Alb  2.1<L>  /  TBili  0.5  /  DBili  x   /  AST  23  /  ALT  51  /  AlkPhos  79  08-26                          8.5    12.89 )-----------( 269      ( 25 Aug 2021 07:10 )             26.7   08-25    138  |  103  |  20  ----------------------------<  143<H>  3.7   |  32<H>  |  0.58    Ca    8.1<L>      25 Aug 2021 07:10  Phos  3.7     08-25  Mg     2.0     08-25    TPro  5.6<L>  /  Alb  2.2<L>  /  TBili  0.5  /  DBili  x   /  AST  32  /  ALT  74  /  AlkPhos  103  08-24                          8.5    14.05 )-----------( 292      ( 18 Aug 2021 07:01 )             26.2   08-18    138  |  104  |  24<H>  ----------------------------<  78  4.1   |  29  |  1.03    Ca    8.8      18 Aug 2021 07:01    TPro  6.3  /  Alb  2.3<L>  /  TBili  0.6  /  DBili  x   /  AST  32  /  ALT  73  /  AlkPhos  168<H>  08-17                                           10.0   19.05 )-----------( 179      ( 08 Aug 2021 07:11 )             30.3   08-08    137  |  102  |  27<H>  ----------------------------<  121<H>  3.9   |  29  |  0.77    Ca    8.4<L>      08 Aug 2021 07:11  Phos  3.4     08-08  Mg     2.0     08-08           TPro  5.2<L>  /  Alb  2.3<L>  /  TBili  0.8  /  DBili  x   /  AST  81<H>  /  ALT  213<H>  /  AlkPhos  200<H>  08-06                            12.9   15.00 )-----------( 366      ( 26 Jul 2021 06:20 )             38.4   07-26          < from: Xray Chest 1 View- PORTABLE-Urgent (07.21.21 @ 15:33) >    PROCEDURE DATE:  07/21/2021          INTERPRETATION:  AP chest on July 21, 2021 at 3:27 PM. Patient is short of breath with cough and fever.    Heart magnified by technique.    There arescattered mid lower lung field infiltrates right greater than left possibly related: Pneumonia.    The lungs are clear on August 30, 2019.    IMPRESSION: Bilateral infiltrates as above.    < end of copied text >  < from: US Duplex Venous Lower Ext Complete, Bilateral (07.25.21 @ 08:42) >  COMPARISON: None available.    TECHNIQUE: Duplex sonography of the BILATERAL LOWER extremity veins with color and spectral Doppler, with and without compression.    FINDINGS:    RIGHT:  Normal compressibility of the RIGHT common femoral, femoral and popliteal veins.  Doppler examination shows normal spontaneous and phasic flow.  No RIGHT calf vein thrombosis is detected.    LEFT:  Normal compressibility of the LEFT common femoral, femoral and popliteal veins.  Doppler examination shows normal spontaneous and phasic flow.  No LEFT calf vein thrombosis is detected.    IMPRESSION:  No evidence of deep venous thrombosis in either lower extremity.    < end of copied text >  RADIOLOGY & ADDITIONAL STUDIES:    < from: Xray Chest 1 View- PORTABLE-Urgent (Xray Chest 1 View- PORTABLE-Urgent .) (07.26.21 @ 08:43) >    PROCEDURE DATE:  07/26/2021          INTERPRETATION:  Portable chest radiograph    CLINICAL INFORMATION: Pneumonia due to Covid 19.. Follow-up    TECHNIQUE:  Portable  AP view of the chest was obtained.    COMPARISON: 7/21/2021 chest available for review.    FINDINGS:    The lungs show stable bilateral  multifocal and diffuse ill-defined airspace opacities.. No pneumothorax.    The  heart is enlarged in transverse diameter. No hilar mass.     Visualized osseous structures are intact.    IMPRESSION:   Stable bilateral  multifocal and diffuse ill-defined airspace opacities..    < end of copied text >  < from: US Duplex Venous Lower Ext Complete, Bilateral (08.06.21 @ 17:16) >  FINDINGS:    RIGHT:  Normal compressibility of the RIGHT common femoral, femoral and popliteal veins.  Doppler examination shows normal spontaneous and phasic flow.  No RIGHT calf vein thrombosis is detected.    LEFT:  Normal compressibility of the LEFT common femoral, femoral and popliteal veins.  Doppler examination shows normal spontaneous and phasic flow.  No LEFT calf vein thrombosisis detected.    IMPRESSION:  No evidence of deep venous thrombosis in either lower extremity.    < end of copied text >  < from: Xray Chest 1 View- PORTABLE-Urgent (Xray Chest 1 View- PORTABLE-Urgent .) (08.06.21 @ 16:45) >  INTERPRETATION:  Portable chest radiograph    CLINICAL INFORMATION: Pneumonia due to Covid 19.    TECHNIQUE:  Portable  AP view of the chest was obtained.    COMPARISON: 8/1/2021 chest radiograph available for review.    FINDINGS:    The lungs show decreasing bilateral diffuse airspace disease.. No pneumothorax.    The  heart is mildly enlarged in transverse diameter. No hilar mass.   Visualized osseous structures are intact.    IMPRESSION:   Decreasing bilateral diffuse airspace disease..    < end of copied text >  xr< from: US Duplex Venous Lower Ext Complete, Bilateral (08.17.21 @ 08:58) >  COMPARISON: None available.    TECHNIQUE: Duplex sonography of the BILATERAL LOWER extremity veins with color and spectral Doppler, with and without compression.    FINDINGS:    RIGHT:  Normal compressibility of the RIGHT common femoral, femoral and popliteal veins.  Doppler examination shows normal spontaneous and phasic flow.  No RIGHT calf vein thrombosis is detected.    LEFT:  Normal compressibility of the LEFT common femoral, femoral and popliteal veins.  Doppler examination shows normal spontaneous and phasic flow.  No LEFT calf vein thrombosis is detected.    IMPRESSION:  No evidence of deep venous thrombosis in either lower extremity.    < end of copied text >  r< from: US Duplex Venous Lower Ext Complete, Bilateral (08.23.21 @ 12:53) >  PROCEDURE DATE:  08/23/2021          INTERPRETATION:  CLINICAL INFORMATION: 55 years  Male with r/o DVT    evaluate for DVT. Covid positive. Elevated d-dimer.    COMPARISON: None available.    TECHNIQUE: Duplex sonography of the BILATERAL LOWER extremity veins with color and spectral Doppler, with and without compression.    FINDINGS:    RIGHT:  Normal compressibility of the RIGHT common femoral, femoral and popliteal veins.  Doppler examination shows normal spontaneous and phasic flow.  No RIGHT calf vein thrombosis is detected.    LEFT:  Normal compressibility of the LEFT common femoral, femoral and popliteal veins.  Doppler examination shows normal spontaneous and phasic flow.  No LEFT calf vein thrombosis is detected.    IMPRESSION:  No evidence of deep venous thrombosis in either lower extremity.    < end of copied text >  < from: Xray Chest 1 View- PORTABLE-Urgent (Xray Chest 1 View- PORTABLE-Urgent .) (08.23.21 @ 11:06) >    PROCEDURE DATE:  08/23/2021          INTERPRETATION:  History: Dyspnea, Covid    Chest:  one view.    Comparison: 08/20/2021    AP radiograph of the chest demonstrates moderate diffuse alveolar infiltrates unchanged. The cardiac silhouette is normal in size. Osseous structures are intact.    Impression:moderate diffuse alveolar infiltrates unchanged.    < end of copied text >  r< from: Xray Chest 1 View- PORTABLE-Urgent (Xray Chest 1 View- PORTABLE-Urgent .) (08.23.21 @ 11:06) >  PROCEDURE DATE:  08/23/2021          INTERPRETATION:  History: Dyspnea, Covid    Chest:  one view.    Comparison: 08/20/2021    AP radiograph of the chest demonstrates moderate diffuse alveolar infiltrates unchanged. The cardiac silhouette is normal in size. Osseous structures are intact.    Impression:moderate diffuse alveolar infiltrates unchanged.    < end of copied text >

## 2021-09-09 NOTE — PROGRESS NOTE ADULT - SUBJECTIVE AND OBJECTIVE BOX
[facility] D # 50  ICU # [id number]    CC:  COVID     HPI:    56 y/o male with HLD, HTN and DM--Non Vacc--presents with 8 days onset of covid symptoms which included, cough, fevers, sob, hypoxia, fevers, diarrhea, chills and myalgias. Tested positive 7/15.  Rx with Rem/Dexa and then high dose steroids and full AC.  Pt tx to ICU on 7/24 for worsening hypoxia.       8/9:  Pt seen and examined in ICU.  Sitting in chair.  On HFNC 50/90% with O2 sat 80-84%. Slightly dysneic. Still eating.  Awake and alert.  Tm 98.6  WBC 19  FS high.  LE Dopp Negative for DVT.  L arm superficial thrombosis--NO DVT.      8/10: Pt seen and examined in ICU.  Case d/w Dr shah at bedside.  On HFNC at 50/90% with O2 sat 86%.  NAD.  Complete full sentences.  Ferritin lower.  Tm 98.8  WBC 16.  Eating well.  CXR-- Personally reviewed--slightly worsening infiltrates      8/11:  Pt seen and examined in ICU.  O2 sat not as high as yesterday.  On HFNC 50/95%.  Sitting in chair.  Taking longer to recover after care this morning.  He doesn't feel any different.  Tm 97.9  WBC 16  FS better.  Still eating well    8/12:  Pt seen and examined in ICU.  Self prone O/N.  Sitting in chair.  Clinically unchanged.  On HFNC 50/95%.  Awake and alert.  WOB unchanged.  Tm 97.9   FS high during night hours    8/13:  Pt seen and examined in ICU.  Had coughing spell O/N resulting in desaturation.  Sitting in chair.  Remains on HFNC and intermittent NRM. WOB unchanged. Tm 97.8  WBC 16  FS better    8/14:  Pt seen and examined in ICU.  Coughing O/N.  Awake and alert.  WOB stable.  Tm 98.8      8/15:  Pt seen and examined in ICU.  Slept well O/N.  Awake and alert.  Tm 98.9  FS OK    9/6:  Pt seen and examined in ICU.  Has stayed off vent support.  On HFNC and CPAP qhs.  Sitting in chair.  Tm 98.6  WBC 10    9/7:  Pt seen and examined in ICU.  Tachycardic, diaphoresis and could not sleep last night.  Tm 99.6  FS OK.  Still desats rapidly and recover period still prolong     9/8:  Pt seen and examined in ICU.  Had diarrhea yesterday after eating food brought in by wife--Resolved.  Slept well.  Tm 99.6  FS OK    9/9:  Pt seen and examined in ICU.  Slept well O/N.  On HFNC.  Will attempt HFNC during sleep tonight.      PMH:  As above.     PSH:  As above.     FH: Non Contributory other than those listed in HPI    Social History:  NC    MEDICATIONS  (STANDING):  amLODIPine   Tablet 5 milliGRAM(s) Oral daily  aspirin  chewable 81 milliGRAM(s) Oral daily  atorvastatin 80 milliGRAM(s) Oral at bedtime  budesonide 160 MICROgram(s)/formoterol 4.5 MICROgram(s) Inhaler 2 Puff(s) Inhalation two times a day  cholecalciferol 2000 Unit(s) Oral daily  cloNIDine 0.1 milliGRAM(s) Oral every 12 hours  dextrose 40% Gel 15 Gram(s) Oral once  dextrose 5%. 1000 milliLiter(s) (50 mL/Hr) IV Continuous <Continuous>  dextrose 5%. 1000 milliLiter(s) (100 mL/Hr) IV Continuous <Continuous>  dextrose 50% Injectable 25 Gram(s) IV Push once  dextrose 50% Injectable 12.5 Gram(s) IV Push once  dextrose 50% Injectable 25 Gram(s) IV Push once  enoxaparin Injectable 40 milliGRAM(s) SubCutaneous every 12 hours  glucagon  Injectable 1 milliGRAM(s) IntraMuscular once  insulin glargine Injectable (LANTUS) 12 Unit(s) SubCutaneous <User Schedule>  insulin lispro (ADMELOG) corrective regimen sliding scale   SubCutaneous three times a day before meals  insulin lispro (ADMELOG) corrective regimen sliding scale   SubCutaneous at bedtime  losartan 75 milliGRAM(s) Oral daily  predniSONE   Tablet 5 milliGRAM(s) Oral daily  senna 1 Tablet(s) Oral at bedtime    MEDICATIONS  (PRN):  acetaminophen   Tablet .. 650 milliGRAM(s) Oral every 4 hours PRN Temp greater or equal to 38C (100.4F), Mild Pain (1 - 3)  ALBUTerol    90 MICROgram(s) HFA Inhaler 2 Puff(s) Inhalation every 4 hours PRN Shortness of Breath and/or Wheezing  ALPRAZolam 0.5 milliGRAM(s) Oral every 6 hours PRN anxiety  guaiFENesin Oral Liquid (Sugar-Free) 100 milliGRAM(s) Oral every 6 hours PRN Cough  hydrALAZINE Injectable 5 milliGRAM(s) IV Push every 6 hours PRN SBP>160  polyethylene glycol 3350 17 Gram(s) Oral daily PRN Constipation      Allergies: NKDA    ROS:  SEE BELOW        ICU Vital Signs Last 24 Hrs  T(C): 36.9 (09 Sep 2021 08:00), Max: 37.2 (08 Sep 2021 20:00)  T(F): 98.5 (09 Sep 2021 08:00), Max: 99 (08 Sep 2021 20:00)  HR: 109 (09 Sep 2021 11:00) (85 - 114)  BP: 114/81 (09 Sep 2021 11:00) (102/73 - 141/87)  BP(mean): 88 (09 Sep 2021 11:00) (51 - 101)  ABP: --  ABP(mean): --  RR: 24 (09 Sep 2021 11:00) (19 - 37)  SpO2: 96% (09 Sep 2021 11:00) (82% - 100%)          I&O's Summary    08 Sep 2021 07:01  -  09 Sep 2021 07:00  --------------------------------------------------------  IN: 1000 mL / OUT: 615 mL / NET: 385 mL        Physical Exam:  SEE BELOW                              DVT Prophylaxis:                                                            Contraindication:     Advanced Directives:    Discussed with:    Visit Information:  Time spent excluding procedure:      ** Time is exclusive of billed procedures and/or teaching and/or routine family updates.

## 2021-09-09 NOTE — PROGRESS NOTE ADULT - ASSESSMENT
IMP:    56 y/o male with HLD, HTN and DM--Non Vacc--admitted with Viral PNA sepsis secondary to COVID--Acute type 1 resp failure and ARDS.  Rapidly desaturates and recover period still prolong  Px WD syndrome    Remains high risk for intubation    Severe Prot Ted malnutrition     Critically ill.  High risk for acute decompensation and deterioration including death   Patient requires critical care for support of one or more vital organ systems with a high probability of imminent or life threatening deterioration in his/her condition     Plan:    HFNC--keep oxygen saturation in mid 80s accepting "Happy Hypoxia" to avoid/limit oxygen toxicity.  Will use HFNC during sleep tonight   DC Clonidine   Cont with PRN alprazolam for another 24-48 hrs.  Hopefully will not need it for sleep on HFNC  Prednisone to 5 daily   FS with insulin coverage--keep FS < 180  Encourage nutritional support  OOB  DVT prophy--SCD and LMWH  No labs in AM    ICU care-- d/w ICU staff on multi disciplinary rounds and pt-- All concerns addressed including but not limited to diagnosis, treatment plan and overall prognosis

## 2021-09-09 NOTE — PROGRESS NOTE ADULT - ASSESSMENT
55 M noted to have COVID 7/15  On arrival hypoxic to 80s and tachypneic. NRB placed,in the ER saturating 95%. Na 126 s/p 1L NS. CXR: Frandy infiltrates. Last scr 1.4 in 2019-> today 1.9 unknown if underlying ckd.  Treated with IV Remdesivir and Decadron, Tocilizumab   Given the obesity/hypertension and prior co-morbidities, he is at risk of intubation but continue HFNC, respiratory status remains critical  DDimer elevated, was on therapeutic lovenox, transitioned to 40mg q 12  Completed Solumedrol, Symbicort 160/4.5 BID (https://[email or web address]/[email or web address]/[email or web address], [facility] study). MICU service started Solumedrol 40mg q 8 hrs as of 8/18  Was on HFNC %FiO2 with BiPAP overnight, now on CPAP and transitions between HFNC/NIPPV  Respiratory status is tenuous and he continues to have an increased work of breathing; at this point he is at high risk of intubation  at high risk of intubation and mechanical ventilation  ICU level care appreciated  lasix 20 mg IVPB Qdaily x 3 days - stopped after second dose  Reviewed CXR  overall prognosis remains guarded but continuing to improve  SaO2 90%  Hgb improving  repeat ABG 7.36/60/91 reviewed  Patient sitting at bedside, using HFNC, just transitioned from BiPAP to 50LPM, 55%FiO2  Discussed with Marion; will continue to monitor

## 2021-09-09 NOTE — PROGRESS NOTE ADULT - SUBJECTIVE AND OBJECTIVE BOX
Patient is a 56y old  Male who presents with a chief complaint of cough/sob (07 Sep 2021 10:48)    BRIEF HOSPITAL COURSE:   Patient is a 55 year old male with a pmhx of DM2, HLD, HTN, unvaccinated admitted 7/21 with sob, hypoxic respiratory failure 2/2 COVID 19 complicated by ARDS s/p actemra, remdesivir and steroid taper. Was admitted to MICU on continuos CPAP. Now weaned to HFNC during day and CPAP at night     Interval Events:  -Afebrile, hemodynamics stable.   -On Hiflo 45L / 55% fiO2 for oxygen support.  -States SOB is improving daily.       Vital signs/reviewed and physical exam performed where pertinent and urgently required.  Lab/radiology studies/ABG/Meds reviewed and interpreted into the assesment and treatment plan.    Assessment/Plan/Therapeutic interventions  1. Acute hypoxic respiratory failure  2. ARDS  3. COVID PNA     Neuro:   -Xanax PRN for Anxiety.     CV:    -Currently HD stable. Goal MAP >65-70.   -Norvasc, Losartan, PRN Hydralazine.   -Statin.     Pulm:    -COVID-19 PNA and is currently  on HiFlo 45L / 55% FiO2 for oxygen support --> titrated to 40L / 50% FiO2. Will continue to actively titrate oxygen support for goal SaO2 >88%.  -Albuterol PRN. Symbicort BID.  -pulmonary toilet, encourage incentive spirometer use, prone positioning encouraged as tolerated.   -Plan for tonight is to keep patient on Hiflo and off of CPAP in order to attempt to wean off of high oxygen requirements and avoid oxygen toxicity. Spoke w/ patient at bedside of said plan, in agreement. States he will sleep prone as well.  -Given extremely tenuous respiratory status, high risk for further life threatening respiratory deterioration, will proceed with a low threshold for endotracheal intubation.      GI:    -Consistent Carb diet.     Renal:  -Even to negative fluid balance based on hemodynamics and renal function. Goal UOP >0.5cc/kg/h. Strict I/Os w/ cain.     HEME:  -Pharmacologic DVT Prophylaxis in addition to SCD's w/ Lovenox BID    ID: ABX use and/or discontinuation based on discussion with ID in conjunction with clinical features, culture data, and judicious procalcitonin monitoring.   -patiently is currently on__________     Endo Aggressive glycemic control to limit FS glucose to < 180mg/dl.  [   ] Insulin sliding scale  [   ] Lantus  [   ] Currently patient does not require active management of blood sugars with insulin support         COVID 19 specific considerations and therapeuric options based on the available and rapidly changing literature    Goals of care considerations:  Ongoing assessment for patient specific treatment options based on progression or decline.  I have involved the family with updates and requests in guidance for medical decision making.       Patient is a 56y old  Male who presents with a chief complaint of cough/sob (07 Sep 2021 10:48)    BRIEF HOSPITAL COURSE:   Patient is a 55 year old male with a pmhx of DM2, HLD, HTN, unvaccinated admitted 7/21 with sob, hypoxic respiratory failure 2/2 COVID 19 complicated by ARDS s/p actemra, remdesivir and steroid taper. Was admitted to MICU on continuos CPAP. Now weaned to HFNC during day and CPAP at night     Interval Events:  -Afebrile, hemodynamics stable.   -On Hiflo 45L / 55% fiO2 for oxygen support.  -States SOB is improving daily.     PAST MEDICAL & SURGICAL HISTORY:  HTN (hypertension)  Diabetes    Vital signs/reviewed and physical exam performed where pertinent and urgently required.  Lab/radiology studies/ABG/Meds reviewed and interpreted into the assessment and treatment plan.    Assessment/Plan/Therapeutic interventions  1. Acute hypoxic respiratory failure  2. ARDS  3. COVID PNA     NEURO:   -Xanax PRN for Anxiety.     CV:    -Currently HD stable. Goal MAP >65-70.   -Norvasc, Losartan, PRN Hydralazine.   -Statin/ASA.  -Keep K~4 and Mg>2 for optimal arrhythmia suppression.    PULM:    -COVID-19 PNA, currently on HiFlo 45L / 55% FiO2 for oxygen support --> titrated to 40L / 50% FiO2. Will continue to actively titrate oxygen support for goal SpO2 >88%.  -Albuterol PRN. Symbicort BID.  -Pulmonary toilet, encourage incentive spirometer use, prone positioning encouraged as tolerated.   -Plan for tonight is to keep patient on Hiflo and off of CPAP in order to attempt to wean off of high oxygen requirements and avoid oxygen toxicity. Spoke w/ patient at bedside of said plan, in agreement. States he will sleep prone as well. Will supplement increase WOB/tachypnea w/ Morphine PRN.   -Given extremely tenuous respiratory status, high risk for further life threatening respiratory deterioration, will proceed with a low threshold for endotracheal intubation.      GI:    -Consistent Carb diet.     RENAL:  -Even to negative fluid balance based on hemodynamics and renal function. Goal UOP >0.5cc/kg/h. Strict I/Os w/ cain.     HEME:  -Pharmacologic DVT Prophylaxis in addition to SCD's w/ Lovenox BID    ID:  -S/p Remdesivir / Decadron courses. S/p Toci. Prednisone 5mg QD.   -Afebrile.  -ABX use and/or discontinuation based on discussion with ID in conjunction with clinical features, culture data, and judicious procalcitonin monitoring.     ENDO:  -Aggressive glycemic control to limit FS glucose to < 180mg/dl. Insulin sliding scale, Lantus     COVID19 specific considerations and therapeutic options based on the available and rapidly changing literature    CRITICAL CARE TIME SPENT:  40 minutes of critical care time spent providing medical care for patient's acute illness/conditions that impairs at least one vital organ system and/or poses a high risk of imminent or life threatening deterioration in the patient's condition. It includes time spent evaluating and treating the patient's acute illness as well as time spent reviewing labs, radiology, discussing goals of care with patient and/or patient's family, and discussing the case with a multidisciplinary team, including the eICU, in an effort to prevent further life threatening deterioration or end organ damage. This time is independent of any procedures performed.

## 2021-09-10 LAB
ANION GAP SERPL CALC-SCNC: 7 MMOL/L — SIGNIFICANT CHANGE UP (ref 5–17)
BUN SERPL-MCNC: 13 MG/DL — SIGNIFICANT CHANGE UP (ref 7–23)
CALCIUM SERPL-MCNC: 8.6 MG/DL — SIGNIFICANT CHANGE UP (ref 8.5–10.1)
CHLORIDE SERPL-SCNC: 101 MMOL/L — SIGNIFICANT CHANGE UP (ref 96–108)
CO2 SERPL-SCNC: 27 MMOL/L — SIGNIFICANT CHANGE UP (ref 22–31)
CREAT SERPL-MCNC: 1.18 MG/DL — SIGNIFICANT CHANGE UP (ref 0.5–1.3)
GLUCOSE SERPL-MCNC: 98 MG/DL — SIGNIFICANT CHANGE UP (ref 70–99)
HCT VFR BLD CALC: 26.8 % — LOW (ref 39–50)
HGB BLD-MCNC: 8.2 G/DL — LOW (ref 13–17)
MAGNESIUM SERPL-MCNC: 1.9 MG/DL — SIGNIFICANT CHANGE UP (ref 1.6–2.6)
MCHC RBC-ENTMCNC: 27.1 PG — SIGNIFICANT CHANGE UP (ref 27–34)
MCHC RBC-ENTMCNC: 30.6 GM/DL — LOW (ref 32–36)
MCV RBC AUTO: 88.4 FL — SIGNIFICANT CHANGE UP (ref 80–100)
PHOSPHATE SERPL-MCNC: 3.6 MG/DL — SIGNIFICANT CHANGE UP (ref 2.5–4.5)
PLATELET # BLD AUTO: 311 K/UL — SIGNIFICANT CHANGE UP (ref 150–400)
POTASSIUM SERPL-MCNC: 3.3 MMOL/L — LOW (ref 3.5–5.3)
POTASSIUM SERPL-SCNC: 3.3 MMOL/L — LOW (ref 3.5–5.3)
RBC # BLD: 3.03 M/UL — LOW (ref 4.2–5.8)
RBC # FLD: 14.5 % — SIGNIFICANT CHANGE UP (ref 10.3–14.5)
SODIUM SERPL-SCNC: 135 MMOL/L — SIGNIFICANT CHANGE UP (ref 135–145)
WBC # BLD: 20.36 K/UL — HIGH (ref 3.8–10.5)
WBC # FLD AUTO: 20.36 K/UL — HIGH (ref 3.8–10.5)

## 2021-09-10 PROCEDURE — 99291 CRITICAL CARE FIRST HOUR: CPT

## 2021-09-10 RX ORDER — POTASSIUM CHLORIDE 20 MEQ
40 PACKET (EA) ORAL EVERY 4 HOURS
Refills: 0 | Status: COMPLETED | OUTPATIENT
Start: 2021-09-10 | End: 2021-09-10

## 2021-09-10 RX ORDER — METOPROLOL TARTRATE 50 MG
25 TABLET ORAL
Refills: 0 | Status: DISCONTINUED | OUTPATIENT
Start: 2021-09-10 | End: 2021-09-28

## 2021-09-10 RX ADMIN — Medication 5 MILLIGRAM(S): at 10:24

## 2021-09-10 RX ADMIN — LOSARTAN POTASSIUM 75 MILLIGRAM(S): 100 TABLET, FILM COATED ORAL at 10:24

## 2021-09-10 RX ADMIN — Medication 100 MILLIGRAM(S): at 03:00

## 2021-09-10 RX ADMIN — SENNA PLUS 1 TABLET(S): 8.6 TABLET ORAL at 22:02

## 2021-09-10 RX ADMIN — Medication 40 MILLIEQUIVALENT(S): at 22:02

## 2021-09-10 RX ADMIN — Medication 40 MILLIEQUIVALENT(S): at 18:35

## 2021-09-10 RX ADMIN — Medication 2000 UNIT(S): at 10:23

## 2021-09-10 RX ADMIN — INSULIN GLARGINE 12 UNIT(S): 100 INJECTION, SOLUTION SUBCUTANEOUS at 10:24

## 2021-09-10 RX ADMIN — ENOXAPARIN SODIUM 40 MILLIGRAM(S): 100 INJECTION SUBCUTANEOUS at 10:24

## 2021-09-10 RX ADMIN — Medication 81 MILLIGRAM(S): at 10:23

## 2021-09-10 RX ADMIN — Medication 25 MILLIGRAM(S): at 10:23

## 2021-09-10 RX ADMIN — BUDESONIDE AND FORMOTEROL FUMARATE DIHYDRATE 2 PUFF(S): 160; 4.5 AEROSOL RESPIRATORY (INHALATION) at 20:43

## 2021-09-10 RX ADMIN — ATORVASTATIN CALCIUM 80 MILLIGRAM(S): 80 TABLET, FILM COATED ORAL at 22:02

## 2021-09-10 RX ADMIN — ENOXAPARIN SODIUM 40 MILLIGRAM(S): 100 INJECTION SUBCUTANEOUS at 22:02

## 2021-09-10 RX ADMIN — Medication 25 MILLIGRAM(S): at 22:02

## 2021-09-10 RX ADMIN — Medication 100 MILLIGRAM(S): at 22:01

## 2021-09-10 RX ADMIN — Medication 1: at 18:36

## 2021-09-10 NOTE — PROGRESS NOTE ADULT - SUBJECTIVE AND OBJECTIVE BOX
Hospital D # 51  ICU # 48    CC:  COVID     HPI:    54 y/o male with HLD, HTN and DM--Non Vacc--presents with 8 days onset of covid symptoms which included, cough, fevers, sob, hypoxia, fevers, diarrhea, chills and myalgias. Tested positive 7/15.  Rx with Rem/Dexa and then high dose steroids and full AC.  Pt tx to ICU on 7/24 for worsening hypoxia.       8/9:  Pt seen and examined in ICU.  Sitting in chair.  On HFNC 50/90% with O2 sat 80-84%. Slightly dysneic. Still eating.  Awake and alert.  Tm 98.6  WBC 19  FS high.  LE Dopp Negative for DVT.  L arm superficial thrombosis--NO DVT.      8/10: Pt seen and examined in ICU.  Case d/w Dr shah at bedside.  On HFNC at 50/90% with O2 sat 86%.  NAD.  Complete full sentences.  Ferritin lower.  Tm 98.8  WBC 16.  Eating well.  CXR-- Personally reviewed--slightly worsening infiltrates      8/11:  Pt seen and examined in ICU.  O2 sat not as high as yesterday.  On HFNC 50/95%.  Sitting in chair.  Taking longer to recover after care this morning.  He doesn't feel any different.  Tm 97.9  WBC 16  FS better.  Still eating well    8/12:  Pt seen and examined in ICU.  Self prone O/N.  Sitting in chair.  Clinically unchanged.  On HFNC 50/95%.  Awake and alert.  WOB unchanged.  Tm 97.9   FS high during night hours    8/13:  Pt seen and examined in ICU.  Had coughing spell O/N resulting in desaturation.  Sitting in chair.  Remains on HFNC and intermittent NRM. WOB unchanged. Tm 97.8  WBC 16  FS better    8/14:  Pt seen and examined in ICU.  Coughing O/N.  Awake and alert.  WOB stable.  Tm 98.8      8/15:  Pt seen and examined in ICU.  Slept well O/N.  Awake and alert.  Tm 98.9  FS OK    9/6:  Pt seen and examined in ICU.  Has stayed off vent support.  On HFNC and CPAP qhs.  Sitting in chair.  Tm 98.6  WBC 10    9/7:  Pt seen and examined in ICU.  Tachycardic, diaphoresis and could not sleep last night.  Tm 99.6  FS OK.  Still desats rapidly and recover period still prolong     9/8:  Pt seen and examined in ICU.  Had diarrhea yesterday after eating food brought in by wife--Resolved.  Slept well.  Tm 99.6  FS OK    9/9:  Pt seen and examined in ICU.  Slept well O/N.  On HFNC.  Will attempt HFNC during sleep tonight    9/10: Pt seen and examined in ICU.  Slept well last night off NIV.  Did not require Benzo.  Tm 99.0  WBC 20  FS OK    PMH:  As above.     PSH:  As above.     FH: Non Contributory other than those listed in HPI    Social History:  NC    MEDICATIONS  (STANDING):  aspirin  chewable 81 milliGRAM(s) Oral daily  atorvastatin 80 milliGRAM(s) Oral at bedtime  budesonide 160 MICROgram(s)/formoterol 4.5 MICROgram(s) Inhaler 2 Puff(s) Inhalation two times a day  cholecalciferol 2000 Unit(s) Oral daily  dextrose 40% Gel 15 Gram(s) Oral once  dextrose 5%. 1000 milliLiter(s) (50 mL/Hr) IV Continuous <Continuous>  dextrose 5%. 1000 milliLiter(s) (100 mL/Hr) IV Continuous <Continuous>  dextrose 50% Injectable 25 Gram(s) IV Push once  dextrose 50% Injectable 12.5 Gram(s) IV Push once  dextrose 50% Injectable 25 Gram(s) IV Push once  enoxaparin Injectable 40 milliGRAM(s) SubCutaneous every 12 hours  glucagon  Injectable 1 milliGRAM(s) IntraMuscular once  insulin glargine Injectable (LANTUS) 12 Unit(s) SubCutaneous <User Schedule>  insulin lispro (ADMELOG) corrective regimen sliding scale   SubCutaneous three times a day before meals  insulin lispro (ADMELOG) corrective regimen sliding scale   SubCutaneous at bedtime  losartan 75 milliGRAM(s) Oral daily  metoprolol tartrate 25 milliGRAM(s) Oral two times a day  predniSONE   Tablet 5 milliGRAM(s) Oral daily  senna 1 Tablet(s) Oral at bedtime    MEDICATIONS  (PRN):  acetaminophen   Tablet .. 650 milliGRAM(s) Oral every 4 hours PRN Temp greater or equal to 38C (100.4F), Mild Pain (1 - 3)  ALBUTerol    90 MICROgram(s) HFA Inhaler 2 Puff(s) Inhalation every 4 hours PRN Shortness of Breath and/or Wheezing  ALPRAZolam 0.5 milliGRAM(s) Oral at bedtime PRN anxiety  guaiFENesin Oral Liquid (Sugar-Free) 100 milliGRAM(s) Oral every 6 hours PRN Cough  hydrALAZINE Injectable 5 milliGRAM(s) IV Push every 6 hours PRN SBP>160  polyethylene glycol 3350 17 Gram(s) Oral daily PRN Constipation      Allergies: NKDA    ROS:  SEE BELOW        ICU Vital Signs Last 24 Hrs  T(C): 37.2 (10 Sep 2021 00:00), Max: 37.2 (10 Sep 2021 00:00)  T(F): 98.9 (10 Sep 2021 00:00), Max: 98.9 (10 Sep 2021 00:00)  HR: 108 (10 Sep 2021 09:00) (100 - 115)  BP: 139/82 (10 Sep 2021 09:00) (106/69 - 148/82)  BP(mean): 95 (10 Sep 2021 09:00) (70 - 97)  ABP: --  ABP(mean): --  RR: 34 (10 Sep 2021 09:00) (19 - 39)  SpO2: 92% (10 Sep 2021 09:00) (91% - 100%)          I&O's Summary    09 Sep 2021 07:01  -  10 Sep 2021 07:00  --------------------------------------------------------  IN: 700 mL / OUT: 1400 mL / NET: -700 mL        Physical Exam:  SEE BELOW                          8.2    20.36 )-----------( 311      ( 10 Sep 2021 07:42 )             26.8       09-10    135  |  101  |  13  ----------------------------<  98  3.3<L>   |  27  |  1.18    Ca    8.6      10 Sep 2021 07:42  Phos  3.6     09-10  Mg     1.9     09-10                      DVT Prophylaxis:                                                            Contraindication:     Advanced Directives:    Discussed with:    Visit Information:  Time spent excluding procedure:      ** Time is exclusive of billed procedures and/or teaching and/or routine family updates.

## 2021-09-10 NOTE — PROGRESS NOTE ADULT - ASSESSMENT
IMP:    54 y/o male with HLD, HTN and DM--Non Vacc--admitted with Viral PNA sepsis secondary to COVID--Acute type 1 resp failure and ARDS.  Rapidly desaturates and recover period still prolong  Px WD syndrome--better  Leukocytosis    Remains high risk for intubation    Severe Prot Ted malnutrition     Critically ill.  High risk for acute decompensation and deterioration including death   Patient requires critical care for support of one or more vital organ systems with a high probability of imminent or life threatening deterioration in his/her condition     Plan:    HFNC--keep oxygen saturation in mid 80s accepting "Happy Hypoxia" to avoid/limit oxygen toxicity.  Will use HFNC during sleep tonight and DC NIV  Trend WBC and fever curve  DC Clonidine   PRN QHS Xanax   Prednisone to 5 daily   FS with insulin coverage--keep FS < 180  Encourage nutritional support  OOB  DVT prophy--SCD and LMWH    ICU care-- d/w ICU staff on multi disciplinary rounds and pt-- All concerns addressed including but not limited to diagnosis, treatment plan and overall prognosis

## 2021-09-10 NOTE — PROGRESS NOTE ADULT - ASSESSMENT
55 M noted to have COVID 7/15  On arrival hypoxic to 80s and tachypneic. NRB placed,in the ER saturating 95%. Na 126 s/p 1L NS. CXR: Frandy infiltrates. Last scr 1.4 in 2019-> today 1.9 unknown if underlying ckd.  Treated with IV Remdesivir and Decadron, Tocilizumab   Given the obesity/hypertension and prior co-morbidities, he is at risk of intubation but continue HFNC, respiratory status remains critical  DDimer elevated, was on therapeutic lovenox, transitioned to 40mg q 12  Completed Solumedrol, Symbicort 160/4.5 BID ([email or web address].tv.[place]/journals/lanres/article/[email or web address], STOIC study).Using HFNC  40LPM, 65%FiO2, did not require NIPPV Overnight  SaO2 is now 90-95%  Respiratory status is tenuous and he continues to have an increased work of breathing; at this point he is at high risk of intubation and although overall prognosis remains guarded, he is improving slowly  ICU level care appreciated  Hgb improving  Repeat ABG 7.36/60/91 reviewed  Discussed with nursing staff  Will continue to monitor 1) COVID Pneumonia  2) Abnormal CXR  3) Dyspnea  4) Hypoxemic Respiratory Failure   56 M noted to have COVID 7/15  On arrival hypoxic to 80s and tachypneic. NRB placed,in the ER saturating 95%. Na 126 s/p 1L NS. CXR: Frandy infiltrates. Last scr 1.4 in 2019-> today 1.9 unknown if underlying ckd.  Treated with IV Remdesivir and Decadron, Tocilizumab   Given the obesity/hypertension and prior co-morbidities, he is at risk of intubation but continue HFNC, respiratory status remains critical  DDimer elevated, was on therapeutic lovenox, transitioned to 40mg q 12  Completed Solumedrol, Symbicort 160/4.5 BID (https://www.[employer].com/journals/lanres/article/YLGL7246-8443, STOIC study).Using HFNC  40LPM, 65%FiO2, did not require NIPPV Overnight  SaO2 is now 90-95%  Respiratory status is tenuous and he continues to have an increased work of breathing; at this point he is at high risk of intubation and although overall prognosis remains guarded, he is improving slowly  ICU level care appreciated  Hgb improving  Repeat ABG 7.36/60/91 reviewed  Discussed with nursing staff  Will continue to monitor

## 2021-09-10 NOTE — PROGRESS NOTE ADULT - SUBJECTIVE AND OBJECTIVE BOX
Patient is a 56y old  Male who presents with a chief complaint of cough/sob (07 Sep 2021 10:48)    BRIEF HOSPITAL COURSE:   Patient is a 55 year old male with a pmhx of DM2, HLD, HTN, unvaccinated admitted 7/21 with sob, hypoxic respiratory failure 2/2 COVID 19 complicated by ARDS s/p actemra, remdesivir and steroid taper. Was admitted to MICU on continuos CPAP. Now weaned to HFNC during day and CPAP at night     Interval Events:  -Afebrile, hemodynamics stable.   -Tolerated being off CPAP entire night last night, while proning.   -On Hiflo 40L / 60% fiO2 for oxygen support.  -States SOB is stable. Precedex withdrawal symptoms improving daily, only slight headache.     PAST MEDICAL & SURGICAL HISTORY:  HTN (hypertension)  Diabetes    Vital signs/reviewed and physical exam performed where pertinent and urgently required.  Lab/radiology studies/ABG/Meds reviewed and interpreted into the assessment and treatment plan.    Assessment/Plan/Therapeutic interventions  1. Acute hypoxic respiratory failure  2. ARDS  3. COVID PNA     NEURO:   -Xanax PRN for Anxiety.     CV:    -Currently HD stable. Goal MAP >65-70.   -Norvasc, Losartan, PRN Hydralazine.   -Statin/ASA.  -Keep K~4 and Mg>2 for optimal arrhythmia suppression.    PULM:    -COVID-19 PNA, currently on HiFlo 40L / 60% FiO2 for oxygen support --> titrated to 40L / 50% FiO2. Will continue to actively titrate oxygen support for goal SpO2 >88%.  -Albuterol PRN. Symbicort BID.  -Pulmonary toilet, encourage incentive spirometer use, prone positioning encouraged as tolerated.   -Will continue to keep patient off CPAP at night, HiFlo w/ proning in order to attempt to wean off of high oxygen requirements and avoid oxygen toxicity. Spoke w/ patient at bedside of said plan, in agreement. Morphine PRN added for increase WOB/tachypnea.   -Still has periods of desaturation to low 80s during coughing fits, w/ long recovery time. Given extremely tenuous respiratory status, high risk for further life threatening respiratory deterioration, will proceed with a low threshold for endotracheal intubation.      GI:    -Consistent Carb diet.     RENAL:  -Even to negative fluid balance based on hemodynamics and renal function. Goal UOP >0.5cc/kg/h. Strict I/Os w/ cain.     HEME:  -Pharmacologic DVT Prophylaxis in addition to SCD's w/ Lovenox BID    ID:  -S/p Remdesivir / Decadron courses. S/p Toci. Prednisone 5mg QD.   -Afebrile.  -ABX use and/or discontinuation based on discussion with ID in conjunction with clinical features, culture data, and judicious procalcitonin monitoring.     ENDO:  -Aggressive glycemic control to limit FS glucose to < 180mg/dl. Insulin sliding scale, Lantus     COVID19 specific considerations and therapeutic options based on the available and rapidly changing literature    CRITICAL CARE TIME SPENT:  42 minutes of critical care time spent providing medical care for patient's acute illness/conditions that impairs at least one vital organ system and/or poses a high risk of imminent or life threatening deterioration in the patient's condition. It includes time spent evaluating and treating the patient's acute illness as well as time spent reviewing labs, radiology, discussing goals of care with patient and/or patient's family, and discussing the case with a multidisciplinary team, including the eICU, in an effort to prevent further life threatening deterioration or end organ damage. This time is independent of any procedures performed.  Patient is a 56y old  Male who presents with a chief complaint of cough/sob (07 Sep 2021 10:48)    BRIEF HOSPITAL COURSE:   Patient is a 55 year old male with a pmhx of DM2, HLD, HTN, unvaccinated admitted 7/21 with sob, hypoxic respiratory failure 2/2 COVID 19 complicated by ARDS s/p actemra, remdesivir and steroid taper. Was admitted to MICU on continuos CPAP. Now weaned to HFNC during day and CPAP at night     Interval Events:  -Continues to have periods of coughing fits, which cause desaturation to low 80s. Recovers after long period of time and deep breathing exercises.  -Afebrile, hemodynamics stable.   -Tolerated being off CPAP entire night last night, while proning.   -On Hiflo 40L / 60% fiO2 for oxygen support.  -States SOB is stable. Precedex withdrawal symptoms improving daily, only slight headache.     PAST MEDICAL & SURGICAL HISTORY:  HTN (hypertension)  Diabetes    Vital signs/reviewed and physical exam performed where pertinent and urgently required.  Lab/radiology studies/ABG/Meds reviewed and interpreted into the assessment and treatment plan.    Assessment/Plan/Therapeutic interventions  1. Acute hypoxic respiratory failure  2. ARDS  3. COVID PNA     NEURO:   -Xanax PRN for Anxiety.     CV:    -Currently HD stable. Goal MAP >65-70.   -Norvasc, Losartan, PRN Hydralazine.   -Statin/ASA.  -Keep K~4 and Mg>2 for optimal arrhythmia suppression.    PULM:    -COVID-19 PNA, currently on HiFlo 40L / 60% FiO2 for oxygen support --> titrated to 40L / 50% FiO2. Will continue to actively titrate oxygen support for goal SpO2 >88%.  -Albuterol PRN. Symbicort BID.  -Pulmonary toilet, encourage incentive spirometer use, prone positioning encouraged as tolerated.   -Will continue to keep patient off CPAP at night, HiFlo w/ proning in order to attempt to wean off of high oxygen requirements and avoid oxygen toxicity. Spoke w/ patient at bedside of said plan, in agreement. Morphine PRN added for increase WOB/tachypnea.   -Still has periods of desaturation to low 80s during coughing fits, w/ long recovery time. Given extremely tenuous respiratory status, high risk for further life threatening respiratory deterioration, will proceed with a low threshold for endotracheal intubation.      GI:    -Consistent Carb diet.     RENAL:  -Even to negative fluid balance based on hemodynamics and renal function. Goal UOP >0.5cc/kg/h. Strict I/Os w/ cain.     HEME:  -Pharmacologic DVT Prophylaxis in addition to SCD's w/ Lovenox BID    ID:  -S/p Remdesivir / Decadron courses. S/p Toci. Prednisone 5mg QD.   -Afebrile.  -ABX use and/or discontinuation based on discussion with ID in conjunction with clinical features, culture data, and judicious procalcitonin monitoring.     ENDO:  -Aggressive glycemic control to limit FS glucose to < 180mg/dl. Insulin sliding scale, Lantus     COVID19 specific considerations and therapeutic options based on the available and rapidly changing literature    CRITICAL CARE TIME SPENT:  42 minutes of critical care time spent providing medical care for patient's acute illness/conditions that impairs at least one vital organ system and/or poses a high risk of imminent or life threatening deterioration in the patient's condition. It includes time spent evaluating and treating the patient's acute illness as well as time spent reviewing labs, radiology, discussing goals of care with patient and/or patient's family, and discussing the case with a multidisciplinary team, including the eICU, in an effort to prevent further life threatening deterioration or end organ damage. This time is independent of any procedures performed.

## 2021-09-10 NOTE — PROGRESS NOTE ADULT - SUBJECTIVE AND OBJECTIVE BOX

## 2021-09-11 LAB
ANION GAP SERPL CALC-SCNC: 6 MMOL/L — SIGNIFICANT CHANGE UP (ref 5–17)
APPEARANCE UR: CLEAR — SIGNIFICANT CHANGE UP
BILIRUB UR-MCNC: NEGATIVE — SIGNIFICANT CHANGE UP
BUN SERPL-MCNC: 15 MG/DL — SIGNIFICANT CHANGE UP (ref 7–23)
CALCIUM SERPL-MCNC: 8.6 MG/DL — SIGNIFICANT CHANGE UP (ref 8.5–10.1)
CHLORIDE SERPL-SCNC: 100 MMOL/L — SIGNIFICANT CHANGE UP (ref 96–108)
CO2 SERPL-SCNC: 28 MMOL/L — SIGNIFICANT CHANGE UP (ref 22–31)
COLOR SPEC: YELLOW — SIGNIFICANT CHANGE UP
CREAT SERPL-MCNC: 0.86 MG/DL — SIGNIFICANT CHANGE UP (ref 0.5–1.3)
DIFF PNL FLD: NEGATIVE — SIGNIFICANT CHANGE UP
GLUCOSE SERPL-MCNC: 92 MG/DL — SIGNIFICANT CHANGE UP (ref 70–99)
GLUCOSE UR QL: NEGATIVE MG/DL — SIGNIFICANT CHANGE UP
HCT VFR BLD CALC: 26.3 % — LOW (ref 39–50)
HGB BLD-MCNC: 8.5 G/DL — LOW (ref 13–17)
KETONES UR-MCNC: NEGATIVE — SIGNIFICANT CHANGE UP
LACTATE SERPL-SCNC: 1.9 MMOL/L — SIGNIFICANT CHANGE UP (ref 0.7–2)
LEUKOCYTE ESTERASE UR-ACNC: NEGATIVE — SIGNIFICANT CHANGE UP
MAGNESIUM SERPL-MCNC: 1.7 MG/DL — SIGNIFICANT CHANGE UP (ref 1.6–2.6)
MCHC RBC-ENTMCNC: 27.9 PG — SIGNIFICANT CHANGE UP (ref 27–34)
MCHC RBC-ENTMCNC: 32.3 GM/DL — SIGNIFICANT CHANGE UP (ref 32–36)
MCV RBC AUTO: 86.2 FL — SIGNIFICANT CHANGE UP (ref 80–100)
NITRITE UR-MCNC: POSITIVE
PH UR: 7 — SIGNIFICANT CHANGE UP (ref 5–8)
PHOSPHATE SERPL-MCNC: 2.9 MG/DL — SIGNIFICANT CHANGE UP (ref 2.5–4.5)
PLATELET # BLD AUTO: 305 K/UL — SIGNIFICANT CHANGE UP (ref 150–400)
POTASSIUM SERPL-MCNC: 3.7 MMOL/L — SIGNIFICANT CHANGE UP (ref 3.5–5.3)
POTASSIUM SERPL-SCNC: 3.7 MMOL/L — SIGNIFICANT CHANGE UP (ref 3.5–5.3)
PROT UR-MCNC: 15 MG/DL
RBC # BLD: 3.05 M/UL — LOW (ref 4.2–5.8)
RBC # FLD: 14.3 % — SIGNIFICANT CHANGE UP (ref 10.3–14.5)
SODIUM SERPL-SCNC: 134 MMOL/L — LOW (ref 135–145)
SP GR SPEC: 1 — LOW (ref 1.01–1.02)
UROBILINOGEN FLD QL: NEGATIVE MG/DL — SIGNIFICANT CHANGE UP
WBC # BLD: 20.86 K/UL — HIGH (ref 3.8–10.5)
WBC # FLD AUTO: 20.86 K/UL — HIGH (ref 3.8–10.5)

## 2021-09-11 PROCEDURE — 99291 CRITICAL CARE FIRST HOUR: CPT

## 2021-09-11 PROCEDURE — 71045 X-RAY EXAM CHEST 1 VIEW: CPT | Mod: 26

## 2021-09-11 RX ORDER — PIPERACILLIN AND TAZOBACTAM 4; .5 G/20ML; G/20ML
3.38 INJECTION, POWDER, LYOPHILIZED, FOR SOLUTION INTRAVENOUS ONCE
Refills: 0 | Status: COMPLETED | OUTPATIENT
Start: 2021-09-11 | End: 2021-09-11

## 2021-09-11 RX ORDER — PIPERACILLIN AND TAZOBACTAM 4; .5 G/20ML; G/20ML
3.38 INJECTION, POWDER, LYOPHILIZED, FOR SOLUTION INTRAVENOUS EVERY 8 HOURS
Refills: 0 | Status: DISCONTINUED | OUTPATIENT
Start: 2021-09-11 | End: 2021-09-14

## 2021-09-11 RX ORDER — VANCOMYCIN HCL 1 G
1250 VIAL (EA) INTRAVENOUS ONCE
Refills: 0 | Status: COMPLETED | OUTPATIENT
Start: 2021-09-11 | End: 2021-09-11

## 2021-09-11 RX ADMIN — SENNA PLUS 1 TABLET(S): 8.6 TABLET ORAL at 21:45

## 2021-09-11 RX ADMIN — Medication 650 MILLIGRAM(S): at 12:30

## 2021-09-11 RX ADMIN — Medication 166.67 MILLIGRAM(S): at 12:47

## 2021-09-11 RX ADMIN — BUDESONIDE AND FORMOTEROL FUMARATE DIHYDRATE 2 PUFF(S): 160; 4.5 AEROSOL RESPIRATORY (INHALATION) at 21:23

## 2021-09-11 RX ADMIN — ATORVASTATIN CALCIUM 80 MILLIGRAM(S): 80 TABLET, FILM COATED ORAL at 21:44

## 2021-09-11 RX ADMIN — Medication 650 MILLIGRAM(S): at 23:00

## 2021-09-11 RX ADMIN — ENOXAPARIN SODIUM 40 MILLIGRAM(S): 100 INJECTION SUBCUTANEOUS at 21:44

## 2021-09-11 RX ADMIN — Medication 100 MILLIGRAM(S): at 21:44

## 2021-09-11 RX ADMIN — Medication 650 MILLIGRAM(S): at 12:00

## 2021-09-11 RX ADMIN — PIPERACILLIN AND TAZOBACTAM 25 GRAM(S): 4; .5 INJECTION, POWDER, LYOPHILIZED, FOR SOLUTION INTRAVENOUS at 21:45

## 2021-09-11 RX ADMIN — ENOXAPARIN SODIUM 40 MILLIGRAM(S): 100 INJECTION SUBCUTANEOUS at 12:46

## 2021-09-11 RX ADMIN — INSULIN GLARGINE 12 UNIT(S): 100 INJECTION, SOLUTION SUBCUTANEOUS at 12:06

## 2021-09-11 RX ADMIN — Medication 650 MILLIGRAM(S): at 22:19

## 2021-09-11 RX ADMIN — Medication 25 MILLIGRAM(S): at 21:44

## 2021-09-11 RX ADMIN — LOSARTAN POTASSIUM 75 MILLIGRAM(S): 100 TABLET, FILM COATED ORAL at 11:54

## 2021-09-11 RX ADMIN — Medication 25 MILLIGRAM(S): at 11:53

## 2021-09-11 RX ADMIN — Medication 100 MILLIGRAM(S): at 02:26

## 2021-09-11 RX ADMIN — PIPERACILLIN AND TAZOBACTAM 200 GRAM(S): 4; .5 INJECTION, POWDER, LYOPHILIZED, FOR SOLUTION INTRAVENOUS at 12:47

## 2021-09-11 RX ADMIN — Medication 2000 UNIT(S): at 11:54

## 2021-09-11 RX ADMIN — Medication 81 MILLIGRAM(S): at 11:53

## 2021-09-11 RX ADMIN — BUDESONIDE AND FORMOTEROL FUMARATE DIHYDRATE 2 PUFF(S): 160; 4.5 AEROSOL RESPIRATORY (INHALATION) at 11:05

## 2021-09-11 RX ADMIN — Medication 1: at 17:48

## 2021-09-11 RX ADMIN — Medication 100 MILLIGRAM(S): at 06:30

## 2021-09-11 RX ADMIN — Medication 5 MILLIGRAM(S): at 11:54

## 2021-09-11 NOTE — PROGRESS NOTE ADULT - SUBJECTIVE AND OBJECTIVE BOX
Patient is a 56y old  Male who presents with a chief complaint of cough/sob (07 Sep 2021 10:48)    BRIEF HOSPITAL COURSE:   Patient is a 55 year old male with a pmhx of DM2, HLD, HTN, unvaccinated admitted 7/21 with sob, hypoxic respiratory failure 2/2 COVID 19 complicated by ARDS s/p actemra, remdesivir and steroid taper. Was admitted to MICU on continuos CPAP. Now weaned to HFNC during day and CPAP at night     Interval Events:  -Developed temperature of 101.8 and increase WBC 22k. Repeat BloodCx sent, given x1 dose of Vanco and started on Zosyn.   -Tolerated Hiflo overnight again off CPAP for second night in a row last night, however still w/ periods of coughing fits, which cause desaturation to 70-80s. Recovers slowly after deep breathing exercises and w/ cough suppressants Received x1 Tessalon Pearle overnight last night, which he states he responded well to and was able to sleep comfortably.   -Hemodynamics stable.   -On Hiflo 40L / 55% fiO2 for oxygen support.  -Headache / Precedex withdrawal sx minimal at this point. Only endorses lingering cough.      PAST MEDICAL & SURGICAL HISTORY:  HTN (hypertension)  Diabetes    Vital signs/reviewed and physical exam performed where pertinent and urgently required.  Lab/radiology studies/ABG/Meds reviewed and interpreted into the assessment and treatment plan.    Assessment/Plan/Therapeutic interventions  1. Acute hypoxic respiratory failure  2. ARDS  3. COVID PNA     NEURO:   -Xanax PRN for Anxiety.     CV:    -Currently HD stable. Goal MAP >65-70.   -Norvasc, Losartan, PRN Hydralazine.   -Statin/ASA.  -Keep K~4 and Mg>2 for optimal arrhythmia suppression.    PULM:    -COVID-19 PNA, currently on HiFlo 40L / 55% FiO2 for oxygen support --> actively titrated to 40L / 45% FiO2. Will continue to actively titrate oxygen support for goal SpO2 >88%.  -Albuterol PRN. Symbicort BID.  -Pulmonary toilet, encourage incentive spirometer use, prone positioning encouraged as tolerated.   -Continue off CPAP at night, HiFlo w/ proning in order to attempt to wean off of high oxygen requirements and avoid oxygen toxicity.   -Morphine PRN for increase WOB/tachypnea.   -Periods of desaturation to 70-80s during coughing fits, w/ eventually recovery s/p cough suppressants. Tessalon Pearle q8h PRN added, will give x1 dose of Hycodan if needed.   Given extremely tenuous respiratory status, high risk for further life threatening respiratory deterioration, will proceed with a low threshold for endotracheal intubation.      GI:    -Consistent Carb diet.     RENAL:  -Even to negative fluid balance based on hemodynamics and renal function. Goal UOP >0.5cc/kg/h. Strict I/Os w/ cain.     HEME:  -Pharmacologic DVT Prophylaxis in addition to SCD's w/ Lovenox BID    ID:  -S/p Remdesivir / Decadron courses. S/p Toci. Prednisone 5mg QD.   -Febrile w/ elevated WBC. Repeat BloodCx sent, f/u and cover appropriately Vanco x1 ordered and started on Zosyn q8h empirically.   -ABX use and/or discontinuation based on discussion with ID in conjunction with clinical features, culture data, and judicious procalcitonin monitoring.     ENDO:  -Aggressive glycemic control to limit FS glucose to < 180mg/dl. Insulin sliding scale, Lantus     COVID19 specific considerations and therapeutic options based on the available and rapidly changing literature    CRITICAL CARE TIME SPENT:  38 minutes of critical care time spent providing medical care for patient's acute illness/conditions that impairs at least one vital organ system and/or poses a high risk of imminent or life threatening deterioration in the patient's condition. It includes time spent evaluating and treating the patient's acute illness as well as time spent reviewing labs, radiology, discussing goals of care with patient and/or patient's family, and discussing the case with a multidisciplinary team, including the eICU, in an effort to prevent further life threatening deterioration or end organ damage. This time is independent of any procedures performed.

## 2021-09-11 NOTE — PROGRESS NOTE ADULT - ASSESSMENT
IMP:    56 y/o male with HLD, HTN and DM--Non Vacc--admitted with Viral PNA sepsis secondary to COVID--Acute type 1 resp failure and ARDS.  Rapidly desaturates and recover period still prolong  Px WD syndrome--better  Leukocytosis--not due to old midline or ear infection     Remains high risk for intubation    Severe Prot Ted malnutrition     Critically ill.  High risk for acute decompensation and deterioration including death   Patient requires critical care for support of one or more vital organ systems with a high probability of imminent or life threatening deterioration in his/her condition     Plan:    HFNC--keep oxygen saturation in mid 80s accepting "Happy Hypoxia" to avoid/limit oxygen toxicity.  Will use HFNC during sleep tonight and DC NIV  Send BCx  Trend WBC and fever curve  No Abx for now   DC Clonidine   PRN QHS Xanax   Prednisone to 5 daily   FS with insulin coverage--keep FS < 180  Encourage nutritional support  OOB  DVT prophy--SCD and LMWH    ICU care-- d/w ICU staff on multi disciplinary rounds and pt-- All concerns addressed including but not limited to diagnosis, treatment plan and overall prognosis

## 2021-09-11 NOTE — PROGRESS NOTE ADULT - SUBJECTIVE AND OBJECTIVE BOX
Hospital D # 52  ICU # [id number]    CC:  COVID     HPI:    54 y/o male with HLD, HTN and DM--Non Vacc--presents with 8 days onset of covid symptoms which included, cough, fevers, sob, hypoxia, fevers, diarrhea, chills and myalgias. Tested positive 7/15.  Rx with Rem/Dexa and then high dose steroids and full AC.  Pt tx to ICU on 7/24 for worsening hypoxia.       8/9:  Pt seen and examined in ICU.  Sitting in chair.  On HFNC 50/90% with O2 sat 80-84%. Slightly dysneic. Still eating.  Awake and alert.  Tm 98.6  WBC 19  FS high.  LE Dopp Negative for DVT.  L arm superficial thrombosis--NO DVT.      8/10: Pt seen and examined in ICU.  Case d/w Dr shah at bedside.  On HFNC at 50/90% with O2 sat 86%.  NAD.  Complete full sentences.  Ferritin lower.  Tm 98.8  WBC 16.  Eating well.  CXR-- Personally reviewed--slightly worsening infiltrates      8/11:  Pt seen and examined in ICU.  O2 sat not as high as yesterday.  On HFNC 50/95%.  Sitting in chair.  Taking longer to recover after care this morning.  He doesn't feel any different.  Tm 97.9  WBC 16  FS better.  Still eating well    8/12:  Pt seen and examined in ICU.  Self prone O/N.  Sitting in chair.  Clinically unchanged.  On HFNC 50/95%.  Awake and alert.  WOB unchanged.  Tm 97.9   FS high during night hours    8/13:  Pt seen and examined in ICU.  Had coughing spell O/N resulting in desaturation.  Sitting in chair.  Remains on HFNC and intermittent NRM. WOB unchanged. Tm 97.8  WBC 16  FS better    8/14:  Pt seen and examined in ICU.  Coughing O/N.  Awake and alert.  WOB stable.  Tm 98.8      8/15:  Pt seen and examined in ICU.  Slept well O/N.  Awake and alert.  Tm 98.9  FS OK    9/6:  Pt seen and examined in ICU.  Has stayed off vent support.  On HFNC and CPAP qhs.  Sitting in chair.  Tm 98.6  WBC 10    9/7:  Pt seen and examined in ICU.  Tachycardic, diaphoresis and could not sleep last night.  Tm 99.6  FS OK.  Still desats rapidly and recover period still prolong     9/8:  Pt seen and examined in ICU.  Had diarrhea yesterday after eating food brought in by wife--Resolved.  Slept well.  Tm 99.6  FS OK    9/9:  Pt seen and examined in ICU.  Slept well O/N.  On HFNC.  Will attempt HFNC during sleep tonight    9/10: Pt seen and examined in ICU.  Slept well last night off NIV.  Did not require Benzo.  Tm 99.0  WBC 20  FS OK    9/11:  Pt seen and examined in ICU.  [place] HFNC QHS.  C/O R ear pain.  Tm 100.9  WBC 20  FS OK.      PMH:  As above.     PSH:  As above.     FH: Non Contributory other than those listed in HPI    Social History:  NC    MEDICATIONS  (STANDING):  aspirin  chewable 81 milliGRAM(s) Oral daily  atorvastatin 80 milliGRAM(s) Oral at bedtime  budesonide 160 MICROgram(s)/formoterol 4.5 MICROgram(s) Inhaler 2 Puff(s) Inhalation two times a day  cholecalciferol 2000 Unit(s) Oral daily  dextrose 40% Gel 15 Gram(s) Oral once  dextrose 5%. 1000 milliLiter(s) (50 mL/Hr) IV Continuous <Continuous>  dextrose 5%. 1000 milliLiter(s) (100 mL/Hr) IV Continuous <Continuous>  dextrose 50% Injectable 25 Gram(s) IV Push once  dextrose 50% Injectable 25 Gram(s) IV Push once  dextrose 50% Injectable 12.5 Gram(s) IV Push once  enoxaparin Injectable 40 milliGRAM(s) SubCutaneous every 12 hours  glucagon  Injectable 1 milliGRAM(s) IntraMuscular once  insulin glargine Injectable (LANTUS) 12 Unit(s) SubCutaneous <User Schedule>  insulin lispro (ADMELOG) corrective regimen sliding scale   SubCutaneous three times a day before meals  insulin lispro (ADMELOG) corrective regimen sliding scale   SubCutaneous at bedtime  losartan 75 milliGRAM(s) Oral daily  metoprolol tartrate 25 milliGRAM(s) Oral two times a day  predniSONE   Tablet 5 milliGRAM(s) Oral daily  senna 1 Tablet(s) Oral at bedtime    MEDICATIONS  (PRN):  acetaminophen   Tablet .. 650 milliGRAM(s) Oral every 4 hours PRN Temp greater or equal to 38C (100.4F), Mild Pain (1 - 3)  ALBUTerol    90 MICROgram(s) HFA Inhaler 2 Puff(s) Inhalation every 4 hours PRN Shortness of Breath and/or Wheezing  ALPRAZolam 0.5 milliGRAM(s) Oral at bedtime PRN anxiety  guaiFENesin Oral Liquid (Sugar-Free) 100 milliGRAM(s) Oral every 6 hours PRN Cough  hydrALAZINE Injectable 5 milliGRAM(s) IV Push every 6 hours PRN SBP>160  polyethylene glycol 3350 17 Gram(s) Oral daily PRN Constipation      Allergies: NKDA    ROS:  SEE BELOW        ICU Vital Signs Last 24 Hrs  T(C): 37.6 (11 Sep 2021 05:00), Max: 37.7 (10 Sep 2021 12:00)  T(F): 99.7 (11 Sep 2021 05:00), Max: 99.9 (10 Sep 2021 12:00)  HR: 107 (11 Sep 2021 09:00) (85 - 107)  BP: 131/67 (11 Sep 2021 09:00) (106/68 - 140/82)  BP(mean): 83 (11 Sep 2021 09:00) (78 - 107)  ABP: --  ABP(mean): --  RR: 6 (11 Sep 2021 09:00) (6 - 36)  SpO2: 86% (11 Sep 2021 09:00) (86% - 100%)          I&O's Summary    10 Sep 2021 07:01  -  11 Sep 2021 07:00  --------------------------------------------------------  IN: 1300 mL / OUT: 1600 mL / NET: -300 mL        Physical Exam:  SEE BELOW                          8.5    20.86 )-----------( 305      ( 11 Sep 2021 07:19 )             26.3       09-11    134<L>  |  100  |  15  ----------------------------<  92  3.7   |  28  |  0.86    Ca    8.6      11 Sep 2021 07:19  Phos  2.9     09-11  Mg     1.7     09-11                      DVT Prophylaxis:                                                            Contraindication:     Advanced Directives:    Discussed with:    Visit Information:  Time spent excluding procedure:      ** Time is exclusive of billed procedures and/or teaching and/or routine family updates.

## 2021-09-11 NOTE — PROGRESS NOTE ADULT - SUBJECTIVE AND OBJECTIVE BOX

## 2021-09-11 NOTE — PROGRESS NOTE ADULT - ASSESSMENT
1) COVID Pneumonia  2) Abnormal CXR  3) Dyspnea  4) Hypoxemic Respiratory Failure   56 M noted to have COVID 7/15  On arrival hypoxic to 80s and tachypneic. NRB placed,in the ER saturating 95%. Na 126 s/p 1L NS. CXR: Frandy infiltrates. Last scr 1.4 in 2019-> today 1.9 unknown if underlying ckd.  Treated with IV Remdesivir and Decadron, Tocilizumab   Given the obesity/hypertension and prior co-morbidities, he is at risk of intubation but continue HFNC, respiratory status remains critical  DDimer elevated, was on therapeutic lovenox, transitioned to 40mg q 12  Completed Solumedrol, Symbicort 160/4.5 BID (https://www.theyoonewt.com/journals/lanres/article/[email or web address], STOIC study).Using HFNC  40LPM, 55%FiO2, did not require NIPPV Overnight  SaO2 is now 90-95%  Respiratory status is tenuous and he continues to have an increased work of breathing; at this point he is at high risk of intubation and although overall prognosis remains guarded, he is improving slowly  ICU level care appreciated  Hgb improving  Repeat ABG 7.36/60/91 reviewed  Discussed with nursing staff  Will continue to monitor

## 2021-09-12 LAB
ANION GAP SERPL CALC-SCNC: 6 MMOL/L — SIGNIFICANT CHANGE UP (ref 5–17)
BUN SERPL-MCNC: 9 MG/DL — SIGNIFICANT CHANGE UP (ref 7–23)
CALCIUM SERPL-MCNC: 8.9 MG/DL — SIGNIFICANT CHANGE UP (ref 8.5–10.1)
CHLORIDE SERPL-SCNC: 101 MMOL/L — SIGNIFICANT CHANGE UP (ref 96–108)
CO2 SERPL-SCNC: 29 MMOL/L — SIGNIFICANT CHANGE UP (ref 22–31)
CREAT SERPL-MCNC: 0.74 MG/DL — SIGNIFICANT CHANGE UP (ref 0.5–1.3)
GLUCOSE SERPL-MCNC: 82 MG/DL — SIGNIFICANT CHANGE UP (ref 70–99)
HCT VFR BLD CALC: 24.7 % — LOW (ref 39–50)
HGB BLD-MCNC: 7.8 G/DL — LOW (ref 13–17)
MAGNESIUM SERPL-MCNC: 1.7 MG/DL — SIGNIFICANT CHANGE UP (ref 1.6–2.6)
MCHC RBC-ENTMCNC: 27.1 PG — SIGNIFICANT CHANGE UP (ref 27–34)
MCHC RBC-ENTMCNC: 31.6 GM/DL — LOW (ref 32–36)
MCV RBC AUTO: 85.8 FL — SIGNIFICANT CHANGE UP (ref 80–100)
PHOSPHATE SERPL-MCNC: 3.1 MG/DL — SIGNIFICANT CHANGE UP (ref 2.5–4.5)
PLATELET # BLD AUTO: 338 K/UL — SIGNIFICANT CHANGE UP (ref 150–400)
POTASSIUM SERPL-MCNC: 3.4 MMOL/L — LOW (ref 3.5–5.3)
POTASSIUM SERPL-SCNC: 3.4 MMOL/L — LOW (ref 3.5–5.3)
RBC # BLD: 2.88 M/UL — LOW (ref 4.2–5.8)
RBC # FLD: 13.9 % — SIGNIFICANT CHANGE UP (ref 10.3–14.5)
SODIUM SERPL-SCNC: 136 MMOL/L — SIGNIFICANT CHANGE UP (ref 135–145)
WBC # BLD: 20.08 K/UL — HIGH (ref 3.8–10.5)
WBC # FLD AUTO: 20.08 K/UL — HIGH (ref 3.8–10.5)

## 2021-09-12 PROCEDURE — 99291 CRITICAL CARE FIRST HOUR: CPT

## 2021-09-12 RX ORDER — POTASSIUM CHLORIDE 20 MEQ
40 PACKET (EA) ORAL EVERY 4 HOURS
Refills: 0 | Status: COMPLETED | OUTPATIENT
Start: 2021-09-12 | End: 2021-09-12

## 2021-09-12 RX ORDER — LANOLIN ALCOHOL/MO/W.PET/CERES
5 CREAM (GRAM) TOPICAL AT BEDTIME
Refills: 0 | Status: DISCONTINUED | OUTPATIENT
Start: 2021-09-12 | End: 2021-10-05

## 2021-09-12 RX ADMIN — BUDESONIDE AND FORMOTEROL FUMARATE DIHYDRATE 2 PUFF(S): 160; 4.5 AEROSOL RESPIRATORY (INHALATION) at 20:50

## 2021-09-12 RX ADMIN — Medication 40 MILLIEQUIVALENT(S): at 18:29

## 2021-09-12 RX ADMIN — BUDESONIDE AND FORMOTEROL FUMARATE DIHYDRATE 2 PUFF(S): 160; 4.5 AEROSOL RESPIRATORY (INHALATION) at 08:59

## 2021-09-12 RX ADMIN — Medication 2000 UNIT(S): at 09:32

## 2021-09-12 RX ADMIN — Medication 25 MILLIGRAM(S): at 21:45

## 2021-09-12 RX ADMIN — Medication 650 MILLIGRAM(S): at 06:37

## 2021-09-12 RX ADMIN — LOSARTAN POTASSIUM 75 MILLIGRAM(S): 100 TABLET, FILM COATED ORAL at 09:33

## 2021-09-12 RX ADMIN — Medication 81 MILLIGRAM(S): at 09:32

## 2021-09-12 RX ADMIN — Medication 100 MILLIGRAM(S): at 17:13

## 2021-09-12 RX ADMIN — Medication 5 MILLIGRAM(S): at 09:33

## 2021-09-12 RX ADMIN — Medication 100 MILLIGRAM(S): at 06:19

## 2021-09-12 RX ADMIN — ENOXAPARIN SODIUM 40 MILLIGRAM(S): 100 INJECTION SUBCUTANEOUS at 21:49

## 2021-09-12 RX ADMIN — PIPERACILLIN AND TAZOBACTAM 25 GRAM(S): 4; .5 INJECTION, POWDER, LYOPHILIZED, FOR SOLUTION INTRAVENOUS at 06:14

## 2021-09-12 RX ADMIN — Medication 650 MILLIGRAM(S): at 07:07

## 2021-09-12 RX ADMIN — PIPERACILLIN AND TAZOBACTAM 25 GRAM(S): 4; .5 INJECTION, POWDER, LYOPHILIZED, FOR SOLUTION INTRAVENOUS at 15:00

## 2021-09-12 RX ADMIN — ENOXAPARIN SODIUM 40 MILLIGRAM(S): 100 INJECTION SUBCUTANEOUS at 09:32

## 2021-09-12 RX ADMIN — ATORVASTATIN CALCIUM 80 MILLIGRAM(S): 80 TABLET, FILM COATED ORAL at 21:45

## 2021-09-12 RX ADMIN — Medication 25 MILLIGRAM(S): at 09:33

## 2021-09-12 RX ADMIN — Medication 5 MILLIGRAM(S): at 21:45

## 2021-09-12 RX ADMIN — INSULIN GLARGINE 12 UNIT(S): 100 INJECTION, SOLUTION SUBCUTANEOUS at 09:32

## 2021-09-12 RX ADMIN — Medication 40 MILLIEQUIVALENT(S): at 09:33

## 2021-09-12 RX ADMIN — Medication 100 MILLIGRAM(S): at 00:43

## 2021-09-12 RX ADMIN — PIPERACILLIN AND TAZOBACTAM 25 GRAM(S): 4; .5 INJECTION, POWDER, LYOPHILIZED, FOR SOLUTION INTRAVENOUS at 21:45

## 2021-09-12 RX ADMIN — SENNA PLUS 1 TABLET(S): 8.6 TABLET ORAL at 21:45

## 2021-09-12 NOTE — PROGRESS NOTE ADULT - SUBJECTIVE AND OBJECTIVE BOX
Patient is a 55y old  Male who presents with a chief complaint of cough/sob (23 Jul 2021 14:03)      HPI:  55 M with pmh HLD, dm, htn presents with 8 days onset of covid symptoms which included, cough, fevers, sob, hypoxia, fevers, diarrhea, chills and myalgias. TEsted positive 7/15. Wife endorsed he was becoming more sob of recently. On arrival hypoxic to 80s and tachypneic. NRB placed, presently on 15% and saturating 95%. Na 126 s/p 1L NS. CXR: Frandy infiltrates. Last scr 1.4 in 2019-> today 1.9 unknown if underlying ckd.  Patient not vaccinated.   Last seen by me in 2019  History of ROBERT and uncontrolled hypertension  Treated with IV Remdesivir and Decadron, now on Toci  SaO2 is 80-85% despite being on 100% NRB  ABG pending        9/12  No acute pulmonary events occurred overnight  Using HFNC  40LPM, 55-60%FiO2, did not require NIPPV Overnight  SaO2 is now 88-95%    MEDICATIONS  (STANDING):  aspirin  chewable 81 milliGRAM(s) Oral daily  atorvastatin 80 milliGRAM(s) Oral at bedtime  budesonide 160 MICROgram(s)/formoterol 4.5 MICROgram(s) Inhaler 2 Puff(s) Inhalation two times a day  cholecalciferol 2000 Unit(s) Oral daily  dextrose 40% Gel 15 Gram(s) Oral once  dextrose 5%. 1000 milliLiter(s) (50 mL/Hr) IV Continuous <Continuous>  dextrose 5%. 1000 milliLiter(s) (100 mL/Hr) IV Continuous <Continuous>  dextrose 50% Injectable 25 Gram(s) IV Push once  dextrose 50% Injectable 12.5 Gram(s) IV Push once  dextrose 50% Injectable 25 Gram(s) IV Push once  enoxaparin Injectable 40 milliGRAM(s) SubCutaneous every 12 hours  glucagon  Injectable 1 milliGRAM(s) IntraMuscular once  insulin glargine Injectable (LANTUS) 12 Unit(s) SubCutaneous <User Schedule>  insulin lispro (ADMELOG) corrective regimen sliding scale   SubCutaneous three times a day before meals  insulin lispro (ADMELOG) corrective regimen sliding scale   SubCutaneous at bedtime  losartan 75 milliGRAM(s) Oral daily  metoprolol tartrate 25 milliGRAM(s) Oral two times a day  piperacillin/tazobactam IVPB.. 3.375 Gram(s) IV Intermittent every 8 hours  potassium chloride    Tablet ER 40 milliEquivalent(s) Oral every 4 hours  predniSONE   Tablet 5 milliGRAM(s) Oral daily  senna 1 Tablet(s) Oral at bedtime    MEDICATIONS  (PRN):  acetaminophen   Tablet .. 650 milliGRAM(s) Oral every 4 hours PRN Temp greater or equal to 38C (100.4F), Mild Pain (1 - 3)  ALBUTerol    90 MICROgram(s) HFA Inhaler 2 Puff(s) Inhalation every 4 hours PRN Shortness of Breath and/or Wheezing  ALPRAZolam 0.5 milliGRAM(s) Oral at bedtime PRN anxiety  benzonatate 100 milliGRAM(s) Oral every 8 hours PRN Cough  guaiFENesin Oral Liquid (Sugar-Free) 100 milliGRAM(s) Oral every 6 hours PRN Cough  hydrALAZINE Injectable 5 milliGRAM(s) IV Push every 6 hours PRN SBP>160  polyethylene glycol 3350 17 Gram(s) Oral daily PRN Constipation    Vital Signs Last 24 Hrs  T(C): 38.2 (12 Sep 2021 06:00), Max: 38.8 (11 Sep 2021 12:00)  T(F): 100.7 (12 Sep 2021 06:00), Max: 101.8 (11 Sep 2021 12:00)  HR: 99 (12 Sep 2021 08:00) (85 - 117)  BP: 134/79 (12 Sep 2021 08:00) (110/92 - 142/74)  BP(mean): 94 (12 Sep 2021 08:00) (78 - 97)  RR: 31 (12 Sep 2021 08:00) (3 - 38)  SpO2: 90% (12 Sep 2021 08:00) (78% - 98%)  OUT:    Voided (mL): 1150 mL  Total OUT: 1150 mL    Total NET: 1312 mL      PHYSICAL EXAM  General Appearance: On HFNC  Lungs: coarse bilaterally  Heart: +S1,S2  Abdomen: Soft, non-tender, bowel sounds active   Extremities: no cyanosis or edema, no joint swelling  Skin: Skin color, texture normal, no rashes   Neurologic: Alert and oriented X3 , non focal, not disoriented                          7.8    20.08 )-----------( 338      ( 12 Sep 2021 05:49 )             24.7   09-12    136  |  101  |  9   ----------------------------<  82  3.4<L>   |  29  |  0.74    Ca    8.9      12 Sep 2021 05:49  Phos  3.1     09-12  Mg     1.7     09-12      Pro BNP 2446  repeat              08-26    136  |  102  |  18  ----------------------------<  87  3.7   |  34<H>  |  0.56    Ca    8.3<L>      26 Aug 2021 07:03  Phos  3.6     08-26  Mg     2.0     08-26    TPro  5.5<L>  /  Alb  2.1<L>  /  TBili  0.5  /  DBili  x   /  AST  23  /  ALT  51  /  AlkPhos  79  08-26                          8.0    11.96 )-----------( 278      ( 26 Aug 2021 07:03 )             26.1   08-26    136  |  102  |  18  ----------------------------<  87  3.7   |  34<H>  |  0.56    Ca    8.3<L>      26 Aug 2021 07:03  Phos  3.6     08-26  Mg     2.0     08-26    TPro  5.5<L>  /  Alb  2.1<L>  /  TBili  0.5  /  DBili  x   /  AST  23  /  ALT  51  /  AlkPhos  79  08-26                          8.5    12.89 )-----------( 269      ( 25 Aug 2021 07:10 )             26.7   08-25    138  |  103  |  20  ----------------------------<  143<H>  3.7   |  32<H>  |  0.58    Ca    8.1<L>      25 Aug 2021 07:10  Phos  3.7     08-25  Mg     2.0     08-25    TPro  5.6<L>  /  Alb  2.2<L>  /  TBili  0.5  /  DBili  x   /  AST  32  /  ALT  74  /  AlkPhos  103  08-24                          8.5    14.05 )-----------( 292      ( 18 Aug 2021 07:01 )             26.2   08-18    138  |  104  |  24<H>  ----------------------------<  78  4.1   |  29  |  1.03    Ca    8.8      18 Aug 2021 07:01    TPro  6.3  /  Alb  2.3<L>  /  TBili  0.6  /  DBili  x   /  AST  32  /  ALT  73  /  AlkPhos  168<H>  08-17                                           10.0   19.05 )-----------( 179      ( 08 Aug 2021 07:11 )             30.3   08-08    137  |  102  |  27<H>  ----------------------------<  121<H>  3.9   |  29  |  0.77    Ca    8.4<L>      08 Aug 2021 07:11  Phos  3.4     08-08  Mg     2.0     08-08           TPro  5.2<L>  /  Alb  2.3<L>  /  TBili  0.8  /  DBili  x   /  AST  81<H>  /  ALT  213<H>  /  AlkPhos  200<H>  08-06                            12.9   15.00 )-----------( 366      ( 26 Jul 2021 06:20 )             38.4   07-26          < from: Xray Chest 1 View- PORTABLE-Urgent (07.21.21 @ 15:33) >    PROCEDURE DATE:  07/21/2021          INTERPRETATION:  AP chest on July 21, 2021 at 3:27 PM. Patient is short of breath with cough and fever.    Heart magnified by technique.    There arescattered mid lower lung field infiltrates right greater than left possibly related: Pneumonia.    The lungs are clear on August 30, 2019.    IMPRESSION: Bilateral infiltrates as above.    < end of copied text >  < from: US Duplex Venous Lower Ext Complete, Bilateral (07.25.21 @ 08:42) >  COMPARISON: None available.    TECHNIQUE: Duplex sonography of the BILATERAL LOWER extremity veins with color and spectral Doppler, with and without compression.    FINDINGS:    RIGHT:  Normal compressibility of the RIGHT common femoral, femoral and popliteal veins.  Doppler examination shows normal spontaneous and phasic flow.  No RIGHT calf vein thrombosis is detected.    LEFT:  Normal compressibility of the LEFT common femoral, femoral and popliteal veins.  Doppler examination shows normal spontaneous and phasic flow.  No LEFT calf vein thrombosis is detected.    IMPRESSION:  No evidence of deep venous thrombosis in either lower extremity.    < end of copied text >  RADIOLOGY & ADDITIONAL STUDIES:    < from: Xray Chest 1 View- PORTABLE-Urgent (Xray Chest 1 View- PORTABLE-Urgent .) (07.26.21 @ 08:43) >    PROCEDURE DATE:  07/26/2021          INTERPRETATION:  Portable chest radiograph    CLINICAL INFORMATION: Pneumonia due to Covid 19.. Follow-up    TECHNIQUE:  Portable  AP view of the chest was obtained.    COMPARISON: 7/21/2021 chest available for review.    FINDINGS:    The lungs show stable bilateral  multifocal and diffuse ill-defined airspace opacities.. No pneumothorax.    The  heart is enlarged in transverse diameter. No hilar mass.     Visualized osseous structures are intact.    IMPRESSION:   Stable bilateral  multifocal and diffuse ill-defined airspace opacities..    < end of copied text >  < from: US Duplex Venous Lower Ext Complete, Bilateral (08.06.21 @ 17:16) >  FINDINGS:    RIGHT:  Normal compressibility of the RIGHT common femoral, femoral and popliteal veins.  Doppler examination shows normal spontaneous and phasic flow.  No RIGHT calf vein thrombosis is detected.    LEFT:  Normal compressibility of the LEFT common femoral, femoral and popliteal veins.  Doppler examination shows normal spontaneous and phasic flow.  No LEFT calf vein thrombosisis detected.    IMPRESSION:  No evidence of deep venous thrombosis in either lower extremity.    < end of copied text >  < from: Xray Chest 1 View- PORTABLE-Urgent (Xray Chest 1 View- PORTABLE-Urgent .) (08.06.21 @ 16:45) >  INTERPRETATION:  Portable chest radiograph    CLINICAL INFORMATION: Pneumonia due to Covid 19.    TECHNIQUE:  Portable  AP view of the chest was obtained.    COMPARISON: 8/1/2021 chest radiograph available for review.    FINDINGS:    The lungs show decreasing bilateral diffuse airspace disease.. No pneumothorax.    The  heart is mildly enlarged in transverse diameter. No hilar mass.   Visualized osseous structures are intact.    IMPRESSION:   Decreasing bilateral diffuse airspace disease..    < end of copied text >  xr< from: US Duplex Venous Lower Ext Complete, Bilateral (08.17.21 @ 08:58) >  COMPARISON: None available.    TECHNIQUE: Duplex sonography of the BILATERAL LOWER extremity veins with color and spectral Doppler, with and without compression.    FINDINGS:    RIGHT:  Normal compressibility of the RIGHT common femoral, femoral and popliteal veins.  Doppler examination shows normal spontaneous and phasic flow.  No RIGHT calf vein thrombosis is detected.    LEFT:  Normal compressibility of the LEFT common femoral, femoral and popliteal veins.  Doppler examination shows normal spontaneous and phasic flow.  No LEFT calf vein thrombosis is detected.    IMPRESSION:  No evidence of deep venous thrombosis in either lower extremity.    < end of copied text >  r< from: US Duplex Venous Lower Ext Complete, Bilateral (08.23.21 @ 12:53) >  PROCEDURE DATE:  08/23/2021          INTERPRETATION:  CLINICAL INFORMATION: 55 years  Male with r/o DVT    evaluate for DVT. Covid positive. Elevated d-dimer.    COMPARISON: None available.    TECHNIQUE: Duplex sonography of the BILATERAL LOWER extremity veins with color and spectral Doppler, with and without compression.    FINDINGS:    RIGHT:  Normal compressibility of the RIGHT common femoral, femoral and popliteal veins.  Doppler examination shows normal spontaneous and phasic flow.  No RIGHT calf vein thrombosis is detected.    LEFT:  Normal compressibility of the LEFT common femoral, femoral and popliteal veins.  Doppler examination shows normal spontaneous and phasic flow.  No LEFT calf vein thrombosis is detected.    IMPRESSION:  No evidence of deep venous thrombosis in either lower extremity.    < end of copied text >  < from: Xray Chest 1 View- PORTABLE-Urgent (Xray Chest 1 View- PORTABLE-Urgent .) (08.23.21 @ 11:06) >    PROCEDURE DATE:  08/23/2021          INTERPRETATION:  History: Dyspnea, Covid    Chest:  one view.    Comparison: 08/20/2021    AP radiograph of the chest demonstrates moderate diffuse alveolar infiltrates unchanged. The cardiac silhouette is normal in size. Osseous structures are intact.    Impression:moderate diffuse alveolar infiltrates unchanged.    < end of copied text >  r< from: Xray Chest 1 View- PORTABLE-Urgent (Xray Chest 1 View- PORTABLE-Urgent .) (08.23.21 @ 11:06) >  PROCEDURE DATE:  08/23/2021          INTERPRETATION:  History: Dyspnea, Covid    Chest:  one view.    Comparison: 08/20/2021    AP radiograph of the chest demonstrates moderate diffuse alveolar infiltrates unchanged. The cardiac silhouette is normal in size. Osseous structures are intact.    Impression:moderate diffuse alveolar infiltrates unchanged.    < end of copied text >

## 2021-09-12 NOTE — PROGRESS NOTE ADULT - ASSESSMENT
1) COVID Pneumonia  2) Abnormal CXR  3) Dyspnea  4) Hypoxemic Respiratory Failure     56 M noted to have COVID 7/15  On arrival hypoxic to 80s and tachypneic. NRB placed,in the ER saturating 95%. Na 126 s/p 1L NS. CXR: Frandy infiltrates. Last scr 1.4 in 2019-> today 1.9 unknown if underlying ckd.  Treated with IV Remdesivir and Decadron, Tocilizumab   Given the obesity/hypertension and prior co-morbidities, he is at risk of intubation but continue HFNC, respiratory status remains critical  DDimer elevated, was on therapeutic lovenox, transitioned to 40mg q 12  Completed Solumedrol, Symbicort 160/4.5 BID (https://www.theGodigext.com/journals/lanres/article/NIYQ8324-2170, STOIC study).Using HFNC  40LPM, 55-60%FiO2, did not require NIPPV Overnight  SaO2 is now 88-95%  Respiratory status is tenuous, work of breathing is improving. Continue titrating down HFNC  ICU level care appreciated  Hgb improving  Repeat ABG 7.36/60/91 reviewed  Discussed with nursing staff  Will continue to monitor

## 2021-09-12 NOTE — PROGRESS NOTE ADULT - SUBJECTIVE AND OBJECTIVE BOX
Hospital D # 53  ICU # 50    CC:  COVID     HPI:    54 y/o male with HLD, HTN and DM--Non Vacc--presents with 8 days onset of covid symptoms which included, cough, fevers, sob, hypoxia, fevers, diarrhea, chills and myalgias. Tested positive 7/15.  Rx with Rem/Dexa and then high dose steroids and full AC.  Pt tx to ICU on  for worsening hypoxia.       :  Pt seen and examined in ICU.  Sitting in chair.  On HFNC 50/90% with O2 sat 80-84%. Slightly dysneic. Still eating.  Awake and alert.  Tm 98.6  WBC 19  FS high.  LE Dopp Negative for DVT.  L arm superficial thrombosis--NO DVT.      8/10: Pt seen and examined in ICU.  Case d/w Dr shah at bedside.  On HFNC at 50/90% with O2 sat 86%.  NAD.  Complete full sentences.  Ferritin lower.  Tm 98.8  WBC 16.  Eating well.  CXR-- Personally reviewed--slightly worsening infiltrates      :  Pt seen and examined in ICU.  O2 sat not as high as yesterday.  On HFNC 50/95%.  Sitting in chair.  Taking longer to recover after care this morning.  He doesn't feel any different.  Tm 97.9  WBC 16  FS better.  Still eating well    :  Pt seen and examined in ICU.  Self prone O/N.  Sitting in chair.  Clinically unchanged.  On HFNC 50/95%.  Awake and alert.  WOB unchanged.  Tm 97.9   FS high during night hours    :  Pt seen and examined in ICU.  Had coughing spell O/N resulting in desaturation.  Sitting in chair.  Remains on HFNC and intermittent NRM. WOB unchanged. Tm 97.8  WBC 16  FS better    :  Pt seen and examined in ICU.  Coughing O/N.  Awake and alert.  WOB stable.  Tm 98.8      8/15:  Pt seen and examined in ICU.  Slept well O/N.  Awake and alert.  Tm 98.9  FS OK    :  Pt seen and examined in ICU.  Has stayed off vent support.  On HFNC and CPAP qhs.  Sitting in chair.  Tm 98.6  WBC 10    :  Pt seen and examined in ICU.  Tachycardic, diaphoresis and could not sleep last night.  Tm 99.6  FS OK.  Still desats rapidly and recover period still prolong     :  Pt seen and examined in ICU.  Had diarrhea yesterday after eating food brought in by wife--Resolved.  Slept well.  Tm 99.6  FS OK    :  Pt seen and examined in ICU.  Slept well O/N.  On HFNC.  Will attempt HFNC during sleep tonight    9/10: Pt seen and examined in ICU.  Slept well last night off NIV.  Did not require Benzo.  Tm 99.0  WBC 20  FS OK    :  Pt seen and examined in ICU.  [place] HFNC QHS.  C/O R ear pain.  Tm 100.9  WBC 20  FS OK    :  Pt seen and examined in ICU.  HFNC O/N with occasional dsesat.  Tm 100.7  WBC 20.  NO new Cxs thus far.  FS OK    PMH:  As above.     PSH:  As above.     FH: Non Contributory other than those listed in HPI    Social History:  NC    MEDICATIONS  (STANDING):  aspirin  chewable 81 milliGRAM(s) Oral daily  atorvastatin 80 milliGRAM(s) Oral at bedtime  budesonide 160 MICROgram(s)/formoterol 4.5 MICROgram(s) Inhaler 2 Puff(s) Inhalation two times a day  cholecalciferol 2000 Unit(s) Oral daily  dextrose 40% Gel 15 Gram(s) Oral once  dextrose 5%. 1000 milliLiter(s) (50 mL/Hr) IV Continuous <Continuous>  dextrose 5%. 1000 milliLiter(s) (100 mL/Hr) IV Continuous <Continuous>  dextrose 50% Injectable 25 Gram(s) IV Push once  dextrose 50% Injectable 12.5 Gram(s) IV Push once  dextrose 50% Injectable 25 Gram(s) IV Push once  enoxaparin Injectable 40 milliGRAM(s) SubCutaneous every 12 hours  glucagon  Injectable 1 milliGRAM(s) IntraMuscular once  insulin glargine Injectable (LANTUS) 12 Unit(s) SubCutaneous <User Schedule>  insulin lispro (ADMELOG) corrective regimen sliding scale   SubCutaneous three times a day before meals  insulin lispro (ADMELOG) corrective regimen sliding scale   SubCutaneous at bedtime  losartan 75 milliGRAM(s) Oral daily  metoprolol tartrate 25 milliGRAM(s) Oral two times a day  piperacillin/tazobactam IVPB.. 3.375 Gram(s) IV Intermittent every 8 hours  potassium chloride    Tablet ER 40 milliEquivalent(s) Oral every 4 hours  predniSONE   Tablet 5 milliGRAM(s) Oral daily  senna 1 Tablet(s) Oral at bedtime    MEDICATIONS  (PRN):  acetaminophen   Tablet .. 650 milliGRAM(s) Oral every 4 hours PRN Temp greater or equal to 38C (100.4F), Mild Pain (1 - 3)  ALBUTerol    90 MICROgram(s) HFA Inhaler 2 Puff(s) Inhalation every 4 hours PRN Shortness of Breath and/or Wheezing  ALPRAZolam 0.5 milliGRAM(s) Oral at bedtime PRN anxiety  benzonatate 100 milliGRAM(s) Oral every 8 hours PRN Cough  guaiFENesin Oral Liquid (Sugar-Free) 100 milliGRAM(s) Oral every 6 hours PRN Cough  hydrALAZINE Injectable 5 milliGRAM(s) IV Push every 6 hours PRN SBP>160  polyethylene glycol 3350 17 Gram(s) Oral daily PRN Constipation      Allergies: NKDA    ROS:  SEE BELOW        ICU Vital Signs Last 24 Hrs  T(C): 37.2 (12 Sep 2021 09:00), Max: 38.8 (11 Sep 2021 12:00)  T(F): 99 (12 Sep 2021 09:00), Max: 101.8 (11 Sep 2021 12:00)  HR: 113 (12 Sep 2021 09:00) (85 - 117)  BP: 134/79 (12 Sep 2021 08:00) (110/92 - 142/74)  BP(mean): 94 (12 Sep 2021 08:00) (78 - 97)  ABP: --  ABP(mean): --  RR: 28 (12 Sep 2021 09:00) (3 - 38)  SpO2: 72% (12 Sep 2021 09:00) (72% - 98%)          I&O's Summary    11 Sep 2021 07:01  -  12 Sep 2021 07:00  --------------------------------------------------------  IN: 550 mL / OUT: 2500 mL / NET: -1950 mL        Physical Exam:  SEE BELOW                          7.8    20.08 )-----------( 338      ( 12 Sep 2021 05:49 )             24.7       09-12    136  |  101  |  9   ----------------------------<  82  3.4<L>   |  29  |  0.74    Ca    8.9      12 Sep 2021 05:49  Phos  3.1       Mg     1.7                       Urinalysis Basic - ( 11 Sep 2021 21:45 )    Color: Yellow / Appearance: Clear / S.005 / pH: x  Gluc: x / Ketone: Negative  / Bili: Negative / Urobili: Negative mg/dL   Blood: x / Protein: 15 mg/dL / Nitrite: Positive   Leuk Esterase: Negative / RBC: Negative /HPF / WBC 0-2   Sq Epi: x / Non Sq Epi: Occasional / Bacteria: Moderate        DVT Prophylaxis:                                                            Contraindication:     Advanced Directives:    Discussed with:    Visit Information:  Time spent excluding procedure:      ** Time is exclusive of billed procedures and/or teaching and/or routine family updates.

## 2021-09-12 NOTE — PROGRESS NOTE ADULT - ASSESSMENT
IMP:    56 y/o male with HLD, HTN and DM--Non Vacc--admitted with Viral PNA sepsis secondary to COVID--Acute type 1 resp failure and ARDS.  Rapidly desaturates and recover period still prolong  Px WD syndrome--resolved   Leukocytosis--not due to old midline or ear infection    Remains high risk for intubation    Severe Prot Ted malnutrition     Critically ill.  High risk for acute decompensation and deterioration including death   Patient requires critical care for support of one or more vital organ systems with a high probability of imminent or life threatening deterioration in his/her condition     Plan:    HFNC--keep oxygen saturation in mid 80s accepting "Happy Hypoxia" to avoid/limit oxygen toxicity.  Will use HFNC during sleep tonight and DC NIV  Follow up Cxs  Cont Pip/Tazo (2)  Trend WBC and fever curve  PRN QHS Xanax   Prednisone to 5 daily   FS with insulin coverage--keep FS < 180  Encourage nutritional support  OOB  DVT prophy--SCD and LMWH    ICU care-- d/w ICU staff on multi disciplinary rounds and pt-- All concerns addressed including but not limited to diagnosis, treatment plan and overall prognosis

## 2021-09-12 NOTE — PROGRESS NOTE ADULT - SUBJECTIVE AND OBJECTIVE BOX
Patient is a 56y old  Male who presents with a chief complaint of cough/sob (12 Sep 2021 09:55)      BRIEF HOSPITAL COURSE:   55M with PMHx HLD, HTN, DM admitted with cough, SOB, hypoxia, COVID+. Pt unvaccinated. Progressive hypoxic respiratory failure requiring escalation to HFNC. Complicated by ARDS. sp Actemra, sp Remdesivir/decadron     Events last 24 hours: febrile today, hemodynamics stable, on HFNC 60%/40L with intermittent use of NRB for desats on top as well, no CPAP needs any longer, tolerating PO but poor intake.      PAST MEDICAL & SURGICAL HISTORY:  HTN (hypertension)    Diabetes          Hosp day #53d      Vital signs / Reviewed and Physical Exam Performed where pertinent and urgently required    Lab / Radiology  studies / ABG / Meds -  reviewed and interpreted into the assessment and treatment plan.    I&O's Summary    11 Sep 2021 07:01  -  12 Sep 2021 07:00  --------------------------------------------------------  IN: 550 mL / OUT: 2500 mL / NET: -1950 mL    12 Sep 2021 07:01  -  12 Sep 2021 21:03  --------------------------------------------------------  IN: 0 mL / OUT: 600 mL / NET: -600 mL        Impression:  1. COVID-19 Viral PNA  2. ARDS  3. acute hypoxemic respiratory failure  4. anemia  5. diabetes mellitus/hyperglycemia  6. severe protein malnutrition  7. essential HTN  8. anxiety  9. hyperlipidemia  10. precedex withdrawal syndrome      Plan:  Neuro - nonfocal, add melatonin PRN for sleep hygiene, xanax standing for anxiolysis, off precedex currently without any further withdrawal                symptoms    CV -  BP fairly well controlled          cont cozaar and lopressor at current dose          cont high intensity statin          long-term goal BP<130/80    Pulm -  cont HFNC with moderate level flow for alveolar recruitment              cont to attempt to actively titrate FiO2 to keep sats>86%, currently tolerating 60% if remains with needs for NRB will trial increase flow for              higher level alveolar              recruitment              weaning stress steroids             standing inhaled steroids (symbicort), PRN bronchodialators            remains high risk for respiratory deterioration and escalation to intubation    GI -  PO diet as tolerates, add back glucerna shake supplementation TID    Renal - Cr stable, avoid nephrotoxins, strict I/Os, even to negative fluid balance as tolerates for hemodynamics and renal fx, BMP in am    Heme -  chem DVT with lovenox, LE duplex negative,  no signs of bleeding, SCDs, transfuse for Hbg<7 or signs of                 active bleeding.    ID - febrile with rising WBC, empiric IV abx restarted, u/a negative, ucx pending/BCx NGTD, check procal, cont stress steroid taper, Abx addition         based on discussion with ID in conjunction with clinical features and culture data    Endo -  Aggressive glycemic control to limit FS glucose to < 180mg/dl, A1c 11.6, a few recorded lower BS, no hypoglycemia, cont current lantus               dosing, if consistently low will adjust lower BID dosing for consistency, moderate ISS coverage scale, further adjustments based on BS               ongoing.    COVID-19 specific considerations and therapeutic  options based on the available and rapidly changing literature.    35 minutes of critical care time spent in the management of this critically ill COVID-19 patient with continuous assessments and interventions based on the interpretation of multiple databases.

## 2021-09-13 LAB
ANION GAP SERPL CALC-SCNC: 5 MMOL/L — SIGNIFICANT CHANGE UP (ref 5–17)
BUN SERPL-MCNC: 6 MG/DL — LOW (ref 7–23)
CALCIUM SERPL-MCNC: 8.9 MG/DL — SIGNIFICANT CHANGE UP (ref 8.5–10.1)
CHLORIDE SERPL-SCNC: 102 MMOL/L — SIGNIFICANT CHANGE UP (ref 96–108)
CO2 SERPL-SCNC: 29 MMOL/L — SIGNIFICANT CHANGE UP (ref 22–31)
CREAT SERPL-MCNC: 0.82 MG/DL — SIGNIFICANT CHANGE UP (ref 0.5–1.3)
GLUCOSE SERPL-MCNC: 93 MG/DL — SIGNIFICANT CHANGE UP (ref 70–99)
HCT VFR BLD CALC: 24 % — LOW (ref 39–50)
HGB BLD-MCNC: 7.8 G/DL — LOW (ref 13–17)
MAGNESIUM SERPL-MCNC: 1.5 MG/DL — LOW (ref 1.6–2.6)
MCHC RBC-ENTMCNC: 27.7 PG — SIGNIFICANT CHANGE UP (ref 27–34)
MCHC RBC-ENTMCNC: 32.5 GM/DL — SIGNIFICANT CHANGE UP (ref 32–36)
MCV RBC AUTO: 85.1 FL — SIGNIFICANT CHANGE UP (ref 80–100)
PHOSPHATE SERPL-MCNC: 3.4 MG/DL — SIGNIFICANT CHANGE UP (ref 2.5–4.5)
PLATELET # BLD AUTO: 370 K/UL — SIGNIFICANT CHANGE UP (ref 150–400)
POTASSIUM SERPL-MCNC: 3.6 MMOL/L — SIGNIFICANT CHANGE UP (ref 3.5–5.3)
POTASSIUM SERPL-SCNC: 3.6 MMOL/L — SIGNIFICANT CHANGE UP (ref 3.5–5.3)
PROCALCITONIN SERPL-MCNC: 0.2 NG/ML — HIGH (ref 0.02–0.1)
RBC # BLD: 2.82 M/UL — LOW (ref 4.2–5.8)
RBC # FLD: 14 % — SIGNIFICANT CHANGE UP (ref 10.3–14.5)
SODIUM SERPL-SCNC: 136 MMOL/L — SIGNIFICANT CHANGE UP (ref 135–145)
WBC # BLD: 17.7 K/UL — HIGH (ref 3.8–10.5)
WBC # FLD AUTO: 17.7 K/UL — HIGH (ref 3.8–10.5)

## 2021-09-13 PROCEDURE — 99291 CRITICAL CARE FIRST HOUR: CPT

## 2021-09-13 RX ORDER — MAGNESIUM SULFATE 500 MG/ML
2 VIAL (ML) INJECTION ONCE
Refills: 0 | Status: COMPLETED | OUTPATIENT
Start: 2021-09-13 | End: 2021-09-13

## 2021-09-13 RX ADMIN — PIPERACILLIN AND TAZOBACTAM 25 GRAM(S): 4; .5 INJECTION, POWDER, LYOPHILIZED, FOR SOLUTION INTRAVENOUS at 13:04

## 2021-09-13 RX ADMIN — PIPERACILLIN AND TAZOBACTAM 25 GRAM(S): 4; .5 INJECTION, POWDER, LYOPHILIZED, FOR SOLUTION INTRAVENOUS at 21:49

## 2021-09-13 RX ADMIN — Medication 2.5 MILLIGRAM(S): at 10:13

## 2021-09-13 RX ADMIN — LOSARTAN POTASSIUM 75 MILLIGRAM(S): 100 TABLET, FILM COATED ORAL at 10:13

## 2021-09-13 RX ADMIN — BUDESONIDE AND FORMOTEROL FUMARATE DIHYDRATE 2 PUFF(S): 160; 4.5 AEROSOL RESPIRATORY (INHALATION) at 10:00

## 2021-09-13 RX ADMIN — Medication 5 MILLIGRAM(S): at 21:50

## 2021-09-13 RX ADMIN — INSULIN GLARGINE 12 UNIT(S): 100 INJECTION, SOLUTION SUBCUTANEOUS at 08:13

## 2021-09-13 RX ADMIN — Medication 25 MILLIGRAM(S): at 10:13

## 2021-09-13 RX ADMIN — Medication 25 MILLIGRAM(S): at 21:50

## 2021-09-13 RX ADMIN — ENOXAPARIN SODIUM 40 MILLIGRAM(S): 100 INJECTION SUBCUTANEOUS at 10:12

## 2021-09-13 RX ADMIN — Medication 81 MILLIGRAM(S): at 10:13

## 2021-09-13 RX ADMIN — ENOXAPARIN SODIUM 40 MILLIGRAM(S): 100 INJECTION SUBCUTANEOUS at 21:50

## 2021-09-13 RX ADMIN — Medication 50 GRAM(S): at 21:49

## 2021-09-13 RX ADMIN — Medication 2000 UNIT(S): at 10:13

## 2021-09-13 RX ADMIN — PIPERACILLIN AND TAZOBACTAM 25 GRAM(S): 4; .5 INJECTION, POWDER, LYOPHILIZED, FOR SOLUTION INTRAVENOUS at 05:28

## 2021-09-13 RX ADMIN — ATORVASTATIN CALCIUM 80 MILLIGRAM(S): 80 TABLET, FILM COATED ORAL at 21:50

## 2021-09-13 RX ADMIN — Medication 100 MILLIGRAM(S): at 22:00

## 2021-09-13 NOTE — PROGRESS NOTE ADULT - ASSESSMENT
54 y/o male with HLD, HTN and DM--Non Vacc--admitted with Viral PNA sepsis secondary to COVID--Acute type 1 resp failure and ARDS.  Rapidly desaturates and recover period still prolong  Remains high risk for intubation    Severe Prot Ted malnutrition     Critically ill.  High risk for acute decompensation and deterioration including death   Patient requires critical care for support of one or more vital organ systems with a high probability of imminent or life threatening deterioration in his/her condition     Plan:    HFNC--keep oxygen saturation titrate down no longer on bipap at night  Follow up Cxs negative  Cont Pip/Tazo (3  Trend WBC and fever curve  PRN QHS Xanax   Prednisone titrate down slowly  FS with insulin coverage--keep FS < 180  Encourage nutritional support  OOB  DVT prophy--SCD and LMWH    ICU care-- d/w ICU staff on multi disciplinary rounds and pt-- All concerns addressed including but not limited to diagnosis, treatment plan and overall prognosis

## 2021-09-13 NOTE — PROGRESS NOTE ADULT - SUBJECTIVE AND OBJECTIVE BOX
Patient is a 56y old  Male who presents with a chief complaint of cough/sob (13 Sep 2021 12:25)      BRIEF HOSPITAL COURSE:   55M with PMHx HLD, HTN, DM admitted with cough, SOB, hypoxia, COVID+. Pt unvaccinated. Progressive hypoxic respiratory failure requiring escalation to HFNC. Complicated by ARDS. sp Actemra, sp Remdesivir/decadron     Events last 24 hours: afebrile today, hemodynamics stable, received HFNC 50%/40L with intermittent use of NRB for desats on top as well, still without CPAP needs any longer, tolerating PO but poor intake, OOB to chair.      PAST MEDICAL & SURGICAL HISTORY:  HTN (hypertension)    Diabetes        Hosp day #54d      Vital signs / Reviewed and Physical Exam Performed where pertinent and urgently required    Lab / Radiology  studies / ABG / Meds -  reviewed and interpreted into the assessment and treatment plan.    I&O's Summary    12 Sep 2021 07:01  -  13 Sep 2021 07:00  --------------------------------------------------------  IN: 800 mL / OUT: 1600 mL / NET: -800 mL    13 Sep 2021 07:01  -  13 Sep 2021 21:19  --------------------------------------------------------  IN: 100 mL / OUT: 850 mL / NET: -750 mL        Impression:  1. COVID-19 Viral PNA  2. ARDS  3. acute hypoxemic respiratory failure  4. anemia  5. diabetes mellitus  6. severe protein malnutrition  7. essential HTN  8. anxiety  9. hyperlipidemia  10. precedex withdrawal syndrome  11. hypomagnesemia      Plan:  Neuro - nonfocal, add melatonin PRN for sleep hygiene, xanax standing for anxiolysis, off precedex without any further withdrawal                symptoms    CV -  BP remains fairly well controlled          cont cozaar and lopressor at current dose          cont high intensity statin          long-term goal BP<130/80    Pulm -  cont HFNC with moderate level flow for alveolar recruitment              cont to attempt to actively titrate FiO2 to keep sats>86%, currently tolerating 50%              weaning stress steroids             standing inhaled steroids (symbicort), PRN bronchodialators            continue to remain high risk for respiratory deterioration and escalation to intubation    GI -  cont to encourage PO diet as tolerates, glucerna shake supplementation TID    Renal - Cr stable, avoid nephrotoxins, strict I/Os, even to negative fluid balance as tolerates for hemodynamics and renal fx, replete Mg, repeat               BMP in am    Heme -  chem DVT with lovenox, LE duplex negative,  no signs of bleeding, SCDs, transfuse for Hbg<7 or signs of                 active bleeding.    ID - afebrile, downtrending WBC, cont empiric IV abx  u/a negative, ucx pending/BCx NGTD, cont stress steroid taper, Abx adjustments        based on discussion with ID in conjunction with clinical features and culture data.    Endo -  Aggressive glycemic control to limit FS glucose to < 180mg/dl, A1c 11.6, current lantus, moderate ISS coverage scale    COVID-19 specific considerations and therapeutic  options based on the available and rapidly changing literature.    35 minutes of critical care time spent in the management of this critically ill COVID-19 patient with continuous assessments and interventions based on the interpretation of multiple databases.

## 2021-09-13 NOTE — PROGRESS NOTE ADULT - SUBJECTIVE AND OBJECTIVE BOX
Events Overnight: patient on high flow 55%, saturation on low 90s, has had low grade fevers    HPI:     66 y/o male with HLD, HTN and DM--Non Vacc--presents with 8 days onset of covid symptoms which included, cough, fevers, sob, hypoxia, fevers, diarrhea, chills and myalgias. Tested positive 7/15.  Rx with Rem/Dexa and then high dose steroids and full AC.  Pt tx to ICU on  for worsening hypoxia.     was on high flow as high as 60liters, 90%, on 55% now, cxr still with bilateral infiltrates    :  Pt seen and examined in ICU.  HFNC O/N with occasional dsesat.  Tm 100.2  WBC 17.  NO new Cxs thus far.  FS O    ROS - dyspneic with exertin, no chest pain, no calf pain, some  upset stomach  ROS otherwise negative     MEDICATIONS  (STANDING):  aspirin  chewable 81 milliGRAM(s) Oral daily  atorvastatin 80 milliGRAM(s) Oral at bedtime  budesonide 160 MICROgram(s)/formoterol 4.5 MICROgram(s) Inhaler 2 Puff(s) Inhalation two times a day  cholecalciferol 2000 Unit(s) Oral daily  dextrose 40% Gel 15 Gram(s) Oral once  dextrose 5%. 1000 milliLiter(s) (50 mL/Hr) IV Continuous <Continuous>  dextrose 5%. 1000 milliLiter(s) (100 mL/Hr) IV Continuous <Continuous>  dextrose 50% Injectable 25 Gram(s) IV Push once  dextrose 50% Injectable 12.5 Gram(s) IV Push once  dextrose 50% Injectable 25 Gram(s) IV Push once  enoxaparin Injectable 40 milliGRAM(s) SubCutaneous every 12 hours  glucagon  Injectable 1 milliGRAM(s) IntraMuscular once  insulin glargine Injectable (LANTUS) 12 Unit(s) SubCutaneous <User Schedule>  insulin lispro (ADMELOG) corrective regimen sliding scale   SubCutaneous three times a day before meals  insulin lispro (ADMELOG) corrective regimen sliding scale   SubCutaneous at bedtime  losartan 75 milliGRAM(s) Oral daily  metoprolol tartrate 25 milliGRAM(s) Oral two times a day  piperacillin/tazobactam IVPB.. 3.375 Gram(s) IV Intermittent every 8 hours  predniSONE   Tablet 2.5 milliGRAM(s) Oral daily  senna 1 Tablet(s) Oral at bedtime    MEDICATIONS  (PRN):  acetaminophen   Tablet .. 650 milliGRAM(s) Oral every 4 hours PRN Temp greater or equal to 38C (100.4F), Mild Pain (1 - 3)  ALBUTerol    90 MICROgram(s) HFA Inhaler 2 Puff(s) Inhalation every 4 hours PRN Shortness of Breath and/or Wheezing  ALPRAZolam 0.5 milliGRAM(s) Oral at bedtime PRN anxiety  benzonatate 100 milliGRAM(s) Oral every 8 hours PRN Cough  guaiFENesin Oral Liquid (Sugar-Free) 100 milliGRAM(s) Oral every 6 hours PRN Cough  hydrALAZINE Injectable 5 milliGRAM(s) IV Push every 6 hours PRN SBP>160  melatonin 5 milliGRAM(s) Oral at bedtime PRN Sleep  polyethylene glycol 3350 17 Gram(s) Oral daily PRN Constipation    ICU Vital Signs Last 24 Hrs  T(C): 37.6 (13 Sep 2021 08:00), Max: 38.2 (12 Sep 2021 14:00)  T(F): 99.6 (13 Sep 2021 08:00), Max: 100.8 (12 Sep 2021 15:45)  HR: 88 (13 Sep 2021 12:00) (85 - 112)  BP: 128/78 (13 Sep 2021 12:00) (104/63 - 150/64)  BP(mean): 89 (13 Sep 2021 12:00) (72 - 103)  ABP: --  ABP(mean): --  RR: 34 (13 Sep 2021 12:08) (11 - 40)  SpO2: 94% (13 Sep 2021 12:08) (81% - 95%)    I&O's Summary    12 Sep 2021 07:01  -  13 Sep 2021 07:00  --------------------------------------------------------  IN: 800 mL / OUT: 1600 mL / NET: -800 mL                        7.8    17.70 )-----------( 370      ( 13 Sep 2021 07:33 )             24.0       09-    136  |  102  |  6<L>  ----------------------------<  93  3.6   |  29  |  0.82    Ca    8.9      13 Sep 2021 07:33  Phos  3.4       Mg     1.5         Physical Exam:    General - comfortable,  HEENT nc/at, high flow on  neck supple  lungs bilateral rhonchi,  cv rrr  abdomen soft non tender  ext no calf pain    Urinalysis Basic - ( 11 Sep 2021 21:45 )    Color: Yellow / Appearance: Clear / S.005 / pH: x  Gluc: x / Ketone: Negative  / Bili: Negative / Urobili: Negative mg/dL   Blood: x / Protein: 15 mg/dL / Nitrite: Positive   Leuk Esterase: Negative / RBC: Negative /HPF / WBC 0-2   Sq Epi: x / Non Sq Epi: Occasional / Bacteria: Moderate    DVT Prophylaxis:  Lovenox                                                        Advanced Directives: Full Code

## 2021-09-14 LAB
ANION GAP SERPL CALC-SCNC: 8 MMOL/L — SIGNIFICANT CHANGE UP (ref 5–17)
BUN SERPL-MCNC: 4 MG/DL — LOW (ref 7–23)
CALCIUM SERPL-MCNC: 9.2 MG/DL — SIGNIFICANT CHANGE UP (ref 8.5–10.1)
CHLORIDE SERPL-SCNC: 101 MMOL/L — SIGNIFICANT CHANGE UP (ref 96–108)
CO2 SERPL-SCNC: 28 MMOL/L — SIGNIFICANT CHANGE UP (ref 22–31)
CREAT SERPL-MCNC: 0.79 MG/DL — SIGNIFICANT CHANGE UP (ref 0.5–1.3)
D DIMER BLD IA.RAPID-MCNC: 470 NG/ML DDU — HIGH
GLUCOSE SERPL-MCNC: 92 MG/DL — SIGNIFICANT CHANGE UP (ref 70–99)
HCT VFR BLD CALC: 24.6 % — LOW (ref 39–50)
HGB BLD-MCNC: 7.8 G/DL — LOW (ref 13–17)
MCHC RBC-ENTMCNC: 27.2 PG — SIGNIFICANT CHANGE UP (ref 27–34)
MCHC RBC-ENTMCNC: 31.7 GM/DL — LOW (ref 32–36)
MCV RBC AUTO: 85.7 FL — SIGNIFICANT CHANGE UP (ref 80–100)
PLATELET # BLD AUTO: 393 K/UL — SIGNIFICANT CHANGE UP (ref 150–400)
POTASSIUM SERPL-MCNC: 3.6 MMOL/L — SIGNIFICANT CHANGE UP (ref 3.5–5.3)
POTASSIUM SERPL-SCNC: 3.6 MMOL/L — SIGNIFICANT CHANGE UP (ref 3.5–5.3)
PROCALCITONIN SERPL-MCNC: 0.14 NG/ML — HIGH (ref 0.02–0.1)
RBC # BLD: 2.87 M/UL — LOW (ref 4.2–5.8)
RBC # FLD: 14.2 % — SIGNIFICANT CHANGE UP (ref 10.3–14.5)
SARS-COV-2 RNA SPEC QL NAA+PROBE: SIGNIFICANT CHANGE UP
SODIUM SERPL-SCNC: 137 MMOL/L — SIGNIFICANT CHANGE UP (ref 135–145)
WBC # BLD: 17.6 K/UL — HIGH (ref 3.8–10.5)
WBC # FLD AUTO: 17.6 K/UL — HIGH (ref 3.8–10.5)

## 2021-09-14 PROCEDURE — 99291 CRITICAL CARE FIRST HOUR: CPT

## 2021-09-14 RX ADMIN — Medication 5 MILLIGRAM(S): at 22:45

## 2021-09-14 RX ADMIN — Medication 1: at 17:38

## 2021-09-14 RX ADMIN — Medication 2.5 MILLIGRAM(S): at 10:10

## 2021-09-14 RX ADMIN — Medication 100 MILLIGRAM(S): at 10:10

## 2021-09-14 RX ADMIN — Medication 100 MILLIGRAM(S): at 22:45

## 2021-09-14 RX ADMIN — ENOXAPARIN SODIUM 40 MILLIGRAM(S): 100 INJECTION SUBCUTANEOUS at 22:44

## 2021-09-14 RX ADMIN — Medication 25 MILLIGRAM(S): at 10:11

## 2021-09-14 RX ADMIN — LOSARTAN POTASSIUM 75 MILLIGRAM(S): 100 TABLET, FILM COATED ORAL at 10:11

## 2021-09-14 RX ADMIN — INSULIN GLARGINE 12 UNIT(S): 100 INJECTION, SOLUTION SUBCUTANEOUS at 08:48

## 2021-09-14 RX ADMIN — ENOXAPARIN SODIUM 40 MILLIGRAM(S): 100 INJECTION SUBCUTANEOUS at 10:11

## 2021-09-14 RX ADMIN — Medication 2000 UNIT(S): at 10:12

## 2021-09-14 RX ADMIN — PIPERACILLIN AND TAZOBACTAM 25 GRAM(S): 4; .5 INJECTION, POWDER, LYOPHILIZED, FOR SOLUTION INTRAVENOUS at 05:23

## 2021-09-14 RX ADMIN — Medication 81 MILLIGRAM(S): at 10:11

## 2021-09-14 RX ADMIN — BUDESONIDE AND FORMOTEROL FUMARATE DIHYDRATE 2 PUFF(S): 160; 4.5 AEROSOL RESPIRATORY (INHALATION) at 09:00

## 2021-09-14 RX ADMIN — Medication 25 MILLIGRAM(S): at 22:45

## 2021-09-14 RX ADMIN — ATORVASTATIN CALCIUM 80 MILLIGRAM(S): 80 TABLET, FILM COATED ORAL at 22:45

## 2021-09-14 NOTE — PROGRESS NOTE ADULT - ASSESSMENT
54 y/o male with HLD, HTN and DM--Non Vacc--admitted with Viral PNA sepsis secondary to COVID--Acute type 1 resp failure and ARDS.   Remains at risk for intubation  on high flow oxygen  last cxr 9/11 with severe bilateral infiltrates  Severe Prot Ted malnutrition     Critically ill.  High risk for acute decompensation and deterioration including death   Patient requires critical care for support of one or more vital organ systems with a high probability of imminent or life threatening deterioration in his/her condition     Plan:    HFNC--keep oxygen saturation titrate down no longer on bipap at night  Follow up Cxs negative, zosyn d/cs  steroids titrated off   Trend WBC and fever curve  PRN QHS Xanax      FS with insulin coverage--keep FS < 180  Encourage nutritional support  OOB  DVT prophy--SCD and LMWH

## 2021-09-14 NOTE — PROGRESS NOTE ADULT - SUBJECTIVE AND OBJECTIVE BOX
ICU Progress Note    HPI:    S:    Pt seen and examined  HD # 55  FULL CODE  PMHx HLD, HTN, DM   Pt here for OSB, cough for several days  Admitted on 7/21 with COVID-19 pna. tested positive on 7/15 outpatient. unvaccinated. Escalating fio2 requirements and development of ARDS now requiring HFNC and NRB. S/p actemra on 7/23 7/27 PM: Remains on HFNC 50/100% with frequent desaturations.    8/9 PM: Remains on HFNC 50/90% + PRN/qHS with frequent desaturations.   8/10 PM: remains on HFNC. Increasing insulin requirements.  8/13 PM: Remains on HFNC with frequent desaturations.   8/14 PM: Remains on HFNC with desaturations.     9/14 PM: Remains on HFNC. Remains at high risk of further deterioration and intubation/mechanical intubation.     ROS: + SOB  Remainder of systems reviewed, negative    Allergies    No Known Allergies    Intolerances    MEDICATIONS  (STANDING):    aspirin  chewable 81 milliGRAM(s) Oral daily  atorvastatin 80 milliGRAM(s) Oral at bedtime  budesonide 160 MICROgram(s)/formoterol 4.5 MICROgram(s) Inhaler 2 Puff(s) Inhalation two times a day  cholecalciferol 2000 Unit(s) Oral daily  dextrose 40% Gel 15 Gram(s) Oral once  dextrose 5%. 1000 milliLiter(s) (50 mL/Hr) IV Continuous <Continuous>  dextrose 5%. 1000 milliLiter(s) (100 mL/Hr) IV Continuous <Continuous>  dextrose 50% Injectable 25 Gram(s) IV Push once  dextrose 50% Injectable 12.5 Gram(s) IV Push once  dextrose 50% Injectable 25 Gram(s) IV Push once  enoxaparin Injectable 40 milliGRAM(s) SubCutaneous every 12 hours  famotidine    Tablet 20 milliGRAM(s) Oral daily  glucagon  Injectable 1 milliGRAM(s) IntraMuscular once  insulin glargine Injectable (LANTUS) 15 Unit(s) SubCutaneous every morning  insulin lispro (ADMELOG) corrective regimen sliding scale   SubCutaneous three times a day before meals  insulin lispro (ADMELOG) corrective regimen sliding scale   SubCutaneous at bedtime  labetalol 200 milliGRAM(s) Oral every 12 hours  losartan 50 milliGRAM(s) Oral daily  predniSONE   Tablet 20 milliGRAM(s) Oral daily    MEDICATIONS  (PRN):    acetaminophen   Tablet .. 650 milliGRAM(s) Oral every 4 hours PRN Temp greater or equal to 38C (100.4F), Mild Pain (1 - 3)  ALBUTerol    90 MICROgram(s) HFA Inhaler 2 Puff(s) Inhalation every 4 hours PRN Shortness of Breath and/or Wheezing  benzocaine 15 mG/menthol 3.6 mG (Sugar-Free) Lozenge 1 Lozenge Oral every 6 hours PRN Sore Throat  benzonatate 100 milliGRAM(s) Oral three times a day PRN Cough  melatonin 6 milliGRAM(s) Oral at bedtime PRN Insomnia    Drug Dosing Weight    Height (cm): 167.6 (21 Jul 2021 14:34)  Weight (kg): 90.4 (21 Jul 2021 22:00)  BMI (kg/m2): 32.2 (21 Jul 2021 22:00)  BSA (m2): 2 (21 Jul 2021 22:00)    PAST MEDICAL & SURGICAL HISTORY:  HTN (hypertension)    Diabetes    FAMILY HISTORY:    ROS: See HPI; otherwise, all systems reviewed and negative.    O:    ICU Vital Signs Last 24 Hrs  T(C): 37.1 (13 Aug 2021 20:00), Max: 37.1 (13 Aug 2021 20:00)  T(F): 98.8 (13 Aug 2021 20:00), Max: 98.8 (13 Aug 2021 20:00)  HR: 91 (13 Aug 2021 21:00) (71 - 93)  BP: 145/77 (13 Aug 2021 21:00) (97/83 - 145/77)  BP(mean): 92 (13 Aug 2021 21:00) (72 - 95)  ABP: --  ABP(mean): --  RR: 21 (13 Aug 2021 21:00) (21 - 37)  SpO2: 88% (13 Aug 2021 21:00) (80% - 98%)    I&O's Detail    12 Aug 2021 07:01  -  13 Aug 2021 07:00  --------------------------------------------------------  IN:  Total IN: 0 mL    OUT:    Voided (mL): 1100 mL  Total OUT: 1100 mL    Total NET: -1100 mL      13 Aug 2021 07:01  -  13 Aug 2021 21:41  --------------------------------------------------------  IN:  Total IN: 0 mL    OUT:    Voided (mL): 1150 mL  Total OUT: 1150 mL    Total NET: -1150 ml    PE:    Adult M sitting in chair  No JVD trachea midline + HFNC 50/100%, SpO2 80's +/- NRB  S1S2+  Diminished BS B/L  Abd soft NTND  No leg swelling/edema noted  Awake and alert  Skin pink and well perfused    LABS:    CBC Full  -  ( 13 Aug 2021 06:34 )  WBC Count : 16.53 K/uL  RBC Count : 3.01 M/uL  Hemoglobin : 8.4 g/dL  Hematocrit : 26.0 %  Platelet Count - Automated : 164 K/uL  Mean Cell Volume : 86.4 fl  Mean Cell Hemoglobin : 27.9 pg  Mean Cell Hemoglobin Concentration : 32.3 gm/dL  Auto Neutrophil # : x  Auto Lymphocyte # : x  Auto Monocyte # : x  Auto Eosinophil # : x  Auto Basophil # : x  Auto Neutrophil % : x  Auto Lymphocyte % : x  Auto Monocyte % : x  Auto Eosinophil % : x  Auto Basophil % : x    08-13    135  |  102  |  26<H>  ----------------------------<  88  3.9   |  28  |  0.83    Ca    8.9      13 Aug 2021 06:34  Phos  3.8     08-13  Mg     2.1     08-13    CAPILLARY BLOOD GLUCOSE    POCT Blood Glucose.: 160 mg/dL (13 Aug 2021 15:15)  POCT Blood Glucose.: 121 mg/dL (13 Aug 2021 08:05)  POCT Blood Glucose.: 187 mg/dL (12 Aug 2021 23:02)

## 2021-09-14 NOTE — PROGRESS NOTE ADULT - SUBJECTIVE AND OBJECTIVE BOX
Events Overnight:  Patient remains on High flow    HPI:      66 y/o male with HLD, HTN and DM--Non Vacc--presents with 8 days onset of covid symptoms which included, cough, fevers, sob, hypoxia, fevers, diarrhea, chills and myalgias. Tested positive 7/15.  Rx with Rem/Dexa and then high dose steroids and full AC.  Pt tx to ICU on 7/24 for worsening hypoxia.     was on high flow as high as 60liters, 90%, on 55% now, cxr still with bilateral infiltrates    9/13:  Pt seen and examined in ICU.  HFNC O/N with occasional desaturation   afebrile    ROS - dyspneic with exertoin, no chest pain, no calf pain, some  upset stomach            no fevers, no chills     MEDICATIONS  (STANDING):  aspirin  chewable 81 milliGRAM(s) Oral daily  atorvastatin 80 milliGRAM(s) Oral at bedtime  budesonide 160 MICROgram(s)/formoterol 4.5 MICROgram(s) Inhaler 2 Puff(s) Inhalation two times a day  cholecalciferol 2000 Unit(s) Oral daily  dextrose 40% Gel 15 Gram(s) Oral once  dextrose 5%. 1000 milliLiter(s) (50 mL/Hr) IV Continuous <Continuous>  dextrose 5%. 1000 milliLiter(s) (100 mL/Hr) IV Continuous <Continuous>  dextrose 50% Injectable 25 Gram(s) IV Push once  dextrose 50% Injectable 12.5 Gram(s) IV Push once  dextrose 50% Injectable 25 Gram(s) IV Push once  enoxaparin Injectable 40 milliGRAM(s) SubCutaneous every 12 hours  glucagon  Injectable 1 milliGRAM(s) IntraMuscular once  insulin glargine Injectable (LANTUS) 12 Unit(s) SubCutaneous <User Schedule>  insulin lispro (ADMELOG) corrective regimen sliding scale   SubCutaneous three times a day before meals  insulin lispro (ADMELOG) corrective regimen sliding scale   SubCutaneous at bedtime  losartan 75 milliGRAM(s) Oral daily  metoprolol tartrate 25 milliGRAM(s) Oral two times a day  piperacillin/tazobactam IVPB.. 3.375 Gram(s) IV Intermittent every 8 hours    MEDICATIONS  (PRN):  acetaminophen   Tablet .. 650 milliGRAM(s) Oral every 4 hours PRN Temp greater or equal to 38C (100.4F), Mild Pain (1 - 3)  ALBUTerol    90 MICROgram(s) HFA Inhaler 2 Puff(s) Inhalation every 4 hours PRN Shortness of Breath and/or Wheezing  ALPRAZolam 0.5 milliGRAM(s) Oral at bedtime PRN anxiety  benzonatate 100 milliGRAM(s) Oral every 8 hours PRN Cough  guaiFENesin Oral Liquid (Sugar-Free) 100 milliGRAM(s) Oral every 6 hours PRN Cough  hydrALAZINE Injectable 5 milliGRAM(s) IV Push every 6 hours PRN SBP>160  melatonin 5 milliGRAM(s) Oral at bedtime PRN Sleep  polyethylene glycol 3350 17 Gram(s) Oral daily PRN Constipation    ICU Vital Signs Last 24 Hrs  T(C): 37 (14 Sep 2021 12:00), Max: 37.5 (13 Sep 2021 16:00)  T(F): 98.6 (14 Sep 2021 12:00), Max: 99.5 (13 Sep 2021 16:00)  HR: 85 (14 Sep 2021 12:00) (85 - 105)  BP: 124/86 (14 Sep 2021 12:00) (112/71 - 143/80)  BP(mean): 93 (14 Sep 2021 12:00) (75 - 103)  ABP: --  ABP(mean): --  RR: 24 (14 Sep 2021 12:00) (12 - 39)  SpO2: 95% (14 Sep 2021 12:00) (82% - 98%)    Physical Exam    General - comfortable  HEENT - nc/at  neck supple  lungs - scattered rhonchi  cv rrr  abdomen soft non tender  ext warm      I&O's Summary    13 Sep 2021 07:01  -  14 Sep 2021 07:00  --------------------------------------------------------  IN: 650 mL / OUT: 1700 mL / NET: -1050 mL    14 Sep 2021 07:01  -  14 Sep 2021 13:11  --------------------------------------------------------  IN: 0 mL / OUT: 500 mL / NET: -500 mL                          7.8    17.60 )-----------( 393      ( 14 Sep 2021 06:44 )             24.6       09-14    137  |  101  |  4<L>  ----------------------------<  92  3.6   |  28  |  0.79    Ca    9.2      14 Sep 2021 06:44  Phos  3.4     09-13  Mg     1.5     09-13    DVT Prophylaxis:    Lovenox                                                             Advanced Directives: Full Code

## 2021-09-14 NOTE — PROGRESS NOTE ADULT - SUBJECTIVE AND OBJECTIVE BOX
Patient is a 55y old  Male who presents with a chief complaint of cough/sob (23 Jul 2021 14:03)      HPI:  55 M with pmh HLD, dm, htn presents with 8 days onset of covid symptoms which included, cough, fevers, sob, hypoxia, fevers, diarrhea, chills and myalgias. TEsted positive 7/15. Wife endorsed he was becoming more sob of recently. On arrival hypoxic to 80s and tachypneic. NRB placed, presently on 15% and saturating 95%. Na 126 s/p 1L NS. CXR: Frandy infiltrates. Last scr 1.4 in 2019-> today 1.9 unknown if underlying ckd.  Patient not vaccinated.   Last seen by me in 2019  History of ROBERT and uncontrolled hypertension  MEDICATIONS  (STANDING):  aspirin  chewable 81 milliGRAM(s) Oral daily  atorvastatin 80 milliGRAM(s) Oral at bedtime  budesonide 160 MICROgram(s)/formoterol 4.5 MICROgram(s) Inhaler 2 Puff(s) Inhalation two times a day  cholecalciferol 2000 Unit(s) Oral daily  dextrose 40% Gel 15 Gram(s) Oral once  dextrose 5%. 1000 milliLiter(s) (50 mL/Hr) IV Continuous <Continuous>  dextrose 5%. 1000 milliLiter(s) (100 mL/Hr) IV Continuous <Continuous>  dextrose 50% Injectable 25 Gram(s) IV Push once  dextrose 50% Injectable 12.5 Gram(s) IV Push once  dextrose 50% Injectable 25 Gram(s) IV Push once  enoxaparin Injectable 40 milliGRAM(s) SubCutaneous every 12 hours  glucagon  Injectable 1 milliGRAM(s) IntraMuscular once  insulin glargine Injectable (LANTUS) 12 Unit(s) SubCutaneous <User Schedule>  insulin lispro (ADMELOG) corrective regimen sliding scale   SubCutaneous three times a day before meals  insulin lispro (ADMELOG) corrective regimen sliding scale   SubCutaneous at bedtime  losartan 75 milliGRAM(s) Oral daily  metoprolol tartrate 25 milliGRAM(s) Oral two times a day  piperacillin/tazobactam IVPB.. 3.375 Gram(s) IV Intermittent every 8 hours  predniSONE   Tablet 2.5 milliGRAM(s) Oral daily  senna 1 Tablet(s) Oral at bedtime    MEDICATIONS  (PRN):  acetaminophen   Tablet .. 650 milliGRAM(s) Oral every 4 hours PRN Temp greater or equal to 38C (100.4F), Mild Pain (1 - 3)  ALBUTerol    90 MICROgram(s) HFA Inhaler 2 Puff(s) Inhalation every 4 hours PRN Shortness of Breath and/or Wheezing  ALPRAZolam 0.5 milliGRAM(s) Oral at bedtime PRN anxiety  benzonatate 100 milliGRAM(s) Oral every 8 hours PRN Cough  guaiFENesin Oral Liquid (Sugar-Free) 100 milliGRAM(s) Oral every 6 hours PRN Cough  hydrALAZINE Injectable 5 milliGRAM(s) IV Push every 6 hours PRN SBP>160  melatonin 5 milliGRAM(s) Oral at bedtime PRN Sleep  polyethylene glycol 3350 17 Gram(s) Oral daily PRN Constipation  Treated with IV Remdesivir and Decadron, now on Toci  SaO2 is 80-85% despite being on 100% NRB  ABG pending        9/14  No acute pulmonary events occurred overnight  Using HFNC  40LPM, 55-60%FiO2, did not require NIPPV Overnight  SaO2 is now 88-95%        Vital Signs Last 24 Hrs  T(C): 37.1 (14 Sep 2021 08:00), Max: 37.5 (13 Sep 2021 16:00)  T(F): 98.8 (14 Sep 2021 08:00), Max: 99.5 (13 Sep 2021 16:00)  HR: 105 (14 Sep 2021 08:00) (85 - 105)  BP: 117/74 (14 Sep 2021 08:00) (112/71 - 150/64)  BP(mean): 84 (14 Sep 2021 08:00) (75 - 103)  RR: 25 (14 Sep 2021 07:00) (12 - 39)  SpO2: 91% (14 Sep 2021 08:00) (82% - 98%)    PHYSICAL EXAM  General Appearance: On HFNC  Lungs: coarse bilaterally  Heart: +S1,S2  Abdomen: Soft, non-tender, bowel sounds active   Extremities: no cyanosis or edema, no joint swelling  Skin: Skin color, texture normal, no rashes   Neurologic: Alert and oriented X3 , non focal, not disoriented                          7.8    20.08 )-----------( 338      ( 12 Sep 2021 05:49 )             24.7   09-12    136  |  101  |  9   ----------------------------<  82  3.4<L>   |  29  |  0.74    Ca    8.9      12 Sep 2021 05:49  Phos  3.1     09-12  Mg     1.7     09-12      Pro BNP 2446  repeat              08-26    136  |  102  |  18  ----------------------------<  87  3.7   |  34<H>  |  0.56    Ca    8.3<L>      26 Aug 2021 07:03  Phos  3.6     08-26  Mg     2.0     08-26    TPro  5.5<L>  /  Alb  2.1<L>  /  TBili  0.5  /  DBili  x   /  AST  23  /  ALT  51  /  AlkPhos  79  08-26                          8.0    11.96 )-----------( 278      ( 26 Aug 2021 07:03 )             26.1   08-26    136  |  102  |  18  ----------------------------<  87  3.7   |  34<H>  |  0.56    Ca    8.3<L>      26 Aug 2021 07:03  Phos  3.6     08-26  Mg     2.0     08-26    TPro  5.5<L>  /  Alb  2.1<L>  /  TBili  0.5  /  DBili  x   /  AST  23  /  ALT  51  /  AlkPhos  79  08-26                          8.5    12.89 )-----------( 269      ( 25 Aug 2021 07:10 )             26.7   08-25    138  |  103  |  20  ----------------------------<  143<H>  3.7   |  32<H>  |  0.58    Ca    8.1<L>      25 Aug 2021 07:10  Phos  3.7     08-25  Mg     2.0     08-25    TPro  5.6<L>  /  Alb  2.2<L>  /  TBili  0.5  /  DBili  x   /  AST  32  /  ALT  74  /  AlkPhos  103  08-24                          8.5    14.05 )-----------( 292      ( 18 Aug 2021 07:01 )             26.2   08-18    138  |  104  |  24<H>  ----------------------------<  78  4.1   |  29  |  1.03    Ca    8.8      18 Aug 2021 07:01    TPro  6.3  /  Alb  2.3<L>  /  TBili  0.6  /  DBili  x   /  AST  32  /  ALT  73  /  AlkPhos  168<H>  08-17                                           10.0   19.05 )-----------( 179      ( 08 Aug 2021 07:11 )             30.3   08-08    137  |  102  |  27<H>  ----------------------------<  121<H>  3.9   |  29  |  0.77    Ca    8.4<L>      08 Aug 2021 07:11  Phos  3.4     08-08  Mg     2.0     08-08           TPro  5.2<L>  /  Alb  2.3<L>  /  TBili  0.8  /  DBili  x   /  AST  81<H>  /  ALT  213<H>  /  AlkPhos  200<H>  08-06                            12.9   15.00 )-----------( 366      ( 26 Jul 2021 06:20 )             38.4   07-26          < from: Xray Chest 1 View- PORTABLE-Urgent (07.21.21 @ 15:33) >    PROCEDURE DATE:  07/21/2021          INTERPRETATION:  AP chest on July 21, 2021 at 3:27 PM. Patient is short of breath with cough and fever.    Heart magnified by technique.    There arescattered mid lower lung field infiltrates right greater than left possibly related: Pneumonia.    The lungs are clear on August 30, 2019.    IMPRESSION: Bilateral infiltrates as above.    < end of copied text >  < from: US Duplex Venous Lower Ext Complete, Bilateral (07.25.21 @ 08:42) >  COMPARISON: None available.    TECHNIQUE: Duplex sonography of the BILATERAL LOWER extremity veins with color and spectral Doppler, with and without compression.    FINDINGS:    RIGHT:  Normal compressibility of the RIGHT common femoral, femoral and popliteal veins.  Doppler examination shows normal spontaneous and phasic flow.  No RIGHT calf vein thrombosis is detected.    LEFT:  Normal compressibility of the LEFT common femoral, femoral and popliteal veins.  Doppler examination shows normal spontaneous and phasic flow.  No LEFT calf vein thrombosis is detected.    IMPRESSION:  No evidence of deep venous thrombosis in either lower extremity.    < end of copied text >  RADIOLOGY & ADDITIONAL STUDIES:    < from: Xray Chest 1 View- PORTABLE-Urgent (Xray Chest 1 View- PORTABLE-Urgent .) (07.26.21 @ 08:43) >    PROCEDURE DATE:  07/26/2021          INTERPRETATION:  Portable chest radiograph    CLINICAL INFORMATION: Pneumonia due to Covid 19.. Follow-up    TECHNIQUE:  Portable  AP view of the chest was obtained.    COMPARISON: 7/21/2021 chest available for review.    FINDINGS:    The lungs show stable bilateral  multifocal and diffuse ill-defined airspace opacities.. No pneumothorax.    The  heart is enlarged in transverse diameter. No hilar mass.     Visualized osseous structures are intact.    IMPRESSION:   Stable bilateral  multifocal and diffuse ill-defined airspace opacities..    < end of copied text >  < from: US Duplex Venous Lower Ext Complete, Bilateral (08.06.21 @ 17:16) >  FINDINGS:    RIGHT:  Normal compressibility of the RIGHT common femoral, femoral and popliteal veins.  Doppler examination shows normal spontaneous and phasic flow.  No RIGHT calf vein thrombosis is detected.    LEFT:  Normal compressibility of the LEFT common femoral, femoral and popliteal veins.  Doppler examination shows normal spontaneous and phasic flow.  No LEFT calf vein thrombosisis detected.    IMPRESSION:  No evidence of deep venous thrombosis in either lower extremity.    < end of copied text >  < from: Xray Chest 1 View- PORTABLE-Urgent (Xray Chest 1 View- PORTABLE-Urgent .) (08.06.21 @ 16:45) >  INTERPRETATION:  Portable chest radiograph    CLINICAL INFORMATION: Pneumonia due to Covid 19.    TECHNIQUE:  Portable  AP view of the chest was obtained.    COMPARISON: 8/1/2021 chest radiograph available for review.    FINDINGS:    The lungs show decreasing bilateral diffuse airspace disease.. No pneumothorax.    The  heart is mildly enlarged in transverse diameter. No hilar mass.   Visualized osseous structures are intact.    IMPRESSION:   Decreasing bilateral diffuse airspace disease..    < end of copied text >  xr< from: US Duplex Venous Lower Ext Complete, Bilateral (08.17.21 @ 08:58) >  COMPARISON: None available.    TECHNIQUE: Duplex sonography of the BILATERAL LOWER extremity veins with color and spectral Doppler, with and without compression.    FINDINGS:    RIGHT:  Normal compressibility of the RIGHT common femoral, femoral and popliteal veins.  Doppler examination shows normal spontaneous and phasic flow.  No RIGHT calf vein thrombosis is detected.    LEFT:  Normal compressibility of the LEFT common femoral, femoral and popliteal veins.  Doppler examination shows normal spontaneous and phasic flow.  No LEFT calf vein thrombosis is detected.    IMPRESSION:  No evidence of deep venous thrombosis in either lower extremity.    < end of copied text >  r< from: US Duplex Venous Lower Ext Complete, Bilateral (08.23.21 @ 12:53) >  PROCEDURE DATE:  08/23/2021          INTERPRETATION:  CLINICAL INFORMATION: 55 years  Male with r/o DVT    evaluate for DVT. Covid positive. Elevated d-dimer.    COMPARISON: None available.    TECHNIQUE: Duplex sonography of the BILATERAL LOWER extremity veins with color and spectral Doppler, with and without compression.    FINDINGS:    RIGHT:  Normal compressibility of the RIGHT common femoral, femoral and popliteal veins.  Doppler examination shows normal spontaneous and phasic flow.  No RIGHT calf vein thrombosis is detected.    LEFT:  Normal compressibility of the LEFT common femoral, femoral and popliteal veins.  Doppler examination shows normal spontaneous and phasic flow.  No LEFT calf vein thrombosis is detected.    IMPRESSION:  No evidence of deep venous thrombosis in either lower extremity.    < end of copied text >  < from: Xray Chest 1 View- PORTABLE-Urgent (Xray Chest 1 View- PORTABLE-Urgent .) (08.23.21 @ 11:06) >    PROCEDURE DATE:  08/23/2021          INTERPRETATION:  History: Dyspnea, Covid    Chest:  one view.    Comparison: 08/20/2021    AP radiograph of the chest demonstrates moderate diffuse alveolar infiltrates unchanged. The cardiac silhouette is normal in size. Osseous structures are intact.    Impression:moderate diffuse alveolar infiltrates unchanged.    < end of copied text >  r< from: Xray Chest 1 View- PORTABLE-Urgent (Xray Chest 1 View- PORTABLE-Urgent .) (08.23.21 @ 11:06) >  PROCEDURE DATE:  08/23/2021          INTERPRETATION:  History: Dyspnea, Covid    Chest:  one view.    Comparison: 08/20/2021    AP radiograph of the chest demonstrates moderate diffuse alveolar infiltrates unchanged. The cardiac silhouette is normal in size. Osseous structures are intact.    Impression:moderate diffuse alveolar infiltrates unchanged.    < end of copied text >

## 2021-09-14 NOTE — PROGRESS NOTE ADULT - ASSESSMENT
1) COVID Pneumonia  2) Abnormal CXR  3) Dyspnea  4) Hypoxemic Respiratory Failure     56 M noted to have COVID 7/15  On arrival hypoxic to 80s and tachypneic. NRB placed,in the ER saturating 95%. Na 126 s/p 1L NS. CXR: Frandy infiltrates. Last scr 1.4 in 2019-> today 1.9 unknown if underlying ckd.  Treated with IV Remdesivir and Decadron, Tocilizumab   Given the obesity/hypertension and prior co-morbidities, he is at risk of intubation but continue HFNC, respiratory status remains critical  DDimer elevated, was on therapeutic lovenox, transitioned to 40mg q 12  Completed Solumedrol, Symbicort 160/4.5 BID (https://www.[email or web address]/journals/lanres/article/JSYS4412-9478, STOIC study).Using HFNC  40LPM, 55-60%FiO2, did not require NIPPV Overnight  SaO2 is now 88-95%  Respiratory status is tenuous, work of breathing is improving. Continue titrating down HFNC  ICU level care appreciated  Hgb improving  Repeat ABG 7.36/60/91 reviewed  Discussed with nursing staff  Will continue to monitor

## 2021-09-14 NOTE — PROGRESS NOTE ADULT - ASSESSMENT
A:    55M  HD # 55    Here for:    1. Acute hypoxic resp failure 2/2  2. COVID-19 PNA complicated by  3. ARDS  4. Hyperglycemia  5. DESTINEY  6. CKD  7. PNA  8. DM  9. HTN  10. HypoMg++  11. HLD  12. Severe protein malnutrition  13. Anemia    This patient requires critical care for support of one or more vital organ systems with a high probability of imminent or life threatening deterioration in his/her condition    P:    PRN analgesia/anxiolysis. Avoid deleriogenic meds. Melatonin for sleep hygiene   HD monitoring. Lopressor 25mg PO BID, cozaar 75mg PO qd, PRN hydralazine.  HFNC + PRN NRB. Goal maintain SpO2 > 90% and alleviate WOB.  Disease modifying therapy: s/p actemra 7/23, s/p RDV, s/p steroids.  s/p broad spectrum abx empiric dosing for PNA cefepime 1g IV TID + vancomycin 1250mg q12h, now off. Monitor off abx.  ID involved.   VTE ppx lovenox BID, PUD ppx pepcid.  Encourage PO intake + glucerna.  OOB as able.  PICC d/c'd.  Anti tussives.  f/u AM labs, replete lytes PRN.  Lipitor, ASA.  Goal BG < 180, BG control improved. Lantus 12u qd + ISS.  DESTINEY on CKD, improved.    Dispo: Cont critical care. Remains high risk for further decompensation requiring intubation    TOTAL CRITICAL CARE TIME: 36 minutes EXCLUSIVE of any non bundled procedures)    Note: This time spent INCLUDES time spent directly as this patient's bedside with evaluation, review of chart including review of laboratory and imaging studies, interpretation of vital signs and cardiac output measurements, any necessary ventilator management, and time spent discussing plan of care with patient and family, including goals of care discussion.

## 2021-09-15 PROCEDURE — 99291 CRITICAL CARE FIRST HOUR: CPT

## 2021-09-15 RX ADMIN — Medication 2000 UNIT(S): at 09:08

## 2021-09-15 RX ADMIN — Medication 81 MILLIGRAM(S): at 09:08

## 2021-09-15 RX ADMIN — BUDESONIDE AND FORMOTEROL FUMARATE DIHYDRATE 2 PUFF(S): 160; 4.5 AEROSOL RESPIRATORY (INHALATION) at 20:48

## 2021-09-15 RX ADMIN — ENOXAPARIN SODIUM 40 MILLIGRAM(S): 100 INJECTION SUBCUTANEOUS at 09:08

## 2021-09-15 RX ADMIN — LOSARTAN POTASSIUM 75 MILLIGRAM(S): 100 TABLET, FILM COATED ORAL at 09:08

## 2021-09-15 RX ADMIN — Medication 100 MILLIGRAM(S): at 22:55

## 2021-09-15 RX ADMIN — Medication 25 MILLIGRAM(S): at 22:55

## 2021-09-15 RX ADMIN — Medication 5 MILLIGRAM(S): at 22:54

## 2021-09-15 RX ADMIN — ATORVASTATIN CALCIUM 80 MILLIGRAM(S): 80 TABLET, FILM COATED ORAL at 22:55

## 2021-09-15 RX ADMIN — ENOXAPARIN SODIUM 40 MILLIGRAM(S): 100 INJECTION SUBCUTANEOUS at 22:55

## 2021-09-15 RX ADMIN — Medication 25 MILLIGRAM(S): at 09:08

## 2021-09-15 RX ADMIN — INSULIN GLARGINE 12 UNIT(S): 100 INJECTION, SOLUTION SUBCUTANEOUS at 09:08

## 2021-09-15 NOTE — PROGRESS NOTE ADULT - ASSESSMENT
1) COVID Pneumonia  2) Abnormal CXR  3) Dyspnea  4) Hypoxemic Respiratory Failure     56 M noted to have COVID 7/15  On arrival hypoxic to 80s and tachypneic. NRB placed,in the ER saturating 95%. Na 126 s/p 1L NS. CXR: Frandy infiltrates. Last scr 1.4 in 2019-> today 1.9 unknown if underlying ckd.  Treated with IV Remdesivir and Decadron, Tocilizumab   Given the obesity/hypertension and prior co-morbidities, he is at risk of intubation but continue HFNC, respiratory status remains critical  DDimer elevated, was on therapeutic lovenox, transitioned to 40mg q 12  Completed Solumedrol, Symbicort 160/4.5 BID (https://www.[email or web address]/journals/lanres/article/QBJW3468-3949, STOIC study).Using HFNC  40LPM, 55-60%FiO2, did not require NIPPV Overnight  SaO2 is now 88-95%  Respiratory status is tenuous, work of breathing is improving. Continue titrating down HFNC  ICU level care appreciated  Hgb improving  Repeat ABG 7.36/60/91 reviewed  Discussed with nursing staff  Will continue to monitor

## 2021-09-15 NOTE — PROGRESS NOTE ADULT - SUBJECTIVE AND OBJECTIVE BOX
Patient is a 55y old  Male who presents with a chief complaint of cough/sob (23 Jul 2021 14:03)      HPI:  55 M with pmh HLD, dm, htn presents with 8 days onset of covid symptoms which included, cough, fevers, sob, hypoxia, fevers, diarrhea, chills and myalgias. TEsted positive 7/15. Wife endorsed he was becoming more sob of recently. On arrival hypoxic to 80s and tachypneic. NRB placed, presently on 15% and saturating 95%. Na 126 s/p 1L NS. CXR: Frandy infiltrates. Last scr 1.4 in 2019-> today 1.9 unknown if underlying ckd.  Patient not vaccinated.   Last seen by me in 2019  History of ROBERT and uncontrolled hypertension  MEDICATIONS  (STANDING):  aspirin  chewable 81 milliGRAM(s) Oral daily  atorvastatin 80 milliGRAM(s) Oral at bedtime  budesonide 160 MICROgram(s)/formoterol 4.5 MICROgram(s) Inhaler 2 Puff(s) Inhalation two times a day  cholecalciferol 2000 Unit(s) Oral daily  dextrose 40% Gel 15 Gram(s) Oral once  dextrose 5%. 1000 milliLiter(s) (50 mL/Hr) IV Continuous <Continuous>  dextrose 5%. 1000 milliLiter(s) (100 mL/Hr) IV Continuous <Continuous>  dextrose 50% Injectable 25 Gram(s) IV Push once  dextrose 50% Injectable 12.5 Gram(s) IV Push once  dextrose 50% Injectable 25 Gram(s) IV Push once  enoxaparin Injectable 40 milliGRAM(s) SubCutaneous every 12 hours  glucagon  Injectable 1 milliGRAM(s) IntraMuscular once  insulin glargine Injectable (LANTUS) 12 Unit(s) SubCutaneous <User Schedule>  insulin lispro (ADMELOG) corrective regimen sliding scale   SubCutaneous three times a day before meals  insulin lispro (ADMELOG) corrective regimen sliding scale   SubCutaneous at bedtime  losartan 75 milliGRAM(s) Oral daily  metoprolol tartrate 25 milliGRAM(s) Oral two times a day  piperacillin/tazobactam IVPB.. 3.375 Gram(s) IV Intermittent every 8 hours  predniSONE   Tablet 2.5 milliGRAM(s) Oral daily  senna 1 Tablet(s) Oral at bedtime    MEDICATIONS  (PRN):  acetaminophen   Tablet .. 650 milliGRAM(s) Oral every 4 hours PRN Temp greater or equal to 38C (100.4F), Mild Pain (1 - 3)  ALBUTerol    90 MICROgram(s) HFA Inhaler 2 Puff(s) Inhalation every 4 hours PRN Shortness of Breath and/or Wheezing  ALPRAZolam 0.5 milliGRAM(s) Oral at bedtime PRN anxiety  benzonatate 100 milliGRAM(s) Oral every 8 hours PRN Cough  guaiFENesin Oral Liquid (Sugar-Free) 100 milliGRAM(s) Oral every 6 hours PRN Cough  hydrALAZINE Injectable 5 milliGRAM(s) IV Push every 6 hours PRN SBP>160  melatonin 5 milliGRAM(s) Oral at bedtime PRN Sleep  polyethylene glycol 3350 17 Gram(s) Oral daily PRN Constipation  Treated with IV Remdesivir and Decadron, now on Toci  SaO2 is 80-85% despite being on 100% NRB  ABG pending        9/15  No acute pulmonary events occurred overnight  Using HFNC  40LPM, 55-60%FiO2, did not require NIPPV Overnight  SaO2 is now 88-95%        Vital Signs Last 24 Hrs  T(C): 37.1 (14 Sep 2021 08:00), Max: 37.5 (13 Sep 2021 16:00)  T(F): 98.8 (14 Sep 2021 08:00), Max: 99.5 (13 Sep 2021 16:00)  HR: 105 (14 Sep 2021 08:00) (85 - 105)  BP: 117/74 (14 Sep 2021 08:00) (112/71 - 150/64)  BP(mean): 84 (14 Sep 2021 08:00) (75 - 103)  RR: 25 (14 Sep 2021 07:00) (12 - 39)  SpO2: 91% (14 Sep 2021 08:00) (82% - 98%)    PHYSICAL EXAM  General Appearance: On HFNC  Lungs: coarse bilaterally  Heart: +S1,S2  Abdomen: Soft, non-tender, bowel sounds active   Extremities: no cyanosis or edema, no joint swelling  Skin: Skin color, texture normal, no rashes   Neurologic: Alert and oriented X3 , non focal, not disoriented                          7.8    20.08 )-----------( 338      ( 12 Sep 2021 05:49 )             24.7   09-12    136  |  101  |  9   ----------------------------<  82  3.4<L>   |  29  |  0.74    Ca    8.9      12 Sep 2021 05:49  Phos  3.1     09-12  Mg     1.7     09-12      Pro BNP 2446  repeat              08-26    136  |  102  |  18  ----------------------------<  87  3.7   |  34<H>  |  0.56    Ca    8.3<L>      26 Aug 2021 07:03  Phos  3.6     08-26  Mg     2.0     08-26    TPro  5.5<L>  /  Alb  2.1<L>  /  TBili  0.5  /  DBili  x   /  AST  23  /  ALT  51  /  AlkPhos  79  08-26                          8.0    11.96 )-----------( 278      ( 26 Aug 2021 07:03 )             26.1   08-26    136  |  102  |  18  ----------------------------<  87  3.7   |  34<H>  |  0.56    Ca    8.3<L>      26 Aug 2021 07:03  Phos  3.6     08-26  Mg     2.0     08-26    TPro  5.5<L>  /  Alb  2.1<L>  /  TBili  0.5  /  DBili  x   /  AST  23  /  ALT  51  /  AlkPhos  79  08-26                          8.5    12.89 )-----------( 269      ( 25 Aug 2021 07:10 )             26.7   08-25    138  |  103  |  20  ----------------------------<  143<H>  3.7   |  32<H>  |  0.58    Ca    8.1<L>      25 Aug 2021 07:10  Phos  3.7     08-25  Mg     2.0     08-25    TPro  5.6<L>  /  Alb  2.2<L>  /  TBili  0.5  /  DBili  x   /  AST  32  /  ALT  74  /  AlkPhos  103  08-24                          8.5    14.05 )-----------( 292      ( 18 Aug 2021 07:01 )             26.2   08-18    138  |  104  |  24<H>  ----------------------------<  78  4.1   |  29  |  1.03    Ca    8.8      18 Aug 2021 07:01    TPro  6.3  /  Alb  2.3<L>  /  TBili  0.6  /  DBili  x   /  AST  32  /  ALT  73  /  AlkPhos  168<H>  08-17                                           10.0   19.05 )-----------( 179      ( 08 Aug 2021 07:11 )             30.3   08-08    137  |  102  |  27<H>  ----------------------------<  121<H>  3.9   |  29  |  0.77    Ca    8.4<L>      08 Aug 2021 07:11  Phos  3.4     08-08  Mg     2.0     08-08           TPro  5.2<L>  /  Alb  2.3<L>  /  TBili  0.8  /  DBili  x   /  AST  81<H>  /  ALT  213<H>  /  AlkPhos  200<H>  08-06                            12.9   15.00 )-----------( 366      ( 26 Jul 2021 06:20 )             38.4   07-26          < from: Xray Chest 1 View- PORTABLE-Urgent (07.21.21 @ 15:33) >    PROCEDURE DATE:  07/21/2021          INTERPRETATION:  AP chest on July 21, 2021 at 3:27 PM. Patient is short of breath with cough and fever.    Heart magnified by technique.    There arescattered mid lower lung field infiltrates right greater than left possibly related: Pneumonia.    The lungs are clear on August 30, 2019.    IMPRESSION: Bilateral infiltrates as above.    < end of copied text >  < from: US Duplex Venous Lower Ext Complete, Bilateral (07.25.21 @ 08:42) >  COMPARISON: None available.    TECHNIQUE: Duplex sonography of the BILATERAL LOWER extremity veins with color and spectral Doppler, with and without compression.    FINDINGS:    RIGHT:  Normal compressibility of the RIGHT common femoral, femoral and popliteal veins.  Doppler examination shows normal spontaneous and phasic flow.  No RIGHT calf vein thrombosis is detected.    LEFT:  Normal compressibility of the LEFT common femoral, femoral and popliteal veins.  Doppler examination shows normal spontaneous and phasic flow.  No LEFT calf vein thrombosis is detected.    IMPRESSION:  No evidence of deep venous thrombosis in either lower extremity.    < end of copied text >  RADIOLOGY & ADDITIONAL STUDIES:    < from: Xray Chest 1 View- PORTABLE-Urgent (Xray Chest 1 View- PORTABLE-Urgent .) (07.26.21 @ 08:43) >    PROCEDURE DATE:  07/26/2021          INTERPRETATION:  Portable chest radiograph    CLINICAL INFORMATION: Pneumonia due to Covid 19.. Follow-up    TECHNIQUE:  Portable  AP view of the chest was obtained.    COMPARISON: 7/21/2021 chest available for review.    FINDINGS:    The lungs show stable bilateral  multifocal and diffuse ill-defined airspace opacities.. No pneumothorax.    The  heart is enlarged in transverse diameter. No hilar mass.     Visualized osseous structures are intact.    IMPRESSION:   Stable bilateral  multifocal and diffuse ill-defined airspace opacities..    < end of copied text >  < from: US Duplex Venous Lower Ext Complete, Bilateral (08.06.21 @ 17:16) >  FINDINGS:    RIGHT:  Normal compressibility of the RIGHT common femoral, femoral and popliteal veins.  Doppler examination shows normal spontaneous and phasic flow.  No RIGHT calf vein thrombosis is detected.    LEFT:  Normal compressibility of the LEFT common femoral, femoral and popliteal veins.  Doppler examination shows normal spontaneous and phasic flow.  No LEFT calf vein thrombosisis detected.    IMPRESSION:  No evidence of deep venous thrombosis in either lower extremity.    < end of copied text >  < from: Xray Chest 1 View- PORTABLE-Urgent (Xray Chest 1 View- PORTABLE-Urgent .) (08.06.21 @ 16:45) >  INTERPRETATION:  Portable chest radiograph    CLINICAL INFORMATION: Pneumonia due to Covid 19.    TECHNIQUE:  Portable  AP view of the chest was obtained.    COMPARISON: 8/1/2021 chest radiograph available for review.    FINDINGS:    The lungs show decreasing bilateral diffuse airspace disease.. No pneumothorax.    The  heart is mildly enlarged in transverse diameter. No hilar mass.   Visualized osseous structures are intact.    IMPRESSION:   Decreasing bilateral diffuse airspace disease..    < end of copied text >  xr< from: US Duplex Venous Lower Ext Complete, Bilateral (08.17.21 @ 08:58) >  COMPARISON: None available.    TECHNIQUE: Duplex sonography of the BILATERAL LOWER extremity veins with color and spectral Doppler, with and without compression.    FINDINGS:    RIGHT:  Normal compressibility of the RIGHT common femoral, femoral and popliteal veins.  Doppler examination shows normal spontaneous and phasic flow.  No RIGHT calf vein thrombosis is detected.    LEFT:  Normal compressibility of the LEFT common femoral, femoral and popliteal veins.  Doppler examination shows normal spontaneous and phasic flow.  No LEFT calf vein thrombosis is detected.    IMPRESSION:  No evidence of deep venous thrombosis in either lower extremity.    < end of copied text >  r< from: US Duplex Venous Lower Ext Complete, Bilateral (08.23.21 @ 12:53) >  PROCEDURE DATE:  08/23/2021          INTERPRETATION:  CLINICAL INFORMATION: 55 years  Male with r/o DVT    evaluate for DVT. Covid positive. Elevated d-dimer.    COMPARISON: None available.    TECHNIQUE: Duplex sonography of the BILATERAL LOWER extremity veins with color and spectral Doppler, with and without compression.    FINDINGS:    RIGHT:  Normal compressibility of the RIGHT common femoral, femoral and popliteal veins.  Doppler examination shows normal spontaneous and phasic flow.  No RIGHT calf vein thrombosis is detected.    LEFT:  Normal compressibility of the LEFT common femoral, femoral and popliteal veins.  Doppler examination shows normal spontaneous and phasic flow.  No LEFT calf vein thrombosis is detected.    IMPRESSION:  No evidence of deep venous thrombosis in either lower extremity.    < end of copied text >  < from: Xray Chest 1 View- PORTABLE-Urgent (Xray Chest 1 View- PORTABLE-Urgent .) (08.23.21 @ 11:06) >    PROCEDURE DATE:  08/23/2021          INTERPRETATION:  History: Dyspnea, Covid    Chest:  one view.    Comparison: 08/20/2021    AP radiograph of the chest demonstrates moderate diffuse alveolar infiltrates unchanged. The cardiac silhouette is normal in size. Osseous structures are intact.    Impression:moderate diffuse alveolar infiltrates unchanged.    < end of copied text >  r< from: Xray Chest 1 View- PORTABLE-Urgent (Xray Chest 1 View- PORTABLE-Urgent .) (08.23.21 @ 11:06) >  PROCEDURE DATE:  08/23/2021          INTERPRETATION:  History: Dyspnea, Covid    Chest:  one view.    Comparison: 08/20/2021    AP radiograph of the chest demonstrates moderate diffuse alveolar infiltrates unchanged. The cardiac silhouette is normal in size. Osseous structures are intact.    Impression:moderate diffuse alveolar infiltrates unchanged.    < end of copied text >

## 2021-09-15 NOTE — PROGRESS NOTE ADULT - SUBJECTIVE AND OBJECTIVE BOX
Events Overnight: on less oxygen, still on high flow on 50%, 35 liters    HPI:     64 y/o male with HLD, HTN and DM--Non Vacc--presents with 8 days onset of covid symptoms which included, cough, fevers, sob, hypoxia, fevers, diarrhea, chills and myalgias. Tested positive 7/15.  Rx with Rem/Dexa and then high dose steroids and full AC.  Pt tx to ICU on 7/24 for worsening hypoxia.     was on high flow as high as 60liters, 90%, on 50% now, cxr still with bilateral infiltrates    9/13:  Pt seen and examined in ICU.  HFNC O/N with occasional desaturation   afebrile  9/15 - Covid negative    ROS - dyspneic with exertoin, no chest pain, no calf pain, some  upset stomach            no fevers, no chills       MEDICATIONS  (STANDING):  aspirin  chewable 81 milliGRAM(s) Oral daily  atorvastatin 80 milliGRAM(s) Oral at bedtime  budesonide 160 MICROgram(s)/formoterol 4.5 MICROgram(s) Inhaler 2 Puff(s) Inhalation two times a day  cholecalciferol 2000 Unit(s) Oral daily  dextrose 40% Gel 15 Gram(s) Oral once  dextrose 5%. 1000 milliLiter(s) (50 mL/Hr) IV Continuous <Continuous>  dextrose 5%. 1000 milliLiter(s) (100 mL/Hr) IV Continuous <Continuous>  dextrose 50% Injectable 25 Gram(s) IV Push once  dextrose 50% Injectable 12.5 Gram(s) IV Push once  dextrose 50% Injectable 25 Gram(s) IV Push once  enoxaparin Injectable 40 milliGRAM(s) SubCutaneous every 12 hours  glucagon  Injectable 1 milliGRAM(s) IntraMuscular once  insulin glargine Injectable (LANTUS) 12 Unit(s) SubCutaneous <User Schedule>  insulin lispro (ADMELOG) corrective regimen sliding scale   SubCutaneous three times a day before meals  insulin lispro (ADMELOG) corrective regimen sliding scale   SubCutaneous at bedtime  losartan 75 milliGRAM(s) Oral daily  metoprolol tartrate 25 milliGRAM(s) Oral two times a day    MEDICATIONS  (PRN):  acetaminophen   Tablet .. 650 milliGRAM(s) Oral every 4 hours PRN Temp greater or equal to 38C (100.4F), Mild Pain (1 - 3)  ALBUTerol    90 MICROgram(s) HFA Inhaler 2 Puff(s) Inhalation every 4 hours PRN Shortness of Breath and/or Wheezing  ALPRAZolam 0.5 milliGRAM(s) Oral at bedtime PRN anxiety  benzonatate 100 milliGRAM(s) Oral every 8 hours PRN Cough  guaiFENesin Oral Liquid (Sugar-Free) 100 milliGRAM(s) Oral every 6 hours PRN Cough  hydrALAZINE Injectable 5 milliGRAM(s) IV Push every 6 hours PRN SBP>160  melatonin 5 milliGRAM(s) Oral at bedtime PRN Sleep  polyethylene glycol 3350 17 Gram(s) Oral daily PRN Constipation    ICU Vital Signs Last 24 Hrs  T(C): 36.7 (15 Sep 2021 04:00), Max: 37.1 (14 Sep 2021 16:00)  T(F): 98 (15 Sep 2021 04:00), Max: 98.7 (14 Sep 2021 16:00)  HR: 90 (15 Sep 2021 08:00) (83 - 104)  BP: 151/87 (15 Sep 2021 08:00) (109/86 - 151/87)  BP(mean): 101 (15 Sep 2021 08:00) (80 - 107)  ABP: --  ABP(mean): --  RR: 25 (15 Sep 2021 09:00) (18 - 37)  SpO2: 88% (15 Sep 2021 09:00) (87% - 98%)    Physical Exam    General - comfortable  Neuro - awake, alert  HEENT - nc/at  neck supple  lungs - scattered rhonchi, dyspneic with exertion  cv rrr  abdomen soft non tender  ext - warm    I&O's Summary    14 Sep 2021 07:01  -  15 Sep 2021 07:00  --------------------------------------------------------  IN: 0 mL / OUT: 2200 mL / NET: -2200 mL                          7.8    17.60 )-----------( 393      ( 14 Sep 2021 06:44 )             24.6       09-14    137  |  101  |  4<L>  ----------------------------<  92  3.6   |  28  |  0.79    Ca    9.2      14 Sep 2021 06:44      DVT Prophylaxis:  Lovenox                                                             Advanced Directives: Full Code

## 2021-09-15 NOTE — PROGRESS NOTE ADULT - SUBJECTIVE AND OBJECTIVE BOX
ICU Progress Note    HPI:    S:    Pt seen and examined  HD # 56  FULL CODE  PMHx HLD, HTN, DM   Pt here for OSB, cough for several days  Admitted on 7/21 with COVID-19 pna. tested positive on 7/15 outpatient. unvaccinated. Escalating fio2 requirements and development of ARDS now requiring HFNC and NRB. S/p actemra on 7/23 7/27 PM: Remains on HFNC 50/100% with frequent desaturations.    8/9 PM: Remains on HFNC 50/90% + PRN/qHS with frequent desaturations.   8/10 PM: remains on HFNC. Increasing insulin requirements.  8/13 PM: Remains on HFNC with frequent desaturations.   8/14 PM: Remains on HFNC with desaturations.     9/14 PM: Remains on HFNC. Remains at high risk of further deterioration and intubation/mechanical intubation.   9/15 PM: Remains on HFNC with occasional desaturations. Remains at high risk of further deterioration and intubation/mechanical intubation.     ROS: + SOB  Remainder of systems reviewed, negative    Allergies    No Known Allergies    Intolerances    MEDICATIONS  (STANDING):    aspirin  chewable 81 milliGRAM(s) Oral daily  atorvastatin 80 milliGRAM(s) Oral at bedtime  budesonide 160 MICROgram(s)/formoterol 4.5 MICROgram(s) Inhaler 2 Puff(s) Inhalation two times a day  cholecalciferol 2000 Unit(s) Oral daily  dextrose 40% Gel 15 Gram(s) Oral once  dextrose 5%. 1000 milliLiter(s) (50 mL/Hr) IV Continuous <Continuous>  dextrose 5%. 1000 milliLiter(s) (100 mL/Hr) IV Continuous <Continuous>  dextrose 50% Injectable 25 Gram(s) IV Push once  dextrose 50% Injectable 12.5 Gram(s) IV Push once  dextrose 50% Injectable 25 Gram(s) IV Push once  enoxaparin Injectable 40 milliGRAM(s) SubCutaneous every 12 hours  famotidine    Tablet 20 milliGRAM(s) Oral daily  glucagon  Injectable 1 milliGRAM(s) IntraMuscular once  insulin glargine Injectable (LANTUS) 15 Unit(s) SubCutaneous every morning  insulin lispro (ADMELOG) corrective regimen sliding scale   SubCutaneous three times a day before meals  insulin lispro (ADMELOG) corrective regimen sliding scale   SubCutaneous at bedtime  labetalol 200 milliGRAM(s) Oral every 12 hours  losartan 50 milliGRAM(s) Oral daily  predniSONE   Tablet 20 milliGRAM(s) Oral daily    MEDICATIONS  (PRN):    acetaminophen   Tablet .. 650 milliGRAM(s) Oral every 4 hours PRN Temp greater or equal to 38C (100.4F), Mild Pain (1 - 3)  ALBUTerol    90 MICROgram(s) HFA Inhaler 2 Puff(s) Inhalation every 4 hours PRN Shortness of Breath and/or Wheezing  benzocaine 15 mG/menthol 3.6 mG (Sugar-Free) Lozenge 1 Lozenge Oral every 6 hours PRN Sore Throat  benzonatate 100 milliGRAM(s) Oral three times a day PRN Cough  melatonin 6 milliGRAM(s) Oral at bedtime PRN Insomnia    Drug Dosing Weight    Height (cm): 167.6 (21 Jul 2021 14:34)  Weight (kg): 90.4 (21 Jul 2021 22:00)  BMI (kg/m2): 32.2 (21 Jul 2021 22:00)  BSA (m2): 2 (21 Jul 2021 22:00)    PAST MEDICAL & SURGICAL HISTORY:  HTN (hypertension)    Diabetes    FAMILY HISTORY:    ROS: See HPI; otherwise, all systems reviewed and negative.    O:    ICU Vital Signs Last 24 Hrs  T(C): 37.1 (13 Aug 2021 20:00), Max: 37.1 (13 Aug 2021 20:00)  T(F): 98.8 (13 Aug 2021 20:00), Max: 98.8 (13 Aug 2021 20:00)  HR: 91 (13 Aug 2021 21:00) (71 - 93)  BP: 145/77 (13 Aug 2021 21:00) (97/83 - 145/77)  BP(mean): 92 (13 Aug 2021 21:00) (72 - 95)  ABP: --  ABP(mean): --  RR: 21 (13 Aug 2021 21:00) (21 - 37)  SpO2: 88% (13 Aug 2021 21:00) (80% - 98%)    I&O's Detail    12 Aug 2021 07:01  -  13 Aug 2021 07:00  --------------------------------------------------------  IN:  Total IN: 0 mL    OUT:    Voided (mL): 1100 mL  Total OUT: 1100 mL    Total NET: -1100 mL      13 Aug 2021 07:01  -  13 Aug 2021 21:41  --------------------------------------------------------  IN:  Total IN: 0 mL    OUT:    Voided (mL): 1150 mL  Total OUT: 1150 mL    Total NET: -1150 ml    PE:    Adult M sitting in chair  No JVD trachea midline + HFNC 50/100%, SpO2 80's +/- NRB  S1S2+  Diminished BS B/L  Abd soft NTND  No leg swelling/edema noted  Awake and alert  Skin pink and well perfused    LABS:    CBC Full  -  ( 13 Aug 2021 06:34 )  WBC Count : 16.53 K/uL  RBC Count : 3.01 M/uL  Hemoglobin : 8.4 g/dL  Hematocrit : 26.0 %  Platelet Count - Automated : 164 K/uL  Mean Cell Volume : 86.4 fl  Mean Cell Hemoglobin : 27.9 pg  Mean Cell Hemoglobin Concentration : 32.3 gm/dL  Auto Neutrophil # : x  Auto Lymphocyte # : x  Auto Monocyte # : x  Auto Eosinophil # : x  Auto Basophil # : x  Auto Neutrophil % : x  Auto Lymphocyte % : x  Auto Monocyte % : x  Auto Eosinophil % : x  Auto Basophil % : x    08-13    135  |  102  |  26<H>  ----------------------------<  88  3.9   |  28  |  0.83    Ca    8.9      13 Aug 2021 06:34  Phos  3.8     08-13  Mg     2.1     08-13    CAPILLARY BLOOD GLUCOSE    POCT Blood Glucose.: 160 mg/dL (13 Aug 2021 15:15)  POCT Blood Glucose.: 121 mg/dL (13 Aug 2021 08:05)  POCT Blood Glucose.: 187 mg/dL (12 Aug 2021 23:02)

## 2021-09-15 NOTE — PROGRESS NOTE ADULT - ASSESSMENT
54 y/o male with HLD, HTN and DM--Non Vacc--admitted with Viral PNA sepsis secondary to COVID--Acute type 1 resp failure and ARDS.   Remains at risk for intubation  on high flow oxygen  last cxr 9/11 with severe bilateral infiltrates  Severe Prot Ted malnutrition     Critically ill.  High risk for acute decompensation and deterioration including death   Patient requires critical care for support of one or more vital organ systems with a high probability of imminent or life threatening deterioration in his/her condition     Plan:    HFNC--keep titrating oxygen saturation titrate down no longer on bipap at night, titrating down flow  Follow up Cxs negative, zosyn d/cs  steroids titrated off   Trend WBC and fever curve  PRN QHS Xanax   now testing covid negative  FS with insulin coverage--keep FS < 180  Encourage nutritional support  OOB  DVT prophy--SCD and LMWH

## 2021-09-15 NOTE — PROGRESS NOTE ADULT - ASSESSMENT
A:    55M  HD # 56    Here for:    1. Acute hypoxic resp failure 2/2  2. COVID-19 PNA complicated by  3. ARDS  4. Hyperglycemia  5. DESTINEY  6. CKD  7. PNA  8. DM  9. HTN  10. HypoMg++  11. HLD  12. Severe protein malnutrition  13. Anemia    This patient requires critical care for support of one or more vital organ systems with a high probability of imminent or life threatening deterioration in his/her condition    P:    PRN analgesia/anxiolysis. Avoid deleriogenic meds. Melatonin for sleep hygiene   HD monitoring. Lopressor 25mg PO BID, cozaar 75mg PO qd, PRN hydralazine.  HFNC + PRN NRB. Goal maintain SpO2 > 90% and alleviate WOB.  OOB as able.  PT consult for assistance with ambulation.   Disease modifying therapy: s/p actemra 7/23, s/p RDV, s/p steroids.  s/p broad spectrum abx empiric dosing for PNA cefepime 1g IV TID + vancomycin 1250mg q12h, now off. Monitor off abx.  ID involved. Decreasing PCT level. Afebrile.   VTE ppx lovenox BID, PUD ppx pepcid.  Encourage PO intake + glucerna.  OOB as able.  PICC d/c'd.  Anti tussives.  f/u AM labs, replete lytes PRN.  Lipitor, ASA.  Goal BG < 180, BG control improved. Lantus 12u qd + ISS.  DESTINEY on CKD, improved.    Dispo: Cont critical care. Remains high risk for further decompensation requiring intubation    TOTAL CRITICAL CARE TIME: 35 minutes EXCLUSIVE of any non bundled procedures)    Note: This time spent INCLUDES time spent directly as this patient's bedside with evaluation, review of chart including review of laboratory and imaging studies, interpretation of vital signs and cardiac output measurements, any necessary ventilator management, and time spent discussing plan of care with patient and family, including goals of care discussion.

## 2021-09-16 LAB
CULTURE RESULTS: SIGNIFICANT CHANGE UP
SPECIMEN SOURCE: SIGNIFICANT CHANGE UP

## 2021-09-16 PROCEDURE — 99291 CRITICAL CARE FIRST HOUR: CPT

## 2021-09-16 PROCEDURE — 71045 X-RAY EXAM CHEST 1 VIEW: CPT | Mod: 26

## 2021-09-16 RX ADMIN — ENOXAPARIN SODIUM 40 MILLIGRAM(S): 100 INJECTION SUBCUTANEOUS at 09:19

## 2021-09-16 RX ADMIN — LOSARTAN POTASSIUM 75 MILLIGRAM(S): 100 TABLET, FILM COATED ORAL at 09:19

## 2021-09-16 RX ADMIN — Medication 25 MILLIGRAM(S): at 21:09

## 2021-09-16 RX ADMIN — Medication 2000 UNIT(S): at 09:18

## 2021-09-16 RX ADMIN — Medication 25 MILLIGRAM(S): at 09:18

## 2021-09-16 RX ADMIN — BUDESONIDE AND FORMOTEROL FUMARATE DIHYDRATE 2 PUFF(S): 160; 4.5 AEROSOL RESPIRATORY (INHALATION) at 21:08

## 2021-09-16 RX ADMIN — ATORVASTATIN CALCIUM 80 MILLIGRAM(S): 80 TABLET, FILM COATED ORAL at 21:09

## 2021-09-16 RX ADMIN — Medication 100 MILLIGRAM(S): at 21:09

## 2021-09-16 RX ADMIN — Medication 100 MILLIGRAM(S): at 09:23

## 2021-09-16 RX ADMIN — Medication 81 MILLIGRAM(S): at 09:19

## 2021-09-16 RX ADMIN — Medication 100 MILLIGRAM(S): at 16:42

## 2021-09-16 RX ADMIN — INSULIN GLARGINE 12 UNIT(S): 100 INJECTION, SOLUTION SUBCUTANEOUS at 09:19

## 2021-09-16 RX ADMIN — Medication 5 MILLIGRAM(S): at 21:09

## 2021-09-16 RX ADMIN — ENOXAPARIN SODIUM 40 MILLIGRAM(S): 100 INJECTION SUBCUTANEOUS at 21:09

## 2021-09-16 RX ADMIN — BUDESONIDE AND FORMOTEROL FUMARATE DIHYDRATE 2 PUFF(S): 160; 4.5 AEROSOL RESPIRATORY (INHALATION) at 09:01

## 2021-09-16 NOTE — PHYSICAL THERAPY INITIAL EVALUATION ADULT - PLANNED THERAPY INTERVENTIONS, PT EVAL
GOAL: Pt will perform 3 stairs with or without U HR as needed within 4weeks./balance training/bed mobility training/gait training/strengthening/transfer training

## 2021-09-16 NOTE — PHYSICAL THERAPY INITIAL EVALUATION ADULT - PERTINENT HX OF CURRENT PROBLEM, REHAB EVAL
66 y/o male with HLD, HTN and DM--Non Vacc--presents with 8 days onset of covid symptoms which included, cough, fevers, sob, hypoxia, fevers, diarrhea, chills and myalgias. Tested positive 7/15.  Rx with Rem/Dexa and then high dose steroids and full AC.  Pt tx to ICU on 7/24 for worsening hypoxia.

## 2021-09-16 NOTE — PROGRESS NOTE ADULT - SUBJECTIVE AND OBJECTIVE BOX
Patient is a 55y old  Male who presents with a chief complaint of cough/sob (23 Jul 2021 14:03)      HPI:  55 M with pmh HLD, dm, htn presents with 8 days onset of covid symptoms which included, cough, fevers, sob, hypoxia, fevers, diarrhea, chills and myalgias. TEsted positive 7/15. Wife endorsed he was becoming more sob of recently. On arrival hypoxic to 80s and tachypneic. NRB placed, presently on 15% and saturating 95%. Na 126 s/p 1L NS. CXR: Frandy infiltrates. Last scr 1.4 in 2019-> today 1.9 unknown if underlying ckd.  Patient not vaccinated.   Last seen by me in 2019  History of ROBERT and uncontrolled hypertension    9/16  Uneventful pulmonary night    MEDICATIONS  (STANDING):  aspirin  chewable 81 milliGRAM(s) Oral daily  atorvastatin 80 milliGRAM(s) Oral at bedtime  budesonide 160 MICROgram(s)/formoterol 4.5 MICROgram(s) Inhaler 2 Puff(s) Inhalation two times a day  cholecalciferol 2000 Unit(s) Oral daily  dextrose 40% Gel 15 Gram(s) Oral once  dextrose 5%. 1000 milliLiter(s) (50 mL/Hr) IV Continuous <Continuous>  dextrose 5%. 1000 milliLiter(s) (100 mL/Hr) IV Continuous <Continuous>  dextrose 50% Injectable 25 Gram(s) IV Push once  dextrose 50% Injectable 12.5 Gram(s) IV Push once  dextrose 50% Injectable 25 Gram(s) IV Push once  enoxaparin Injectable 40 milliGRAM(s) SubCutaneous every 12 hours  glucagon  Injectable 1 milliGRAM(s) IntraMuscular once  insulin glargine Injectable (LANTUS) 12 Unit(s) SubCutaneous <User Schedule>  insulin lispro (ADMELOG) corrective regimen sliding scale   SubCutaneous three times a day before meals  insulin lispro (ADMELOG) corrective regimen sliding scale   SubCutaneous at bedtime  losartan 75 milliGRAM(s) Oral daily  metoprolol tartrate 25 milliGRAM(s) Oral two times a day  piperacillin/tazobactam IVPB.. 3.375 Gram(s) IV Intermittent every 8 hours  predniSONE   Tablet 2.5 milliGRAM(s) Oral daily  senna 1 Tablet(s) Oral at bedtime    MEDICATIONS  (PRN):  acetaminophen   Tablet .. 650 milliGRAM(s) Oral every 4 hours PRN Temp greater or equal to 38C (100.4F), Mild Pain (1 - 3)  ALBUTerol    90 MICROgram(s) HFA Inhaler 2 Puff(s) Inhalation every 4 hours PRN Shortness of Breath and/or Wheezing  ALPRAZolam 0.5 milliGRAM(s) Oral at bedtime PRN anxiety  benzonatate 100 milliGRAM(s) Oral every 8 hours PRN Cough  guaiFENesin Oral Liquid (Sugar-Free) 100 milliGRAM(s) Oral every 6 hours PRN Cough  hydrALAZINE Injectable 5 milliGRAM(s) IV Push every 6 hours PRN SBP>160  melatonin 5 milliGRAM(s) Oral at bedtime PRN Sleep  polyethylene glycol 3350 17 Gram(s) Oral daily PRN Constipation  Treated with IV Remdesivir and Decadron, now on Toci  SaO2 is 80-85% despite being on 100% NRB  ABG pending        Vital Signs Last 24 Hrs  T(C): 37.2 (16 Sep 2021 06:00), Max: 37.3 (16 Sep 2021 00:00)  T(F): 98.9 (16 Sep 2021 06:00), Max: 99.1 (16 Sep 2021 00:00)  HR: 100 (16 Sep 2021 07:00) (87 - 112)  BP: 118/61 (16 Sep 2021 07:00) (115/65 - 154/79)  BP(mean): 73 (16 Sep 2021 07:00) (73 - 107)  RR: 27 (16 Sep 2021 07:00) (20 - 43)  SpO2: 89% (16 Sep 2021 07:00) (79% - 98%)    PHYSICAL EXAM  General Appearance: On HFNC  Lungs: coarse bilaterally  Heart: +S1,S2  Abdomen: Soft, non-tender, bowel sounds active   Extremities: no cyanosis or edema, no joint swelling  Skin: Skin color, texture normal, no rashes   Neurologic: Alert and oriented X3 , non focal, not disoriented                          7.8    20.08 )-----------( 338      ( 12 Sep 2021 05:49 )             24.7   09-12    136  |  101  |  9   ----------------------------<  82  3.4<L>   |  29  |  0.74    Ca    8.9      12 Sep 2021 05:49  Phos  3.1     09-12  Mg     1.7     09-12      Pro BNP 2446  repeat              08-26    136  |  102  |  18  ----------------------------<  87  3.7   |  34<H>  |  0.56    Ca    8.3<L>      26 Aug 2021 07:03  Phos  3.6     08-26  Mg     2.0     08-26    TPro  5.5<L>  /  Alb  2.1<L>  /  TBili  0.5  /  DBili  x   /  AST  23  /  ALT  51  /  AlkPhos  79  08-26                          8.0    11.96 )-----------( 278      ( 26 Aug 2021 07:03 )             26.1   08-26    136  |  102  |  18  ----------------------------<  87  3.7   |  34<H>  |  0.56    Ca    8.3<L>      26 Aug 2021 07:03  Phos  3.6     08-26  Mg     2.0     08-26    TPro  5.5<L>  /  Alb  2.1<L>  /  TBili  0.5  /  DBili  x   /  AST  23  /  ALT  51  /  AlkPhos  79  08-26                          8.5    12.89 )-----------( 269      ( 25 Aug 2021 07:10 )             26.7   08-25    138  |  103  |  20  ----------------------------<  143<H>  3.7   |  32<H>  |  0.58    Ca    8.1<L>      25 Aug 2021 07:10  Phos  3.7     08-25  Mg     2.0     08-25    TPro  5.6<L>  /  Alb  2.2<L>  /  TBili  0.5  /  DBili  x   /  AST  32  /  ALT  74  /  AlkPhos  103  08-24                          8.5    14.05 )-----------( 292      ( 18 Aug 2021 07:01 )             26.2   08-18    138  |  104  |  24<H>  ----------------------------<  78  4.1   |  29  |  1.03    Ca    8.8      18 Aug 2021 07:01    TPro  6.3  /  Alb  2.3<L>  /  TBili  0.6  /  DBili  x   /  AST  32  /  ALT  73  /  AlkPhos  168<H>  08-17                                           10.0   19.05 )-----------( 179      ( 08 Aug 2021 07:11 )             30.3   08-08    137  |  102  |  27<H>  ----------------------------<  121<H>  3.9   |  29  |  0.77    Ca    8.4<L>      08 Aug 2021 07:11  Phos  3.4     08-08  Mg     2.0     08-08           TPro  5.2<L>  /  Alb  2.3<L>  /  TBili  0.8  /  DBili  x   /  AST  81<H>  /  ALT  213<H>  /  AlkPhos  200<H>  08-06                            12.9   15.00 )-----------( 366      ( 26 Jul 2021 06:20 )             38.4   07-26          < from: Xray Chest 1 View- PORTABLE-Urgent (07.21.21 @ 15:33) >    PROCEDURE DATE:  07/21/2021          INTERPRETATION:  AP chest on July 21, 2021 at 3:27 PM. Patient is short of breath with cough and fever.    Heart magnified by technique.    There arescattered mid lower lung field infiltrates right greater than left possibly related: Pneumonia.    The lungs are clear on August 30, 2019.    IMPRESSION: Bilateral infiltrates as above.    < end of copied text >  < from: US Duplex Venous Lower Ext Complete, Bilateral (07.25.21 @ 08:42) >  COMPARISON: None available.    TECHNIQUE: Duplex sonography of the BILATERAL LOWER extremity veins with color and spectral Doppler, with and without compression.    FINDINGS:    RIGHT:  Normal compressibility of the RIGHT common femoral, femoral and popliteal veins.  Doppler examination shows normal spontaneous and phasic flow.  No RIGHT calf vein thrombosis is detected.    LEFT:  Normal compressibility of the LEFT common femoral, femoral and popliteal veins.  Doppler examination shows normal spontaneous and phasic flow.  No LEFT calf vein thrombosis is detected.    IMPRESSION:  No evidence of deep venous thrombosis in either lower extremity.    < end of copied text >  RADIOLOGY & ADDITIONAL STUDIES:    < from: Xray Chest 1 View- PORTABLE-Urgent (Xray Chest 1 View- PORTABLE-Urgent .) (07.26.21 @ 08:43) >    PROCEDURE DATE:  07/26/2021          INTERPRETATION:  Portable chest radiograph    CLINICAL INFORMATION: Pneumonia due to Covid 19.. Follow-up    TECHNIQUE:  Portable  AP view of the chest was obtained.    COMPARISON: 7/21/2021 chest available for review.    FINDINGS:    The lungs show stable bilateral  multifocal and diffuse ill-defined airspace opacities.. No pneumothorax.    The  heart is enlarged in transverse diameter. No hilar mass.     Visualized osseous structures are intact.    IMPRESSION:   Stable bilateral  multifocal and diffuse ill-defined airspace opacities..    < end of copied text >  < from: US Duplex Venous Lower Ext Complete, Bilateral (08.06.21 @ 17:16) >  FINDINGS:    RIGHT:  Normal compressibility of the RIGHT common femoral, femoral and popliteal veins.  Doppler examination shows normal spontaneous and phasic flow.  No RIGHT calf vein thrombosis is detected.    LEFT:  Normal compressibility of the LEFT common femoral, femoral and popliteal veins.  Doppler examination shows normal spontaneous and phasic flow.  No LEFT calf vein thrombosisis detected.    IMPRESSION:  No evidence of deep venous thrombosis in either lower extremity.    < end of copied text >  < from: Xray Chest 1 View- PORTABLE-Urgent (Xray Chest 1 View- PORTABLE-Urgent .) (08.06.21 @ 16:45) >  INTERPRETATION:  Portable chest radiograph    CLINICAL INFORMATION: Pneumonia due to Covid 19.    TECHNIQUE:  Portable  AP view of the chest was obtained.    COMPARISON: 8/1/2021 chest radiograph available for review.    FINDINGS:    The lungs show decreasing bilateral diffuse airspace disease.. No pneumothorax.    The  heart is mildly enlarged in transverse diameter. No hilar mass.   Visualized osseous structures are intact.    IMPRESSION:   Decreasing bilateral diffuse airspace disease..    < end of copied text >  xr< from: US Duplex Venous Lower Ext Complete, Bilateral (08.17.21 @ 08:58) >  COMPARISON: None available.    TECHNIQUE: Duplex sonography of the BILATERAL LOWER extremity veins with color and spectral Doppler, with and without compression.    FINDINGS:    RIGHT:  Normal compressibility of the RIGHT common femoral, femoral and popliteal veins.  Doppler examination shows normal spontaneous and phasic flow.  No RIGHT calf vein thrombosis is detected.    LEFT:  Normal compressibility of the LEFT common femoral, femoral and popliteal veins.  Doppler examination shows normal spontaneous and phasic flow.  No LEFT calf vein thrombosis is detected.    IMPRESSION:  No evidence of deep venous thrombosis in either lower extremity.    < end of copied text >  r< from: US Duplex Venous Lower Ext Complete, Bilateral (08.23.21 @ 12:53) >  PROCEDURE DATE:  08/23/2021          INTERPRETATION:  CLINICAL INFORMATION: 55 years  Male with r/o DVT    evaluate for DVT. Covid positive. Elevated d-dimer.    COMPARISON: None available.    TECHNIQUE: Duplex sonography of the BILATERAL LOWER extremity veins with color and spectral Doppler, with and without compression.    FINDINGS:    RIGHT:  Normal compressibility of the RIGHT common femoral, femoral and popliteal veins.  Doppler examination shows normal spontaneous and phasic flow.  No RIGHT calf vein thrombosis is detected.    LEFT:  Normal compressibility of the LEFT common femoral, femoral and popliteal veins.  Doppler examination shows normal spontaneous and phasic flow.  No LEFT calf vein thrombosis is detected.    IMPRESSION:  No evidence of deep venous thrombosis in either lower extremity.    < end of copied text >  < from: Xray Chest 1 View- PORTABLE-Urgent (Xray Chest 1 View- PORTABLE-Urgent .) (08.23.21 @ 11:06) >    PROCEDURE DATE:  08/23/2021          INTERPRETATION:  History: Dyspnea, Covid    Chest:  one view.    Comparison: 08/20/2021    AP radiograph of the chest demonstrates moderate diffuse alveolar infiltrates unchanged. The cardiac silhouette is normal in size. Osseous structures are intact.    Impression:moderate diffuse alveolar infiltrates unchanged.    < end of copied text >  r< from: Xray Chest 1 View- PORTABLE-Urgent (Xray Chest 1 View- PORTABLE-Urgent .) (08.23.21 @ 11:06) >  PROCEDURE DATE:  08/23/2021          INTERPRETATION:  History: Dyspnea, Covid    Chest:  one view.    Comparison: 08/20/2021    AP radiograph of the chest demonstrates moderate diffuse alveolar infiltrates unchanged. The cardiac silhouette is normal in size. Osseous structures are intact.    Impression:moderate diffuse alveolar infiltrates unchanged.    < end of copied text >   normal...

## 2021-09-16 NOTE — PROGRESS NOTE ADULT - ASSESSMENT
1) COVID Pneumonia  2) Abnormal CXR  3) Dyspnea  4) Hypoxemic Respiratory Failure     56 M noted to have COVID 7/15  On arrival hypoxic to 80s and tachypneic. NRB placed,in the ER saturating 95%. Na 126 s/p 1L NS. CXR: Frandy infiltrates. Last scr 1.4 in 2019-> today 1.9 unknown if underlying ckd.  Treated with IV Remdesivir and Decadron, Tocilizumab   Given the obesity/hypertension and prior co-morbidities, he is at risk of intubation but continue HFNC, respiratory status remains critical  DDimer elevated, was on therapeutic lovenox, transitioned to 40mg q 12  Completed Solumedrol, Symbicort 160/4.5 BID ([email or web address], STOIC study).Using HFNC  30LPM, 45%FiO2  SaO2 is now 88-95%  Respiratory status is tenuous, work of breathing is improving. Continue titrating down HFNC  ICU level care appreciated  Hgb improving  Repeat ABG 7.36/60/91 reviewed  Discussed with nursing staff  Will continue to monitor

## 2021-09-16 NOTE — PHYSICAL THERAPY INITIAL EVALUATION ADULT - LIVES WITH, PROFILE
pt lives with wife and 2 young children in private home with 3 steps to enter, no steps inside./children/spouse

## 2021-09-16 NOTE — PROGRESS NOTE ADULT - SUBJECTIVE AND OBJECTIVE BOX
Events Overnight:  Patient still on high flow, desaturates, with exertion, cxr today still with severe bilateral infiltrates    HPI:      64 y/o male with HLD, HTN and DM--Non Vacc--presents with 8 days onset of covid symptoms which included, cough, fevers, sob, hypoxia, fevers, diarrhea, chills and myalgias. Tested positive 7/15.  Rx with Rem/Dexa and then high dose steroids and full AC.  Pt tx to ICU on 7/24 for worsening hypoxia.     was on high flow as high as 60liters, 90%, on 45 now, cxr still with bilateral infiltrates    9/13:  Pt seen and examined in ICU.  HFNC O/N with occasional desaturation   afebrile  9/15 - Covid negative    ROS - dyspneic with exertion, no chest pain, no calf pain, some  upset stomach            no fevers, no chills       MEDICATIONS  (STANDING):  aspirin  chewable 81 milliGRAM(s) Oral daily  atorvastatin 80 milliGRAM(s) Oral at bedtime  budesonide 160 MICROgram(s)/formoterol 4.5 MICROgram(s) Inhaler 2 Puff(s) Inhalation two times a day  cholecalciferol 2000 Unit(s) Oral daily  dextrose 40% Gel 15 Gram(s) Oral once  dextrose 5%. 1000 milliLiter(s) (50 mL/Hr) IV Continuous <Continuous>  dextrose 5%. 1000 milliLiter(s) (100 mL/Hr) IV Continuous <Continuous>  dextrose 50% Injectable 25 Gram(s) IV Push once  dextrose 50% Injectable 12.5 Gram(s) IV Push once  dextrose 50% Injectable 25 Gram(s) IV Push once  enoxaparin Injectable 40 milliGRAM(s) SubCutaneous every 12 hours  glucagon  Injectable 1 milliGRAM(s) IntraMuscular once  insulin glargine Injectable (LANTUS) 12 Unit(s) SubCutaneous <User Schedule>  insulin lispro (ADMELOG) corrective regimen sliding scale   SubCutaneous three times a day before meals  insulin lispro (ADMELOG) corrective regimen sliding scale   SubCutaneous at bedtime  losartan 75 milliGRAM(s) Oral daily  metoprolol tartrate 25 milliGRAM(s) Oral two times a day    MEDICATIONS  (PRN):  acetaminophen   Tablet .. 650 milliGRAM(s) Oral every 4 hours PRN Temp greater or equal to 38C (100.4F), Mild Pain (1 - 3)  ALBUTerol    90 MICROgram(s) HFA Inhaler 2 Puff(s) Inhalation every 4 hours PRN Shortness of Breath and/or Wheezing  benzonatate 100 milliGRAM(s) Oral every 8 hours PRN Cough  guaiFENesin Oral Liquid (Sugar-Free) 100 milliGRAM(s) Oral every 6 hours PRN Cough  hydrALAZINE Injectable 5 milliGRAM(s) IV Push every 6 hours PRN SBP>160  melatonin 5 milliGRAM(s) Oral at bedtime PRN Sleep  polyethylene glycol 3350 17 Gram(s) Oral daily PRN Constipation    ICU Vital Signs Last 24 Hrs  T(C): 37.2 (16 Sep 2021 06:00), Max: 37.3 (16 Sep 2021 00:00)  T(F): 98.9 (16 Sep 2021 06:00), Max: 99.1 (16 Sep 2021 00:00)  HR: 107 (16 Sep 2021 10:00) (88 - 112)  BP: 138/83 (16 Sep 2021 10:00) (115/65 - 154/79)  BP(mean): 91 (16 Sep 2021 10:00) (73 - 107)  ABP: --  ABP(mean): --  RR: 32 (16 Sep 2021 10:09) (20 - 43)  SpO2: 88% (16 Sep 2021 10:09) (76% - 98%)    Physical Exam:    General - awake, alert  neuro GCS 15, non focal  heent, nc/at  lungs clear  cv scattered rhonchi  abdomen - soft, non - tender  ext warm    I&O's Summary    15 Sep 2021 07:01  -  16 Sep 2021 07:00  --------------------------------------------------------  IN: 0 mL / OUT: 1850 mL / NET: -1850 mL    DVT Prophylaxis:  Lovenox                                                               Advanced Directives: Full Code

## 2021-09-16 NOTE — PROGRESS NOTE ADULT - ASSESSMENT
54 y/o male with HLD, HTN and DM--Non Vacc--admitted with Viral PNA sepsis secondary to COVID--Acute type 1 resp failure and ARDS.   Remains at risk for intubation  on high flow oxygen  last cxr 9/16 with severe bilateral infiltrates  Severe Prot Ted malnutrition     Critically ill.  High risk for acute decompensation and deterioration including death   Patient requires critical care for support of one or more vital organ systems with a high probability of imminent or life threatening deterioration in his/her condition     Plan:    HFNC--keep titrating oxygen saturation titrate down no longer on bipap at night, titrating down flow  Follow up Cxs negative, zosyn d/cs  steroids titrated off   glucose under good control  PRN QHS Xanax   now testing covid negative  OOB  DVT prophy--SCD and LMWH

## 2021-09-17 PROCEDURE — 99291 CRITICAL CARE FIRST HOUR: CPT

## 2021-09-17 RX ADMIN — Medication 5 MILLIGRAM(S): at 23:01

## 2021-09-17 RX ADMIN — INSULIN GLARGINE 12 UNIT(S): 100 INJECTION, SOLUTION SUBCUTANEOUS at 11:17

## 2021-09-17 RX ADMIN — ENOXAPARIN SODIUM 40 MILLIGRAM(S): 100 INJECTION SUBCUTANEOUS at 23:02

## 2021-09-17 RX ADMIN — ATORVASTATIN CALCIUM 80 MILLIGRAM(S): 80 TABLET, FILM COATED ORAL at 23:01

## 2021-09-17 RX ADMIN — BUDESONIDE AND FORMOTEROL FUMARATE DIHYDRATE 2 PUFF(S): 160; 4.5 AEROSOL RESPIRATORY (INHALATION) at 09:59

## 2021-09-17 RX ADMIN — Medication 25 MILLIGRAM(S): at 23:01

## 2021-09-17 RX ADMIN — LOSARTAN POTASSIUM 75 MILLIGRAM(S): 100 TABLET, FILM COATED ORAL at 11:17

## 2021-09-17 RX ADMIN — Medication 2000 UNIT(S): at 11:15

## 2021-09-17 RX ADMIN — BUDESONIDE AND FORMOTEROL FUMARATE DIHYDRATE 2 PUFF(S): 160; 4.5 AEROSOL RESPIRATORY (INHALATION) at 20:37

## 2021-09-17 RX ADMIN — Medication 100 MILLIGRAM(S): at 23:02

## 2021-09-17 RX ADMIN — ENOXAPARIN SODIUM 40 MILLIGRAM(S): 100 INJECTION SUBCUTANEOUS at 11:16

## 2021-09-17 RX ADMIN — Medication 25 MILLIGRAM(S): at 11:17

## 2021-09-17 RX ADMIN — Medication 81 MILLIGRAM(S): at 11:15

## 2021-09-17 NOTE — PROGRESS NOTE ADULT - SUBJECTIVE AND OBJECTIVE BOX
Events Overnight:  Patient remains high flow dependent on 50% 35 liters, had episode of desaturation yesterday    HPI:      66 y/o male with HLD, HTN and DM--Non Vacc--presents with 8 days onset of covid symptoms which included, cough, fevers, sob, hypoxia, fevers, diarrhea, chills and myalgias. Tested positive 7/15.  Rx with Rem/Dexa and then high dose steroids and full AC.  Pt tx to ICU on 7/24 for worsening hypoxia.     was on high flow as high as 60liters, 90%, on 45 now, cxr still with bilateral infiltrates    9/13:  Pt seen and examined in ICU.  HFNC O/N with occasional desaturation   afebrile  9/15 - Covid negative    ROS - dyspneic with exertion, no chest pain, no calf pain, stomach feels better            no fevers, no chills     MEDICATIONS  (STANDING):  aspirin  chewable 81 milliGRAM(s) Oral daily  atorvastatin 80 milliGRAM(s) Oral at bedtime  budesonide 160 MICROgram(s)/formoterol 4.5 MICROgram(s) Inhaler 2 Puff(s) Inhalation two times a day  cholecalciferol 2000 Unit(s) Oral daily  dextrose 40% Gel 15 Gram(s) Oral once  dextrose 5%. 1000 milliLiter(s) (50 mL/Hr) IV Continuous <Continuous>  dextrose 5%. 1000 milliLiter(s) (100 mL/Hr) IV Continuous <Continuous>  dextrose 50% Injectable 25 Gram(s) IV Push once  dextrose 50% Injectable 12.5 Gram(s) IV Push once  dextrose 50% Injectable 25 Gram(s) IV Push once  enoxaparin Injectable 40 milliGRAM(s) SubCutaneous every 12 hours  glucagon  Injectable 1 milliGRAM(s) IntraMuscular once  insulin glargine Injectable (LANTUS) 12 Unit(s) SubCutaneous <User Schedule>  insulin lispro (ADMELOG) corrective regimen sliding scale   SubCutaneous three times a day before meals  insulin lispro (ADMELOG) corrective regimen sliding scale   SubCutaneous at bedtime  losartan 75 milliGRAM(s) Oral daily  metoprolol tartrate 25 milliGRAM(s) Oral two times a day    MEDICATIONS  (PRN):  acetaminophen   Tablet .. 650 milliGRAM(s) Oral every 4 hours PRN Temp greater or equal to 38C (100.4F), Mild Pain (1 - 3)  ALBUTerol    90 MICROgram(s) HFA Inhaler 2 Puff(s) Inhalation every 4 hours PRN Shortness of Breath and/or Wheezing  benzonatate 100 milliGRAM(s) Oral every 8 hours PRN Cough  guaiFENesin Oral Liquid (Sugar-Free) 100 milliGRAM(s) Oral every 6 hours PRN Cough  hydrALAZINE Injectable 5 milliGRAM(s) IV Push every 6 hours PRN SBP>160  melatonin 5 milliGRAM(s) Oral at bedtime PRN Sleep  polyethylene glycol 3350 17 Gram(s) Oral daily PRN Constipation    ICU Vital Signs Last 24 Hrs  T(C): 37.3 (17 Sep 2021 07:00), Max: 37.6 (16 Sep 2021 17:00)  T(F): 99.2 (17 Sep 2021 07:00), Max: 99.6 (16 Sep 2021 17:00)  HR: 87 (17 Sep 2021 13:00) (83 - 110)  BP: 137/85 (17 Sep 2021 13:00) (121/83 - 164/89)  BP(mean): 96 (17 Sep 2021 13:00) (64 - 105)  ABP: --  ABP(mean): --  RR: 39 (17 Sep 2021 13:00) (20 - 39)  SpO2: 92% (17 Sep 2021 13:00) (85% - 96%)    Physical Exam:    General - comfortable  HEENT high flow on, nc.at  neck supple  lungs scattered rhonchi  cv rrr  abdomen - soft  ext war,    I&O's Summary    16 Sep 2021 07:01  -  17 Sep 2021 07:00  --------------------------------------------------------  IN: 550 mL / OUT: 1150 mL / NET: -600 mL      DVT Prophylaxis:   Lovenox                                                             Advanced Directives: Full code

## 2021-09-17 NOTE — PROGRESS NOTE ADULT - SUBJECTIVE AND OBJECTIVE BOX
HPI: 56 yo male with PMHx HTN, HLD, T2DM, admitted on 7/21 with COVID-19 viral pneumonia and acute hypoxemic respiratory failure, complicated by worsening ARDS requiring ICU level of care since 7/24.       EVENTS: HFNC dependent respiratory failure with intermittent desaturations, more hypoxic, titrated up HFNC to 70% FIO2 and inc to 35 LPM        COVID 19 specific considerations and therapeutic options based on the available and rapidly changing literature.    30 minutes of critical care time spent in the management of this critically ill COVID-19 patient suffering from multisystem organ failure with continuous assessments and interventions based on the interpretation of multiple databases. Critical care time not inclusive of independent time spent on procedures performed. HPI: 56 yo male with PMHx HTN, HLD, T2DM, admitted on 7/21 with COVID-19 viral pneumonia and acute hypoxemic respiratory failure, complicated by worsening ARDS requiring ICU level of care since 7/24.       EVENTS: HFNC dependent respiratory failure with intermittent desaturations, more hypoxic, titrated up HFNC to 70% FIO2 and inc to 35 LPM        T(C): 37.6 (09-16-21 @ 20:00), Max: 37.6 (09-16-21 @ 17:00)  HR: 92 (09-17-21 @ 03:00) (83 - 108)  BP: 122/90 (09-17-21 @ 03:00) (115/65 - 164/89)  RR: 35 (09-17-21 @ 03:26) (20 - 40)  SpO2: 89% (09-17-21 @ 03:26) (76% - 96%)          09-15-21 @ 07:01  -  09-16-21 @ 07:00  --------------------------------------------------------  IN: 0 mL / OUT: 1850 mL / NET: -1850 mL    09-16-21 @ 07:01  -  09-17-21 @ 03:54  --------------------------------------------------------  IN: 550 mL / OUT: 500 mL / NET: 50 mL          Vital signs reviewed and physical exam performed where pertinent and urgently required.    Lab/radiology studies/ABG/meds reviewed and interpreted into the assessment and treatment plan.        COVID 19 specific considerations and therapeutic options based on the available and rapidly changing literature.    30 minutes of critical care time spent in the management of this critically ill COVID-19 patient suffering from multisystem organ failure with continuous assessments and interventions based on the interpretation of multiple databases. Critical care time not inclusive of independent time spent on procedures performed.

## 2021-09-17 NOTE — PROGRESS NOTE ADULT - ASSESSMENT
54 y/o male with HLD, HTN and DM--Non Vacc--admitted with Viral PNA sepsis secondary to COVID--Acute type 1 resp failure and ARDS.   Remains at risk for intubation  on high flow oxygen  last cxr 9/16 with severe bilateral infiltrates  Severe Prot Ted malnutrition     Critically ill.  High risk for acute decompensation and deterioration including death   Patient requires critical care for support of one or more vital organ systems with a high probability of imminent or life threatening deterioration in his/her condition     Plan:    HFNC--keep titrating oxygen saturation titrate down no longer on bipap at night, titrating down flow  Follow up Cxs negative, of antibiotics  steroids titrated off   glucose under good control  PRN QHS Xanax   now testing covid negative  OOB  DVT prophy--SCD and LMWH

## 2021-09-17 NOTE — PROGRESS NOTE ADULT - ASSESSMENT
56 yo male with PMHx HTN, HLD, T2DM, admitted on 7/21 with COVID-19 viral pneumonia and acute hypoxemic respiratory failure    Hypoxemic respiratory failure  Severe ARDS  Persistent dense bilateral infiltrates   HFNC dependent  Increasing FiO2 and flow requirements overnight, uptitrating HFNC   Will escalate to NIPPV if needed  Tenuous respiratory status. Remains high risk for necessitating urgent intubation.

## 2021-09-17 NOTE — PROGRESS NOTE ADULT - SUBJECTIVE AND OBJECTIVE BOX
Patient is a 55y old  Male who presents with a chief complaint of cough/sob (23 Jul 2021 14:03)      HPI:  55 M with pmh HLD, dm, htn presents with 8 days onset of covid symptoms which included, cough, fevers, sob, hypoxia, fevers, diarrhea, chills and myalgias. TEsted positive 7/15. Wife endorsed he was becoming more sob of recently. On arrival hypoxic to 80s and tachypneic. NRB placed, presently on 15% and saturating 95%. Na 126 s/p 1L NS. CXR: Frandy infiltrates. Last scr 1.4 in 2019-> today 1.9 unknown if underlying ckd.  Patient not vaccinated.   Last seen by me in 2019  History of ROBERT and uncontrolled hypertension    9/16  Uneventful pulmonary night  9/17  remains on high Fio2 requirement  covering for Dr Arceo  data discussed with overnight RN staff    MEDICATIONS  (STANDING):  aspirin  chewable 81 milliGRAM(s) Oral daily  atorvastatin 80 milliGRAM(s) Oral at bedtime  budesonide 160 MICROgram(s)/formoterol 4.5 MICROgram(s) Inhaler 2 Puff(s) Inhalation two times a day  cholecalciferol 2000 Unit(s) Oral daily  dextrose 40% Gel 15 Gram(s) Oral once  dextrose 5%. 1000 milliLiter(s) (50 mL/Hr) IV Continuous <Continuous>  dextrose 5%. 1000 milliLiter(s) (100 mL/Hr) IV Continuous <Continuous>  dextrose 50% Injectable 25 Gram(s) IV Push once  dextrose 50% Injectable 12.5 Gram(s) IV Push once  dextrose 50% Injectable 25 Gram(s) IV Push once  enoxaparin Injectable 40 milliGRAM(s) SubCutaneous every 12 hours  glucagon  Injectable 1 milliGRAM(s) IntraMuscular once  insulin glargine Injectable (LANTUS) 12 Unit(s) SubCutaneous <User Schedule>  insulin lispro (ADMELOG) corrective regimen sliding scale   SubCutaneous three times a day before meals  insulin lispro (ADMELOG) corrective regimen sliding scale   SubCutaneous at bedtime  losartan 75 milliGRAM(s) Oral daily  metoprolol tartrate 25 milliGRAM(s) Oral two times a day      MEDICATIONS  (PRN):  acetaminophen   Tablet .. 650 milliGRAM(s) Oral every 4 hours PRN Temp greater or equal to 38C (100.4F), Mild Pain (1 - 3)  ALBUTerol    90 MICROgram(s) HFA Inhaler 2 Puff(s) Inhalation every 4 hours PRN Shortness of Breath and/or Wheezing  ALPRAZolam 0.5 milliGRAM(s) Oral at bedtime PRN anxiety  benzonatate 100 milliGRAM(s) Oral every 8 hours PRN Cough  guaiFENesin Oral Liquid (Sugar-Free) 100 milliGRAM(s) Oral every 6 hours PRN Cough  hydrALAZINE Injectable 5 milliGRAM(s) IV Push every 6 hours PRN SBP>160  melatonin 5 milliGRAM(s) Oral at bedtime PRN Sleep  polyethylene glycol 3350 17 Gram(s) Oral daily PRN Constipation  Treated with IV Remdesivir and Decadron, now on Toci    ICU Vital Signs Last 24 Hrs  T(C): 37.3 (17 Sep 2021 07:00), Max: 37.6 (16 Sep 2021 17:00)  T(F): 99.2 (17 Sep 2021 07:00), Max: 99.6 (16 Sep 2021 17:00)  HR: 102 (17 Sep 2021 07:00) (83 - 110)  BP: 148/86 (17 Sep 2021 07:00) (122/90 - 164/89)  BP(mean): 101 (17 Sep 2021 07:00) (64 - 105)  ABP: --  ABP(mean): --  RR: 21 (17 Sep 2021 07:00) (20 - 40)  SpO2: 91% (17 Sep 2021 07:00) (76% - 96%)    I&O's Detail    16 Sep 2021 07:01  -  17 Sep 2021 07:00  --------------------------------------------------------  IN:    Oral Fluid: 550 mL  Total IN: 550 mL    OUT:    Incontinent per Retracted Penis Pouch (mL): 1150 mL  Total OUT: 1150 mL    Total NET: -600 mL          PHYSICAL EXAM  General Appearance: On HFNC  Lungs: coarse bilaterally  Heart: +S1,S2  Abdomen: Soft, non-tender, bowel sounds active   Extremities: no cyanosis or edema, no joint swelling  Skin: Skin color, texture normal, no rashes   Neurologic: Alert and oriented X3 , non focal, not disoriented                          7.8    20.08 )-----------( 338      ( 12 Sep 2021 05:49 )             24.7   09-12    136  |  101  |  9   ----------------------------<  82  3.4<L>   |  29  |  0.74    Ca    8.9      12 Sep 2021 05:49  Phos  3.1     09-12  Mg     1.7     09-12      Pro BNP 2446  repeat              08-26    136  |  102  |  18  ----------------------------<  87  3.7   |  34<H>  |  0.56    Ca    8.3<L>      26 Aug 2021 07:03  Phos  3.6     08-26  Mg     2.0     08-26    TPro  5.5<L>  /  Alb  2.1<L>  /  TBili  0.5  /  DBili  x   /  AST  23  /  ALT  51  /  AlkPhos  79  08-26                          8.0    11.96 )-----------( 278      ( 26 Aug 2021 07:03 )             26.1   08-26    136  |  102  |  18  ----------------------------<  87  3.7   |  34<H>  |  0.56    Ca    8.3<L>      26 Aug 2021 07:03  Phos  3.6     08-26  Mg     2.0     08-26    TPro  5.5<L>  /  Alb  2.1<L>  /  TBili  0.5  /  DBili  x   /  AST  23  /  ALT  51  /  AlkPhos  79  08-26                          8.5    12.89 )-----------( 269      ( 25 Aug 2021 07:10 )             26.7   08-25    138  |  103  |  20  ----------------------------<  143<H>  3.7   |  32<H>  |  0.58    Ca    8.1<L>      25 Aug 2021 07:10  Phos  3.7     08-25  Mg     2.0     08-25    TPro  5.6<L>  /  Alb  2.2<L>  /  TBili  0.5  /  DBili  x   /  AST  32  /  ALT  74  /  AlkPhos  103  08-24                          8.5    14.05 )-----------( 292      ( 18 Aug 2021 07:01 )             26.2   08-18    138  |  104  |  24<H>  ----------------------------<  78  4.1   |  29  |  1.03    Ca    8.8      18 Aug 2021 07:01    TPro  6.3  /  Alb  2.3<L>  /  TBili  0.6  /  DBili  x   /  AST  32  /  ALT  73  /  AlkPhos  168<H>  08-17                                           10.0   19.05 )-----------( 179      ( 08 Aug 2021 07:11 )             30.3   08-08    137  |  102  |  27<H>  ----------------------------<  121<H>  3.9   |  29  |  0.77    Ca    8.4<L>      08 Aug 2021 07:11  Phos  3.4     08-08  Mg     2.0     08-08           TPro  5.2<L>  /  Alb  2.3<L>  /  TBili  0.8  /  DBili  x   /  AST  81<H>  /  ALT  213<H>  /  AlkPhos  200<H>  08-06                            12.9   15.00 )-----------( 366      ( 26 Jul 2021 06:20 )             38.4   07-26          < from: Xray Chest 1 View- PORTABLE-Urgent (07.21.21 @ 15:33) >    PROCEDURE DATE:  07/21/2021          INTERPRETATION:  AP chest on July 21, 2021 at 3:27 PM. Patient is short of breath with cough and fever.    Heart magnified by technique.    There arescattered mid lower lung field infiltrates right greater than left possibly related: Pneumonia.    The lungs are clear on August 30, 2019.    IMPRESSION: Bilateral infiltrates as above.    < end of copied text >  < from: US Duplex Venous Lower Ext Complete, Bilateral (07.25.21 @ 08:42) >  COMPARISON: None available.    TECHNIQUE: Duplex sonography of the BILATERAL LOWER extremity veins with color and spectral Doppler, with and without compression.    FINDINGS:    RIGHT:  Normal compressibility of the RIGHT common femoral, femoral and popliteal veins.  Doppler examination shows normal spontaneous and phasic flow.  No RIGHT calf vein thrombosis is detected.    LEFT:  Normal compressibility of the LEFT common femoral, femoral and popliteal veins.  Doppler examination shows normal spontaneous and phasic flow.  No LEFT calf vein thrombosis is detected.    IMPRESSION:  No evidence of deep venous thrombosis in either lower extremity.    < end of copied text >  RADIOLOGY & ADDITIONAL STUDIES:    < from: Xray Chest 1 View- PORTABLE-Urgent (Xray Chest 1 View- PORTABLE-Urgent .) (07.26.21 @ 08:43) >    PROCEDURE DATE:  07/26/2021          INTERPRETATION:  Portable chest radiograph    CLINICAL INFORMATION: Pneumonia due to Covid 19.. Follow-up    TECHNIQUE:  Portable  AP view of the chest was obtained.    COMPARISON: 7/21/2021 chest available for review.    FINDINGS:    The lungs show stable bilateral  multifocal and diffuse ill-defined airspace opacities.. No pneumothorax.    The  heart is enlarged in transverse diameter. No hilar mass.     Visualized osseous structures are intact.    IMPRESSION:   Stable bilateral  multifocal and diffuse ill-defined airspace opacities..    < end of copied text >  < from: US Duplex Venous Lower Ext Complete, Bilateral (08.06.21 @ 17:16) >  FINDINGS:    RIGHT:  Normal compressibility of the RIGHT common femoral, femoral and popliteal veins.  Doppler examination shows normal spontaneous and phasic flow.  No RIGHT calf vein thrombosis is detected.    LEFT:  Normal compressibility of the LEFT common femoral, femoral and popliteal veins.  Doppler examination shows normal spontaneous and phasic flow.  No LEFT calf vein thrombosisis detected.    IMPRESSION:  No evidence of deep venous thrombosis in either lower extremity.    < end of copied text >  < from: Xray Chest 1 View- PORTABLE-Urgent (Xray Chest 1 View- PORTABLE-Urgent .) (08.06.21 @ 16:45) >  INTERPRETATION:  Portable chest radiograph    CLINICAL INFORMATION: Pneumonia due to Covid 19.    TECHNIQUE:  Portable  AP view of the chest was obtained.    COMPARISON: 8/1/2021 chest radiograph available for review.    FINDINGS:    The lungs show decreasing bilateral diffuse airspace disease.. No pneumothorax.    The  heart is mildly enlarged in transverse diameter. No hilar mass.   Visualized osseous structures are intact.    IMPRESSION:   Decreasing bilateral diffuse airspace disease..    < end of copied text >  xr< from: US Duplex Venous Lower Ext Complete, Bilateral (08.17.21 @ 08:58) >  COMPARISON: None available.    TECHNIQUE: Duplex sonography of the BILATERAL LOWER extremity veins with color and spectral Doppler, with and without compression.    FINDINGS:    RIGHT:  Normal compressibility of the RIGHT common femoral, femoral and popliteal veins.  Doppler examination shows normal spontaneous and phasic flow.  No RIGHT calf vein thrombosis is detected.    LEFT:  Normal compressibility of the LEFT common femoral, femoral and popliteal veins.  Doppler examination shows normal spontaneous and phasic flow.  No LEFT calf vein thrombosis is detected.    IMPRESSION:  No evidence of deep venous thrombosis in either lower extremity.    < end of copied text >  r< from: US Duplex Venous Lower Ext Complete, Bilateral (08.23.21 @ 12:53) >  PROCEDURE DATE:  08/23/2021          INTERPRETATION:  CLINICAL INFORMATION: 55 years  Male with r/o DVT    evaluate for DVT. Covid positive. Elevated d-dimer.    COMPARISON: None available.    TECHNIQUE: Duplex sonography of the BILATERAL LOWER extremity veins with color and spectral Doppler, with and without compression.    FINDINGS:    RIGHT:  Normal compressibility of the RIGHT common femoral, femoral and popliteal veins.  Doppler examination shows normal spontaneous and phasic flow.  No RIGHT calf vein thrombosis is detected.    LEFT:  Normal compressibility of the LEFT common femoral, femoral and popliteal veins.  Doppler examination shows normal spontaneous and phasic flow.  No LEFT calf vein thrombosis is detected.    IMPRESSION:  No evidence of deep venous thrombosis in either lower extremity.    < end of copied text >  < from: Xray Chest 1 View- PORTABLE-Urgent (Xray Chest 1 View- PORTABLE-Urgent .) (08.23.21 @ 11:06) >    PROCEDURE DATE:  08/23/2021          INTERPRETATION:  History: Dyspnea, Covid    Chest:  one view.    Comparison: 08/20/2021    AP radiograph of the chest demonstrates moderate diffuse alveolar infiltrates unchanged. The cardiac silhouette is normal in size. Osseous structures are intact.    Impression:moderate diffuse alveolar infiltrates unchanged.    < end of copied text >  r< from: Xray Chest 1 View- PORTABLE-Urgent (Xray Chest 1 View- PORTABLE-Urgent .) (08.23.21 @ 11:06) >  PROCEDURE DATE:  08/23/2021          INTERPRETATION:  History: Dyspnea, Covid    Chest:  one view.    Comparison: 08/20/2021    AP radiograph of the chest demonstrates moderate diffuse alveolar infiltrates unchanged. The cardiac silhouette is normal in size. Osseous structures are intact.    Impression:moderate diffuse alveolar infiltrates unchanged.    < end of copied text >  < from: Xray Chest 1 View-PORTABLE IMMEDIATE (Xray Chest 1 View-PORTABLE IMMEDIATE .) (09.11.21 @ 12:32) >    PROCEDURE DATE:  09/11/2021          INTERPRETATION:  XR CHEST IMMEDIATE    Single AP view    HISTORY:  Fever    Comparison: Chest x-ray 9/4/2021    The cardiac silhouette is within normal limits. Diffuse bilateral airspace disease. No pleural abnormality.    IMPRESSION: Unchanged bilateral airspace disease    < end of copied text >

## 2021-09-18 LAB
ANION GAP SERPL CALC-SCNC: 8 MMOL/L — SIGNIFICANT CHANGE UP (ref 5–17)
BUN SERPL-MCNC: 8 MG/DL — SIGNIFICANT CHANGE UP (ref 7–23)
CALCIUM SERPL-MCNC: 8.7 MG/DL — SIGNIFICANT CHANGE UP (ref 8.5–10.1)
CHLORIDE SERPL-SCNC: 100 MMOL/L — SIGNIFICANT CHANGE UP (ref 96–108)
CO2 SERPL-SCNC: 28 MMOL/L — SIGNIFICANT CHANGE UP (ref 22–31)
CREAT SERPL-MCNC: 0.74 MG/DL — SIGNIFICANT CHANGE UP (ref 0.5–1.3)
GLUCOSE SERPL-MCNC: 95 MG/DL — SIGNIFICANT CHANGE UP (ref 70–99)
HCT VFR BLD CALC: 23.7 % — LOW (ref 39–50)
HGB BLD-MCNC: 7.3 G/DL — LOW (ref 13–17)
MCHC RBC-ENTMCNC: 26.4 PG — LOW (ref 27–34)
MCHC RBC-ENTMCNC: 30.8 GM/DL — LOW (ref 32–36)
MCV RBC AUTO: 85.9 FL — SIGNIFICANT CHANGE UP (ref 80–100)
PLATELET # BLD AUTO: 480 K/UL — HIGH (ref 150–400)
POTASSIUM SERPL-MCNC: 3.6 MMOL/L — SIGNIFICANT CHANGE UP (ref 3.5–5.3)
POTASSIUM SERPL-SCNC: 3.6 MMOL/L — SIGNIFICANT CHANGE UP (ref 3.5–5.3)
RBC # BLD: 2.76 M/UL — LOW (ref 4.2–5.8)
RBC # FLD: 14.3 % — SIGNIFICANT CHANGE UP (ref 10.3–14.5)
SODIUM SERPL-SCNC: 136 MMOL/L — SIGNIFICANT CHANGE UP (ref 135–145)
WBC # BLD: 18.42 K/UL — HIGH (ref 3.8–10.5)
WBC # FLD AUTO: 18.42 K/UL — HIGH (ref 3.8–10.5)

## 2021-09-18 PROCEDURE — 99233 SBSQ HOSP IP/OBS HIGH 50: CPT

## 2021-09-18 RX ORDER — CEFEPIME 1 G/1
2000 INJECTION, POWDER, FOR SOLUTION INTRAMUSCULAR; INTRAVENOUS EVERY 12 HOURS
Refills: 0 | Status: COMPLETED | OUTPATIENT
Start: 2021-09-18 | End: 2021-09-25

## 2021-09-18 RX ADMIN — ATORVASTATIN CALCIUM 80 MILLIGRAM(S): 80 TABLET, FILM COATED ORAL at 21:26

## 2021-09-18 RX ADMIN — Medication 25 MILLIGRAM(S): at 09:57

## 2021-09-18 RX ADMIN — Medication 25 MILLIGRAM(S): at 21:26

## 2021-09-18 RX ADMIN — Medication 81 MILLIGRAM(S): at 09:57

## 2021-09-18 RX ADMIN — Medication 5 MILLIGRAM(S): at 21:26

## 2021-09-18 RX ADMIN — ENOXAPARIN SODIUM 40 MILLIGRAM(S): 100 INJECTION SUBCUTANEOUS at 21:27

## 2021-09-18 RX ADMIN — ENOXAPARIN SODIUM 40 MILLIGRAM(S): 100 INJECTION SUBCUTANEOUS at 09:57

## 2021-09-18 RX ADMIN — Medication 100 MILLIGRAM(S): at 21:26

## 2021-09-18 RX ADMIN — CEFEPIME 100 MILLIGRAM(S): 1 INJECTION, POWDER, FOR SOLUTION INTRAMUSCULAR; INTRAVENOUS at 21:27

## 2021-09-18 RX ADMIN — LOSARTAN POTASSIUM 75 MILLIGRAM(S): 100 TABLET, FILM COATED ORAL at 09:58

## 2021-09-18 RX ADMIN — BUDESONIDE AND FORMOTEROL FUMARATE DIHYDRATE 2 PUFF(S): 160; 4.5 AEROSOL RESPIRATORY (INHALATION) at 19:12

## 2021-09-18 RX ADMIN — BUDESONIDE AND FORMOTEROL FUMARATE DIHYDRATE 2 PUFF(S): 160; 4.5 AEROSOL RESPIRATORY (INHALATION) at 11:00

## 2021-09-18 RX ADMIN — Medication 2000 UNIT(S): at 09:58

## 2021-09-18 NOTE — PROGRESS NOTE ADULT - ASSESSMENT
56 yo male with PMHx HTN, HLD, T2DM, admitted on 7/21 with COVID-19 viral pneumonia and acute hypoxemic respiratory failure    Hypoxemic respiratory failure  Severe ARDS  Persistent dense bilateral infiltrates   HFNC dependent  Increasing FiO2 and flow requirements overnight, uptitrating HFNC, actively attempting to titrate down FiO2 to keep SpO2 >88%  Will escalate to NIPPV if needed  Tenuous respiratory status. Remains high risk for necessitating urgent intubation.

## 2021-09-18 NOTE — PROGRESS NOTE ADULT - SUBJECTIVE AND OBJECTIVE BOX
Events Overnight: on 50%, 35 liters, but winded and desaturates with any exertion    HPI:      66 y/o male with HLD, HTN and DM--Non Vacc--presents with 8 days onset of covid symptoms which included, cough, fevers, sob, hypoxia, fevers, diarrhea, chills and myalgias. Tested positive 7/15.  Rx with Rem/Dexa and then high dose steroids and full AC.  Pt tx to ICU on 7/24 for worsening hypoxia.     was on high flow as high as 60liters, 90%, on 45 now, cxr still with bilateral infiltrates    9/13:  Pt seen and examined in ICU.  HFNC O/N with occasional desaturation   afebrile  9/15 - Covid negative  last cxr 9/16 still with persistent infiltrates    ROS - dyspneic with exertion, no chest pain, no calf pain, stomach feels better            no fevers, no chills     PMH:   as above       MEDICATIONS  (STANDING):  aspirin  chewable 81 milliGRAM(s) Oral daily  atorvastatin 80 milliGRAM(s) Oral at bedtime  budesonide 160 MICROgram(s)/formoterol 4.5 MICROgram(s) Inhaler 2 Puff(s) Inhalation two times a day  cefepime   IVPB 2000 milliGRAM(s) IV Intermittent every 12 hours  cholecalciferol 2000 Unit(s) Oral daily  dextrose 40% Gel 15 Gram(s) Oral once  dextrose 5%. 1000 milliLiter(s) (50 mL/Hr) IV Continuous <Continuous>  dextrose 5%. 1000 milliLiter(s) (100 mL/Hr) IV Continuous <Continuous>  dextrose 50% Injectable 25 Gram(s) IV Push once  dextrose 50% Injectable 12.5 Gram(s) IV Push once  dextrose 50% Injectable 25 Gram(s) IV Push once  enoxaparin Injectable 40 milliGRAM(s) SubCutaneous every 12 hours  glucagon  Injectable 1 milliGRAM(s) IntraMuscular once  insulin glargine Injectable (LANTUS) 12 Unit(s) SubCutaneous <User Schedule>  insulin lispro (ADMELOG) corrective regimen sliding scale   SubCutaneous three times a day before meals  insulin lispro (ADMELOG) corrective regimen sliding scale   SubCutaneous at bedtime  losartan 75 milliGRAM(s) Oral daily  metoprolol tartrate 25 milliGRAM(s) Oral two times a day    MEDICATIONS  (PRN):  acetaminophen   Tablet .. 650 milliGRAM(s) Oral every 4 hours PRN Temp greater or equal to 38C (100.4F), Mild Pain (1 - 3)  ALBUTerol    90 MICROgram(s) HFA Inhaler 2 Puff(s) Inhalation every 4 hours PRN Shortness of Breath and/or Wheezing  benzonatate 100 milliGRAM(s) Oral every 8 hours PRN Cough  guaiFENesin Oral Liquid (Sugar-Free) 100 milliGRAM(s) Oral every 6 hours PRN Cough  hydrALAZINE Injectable 5 milliGRAM(s) IV Push every 6 hours PRN SBP>160  melatonin 5 milliGRAM(s) Oral at bedtime PRN Sleep  polyethylene glycol 3350 17 Gram(s) Oral daily PRN Constipation    ICU Vital Signs Last 24 Hrs  T(C): 36.3 (18 Sep 2021 10:00), Max: 37.6 (17 Sep 2021 22:00)  T(F): 97.4 (18 Sep 2021 10:00), Max: 99.6 (17 Sep 2021 22:00)  HR: 100 (18 Sep 2021 10:00) (86 - 116)  BP: 151/97 (18 Sep 2021 10:00) (103/82 - 151/97)  BP(mean): 108 (18 Sep 2021 10:00) (72 - 109)  ABP: --  ABP(mean): --  RR: 23 (18 Sep 2021 11:30) (19 - 44)  SpO2: 91% (18 Sep 2021 11:30) (86% - 100%)    I&O's Summary    17 Sep 2021 07:01  -  18 Sep 2021 07:00  --------------------------------------------------------  IN: 0 mL / OUT: 1300 mL / NET: -1300 mL    Physical exam    general- comfortable, but dyspneic with exertion  HEENT - nc/at  robert supple  lungs bilateral rhonchi  cv rrr  abdomen - soft, non tender  ext warm                        7.3    18.42 )-----------( 480      ( 18 Sep 2021 06:50 )             23.7       09-18    136  |  100  |  8   ----------------------------<  95  3.6   |  28  |  0.74    Ca    8.7      18 Sep 2021 06:50    DVT Prophylaxis:    Lovenox                                                             Advanced Directives: Full Code

## 2021-09-18 NOTE — PROGRESS NOTE ADULT - ASSESSMENT
1) COVID Pneumonia  2) Abnormal CXR  3) Dyspnea  4) Hypoxemic Respiratory Failure   5) Suspected superimposed nosocomial pneumonia    56 M noted to have COVID 7/15  On arrival hypoxic to 80s and tachypneic. NRB placed,in the ER saturating 95%. Na 126 s/p 1L NS. CXR: Frandy infiltrates. Last scr 1.4 in 2019-> today 1.9 unknown if underlying ckd.  Treated with IV Remdesivir and Decadron, Tocilizumab   Given the obesity/hypertension and prior co-morbidities, he is at risk of intubation but continue HFNC, respiratory status remains critical  DDimer elevated, was on therapeutic lovenox, transitioned to 40mg q 12  Completed Solumedrol, Symbicort 160/4.5 BID ([email or web address], STOIC study).Using HFNC  30LPM, 45%FiO2  SaO2 is now 88-95%  Respiratory status is tenuous, work of breathing is improving. Continue titrating down HFNC  ICU level care appreciated  Hgb improving  Repeat ABG 7.36/60/91 reviewed  time spent taking care of critically ill pt 30 mins /all recent data reviewed including imaging    9/17  remains on high Fio2 requirement due to ARDS post COVID pneumonia  Developed pulm fibrosis post COVID  covering for Dr Arceo  data discussed with overnight RN staff  Will continue to monitor  ct chest once stable for transfer    9/18  prolonged hospitalization with ARDS post COVID pneumonia  Suspected superimposed nosocomial pneumonia / upper lobe infiltrates  Decline in resp status with worsening cxr as well as higher fio2 requirements are all worrisome findings  superimposed bacterial infection likely with leukocytosis and increased infiltrates seen  suggest re eval with Id consultants for role of antibiotic therapy  anemia with gradual drop in HCT followed by serial cbc / doubt DAH / pneumonitis  steroids already tapered off  will resume Iv cefepime and get ID re eval for coverage / discussed with Dr Chamorro today  sputum for gs/ cx if available  will discuss with ICU team

## 2021-09-18 NOTE — PROGRESS NOTE ADULT - SUBJECTIVE AND OBJECTIVE BOX
Patient is a 55y old  Male who presents with a chief complaint of cough/sob (23 Jul 2021 14:03)      HPI:  55 M with pmh HLD, dm, htn presents with 8 days onset of covid symptoms which included, cough, fevers, sob, hypoxia, fevers, diarrhea, chills and myalgias. TEsted positive 7/15. Wife endorsed he was becoming more sob of recently. On arrival hypoxic to 80s and tachypneic. NRB placed, presently on 15% and saturating 95%. Na 126 s/p 1L NS. CXR: Frandy infiltrates. Last scr 1.4 in 2019-> today 1.9 unknown if underlying ckd.  Patient not vaccinated.   Last seen by me in 2019  History of ROBERT and uncontrolled hypertension    9/16  Uneventful pulmonary night  9/17  remains on high Fio2 requirement  covering for Dr Arceo  data discussed with overnight RN staff  9/18  covering for Dr Arceo  pt with some increased Fio2 requirement overnight  increased resp rate but adequate oxygenation / resting this am    MEDICATIONS  (STANDING):  aspirin  chewable 81 milliGRAM(s) Oral daily  atorvastatin 80 milliGRAM(s) Oral at bedtime  budesonide 160 MICROgram(s)/formoterol 4.5 MICROgram(s) Inhaler 2 Puff(s) Inhalation two times a day  cholecalciferol 2000 Unit(s) Oral daily  dextrose 40% Gel 15 Gram(s) Oral once  dextrose 5%. 1000 milliLiter(s) (50 mL/Hr) IV Continuous <Continuous>  dextrose 5%. 1000 milliLiter(s) (100 mL/Hr) IV Continuous <Continuous>  dextrose 50% Injectable 25 Gram(s) IV Push once  dextrose 50% Injectable 12.5 Gram(s) IV Push once  dextrose 50% Injectable 25 Gram(s) IV Push once  enoxaparin Injectable 40 milliGRAM(s) SubCutaneous every 12 hours  glucagon  Injectable 1 milliGRAM(s) IntraMuscular once  insulin glargine Injectable (LANTUS) 12 Unit(s) SubCutaneous <User Schedule>  insulin lispro (ADMELOG) corrective regimen sliding scale   SubCutaneous three times a day before meals  insulin lispro (ADMELOG) corrective regimen sliding scale   SubCutaneous at bedtime  losartan 75 milliGRAM(s) Oral daily  metoprolol tartrate 25 milliGRAM(s) Oral two times a day        MEDICATIONS  (PRN):  acetaminophen   Tablet .. 650 milliGRAM(s) Oral every 4 hours PRN Temp greater or equal to 38C (100.4F), Mild Pain (1 - 3)  ALBUTerol    90 MICROgram(s) HFA Inhaler 2 Puff(s) Inhalation every 4 hours PRN Shortness of Breath and/or Wheezing  ALPRAZolam 0.5 milliGRAM(s) Oral at bedtime PRN anxiety  benzonatate 100 milliGRAM(s) Oral every 8 hours PRN Cough  guaiFENesin Oral Liquid (Sugar-Free) 100 milliGRAM(s) Oral every 6 hours PRN Cough  hydrALAZINE Injectable 5 milliGRAM(s) IV Push every 6 hours PRN SBP>160  melatonin 5 milliGRAM(s) Oral at bedtime PRN Sleep  polyethylene glycol 3350 17 Gram(s) Oral daily PRN Constipation  Treated with IV Remdesivir and Decadron, now on Toci    ICU Vital Signs Last 24 Hrs  T(C): 36.9 (18 Sep 2021 04:00), Max: 37.6 (17 Sep 2021 22:00)  T(F): 98.5 (18 Sep 2021 04:00), Max: 99.6 (17 Sep 2021 22:00)  HR: 95 (18 Sep 2021 09:00) (86 - 116)  BP: 137/86 (18 Sep 2021 09:00) (103/82 - 151/84)  BP(mean): 97 (18 Sep 2021 09:00) (72 - 109)  ABP: --  ABP(mean): --  RR: 25 (18 Sep 2021 09:24) (19 - 44)  SpO2: 92% (18 Sep 2021 09:24) (86% - 100%)      I&O's Detail    17 Sep 2021 07:01  -  18 Sep 2021 07:00  --------------------------------------------------------  IN:  Total IN: 0 mL    OUT:    Incontinent per Retracted Penis Pouch (mL): 1300 mL  Total OUT: 1300 mL    Total NET: -1300 mL        PHYSICAL EXAM  General Appearance: On HFNC  Lungs: coarse bilaterally  Heart: +S1,S2  Abdomen: Soft, non-tender, bowel sounds active   Extremities: no cyanosis or edema, no joint swelling  Skin: Skin color, texture normal, no rashes   Neurologic: Alert and oriented X3 , non focal, not disoriented                                   7.3    18.42 )-----------( 480      ( 18 Sep 2021 06:50 )             23.7   09-18    136  |  100  |  8   ----------------------------<  95  3.6   |  28  |  0.74    Ca    8.7      18 Sep 2021 06:50                 7.8    20.08 )-----------( 338      ( 12 Sep 2021 05:49 )             24.7   09-12    136  |  101  |  9   ----------------------------<  82  3.4<L>   |  29  |  0.74    Ca    8.9      12 Sep 2021 05:49  Phos  3.1     09-12  Mg     1.7     09-12      Pro BNP 2446  repeat              08-26    136  |  102  |  18  ----------------------------<  87  3.7   |  34<H>  |  0.56    Ca    8.3<L>      26 Aug 2021 07:03  Phos  3.6     08-26  Mg     2.0     08-26    TPro  5.5<L>  /  Alb  2.1<L>  /  TBili  0.5  /  DBili  x   /  AST  23  /  ALT  51  /  AlkPhos  79  08-26                          8.0    11.96 )-----------( 278      ( 26 Aug 2021 07:03 )             26.1   08-26    136  |  102  |  18  ----------------------------<  87  3.7   |  34<H>  |  0.56    Ca    8.3<L>      26 Aug 2021 07:03  Phos  3.6     08-26  Mg     2.0     08-26    TPro  5.5<L>  /  Alb  2.1<L>  /  TBili  0.5  /  DBili  x   /  AST  23  /  ALT  51  /  AlkPhos  79  08-26                          8.5    12.89 )-----------( 269      ( 25 Aug 2021 07:10 )             26.7   08-25    138  |  103  |  20  ----------------------------<  143<H>  3.7   |  32<H>  |  0.58    Ca    8.1<L>      25 Aug 2021 07:10  Phos  3.7     08-25  Mg     2.0     08-25    TPro  5.6<L>  /  Alb  2.2<L>  /  TBili  0.5  /  DBili  x   /  AST  32  /  ALT  74  /  AlkPhos  103  08-24                          8.5    14.05 )-----------( 292      ( 18 Aug 2021 07:01 )             26.2   08-18    138  |  104  |  24<H>  ----------------------------<  78  4.1   |  29  |  1.03    Ca    8.8      18 Aug 2021 07:01    TPro  6.3  /  Alb  2.3<L>  /  TBili  0.6  /  DBili  x   /  AST  32  /  ALT  73  /  AlkPhos  168<H>  08-17                                           10.0   19.05 )-----------( 179      ( 08 Aug 2021 07:11 )             30.3   08-08    137  |  102  |  27<H>  ----------------------------<  121<H>  3.9   |  29  |  0.77    Ca    8.4<L>      08 Aug 2021 07:11  Phos  3.4     08-08  Mg     2.0     08-08           TPro  5.2<L>  /  Alb  2.3<L>  /  TBili  0.8  /  DBili  x   /  AST  81<H>  /  ALT  213<H>  /  AlkPhos  200<H>  08-06                            12.9   15.00 )-----------( 366      ( 26 Jul 2021 06:20 )             38.4   07-26          < from: Xray Chest 1 View- PORTABLE-Urgent (07.21.21 @ 15:33) >    PROCEDURE DATE:  07/21/2021          INTERPRETATION:  AP chest on July 21, 2021 at 3:27 PM. Patient is short of breath with cough and fever.    Heart magnified by technique.    There arescattered mid lower lung field infiltrates right greater than left possibly related: Pneumonia.    The lungs are clear on August 30, 2019.    IMPRESSION: Bilateral infiltrates as above.    < end of copied text >  < from: US Duplex Venous Lower Ext Complete, Bilateral (07.25.21 @ 08:42) >  COMPARISON: None available.    TECHNIQUE: Duplex sonography of the BILATERAL LOWER extremity veins with color and spectral Doppler, with and without compression.    FINDINGS:    RIGHT:  Normal compressibility of the RIGHT common femoral, femoral and popliteal veins.  Doppler examination shows normal spontaneous and phasic flow.  No RIGHT calf vein thrombosis is detected.    LEFT:  Normal compressibility of the LEFT common femoral, femoral and popliteal veins.  Doppler examination shows normal spontaneous and phasic flow.  No LEFT calf vein thrombosis is detected.    IMPRESSION:  No evidence of deep venous thrombosis in either lower extremity.    < end of copied text >  RADIOLOGY & ADDITIONAL STUDIES:    < from: Xray Chest 1 View- PORTABLE-Urgent (Xray Chest 1 View- PORTABLE-Urgent .) (07.26.21 @ 08:43) >    PROCEDURE DATE:  07/26/2021          INTERPRETATION:  Portable chest radiograph    CLINICAL INFORMATION: Pneumonia due to Covid 19.. Follow-up    TECHNIQUE:  Portable  AP view of the chest was obtained.    COMPARISON: 7/21/2021 chest available for review.    FINDINGS:    The lungs show stable bilateral  multifocal and diffuse ill-defined airspace opacities.. No pneumothorax.    The  heart is enlarged in transverse diameter. No hilar mass.     Visualized osseous structures are intact.    IMPRESSION:   Stable bilateral  multifocal and diffuse ill-defined airspace opacities..    < end of copied text >  < from: US Duplex Venous Lower Ext Complete, Bilateral (08.06.21 @ 17:16) >  FINDINGS:    RIGHT:  Normal compressibility of the RIGHT common femoral, femoral and popliteal veins.  Doppler examination shows normal spontaneous and phasic flow.  No RIGHT calf vein thrombosis is detected.    LEFT:  Normal compressibility of the LEFT common femoral, femoral and popliteal veins.  Doppler examination shows normal spontaneous and phasic flow.  No LEFT calf vein thrombosisis detected.    IMPRESSION:  No evidence of deep venous thrombosis in either lower extremity.    < end of copied text >  < from: Xray Chest 1 View- PORTABLE-Urgent (Xray Chest 1 View- PORTABLE-Urgent .) (08.06.21 @ 16:45) >  INTERPRETATION:  Portable chest radiograph    CLINICAL INFORMATION: Pneumonia due to Covid 19.    TECHNIQUE:  Portable  AP view of the chest was obtained.    COMPARISON: 8/1/2021 chest radiograph available for review.    FINDINGS:    The lungs show decreasing bilateral diffuse airspace disease.. No pneumothorax.    The  heart is mildly enlarged in transverse diameter. No hilar mass.   Visualized osseous structures are intact.    IMPRESSION:   Decreasing bilateral diffuse airspace disease..    < end of copied text >  xr< from: US Duplex Venous Lower Ext Complete, Bilateral (08.17.21 @ 08:58) >  COMPARISON: None available.    TECHNIQUE: Duplex sonography of the BILATERAL LOWER extremity veins with color and spectral Doppler, with and without compression.    FINDINGS:    RIGHT:  Normal compressibility of the RIGHT common femoral, femoral and popliteal veins.  Doppler examination shows normal spontaneous and phasic flow.  No RIGHT calf vein thrombosis is detected.    LEFT:  Normal compressibility of the LEFT common femoral, femoral and popliteal veins.  Doppler examination shows normal spontaneous and phasic flow.  No LEFT calf vein thrombosis is detected.    IMPRESSION:  No evidence of deep venous thrombosis in either lower extremity.    < end of copied text >  r< from: US Duplex Venous Lower Ext Complete, Bilateral (08.23.21 @ 12:53) >  PROCEDURE DATE:  08/23/2021          INTERPRETATION:  CLINICAL INFORMATION: 55 years  Male with r/o DVT    evaluate for DVT. Covid positive. Elevated d-dimer.    COMPARISON: None available.    TECHNIQUE: Duplex sonography of the BILATERAL LOWER extremity veins with color and spectral Doppler, with and without compression.    FINDINGS:    RIGHT:  Normal compressibility of the RIGHT common femoral, femoral and popliteal veins.  Doppler examination shows normal spontaneous and phasic flow.  No RIGHT calf vein thrombosis is detected.    LEFT:  Normal compressibility of the LEFT common femoral, femoral and popliteal veins.  Doppler examination shows normal spontaneous and phasic flow.  No LEFT calf vein thrombosis is detected.    IMPRESSION:  No evidence of deep venous thrombosis in either lower extremity.    < end of copied text >  < from: Xray Chest 1 View- PORTABLE-Urgent (Xray Chest 1 View- PORTABLE-Urgent .) (08.23.21 @ 11:06) >    PROCEDURE DATE:  08/23/2021          INTERPRETATION:  History: Dyspnea, Covid    Chest:  one view.    Comparison: 08/20/2021    AP radiograph of the chest demonstrates moderate diffuse alveolar infiltrates unchanged. The cardiac silhouette is normal in size. Osseous structures are intact.    Impression:moderate diffuse alveolar infiltrates unchanged.    < end of copied text >  r< from: Xray Chest 1 View- PORTABLE-Urgent (Xray Chest 1 View- PORTABLE-Urgent .) (08.23.21 @ 11:06) >  PROCEDURE DATE:  08/23/2021          INTERPRETATION:  History: Dyspnea, Covid    Chest:  one view.    Comparison: 08/20/2021    AP radiograph of the chest demonstrates moderate diffuse alveolar infiltrates unchanged. The cardiac silhouette is normal in size. Osseous structures are intact.    Impression:moderate diffuse alveolar infiltrates unchanged.    < end of copied text >  < from: Xray Chest 1 View-PORTABLE IMMEDIATE (Xray Chest 1 View-PORTABLE IMMEDIATE .) (09.11.21 @ 12:32) >    PROCEDURE DATE:  09/11/2021          INTERPRETATION:  XR CHEST IMMEDIATE    Single AP view    HISTORY:  Fever    Comparison: Chest x-ray 9/4/2021    The cardiac silhouette is within normal limits. Diffuse bilateral airspace disease. No pleural abnormality.    IMPRESSION: Unchanged bilateral airspace disease    < end of copied text >  x< from: Xray Chest 1 View- PORTABLE-Routine (Xray Chest 1 View- PORTABLE-Routine in AM.) (09.16.21 @ 07:15) >    INTERPRETATION:  Portable chest radiograph    CLINICAL INFORMATION: Pneumonia due to Covid 19.    TECHNIQUE:  Portable  AP view of the chest.    COMPARISON: 9/11/2021 chest available for review.    FINDINGS:    The lungs show persistent bilateral  multifocal and diffuse ill-defined airspace opacities. No pneumothorax.    The  heart is enlarged in transverse diameter. No hilar mass.     Visualizedosseous structures are intact.    IMPRESSION:   No significant interval change.    < end of copied text >

## 2021-09-18 NOTE — PROGRESS NOTE ADULT - ASSESSMENT
54 y/o male with HLD, HTN and DM--Non Vacc--admitted with Viral PNA sepsis secondary to COVID--Acute type 1 resp failure and ARDS.   Remains at risk for intubation  on high flow oxygen  last cxr 9/16 with severe bilateral infiltrates  Severe Prot Ted malnutrition     Critically ill.  High risk for acute decompensation and deterioration including death   Patient requires critical care for support of one or more vital organ systems with a high probability of imminent or life threatening deterioration in his/her condition     Plan:    HFNC--keep titrating oxygen saturation titrate down still needs high flow oxygenn  Follow up Cxs negative, of antibiotics  steroids titrated off   glucose under good control, d/c lantus and observe  PRN QHS Xanax   now testing covid negative  OOB  DVT prophy--SCD and LMWH

## 2021-09-18 NOTE — PROGRESS NOTE ADULT - SUBJECTIVE AND OBJECTIVE BOX
Patient is a 56y old  Male who presents with a chief complaint of cough/sob (17 Sep 2021 14:19)      BRIEF HOSPITAL COURSE: HPI: 56 yo male with PMHx HTN, HLD, T2DM, admitted on 7/21 with COVID-19 viral pneumonia and acute hypoxemic respiratory failure, complicated by worsening ARDS requiring ICU level of care since 7/24.       EVENTS: HFNC dependent respiratory failure with intermittent desaturations, desaturated to 67% transitioning from chair to bed, then sustaining 78%. Uptitrated HFNC 75% / 35 LPM. Having rib pain, not able to self-prone      PAST MEDICAL & SURGICAL HISTORY:  HTN (hypertension)    Diabetes        SOCIAL HISTORY: non-smoker, no etoh        Review of Systems:  CONSTITUTIONAL: No fever, chills, or fatigue  EYES: No visual disturbances  ENMT:  No difficulty hearing  NECK: No pain  RESPIRATORY: +shortness of breath, rib pain  CARDIOVASCULAR: No chest pain, palpitations, or leg swelling  GASTROINTESTINAL: No abdominal pain. No nausea, vomiting, diarrhea, or constipation. No hematemesis, melena or hematochezia  GENITOURINARY: No dysuria, frequency, hematuria, or incontinence  NEUROLOGICAL: No headaches, loss of strength, numbness, or tremors  SKIN: No rashes  MUSCULOSKELETAL: No back or extremity pain. No calf pain  PSYCHIATRIC: No depression, anxiety, or difficulty sleeping        Medications:    hydrALAZINE Injectable 5 milliGRAM(s) IV Push every 6 hours PRN  losartan 75 milliGRAM(s) Oral daily  metoprolol tartrate 25 milliGRAM(s) Oral two times a day    ALBUTerol    90 MICROgram(s) HFA Inhaler 2 Puff(s) Inhalation every 4 hours PRN  benzonatate 100 milliGRAM(s) Oral every 8 hours PRN  budesonide 160 MICROgram(s)/formoterol 4.5 MICROgram(s) Inhaler 2 Puff(s) Inhalation two times a day  guaiFENesin Oral Liquid (Sugar-Free) 100 milliGRAM(s) Oral every 6 hours PRN    acetaminophen   Tablet .. 650 milliGRAM(s) Oral every 4 hours PRN  melatonin 5 milliGRAM(s) Oral at bedtime PRN      aspirin  chewable 81 milliGRAM(s) Oral daily  enoxaparin Injectable 40 milliGRAM(s) SubCutaneous every 12 hours    polyethylene glycol 3350 17 Gram(s) Oral daily PRN      atorvastatin 80 milliGRAM(s) Oral at bedtime  dextrose 40% Gel 15 Gram(s) Oral once  dextrose 50% Injectable 25 Gram(s) IV Push once  dextrose 50% Injectable 12.5 Gram(s) IV Push once  dextrose 50% Injectable 25 Gram(s) IV Push once  glucagon  Injectable 1 milliGRAM(s) IntraMuscular once  insulin glargine Injectable (LANTUS) 12 Unit(s) SubCutaneous <User Schedule>  insulin lispro (ADMELOG) corrective regimen sliding scale   SubCutaneous three times a day before meals  insulin lispro (ADMELOG) corrective regimen sliding scale   SubCutaneous at bedtime    cholecalciferol 2000 Unit(s) Oral daily  dextrose 5%. 1000 milliLiter(s) IV Continuous <Continuous>  dextrose 5%. 1000 milliLiter(s) IV Continuous <Continuous>                ICU Vital Signs Last 24 Hrs  T(C): 37.6 (17 Sep 2021 22:00), Max: 37.6 (17 Sep 2021 22:00)  T(F): 99.6 (17 Sep 2021 22:00), Max: 99.6 (17 Sep 2021 22:00)  HR: 89 (18 Sep 2021 04:00) (87 - 116)  BP: 135/54 (18 Sep 2021 04:00) (103/82 - 150/89)  BP(mean): 72 (18 Sep 2021 04:00) (72 - 109)  ABP: --  ABP(mean): --  RR: 35 (18 Sep 2021 04:00) (19 - 44)  SpO2: 91% (18 Sep 2021 04:00) (86% - 100%)          I&O's Detail    16 Sep 2021 07:01  -  17 Sep 2021 07:00  --------------------------------------------------------  IN:    Oral Fluid: 550 mL  Total IN: 550 mL    OUT:    Incontinent per Retracted Penis Pouch (mL): 1150 mL  Total OUT: 1150 mL    Total NET: -600 mL      17 Sep 2021 07:01  -  18 Sep 2021 04:38  --------------------------------------------------------  IN:  Total IN: 0 mL    OUT:    Incontinent per Retracted Penis Pouch (mL): 650 mL  Total OUT: 650 mL    Total NET: -650 mL            LABS:                CAPILLARY BLOOD GLUCOSE      POCT Blood Glucose.: 144 mg/dL (17 Sep 2021 23:24)        CULTURES:  Culture Results:   No Growth Final (09-11 @ 12:25)            Physical Examination:    General: No acute distress.      HEENT: Pupils equal, reactive to light.  Symmetric.    PULM: Coarse bilaterally    CVS: Anterior chest wall pain reproducible on exam. Regular rate and rhythm, no murmurs, rubs, or gallops    ABD: Soft, nondistended, nontender, normoactive bowel sounds, no masses    EXT: No edema, nontender    SKIN: Warm and well perfused, no rashes noted.    NEURO: Alert, oriented, interactive, nonfocal        RADIOLOGY: < from: Xray Chest 1 View- PORTABLE-Routine (Xray Chest 1 View- PORTABLE-Routine in AM.) (09.16.21 @ 07:15) >    EXAM:  XR CHEST PORTABLE ROUTINE 1V                            PROCEDURE DATE:  09/16/2021          INTERPRETATION:  Portable chest radiograph    CLINICAL INFORMATION: Pneumonia due to Covid 19.    TECHNIQUE:  Portable  AP view of the chest.    COMPARISON: 9/11/2021 chest available for review.    FINDINGS:    The lungs show persistent bilateral  multifocal and diffuse ill-defined airspace opacities. No pneumothorax.    The  heart is enlarged in transverse diameter. No hilar mass.     Visualizedosseous structures are intact.    IMPRESSION:   No significant interval change.    --- End of Report ---        TERRI CANTU MD; Attending Radiologist  This document has been electronically signed. Sep 17 2021  4:19PM    < end of copied text >          INVASIVE LINES: x   INDWELLING GOFF: x   VTE PROPHYLAXIS: Lovenox   CAM ICU: -   CODE STATUS: FULL        CRITICAL CARE TIME SPENT: 32 minutes spent performing frequent bedside reassessments and augmenting plan of care to address problems of acute critical illness that pose high probability of life threatening deterioration and/or end organ damage/dysfunction and discussing goals of care, non-inclusive of time spent on procedures performed.

## 2021-09-19 LAB
ANION GAP SERPL CALC-SCNC: 7 MMOL/L — SIGNIFICANT CHANGE UP (ref 5–17)
BUN SERPL-MCNC: 7 MG/DL — SIGNIFICANT CHANGE UP (ref 7–23)
CALCIUM SERPL-MCNC: 8.8 MG/DL — SIGNIFICANT CHANGE UP (ref 8.5–10.1)
CHLORIDE SERPL-SCNC: 102 MMOL/L — SIGNIFICANT CHANGE UP (ref 96–108)
CO2 SERPL-SCNC: 28 MMOL/L — SIGNIFICANT CHANGE UP (ref 22–31)
CREAT SERPL-MCNC: 0.79 MG/DL — SIGNIFICANT CHANGE UP (ref 0.5–1.3)
GLUCOSE SERPL-MCNC: 109 MG/DL — HIGH (ref 70–99)
HCT VFR BLD CALC: 24.2 % — LOW (ref 39–50)
HGB BLD-MCNC: 7.6 G/DL — LOW (ref 13–17)
MCHC RBC-ENTMCNC: 26.7 PG — LOW (ref 27–34)
MCHC RBC-ENTMCNC: 31.4 GM/DL — LOW (ref 32–36)
MCV RBC AUTO: 84.9 FL — SIGNIFICANT CHANGE UP (ref 80–100)
PLATELET # BLD AUTO: 508 K/UL — HIGH (ref 150–400)
POTASSIUM SERPL-MCNC: 3.6 MMOL/L — SIGNIFICANT CHANGE UP (ref 3.5–5.3)
POTASSIUM SERPL-SCNC: 3.6 MMOL/L — SIGNIFICANT CHANGE UP (ref 3.5–5.3)
PROCALCITONIN SERPL-MCNC: 0.12 NG/ML — HIGH (ref 0.02–0.1)
RBC # BLD: 2.85 M/UL — LOW (ref 4.2–5.8)
RBC # FLD: 14.2 % — SIGNIFICANT CHANGE UP (ref 10.3–14.5)
SODIUM SERPL-SCNC: 137 MMOL/L — SIGNIFICANT CHANGE UP (ref 135–145)
WBC # BLD: 17.3 K/UL — HIGH (ref 3.8–10.5)
WBC # FLD AUTO: 17.3 K/UL — HIGH (ref 3.8–10.5)

## 2021-09-19 PROCEDURE — 99233 SBSQ HOSP IP/OBS HIGH 50: CPT

## 2021-09-19 PROCEDURE — 71045 X-RAY EXAM CHEST 1 VIEW: CPT | Mod: 26

## 2021-09-19 RX ADMIN — Medication 3: at 16:50

## 2021-09-19 RX ADMIN — CEFEPIME 100 MILLIGRAM(S): 1 INJECTION, POWDER, FOR SOLUTION INTRAMUSCULAR; INTRAVENOUS at 22:16

## 2021-09-19 RX ADMIN — Medication 100 MILLIGRAM(S): at 22:15

## 2021-09-19 RX ADMIN — LOSARTAN POTASSIUM 75 MILLIGRAM(S): 100 TABLET, FILM COATED ORAL at 10:24

## 2021-09-19 RX ADMIN — ENOXAPARIN SODIUM 40 MILLIGRAM(S): 100 INJECTION SUBCUTANEOUS at 10:25

## 2021-09-19 RX ADMIN — Medication 81 MILLIGRAM(S): at 10:25

## 2021-09-19 RX ADMIN — Medication 2000 UNIT(S): at 10:25

## 2021-09-19 RX ADMIN — ENOXAPARIN SODIUM 40 MILLIGRAM(S): 100 INJECTION SUBCUTANEOUS at 22:15

## 2021-09-19 RX ADMIN — Medication 5 MILLIGRAM(S): at 22:15

## 2021-09-19 RX ADMIN — Medication 25 MILLIGRAM(S): at 10:25

## 2021-09-19 RX ADMIN — Medication 25 MILLIGRAM(S): at 22:16

## 2021-09-19 RX ADMIN — CEFEPIME 100 MILLIGRAM(S): 1 INJECTION, POWDER, FOR SOLUTION INTRAMUSCULAR; INTRAVENOUS at 10:24

## 2021-09-19 RX ADMIN — ATORVASTATIN CALCIUM 80 MILLIGRAM(S): 80 TABLET, FILM COATED ORAL at 22:16

## 2021-09-19 RX ADMIN — BUDESONIDE AND FORMOTEROL FUMARATE DIHYDRATE 2 PUFF(S): 160; 4.5 AEROSOL RESPIRATORY (INHALATION) at 09:28

## 2021-09-19 RX ADMIN — BUDESONIDE AND FORMOTEROL FUMARATE DIHYDRATE 2 PUFF(S): 160; 4.5 AEROSOL RESPIRATORY (INHALATION) at 20:44

## 2021-09-19 NOTE — PROGRESS NOTE ADULT - CRITICAL CARE SERVICES PROVIDED
Critical care services provided

## 2021-09-19 NOTE — PROGRESS NOTE ADULT - ASSESSMENT
54 y/o male with HLD, HTN and DM--Non Vacc--admitted with Viral PNA sepsis secondary to COVID--Acute type 1 resp failure and ARDS.   Remains at risk for intubation  on high flow oxygen  last cxr 9/19 with persistent bilateral infiltrates      Critically ill.  High risk for acute decompensation and deterioration including death   Patient requires critical care for support of one or more vital organ systems with a high probability of imminent or life threatening deterioration in his/her condition     Plan:    HFNC--keep titrating oxygen saturation titrate down seem stuck on current levels  Follow up Cxs negative, was started on cefepime by pulmonary for inc wbc, and persistent infiltrates, will check procal level  steroids titrated off   glucose under good control, d/c lantus and observe  PRN QHS Xanax   now testing covid negative  OOB  DVT prophy--SCD and LMWH

## 2021-09-19 NOTE — PROGRESS NOTE ADULT - SUBJECTIVE AND OBJECTIVE BOX
Patient is a 56y old  Male who presents with a chief complaint of cough/sob (18 Sep 2021 11:57)      BRIEF HOSPITAL COURSE: 55 y/o male with pmhx of HTN, HLD, DM II admitted on 7/21 with COVID-19 pna and acute hypoxic respiratory failure transferred to ICU on 7/24 for worsening hypoxia requiring HFNC/bipap and has been high flow dependent since.     Events last 24 hours: received on 50%/35 lpm. became hypoxic, had to increase to 65% fio2.     PAST MEDICAL & SURGICAL HISTORY:  HTN (hypertension)    Diabetes        Review of Systems:  CONSTITUTIONAL: No fever, chills, or fatigue  EYES: No eye pain, visual disturbances, or discharge  ENMT:  No difficulty hearing, tinnitus, vertigo; No sinus or throat pain  NECK: No pain or stiffness  RESPIRATORY: No cough, wheezing, chills or hemoptysis; +shortness of breath  CARDIOVASCULAR: No chest pain, palpitations, dizziness, or leg swelling  GASTROINTESTINAL: No abdominal or epigastric pain. No nausea, vomiting, or hematemesis; No diarrhea or constipation. No melena or hematochezia.  GENITOURINARY: No dysuria, frequency, hematuria, or incontinence  NEUROLOGICAL: No headaches, memory loss, loss of strength, numbness, or tremors  SKIN: No itching, burning, rashes, or lesions   MUSCULOSKELETAL: No joint pain or swelling; No muscle, back, or extremity pain  PSYCHIATRIC: No depression, anxiety, mood swings, or difficulty sleeping      Medications:  cefepime   IVPB 2000 milliGRAM(s) IV Intermittent every 12 hours    hydrALAZINE Injectable 5 milliGRAM(s) IV Push every 6 hours PRN  losartan 75 milliGRAM(s) Oral daily  metoprolol tartrate 25 milliGRAM(s) Oral two times a day    ALBUTerol    90 MICROgram(s) HFA Inhaler 2 Puff(s) Inhalation every 4 hours PRN  benzonatate 100 milliGRAM(s) Oral every 8 hours PRN  budesonide 160 MICROgram(s)/formoterol 4.5 MICROgram(s) Inhaler 2 Puff(s) Inhalation two times a day  guaiFENesin Oral Liquid (Sugar-Free) 100 milliGRAM(s) Oral every 6 hours PRN    acetaminophen   Tablet .. 650 milliGRAM(s) Oral every 4 hours PRN  melatonin 5 milliGRAM(s) Oral at bedtime PRN      aspirin  chewable 81 milliGRAM(s) Oral daily  enoxaparin Injectable 40 milliGRAM(s) SubCutaneous every 12 hours    polyethylene glycol 3350 17 Gram(s) Oral daily PRN      atorvastatin 80 milliGRAM(s) Oral at bedtime  dextrose 40% Gel 15 Gram(s) Oral once  dextrose 50% Injectable 25 Gram(s) IV Push once  dextrose 50% Injectable 12.5 Gram(s) IV Push once  dextrose 50% Injectable 25 Gram(s) IV Push once  glucagon  Injectable 1 milliGRAM(s) IntraMuscular once  insulin lispro (ADMELOG) corrective regimen sliding scale   SubCutaneous three times a day before meals  insulin lispro (ADMELOG) corrective regimen sliding scale   SubCutaneous at bedtime    cholecalciferol 2000 Unit(s) Oral daily  dextrose 5%. 1000 milliLiter(s) IV Continuous <Continuous>  dextrose 5%. 1000 milliLiter(s) IV Continuous <Continuous>                ICU Vital Signs Last 24 Hrs  T(C): 37.3 (18 Sep 2021 20:00), Max: 37.3 (18 Sep 2021 20:00)  T(F): 99.2 (18 Sep 2021 20:00), Max: 99.2 (18 Sep 2021 20:00)  HR: 92 (18 Sep 2021 23:00) (86 - 110)  BP: 135/83 (18 Sep 2021 23:00) (103/82 - 151/97)  BP(mean): 95 (18 Sep 2021 23:00) (72 - 108)  ABP: --  ABP(mean): --  RR: 29 (18 Sep 2021 23:00) (21 - 37)  SpO2: 96% (18 Sep 2021 23:00) (82% - 100%)          I&O's Detail    17 Sep 2021 07:01  -  18 Sep 2021 07:00  --------------------------------------------------------  IN:  Total IN: 0 mL    OUT:    Incontinent per Retracted Penis Pouch (mL): 1300 mL  Total OUT: 1300 mL    Total NET: -1300 mL      18 Sep 2021 07:01  -  19 Sep 2021 00:39  --------------------------------------------------------  IN:    IV PiggyBack: 50 mL  Total IN: 50 mL    OUT:    Incontinent per Retracted Penis Pouch (mL): 450 mL  Total OUT: 450 mL    Total NET: -400 mL            LABS:                        7.3    18.42 )-----------( 480      ( 18 Sep 2021 06:50 )             23.7     09-18    136  |  100  |  8   ----------------------------<  95  3.6   |  28  |  0.74    Ca    8.7      18 Sep 2021 06:50            CAPILLARY BLOOD GLUCOSE      POCT Blood Glucose.: 156 mg/dL (18 Sep 2021 21:33)        CULTURES:      Physical Examination:    physical exam performed where clinically necessary based on current covid-19 standards limiting patient contact.    patient is alert and awake with no use of accessory respiratory muscles with RR tachypneic to 24.     DEVICES:     RADIOLOGY:     IMPRESSION:   No significant interval change.    --- End of Report ---            TERRI CANTU MD; Attending Radiologist  This document has been electronically signed. Sep 17 2021  4:19PM      CRITICAL CARE TIME SPENT: 31 minutes of critical care time spent providing medical care for patient's acute illness/conditions that impairs at least one vital organ system and/or poses a high risk of imminent or life threatening deterioration in the patient's condition. It includes time spent evaluating and treating the patient's acute illness as well as time spent reviewing labs, radiology, discussing goals of care with patient and/or patient's family, and discussing the case with a multidisciplinary team in an effort to prevent further life threatening deterioration or end organ damage. This time is independent of any procedures performed.

## 2021-09-19 NOTE — PROGRESS NOTE ADULT - CRITICAL CARE SERVICES
30
30
45
30
35
35
45
45
75
40
45
30
45
45
30
36
36
39
45
30
36
37
40
30
42
30
45
30

## 2021-09-19 NOTE — PROGRESS NOTE ADULT - SUBJECTIVE AND OBJECTIVE BOX
Patient is a 55y old  Male who presents with a chief complaint of cough/sob (23 Jul 2021 14:03)      HPI:  55 M with pmh HLD, dm, htn presents with 8 days onset of covid symptoms which included, cough, fevers, sob, hypoxia, fevers, diarrhea, chills and myalgias. TEsted positive 7/15. Wife endorsed he was becoming more sob of recently. On arrival hypoxic to 80s and tachypneic. NRB placed, presently on 15% and saturating 95%. Na 126 s/p 1L NS. CXR: Frandy infiltrates. Last scr 1.4 in 2019-> today 1.9 unknown if underlying ckd.  Patient not vaccinated.   Last seen by me in 2019  History of ROBERT and uncontrolled hypertension    9/16  Uneventful pulmonary night  9/17  remains on high Fio2 requirement  covering for Dr Arceo  data discussed with overnight RN staff  9/18  covering for Dr Arceo  pt with some increased Fio2 requirement overnight  increased resp rate but adequate oxygenation / resting this am  9/19  data discussed with Intensivist and ID team regarding cxr findings  sitting OOB in chair on high flow o2  MEDICATIONS  (STANDING):  aspirin  chewable 81 milliGRAM(s) Oral daily  atorvastatin 80 milliGRAM(s) Oral at bedtime  budesonide 160 MICROgram(s)/formoterol 4.5 MICROgram(s) Inhaler 2 Puff(s) Inhalation two times a day  cefepime   IVPB 2000 milliGRAM(s) IV Intermittent every 12 hours  cholecalciferol 2000 Unit(s) Oral daily  dextrose 40% Gel 15 Gram(s) Oral once  dextrose 5%. 1000 milliLiter(s) (50 mL/Hr) IV Continuous <Continuous>  dextrose 5%. 1000 milliLiter(s) (100 mL/Hr) IV Continuous <Continuous>  dextrose 50% Injectable 25 Gram(s) IV Push once  dextrose 50% Injectable 12.5 Gram(s) IV Push once  dextrose 50% Injectable 25 Gram(s) IV Push once  enoxaparin Injectable 40 milliGRAM(s) SubCutaneous every 12 hours  glucagon  Injectable 1 milliGRAM(s) IntraMuscular once  insulin lispro (ADMELOG) corrective regimen sliding scale   SubCutaneous three times a day before meals  insulin lispro (ADMELOG) corrective regimen sliding scale   SubCutaneous at bedtime  losartan 75 milliGRAM(s) Oral daily  metoprolol tartrate 25 milliGRAM(s) Oral two times a day          MEDICATIONS  (PRN):  acetaminophen   Tablet .. 650 milliGRAM(s) Oral every 4 hours PRN Temp greater or equal to 38C (100.4F), Mild Pain (1 - 3)  ALBUTerol    90 MICROgram(s) HFA Inhaler 2 Puff(s) Inhalation every 4 hours PRN Shortness of Breath and/or Wheezing  ALPRAZolam 0.5 milliGRAM(s) Oral at bedtime PRN anxiety  benzonatate 100 milliGRAM(s) Oral every 8 hours PRN Cough  guaiFENesin Oral Liquid (Sugar-Free) 100 milliGRAM(s) Oral every 6 hours PRN Cough  hydrALAZINE Injectable 5 milliGRAM(s) IV Push every 6 hours PRN SBP>160  melatonin 5 milliGRAM(s) Oral at bedtime PRN Sleep  polyethylene glycol 3350 17 Gram(s) Oral daily PRN Constipation  Treated with IV Remdesivir and Decadron, now on Toci    ICU Vital Signs Last 24 Hrs  T(C): 36.9 (19 Sep 2021 04:00), Max: 37.3 (18 Sep 2021 20:00)  T(F): 98.4 (19 Sep 2021 04:00), Max: 99.2 (18 Sep 2021 20:00)  HR: 95 (19 Sep 2021 08:00) (89 - 110)  BP: 138/83 (19 Sep 2021 08:00) (113/86 - 145/74)  BP(mean): 94 (19 Sep 2021 08:00) (86 - 104)  ABP: --  ABP(mean): --  RR: 40 (19 Sep 2021 09:28) (21 - 40)  SpO2: 90% (19 Sep 2021 09:28) (82% - 97%)    I&O's Detail    18 Sep 2021 07:01  -  19 Sep 2021 07:00  --------------------------------------------------------  IN:    IV PiggyBack: 50 mL  Total IN: 50 mL    OUT:    Incontinent per Retracted Penis Pouch (mL): 1650 mL  Total OUT: 1650 mL    Total NET: -1600 mL          PHYSICAL EXAM  General Appearance: On HFNC  Lungs: coarse bilaterally  Heart: +S1,S2  Abdomen: Soft, non-tender, bowel sounds active   Extremities: no cyanosis or edema, no joint swelling  Skin: Skin color, texture normal, no rashes   Neurologic: Alert and oriented X3 , non focal, not disoriented                                   7.3    18.42 )-----------( 480      ( 18 Sep 2021 06:50 )             23.7   09-18    136  |  100  |  8   ----------------------------<  95  3.6   |  28  |  0.74    Ca    8.7      18 Sep 2021 06:50                 7.8    20.08 )-----------( 338      ( 12 Sep 2021 05:49 )             24.7   09-12    136  |  101  |  9   ----------------------------<  82  3.4<L>   |  29  |  0.74    Ca    8.9      12 Sep 2021 05:49  Phos  3.1     09-12  Mg     1.7     09-12      Pro BNP 2446  repeat              08-26    136  |  102  |  18  ----------------------------<  87  3.7   |  34<H>  |  0.56    Ca    8.3<L>      26 Aug 2021 07:03  Phos  3.6     08-26  Mg     2.0     08-26    TPro  5.5<L>  /  Alb  2.1<L>  /  TBili  0.5  /  DBili  x   /  AST  23  /  ALT  51  /  AlkPhos  79  08-26                          8.0    11.96 )-----------( 278      ( 26 Aug 2021 07:03 )             26.1   08-26    136  |  102  |  18  ----------------------------<  87  3.7   |  34<H>  |  0.56    Ca    8.3<L>      26 Aug 2021 07:03  Phos  3.6     08-26  Mg     2.0     08-26    TPro  5.5<L>  /  Alb  2.1<L>  /  TBili  0.5  /  DBili  x   /  AST  23  /  ALT  51  /  AlkPhos  79  08-26                          8.5    12.89 )-----------( 269      ( 25 Aug 2021 07:10 )             26.7   08-25    138  |  103  |  20  ----------------------------<  143<H>  3.7   |  32<H>  |  0.58    Ca    8.1<L>      25 Aug 2021 07:10  Phos  3.7     08-25  Mg     2.0     08-25    TPro  5.6<L>  /  Alb  2.2<L>  /  TBili  0.5  /  DBili  x   /  AST  32  /  ALT  74  /  AlkPhos  103  08-24                          8.5    14.05 )-----------( 292      ( 18 Aug 2021 07:01 )             26.2   08-18    138  |  104  |  24<H>  ----------------------------<  78  4.1   |  29  |  1.03    Ca    8.8      18 Aug 2021 07:01    TPro  6.3  /  Alb  2.3<L>  /  TBili  0.6  /  DBili  x   /  AST  32  /  ALT  73  /  AlkPhos  168<H>  08-17                                           10.0   19.05 )-----------( 179      ( 08 Aug 2021 07:11 )             30.3   08-08    137  |  102  |  27<H>  ----------------------------<  121<H>  3.9   |  29  |  0.77    Ca    8.4<L>      08 Aug 2021 07:11  Phos  3.4     08-08  Mg     2.0     08-08           TPro  5.2<L>  /  Alb  2.3<L>  /  TBili  0.8  /  DBili  x   /  AST  81<H>  /  ALT  213<H>  /  AlkPhos  200<H>  08-06                            12.9   15.00 )-----------( 366      ( 26 Jul 2021 06:20 )             38.4   07-26          < from: Xray Chest 1 View- PORTABLE-Urgent (07.21.21 @ 15:33) >    PROCEDURE DATE:  07/21/2021          INTERPRETATION:  AP chest on July 21, 2021 at 3:27 PM. Patient is short of breath with cough and fever.    Heart magnified by technique.    There arescattered mid lower lung field infiltrates right greater than left possibly related: Pneumonia.    The lungs are clear on August 30, 2019.    IMPRESSION: Bilateral infiltrates as above.    < end of copied text >  < from: US Duplex Venous Lower Ext Complete, Bilateral (07.25.21 @ 08:42) >  COMPARISON: None available.    TECHNIQUE: Duplex sonography of the BILATERAL LOWER extremity veins with color and spectral Doppler, with and without compression.    FINDINGS:    RIGHT:  Normal compressibility of the RIGHT common femoral, femoral and popliteal veins.  Doppler examination shows normal spontaneous and phasic flow.  No RIGHT calf vein thrombosis is detected.    LEFT:  Normal compressibility of the LEFT common femoral, femoral and popliteal veins.  Doppler examination shows normal spontaneous and phasic flow.  No LEFT calf vein thrombosis is detected.    IMPRESSION:  No evidence of deep venous thrombosis in either lower extremity.    < end of copied text >  RADIOLOGY & ADDITIONAL STUDIES:    < from: Xray Chest 1 View- PORTABLE-Urgent (Xray Chest 1 View- PORTABLE-Urgent .) (07.26.21 @ 08:43) >    PROCEDURE DATE:  07/26/2021          INTERPRETATION:  Portable chest radiograph    CLINICAL INFORMATION: Pneumonia due to Covid 19.. Follow-up    TECHNIQUE:  Portable  AP view of the chest was obtained.    COMPARISON: 7/21/2021 chest available for review.    FINDINGS:    The lungs show stable bilateral  multifocal and diffuse ill-defined airspace opacities.. No pneumothorax.    The  heart is enlarged in transverse diameter. No hilar mass.     Visualized osseous structures are intact.    IMPRESSION:   Stable bilateral  multifocal and diffuse ill-defined airspace opacities..    < end of copied text >  < from: US Duplex Venous Lower Ext Complete, Bilateral (08.06.21 @ 17:16) >  FINDINGS:    RIGHT:  Normal compressibility of the RIGHT common femoral, femoral and popliteal veins.  Doppler examination shows normal spontaneous and phasic flow.  No RIGHT calf vein thrombosis is detected.    LEFT:  Normal compressibility of the LEFT common femoral, femoral and popliteal veins.  Doppler examination shows normal spontaneous and phasic flow.  No LEFT calf vein thrombosisis detected.    IMPRESSION:  No evidence of deep venous thrombosis in either lower extremity.    < end of copied text >  < from: Xray Chest 1 View- PORTABLE-Urgent (Xray Chest 1 View- PORTABLE-Urgent .) (08.06.21 @ 16:45) >  INTERPRETATION:  Portable chest radiograph    CLINICAL INFORMATION: Pneumonia due to Covid 19.    TECHNIQUE:  Portable  AP view of the chest was obtained.    COMPARISON: 8/1/2021 chest radiograph available for review.    FINDINGS:    The lungs show decreasing bilateral diffuse airspace disease.. No pneumothorax.    The  heart is mildly enlarged in transverse diameter. No hilar mass.   Visualized osseous structures are intact.    IMPRESSION:   Decreasing bilateral diffuse airspace disease..    < end of copied text >  xr< from: US Duplex Venous Lower Ext Complete, Bilateral (08.17.21 @ 08:58) >  COMPARISON: None available.    TECHNIQUE: Duplex sonography of the BILATERAL LOWER extremity veins with color and spectral Doppler, with and without compression.    FINDINGS:    RIGHT:  Normal compressibility of the RIGHT common femoral, femoral and popliteal veins.  Doppler examination shows normal spontaneous and phasic flow.  No RIGHT calf vein thrombosis is detected.    LEFT:  Normal compressibility of the LEFT common femoral, femoral and popliteal veins.  Doppler examination shows normal spontaneous and phasic flow.  No LEFT calf vein thrombosis is detected.    IMPRESSION:  No evidence of deep venous thrombosis in either lower extremity.    < end of copied text >  r< from: US Duplex Venous Lower Ext Complete, Bilateral (08.23.21 @ 12:53) >  PROCEDURE DATE:  08/23/2021          INTERPRETATION:  CLINICAL INFORMATION: 55 years  Male with r/o DVT    evaluate for DVT. Covid positive. Elevated d-dimer.    COMPARISON: None available.    TECHNIQUE: Duplex sonography of the BILATERAL LOWER extremity veins with color and spectral Doppler, with and without compression.    FINDINGS:    RIGHT:  Normal compressibility of the RIGHT common femoral, femoral and popliteal veins.  Doppler examination shows normal spontaneous and phasic flow.  No RIGHT calf vein thrombosis is detected.    LEFT:  Normal compressibility of the LEFT common femoral, femoral and popliteal veins.  Doppler examination shows normal spontaneous and phasic flow.  No LEFT calf vein thrombosis is detected.    IMPRESSION:  No evidence of deep venous thrombosis in either lower extremity.    < end of copied text >  < from: Xray Chest 1 View- PORTABLE-Urgent (Xray Chest 1 View- PORTABLE-Urgent .) (08.23.21 @ 11:06) >    PROCEDURE DATE:  08/23/2021          INTERPRETATION:  History: Dyspnea, Covid    Chest:  one view.    Comparison: 08/20/2021    AP radiograph of the chest demonstrates moderate diffuse alveolar infiltrates unchanged. The cardiac silhouette is normal in size. Osseous structures are intact.    Impression:moderate diffuse alveolar infiltrates unchanged.    < end of copied text >  r< from: Xray Chest 1 View- PORTABLE-Urgent (Xray Chest 1 View- PORTABLE-Urgent .) (08.23.21 @ 11:06) >  PROCEDURE DATE:  08/23/2021          INTERPRETATION:  History: Dyspnea, Covid    Chest:  one view.    Comparison: 08/20/2021    AP radiograph of the chest demonstrates moderate diffuse alveolar infiltrates unchanged. The cardiac silhouette is normal in size. Osseous structures are intact.    Impression:moderate diffuse alveolar infiltrates unchanged.    < end of copied text >  < from: Xray Chest 1 View-PORTABLE IMMEDIATE (Xray Chest 1 View-PORTABLE IMMEDIATE .) (09.11.21 @ 12:32) >    PROCEDURE DATE:  09/11/2021          INTERPRETATION:  XR CHEST IMMEDIATE    Single AP view    HISTORY:  Fever    Comparison: Chest x-ray 9/4/2021    The cardiac silhouette is within normal limits. Diffuse bilateral airspace disease. No pleural abnormality.    IMPRESSION: Unchanged bilateral airspace disease    < end of copied text >  x< from: Xray Chest 1 View- PORTABLE-Routine (Xray Chest 1 View- PORTABLE-Routine in AM.) (09.16.21 @ 07:15) >    INTERPRETATION:  Portable chest radiograph    CLINICAL INFORMATION: Pneumonia due to Covid 19.    TECHNIQUE:  Portable  AP view of the chest.    COMPARISON: 9/11/2021 chest available for review.    FINDINGS:    The lungs show persistent bilateral  multifocal and diffuse ill-defined airspace opacities. No pneumothorax.    The  heart is enlarged in transverse diameter. No hilar mass.     Visualizedosseous structures are intact.    IMPRESSION:   No significant interval change.    < end of copied text >

## 2021-09-19 NOTE — PROGRESS NOTE ADULT - ASSESSMENT
1) COVID Pneumonia  2) Abnormal CXR  3) Dyspnea  4) Hypoxemic Respiratory Failure   5) Suspected superimposed nosocomial pneumonia    56 M noted to have COVID 7/15  On arrival hypoxic to 80s and tachypneic. NRB placed,in the ER saturating 95%. Na 126 s/p 1L NS. CXR: Frandy infiltrates. Last scr 1.4 in 2019-> today 1.9 unknown if underlying ckd.  Treated with IV Remdesivir and Decadron, Tocilizumab   Given the obesity/hypertension and prior co-morbidities, he is at risk of intubation but continue HFNC, respiratory status remains critical  DDimer elevated, was on therapeutic lovenox, transitioned to 40mg q 12  Completed Solumedrol, Symbicort 160/4.5 BID (https://[email or web address]/journals/lanres/article/[email or web address], STOIC study).Using HFNC  30LPM, 45%FiO2  SaO2 is now 88-95%  Respiratory status is tenuous, work of breathing is improving. Continue titrating down HFNC  ICU level care appreciated  Hgb improving  Repeat ABG 7.36/60/91 reviewed  time spent taking care of critically ill pt 30 mins /all recent data reviewed including imaging    9/17  remains on high Fio2 requirement due to ARDS post COVID pneumonia  Developed pulm fibrosis post COVID  covering for Dr Arceo  data discussed with overnight RN staff  Will continue to monitor  ct chest once stable for transfer    9/18  prolonged hospitalization with ARDS post COVID pneumonia  Suspected superimposed nosocomial pneumonia / upper lobe infiltrates  Decline in resp status with worsening cxr as well as higher fio2 requirements are all worrisome findings  superimposed bacterial infection likely with leukocytosis and increased infiltrates seen  suggest re eval with Id consultants for role of antibiotic therapy  anemia with gradual drop in HCT followed by serial cbc / doubt DAH / pneumonitis  steroids already tapered off  will resume Iv cefepime and get ID re eval for coverage / discussed with Dr Chamorro today  sputum for gs/ cx if available  will discuss with ICU team    9/19  Cefepime day 2  anemia with gradual drop in HCT followed by serial cbc / doubt DAH / pneumonitis  steroids already tapered off  Id re eval in am regarding approach given infiltrates and leukocytosis  Dr Arceo will resume in am  data discussed with Intensivist today

## 2021-09-19 NOTE — PROGRESS NOTE ADULT - ASSESSMENT
57 y/o male with pmhx of HTN, HLD, DM II admitted on 7/21 with COVID-19 pna and acute hypoxic respiratory failure transferred to ICU on 7/24 for worsening hypoxia and ARDS requiring HFNC/bipap and has been high flow dependent since.       - continue HFNC, actively titrating flow rate and fio2 to maintain spo2 > 90%. patient frequently desats and continues to be at high risk for further respiratory decompensation requiring bipap and/or emergent intubation  - encourage PO nutrition  - prone position as tolerated, though limited utility this far in to disease process   - s/p toci on 7/23. completed remdesevir and steroids

## 2021-09-19 NOTE — PROGRESS NOTE ADULT - SUBJECTIVE AND OBJECTIVE BOX
Events Overnight: Patient in his usual pattern on inc oxygen overnight, now sitting in chair on 50%, 35 liters, still desaturates with exertion    HPI:      66 y/o male with HLD, HTN and DM--Non Vacc--presents with 8 days onset of covid symptoms which included, cough, fevers, sob, hypoxia, fevers, diarrhea, chills and myalgias. Tested positive 7/15.  Rx with Rem/Dexa and then high dose steroids and full AC.  Pt tx to ICU on 7/24 for worsening hypoxia.     was on high flow as high as 60liters, 90%, on 45  now, cxr still with bilateral infiltrates    9/13:  Pt seen and examined in ICU.  HFNC O/N with occasional desaturation   afebrile  9/15 - Covid negative  last cxr 9/19 still with persistent infiltrates,     ROS - dyspneic with exertion, no chest pain, no calf pain, stomach feels better            no fevers, no chills     PMH:   as above     MEDICATIONS  (STANDING):  aspirin  chewable 81 milliGRAM(s) Oral daily  atorvastatin 80 milliGRAM(s) Oral at bedtime  budesonide 160 MICROgram(s)/formoterol 4.5 MICROgram(s) Inhaler 2 Puff(s) Inhalation two times a day  cefepime   IVPB 2000 milliGRAM(s) IV Intermittent every 12 hours  cholecalciferol 2000 Unit(s) Oral daily  dextrose 40% Gel 15 Gram(s) Oral once  dextrose 5%. 1000 milliLiter(s) (50 mL/Hr) IV Continuous <Continuous>  dextrose 5%. 1000 milliLiter(s) (100 mL/Hr) IV Continuous <Continuous>  dextrose 50% Injectable 25 Gram(s) IV Push once  dextrose 50% Injectable 12.5 Gram(s) IV Push once  dextrose 50% Injectable 25 Gram(s) IV Push once  enoxaparin Injectable 40 milliGRAM(s) SubCutaneous every 12 hours  glucagon  Injectable 1 milliGRAM(s) IntraMuscular once  insulin lispro (ADMELOG) corrective regimen sliding scale   SubCutaneous three times a day before meals  insulin lispro (ADMELOG) corrective regimen sliding scale   SubCutaneous at bedtime  losartan 75 milliGRAM(s) Oral daily  metoprolol tartrate 25 milliGRAM(s) Oral two times a day    MEDICATIONS  (PRN):  acetaminophen   Tablet .. 650 milliGRAM(s) Oral every 4 hours PRN Temp greater or equal to 38C (100.4F), Mild Pain (1 - 3)  ALBUTerol    90 MICROgram(s) HFA Inhaler 2 Puff(s) Inhalation every 4 hours PRN Shortness of Breath and/or Wheezing  benzonatate 100 milliGRAM(s) Oral every 8 hours PRN Cough  guaiFENesin Oral Liquid (Sugar-Free) 100 milliGRAM(s) Oral every 6 hours PRN Cough  hydrALAZINE Injectable 5 milliGRAM(s) IV Push every 6 hours PRN SBP>160  melatonin 5 milliGRAM(s) Oral at bedtime PRN Sleep  polyethylene glycol 3350 17 Gram(s) Oral daily PRN Constipation    ICU Vital Signs Last 24 Hrs  T(C): 36.9 (19 Sep 2021 04:00), Max: 37.3 (18 Sep 2021 20:00)  T(F): 98.4 (19 Sep 2021 04:00), Max: 99.2 (18 Sep 2021 20:00)  HR: 94 (19 Sep 2021 07:00) (89 - 110)  BP: 141/77 (19 Sep 2021 07:00) (113/86 - 151/97)  BP(mean): 93 (19 Sep 2021 07:00) (86 - 108)  ABP: --  ABP(mean): --  RR: 40 (19 Sep 2021 09:28) (21 - 40)  SpO2: 90% (19 Sep 2021 09:28) (82% - 97%)      Physical Exam:    General - comfortable  HEENT - nc/at high flow on  neck supple  lungs bilateral crackles  cv rrr  abdomen soft  ext normal  neuro awake, alert non focal    I&O's Summary    18 Sep 2021 07:01  -  19 Sep 2021 07:00  --------------------------------------------------------  IN: 50 mL / OUT: 1650 mL / NET: -1600 mL                         7.6    17.30 )-----------( 508      ( 19 Sep 2021 06:12 )             24.2       09-19    137  |  102  |  7   ----------------------------<  109<H>  3.6   |  28  |  0.79    Ca    8.8      19 Sep 2021 06:12    DVT Prophylaxis:  Lovenox                                                               Advanced Directives: Full Code

## 2021-09-20 PROCEDURE — 99233 SBSQ HOSP IP/OBS HIGH 50: CPT

## 2021-09-20 RX ADMIN — BUDESONIDE AND FORMOTEROL FUMARATE DIHYDRATE 2 PUFF(S): 160; 4.5 AEROSOL RESPIRATORY (INHALATION) at 21:03

## 2021-09-20 RX ADMIN — CEFEPIME 100 MILLIGRAM(S): 1 INJECTION, POWDER, FOR SOLUTION INTRAMUSCULAR; INTRAVENOUS at 22:57

## 2021-09-20 RX ADMIN — LOSARTAN POTASSIUM 75 MILLIGRAM(S): 100 TABLET, FILM COATED ORAL at 10:18

## 2021-09-20 RX ADMIN — BUDESONIDE AND FORMOTEROL FUMARATE DIHYDRATE 2 PUFF(S): 160; 4.5 AEROSOL RESPIRATORY (INHALATION) at 09:00

## 2021-09-20 RX ADMIN — Medication 5 MILLIGRAM(S): at 22:57

## 2021-09-20 RX ADMIN — Medication 2000 UNIT(S): at 10:18

## 2021-09-20 RX ADMIN — ENOXAPARIN SODIUM 40 MILLIGRAM(S): 100 INJECTION SUBCUTANEOUS at 22:57

## 2021-09-20 RX ADMIN — CEFEPIME 100 MILLIGRAM(S): 1 INJECTION, POWDER, FOR SOLUTION INTRAMUSCULAR; INTRAVENOUS at 10:17

## 2021-09-20 RX ADMIN — Medication 25 MILLIGRAM(S): at 22:57

## 2021-09-20 RX ADMIN — Medication 81 MILLIGRAM(S): at 10:17

## 2021-09-20 RX ADMIN — ATORVASTATIN CALCIUM 80 MILLIGRAM(S): 80 TABLET, FILM COATED ORAL at 22:57

## 2021-09-20 RX ADMIN — ENOXAPARIN SODIUM 40 MILLIGRAM(S): 100 INJECTION SUBCUTANEOUS at 10:17

## 2021-09-20 RX ADMIN — Medication 25 MILLIGRAM(S): at 10:18

## 2021-09-20 NOTE — PROGRESS NOTE ADULT - SUBJECTIVE AND OBJECTIVE BOX
ICU Progress Note    HPI:    S:    Pt seen and examined  HD # [id number]  FULL CODE  PMHx HLD, HTN, DM   Pt here for OSB, cough for several days  Admitted on 7/21 with COVID-19 pna. tested positive on 7/15 outpatient. unvaccinated. Escalating fio2 requirements and development of ARDS now requiring HFNC and NRB. S/p actemra on 7/23 7/27 PM: Remains on HFNC 50/100% with frequent desaturations.    8/9 PM: Remains on HFNC 50/90% + PRN/qHS with frequent desaturations.   8/10 PM: remains on HFNC. Increasing insulin requirements.  8/13 PM: Remains on HFNC with frequent desaturations.   8/14 PM: Remains on HFNC with desaturations.     9/14 PM: Remains on HFNC. Remains at high risk of further deterioration and intubation/mechanical intubation.   9/15 PM: Remains on HFNC with occasional desaturations. Remains at high risk of further deterioration and intubation/mechanical intubation.     9/20 PM: Remains on HFNC, albeit decreasing oxygen requirements. Remains with severe COVID disease.    ROS: + SOB  Remainder of systems reviewed, negative    Allergies    No Known Allergies    Intolerances    MEDICATIONS  (STANDING):    aspirin  chewable 81 milliGRAM(s) Oral daily  atorvastatin 80 milliGRAM(s) Oral at bedtime  budesonide 160 MICROgram(s)/formoterol 4.5 MICROgram(s) Inhaler 2 Puff(s) Inhalation two times a day  cholecalciferol 2000 Unit(s) Oral daily  dextrose 40% Gel 15 Gram(s) Oral once  dextrose 5%. 1000 milliLiter(s) (50 mL/Hr) IV Continuous <Continuous>  dextrose 5%. 1000 milliLiter(s) (100 mL/Hr) IV Continuous <Continuous>  dextrose 50% Injectable 25 Gram(s) IV Push once  dextrose 50% Injectable 12.5 Gram(s) IV Push once  dextrose 50% Injectable 25 Gram(s) IV Push once  enoxaparin Injectable 40 milliGRAM(s) SubCutaneous every 12 hours  famotidine    Tablet 20 milliGRAM(s) Oral daily  glucagon  Injectable 1 milliGRAM(s) IntraMuscular once  insulin glargine Injectable (LANTUS) 15 Unit(s) SubCutaneous every morning  insulin lispro (ADMELOG) corrective regimen sliding scale   SubCutaneous three times a day before meals  insulin lispro (ADMELOG) corrective regimen sliding scale   SubCutaneous at bedtime  labetalol 200 milliGRAM(s) Oral every 12 hours  losartan 50 milliGRAM(s) Oral daily  predniSONE   Tablet 20 milliGRAM(s) Oral daily    MEDICATIONS  (PRN):    acetaminophen   Tablet .. 650 milliGRAM(s) Oral every 4 hours PRN Temp greater or equal to 38C (100.4F), Mild Pain (1 - 3)  ALBUTerol    90 MICROgram(s) HFA Inhaler 2 Puff(s) Inhalation every 4 hours PRN Shortness of Breath and/or Wheezing  benzocaine 15 mG/menthol 3.6 mG (Sugar-Free) Lozenge 1 Lozenge Oral every 6 hours PRN Sore Throat  benzonatate 100 milliGRAM(s) Oral three times a day PRN Cough  melatonin 6 milliGRAM(s) Oral at bedtime PRN Insomnia    Drug Dosing Weight    Height (cm): 167.6 (21 Jul 2021 14:34)  Weight (kg): 90.4 (21 Jul 2021 22:00)  BMI (kg/m2): 32.2 (21 Jul 2021 22:00)  BSA (m2): 2 (21 Jul 2021 22:00)    PAST MEDICAL & SURGICAL HISTORY:  HTN (hypertension)    Diabetes    FAMILY HISTORY:    ROS: See HPI; otherwise, all systems reviewed and negative.    O:    ICU Vital Signs Last 24 Hrs  T(C): 37.1 (13 Aug 2021 20:00), Max: 37.1 (13 Aug 2021 20:00)  T(F): 98.8 (13 Aug 2021 20:00), Max: 98.8 (13 Aug 2021 20:00)  HR: 91 (13 Aug 2021 21:00) (71 - 93)  BP: 145/77 (13 Aug 2021 21:00) (97/83 - 145/77)  BP(mean): 92 (13 Aug 2021 21:00) (72 - 95)  ABP: --  ABP(mean): --  RR: 21 (13 Aug 2021 21:00) (21 - 37)  SpO2: 88% (13 Aug 2021 21:00) (80% - 98%)    I&O's Detail    12 Aug 2021 07:01  -  13 Aug 2021 07:00  --------------------------------------------------------  IN:  Total IN: 0 mL    OUT:    Voided (mL): 1100 mL  Total OUT: 1100 mL    Total NET: -1100 mL      13 Aug 2021 07:01  -  13 Aug 2021 21:41  --------------------------------------------------------  IN:  Total IN: 0 mL    OUT:    Voided (mL): 1150 mL  Total OUT: 1150 mL    Total NET: -1150 ml    PE:    Adult M sitting in chair  No JVD trachea midline + HFNC 50/100%, SpO2 80's +/- NRB  S1S2+  Diminished BS B/L  Abd soft NTND  No leg swelling/edema noted  Awake and alert  Skin pink and well perfused    LABS:    CBC Full  -  ( 13 Aug 2021 06:34 )  WBC Count : 16.53 K/uL  RBC Count : 3.01 M/uL  Hemoglobin : 8.4 g/dL  Hematocrit : 26.0 %  Platelet Count - Automated : 164 K/uL  Mean Cell Volume : 86.4 fl  Mean Cell Hemoglobin : 27.9 pg  Mean Cell Hemoglobin Concentration : 32.3 gm/dL  Auto Neutrophil # : x  Auto Lymphocyte # : x  Auto Monocyte # : x  Auto Eosinophil # : x  Auto Basophil # : x  Auto Neutrophil % : x  Auto Lymphocyte % : x  Auto Monocyte % : x  Auto Eosinophil % : x  Auto Basophil % : x    08-13    135  |  102  |  26<H>  ----------------------------<  88  3.9   |  28  |  0.83    Ca    8.9      13 Aug 2021 06:34  Phos  3.8     08-13  Mg     2.1     08-13    CAPILLARY BLOOD GLUCOSE    POCT Blood Glucose.: 160 mg/dL (13 Aug 2021 15:15)  POCT Blood Glucose.: 121 mg/dL (13 Aug 2021 08:05)  POCT Blood Glucose.: 187 mg/dL (12 Aug 2021 23:02)

## 2021-09-20 NOTE — PROGRESS NOTE ADULT - ASSESSMENT
1) COVID Pneumonia  2) Abnormal CXR  3) Dyspnea  4) Hypoxemic Respiratory Failure   5) Suspected superimposed nosocomial pneumonia    56 M noted to have COVID 7/15  On arrival hypoxic to 80s and tachypneic. NRB placed,in the ER saturating 95%. Na 126 s/p 1L NS. CXR: Frandy infiltrates. Last scr 1.4 in 2019-> today 1.9 unknown if underlying ckd.  Treated with IV Remdesivir and Decadron, Tocilizumab   Given the obesity/hypertension and prior co-morbidities, he is at risk of intubation but continue HFNC, respiratory status remains critical  DDimer elevated, was on therapeutic lovenox, transitioned to 40mg q 12  Completed Solumedrol, Symbicort 160/4.5 BID (https://[email or web address]/[email or web address], STOIC study).Using HFNC  30LPM, 45%FiO2  SaO2 is now 88-95%  Respiratory status is tenuous, work of breathing is improving. Continue titrating down HFNC  ICU level care appreciated  Hgb improving  Repeat ABG 7.36/60/91 reviewed  prolonged hospitalization with ARDS post COVID pneumonia  superimposed bacterial infection likely with leukocytosis and increased infiltrates seen  Appreciate ID Recommendations; Cefepime day 3

## 2021-09-20 NOTE — PROGRESS NOTE ADULT - SUBJECTIVE AND OBJECTIVE BOX
Date of service: 09-20-21 @ 13:22    Pt seen and examined  Patient sitting up in chair  On high flow  Has cough  Afebrile    ROS: no fever or chills; denies dizziness, no HA,  no abdominal pain, no diarrhea or constipation; no dysuria, no urinary frequency, no legs pain, no rashes      MEDICATIONS  (STANDING):  aspirin  chewable 81 milliGRAM(s) Oral daily  atorvastatin 80 milliGRAM(s) Oral at bedtime  budesonide 160 MICROgram(s)/formoterol 4.5 MICROgram(s) Inhaler 2 Puff(s) Inhalation two times a day  cefepime   IVPB 2000 milliGRAM(s) IV Intermittent every 12 hours  cholecalciferol 2000 Unit(s) Oral daily  dextrose 40% Gel 15 Gram(s) Oral once  dextrose 5%. 1000 milliLiter(s) (50 mL/Hr) IV Continuous <Continuous>  dextrose 5%. 1000 milliLiter(s) (100 mL/Hr) IV Continuous <Continuous>  dextrose 50% Injectable 25 Gram(s) IV Push once  dextrose 50% Injectable 12.5 Gram(s) IV Push once  dextrose 50% Injectable 25 Gram(s) IV Push once  enoxaparin Injectable 40 milliGRAM(s) SubCutaneous every 12 hours  glucagon  Injectable 1 milliGRAM(s) IntraMuscular once  insulin lispro (ADMELOG) corrective regimen sliding scale   SubCutaneous three times a day before meals  insulin lispro (ADMELOG) corrective regimen sliding scale   SubCutaneous at bedtime  losartan 75 milliGRAM(s) Oral daily  metoprolol tartrate 25 milliGRAM(s) Oral two times a day    Vital Signs Last 24 Hrs  T(C): 36.9 (20 Sep 2021 11:00), Max: 37.5 (19 Sep 2021 16:50)  T(F): 98.4 (20 Sep 2021 11:00), Max: 99.5 (19 Sep 2021 16:50)  HR: 100 (20 Sep 2021 12:00) (80 - 110)  BP: 138/81 (20 Sep 2021 12:00) (134/81 - 155/87)  BP(mean): 94 (20 Sep 2021 12:00) (94 - 110)  RR: 27 (20 Sep 2021 12:00) (18 - 40)  SpO2: 94% (20 Sep 2021 12:00) (87% - 100%)    PE:  Constitutional: NAD on HFNC  HEENT: NC/AT, EOMI, PERRLA, conjunctivae clear; ears and nose atraumatic; pharynx benign  Neck: supple; thyroid not palpable  Back: no tenderness  Respiratory: decreased breath sounds, rhonchi  Cardiovascular: S1S2 regular, no murmurs  Abdomen: soft, not tender, not distended, positive BS; liver and spleen WNL  Genitourinary: no suprapubic tenderness  Musculoskeletal: no muscle tenderness, no joint swelling or tenderness  Extremities: no pedal edema  Neurological/ Psychiatric: AxOx3, Judgement and insight normal;  moving all extremities  Skin: no rashes; no palpable lesions    Labs: all available labs reviewed                                                         7.6    17.30 )-----------( 508      ( 19 Sep 2021 06:12 )             24.2     09-19    137  |  102  |  7   ----------------------------<  109<H>  3.6   |  28  |  0.79    Ca    8.8      19 Sep 2021 06:12            Culture - Blood (collected 07-21-21 @ 14:42)  Source: .Blood Blood-Peripheral  Preliminary Report (07-22-21 @ 22:01):    No growth to date.      Radiology: all available radiological tests reviewed    EXAM:  XR CHEST PORTABLE URGENT 1V                            PROCEDURE DATE:  07/21/2021          INTERPRETATION:  AP chest on July 21, 2021 at 3:27 PM. Patient is short of breath with cough and fever.    Heart magnified by technique.    There arescattered mid lower lung field infiltrates right greater than left possibly related: Pneumonia.    The lungs are clear on August 30, 2019.    IMPRESSION: Bilateral infiltrates as above.    < end of copied text >    Advanced directives addressed: full resuscitation

## 2021-09-20 NOTE — PROGRESS NOTE ADULT - SUBJECTIVE AND OBJECTIVE BOX
Patient is a 55y old  Male who presents with a chief complaint of cough/sob (23 Jul 2021 14:03)      HPI:  55 M with pmh HLD, dm, htn presents with 8 days onset of covid symptoms which included, cough, fevers, sob, hypoxia, fevers, diarrhea, chills and myalgias. TEsted positive 7/15. Wife endorsed he was becoming more sob of recently. On arrival hypoxic to 80s and tachypneic. NRB placed, presently on 15% and saturating 95%. Na 126 s/p 1L NS. CXR: Frandy infiltrates. Last scr 1.4 in 2019-> today 1.9 unknown if underlying ckd.  Patient not vaccinated.   Last seen by me in 2019  History of ROBERT and uncontrolled hypertension    9/16  Uneventful pulmonary night  9/17  remains on high Fio2 requirement  covering for Dr Arceo  data discussed with overnight RN staff  9/18  covering for Dr Arceo  pt with some increased Fio2 requirement overnight  increased resp rate but adequate oxygenation / resting this am  9/19  data discussed with Intensivist and ID team regarding cxr findings  sitting OOB in chair on high flow o2    9/20  Patient back on HFNC 70% FiO2 45LPM    MEDICATIONS  (STANDING):  aspirin  chewable 81 milliGRAM(s) Oral daily  atorvastatin 80 milliGRAM(s) Oral at bedtime  budesonide 160 MICROgram(s)/formoterol 4.5 MICROgram(s) Inhaler 2 Puff(s) Inhalation two times a day  cefepime   IVPB 2000 milliGRAM(s) IV Intermittent every 12 hours  cholecalciferol 2000 Unit(s) Oral daily  dextrose 40% Gel 15 Gram(s) Oral once  dextrose 5%. 1000 milliLiter(s) (50 mL/Hr) IV Continuous <Continuous>  dextrose 5%. 1000 milliLiter(s) (100 mL/Hr) IV Continuous <Continuous>  dextrose 50% Injectable 25 Gram(s) IV Push once  dextrose 50% Injectable 12.5 Gram(s) IV Push once  dextrose 50% Injectable 25 Gram(s) IV Push once  enoxaparin Injectable 40 milliGRAM(s) SubCutaneous every 12 hours  glucagon  Injectable 1 milliGRAM(s) IntraMuscular once  insulin lispro (ADMELOG) corrective regimen sliding scale   SubCutaneous three times a day before meals  insulin lispro (ADMELOG) corrective regimen sliding scale   SubCutaneous at bedtime  losartan 75 milliGRAM(s) Oral daily  metoprolol tartrate 25 milliGRAM(s) Oral two times a day    MEDICATIONS  (PRN):  acetaminophen   Tablet .. 650 milliGRAM(s) Oral every 4 hours PRN Temp greater or equal to 38C (100.4F), Mild Pain (1 - 3)  ALBUTerol    90 MICROgram(s) HFA Inhaler 2 Puff(s) Inhalation every 4 hours PRN Shortness of Breath and/or Wheezing  benzonatate 100 milliGRAM(s) Oral every 8 hours PRN Cough  guaiFENesin Oral Liquid (Sugar-Free) 100 milliGRAM(s) Oral every 6 hours PRN Cough  hydrALAZINE Injectable 5 milliGRAM(s) IV Push every 6 hours PRN SBP>160  melatonin 5 milliGRAM(s) Oral at bedtime PRN Sleep  polyethylene glycol 3350 17 Gram(s) Oral daily PRN Constipation      Vital Signs Last 24 Hrs  T(C): 36.9 (20 Sep 2021 11:00), Max: 37.3 (19 Sep 2021 20:00)  T(F): 98.4 (20 Sep 2021 11:00), Max: 99.2 (19 Sep 2021 20:00)  HR: 92 (20 Sep 2021 16:00) (80 - 110)  BP: 148/80 (20 Sep 2021 16:00) (134/81 - 155/87)  BP(mean): 95 (20 Sep 2021 16:00) (94 - 110)  RR: 29 (20 Sep 2021 16:00) (18 - 40)  SpO2: 90% (20 Sep 2021 16:00) (89% - 100%)    I&O's Detail    18 Sep 2021 07:01  -  19 Sep 2021 07:00  --------------------------------------------------------  IN:    IV PiggyBack: 50 mL  Total IN: 50 mL    OUT:    Incontinent per Retracted Penis Pouch (mL): 1650 mL  Total OUT: 1650 mL    Total NET: -1600 mL          PHYSICAL EXAM  General Appearance: On HFNC  Lungs: coarse bilaterally  Heart: +S1,S2  Abdomen: Soft, non-tender, bowel sounds active   Extremities: no cyanosis or edema, no joint swelling  Skin: Skin color, texture normal, no rashes   Neurologic: Alert and oriented X3 , non focal, not disoriented                                   7.3    18.42 )-----------( 480      ( 18 Sep 2021 06:50 )             23.7   09-18    136  |  100  |  8   ----------------------------<  95  3.6   |  28  |  0.74    Ca    8.7      18 Sep 2021 06:50                 7.8    20.08 )-----------( 338      ( 12 Sep 2021 05:49 )             24.7   09-12    136  |  101  |  9   ----------------------------<  82  3.4<L>   |  29  |  0.74    Ca    8.9      12 Sep 2021 05:49  Phos  3.1     09-12  Mg     1.7     09-12      Pro BNP 2446  repeat              08-26    136  |  102  |  18  ----------------------------<  87  3.7   |  34<H>  |  0.56    Ca    8.3<L>      26 Aug 2021 07:03  Phos  3.6     08-26  Mg     2.0     08-26    TPro  5.5<L>  /  Alb  2.1<L>  /  TBili  0.5  /  DBili  x   /  AST  23  /  ALT  51  /  AlkPhos  79  08-26                          8.0    11.96 )-----------( 278      ( 26 Aug 2021 07:03 )             26.1   08-26    136  |  102  |  18  ----------------------------<  87  3.7   |  34<H>  |  0.56    Ca    8.3<L>      26 Aug 2021 07:03  Phos  3.6     08-26  Mg     2.0     08-26    TPro  5.5<L>  /  Alb  2.1<L>  /  TBili  0.5  /  DBili  x   /  AST  23  /  ALT  51  /  AlkPhos  79  08-26                          8.5    12.89 )-----------( 269      ( 25 Aug 2021 07:10 )             26.7   08-25    138  |  103  |  20  ----------------------------<  143<H>  3.7   |  32<H>  |  0.58    Ca    8.1<L>      25 Aug 2021 07:10  Phos  3.7     08-25  Mg     2.0     08-25    TPro  5.6<L>  /  Alb  2.2<L>  /  TBili  0.5  /  DBili  x   /  AST  32  /  ALT  74  /  AlkPhos  103  08-24                          8.5    14.05 )-----------( 292      ( 18 Aug 2021 07:01 )             26.2   08-18    138  |  104  |  24<H>  ----------------------------<  78  4.1   |  29  |  1.03    Ca    8.8      18 Aug 2021 07:01    TPro  6.3  /  Alb  2.3<L>  /  TBili  0.6  /  DBili  x   /  AST  32  /  ALT  73  /  AlkPhos  168<H>  08-17                                           10.0   19.05 )-----------( 179      ( 08 Aug 2021 07:11 )             30.3   08-08    137  |  102  |  27<H>  ----------------------------<  121<H>  3.9   |  29  |  0.77    Ca    8.4<L>      08 Aug 2021 07:11  Phos  3.4     08-08  Mg     2.0     08-08           TPro  5.2<L>  /  Alb  2.3<L>  /  TBili  0.8  /  DBili  x   /  AST  81<H>  /  ALT  213<H>  /  AlkPhos  200<H>  08-06                            12.9   15.00 )-----------( 366      ( 26 Jul 2021 06:20 )             38.4   07-26          < from: Xray Chest 1 View- PORTABLE-Urgent (07.21.21 @ 15:33) >    PROCEDURE DATE:  07/21/2021          INTERPRETATION:  AP chest on July 21, 2021 at 3:27 PM. Patient is short of breath with cough and fever.    Heart magnified by technique.    There arescattered mid lower lung field infiltrates right greater than left possibly related: Pneumonia.    The lungs are clear on August 30, 2019.    IMPRESSION: Bilateral infiltrates as above.    < end of copied text >  < from: US Duplex Venous Lower Ext Complete, Bilateral (07.25.21 @ 08:42) >  COMPARISON: None available.    TECHNIQUE: Duplex sonography of the BILATERAL LOWER extremity veins with color and spectral Doppler, with and without compression.    FINDINGS:    RIGHT:  Normal compressibility of the RIGHT common femoral, femoral and popliteal veins.  Doppler examination shows normal spontaneous and phasic flow.  No RIGHT calf vein thrombosis is detected.    LEFT:  Normal compressibility of the LEFT common femoral, femoral and popliteal veins.  Doppler examination shows normal spontaneous and phasic flow.  No LEFT calf vein thrombosis is detected.    IMPRESSION:  No evidence of deep venous thrombosis in either lower extremity.    < end of copied text >  RADIOLOGY & ADDITIONAL STUDIES:    < from: Xray Chest 1 View- PORTABLE-Urgent (Xray Chest 1 View- PORTABLE-Urgent .) (07.26.21 @ 08:43) >    PROCEDURE DATE:  07/26/2021          INTERPRETATION:  Portable chest radiograph    CLINICAL INFORMATION: Pneumonia due to Covid 19.. Follow-up    TECHNIQUE:  Portable  AP view of the chest was obtained.    COMPARISON: 7/21/2021 chest available for review.    FINDINGS:    The lungs show stable bilateral  multifocal and diffuse ill-defined airspace opacities.. No pneumothorax.    The  heart is enlarged in transverse diameter. No hilar mass.     Visualized osseous structures are intact.    IMPRESSION:   Stable bilateral  multifocal and diffuse ill-defined airspace opacities..    < end of copied text >  < from: US Duplex Venous Lower Ext Complete, Bilateral (08.06.21 @ 17:16) >  FINDINGS:    RIGHT:  Normal compressibility of the RIGHT common femoral, femoral and popliteal veins.  Doppler examination shows normal spontaneous and phasic flow.  No RIGHT calf vein thrombosis is detected.    LEFT:  Normal compressibility of the LEFT common femoral, femoral and popliteal veins.  Doppler examination shows normal spontaneous and phasic flow.  No LEFT calf vein thrombosisis detected.    IMPRESSION:  No evidence of deep venous thrombosis in either lower extremity.    < end of copied text >  < from: Xray Chest 1 View- PORTABLE-Urgent (Xray Chest 1 View- PORTABLE-Urgent .) (08.06.21 @ 16:45) >  INTERPRETATION:  Portable chest radiograph    CLINICAL INFORMATION: Pneumonia due to Covid 19.    TECHNIQUE:  Portable  AP view of the chest was obtained.    COMPARISON: 8/1/2021 chest radiograph available for review.    FINDINGS:    The lungs show decreasing bilateral diffuse airspace disease.. No pneumothorax.    The  heart is mildly enlarged in transverse diameter. No hilar mass.   Visualized osseous structures are intact.    IMPRESSION:   Decreasing bilateral diffuse airspace disease..    < end of copied text >  xr< from: US Duplex Venous Lower Ext Complete, Bilateral (08.17.21 @ 08:58) >  COMPARISON: None available.    TECHNIQUE: Duplex sonography of the BILATERAL LOWER extremity veins with color and spectral Doppler, with and without compression.    FINDINGS:    RIGHT:  Normal compressibility of the RIGHT common femoral, femoral and popliteal veins.  Doppler examination shows normal spontaneous and phasic flow.  No RIGHT calf vein thrombosis is detected.    LEFT:  Normal compressibility of the LEFT common femoral, femoral and popliteal veins.  Doppler examination shows normal spontaneous and phasic flow.  No LEFT calf vein thrombosis is detected.    IMPRESSION:  No evidence of deep venous thrombosis in either lower extremity.    < end of copied text >  r< from: US Duplex Venous Lower Ext Complete, Bilateral (08.23.21 @ 12:53) >  PROCEDURE DATE:  08/23/2021          INTERPRETATION:  CLINICAL INFORMATION: 55 years  Male with r/o DVT    evaluate for DVT. Covid positive. Elevated d-dimer.    COMPARISON: None available.    TECHNIQUE: Duplex sonography of the BILATERAL LOWER extremity veins with color and spectral Doppler, with and without compression.    FINDINGS:    RIGHT:  Normal compressibility of the RIGHT common femoral, femoral and popliteal veins.  Doppler examination shows normal spontaneous and phasic flow.  No RIGHT calf vein thrombosis is detected.    LEFT:  Normal compressibility of the LEFT common femoral, femoral and popliteal veins.  Doppler examination shows normal spontaneous and phasic flow.  No LEFT calf vein thrombosis is detected.    IMPRESSION:  No evidence of deep venous thrombosis in either lower extremity.    < end of copied text >  < from: Xray Chest 1 View- PORTABLE-Urgent (Xray Chest 1 View- PORTABLE-Urgent .) (08.23.21 @ 11:06) >    PROCEDURE DATE:  08/23/2021          INTERPRETATION:  History: Dyspnea, Covid    Chest:  one view.    Comparison: 08/20/2021    AP radiograph of the chest demonstrates moderate diffuse alveolar infiltrates unchanged. The cardiac silhouette is normal in size. Osseous structures are intact.    Impression:moderate diffuse alveolar infiltrates unchanged.    < end of copied text >  r< from: Xray Chest 1 View- PORTABLE-Urgent (Xray Chest 1 View- PORTABLE-Urgent .) (08.23.21 @ 11:06) >  PROCEDURE DATE:  08/23/2021          INTERPRETATION:  History: Dyspnea, Covid    Chest:  one view.    Comparison: 08/20/2021    AP radiograph of the chest demonstrates moderate diffuse alveolar infiltrates unchanged. The cardiac silhouette is normal in size. Osseous structures are intact.    Impression:moderate diffuse alveolar infiltrates unchanged.    < end of copied text >  < from: Xray Chest 1 View-PORTABLE IMMEDIATE (Xray Chest 1 View-PORTABLE IMMEDIATE .) (09.11.21 @ 12:32) >    PROCEDURE DATE:  09/11/2021          INTERPRETATION:  XR CHEST IMMEDIATE    Single AP view    HISTORY:  Fever    Comparison: Chest x-ray 9/4/2021    The cardiac silhouette is within normal limits. Diffuse bilateral airspace disease. No pleural abnormality.    IMPRESSION: Unchanged bilateral airspace disease    < end of copied text >  x< from: Xray Chest 1 View- PORTABLE-Routine (Xray Chest 1 View- PORTABLE-Routine in AM.) (09.16.21 @ 07:15) >    INTERPRETATION:  Portable chest radiograph    CLINICAL INFORMATION: Pneumonia due to Covid 19.    TECHNIQUE:  Portable  AP view of the chest.    COMPARISON: 9/11/2021 chest available for review.    FINDINGS:    The lungs show persistent bilateral  multifocal and diffuse ill-defined airspace opacities. No pneumothorax.    The  heart is enlarged in transverse diameter. No hilar mass.     Visualizedosseous structures are intact.    IMPRESSION:   No significant interval change.    < end of copied text >

## 2021-09-20 NOTE — PROGRESS NOTE ADULT - ASSESSMENT
A/P: 55 year old male with Acute Hypoxic Respiratory Failure from COVID-19 Pneumonia  HTN  DM II      Plan:  ICU    PULM: Continue HF NC and try to wean down flow and Fi O2    Cardio: Hemodynamics reasonable    Renal: Cr Stable    GI: H2 blocker, PO Diet    ENDO:  RISS.    ID: S/P Actemra, Completed REM and High Dose Solumedrol    DVT Prophylaxis with Lovenox    LE Dopplers on 7/26, 8/25, 8/26 were negative for LE DVT    OOB to Chair

## 2021-09-20 NOTE — PROGRESS NOTE ADULT - ASSESSMENT
"Pt called regarding Nexium, PCP did select dispense at written, pt stated generic doesn't work. Pt then stated \"will you [facility] out to [facility] they said I needed cost selection override\". Writer will signs and request pharmacy fax us this paperwork addressed to Dr. Hall to complete.   See RX signed today notes to pharmacy requesting paperwork be faxed.   " A:    55M  HD # [id number]    Here for:    1. Acute hypoxic resp failure 2/2  2. COVID-19 PNA complicated by  3. ARDS  4. Hyperglycemia  5. DESTINEY  6. CKD  7. PNA  8. DM  9. HTN  10. HypoMg++  11. HLD  12. Severe protein malnutrition  13. Anemia    P:    PRN analgesia/anxiolysis. Avoid deleriogenic meds. Melatonin for sleep hygiene   HD monitoring. Lopressor 25mg PO BID, cozaar 75mg PO qd, PRN hydralazine.  HFNC + PRN NRB. Goal maintain SpO2 > 90% and alleviate WOB.  OOB as able.  PT consult for assistance with ambulation.   Disease modifying therapy: s/p actemra 7/23, s/p RDV, s/p steroids.  Broad spectrum abx empiric dosing for PNA cefepime 2g IV BID s/p vancomycin 1250mg q12h, now off. ID involved. Afebrile.   VTE ppx lovenox BID, PUD ppx pepcid.  Encourage PO intake + glucerna.  OOB as able.  PICC d/c'd.  Anti tussives.  f/u AM labs, replete lytes PRN.  Lipitor, ASA.  Goal BG < 180, BG control improved. Lantus 12u qd + ISS.  DESTINEY on CKD, improved.    Dispo: Cont care. Improving but remains severely ill on continuous HFNC.

## 2021-09-20 NOTE — PROGRESS NOTE ADULT - SUBJECTIVE AND OBJECTIVE BOX
HPI:    56 y/o male with HLD, HTN and DM--Non Vacc--presents with 8 days onset of covid symptoms which included, cough, fevers, sob, hypoxia, fevers, diarrhea, chills and myalgias. Tested positive 7/15.  Rx with Rem/Dexa and then high dose steroids and full AC.  Pt tx to ICU on 7/24 for worsening hypoxia.       8/9:  Pt seen and examined in ICU.  Sitting in chair.  On HFNC 50/90% with O2 sat 80-84%. Slightly dysneic. Still eating.  Awake and alert.  Tm 98.6  WBC 19  FS high.  LE Dopp Negative for DVT.  L arm superficial thrombosis--NO DVT.      8/10: Pt seen and examined in ICU.  Case d/w Dr shah at bedside.  On HFNC at 50/90% with O2 sat 86%.  NAD.  Complete full sentences.  Ferritin lower.  Tm 98.8  WBC 16.  Eating well.  CXR-- Personally reviewed--slightly worsening infiltrates      8/11:  Pt seen and examined in ICU.  O2 sat not as high as yesterday.  On HFNC 50/95%.  Sitting in chair.  Taking longer to recover after care this morning.  He doesn't feel any different.  Tm 97.9  WBC 16  FS better.  Still eating well    8/12:  Pt seen and examined in ICU.  Self prone O/N.  Sitting in chair.  Clinically unchanged.  On HFNC 50/95%.  Awake and alert.  WOB unchanged.  Tm 97.9   FS high during night hours    8/13:  Pt seen and examined in ICU.  Had coughing spell O/N resulting in desaturation.  Sitting in chair.  Remains on HFNC and intermittent NRM. WOB unchanged. Tm 97.8  WBC 16  FS better    8/14:  Pt seen and examined in ICU.  Coughing O/N.  Awake and alert.  WOB stable.  Tm 98.8      8/15:  Pt seen and examined in ICU.  Slept well O/N.  Awake and alert.  Tm 98.9  FS OK    9/6:  Pt seen and examined in ICU.  Has stayed off vent support.  On HFNC and CPAP qhs.  Sitting in chair.  Tm 98.6  WBC 10    9/7:  Pt seen and examined in ICU.  Tachycardic, diaphoresis and could not sleep last night.  Tm 99.6  FS OK.  Still desats rapidly and recover period still prolong     9/8:  Pt seen and examined in ICU.  Had diarrhea yesterday after eating food brought in by wife--Resolved.  Slept well.  Tm 99.6  FS OK    9/9:  Pt seen and examined in ICU.  Slept well O/N.  On HFNC.  Will attempt HFNC during sleep tonight    9/10: Pt seen and examined in ICU.  Slept well last night off NIV.  Did not require Benzo.  Tm 99.0  WBC 20  FS OK    9/11:  Pt seen and examined in ICU.  [place] HFNC QHS.  C/O R ear pain.  Tm 100.9  WBC 20  FS OK    9/12:  Pt seen and examined in ICU.  HFNC O/N with occasional dsesat.  Tm 100.7  WBC 20.  NO new Cxs thus far.  FS OK      9/20: HE continues with HF NC, 40L and 45%.  Occasionally coughing up thick sputum.  New L sided infiltrats on CXR      PMH:  As above.         PAST MEDICAL/SURGICAL/FAMILY/SOCIAL HISTORY:    Past Medical History:  Diabetes    HTN (hypertension).  No surgeries     Tobacco Usage:  · Tobacco Usage	non smoker  Fhx: none (21 Jul 2021 19:19)       PAST MEDICAL & SURGICAL HISTORY:  HTN (hypertension)    Diabetes        FAMILY HISTORY:      Social Hx:    Allergies    No Known Allergies    Intolerances            ICU Vital Signs Last 24 Hrs  T(C): 36.9 (20 Sep 2021 11:00), Max: 37.5 (19 Sep 2021 16:50)  T(F): 98.4 (20 Sep 2021 11:00), Max: 99.5 (19 Sep 2021 16:50)  HR: 100 (20 Sep 2021 12:00) (80 - 110)  BP: 138/81 (20 Sep 2021 12:00) (134/81 - 155/87)  BP(mean): 94 (20 Sep 2021 12:00) (94 - 110)  ABP: --  ABP(mean): --  RR: 27 (20 Sep 2021 12:00) (18 - 40)  SpO2: 94% (20 Sep 2021 12:00) (87% - 100%)          I&O's Summary    19 Sep 2021 07:01  -  20 Sep 2021 07:00  --------------------------------------------------------  IN: 50 mL / OUT: 200 mL / NET: -150 mL    20 Sep 2021 07:01  -  20 Sep 2021 14:18  --------------------------------------------------------  IN: 50 mL / OUT: 275 mL / NET: -225 mL                              7.6    17.30 )-----------( 508      ( 19 Sep 2021 06:12 )             24.2       09-19    137  |  102  |  7   ----------------------------<  109<H>  3.6   |  28  |  0.79    Ca    8.8      19 Sep 2021 06:12                      MEDICATIONS  (STANDING):  aspirin  chewable 81 milliGRAM(s) Oral daily  atorvastatin 80 milliGRAM(s) Oral at bedtime  budesonide 160 MICROgram(s)/formoterol 4.5 MICROgram(s) Inhaler 2 Puff(s) Inhalation two times a day  cefepime   IVPB 2000 milliGRAM(s) IV Intermittent every 12 hours  cholecalciferol 2000 Unit(s) Oral daily  dextrose 40% Gel 15 Gram(s) Oral once  dextrose 5%. 1000 milliLiter(s) (50 mL/Hr) IV Continuous <Continuous>  dextrose 5%. 1000 milliLiter(s) (100 mL/Hr) IV Continuous <Continuous>  dextrose 50% Injectable 25 Gram(s) IV Push once  dextrose 50% Injectable 12.5 Gram(s) IV Push once  dextrose 50% Injectable 25 Gram(s) IV Push once  enoxaparin Injectable 40 milliGRAM(s) SubCutaneous every 12 hours  glucagon  Injectable 1 milliGRAM(s) IntraMuscular once  insulin lispro (ADMELOG) corrective regimen sliding scale   SubCutaneous three times a day before meals  insulin lispro (ADMELOG) corrective regimen sliding scale   SubCutaneous at bedtime  losartan 75 milliGRAM(s) Oral daily  metoprolol tartrate 25 milliGRAM(s) Oral two times a day    MEDICATIONS  (PRN):  acetaminophen   Tablet .. 650 milliGRAM(s) Oral every 4 hours PRN Temp greater or equal to 38C (100.4F), Mild Pain (1 - 3)  ALBUTerol    90 MICROgram(s) HFA Inhaler 2 Puff(s) Inhalation every 4 hours PRN Shortness of Breath and/or Wheezing  benzonatate 100 milliGRAM(s) Oral every 8 hours PRN Cough  guaiFENesin Oral Liquid (Sugar-Free) 100 milliGRAM(s) Oral every 6 hours PRN Cough  hydrALAZINE Injectable 5 milliGRAM(s) IV Push every 6 hours PRN SBP>160  melatonin 5 milliGRAM(s) Oral at bedtime PRN Sleep  polyethylene glycol 3350 17 Gram(s) Oral daily PRN Constipation      DVT Prophylaxis: Lovenox    Advanced Directives:  Discussed with:    Visit Information:    ** Time is exclusive of billed procedures and/or teaching and/or routine family updates.

## 2021-09-21 LAB
ANION GAP SERPL CALC-SCNC: 6 MMOL/L — SIGNIFICANT CHANGE UP (ref 5–17)
BUN SERPL-MCNC: 8 MG/DL — SIGNIFICANT CHANGE UP (ref 7–23)
CALCIUM SERPL-MCNC: 8.8 MG/DL — SIGNIFICANT CHANGE UP (ref 8.5–10.1)
CHLORIDE SERPL-SCNC: 101 MMOL/L — SIGNIFICANT CHANGE UP (ref 96–108)
CO2 SERPL-SCNC: 28 MMOL/L — SIGNIFICANT CHANGE UP (ref 22–31)
CREAT SERPL-MCNC: 0.77 MG/DL — SIGNIFICANT CHANGE UP (ref 0.5–1.3)
GLUCOSE SERPL-MCNC: 99 MG/DL — SIGNIFICANT CHANGE UP (ref 70–99)
GRAM STN FLD: SIGNIFICANT CHANGE UP
HCT VFR BLD CALC: 23.5 % — LOW (ref 39–50)
HGB BLD-MCNC: 7.4 G/DL — LOW (ref 13–17)
MAGNESIUM SERPL-MCNC: 1.8 MG/DL — SIGNIFICANT CHANGE UP (ref 1.6–2.6)
MCHC RBC-ENTMCNC: 26.3 PG — LOW (ref 27–34)
MCHC RBC-ENTMCNC: 31.5 GM/DL — LOW (ref 32–36)
MCV RBC AUTO: 83.6 FL — SIGNIFICANT CHANGE UP (ref 80–100)
PHOSPHATE SERPL-MCNC: 3.5 MG/DL — SIGNIFICANT CHANGE UP (ref 2.5–4.5)
PLATELET # BLD AUTO: 485 K/UL — HIGH (ref 150–400)
POTASSIUM SERPL-MCNC: 3.8 MMOL/L — SIGNIFICANT CHANGE UP (ref 3.5–5.3)
POTASSIUM SERPL-SCNC: 3.8 MMOL/L — SIGNIFICANT CHANGE UP (ref 3.5–5.3)
RBC # BLD: 2.81 M/UL — LOW (ref 4.2–5.8)
RBC # FLD: 14.2 % — SIGNIFICANT CHANGE UP (ref 10.3–14.5)
SODIUM SERPL-SCNC: 135 MMOL/L — SIGNIFICANT CHANGE UP (ref 135–145)
SPECIMEN SOURCE: SIGNIFICANT CHANGE UP
WBC # BLD: 16.22 K/UL — HIGH (ref 3.8–10.5)
WBC # FLD AUTO: 16.22 K/UL — HIGH (ref 3.8–10.5)

## 2021-09-21 PROCEDURE — 99233 SBSQ HOSP IP/OBS HIGH 50: CPT

## 2021-09-21 RX ADMIN — Medication 25 MILLIGRAM(S): at 22:17

## 2021-09-21 RX ADMIN — CEFEPIME 100 MILLIGRAM(S): 1 INJECTION, POWDER, FOR SOLUTION INTRAMUSCULAR; INTRAVENOUS at 22:17

## 2021-09-21 RX ADMIN — LOSARTAN POTASSIUM 75 MILLIGRAM(S): 100 TABLET, FILM COATED ORAL at 13:14

## 2021-09-21 RX ADMIN — Medication 100 MILLIGRAM(S): at 22:18

## 2021-09-21 RX ADMIN — ENOXAPARIN SODIUM 40 MILLIGRAM(S): 100 INJECTION SUBCUTANEOUS at 13:13

## 2021-09-21 RX ADMIN — Medication 25 MILLIGRAM(S): at 13:16

## 2021-09-21 RX ADMIN — ATORVASTATIN CALCIUM 80 MILLIGRAM(S): 80 TABLET, FILM COATED ORAL at 22:17

## 2021-09-21 RX ADMIN — Medication 2000 UNIT(S): at 13:13

## 2021-09-21 RX ADMIN — BUDESONIDE AND FORMOTEROL FUMARATE DIHYDRATE 2 PUFF(S): 160; 4.5 AEROSOL RESPIRATORY (INHALATION) at 20:33

## 2021-09-21 RX ADMIN — Medication 81 MILLIGRAM(S): at 13:13

## 2021-09-21 RX ADMIN — Medication 5 MILLIGRAM(S): at 22:17

## 2021-09-21 RX ADMIN — CEFEPIME 100 MILLIGRAM(S): 1 INJECTION, POWDER, FOR SOLUTION INTRAMUSCULAR; INTRAVENOUS at 13:13

## 2021-09-21 RX ADMIN — ENOXAPARIN SODIUM 40 MILLIGRAM(S): 100 INJECTION SUBCUTANEOUS at 22:17

## 2021-09-21 NOTE — PROGRESS NOTE ADULT - SUBJECTIVE AND OBJECTIVE BOX
ICU Progress Note    HPI:    S:    Pt seen and examined  HD # 62  FULL CODE  PMHx HLD, HTN, DM   Pt here for OSB, cough for several days  Admitted on 7/21 with COVID-19 pna. tested positive on 7/15 outpatient. unvaccinated. Escalating fio2 requirements and development of ARDS now requiring HFNC and NRB. S/p actemra on 7/23 7/27 PM: Remains on HFNC 50/100% with frequent desaturations.    8/9 PM: Remains on HFNC 50/90% + PRN/qHS with frequent desaturations.   8/10 PM: remains on HFNC. Increasing insulin requirements.  8/13 PM: Remains on HFNC with frequent desaturations.   8/14 PM: Remains on HFNC with desaturations.     9/14 PM: Remains on HFNC. Remains at high risk of further deterioration and intubation/mechanical intubation.   9/15 PM: Remains on HFNC with occasional desaturations. Remains at high risk of further deterioration and intubation/mechanical intubation.     9/20 PM: Remains on HFNC, albeit decreasing oxygen requirements. Remains with severe COVID disease.  9/21 PM: Remains on slowly deescalating HFNC    ROS: + SOB  Remainder of systems reviewed, negative    Allergies    No Known Allergies    Intolerances    MEDICATIONS  (STANDING):    aspirin  chewable 81 milliGRAM(s) Oral daily  atorvastatin 80 milliGRAM(s) Oral at bedtime  budesonide 160 MICROgram(s)/formoterol 4.5 MICROgram(s) Inhaler 2 Puff(s) Inhalation two times a day  cholecalciferol 2000 Unit(s) Oral daily  dextrose 40% Gel 15 Gram(s) Oral once  dextrose 5%. 1000 milliLiter(s) (50 mL/Hr) IV Continuous <Continuous>  dextrose 5%. 1000 milliLiter(s) (100 mL/Hr) IV Continuous <Continuous>  dextrose 50% Injectable 25 Gram(s) IV Push once  dextrose 50% Injectable 12.5 Gram(s) IV Push once  dextrose 50% Injectable 25 Gram(s) IV Push once  enoxaparin Injectable 40 milliGRAM(s) SubCutaneous every 12 hours  famotidine    Tablet 20 milliGRAM(s) Oral daily  glucagon  Injectable 1 milliGRAM(s) IntraMuscular once  insulin glargine Injectable (LANTUS) 15 Unit(s) SubCutaneous every morning  insulin lispro (ADMELOG) corrective regimen sliding scale   SubCutaneous three times a day before meals  insulin lispro (ADMELOG) corrective regimen sliding scale   SubCutaneous at bedtime  labetalol 200 milliGRAM(s) Oral every 12 hours  losartan 50 milliGRAM(s) Oral daily  predniSONE   Tablet 20 milliGRAM(s) Oral daily    MEDICATIONS  (PRN):    acetaminophen   Tablet .. 650 milliGRAM(s) Oral every 4 hours PRN Temp greater or equal to 38C (100.4F), Mild Pain (1 - 3)  ALBUTerol    90 MICROgram(s) HFA Inhaler 2 Puff(s) Inhalation every 4 hours PRN Shortness of Breath and/or Wheezing  benzocaine 15 mG/menthol 3.6 mG (Sugar-Free) Lozenge 1 Lozenge Oral every 6 hours PRN Sore Throat  benzonatate 100 milliGRAM(s) Oral three times a day PRN Cough  melatonin 6 milliGRAM(s) Oral at bedtime PRN Insomnia    Drug Dosing Weight    Height (cm): 167.6 (21 Jul 2021 14:34)  Weight (kg): 90.4 (21 Jul 2021 22:00)  BMI (kg/m2): 32.2 (21 Jul 2021 22:00)  BSA (m2): 2 (21 Jul 2021 22:00)    PAST MEDICAL & SURGICAL HISTORY:  HTN (hypertension)    Diabetes    FAMILY HISTORY:    ROS: See HPI; otherwise, all systems reviewed and negative.    O:    ICU Vital Signs Last 24 Hrs  T(C): 37.1 (13 Aug 2021 20:00), Max: 37.1 (13 Aug 2021 20:00)  T(F): 98.8 (13 Aug 2021 20:00), Max: 98.8 (13 Aug 2021 20:00)  HR: 91 (13 Aug 2021 21:00) (71 - 93)  BP: 145/77 (13 Aug 2021 21:00) (97/83 - 145/77)  BP(mean): 92 (13 Aug 2021 21:00) (72 - 95)  ABP: --  ABP(mean): --  RR: 21 (13 Aug 2021 21:00) (21 - 37)  SpO2: 88% (13 Aug 2021 21:00) (80% - 98%)    I&O's Detail    12 Aug 2021 07:01  -  13 Aug 2021 07:00  --------------------------------------------------------  IN:  Total IN: 0 mL    OUT:    Voided (mL): 1100 mL  Total OUT: 1100 mL    Total NET: -1100 mL      13 Aug 2021 07:01  -  13 Aug 2021 21:41  --------------------------------------------------------  IN:  Total IN: 0 mL    OUT:    Voided (mL): 1150 mL  Total OUT: 1150 mL    Total NET: -1150 ml    PE:    Adult M sitting in chair  No JVD trachea midline + HFNC 50/100%, SpO2 80's +/- NRB  S1S2+  Diminished BS B/L  Abd soft NTND  No leg swelling/edema noted  Awake and alert  Skin pink and well perfused    LABS:    CBC Full  -  ( 13 Aug 2021 06:34 )  WBC Count : 16.53 K/uL  RBC Count : 3.01 M/uL  Hemoglobin : 8.4 g/dL  Hematocrit : 26.0 %  Platelet Count - Automated : 164 K/uL  Mean Cell Volume : 86.4 fl  Mean Cell Hemoglobin : 27.9 pg  Mean Cell Hemoglobin Concentration : 32.3 gm/dL  Auto Neutrophil # : x  Auto Lymphocyte # : x  Auto Monocyte # : x  Auto Eosinophil # : x  Auto Basophil # : x  Auto Neutrophil % : x  Auto Lymphocyte % : x  Auto Monocyte % : x  Auto Eosinophil % : x  Auto Basophil % : x    08-13    135  |  102  |  26<H>  ----------------------------<  88  3.9   |  28  |  0.83    Ca    8.9      13 Aug 2021 06:34  Phos  3.8     08-13  Mg     2.1     08-13    CAPILLARY BLOOD GLUCOSE    POCT Blood Glucose.: 160 mg/dL (13 Aug 2021 15:15)  POCT Blood Glucose.: 121 mg/dL (13 Aug 2021 08:05)  POCT Blood Glucose.: 187 mg/dL (12 Aug 2021 23:02)

## 2021-09-21 NOTE — PROGRESS NOTE ADULT - ASSESSMENT
A:    55M  HD # 62    Here for:    1. Acute hypoxic resp failure 2/2  2. COVID-19 PNA complicated by  3. ARDS  4. Hyperglycemia  5. DESTINEY  6. CKD  7. PNA  8. DM  9. HTN  10. HypoMg++  11. HLD  12. Severe protein malnutrition  13. Anemia    P:    PRN analgesia/anxiolysis. Avoid deleriogenic meds. Melatonin for sleep hygiene   HD monitoring. Lopressor 25mg PO BID, cozaar 75mg PO qd, PRN hydralazine.  HFNC + PRN NRB. Goal maintain SpO2 > 90% and alleviate WOB.  OOB as able.  PT consult for assistance with ambulation.   Disease modifying therapy: s/p actemra 7/23, s/p RDV, s/p steroids.  Broad spectrum abx empiric dosing for PNA cefepime 2g IV BID s/p vancomycin 1250mg q12h, now off. ID involved. Afebrile.   VTE ppx lovenox BID, PUD ppx pepcid.  Encourage PO intake + glucerna.  OOB as able.  PICC d/c'd.  Anti tussives.  f/u AM labs, replete lytes PRN.  Lipitor, ASA.  Goal BG < 180, BG control improved. Lantus 12u qd + ISS.  DESTINEY on CKD, improved.    Dispo: Cont care. Improving but remains severely ill on continuous HFNC.

## 2021-09-21 NOTE — PROGRESS NOTE ADULT - SUBJECTIVE AND OBJECTIVE BOX
Date of service: 09-21-21 @ 11:58    Pt seen and examined  On high flow NC  Laying in bed, NAD  Has wet cough  Afebrile    ROS: no fever or chills; denies dizziness, no HA,  no abdominal pain, no diarrhea or constipation; no dysuria, no urinary frequency, no legs pain, no rashes      MEDICATIONS  (STANDING):  aspirin  chewable 81 milliGRAM(s) Oral daily  atorvastatin 80 milliGRAM(s) Oral at bedtime  budesonide 160 MICROgram(s)/formoterol 4.5 MICROgram(s) Inhaler 2 Puff(s) Inhalation two times a day  cefepime   IVPB 2000 milliGRAM(s) IV Intermittent every 12 hours  cholecalciferol 2000 Unit(s) Oral daily  dextrose 40% Gel 15 Gram(s) Oral once  dextrose 5%. 1000 milliLiter(s) (50 mL/Hr) IV Continuous <Continuous>  dextrose 5%. 1000 milliLiter(s) (100 mL/Hr) IV Continuous <Continuous>  dextrose 50% Injectable 25 Gram(s) IV Push once  dextrose 50% Injectable 12.5 Gram(s) IV Push once  dextrose 50% Injectable 25 Gram(s) IV Push once  enoxaparin Injectable 40 milliGRAM(s) SubCutaneous every 12 hours  glucagon  Injectable 1 milliGRAM(s) IntraMuscular once  insulin lispro (ADMELOG) corrective regimen sliding scale   SubCutaneous three times a day before meals  insulin lispro (ADMELOG) corrective regimen sliding scale   SubCutaneous at bedtime  losartan 75 milliGRAM(s) Oral daily  metoprolol tartrate 25 milliGRAM(s) Oral two times a day    Vital Signs Last 24 Hrs  T(C): 37.2 (21 Sep 2021 03:00), Max: 37.2 (21 Sep 2021 03:00)  T(F): 98.9 (21 Sep 2021 03:00), Max: 98.9 (21 Sep 2021 03:00)  HR: 91 (21 Sep 2021 09:00) (82 - 108)  BP: 147/86 (21 Sep 2021 09:00) (118/83 - 156/95)  BP(mean): 99 (21 Sep 2021 09:00) (88 - 109)  RR: 26 (21 Sep 2021 11:40) (20 - 37)  SpO2: 98% (21 Sep 2021 11:40) (85% - 99%)    PE:  Constitutional: NAD on HFNC  HEENT: NC/AT, EOMI, PERRLA, conjunctivae clear; ears and nose atraumatic; pharynx benign  Neck: supple; thyroid not palpable  Back: no tenderness  Respiratory: decreased breath sounds, rhonchi  Cardiovascular: S1S2 regular, no murmurs  Abdomen: soft, not tender, not distended, positive BS; liver and spleen WNL  Genitourinary: no suprapubic tenderness  Musculoskeletal: no muscle tenderness, no joint swelling or tenderness  Extremities: no pedal edema  Neurological/ Psychiatric: AxOx3, Judgement and insight normal;  moving all extremities  Skin: no rashes; no palpable lesions    Labs: all available labs reviewed                                                         7.4    16.22 )-----------( 485      ( 21 Sep 2021 06:18 )             23.5     09-21    135  |  101  |  8   ----------------------------<  99  3.8   |  28  |  0.77    Ca    8.8      21 Sep 2021 06:18  Phos  3.5     09-21  Mg     1.8     09-21        Culture - Blood (collected 07-21-21 @ 14:42)  Source: .Blood Blood-Peripheral  Preliminary Report (07-22-21 @ 22:01):    No growth to date.      Radiology: all available radiological tests reviewed    EXAM:  XR CHEST PORTABLE URGENT 1V                            PROCEDURE DATE:  07/21/2021          INTERPRETATION:  AP chest on July 21, 2021 at 3:27 PM. Patient is short of breath with cough and fever.    Heart magnified by technique.    There arescattered mid lower lung field infiltrates right greater than left possibly related: Pneumonia.    The lungs are clear on August 30, 2019.    IMPRESSION: Bilateral infiltrates as above.    < end of copied text >    Advanced directives addressed: full resuscitation

## 2021-09-21 NOTE — PROGRESS NOTE ADULT - ASSESSMENT
55 M with pmh HLD, dm, htn presents with 8 days onset of covid symptoms which included, cough, fevers, sob, hypoxia, fevers, diarrhea, chills and myalgias. Tested positive 7/15. Wife endorsed he was becoming more sob of recently. On arrival hypoxic to 80s and tachypneic. NRB placed, presently on 15% and saturating 95%. Na 126 s/p 1L NS. CXR: Frandy infiltrates. Last scr 1.4 in 2019-> 7/21 1.9 unknown if underlying ckd. Dimer 450 - normal for age  however with covid and increasing fibrinogen, will need further eval. Denies chest pain. LHC performed 2019 revealed normal coronaries. Here xray with b/l lung infiltrates, hypoxic requiring NRB, was given remdesivir/decadron.     1. acute respiratory failure. covid-19 viral syndrome. multifocal pneumonia  - leukocytosis 16  - persistent lung infiltrates, concern for superimposed bacterial pna  - on IV cefepime [id number] #[id number]  - continue with abx coverage   - f/u sputum cx  - completed prior course of steroids/remdesivir   - s/p tocilizumab x 1 7/23  - isolation precautions  - prone positoning  - supportive care    2. other issues - care per medicine

## 2021-09-21 NOTE — PROGRESS NOTE ADULT - SUBJECTIVE AND OBJECTIVE BOX
HPI:    56 y/o male with HLD, HTN and DM--Non Vacc--presents with 8 days onset of covid symptoms which included, cough, fevers, sob, hypoxia, fevers, diarrhea, chills and myalgias. Tested positive 7/15.  Rx with Rem/Dexa and then high dose steroids and full AC.  Pt tx to ICU on 7/24 for worsening hypoxia.       8/9:  Pt seen and examined in ICU.  Sitting in chair.  On HFNC 50/90% with O2 sat 80-84%. Slightly dysneic. Still eating.  Awake and alert.  Tm 98.6  WBC 19  FS high.  LE Dopp Negative for DVT.  L arm superficial thrombosis--NO DVT.      8/10: Pt seen and examined in ICU.  Case d/w Dr shah at bedside.  On HFNC at 50/90% with O2 sat 86%.  NAD.  Complete full sentences.  Ferritin lower.  Tm 98.8  WBC 16.  Eating well.  CXR-- Personally reviewed--slightly worsening infiltrates      8/11:  Pt seen and examined in ICU.  O2 sat not as high as yesterday.  On HFNC 50/95%.  Sitting in chair.  Taking longer to recover after care this morning.  He doesn't feel any different.  Tm 97.9  WBC 16  FS better.  Still eating well    8/12:  Pt seen and examined in ICU.  Self prone O/N.  Sitting in chair.  Clinically unchanged.  On HFNC 50/95%.  Awake and alert.  WOB unchanged.  Tm 97.9   FS high during night hours    8/13:  Pt seen and examined in ICU.  Had coughing spell O/N resulting in desaturation.  Sitting in chair.  Remains on HFNC and intermittent NRM. WOB unchanged. Tm 97.8  WBC 16  FS better    8/14:  Pt seen and examined in ICU.  Coughing O/N.  Awake and alert.  WOB stable.  Tm 98.8      8/15:  Pt seen and examined in ICU.  Slept well O/N.  Awake and alert.  Tm 98.9  FS OK    9/6:  Pt seen and examined in ICU.  Has stayed off vent support.  On HFNC and CPAP qhs.  Sitting in chair.  Tm 98.6  WBC 10    9/7:  Pt seen and examined in ICU.  Tachycardic, diaphoresis and could not sleep last night.  Tm 99.6  FS OK.  Still desats rapidly and recover period still prolong     9/8:  Pt seen and examined in ICU.  Had diarrhea yesterday after eating food brought in by wife--Resolved.  Slept well.  Tm 99.6  FS OK    9/9:  Pt seen and examined in ICU.  Slept well O/N.  On HFNC.  Will attempt HFNC during sleep tonight    9/10: Pt seen and examined in ICU.  Slept well last night off NIV.  Did not require Benzo.  Tm 99.0  WBC 20  FS OK    9/11:  Pt seen and examined in ICU.  [place] HFNC QHS.  C/O R ear pain.  Tm 100.9  WBC 20  FS OK    9/12:  Pt seen and examined in ICU.  HFNC O/N with occasional dsesat.  Tm 100.7  WBC 20.  NO new Cxs thus far.  FS OK      9/20: HE continues with HF NC, 40L and 45%.  Occasionally coughing up thick sputum.  New L sided infiltrates on CXR  9/21: Still on HF NC O2      PAST MEDICAL & SURGICAL HISTORY:  HTN (hypertension)    Diabetes        FAMILY HISTORY:      Social Hx:    Allergies    No Known Allergies    Intolerances            ICU Vital Signs Last 24 Hrs  T(C): 37.2 (21 Sep 2021 03:00), Max: 37.2 (21 Sep 2021 03:00)  T(F): 98.9 (21 Sep 2021 03:00), Max: 98.9 (21 Sep 2021 03:00)  HR: 91 (21 Sep 2021 09:00) (82 - 108)  BP: 147/86 (21 Sep 2021 09:00) (118/83 - 156/95)  BP(mean): 99 (21 Sep 2021 09:00) (88 - 109)  ABP: --  ABP(mean): --  RR: 26 (21 Sep 2021 11:40) (20 - 37)  SpO2: 98% (21 Sep 2021 11:40) (85% - 99%)          I&O's Summary    20 Sep 2021 07:01  -  21 Sep 2021 07:00  --------------------------------------------------------  IN: 100 mL / OUT: 1075 mL / NET: -975 mL    21 Sep 2021 07:01  -  21 Sep 2021 12:24  --------------------------------------------------------  IN: 0 mL / OUT: 475 mL / NET: -475 mL                              7.4    16.22 )-----------( 485      ( 21 Sep 2021 06:18 )             23.5       09-21    135  |  101  |  8   ----------------------------<  99  3.8   |  28  |  0.77    Ca    8.8      21 Sep 2021 06:18  Phos  3.5     09-21  Mg     1.8     09-21                      MEDICATIONS  (STANDING):  aspirin  chewable 81 milliGRAM(s) Oral daily  atorvastatin 80 milliGRAM(s) Oral at bedtime  budesonide 160 MICROgram(s)/formoterol 4.5 MICROgram(s) Inhaler 2 Puff(s) Inhalation two times a day  cefepime   IVPB 2000 milliGRAM(s) IV Intermittent every 12 hours  cholecalciferol 2000 Unit(s) Oral daily  dextrose 40% Gel 15 Gram(s) Oral once  dextrose 5%. 1000 milliLiter(s) (50 mL/Hr) IV Continuous <Continuous>  dextrose 5%. 1000 milliLiter(s) (100 mL/Hr) IV Continuous <Continuous>  dextrose 50% Injectable 25 Gram(s) IV Push once  dextrose 50% Injectable 12.5 Gram(s) IV Push once  dextrose 50% Injectable 25 Gram(s) IV Push once  enoxaparin Injectable 40 milliGRAM(s) SubCutaneous every 12 hours  glucagon  Injectable 1 milliGRAM(s) IntraMuscular once  insulin lispro (ADMELOG) corrective regimen sliding scale   SubCutaneous three times a day before meals  insulin lispro (ADMELOG) corrective regimen sliding scale   SubCutaneous at bedtime  losartan 75 milliGRAM(s) Oral daily  metoprolol tartrate 25 milliGRAM(s) Oral two times a day    MEDICATIONS  (PRN):  acetaminophen   Tablet .. 650 milliGRAM(s) Oral every 4 hours PRN Temp greater or equal to 38C (100.4F), Mild Pain (1 - 3)  ALBUTerol    90 MICROgram(s) HFA Inhaler 2 Puff(s) Inhalation every 4 hours PRN Shortness of Breath and/or Wheezing  benzonatate 100 milliGRAM(s) Oral every 8 hours PRN Cough  guaiFENesin Oral Liquid (Sugar-Free) 100 milliGRAM(s) Oral every 6 hours PRN Cough  hydrALAZINE Injectable 5 milliGRAM(s) IV Push every 6 hours PRN SBP>160  melatonin 5 milliGRAM(s) Oral at bedtime PRN Sleep  polyethylene glycol 3350 17 Gram(s) Oral daily PRN Constipation      DVT Prophylaxis: Lovenox    Advanced Directives:  Discussed with:    Visit Information:     ** Time is exclusive of billed procedures and/or teaching and/or routine family updates.

## 2021-09-21 NOTE — PROGRESS NOTE ADULT - ASSESSMENT
1) COVID Pneumonia  2) Abnormal CXR  3) Dyspnea  4) Hypoxemic Respiratory Failure   5) Suspected superimposed nosocomial pneumonia    56 M noted to have COVID 7/15  On arrival hypoxic to 80s and tachypneic. NRB placed,in the ER saturating 95%. Na 126 s/p 1L NS. CXR: Frandy infiltrates. Last scr 1.4 in 2019-> today 1.9 unknown if underlying ckd.  Treated with IV Remdesivir and Decadron, Tocilizumab   Given the obesity/hypertension and prior co-morbidities, he is at risk of intubation but continue HFNC, respiratory status remains critical  DDimer elevated, was on therapeutic lovenox, transitioned to 40mg q 12  Completed Solumedrol, Symbicort 160/4.5 BID (https://www.theFliggot.com/journals/lanres/article/KIPG3119-4204, STOIC study).Using HFNC  30LPM, 45%FiO2  SaO2 is now 88-95%  Respiratory status is tenuous, work of breathing is improving. Continue titrating down HFNC  ICU level care appreciated  Hgb improving  Repeat ABG 7.36/60/91 reviewed  prolonged hospitalization with ARDS post COVID pneumonia  superimposed bacterial infection likely with leukocytosis and increased infiltrates seen  Appreciate ID Recommendations; Cefepime day 3

## 2021-09-21 NOTE — PROGRESS NOTE ADULT - ASSESSMENT
A/P: 55 year old male with Acute Hypoxic Respiratory Failure from COVID-19 Pneumonia  HTN  DM II      Plan:  ICU    PULM: Continue HF NC and try to wean down flow and Fi O2.  CXR on 9/23    Cardio: Hemodynamics reasonable    Renal: Cr Stable    GI: H2 blocker, PO Diet    ENDO:  RISS.    ID: S/P Actemra, Completed REM and High Dose Solumedrol    DVT Prophylaxis with Lovenox    LE Dopplers on 7/26, 8/25, 8/26 were negative for LE DVT    OOB to Chair

## 2021-09-22 LAB — SARS-COV-2 RNA SPEC QL NAA+PROBE: SIGNIFICANT CHANGE UP

## 2021-09-22 PROCEDURE — 71045 X-RAY EXAM CHEST 1 VIEW: CPT | Mod: 26

## 2021-09-22 PROCEDURE — 99233 SBSQ HOSP IP/OBS HIGH 50: CPT

## 2021-09-22 RX ADMIN — Medication 25 MILLIGRAM(S): at 22:12

## 2021-09-22 RX ADMIN — CEFEPIME 100 MILLIGRAM(S): 1 INJECTION, POWDER, FOR SOLUTION INTRAMUSCULAR; INTRAVENOUS at 11:00

## 2021-09-22 RX ADMIN — CEFEPIME 100 MILLIGRAM(S): 1 INJECTION, POWDER, FOR SOLUTION INTRAMUSCULAR; INTRAVENOUS at 22:11

## 2021-09-22 RX ADMIN — ATORVASTATIN CALCIUM 80 MILLIGRAM(S): 80 TABLET, FILM COATED ORAL at 22:12

## 2021-09-22 RX ADMIN — Medication 25 MILLIGRAM(S): at 11:02

## 2021-09-22 RX ADMIN — LOSARTAN POTASSIUM 75 MILLIGRAM(S): 100 TABLET, FILM COATED ORAL at 11:02

## 2021-09-22 RX ADMIN — Medication 81 MILLIGRAM(S): at 11:02

## 2021-09-22 RX ADMIN — Medication 2000 UNIT(S): at 11:01

## 2021-09-22 RX ADMIN — Medication 5 MILLIGRAM(S): at 22:12

## 2021-09-22 RX ADMIN — Medication 100 MILLIGRAM(S): at 22:12

## 2021-09-22 RX ADMIN — ENOXAPARIN SODIUM 40 MILLIGRAM(S): 100 INJECTION SUBCUTANEOUS at 22:11

## 2021-09-22 RX ADMIN — ENOXAPARIN SODIUM 40 MILLIGRAM(S): 100 INJECTION SUBCUTANEOUS at 11:00

## 2021-09-22 NOTE — PROGRESS NOTE ADULT - ASSESSMENT
A/P: 55 year old male with Acute Hypoxic Respiratory Failure from COVID-19 Pneumonia  HTN  DM II      Plan:  ICU    PULM: Continue HF NC and try to wean down flow and Fi O2.  CXR today reviewed and with continued B/L infiltrates    Cardio: Hemodynamics reasonable    Renal: Cr Stable    GI: H2 blocker, PO Diet    ENDO:  RISS.    ID: S/P Actemra, Completed REM and High Dose Solumedrol    DVT Prophylaxis with Lovenox    LE Dopplers on 7/26, 8/25, 8/26 were negative for LE DVT    OOB to Chair

## 2021-09-22 NOTE — PROGRESS NOTE ADULT - ASSESSMENT
55 M with pmh HLD, dm, htn presents with 8 days onset of covid symptoms which included, cough, fevers, sob, hypoxia, fevers, diarrhea, chills and myalgias. Tested positive 7/15. Wife endorsed he was becoming more sob of recently. On arrival hypoxic to 80s and tachypneic. NRB placed, presently on 15% and saturating 95%. Na 126 s/p 1L NS. CXR: Frandy infiltrates. Last scr 1.4 in 2019-> 7/21 1.9 unknown if underlying ckd. Dimer 450 - normal for age  however with covid and increasing fibrinogen, will need further eval. Denies chest pain. LHC performed 2019 revealed normal coronaries. Here xray with b/l lung infiltrates, hypoxic requiring NRB, was given remdesivir/decadron.     1. acute respiratory failure. covid-19 viral syndrome. multifocal pneumonia  - leukocytosis 16  - persistent lung infiltrates, concern for superimposed bacterial pna  - on IV cefepime [id number] #4-5  - continue with abx coverage   - f/u sputum cx  - completed prior course of steroids/remdesivir   - s/p tocilizumab x 1 7/23  - isolation precautions  - prone positoning  - supportive care    2. other issues - care per medicine

## 2021-09-22 NOTE — PROGRESS NOTE ADULT - SUBJECTIVE AND OBJECTIVE BOX
HPI:    56 y/o male with HLD, HTN and DM--Non Vacc--presents with 8 days onset of covid symptoms which included, cough, fevers, sob, hypoxia, fevers, diarrhea, chills and myalgias. Tested positive 7/15.  Rx with Rem/Dexa and then high dose steroids and full AC.  Pt tx to ICU on 7/24 for worsening hypoxia.       8/9:  Pt seen and examined in ICU.  Sitting in chair.  On HFNC 50/90% with O2 sat 80-84%. Slightly dysneic. Still eating.  Awake and alert.  Tm 98.6  WBC 19  FS high.  LE Dopp Negative for DVT.  L arm superficial thrombosis--NO DVT.      8/10: Pt seen and examined in ICU.  Case d/w Dr shah at bedside.  On HFNC at 50/90% with O2 sat 86%.  NAD.  Complete full sentences.  Ferritin lower.  Tm 98.8  WBC 16.  Eating well.  CXR-- Personally reviewed--slightly worsening infiltrates      8/11:  Pt seen and examined in ICU.  O2 sat not as high as yesterday.  On HFNC 50/95%.  Sitting in chair.  Taking longer to recover after care this morning.  He doesn't feel any different.  Tm 97.9  WBC 16  FS better.  Still eating well    8/12:  Pt seen and examined in ICU.  Self prone O/N.  Sitting in chair.  Clinically unchanged.  On HFNC 50/95%.  Awake and alert.  WOB unchanged.  Tm 97.9   FS high during night hours    8/13:  Pt seen and examined in ICU.  Had coughing spell O/N resulting in desaturation.  Sitting in chair.  Remains on HFNC and intermittent NRM. WOB unchanged. Tm 97.8  WBC 16  FS better    8/14:  Pt seen and examined in ICU.  Coughing O/N.  Awake and alert.  WOB stable.  Tm 98.8      8/15:  Pt seen and examined in ICU.  Slept well O/N.  Awake and alert.  Tm 98.9  FS OK    9/6:  Pt seen and examined in ICU.  Has stayed off vent support.  On HFNC and CPAP qhs.  Sitting in chair.  Tm 98.6  WBC 10    9/7:  Pt seen and examined in ICU.  Tachycardic, diaphoresis and could not sleep last night.  Tm 99.6  FS OK.  Still desats rapidly and recover period still prolong     9/8:  Pt seen and examined in ICU.  Had diarrhea yesterday after eating food brought in by wife--Resolved.  Slept well.  Tm 99.6  FS OK    9/9:  Pt seen and examined in ICU.  Slept well O/N.  On HFNC.  Will attempt HFNC during sleep tonight    9/10: Pt seen and examined in ICU.  Slept well last night off NIV.  Did not require Benzo.  Tm 99.0  WBC 20  FS OK    9/11:  Pt seen and examined in ICU.  Tierra HFNC QHS.  C/O R ear pain.  Tm 100.9  WBC 20  FS OK    9/12:  Pt seen and examined in ICU.  HFNC O/N with occasional dsesat.  Tm 100.7  WBC 20.  NO new Cxs thus far.  FS OK      9/20: HE continues with HF NC, 40L and 45%.  Occasionally coughing up thick sputum.  New L sided infiltrates on CXR  9/21: Still on HF NC O2  9/22: On 30L and 45%      PAST MEDICAL/SURGICAL/FAMILY/SOCIAL HISTORY:    Past Medical History:  Diabetes    HTN (hypertension).  No surgeries     Tobacco Usage:  · Tobacco Usage	non smoker  Fhx: none (21 Jul 2021 19:19)       PAST MEDICAL & SURGICAL HISTORY:  HTN (hypertension)    Diabetes        FAMILY HISTORY:      Social Hx:    Allergies    No Known Allergies    Intolerances            ICU Vital Signs Last 24 Hrs  T(C): 36.7 (22 Sep 2021 11:00), Max: 36.7 (22 Sep 2021 11:00)  T(F): 98 (22 Sep 2021 11:00), Max: 98 (22 Sep 2021 11:00)  HR: 99 (22 Sep 2021 11:00) (83 - 106)  BP: 146/93 (22 Sep 2021 10:01) (119/71 - 160/90)  BP(mean): 106 (22 Sep 2021 10:01) (84 - 110)  ABP: --  ABP(mean): --  RR: 37 (22 Sep 2021 11:49) (19 - 37)  SpO2: 88% (22 Sep 2021 11:49) (81% - 96%)          I&O's Summary    21 Sep 2021 07:01  -  22 Sep 2021 07:00  --------------------------------------------------------  IN: 50 mL / OUT: 675 mL / NET: -625 mL    22 Sep 2021 07:01  -  22 Sep 2021 13:36  --------------------------------------------------------  IN: 0 mL / OUT: 900 mL / NET: -900 mL                              7.4    16.22 )-----------( 485      ( 21 Sep 2021 06:18 )             23.5       09-21    135  |  101  |  8   ----------------------------<  99  3.8   |  28  |  0.77    Ca    8.8      21 Sep 2021 06:18  Phos  3.5     09-21  Mg     1.8     09-21                      MEDICATIONS  (STANDING):  aspirin  chewable 81 milliGRAM(s) Oral daily  atorvastatin 80 milliGRAM(s) Oral at bedtime  budesonide 160 MICROgram(s)/formoterol 4.5 MICROgram(s) Inhaler 2 Puff(s) Inhalation two times a day  cefepime   IVPB 2000 milliGRAM(s) IV Intermittent every 12 hours  cholecalciferol 2000 Unit(s) Oral daily  dextrose 40% Gel 15 Gram(s) Oral once  dextrose 5%. 1000 milliLiter(s) (50 mL/Hr) IV Continuous <Continuous>  dextrose 5%. 1000 milliLiter(s) (100 mL/Hr) IV Continuous <Continuous>  dextrose 50% Injectable 25 Gram(s) IV Push once  dextrose 50% Injectable 12.5 Gram(s) IV Push once  dextrose 50% Injectable 25 Gram(s) IV Push once  enoxaparin Injectable 40 milliGRAM(s) SubCutaneous every 12 hours  glucagon  Injectable 1 milliGRAM(s) IntraMuscular once  insulin lispro (ADMELOG) corrective regimen sliding scale   SubCutaneous three times a day before meals  insulin lispro (ADMELOG) corrective regimen sliding scale   SubCutaneous at bedtime  losartan 75 milliGRAM(s) Oral daily  metoprolol tartrate 25 milliGRAM(s) Oral two times a day    MEDICATIONS  (PRN):  acetaminophen   Tablet .. 650 milliGRAM(s) Oral every 4 hours PRN Temp greater or equal to 38C (100.4F), Mild Pain (1 - 3)  ALBUTerol    90 MICROgram(s) HFA Inhaler 2 Puff(s) Inhalation every 4 hours PRN Shortness of Breath and/or Wheezing  benzonatate 100 milliGRAM(s) Oral every 8 hours PRN Cough  guaiFENesin Oral Liquid (Sugar-Free) 100 milliGRAM(s) Oral every 6 hours PRN Cough  hydrALAZINE Injectable 5 milliGRAM(s) IV Push every 6 hours PRN SBP>160  melatonin 5 milliGRAM(s) Oral at bedtime PRN Sleep  polyethylene glycol 3350 17 Gram(s) Oral daily PRN Constipation      DVT Prophylaxis: lovenox    Advanced Directives:  Discussed with:    Visit Information:    ** Time is exclusive of billed procedures and/or teaching and/or routine family updates.

## 2021-09-22 NOTE — PROGRESS NOTE ADULT - SUBJECTIVE AND OBJECTIVE BOX
Date of service: 09-22-21 @ 12:04    Pt seen and examined  On high flow NC  Laying in bed, NAD  Afebrile    ROS: no fever or chills; denies dizziness, no HA,  no abdominal pain, no diarrhea or constipation; no dysuria, no urinary frequency, no legs pain, no rashes      MEDICATIONS  (STANDING):  aspirin  chewable 81 milliGRAM(s) Oral daily  atorvastatin 80 milliGRAM(s) Oral at bedtime  budesonide 160 MICROgram(s)/formoterol 4.5 MICROgram(s) Inhaler 2 Puff(s) Inhalation two times a day  cefepime   IVPB 2000 milliGRAM(s) IV Intermittent every 12 hours  cholecalciferol 2000 Unit(s) Oral daily  dextrose 40% Gel 15 Gram(s) Oral once  dextrose 5%. 1000 milliLiter(s) (50 mL/Hr) IV Continuous <Continuous>  dextrose 5%. 1000 milliLiter(s) (100 mL/Hr) IV Continuous <Continuous>  dextrose 50% Injectable 25 Gram(s) IV Push once  dextrose 50% Injectable 12.5 Gram(s) IV Push once  dextrose 50% Injectable 25 Gram(s) IV Push once  enoxaparin Injectable 40 milliGRAM(s) SubCutaneous every 12 hours  glucagon  Injectable 1 milliGRAM(s) IntraMuscular once  insulin lispro (ADMELOG) corrective regimen sliding scale   SubCutaneous three times a day before meals  insulin lispro (ADMELOG) corrective regimen sliding scale   SubCutaneous at bedtime  losartan 75 milliGRAM(s) Oral daily  metoprolol tartrate 25 milliGRAM(s) Oral two times a day    Vital Signs Last 24 Hrs  T(C): 36.7 (22 Sep 2021 11:00), Max: 36.7 (22 Sep 2021 11:00)  T(F): 98 (22 Sep 2021 11:00), Max: 98 (22 Sep 2021 11:00)  HR: 99 (22 Sep 2021 11:00) (83 - 106)  BP: 146/93 (22 Sep 2021 10:01) (119/71 - 160/90)  BP(mean): 106 (22 Sep 2021 10:01) (84 - 110)  RR: 37 (22 Sep 2021 11:49) (19 - 37)  SpO2: 88% (22 Sep 2021 11:49) (81% - 96%)    PE:  Constitutional: NAD on HFNC  HEENT: NC/AT, EOMI, PERRLA, conjunctivae clear; ears and nose atraumatic; pharynx benign  Neck: supple; thyroid not palpable  Back: no tenderness  Respiratory: decreased breath sounds, rhonchi  Cardiovascular: S1S2 regular, no murmurs  Abdomen: soft, not tender, not distended, positive BS; liver and spleen WNL  Genitourinary: no suprapubic tenderness  Musculoskeletal: no muscle tenderness, no joint swelling or tenderness  Extremities: no pedal edema  Neurological/ Psychiatric: AxOx3, Judgement and insight normal;  moving all extremities  Skin: no rashes; no palpable lesions    Labs: all available labs reviewed                        7.4    16.22 )-----------( 485      ( 21 Sep 2021 06:18 )             23.5     09-21    135  |  101  |  8   ----------------------------<  99  3.8   |  28  |  0.77    Ca    8.8      21 Sep 2021 06:18  Phos  3.5     09-21  Mg     1.8     09-21                Culture - Blood (collected 07-21-21 @ 14:42)  Source: .Blood Blood-Peripheral  Preliminary Report (07-22-21 @ 22:01):    No growth to date.      Radiology: all available radiological tests reviewed    EXAM:  XR CHEST PORTABLE URGENT 1V                            PROCEDURE DATE:  07/21/2021          INTERPRETATION:  AP chest on July 21, 2021 at 3:27 PM. Patient is short of breath with cough and fever.    Heart magnified by technique.    There arescattered mid lower lung field infiltrates right greater than left possibly related: Pneumonia.    The lungs are clear on August 30, 2019.    IMPRESSION: Bilateral infiltrates as above.    < end of copied text >    Advanced directives addressed: full resuscitation

## 2021-09-23 LAB
CULTURE RESULTS: SIGNIFICANT CHANGE UP
SPECIMEN SOURCE: SIGNIFICANT CHANGE UP

## 2021-09-23 PROCEDURE — 99233 SBSQ HOSP IP/OBS HIGH 50: CPT

## 2021-09-23 RX ADMIN — CEFEPIME 100 MILLIGRAM(S): 1 INJECTION, POWDER, FOR SOLUTION INTRAMUSCULAR; INTRAVENOUS at 22:07

## 2021-09-23 RX ADMIN — Medication 2000 UNIT(S): at 12:28

## 2021-09-23 RX ADMIN — ENOXAPARIN SODIUM 40 MILLIGRAM(S): 100 INJECTION SUBCUTANEOUS at 11:00

## 2021-09-23 RX ADMIN — CEFEPIME 100 MILLIGRAM(S): 1 INJECTION, POWDER, FOR SOLUTION INTRAMUSCULAR; INTRAVENOUS at 11:00

## 2021-09-23 RX ADMIN — Medication 81 MILLIGRAM(S): at 12:28

## 2021-09-23 RX ADMIN — ENOXAPARIN SODIUM 40 MILLIGRAM(S): 100 INJECTION SUBCUTANEOUS at 22:07

## 2021-09-23 RX ADMIN — ATORVASTATIN CALCIUM 80 MILLIGRAM(S): 80 TABLET, FILM COATED ORAL at 22:07

## 2021-09-23 RX ADMIN — BUDESONIDE AND FORMOTEROL FUMARATE DIHYDRATE 2 PUFF(S): 160; 4.5 AEROSOL RESPIRATORY (INHALATION) at 21:02

## 2021-09-23 RX ADMIN — Medication 5 MILLIGRAM(S): at 22:07

## 2021-09-23 RX ADMIN — BUDESONIDE AND FORMOTEROL FUMARATE DIHYDRATE 2 PUFF(S): 160; 4.5 AEROSOL RESPIRATORY (INHALATION) at 09:45

## 2021-09-23 RX ADMIN — Medication 25 MILLIGRAM(S): at 12:29

## 2021-09-23 RX ADMIN — LOSARTAN POTASSIUM 75 MILLIGRAM(S): 100 TABLET, FILM COATED ORAL at 12:29

## 2021-09-23 RX ADMIN — Medication 25 MILLIGRAM(S): at 22:07

## 2021-09-23 NOTE — PROGRESS NOTE ADULT - ASSESSMENT
A/P: 55 year old male with Acute Hypoxic Respiratory Failure from COVID-19 Pneumonia  HTN  DM II      Plan:  ICU    PULM: Continue HF NC and try to wean down flow and Fi O2.  Will also try on VM and NC O2 today.  CXR today reviewed and with continued B/L infiltrates    Cardio: Hemodynamics reasonable    Renal: Cr Stable    GI: H2 blocker, PO Diet    ENDO:  RISS.    ID: S/P Actemra, Completed REM and High Dose Solumedrol    DVT Prophylaxis with Lovenox    LE Dopplers on 7/26, 8/25, 8/26 were negative for LE DVT    OOB to Chair

## 2021-09-23 NOTE — CHART NOTE - NSCHARTNOTEFT_GEN_A_CORE
Clinical Nutrition Follow Up Note    *54yo male admitted with acute hypoxic resp failure from COVID-19 PNA; pt c/w HFNC atttempting to wean down with b/l infiltrates.        *Labs reviewed:   last BMP on 9/21 WNL.    HbA1c: A1C with Estimated Average Glucose Result: 11.6 % (07-22-21 @ 07:57)  Glucose: POCT Blood Glucose.: 134 mg/dL (09-23-21 @ 10:02)  Lipid Panel: Date/Time: 09-03-21 @ 06:53  Triglycerides, Serum: 200    POCT Blood Glucose.: 134 mg/dL (23 Sep 2021 10:02)  POCT Blood Glucose.: 187 mg/dL (22 Sep 2021 22:16)  POCT Blood Glucose.: 129 mg/dL (22 Sep 2021 17:16)    *glucose WNL.  lytes WNL.    *I&O's Detail    22 Sep 2021 07:01  -  23 Sep 2021 07:00  --------------------------------------------------------  IN:  Total IN: 0 mL    OUT:    Voided (mL): 900 mL  Total OUT: 900 mL    Total NET: -900 mL    *negative fluid status monitor and encourage glucerna TID.    *linda score of 18; no PU documented. no edema documented.  BM(+) 9/21 (2 days no BM), on bowel regimen.    *pt consuming 60-75% of tray with some of glucerna.  encourage glucerna TID in order for patient to meet increased nutr needs.    *continues to meet criteria for severe malnutrition*    Diet, Consistent Carbohydrate w/Evening Snack:   Supplement Feeding Modality:  Oral  Glucerna Shake Cans or Servings Per Day:  1       Frequency:  Three Times a day (09-12-21 @ 21:06)    Estimated Needs: Based on 72Kg (adjusted IBW)   Calories: 2457-2885 Kcal (25-30 Kcal/Kg)  Protein:  115-130g (1.6-1.8 g/Kg)  Fluids:  7384-8292 mL (25-30 mL/Kg)    *Wt Hx:   75.4Kg (9/11)  92Kg (8/24)  82.5Kg (8/19): wt loss of ~7.9Kg in ~29days; (8.7%) clinically significant.  Height (cm): 167.6 (07-21-21 @ 14:34)  Weight (kg): 90.4 (07-21-21 @ 22:00)  BMI (kg/m2): 32.2 (07-21-21 @ 22:00)  BSA (m2): 2 (07-21-21 @ 22:00)      RECOMMENDATIONS:  1) encourage glucerna TID  2) monitor BM; adjust bowel regimen prn  3) obtain new wt when feasible to track/trend changes    *will monitor and adjust treatement plan prn*  nAita Arnold MA, RDN, CDN, [facility] [phone]

## 2021-09-23 NOTE — PROGRESS NOTE ADULT - ASSESSMENT
55 M with pmh HLD, dm, htn presents with 8 days onset of covid symptoms which included, cough, fevers, sob, hypoxia, fevers, diarrhea, chills and myalgias. Tested positive 7/15. Wife endorsed he was becoming more sob of recently. On arrival hypoxic to 80s and tachypneic. NRB placed, presently on 15% and saturating 95%. Na 126 s/p 1L NS. CXR: Frandy infiltrates. Last scr 1.4 in 2019-> 7/21 1.9 unknown if underlying ckd. Dimer 450 - normal for age  however with covid and increasing fibrinogen, will need further eval. Denies chest pain. LHC performed 2019 revealed normal coronaries. Here xray with b/l lung infiltrates, hypoxic requiring NRB, was given remdesivir/decadron.     1. acute respiratory failure. covid-19 viral syndrome. multifocal pneumonia  - persistent lung infiltrates, concern for superimposed bacterial pna  - on IV cefepime [id number] #[id number]  - complete 7 day course  - continue with abx coverage   - sputum cx nl lucia  - completed prior course of steroids/remdesivir   - s/p tocilizumab x 1 7/23  - isolation precautions  - prone positoning  - supportive care    2. other issues - care per medicine

## 2021-09-23 NOTE — PROGRESS NOTE ADULT - SUBJECTIVE AND OBJECTIVE BOX
HPI:    56 y/o male with HLD, HTN and DM--Non Vacc--presents with 8 days onset of covid symptoms which included, cough, fevers, sob, hypoxia, fevers, diarrhea, chills and myalgias. Tested positive 7/15.  Rx with Rem/Dexa and then high dose steroids and full AC.  Pt tx to ICU on 7/24 for worsening hypoxia.       8/9:  Pt seen and examined in ICU.  Sitting in chair.  On HFNC 50/90% with O2 sat 80-84%. Slightly dysneic. Still eating.  Awake and alert.  Tm 98.6  WBC 19  FS high.  LE Dopp Negative for DVT.  L arm superficial thrombosis--NO DVT.      8/10: Pt seen and examined in ICU.  Case d/w Dr shah at bedside.  On HFNC at 50/90% with O2 sat 86%.  NAD.  Complete full sentences.  Ferritin lower.  Tm 98.8  WBC 16.  Eating well.  CXR-- Personally reviewed--slightly worsening infiltrates      8/11:  Pt seen and examined in ICU.  O2 sat not as high as yesterday.  On HFNC 50/95%.  Sitting in chair.  Taking longer to recover after care this morning.  He doesn't feel any different.  Tm 97.9  WBC 16  FS better.  Still eating well    8/12:  Pt seen and examined in ICU.  Self prone O/N.  Sitting in chair.  Clinically unchanged.  On HFNC 50/95%.  Awake and alert.  WOB unchanged.  Tm 97.9   FS high during night hours    8/13:  Pt seen and examined in ICU.  Had coughing spell O/N resulting in desaturation.  Sitting in chair.  Remains on HFNC and intermittent NRM. WOB unchanged. Tm 97.8  WBC 16  FS better    8/14:  Pt seen and examined in ICU.  Coughing O/N.  Awake and alert.  WOB stable.  Tm 98.8      8/15:  Pt seen and examined in ICU.  Slept well O/N.  Awake and alert.  Tm 98.9  FS OK    9/6:  Pt seen and examined in ICU.  Has stayed off vent support.  On HFNC and CPAP qhs.  Sitting in chair.  Tm 98.6  WBC 10    9/7:  Pt seen and examined in ICU.  Tachycardic, diaphoresis and could not sleep last night.  Tm 99.6  FS OK.  Still desats rapidly and recover period still prolong     9/8:  Pt seen and examined in ICU.  Had diarrhea yesterday after eating food brought in by wife--Resolved.  Slept well.  Tm 99.6  FS OK    9/9:  Pt seen and examined in ICU.  Slept well O/N.  On HFNC.  Will attempt HFNC during sleep tonight    9/10: Pt seen and examined in ICU.  Slept well last night off NIV.  Did not require Benzo.  Tm 99.0  WBC 20  FS OK    9/11:  Pt seen and examined in ICU.  Tierra HFNC QHS.  C/O R ear pain.  Tm 100.9  WBC 20  FS OK    9/12:  Pt seen and examined in ICU.  HFNC O/N with occasional dsesat.  Tm 100.7  WBC 20.  NO new Cxs thus far.  FS OK      9/20: HE continues with HF NC, 40L and 45%.  Occasionally coughing up thick sputum.  New L sided infiltrates on CXR  9/21: Still on HF NC O2  9/22: On 30L and 45%  9/23: He remains on 30 L and 45%.        PAST MEDICAL/SURGICAL/FAMILY/SOCIAL HISTORY:    Past Medical History:  Diabetes    HTN (hypertension).  No surgeries     Tobacco Usage:  · Tobacco Usage	non smoker  Fhx: none (21 Jul 2021 19:19)       PAST MEDICAL & SURGICAL HISTORY:  HTN (hypertension)    Diabetes        FAMILY HISTORY:      Social Hx:    Allergies    No Known Allergies    Intolerances            ICU Vital Signs Last 24 Hrs  T(C): 36.6 (23 Sep 2021 10:30), Max: 36.7 (22 Sep 2021 22:00)  T(F): 97.8 (23 Sep 2021 10:30), Max: 98 (22 Sep 2021 22:00)  HR: 87 (23 Sep 2021 10:30) (78 - 96)  BP: 160/90 (23 Sep 2021 10:30) (139/87 - 160/90)  BP(mean): 108 (23 Sep 2021 07:00) (100 - 110)  ABP: --  ABP(mean): --  RR: 30 (23 Sep 2021 10:30) (20 - 36)  SpO2: 95% (23 Sep 2021 10:30) (86% - 98%)          I&O's Summary    22 Sep 2021 07:01  -  23 Sep 2021 07:00  --------------------------------------------------------  IN: 0 mL / OUT: 900 mL / NET: -900 mL                                  MEDICATIONS  (STANDING):  aspirin  chewable 81 milliGRAM(s) Oral daily  atorvastatin 80 milliGRAM(s) Oral at bedtime  budesonide 160 MICROgram(s)/formoterol 4.5 MICROgram(s) Inhaler 2 Puff(s) Inhalation two times a day  cefepime   IVPB 2000 milliGRAM(s) IV Intermittent every 12 hours  cholecalciferol 2000 Unit(s) Oral daily  dextrose 40% Gel 15 Gram(s) Oral once  dextrose 5%. 1000 milliLiter(s) (50 mL/Hr) IV Continuous <Continuous>  dextrose 5%. 1000 milliLiter(s) (100 mL/Hr) IV Continuous <Continuous>  dextrose 50% Injectable 25 Gram(s) IV Push once  dextrose 50% Injectable 12.5 Gram(s) IV Push once  dextrose 50% Injectable 25 Gram(s) IV Push once  enoxaparin Injectable 40 milliGRAM(s) SubCutaneous every 12 hours  glucagon  Injectable 1 milliGRAM(s) IntraMuscular once  insulin lispro (ADMELOG) corrective regimen sliding scale   SubCutaneous three times a day before meals  insulin lispro (ADMELOG) corrective regimen sliding scale   SubCutaneous at bedtime  losartan 75 milliGRAM(s) Oral daily  metoprolol tartrate 25 milliGRAM(s) Oral two times a day    MEDICATIONS  (PRN):  acetaminophen   Tablet .. 650 milliGRAM(s) Oral every 4 hours PRN Temp greater or equal to 38C (100.4F), Mild Pain (1 - 3)  ALBUTerol    90 MICROgram(s) HFA Inhaler 2 Puff(s) Inhalation every 4 hours PRN Shortness of Breath and/or Wheezing  benzonatate 100 milliGRAM(s) Oral every 8 hours PRN Cough  guaiFENesin Oral Liquid (Sugar-Free) 100 milliGRAM(s) Oral every 6 hours PRN Cough  hydrALAZINE Injectable 5 milliGRAM(s) IV Push every 6 hours PRN SBP>160  melatonin 5 milliGRAM(s) Oral at bedtime PRN Sleep  polyethylene glycol 3350 17 Gram(s) Oral daily PRN Constipation      DVT Prophylaxis: Lovenox    Advanced Directives:  Discussed with:    Visit Information:    ** Time is exclusive of billed procedures and/or teaching and/or routine family updates.

## 2021-09-23 NOTE — CHART NOTE - NSCHARTNOTESELECT_GEN_ALL_CORE
Dietitian F/U PPN recs
Dietitian F/U PPN recs
Dietitian F/U w/ PPN recs
Dietitian PN F/U
dietitian f/u note
CCM/Event Note
Dietitian F/U PPN recommendation
Dietitian F/U PPN recs
Dietitian f/u Note
Dietitian f/u PPN recs
Dietitian f/u PPN recs
Event Note
Follow Up - PPN Recommendation RDN
Follow Up Note - PPN Recommendation
Follow Up PPN Recommendation RD
POCUS/Event Note
dietitian PPN Note
dietitian f/u note

## 2021-09-23 NOTE — PROGRESS NOTE ADULT - SUBJECTIVE AND OBJECTIVE BOX
Date of service: 09-23-21 @ 14:30    Pt seen and examined  On high flow NC  Laying in bed, NAD  Less cough  Comfortable   Afebrile    ROS: no fever or chills; denies dizziness, no HA,  no abdominal pain, no diarrhea or constipation; no dysuria, no urinary frequency, no legs pain, no rashes    MEDICATIONS  (STANDING):  aspirin  chewable 81 milliGRAM(s) Oral daily  atorvastatin 80 milliGRAM(s) Oral at bedtime  budesonide 160 MICROgram(s)/formoterol 4.5 MICROgram(s) Inhaler 2 Puff(s) Inhalation two times a day  cefepime   IVPB 2000 milliGRAM(s) IV Intermittent every 12 hours  cholecalciferol 2000 Unit(s) Oral daily  dextrose 40% Gel 15 Gram(s) Oral once  dextrose 5%. 1000 milliLiter(s) (50 mL/Hr) IV Continuous <Continuous>  dextrose 5%. 1000 milliLiter(s) (100 mL/Hr) IV Continuous <Continuous>  dextrose 50% Injectable 25 Gram(s) IV Push once  dextrose 50% Injectable 12.5 Gram(s) IV Push once  dextrose 50% Injectable 25 Gram(s) IV Push once  enoxaparin Injectable 40 milliGRAM(s) SubCutaneous every 12 hours  glucagon  Injectable 1 milliGRAM(s) IntraMuscular once  insulin lispro (ADMELOG) corrective regimen sliding scale   SubCutaneous three times a day before meals  insulin lispro (ADMELOG) corrective regimen sliding scale   SubCutaneous at bedtime  losartan 75 milliGRAM(s) Oral daily  metoprolol tartrate 25 milliGRAM(s) Oral two times a day    Vital Signs Last 24 Hrs  T(C): 36.8 (23 Sep 2021 17:31), Max: 36.8 (23 Sep 2021 17:31)  T(F): 98.2 (23 Sep 2021 17:31), Max: 98.2 (23 Sep 2021 17:31)  HR: 86 (23 Sep 2021 17:31) (78 - 96)  BP: 169/96 (23 Sep 2021 17:31) (139/87 - 169/96)  BP(mean): 114 (23 Sep 2021 17:31) (100 - 114)  RR: 41 (23 Sep 2021 17:31) (18 - 41)  SpO2: 95% (23 Sep 2021 17:31) (86% - 98%)      PE:  Constitutional: NAD on NC   HEENT: NC/AT, EOMI, PERRLA, conjunctivae clear; ears and nose atraumatic; pharynx benign  Neck: supple; thyroid not palpable  Back: no tenderness  Respiratory: decreased breath sounds, rhonchi  Cardiovascular: S1S2 regular, no murmurs  Abdomen: soft, not tender, not distended, positive BS; liver and spleen WNL  Genitourinary: no suprapubic tenderness  Musculoskeletal: no muscle tenderness, no joint swelling or tenderness  Extremities: no pedal edema  Neurological/ Psychiatric: AxOx3, Judgement and insight normal;  moving all extremities  Skin: no rashes; no palpable lesions    Labs: all available labs reviewed            Culture - Blood (collected 07-21-21 @ 14:42)  Source: .Blood Blood-Peripheral  Preliminary Report (07-22-21 @ 22:01):    No growth to date.      Radiology: all available radiological tests reviewed    EXAM:  XR CHEST PORTABLE URGENT 1V                            PROCEDURE DATE:  07/21/2021          INTERPRETATION:  AP chest on July 21, 2021 at 3:27 PM. Patient is short of breath with cough and fever.    Heart magnified by technique.    There arescattered mid lower lung field infiltrates right greater than left possibly related: Pneumonia.    The lungs are clear on August 30, 2019.    IMPRESSION: Bilateral infiltrates as above.    < end of copied text >    Advanced directives addressed: full resuscitation

## 2021-09-24 LAB
ANION GAP SERPL CALC-SCNC: 6 MMOL/L — SIGNIFICANT CHANGE UP (ref 5–17)
BUN SERPL-MCNC: 9 MG/DL — SIGNIFICANT CHANGE UP (ref 7–23)
CALCIUM SERPL-MCNC: 9.2 MG/DL — SIGNIFICANT CHANGE UP (ref 8.5–10.1)
CHLORIDE SERPL-SCNC: 106 MMOL/L — SIGNIFICANT CHANGE UP (ref 96–108)
CO2 SERPL-SCNC: 26 MMOL/L — SIGNIFICANT CHANGE UP (ref 22–31)
CREAT SERPL-MCNC: 0.85 MG/DL — SIGNIFICANT CHANGE UP (ref 0.5–1.3)
GLUCOSE SERPL-MCNC: 167 MG/DL — HIGH (ref 70–99)
HCT VFR BLD CALC: 27 % — LOW (ref 39–50)
HGB BLD-MCNC: 8.4 G/DL — LOW (ref 13–17)
MAGNESIUM SERPL-MCNC: 1.9 MG/DL — SIGNIFICANT CHANGE UP (ref 1.6–2.6)
MCHC RBC-ENTMCNC: 26.1 PG — LOW (ref 27–34)
MCHC RBC-ENTMCNC: 31.1 GM/DL — LOW (ref 32–36)
MCV RBC AUTO: 83.9 FL — SIGNIFICANT CHANGE UP (ref 80–100)
PHOSPHATE SERPL-MCNC: 3.2 MG/DL — SIGNIFICANT CHANGE UP (ref 2.5–4.5)
PLATELET # BLD AUTO: 510 K/UL — HIGH (ref 150–400)
POTASSIUM SERPL-MCNC: 3.8 MMOL/L — SIGNIFICANT CHANGE UP (ref 3.5–5.3)
POTASSIUM SERPL-SCNC: 3.8 MMOL/L — SIGNIFICANT CHANGE UP (ref 3.5–5.3)
RBC # BLD: 3.22 M/UL — LOW (ref 4.2–5.8)
RBC # FLD: 14.6 % — HIGH (ref 10.3–14.5)
SARS-COV-2 RNA SPEC QL NAA+PROBE: SIGNIFICANT CHANGE UP
SODIUM SERPL-SCNC: 138 MMOL/L — SIGNIFICANT CHANGE UP (ref 135–145)
WBC # BLD: 15.71 K/UL — HIGH (ref 3.8–10.5)
WBC # FLD AUTO: 15.71 K/UL — HIGH (ref 3.8–10.5)

## 2021-09-24 PROCEDURE — 99232 SBSQ HOSP IP/OBS MODERATE 35: CPT

## 2021-09-24 RX ORDER — ALPRAZOLAM 0.25 MG
0.25 TABLET ORAL ONCE
Refills: 0 | Status: DISCONTINUED | OUTPATIENT
Start: 2021-09-24 | End: 2021-09-24

## 2021-09-24 RX ORDER — HYDRALAZINE HCL 50 MG
10 TABLET ORAL ONCE
Refills: 0 | Status: COMPLETED | OUTPATIENT
Start: 2021-09-24 | End: 2021-09-24

## 2021-09-24 RX ADMIN — Medication 10 MILLIGRAM(S): at 19:00

## 2021-09-24 RX ADMIN — Medication 25 MILLIGRAM(S): at 11:05

## 2021-09-24 RX ADMIN — Medication 5 MILLIGRAM(S): at 06:24

## 2021-09-24 RX ADMIN — Medication 25 MILLIGRAM(S): at 23:19

## 2021-09-24 RX ADMIN — LOSARTAN POTASSIUM 75 MILLIGRAM(S): 100 TABLET, FILM COATED ORAL at 11:05

## 2021-09-24 RX ADMIN — Medication 81 MILLIGRAM(S): at 11:05

## 2021-09-24 RX ADMIN — BUDESONIDE AND FORMOTEROL FUMARATE DIHYDRATE 2 PUFF(S): 160; 4.5 AEROSOL RESPIRATORY (INHALATION) at 10:00

## 2021-09-24 RX ADMIN — Medication 0.25 MILLIGRAM(S): at 19:00

## 2021-09-24 RX ADMIN — CEFEPIME 100 MILLIGRAM(S): 1 INJECTION, POWDER, FOR SOLUTION INTRAMUSCULAR; INTRAVENOUS at 11:00

## 2021-09-24 RX ADMIN — ATORVASTATIN CALCIUM 80 MILLIGRAM(S): 80 TABLET, FILM COATED ORAL at 23:19

## 2021-09-24 RX ADMIN — Medication 5 MILLIGRAM(S): at 16:16

## 2021-09-24 RX ADMIN — BUDESONIDE AND FORMOTEROL FUMARATE DIHYDRATE 2 PUFF(S): 160; 4.5 AEROSOL RESPIRATORY (INHALATION) at 20:54

## 2021-09-24 RX ADMIN — CEFEPIME 100 MILLIGRAM(S): 1 INJECTION, POWDER, FOR SOLUTION INTRAMUSCULAR; INTRAVENOUS at 23:19

## 2021-09-24 RX ADMIN — Medication 2000 UNIT(S): at 11:05

## 2021-09-24 RX ADMIN — ENOXAPARIN SODIUM 40 MILLIGRAM(S): 100 INJECTION SUBCUTANEOUS at 23:19

## 2021-09-24 RX ADMIN — ENOXAPARIN SODIUM 40 MILLIGRAM(S): 100 INJECTION SUBCUTANEOUS at 11:00

## 2021-09-24 NOTE — PROGRESS NOTE ADULT - ASSESSMENT
A/P: 55 year old male with Acute Hypoxic Respiratory Failure from COVID-19 Pneumonia  HTN  DM II      Plan:  ICU    PULM: Continue NC O2 at 6L.      Cardio: Hemodynamics reasonable    Renal: Cr Stable    GI: H2 blocker, PO Diet    ENDO:  RISS.    ID: S/P Actemra, Completed REM and High Dose Solumedrol    DVT Prophylaxis with Lovenox    LE Dopplers on 7/26, 8/25, 8/26 were negative for LE DVT    OOB to Chair and PT

## 2021-09-24 NOTE — PROGRESS NOTE ADULT - SUBJECTIVE AND OBJECTIVE BOX
Date of service: 09-24-21 @ 11:20    Pt seen and examined  Laying in bed, NAD  on VM, feels better  Afebrile    ROS: no fever or chills; denies dizziness, no HA,  no abdominal pain, no diarrhea or constipation; no dysuria, no urinary frequency, no legs pain, no rashes    MEDICATIONS  (STANDING):  aspirin  chewable 81 milliGRAM(s) Oral daily  atorvastatin 80 milliGRAM(s) Oral at bedtime  budesonide 160 MICROgram(s)/formoterol 4.5 MICROgram(s) Inhaler 2 Puff(s) Inhalation two times a day  cefepime   IVPB 2000 milliGRAM(s) IV Intermittent every 12 hours  cholecalciferol 2000 Unit(s) Oral daily  dextrose 40% Gel 15 Gram(s) Oral once  dextrose 5%. 1000 milliLiter(s) (50 mL/Hr) IV Continuous <Continuous>  dextrose 5%. 1000 milliLiter(s) (100 mL/Hr) IV Continuous <Continuous>  dextrose 50% Injectable 25 Gram(s) IV Push once  dextrose 50% Injectable 12.5 Gram(s) IV Push once  dextrose 50% Injectable 25 Gram(s) IV Push once  enoxaparin Injectable 40 milliGRAM(s) SubCutaneous every 12 hours  glucagon  Injectable 1 milliGRAM(s) IntraMuscular once  insulin lispro (ADMELOG) corrective regimen sliding scale   SubCutaneous three times a day before meals  insulin lispro (ADMELOG) corrective regimen sliding scale   SubCutaneous at bedtime  losartan 75 milliGRAM(s) Oral daily  metoprolol tartrate 25 milliGRAM(s) Oral two times a day    Vital Signs Last 24 Hrs  T(C): 36.9 (24 Sep 2021 06:00), Max: 37 (23 Sep 2021 22:00)  T(F): 98.5 (24 Sep 2021 06:00), Max: 98.6 (23 Sep 2021 22:00)  HR: 107 (24 Sep 2021 10:00) (82 - 119)  BP: 135/94 (24 Sep 2021 10:00) (135/94 - 169/96)  BP(mean): 100 (24 Sep 2021 10:00) (100 - 115)  RR: 16 (24 Sep 2021 10:00) (16 - 41)  SpO2: 97% (24 Sep 2021 10:00) (86% - 99%)      PE:  Constitutional: NAD  HEENT: NC/AT, EOMI, PERRLA, conjunctivae clear; ears and nose atraumatic; pharynx benign  Neck: supple; thyroid not palpable  Back: no tenderness  Respiratory: decreased breath sounds, rhonchi  Cardiovascular: S1S2 regular, no murmurs  Abdomen: soft, not tender, not distended, positive BS; liver and spleen WNL  Genitourinary: no suprapubic tenderness  Musculoskeletal: no muscle tenderness, no joint swelling or tenderness  Extremities: no pedal edema  Neurological/ Psychiatric: AxOx3, Judgement and insight normal;  moving all extremities  Skin: no rashes; no palpable lesions    Labs: all available labs reviewed                            8.4    15.71 )-----------( 510      ( 24 Sep 2021 10:14 )             27.0     09-24    138  |  106  |  9   ----------------------------<  167<H>  3.8   |  26  |  0.85    Ca    9.2      24 Sep 2021 10:14  Phos  3.2     09-24  Mg     1.9     09-24        Culture - Blood (collected 07-21-21 @ 14:42)  Source: .Blood Blood-Peripheral  Preliminary Report (07-22-21 @ 22:01):    No growth to date.      Radiology: all available radiological tests reviewed    EXAM:  XR CHEST PORTABLE URGENT 1V                            PROCEDURE DATE:  07/21/2021          INTERPRETATION:  AP chest on July 21, 2021 at 3:27 PM. Patient is short of breath with cough and fever.    Heart magnified by technique.    There arescattered mid lower lung field infiltrates right greater than left possibly related: Pneumonia.    The lungs are clear on August 30, 2019.    IMPRESSION: Bilateral infiltrates as above.    < end of copied text >    Advanced directives addressed: full resuscitation

## 2021-09-24 NOTE — PROGRESS NOTE ADULT - ASSESSMENT
1) COVID Pneumonia  2) Abnormal CXR  3) Dyspnea  4) Hypoxemic Respiratory Failure   5) Suspected superimposed nosocomial pneumonia    56 M noted to have COVID 7/15  On arrival hypoxic to 80s and tachypneic. NRB placed,in the ER saturating 95%. Na 126 s/p 1L NS. CXR: Frandy infiltrates. Last scr 1.4 in 2019-> today 1.9 unknown if underlying ckd.  Treated with IV Remdesivir and Decadron, Tocilizumab   Given the obesity/hypertension and prior co-morbidities, he is at risk of intubation but continue HFNC, respiratory status remains critical  DDimer elevated, was on therapeutic lovenox, transitioned to 40mg q 12  Completed Solumedrol, Symbicort 160/4.5 BID ([email or web address], STOIC study)  SaO2 is now 88-95%  Respiratory status is tenuous, work of breathing is improving and HFNC transitioned to   ICU level care appreciated  Hgb improving  Repeat ABG 7.36/60/91 reviewed  prolonged hospitalization with ARDS post COVID pneumonia  superimposed bacterial infection likely with leukocytosis and increased infiltrates seen, WBC improving  Appreciate ID Recommendations; Cefepime

## 2021-09-24 NOTE — PROGRESS NOTE ADULT - SUBJECTIVE AND OBJECTIVE BOX
HPI:    54 y/o male with HLD, HTN and DM--Non Vacc--presents with 8 days onset of covid symptoms which included, cough, fevers, sob, hypoxia, fevers, diarrhea, chills and myalgias. Tested positive 7/15.  Rx with Rem/Dexa and then high dose steroids and full AC.  Pt tx to ICU on 7/24 for worsening hypoxia.       8/9:  Pt seen and examined in ICU.  Sitting in chair.  On HFNC 50/90% with O2 sat 80-84%. Slightly dysneic. Still eating.  Awake and alert.  Tm 98.6  WBC 19  FS high.  LE Dopp Negative for DVT.  L arm superficial thrombosis--NO DVT.      8/10: Pt seen and examined in ICU.  Case d/w Dr shah at bedside.  On HFNC at 50/90% with O2 sat 86%.  NAD.  Complete full sentences.  Ferritin lower.  Tm 98.8  WBC 16.  Eating well.  CXR-- Personally reviewed--slightly worsening infiltrates      8/11:  Pt seen and examined in ICU.  O2 sat not as high as yesterday.  On HFNC 50/95%.  Sitting in chair.  Taking longer to recover after care this morning.  He doesn't feel any different.  Tm 97.9  WBC 16  FS better.  Still eating well    8/12:  Pt seen and examined in ICU.  Self prone O/N.  Sitting in chair.  Clinically unchanged.  On HFNC 50/95%.  Awake and alert.  WOB unchanged.  Tm 97.9   FS high during night hours    8/13:  Pt seen and examined in ICU.  Had coughing spell O/N resulting in desaturation.  Sitting in chair.  Remains on HFNC and intermittent NRM. WOB unchanged. Tm 97.8  WBC 16  FS better    8/14:  Pt seen and examined in ICU.  Coughing O/N.  Awake and alert.  WOB stable.  Tm 98.8      8/15:  Pt seen and examined in ICU.  Slept well O/N.  Awake and alert.  Tm 98.9  FS OK    9/6:  Pt seen and examined in ICU.  Has stayed off vent support.  On HFNC and CPAP qhs.  Sitting in chair.  Tm 98.6  WBC 10    9/7:  Pt seen and examined in ICU.  Tachycardic, diaphoresis and could not sleep last night.  Tm 99.6  FS OK.  Still desats rapidly and recover period still prolong     9/8:  Pt seen and examined in ICU.  Had diarrhea yesterday after eating food brought in by wife--Resolved.  Slept well.  Tm 99.6  FS OK    9/9:  Pt seen and examined in ICU.  Slept well O/N.  On HFNC.  Will attempt HFNC during sleep tonight    9/10: Pt seen and examined in ICU.  Slept well last night off NIV.  Did not require Benzo.  Tm 99.0  WBC 20  FS OK    9/11:  Pt seen and examined in ICU.  [place] HFNC QHS.  C/O R ear pain.  Tm 100.9  WBC 20  FS OK    9/12:  Pt seen and examined in ICU.  HFNC O/N with occasional dsesat.  Tm 100.7  WBC 20.  NO new Cxs thus far.  FS OK      9/20: HE continues with HF NC, 40L and 45%.  Occasionally coughing up thick sputum.  New L sided infiltrates on CXR  9/21: Still on HF NC O2  9/22: On 30L and 45%  9/23: He remains on 30 L and 45%.    9/21: He is now on 6L and with an O2 sat in the low 90s            PAST MEDICAL/SURGICAL/FAMILY/SOCIAL HISTORY:    Past Medical History:  Diabetes    HTN (hypertension).  No surgeries     Tobacco Usage:  · Tobacco Usage	non smoker  Fhx: none (21 Jul 2021 19:19)       PAST MEDICAL & SURGICAL HISTORY:  HTN (hypertension)    Diabetes        FAMILY HISTORY:      Social Hx:    Allergies    No Known Allergies    Intolerances            ICU Vital Signs Last 24 Hrs  T(C): 36.9 (24 Sep 2021 06:00), Max: 37 (23 Sep 2021 22:00)  T(F): 98.5 (24 Sep 2021 06:00), Max: 98.6 (23 Sep 2021 22:00)  HR: 101 (24 Sep 2021 12:01) (82 - 119)  BP: 140/82 (24 Sep 2021 12:00) (135/94 - 169/96)  BP(mean): 93 (24 Sep 2021 12:00) (93 - 115)  ABP: --  ABP(mean): --  RR: 13 (24 Sep 2021 12:00) (13 - 41)  SpO2: 90% (24 Sep 2021 12:01) (86% - 99%)          I&O's Summary    23 Sep 2021 07:01  -  24 Sep 2021 07:00  --------------------------------------------------------  IN: 100 mL / OUT: 450 mL / NET: -350 mL    24 Sep 2021 07:01  -  24 Sep 2021 12:39  --------------------------------------------------------  IN: 0 mL / OUT: 510 mL / NET: -510 mL                              8.4    15.71 )-----------( 510      ( 24 Sep 2021 10:14 )             27.0       09-24    138  |  106  |  9   ----------------------------<  167<H>  3.8   |  26  |  0.85    Ca    9.2      24 Sep 2021 10:14  Phos  3.2     09-24  Mg     1.9     09-24                      MEDICATIONS  (STANDING):  aspirin  chewable 81 milliGRAM(s) Oral daily  atorvastatin 80 milliGRAM(s) Oral at bedtime  budesonide 160 MICROgram(s)/formoterol 4.5 MICROgram(s) Inhaler 2 Puff(s) Inhalation two times a day  cefepime   IVPB 2000 milliGRAM(s) IV Intermittent every 12 hours  cholecalciferol 2000 Unit(s) Oral daily  dextrose 40% Gel 15 Gram(s) Oral once  dextrose 5%. 1000 milliLiter(s) (50 mL/Hr) IV Continuous <Continuous>  dextrose 5%. 1000 milliLiter(s) (100 mL/Hr) IV Continuous <Continuous>  dextrose 50% Injectable 25 Gram(s) IV Push once  dextrose 50% Injectable 12.5 Gram(s) IV Push once  dextrose 50% Injectable 25 Gram(s) IV Push once  enoxaparin Injectable 40 milliGRAM(s) SubCutaneous every 12 hours  glucagon  Injectable 1 milliGRAM(s) IntraMuscular once  insulin lispro (ADMELOG) corrective regimen sliding scale   SubCutaneous three times a day before meals  insulin lispro (ADMELOG) corrective regimen sliding scale   SubCutaneous at bedtime  losartan 75 milliGRAM(s) Oral daily  metoprolol tartrate 25 milliGRAM(s) Oral two times a day    MEDICATIONS  (PRN):  acetaminophen   Tablet .. 650 milliGRAM(s) Oral every 4 hours PRN Temp greater or equal to 38C (100.4F), Mild Pain (1 - 3)  ALBUTerol    90 MICROgram(s) HFA Inhaler 2 Puff(s) Inhalation every 4 hours PRN Shortness of Breath and/or Wheezing  benzonatate 100 milliGRAM(s) Oral every 8 hours PRN Cough  guaiFENesin Oral Liquid (Sugar-Free) 100 milliGRAM(s) Oral every 6 hours PRN Cough  hydrALAZINE Injectable 5 milliGRAM(s) IV Push every 6 hours PRN SBP>160  melatonin 5 milliGRAM(s) Oral at bedtime PRN Sleep  polyethylene glycol 3350 17 Gram(s) Oral daily PRN Constipation      DVT Prophylaxis:    Advanced Directives:  Discussed with:    Visit Information:    ** Time is exclusive of billed procedures and/or teaching and/or routine family updates.

## 2021-09-24 NOTE — PROGRESS NOTE ADULT - ASSESSMENT
55 M with pmh HLD, dm, htn presents with 8 days onset of covid symptoms which included, cough, fevers, sob, hypoxia, fevers, diarrhea, chills and myalgias. Tested positive 7/15. Wife endorsed he was becoming more sob of recently. On arrival hypoxic to 80s and tachypneic. NRB placed, presently on 15% and saturating 95%. Na 126 s/p 1L NS. CXR: Frandy infiltrates. Last scr 1.4 in 2019-> 7/21 1.9 unknown if underlying ckd. Dimer 450 - normal for age  however with covid and increasing fibrinogen, will need further eval. Denies chest pain. LHC performed 2019 revealed normal coronaries. Here xray with b/l lung infiltrates, hypoxic requiring NRB, was given remdesivir/decadron.     1. acute respiratory failure. covid-19 viral syndrome. multifocal pneumonia  - persistent lung infiltrates, concern for superimposed bacterial pna  - on IV cefepime [id number] #[id number]  - complete 7 day course  - continue with abx coverage   - sputum cx nl lucia  - completed prior course of steroids/remdesivir   - s/p tocilizumab x 1 7/23  - isolation precautions  - prone positoning  - supportive care    2. other issues - care per medicine

## 2021-09-24 NOTE — PROGRESS NOTE ADULT - SUBJECTIVE AND OBJECTIVE BOX
Patient is a 55y old  Male who presents with a chief complaint of cough/sob (23 Jul 2021 14:03)      HPI:  55 M with pmh HLD, dm, htn presents with 8 days onset of covid symptoms which included, cough, fevers, sob, hypoxia, fevers, diarrhea, chills and myalgias. TEsted positive 7/15. Wife endorsed he was becoming more sob of recently. On arrival hypoxic to 80s and tachypneic. NRB placed, presently on 15% and saturating 95%. Na 126 s/p 1L NS. CXR: Frandy infiltrates. Last scr 1.4 in 2019-> today 1.9 unknown if underlying ckd.  Patient not vaccinated.   Last seen by me in 2019  History of ROBERT and uncontrolled hypertension    9/24  Now transitioned from HFNC to NRB/ now on 40-50% VM    MEDICATIONS  (STANDING):  aspirin  chewable 81 milliGRAM(s) Oral daily  atorvastatin 80 milliGRAM(s) Oral at bedtime  budesonide 160 MICROgram(s)/formoterol 4.5 MICROgram(s) Inhaler 2 Puff(s) Inhalation two times a day  cefepime   IVPB 2000 milliGRAM(s) IV Intermittent every 12 hours  cholecalciferol 2000 Unit(s) Oral daily  dextrose 40% Gel 15 Gram(s) Oral once  dextrose 5%. 1000 milliLiter(s) (50 mL/Hr) IV Continuous <Continuous>  dextrose 5%. 1000 milliLiter(s) (100 mL/Hr) IV Continuous <Continuous>  dextrose 50% Injectable 25 Gram(s) IV Push once  dextrose 50% Injectable 12.5 Gram(s) IV Push once  dextrose 50% Injectable 25 Gram(s) IV Push once  enoxaparin Injectable 40 milliGRAM(s) SubCutaneous every 12 hours  glucagon  Injectable 1 milliGRAM(s) IntraMuscular once  insulin lispro (ADMELOG) corrective regimen sliding scale   SubCutaneous three times a day before meals  insulin lispro (ADMELOG) corrective regimen sliding scale   SubCutaneous at bedtime  losartan 75 milliGRAM(s) Oral daily  metoprolol tartrate 25 milliGRAM(s) Oral two times a day    MEDICATIONS  (PRN):  acetaminophen   Tablet .. 650 milliGRAM(s) Oral every 4 hours PRN Temp greater or equal to 38C (100.4F), Mild Pain (1 - 3)  ALBUTerol    90 MICROgram(s) HFA Inhaler 2 Puff(s) Inhalation every 4 hours PRN Shortness of Breath and/or Wheezing  benzonatate 100 milliGRAM(s) Oral every 8 hours PRN Cough  guaiFENesin Oral Liquid (Sugar-Free) 100 milliGRAM(s) Oral every 6 hours PRN Cough  hydrALAZINE Injectable 5 milliGRAM(s) IV Push every 6 hours PRN SBP>160  melatonin 5 milliGRAM(s) Oral at bedtime PRN Sleep  polyethylene glycol 3350 17 Gram(s) Oral daily PRN Constipation    Vital Signs Last 24 Hrs  T(C): 37 (23 Sep 2021 22:00), Max: 37 (23 Sep 2021 22:00)  T(F): 98.6 (23 Sep 2021 22:00), Max: 98.6 (23 Sep 2021 22:00)  HR: 87 (24 Sep 2021 06:00) (82 - 108)  BP: 168/84 (24 Sep 2021 06:00) (153/89 - 169/96)  BP(mean): 105 (24 Sep 2021 06:00) (103 - 115)  RR: 32 (24 Sep 2021 06:00) (18 - 41)  SpO2: 93% (24 Sep 2021 06:00) (86% - 98%)    I&O's Detail    18 Sep 2021 07:01  -  19 Sep 2021 07:00  --------------------------------------------------------  IN:    IV PiggyBack: 50 mL  Total IN: 50 mL    OUT:    Incontinent per Retracted Penis Pouch (mL): 1650 mL  Total OUT: 1650 mL    Total NET: -1600 mL          PHYSICAL EXAM  General Appearance: On VM  Lungs: coarse bilaterally  Heart: +S1,S2  Abdomen: Soft, non-tender, bowel sounds active   Extremities: no cyanosis or edema, no joint swelling  Skin: Skin color, texture normal, no rashes   Neurologic: Alert and oriented X3 , non focal, not disoriented                                < from: Xray Chest 1 View- PORTABLE-Urgent (07.21.21 @ 15:33) >    PROCEDURE DATE:  07/21/2021          INTERPRETATION:  AP chest on July 21, 2021 at 3:27 PM. Patient is short of breath with cough and fever.    Heart magnified by technique.    There arescattered mid lower lung field infiltrates right greater than left possibly related: Pneumonia.    The lungs are clear on August 30, 2019.    IMPRESSION: Bilateral infiltrates as above.    < end of copied text >  < from: US Duplex Venous Lower Ext Complete, Bilateral (07.25.21 @ 08:42) >  COMPARISON: None available.    TECHNIQUE: Duplex sonography of the BILATERAL LOWER extremity veins with color and spectral Doppler, with and without compression.    FINDINGS:    RIGHT:  Normal compressibility of the RIGHT common femoral, femoral and popliteal veins.  Doppler examination shows normal spontaneous and phasic flow.  No RIGHT calf vein thrombosis is detected.    LEFT:  Normal compressibility of the LEFT common femoral, femoral and popliteal veins.  Doppler examination shows normal spontaneous and phasic flow.  No LEFT calf vein thrombosis is detected.    IMPRESSION:  No evidence of deep venous thrombosis in either lower extremity.    < end of copied text >  RADIOLOGY & ADDITIONAL STUDIES:    < from: Xray Chest 1 View- PORTABLE-Urgent (Xray Chest 1 View- PORTABLE-Urgent .) (07.26.21 @ 08:43) >    PROCEDURE DATE:  07/26/2021          INTERPRETATION:  Portable chest radiograph    CLINICAL INFORMATION: Pneumonia due to Covid 19.. Follow-up    TECHNIQUE:  Portable  AP view of the chest was obtained.    COMPARISON: 7/21/2021 chest available for review.    FINDINGS:    The lungs show stable bilateral  multifocal and diffuse ill-defined airspace opacities.. No pneumothorax.    The  heart is enlarged in transverse diameter. No hilar mass.     Visualized osseous structures are intact.    IMPRESSION:   Stable bilateral  multifocal and diffuse ill-defined airspace opacities..    < end of copied text >  < from: US Duplex Venous Lower Ext Complete, Bilateral (08.06.21 @ 17:16) >  FINDINGS:    RIGHT:  Normal compressibility of the RIGHT common femoral, femoral and popliteal veins.  Doppler examination shows normal spontaneous and phasic flow.  No RIGHT calf vein thrombosis is detected.    LEFT:  Normal compressibility of the LEFT common femoral, femoral and popliteal veins.  Doppler examination shows normal spontaneous and phasic flow.  No LEFT calf vein thrombosisis detected.    IMPRESSION:  No evidence of deep venous thrombosis in either lower extremity.    < end of copied text >  < from: Xray Chest 1 View- PORTABLE-Urgent (Xray Chest 1 View- PORTABLE-Urgent .) (08.06.21 @ 16:45) >  INTERPRETATION:  Portable chest radiograph    CLINICAL INFORMATION: Pneumonia due to Covid 19.    TECHNIQUE:  Portable  AP view of the chest was obtained.    COMPARISON: 8/1/2021 chest radiograph available for review.    FINDINGS:    The lungs show decreasing bilateral diffuse airspace disease.. No pneumothorax.    The  heart is mildly enlarged in transverse diameter. No hilar mass.   Visualized osseous structures are intact.    IMPRESSION:   Decreasing bilateral diffuse airspace disease..    < end of copied text >  xr< from: US Duplex Venous Lower Ext Complete, Bilateral (08.17.21 @ 08:58) >  COMPARISON: None available.    TECHNIQUE: Duplex sonography of the BILATERAL LOWER extremity veins with color and spectral Doppler, with and without compression.    FINDINGS:    RIGHT:  Normal compressibility of the RIGHT common femoral, femoral and popliteal veins.  Doppler examination shows normal spontaneous and phasic flow.  No RIGHT calf vein thrombosis is detected.    LEFT:  Normal compressibility of the LEFT common femoral, femoral and popliteal veins.  Doppler examination shows normal spontaneous and phasic flow.  No LEFT calf vein thrombosis is detected.    IMPRESSION:  No evidence of deep venous thrombosis in either lower extremity.    < end of copied text >  r< from: US Duplex Venous Lower Ext Complete, Bilateral (08.23.21 @ 12:53) >  PROCEDURE DATE:  08/23/2021          INTERPRETATION:  CLINICAL INFORMATION: 55 years  Male with r/o DVT    evaluate for DVT. Covid positive. Elevated d-dimer.    COMPARISON: None available.    TECHNIQUE: Duplex sonography of the BILATERAL LOWER extremity veins with color and spectral Doppler, with and without compression.    FINDINGS:    RIGHT:  Normal compressibility of the RIGHT common femoral, femoral and popliteal veins.  Doppler examination shows normal spontaneous and phasic flow.  No RIGHT calf vein thrombosis is detected.    LEFT:  Normal compressibility of the LEFT common femoral, femoral and popliteal veins.  Doppler examination shows normal spontaneous and phasic flow.  No LEFT calf vein thrombosis is detected.    IMPRESSION:  No evidence of deep venous thrombosis in either lower extremity.    < end of copied text >  < from: Xray Chest 1 View- PORTABLE-Urgent (Xray Chest 1 View- PORTABLE-Urgent .) (08.23.21 @ 11:06) >    PROCEDURE DATE:  08/23/2021          INTERPRETATION:  History: Dyspnea, Covid    Chest:  one view.    Comparison: 08/20/2021    AP radiograph of the chest demonstrates moderate diffuse alveolar infiltrates unchanged. The cardiac silhouette is normal in size. Osseous structures are intact.    Impression:moderate diffuse alveolar infiltrates unchanged.    < end of copied text >  r< from: Xray Chest 1 View- PORTABLE-Urgent (Xray Chest 1 View- PORTABLE-Urgent .) (08.23.21 @ 11:06) >  PROCEDURE DATE:  08/23/2021          INTERPRETATION:  History: Dyspnea, Covid    Chest:  one view.    Comparison: 08/20/2021    AP radiograph of the chest demonstrates moderate diffuse alveolar infiltrates unchanged. The cardiac silhouette is normal in size. Osseous structures are intact.    Impression:moderate diffuse alveolar infiltrates unchanged.    < end of copied text >  < from: Xray Chest 1 View-PORTABLE IMMEDIATE (Xray Chest 1 View-PORTABLE IMMEDIATE .) (09.11.21 @ 12:32) >    PROCEDURE DATE:  09/11/2021          INTERPRETATION:  XR CHEST IMMEDIATE    Single AP view    HISTORY:  Fever    Comparison: Chest x-ray 9/4/2021    The cardiac silhouette is within normal limits. Diffuse bilateral airspace disease. No pleural abnormality.    IMPRESSION: Unchanged bilateral airspace disease    < end of copied text >  x< from: Xray Chest 1 View- PORTABLE-Routine (Xray Chest 1 View- PORTABLE-Routine in AM.) (09.16.21 @ 07:15) >    INTERPRETATION:  Portable chest radiograph    CLINICAL INFORMATION: Pneumonia due to Covid 19.    TECHNIQUE:  Portable  AP view of the chest.    COMPARISON: 9/11/2021 chest available for review.    FINDINGS:    The lungs show persistent bilateral  multifocal and diffuse ill-defined airspace opacities. No pneumothorax.    The  heart is enlarged in transverse diameter. No hilar mass.     Visualizedosseous structures are intact.    IMPRESSION:   No significant interval change.    < end of copied text >

## 2021-09-25 LAB — SARS-COV-2 RNA SPEC QL NAA+PROBE: SIGNIFICANT CHANGE UP

## 2021-09-25 PROCEDURE — 99291 CRITICAL CARE FIRST HOUR: CPT | Mod: 25

## 2021-09-25 PROCEDURE — 99232 SBSQ HOSP IP/OBS MODERATE 35: CPT | Mod: 25

## 2021-09-25 RX ORDER — SODIUM CHLORIDE 0.65 %
1 AEROSOL, SPRAY (ML) NASAL
Refills: 0 | Status: DISCONTINUED | OUTPATIENT
Start: 2021-09-25 | End: 2021-10-05

## 2021-09-25 RX ADMIN — ENOXAPARIN SODIUM 40 MILLIGRAM(S): 100 INJECTION SUBCUTANEOUS at 21:50

## 2021-09-25 RX ADMIN — Medication 25 MILLIGRAM(S): at 09:26

## 2021-09-25 RX ADMIN — Medication 100 MILLIGRAM(S): at 21:50

## 2021-09-25 RX ADMIN — Medication 5 MILLIGRAM(S): at 06:45

## 2021-09-25 RX ADMIN — CEFEPIME 100 MILLIGRAM(S): 1 INJECTION, POWDER, FOR SOLUTION INTRAMUSCULAR; INTRAVENOUS at 09:24

## 2021-09-25 RX ADMIN — Medication 2000 UNIT(S): at 09:26

## 2021-09-25 RX ADMIN — Medication 81 MILLIGRAM(S): at 09:26

## 2021-09-25 RX ADMIN — Medication 100 MILLIGRAM(S): at 09:24

## 2021-09-25 RX ADMIN — BUDESONIDE AND FORMOTEROL FUMARATE DIHYDRATE 2 PUFF(S): 160; 4.5 AEROSOL RESPIRATORY (INHALATION) at 21:26

## 2021-09-25 RX ADMIN — ENOXAPARIN SODIUM 40 MILLIGRAM(S): 100 INJECTION SUBCUTANEOUS at 09:25

## 2021-09-25 RX ADMIN — Medication 100 MILLIGRAM(S): at 09:26

## 2021-09-25 RX ADMIN — Medication 25 MILLIGRAM(S): at 21:50

## 2021-09-25 RX ADMIN — LOSARTAN POTASSIUM 75 MILLIGRAM(S): 100 TABLET, FILM COATED ORAL at 09:26

## 2021-09-25 RX ADMIN — ATORVASTATIN CALCIUM 80 MILLIGRAM(S): 80 TABLET, FILM COATED ORAL at 21:50

## 2021-09-25 RX ADMIN — Medication 5 MILLIGRAM(S): at 21:50

## 2021-09-25 NOTE — PROGRESS NOTE ADULT - SUBJECTIVE AND OBJECTIVE BOX
HPI:    54 y/o male with HLD, HTN and DM--Non Vacc--presents with 8 days onset of covid symptoms which included, cough, fevers, sob, hypoxia, fevers, diarrhea, chills and myalgias. Tested positive 7/15.  Rx with Rem/Dexa and then high dose steroids and full AC.  Pt tx to ICU on 7/24 for worsening hypoxia.       8/9:  Pt seen and examined in ICU.  Sitting in chair.  On HFNC 50/90% with O2 sat 80-84%. Slightly dysneic. Still eating.  Awake and alert.  Tm 98.6  WBC 19  FS high.  LE Dopp Negative for DVT.  L arm superficial thrombosis--NO DVT.      8/10: Pt seen and examined in ICU.  Case d/w Dr shah at bedside.  On HFNC at 50/90% with O2 sat 86%.  NAD.  Complete full sentences.  Ferritin lower.  Tm 98.8  WBC 16.  Eating well.  CXR-- Personally reviewed--slightly worsening infiltrates      8/11:  Pt seen and examined in ICU.  O2 sat not as high as yesterday.  On HFNC 50/95%.  Sitting in chair.  Taking longer to recover after care this morning.  He doesn't feel any different.  Tm 97.9  WBC 16  FS better.  Still eating well    8/12:  Pt seen and examined in ICU.  Self prone O/N.  Sitting in chair.  Clinically unchanged.  On HFNC 50/95%.  Awake and alert.  WOB unchanged.  Tm 97.9   FS high during night hours    8/13:  Pt seen and examined in ICU.  Had coughing spell O/N resulting in desaturation.  Sitting in chair.  Remains on HFNC and intermittent NRM. WOB unchanged. Tm 97.8  WBC 16  FS better    8/14:  Pt seen and examined in ICU.  Coughing O/N.  Awake and alert.  WOB stable.  Tm 98.8      8/15:  Pt seen and examined in ICU.  Slept well O/N.  Awake and alert.  Tm 98.9  FS OK    9/6:  Pt seen and examined in ICU.  Has stayed off vent support.  On HFNC and CPAP qhs.  Sitting in chair.  Tm 98.6  WBC 10    9/7:  Pt seen and examined in ICU.  Tachycardic, diaphoresis and could not sleep last night.  Tm 99.6  FS OK.  Still desats rapidly and recover period still prolong     9/8:  Pt seen and examined in ICU.  Had diarrhea yesterday after eating food brought in by wife--Resolved.  Slept well.  Tm 99.6  FS OK    9/9:  Pt seen and examined in ICU.  Slept well O/N.  On HFNC.  Will attempt HFNC during sleep tonight    9/10: Pt seen and examined in ICU.  Slept well last night off NIV.  Did not require Benzo.  Tm 99.0  WBC 20  FS OK    9/11:  Pt seen and examined in ICU.  [place]ierra HFNC QHS.  C/O R ear pain.  Tm 100.9  WBC 20  FS OK    9/12:  Pt seen and examined in ICU.  HFNC O/N with occasional dsesat.  Tm 100.7  WBC 20.  NO new Cxs thus far.  FS OK      9/20: HE continues with HF NC, 40L and 45%.  Occasionally coughing up thick sputum.  New L sided infiltrates on CXR  9/21: Still on HF NC O2  9/22: On 30L and 45%  9/23: He remains on 30 L and 45%.    9/24: He is now on 6L and with an O2 sat in the low 90s    9/25: he did well on NC O2 6 L and VM O2 over night      PAST MEDICAL/SURGICAL/FAMILY/SOCIAL HISTORY:    Past Medical History:  Diabetes    HTN (hypertension).  No surgeries     Tobacco Usage:  · Tobacco Usage	non smoker  Fhx: none (21 Jul 2021 19:19)       PAST MEDICAL & SURGICAL HISTORY:  HTN (hypertension)    Diabetes        FAMILY HISTORY:      Social Hx:    Allergies    No Known Allergies    Intolerances            ICU Vital Signs Last 24 Hrs  T(C): 36.9 (25 Sep 2021 06:00), Max: 36.9 (25 Sep 2021 06:00)  T(F): 98.5 (25 Sep 2021 06:00), Max: 98.5 (25 Sep 2021 06:00)  HR: 108 (25 Sep 2021 08:00) (77 - 111)  BP: 142/77 (25 Sep 2021 08:00) (130/95 - 168/98)  BP(mean): 94 (25 Sep 2021 08:00) (89 - 117)  ABP: --  ABP(mean): --  RR: 29 (25 Sep 2021 10:58) (13 - 34)  SpO2: 90% (25 Sep 2021 10:58) (90% - 100%)          I&O's Summary    24 Sep 2021 07:01  -  25 Sep 2021 07:00  --------------------------------------------------------  IN: 50 mL / OUT: 760 mL / NET: -710 mL                              8.4    15.71 )-----------( 510      ( 24 Sep 2021 10:14 )             27.0       09-24    138  |  106  |  9   ----------------------------<  167<H>  3.8   |  26  |  0.85    Ca    9.2      24 Sep 2021 10:14  Phos  3.2     09-24  Mg     1.9     09-24                      MEDICATIONS  (STANDING):  aspirin  chewable 81 milliGRAM(s) Oral daily  atorvastatin 80 milliGRAM(s) Oral at bedtime  budesonide 160 MICROgram(s)/formoterol 4.5 MICROgram(s) Inhaler 2 Puff(s) Inhalation two times a day  cholecalciferol 2000 Unit(s) Oral daily  enoxaparin Injectable 40 milliGRAM(s) SubCutaneous every 12 hours  glucagon  Injectable 1 milliGRAM(s) IntraMuscular once  losartan 75 milliGRAM(s) Oral daily  metoprolol tartrate 25 milliGRAM(s) Oral two times a day    MEDICATIONS  (PRN):  acetaminophen   Tablet .. 650 milliGRAM(s) Oral every 4 hours PRN Temp greater or equal to 38C (100.4F), Mild Pain (1 - 3)  ALBUTerol    90 MICROgram(s) HFA Inhaler 2 Puff(s) Inhalation every 4 hours PRN Shortness of Breath and/or Wheezing  benzonatate 100 milliGRAM(s) Oral every 8 hours PRN Cough  guaiFENesin Oral Liquid (Sugar-Free) 100 milliGRAM(s) Oral every 6 hours PRN Cough  hydrALAZINE Injectable 5 milliGRAM(s) IV Push every 6 hours PRN SBP>160  melatonin 5 milliGRAM(s) Oral at bedtime PRN Sleep  polyethylene glycol 3350 17 Gram(s) Oral daily PRN Constipation  sodium chloride 0.65% Nasal 1 Spray(s) Both Nostrils two times a day PRN Nasal Congestion      DVT Prophylaxis:    Advanced Directives:  Discussed with:    Visit Information:    ** Time is exclusive of billed procedures and/or teaching and/or routine family updates.

## 2021-09-26 PROCEDURE — 99232 SBSQ HOSP IP/OBS MODERATE 35: CPT

## 2021-09-26 RX ADMIN — BUDESONIDE AND FORMOTEROL FUMARATE DIHYDRATE 2 PUFF(S): 160; 4.5 AEROSOL RESPIRATORY (INHALATION) at 09:13

## 2021-09-26 RX ADMIN — LOSARTAN POTASSIUM 75 MILLIGRAM(S): 100 TABLET, FILM COATED ORAL at 10:17

## 2021-09-26 RX ADMIN — Medication 2000 UNIT(S): at 10:21

## 2021-09-26 RX ADMIN — Medication 81 MILLIGRAM(S): at 10:17

## 2021-09-26 RX ADMIN — BUDESONIDE AND FORMOTEROL FUMARATE DIHYDRATE 2 PUFF(S): 160; 4.5 AEROSOL RESPIRATORY (INHALATION) at 21:13

## 2021-09-26 RX ADMIN — ATORVASTATIN CALCIUM 80 MILLIGRAM(S): 80 TABLET, FILM COATED ORAL at 23:51

## 2021-09-26 RX ADMIN — Medication 25 MILLIGRAM(S): at 10:17

## 2021-09-26 RX ADMIN — ENOXAPARIN SODIUM 40 MILLIGRAM(S): 100 INJECTION SUBCUTANEOUS at 10:17

## 2021-09-26 RX ADMIN — Medication 25 MILLIGRAM(S): at 23:52

## 2021-09-26 RX ADMIN — ENOXAPARIN SODIUM 40 MILLIGRAM(S): 100 INJECTION SUBCUTANEOUS at 23:52

## 2021-09-26 NOTE — PROGRESS NOTE ADULT - NEGATIVE SKIN SYMPTOMS
no rash/no itching

## 2021-09-26 NOTE — PROGRESS NOTE ADULT - NEGATIVE GASTROINTESTINAL SYMPTOMS
no nausea/no vomiting

## 2021-09-26 NOTE — PROGRESS NOTE ADULT - ASSESSMENT
1) COVID Pneumonia  2) Abnormal CXR  3) Dyspnea  4) Hypoxemic Respiratory Failure   5) Suspected superimposed nosocomial pneumonia    56 M noted to have COVID 7/15  On arrival hypoxic to 80s and tachypneic. NRB placed,in the ER saturating 95%. Na 126 s/p 1L NS. CXR: Frandy infiltrates. Last scr 1.4 in 2019-> today 1.9 unknown if underlying ckd.  Treated with IV Remdesivir and Decadron, Tocilizumab   Given the obesity/hypertension and prior co-morbidities, he is at risk of intubation but continue HFNC, respiratory status remains critical  DDimer elevated, was on therapeutic lovenox, transitioned to 40mg q 12  Completed Solumedrol, Symbicort 160/4.5 BID (https://[email or web address]/lanres/article/[email or web address], STOIC study)  SaO2 is now 88-95%  Respiratory status is tenuous, work of breathing is improving and HFNC transitioned to   ICU level care appreciated  Hgb improving  Repeat ABG 7.36/60/91 reviewed  prolonged hospitalization with ARDS post COVID pneumonia  superimposed bacterial infection likely with leukocytosis and increased infiltrates seen, WBC improving  Appreciate ID Recommendations; Cefepime

## 2021-09-26 NOTE — PROGRESS NOTE ADULT - SUBJECTIVE AND OBJECTIVE BOX
HPI:    56 y/o male with HLD, HTN and DM--Non Vacc--presents with 8 days onset of covid symptoms which included, cough, fevers, sob, hypoxia, fevers, diarrhea, chills and myalgias. Tested positive 7/15.  Rx with Rem/Dexa and then high dose steroids and full AC.  Pt tx to ICU on 7/24 for worsening hypoxia.       8/9:  Pt seen and examined in ICU.  Sitting in chair.  On HFNC 50/90% with O2 sat 80-84%. Slightly dysneic. Still eating.  Awake and alert.  Tm 98.6  WBC 19  FS high.  LE Dopp Negative for DVT.  L arm superficial thrombosis--NO DVT.      8/10: Pt seen and examined in ICU.  Case d/w Dr shah at bedside.  On HFNC at 50/90% with O2 sat 86%.  NAD.  Complete full sentences.  Ferritin lower.  Tm 98.8  WBC 16.  Eating well.  CXR-- Personally reviewed--slightly worsening infiltrates      8/11:  Pt seen and examined in ICU.  O2 sat not as high as yesterday.  On HFNC 50/95%.  Sitting in chair.  Taking longer to recover after care this morning.  He doesn't feel any different.  Tm 97.9  WBC 16  FS better.  Still eating well    8/12:  Pt seen and examined in ICU.  Self prone O/N.  Sitting in chair.  Clinically unchanged.  On HFNC 50/95%.  Awake and alert.  WOB unchanged.  Tm 97.9   FS high during night hours    8/13:  Pt seen and examined in ICU.  Had coughing spell O/N resulting in desaturation.  Sitting in chair.  Remains on HFNC and intermittent NRM. WOB unchanged. Tm 97.8  WBC 16  FS better    8/14:  Pt seen and examined in ICU.  Coughing O/N.  Awake and alert.  WOB stable.  Tm 98.8      8/15:  Pt seen and examined in ICU.  Slept well O/N.  Awake and alert.  Tm 98.9  FS OK    9/6:  Pt seen and examined in ICU.  Has stayed off vent support.  On HFNC and CPAP qhs.  Sitting in chair.  Tm 98.6  WBC 10    9/7:  Pt seen and examined in ICU.  Tachycardic, diaphoresis and could not sleep last night.  Tm 99.6  FS OK.  Still desats rapidly and recover period still prolong     9/8:  Pt seen and examined in ICU.  Had diarrhea yesterday after eating food brought in by wife--Resolved.  Slept well.  Tm 99.6  FS OK    9/9:  Pt seen and examined in ICU.  Slept well O/N.  On HFNC.  Will attempt HFNC during sleep tonight    9/10: Pt seen and examined in ICU.  Slept well last night off NIV.  Did not require Benzo.  Tm 99.0  WBC 20  FS OK    9/11:  Pt seen and examined in ICU.  Tierra HFNC QHS.  C/O R ear pain.  Tm 100.9  WBC 20  FS OK    9/12:  Pt seen and examined in ICU.  HFNC O/N with occasional dsesat.  Tm 100.7  WBC 20.  NO new Cxs thus far.  FS OK      9/20: HE continues with HF NC, 40L and 45%.  Occasionally coughing up thick sputum.  New L sided infiltrates on CXR  9/21: Still on HF NC O2  9/22: On 30L and 45%  9/23: He remains on 30 L and 45%.    9/24: He is now on 6L and with an O2 sat in the low 90s    9/25: he did well on NC O2 6 L and VM O2 over night  9/26: Doing well on NC O2      PAST MEDICAL/SURGICAL/FAMILY/SOCIAL HISTORY:    Past Medical History:  Diabetes    HTN (hypertension).  No surgeries     Tobacco Usage:  · Tobacco Usage	non smoker  Fhx: none (21 Jul 2021 19:19)       PAST MEDICAL & SURGICAL HISTORY:  HTN (hypertension)    Diabetes        FAMILY HISTORY:      Social Hx:    Allergies    No Known Allergies    Intolerances            ICU Vital Signs Last 24 Hrs  T(C): 37.2 (26 Sep 2021 04:00), Max: 37.3 (25 Sep 2021 20:00)  T(F): 98.9 (26 Sep 2021 04:00), Max: 99.1 (25 Sep 2021 20:00)  HR: 91 (26 Sep 2021 11:00) (80 - 112)  BP: 151/95 (26 Sep 2021 11:00) (131/83 - 172/108)  BP(mean): 109 (26 Sep 2021 11:00) (92 - 120)  ABP: --  ABP(mean): --  RR: 16 (26 Sep 2021 11:00) (16 - 35)  SpO2: 83% (26 Sep 2021 11:00) (83% - 100%)          I&O's Summary    25 Sep 2021 07:01  -  26 Sep 2021 07:00  --------------------------------------------------------  IN: 450 mL / OUT: 700 mL / NET: -250 mL                                  MEDICATIONS  (STANDING):  aspirin  chewable 81 milliGRAM(s) Oral daily  atorvastatin 80 milliGRAM(s) Oral at bedtime  budesonide 160 MICROgram(s)/formoterol 4.5 MICROgram(s) Inhaler 2 Puff(s) Inhalation two times a day  cholecalciferol 2000 Unit(s) Oral daily  enoxaparin Injectable 40 milliGRAM(s) SubCutaneous every 12 hours  glucagon  Injectable 1 milliGRAM(s) IntraMuscular once  losartan 75 milliGRAM(s) Oral daily  metoprolol tartrate 25 milliGRAM(s) Oral two times a day    MEDICATIONS  (PRN):  acetaminophen   Tablet .. 650 milliGRAM(s) Oral every 4 hours PRN Temp greater or equal to 38C (100.4F), Mild Pain (1 - 3)  ALBUTerol    90 MICROgram(s) HFA Inhaler 2 Puff(s) Inhalation every 4 hours PRN Shortness of Breath and/or Wheezing  benzonatate 100 milliGRAM(s) Oral every 8 hours PRN Cough  guaiFENesin Oral Liquid (Sugar-Free) 100 milliGRAM(s) Oral every 6 hours PRN Cough  hydrALAZINE Injectable 5 milliGRAM(s) IV Push every 6 hours PRN SBP>160  melatonin 5 milliGRAM(s) Oral at bedtime PRN Sleep  polyethylene glycol 3350 17 Gram(s) Oral daily PRN Constipation  sodium chloride 0.65% Nasal 1 Spray(s) Both Nostrils two times a day PRN Nasal Congestion      DVT Prophylaxis:    Advanced Directives:  Discussed with:    Visit Information:    ** Time is exclusive of billed procedures and/or teaching and/or routine family updates.

## 2021-09-26 NOTE — PROGRESS NOTE ADULT - NEGATIVE GENERAL SYMPTOMS
no fever/no chills

## 2021-09-26 NOTE — PROGRESS NOTE ADULT - NEGATIVE ENMT SYMPTOMS
no hearing difficulty

## 2021-09-27 LAB
ANION GAP SERPL CALC-SCNC: 5 MMOL/L — SIGNIFICANT CHANGE UP (ref 5–17)
BUN SERPL-MCNC: 19 MG/DL — SIGNIFICANT CHANGE UP (ref 7–23)
CALCIUM SERPL-MCNC: 8.6 MG/DL — SIGNIFICANT CHANGE UP (ref 8.5–10.1)
CHLORIDE SERPL-SCNC: 110 MMOL/L — HIGH (ref 96–108)
CO2 SERPL-SCNC: 26 MMOL/L — SIGNIFICANT CHANGE UP (ref 22–31)
CREAT SERPL-MCNC: 1.13 MG/DL — SIGNIFICANT CHANGE UP (ref 0.5–1.3)
GLUCOSE SERPL-MCNC: 96 MG/DL — SIGNIFICANT CHANGE UP (ref 70–99)
HCT VFR BLD CALC: 25.4 % — LOW (ref 39–50)
HGB BLD-MCNC: 7.6 G/DL — LOW (ref 13–17)
MAGNESIUM SERPL-MCNC: 1.9 MG/DL — SIGNIFICANT CHANGE UP (ref 1.6–2.6)
MCHC RBC-ENTMCNC: 25.9 PG — LOW (ref 27–34)
MCHC RBC-ENTMCNC: 29.9 GM/DL — LOW (ref 32–36)
MCV RBC AUTO: 86.4 FL — SIGNIFICANT CHANGE UP (ref 80–100)
PHOSPHATE SERPL-MCNC: 3.9 MG/DL — SIGNIFICANT CHANGE UP (ref 2.5–4.5)
PLATELET # BLD AUTO: 439 K/UL — HIGH (ref 150–400)
POTASSIUM SERPL-MCNC: 4.1 MMOL/L — SIGNIFICANT CHANGE UP (ref 3.5–5.3)
POTASSIUM SERPL-SCNC: 4.1 MMOL/L — SIGNIFICANT CHANGE UP (ref 3.5–5.3)
RBC # BLD: 2.94 M/UL — LOW (ref 4.2–5.8)
RBC # FLD: 15.1 % — HIGH (ref 10.3–14.5)
SODIUM SERPL-SCNC: 141 MMOL/L — SIGNIFICANT CHANGE UP (ref 135–145)
WBC # BLD: 13.34 K/UL — HIGH (ref 3.8–10.5)
WBC # FLD AUTO: 13.34 K/UL — HIGH (ref 3.8–10.5)

## 2021-09-27 PROCEDURE — 99232 SBSQ HOSP IP/OBS MODERATE 35: CPT

## 2021-09-27 RX ADMIN — ATORVASTATIN CALCIUM 80 MILLIGRAM(S): 80 TABLET, FILM COATED ORAL at 21:33

## 2021-09-27 RX ADMIN — BUDESONIDE AND FORMOTEROL FUMARATE DIHYDRATE 2 PUFF(S): 160; 4.5 AEROSOL RESPIRATORY (INHALATION) at 21:37

## 2021-09-27 RX ADMIN — BUDESONIDE AND FORMOTEROL FUMARATE DIHYDRATE 2 PUFF(S): 160; 4.5 AEROSOL RESPIRATORY (INHALATION) at 10:21

## 2021-09-27 RX ADMIN — ENOXAPARIN SODIUM 40 MILLIGRAM(S): 100 INJECTION SUBCUTANEOUS at 10:02

## 2021-09-27 RX ADMIN — Medication 81 MILLIGRAM(S): at 10:01

## 2021-09-27 RX ADMIN — Medication 25 MILLIGRAM(S): at 10:02

## 2021-09-27 RX ADMIN — Medication 5 MILLIGRAM(S): at 21:32

## 2021-09-27 RX ADMIN — LOSARTAN POTASSIUM 75 MILLIGRAM(S): 100 TABLET, FILM COATED ORAL at 10:01

## 2021-09-27 RX ADMIN — Medication 2000 UNIT(S): at 10:02

## 2021-09-27 RX ADMIN — Medication 25 MILLIGRAM(S): at 21:32

## 2021-09-27 RX ADMIN — ENOXAPARIN SODIUM 40 MILLIGRAM(S): 100 INJECTION SUBCUTANEOUS at 21:32

## 2021-09-27 NOTE — PROGRESS NOTE ADULT - TIME BILLING
Imp:       Plan:      DVT ppx:   Nutrition:   Central line:   Arterial line:   Faulkner:   Restraints:   Activity:   Code status:   Disposition:   Updated: 56M PMH HTN, DM2, now with-    Imp:   Acute hypoxic resp failure due to COVID-19 pna  Anemia of chronic disease     Slowly improving from a clinical standpoint despite worsening CXR     Plan:  Wean O2 as tolerates   HD stable   Glu WNL   On aspirin, statin   Ambulate in anticipation for discharge home with services     DVT ppx: lovenox   Nutrition: po diet   Central line: no  Arterial line: no  Faulkner: no  Restraints: no  Activity: ambulating   Code status: full  Disposition: ICU  Updated: patient 56M PMH HTN, DM2, now with-    Imp:   Acute hypoxic resp failure due to COVID-19 pna  Anemia of chronic disease     Slowly improving from a clinical standpoint despite worsening CXR     Plan:  Wean O2 as tolerates   HD stable   Glu WNL   On aspirin, statin   Check retic ct and iron studies in am  No active bleeding   Ambulate in anticipation for discharge home with services     DVT ppx: lovenox   Nutrition: po diet   Central line: no  Arterial line: no  Faulkner: no  Restraints: no  Activity: ambulating   Code status: full  Disposition: ICU  Updated: patient

## 2021-09-27 NOTE — PROGRESS NOTE ADULT - SUBJECTIVE AND OBJECTIVE BOX
Patient is a 56y old  Male who presents with a chief complaint of cough/sob (26 Sep 2021 11:12)    24 hour events:     PAST MEDICAL & SURGICAL HISTORY:  HTN (hypertension)    Diabetes        Allergies    No Known Allergies    Intolerances      REVIEW OF SYSTEMS: SEE BELOW       ICU Vital Signs Last 24 Hrs  T(C): 36.7 (27 Sep 2021 14:00), Max: 36.9 (27 Sep 2021 00:00)  T(F): 98 (27 Sep 2021 14:00), Max: 98.5 (27 Sep 2021 00:00)  HR: 90 (27 Sep 2021 16:00) (76 - 108)  BP: 148/91 (27 Sep 2021 16:00) (134/91 - 175/95)  BP(mean): 102 (27 Sep 2021 16:00) (91 - 116)  ABP: --  ABP(mean): --  RR: 28 (27 Sep 2021 16:00) (20 - 40)  SpO2: 100% (27 Sep 2021 16:00) (91% - 100%)      CAPILLARY BLOOD GLUCOSE          I&O's Summary    26 Sep 2021 07:01  -  27 Sep 2021 07:00  --------------------------------------------------------  IN: 840 mL / OUT: 900 mL / NET: -60 mL    27 Sep 2021 07:01  -  27 Sep 2021 17:14  --------------------------------------------------------  IN: 720 mL / OUT: 650 mL / NET: 70 mL            MEDICATIONS  (STANDING):  aspirin  chewable 81 milliGRAM(s) Oral daily  atorvastatin 80 milliGRAM(s) Oral at bedtime  budesonide 160 MICROgram(s)/formoterol 4.5 MICROgram(s) Inhaler 2 Puff(s) Inhalation two times a day  cholecalciferol 2000 Unit(s) Oral daily  enoxaparin Injectable 40 milliGRAM(s) SubCutaneous every 12 hours  glucagon  Injectable 1 milliGRAM(s) IntraMuscular once  losartan 75 milliGRAM(s) Oral daily  metoprolol tartrate 25 milliGRAM(s) Oral two times a day      MEDICATIONS  (PRN):  acetaminophen   Tablet .. 650 milliGRAM(s) Oral every 4 hours PRN Temp greater or equal to 38C (100.4F), Mild Pain (1 - 3)  ALBUTerol    90 MICROgram(s) HFA Inhaler 2 Puff(s) Inhalation every 4 hours PRN Shortness of Breath and/or Wheezing  benzonatate 100 milliGRAM(s) Oral every 8 hours PRN Cough  guaiFENesin Oral Liquid (Sugar-Free) 100 milliGRAM(s) Oral every 6 hours PRN Cough  hydrALAZINE Injectable 5 milliGRAM(s) IV Push every 6 hours PRN SBP>160  melatonin 5 milliGRAM(s) Oral at bedtime PRN Sleep  polyethylene glycol 3350 17 Gram(s) Oral daily PRN Constipation  sodium chloride 0.65% Nasal 1 Spray(s) Both Nostrils two times a day PRN Nasal Congestion      PHYSICAL EXAM: SEE BELOW                          7.6    13.34 )-----------( 439      ( 27 Sep 2021 06:22 )             25.4       09-27    141  |  110<H>  |  19  ----------------------------<  96  4.1   |  26  |  1.13    Ca    8.6      27 Sep 2021 06:22  Phos  3.9     09-27  Mg     1.9     09-27                         Patient is a 56y old  Male who presents with a chief complaint of cough/sob (26 Sep 2021 11:12)    24 hour events: ambulating   on 6L NC alternating with 50% VM     PAST MEDICAL & SURGICAL HISTORY:  HTN (hypertension)    Diabetes        Allergies    No Known Allergies    Intolerances      REVIEW OF SYSTEMS: SEE BELOW       ICU Vital Signs Last 24 Hrs  T(C): 36.7 (27 Sep 2021 14:00), Max: 36.9 (27 Sep 2021 00:00)  T(F): 98 (27 Sep 2021 14:00), Max: 98.5 (27 Sep 2021 00:00)  HR: 90 (27 Sep 2021 16:00) (76 - 108)  BP: 148/91 (27 Sep 2021 16:00) (134/91 - 175/95)  BP(mean): 102 (27 Sep 2021 16:00) (91 - 116)  ABP: --  ABP(mean): --  RR: 28 (27 Sep 2021 16:00) (20 - 40)  SpO2: 100% (27 Sep 2021 16:00) (91% - 100%)      CAPILLARY BLOOD GLUCOSE          I&O's Summary    26 Sep 2021 07:01  -  27 Sep 2021 07:00  --------------------------------------------------------  IN: 840 mL / OUT: 900 mL / NET: -60 mL    27 Sep 2021 07:01  -  27 Sep 2021 17:14  --------------------------------------------------------  IN: 720 mL / OUT: 650 mL / NET: 70 mL            MEDICATIONS  (STANDING):  aspirin  chewable 81 milliGRAM(s) Oral daily  atorvastatin 80 milliGRAM(s) Oral at bedtime  budesonide 160 MICROgram(s)/formoterol 4.5 MICROgram(s) Inhaler 2 Puff(s) Inhalation two times a day  cholecalciferol 2000 Unit(s) Oral daily  enoxaparin Injectable 40 milliGRAM(s) SubCutaneous every 12 hours  glucagon  Injectable 1 milliGRAM(s) IntraMuscular once  losartan 75 milliGRAM(s) Oral daily  metoprolol tartrate 25 milliGRAM(s) Oral two times a day      MEDICATIONS  (PRN):  acetaminophen   Tablet .. 650 milliGRAM(s) Oral every 4 hours PRN Temp greater or equal to 38C (100.4F), Mild Pain (1 - 3)  ALBUTerol    90 MICROgram(s) HFA Inhaler 2 Puff(s) Inhalation every 4 hours PRN Shortness of Breath and/or Wheezing  benzonatate 100 milliGRAM(s) Oral every 8 hours PRN Cough  guaiFENesin Oral Liquid (Sugar-Free) 100 milliGRAM(s) Oral every 6 hours PRN Cough  hydrALAZINE Injectable 5 milliGRAM(s) IV Push every 6 hours PRN SBP>160  melatonin 5 milliGRAM(s) Oral at bedtime PRN Sleep  polyethylene glycol 3350 17 Gram(s) Oral daily PRN Constipation  sodium chloride 0.65% Nasal 1 Spray(s) Both Nostrils two times a day PRN Nasal Congestion      PHYSICAL EXAM: SEE BELOW                          7.6    13.34 )-----------( 439      ( 27 Sep 2021 06:22 )             25.4       09-27    141  |  110<H>  |  19  ----------------------------<  96  4.1   |  26  |  1.13    Ca    8.6      27 Sep 2021 06:22  Phos  3.9     09-27  Mg     1.9     09-27

## 2021-09-28 LAB
ANION GAP SERPL CALC-SCNC: 7 MMOL/L — SIGNIFICANT CHANGE UP (ref 5–17)
BUN SERPL-MCNC: 20 MG/DL — SIGNIFICANT CHANGE UP (ref 7–23)
CALCIUM SERPL-MCNC: 8.5 MG/DL — SIGNIFICANT CHANGE UP (ref 8.5–10.1)
CHLORIDE SERPL-SCNC: 108 MMOL/L — SIGNIFICANT CHANGE UP (ref 96–108)
CO2 SERPL-SCNC: 26 MMOL/L — SIGNIFICANT CHANGE UP (ref 22–31)
CREAT SERPL-MCNC: 1.17 MG/DL — SIGNIFICANT CHANGE UP (ref 0.5–1.3)
FERRITIN SERPL-MCNC: 567 NG/ML — HIGH (ref 30–400)
GLUCOSE SERPL-MCNC: 103 MG/DL — HIGH (ref 70–99)
HCT VFR BLD CALC: 25.1 % — LOW (ref 39–50)
HGB BLD-MCNC: 7.4 G/DL — LOW (ref 13–17)
IRON SATN MFR SERPL: 12 % — LOW (ref 16–55)
IRON SATN MFR SERPL: 30 UG/DL — LOW (ref 45–165)
MCHC RBC-ENTMCNC: 25.6 PG — LOW (ref 27–34)
MCHC RBC-ENTMCNC: 29.5 GM/DL — LOW (ref 32–36)
MCV RBC AUTO: 86.9 FL — SIGNIFICANT CHANGE UP (ref 80–100)
PLATELET # BLD AUTO: 391 K/UL — SIGNIFICANT CHANGE UP (ref 150–400)
POTASSIUM SERPL-MCNC: 4 MMOL/L — SIGNIFICANT CHANGE UP (ref 3.5–5.3)
POTASSIUM SERPL-SCNC: 4 MMOL/L — SIGNIFICANT CHANGE UP (ref 3.5–5.3)
RBC # BLD: 2.89 M/UL — LOW (ref 4.2–5.8)
RBC # BLD: 2.89 M/UL — LOW (ref 4.2–5.8)
RBC # FLD: 15.2 % — HIGH (ref 10.3–14.5)
RETICS #: 113.3 K/UL — SIGNIFICANT CHANGE UP (ref 25–125)
RETICS/RBC NFR: 3.9 % — HIGH (ref 0.5–2.5)
SODIUM SERPL-SCNC: 141 MMOL/L — SIGNIFICANT CHANGE UP (ref 135–145)
TIBC SERPL-MCNC: 242 UG/DL — SIGNIFICANT CHANGE UP (ref 220–430)
TRANSFERRIN SERPL-MCNC: 194 MG/DL — LOW (ref 200–360)
UIBC SERPL-MCNC: 212 UG/DL — SIGNIFICANT CHANGE UP (ref 110–370)
WBC # BLD: 11.66 K/UL — HIGH (ref 3.8–10.5)
WBC # FLD AUTO: 11.66 K/UL — HIGH (ref 3.8–10.5)

## 2021-09-28 PROCEDURE — 99233 SBSQ HOSP IP/OBS HIGH 50: CPT

## 2021-09-28 RX ORDER — PANTOPRAZOLE SODIUM 20 MG/1
40 TABLET, DELAYED RELEASE ORAL DAILY
Refills: 0 | Status: DISCONTINUED | OUTPATIENT
Start: 2021-09-28 | End: 2021-10-05

## 2021-09-28 RX ORDER — METOPROLOL TARTRATE 50 MG
50 TABLET ORAL
Refills: 0 | Status: DISCONTINUED | OUTPATIENT
Start: 2021-09-28 | End: 2021-10-05

## 2021-09-28 RX ORDER — ENOXAPARIN SODIUM 100 MG/ML
40 INJECTION SUBCUTANEOUS DAILY
Refills: 0 | Status: DISCONTINUED | OUTPATIENT
Start: 2021-09-29 | End: 2021-10-05

## 2021-09-28 RX ORDER — ATORVASTATIN CALCIUM 80 MG/1
40 TABLET, FILM COATED ORAL AT BEDTIME
Refills: 0 | Status: DISCONTINUED | OUTPATIENT
Start: 2021-09-28 | End: 2021-10-05

## 2021-09-28 RX ORDER — IRON SUCROSE 20 MG/ML
100 INJECTION, SOLUTION INTRAVENOUS EVERY 24 HOURS
Refills: 0 | Status: DISCONTINUED | OUTPATIENT
Start: 2021-09-28 | End: 2021-09-29

## 2021-09-28 RX ADMIN — Medication 50 MILLIGRAM(S): at 22:22

## 2021-09-28 RX ADMIN — IRON SUCROSE 100 MILLIGRAM(S): 20 INJECTION, SOLUTION INTRAVENOUS at 12:51

## 2021-09-28 RX ADMIN — BUDESONIDE AND FORMOTEROL FUMARATE DIHYDRATE 2 PUFF(S): 160; 4.5 AEROSOL RESPIRATORY (INHALATION) at 20:39

## 2021-09-28 RX ADMIN — ENOXAPARIN SODIUM 40 MILLIGRAM(S): 100 INJECTION SUBCUTANEOUS at 09:36

## 2021-09-28 RX ADMIN — LOSARTAN POTASSIUM 75 MILLIGRAM(S): 100 TABLET, FILM COATED ORAL at 09:36

## 2021-09-28 RX ADMIN — Medication 25 MILLIGRAM(S): at 09:36

## 2021-09-28 RX ADMIN — Medication 81 MILLIGRAM(S): at 09:36

## 2021-09-28 RX ADMIN — PANTOPRAZOLE SODIUM 40 MILLIGRAM(S): 20 TABLET, DELAYED RELEASE ORAL at 18:04

## 2021-09-28 RX ADMIN — Medication 5 MILLIGRAM(S): at 22:22

## 2021-09-28 RX ADMIN — ATORVASTATIN CALCIUM 40 MILLIGRAM(S): 80 TABLET, FILM COATED ORAL at 22:22

## 2021-09-28 RX ADMIN — Medication 2000 UNIT(S): at 09:36

## 2021-09-28 NOTE — PROGRESS NOTE ADULT - TIME BILLING
56M PMH HTN, DM2, now with-    Imp:   Acute hypoxic resp failure due to COVID-19 pna  Anemia of chronic disease vs GIB     Slowly improving     Plan:  Wean O2 as tolerates   HD stable   Glu WNL   On aspirin, statin - will likely d/c aspirin due to worsening anemia and concern for GIB, there is no hx CAD/PCI   Iron studies consistent with iron def anemia - order iv iron x 5 days, then po iron   Patient endorses intermittent GIB which is not currently active, will add daily PPI and d/c aspirin   Ambulate in anticipation for discharge home with services     DVT ppx: lovenox   Nutrition: po diet   Central line: no  Arterial line: no  Faulkner: no  Restraints: no  Activity: ambulating   Code status: full  Disposition: ICU  Updated: patient

## 2021-09-28 NOTE — PROGRESS NOTE ADULT - SUBJECTIVE AND OBJECTIVE BOX
Patient is a 56y old  Male who presents with a chief complaint of cough/sob (27 Sep 2021 17:14)    24 hour events: weaning fio2     PAST MEDICAL & SURGICAL HISTORY:  HTN (hypertension)    Diabetes        Allergies    No Known Allergies    Intolerances      REVIEW OF SYSTEMS: SEE BELOW       ICU Vital Signs Last 24 Hrs  T(C): 37.2 (28 Sep 2021 12:00), Max: 37.2 (28 Sep 2021 12:00)  T(F): 98.9 (28 Sep 2021 12:00), Max: 98.9 (28 Sep 2021 12:00)  HR: 88 (28 Sep 2021 15:00) (76 - 100)  BP: 150/88 (28 Sep 2021 15:00) (130/92 - 176/102)  BP(mean): 102 (28 Sep 2021 15:00) (94 - 119)  ABP: --  ABP(mean): --  RR: 17 (28 Sep 2021 15:00) (17 - 40)  SpO2: 91% (28 Sep 2021 15:00) (86% - 100%)      CAPILLARY BLOOD GLUCOSE          I&O's Summary    27 Sep 2021 07:01  -  28 Sep 2021 07:00  --------------------------------------------------------  IN: 720 mL / OUT: 650 mL / NET: 70 mL    28 Sep 2021 07:01  -  28 Sep 2021 15:53  --------------------------------------------------------  IN: 0 mL / OUT: 500 mL / NET: -500 mL            MEDICATIONS  (STANDING):  aspirin  chewable 81 milliGRAM(s) Oral daily  atorvastatin 80 milliGRAM(s) Oral at bedtime  budesonide 160 MICROgram(s)/formoterol 4.5 MICROgram(s) Inhaler 2 Puff(s) Inhalation two times a day  cholecalciferol 2000 Unit(s) Oral daily  enoxaparin Injectable 40 milliGRAM(s) SubCutaneous every 12 hours  glucagon  Injectable 1 milliGRAM(s) IntraMuscular once  iron sucrose Injectable 100 milliGRAM(s) IV Push every 24 hours  losartan 75 milliGRAM(s) Oral daily  metoprolol tartrate 25 milliGRAM(s) Oral two times a day      MEDICATIONS  (PRN):  acetaminophen   Tablet .. 650 milliGRAM(s) Oral every 4 hours PRN Temp greater or equal to 38C (100.4F), Mild Pain (1 - 3)  ALBUTerol    90 MICROgram(s) HFA Inhaler 2 Puff(s) Inhalation every 4 hours PRN Shortness of Breath and/or Wheezing  benzonatate 100 milliGRAM(s) Oral every 8 hours PRN Cough  guaiFENesin Oral Liquid (Sugar-Free) 100 milliGRAM(s) Oral every 6 hours PRN Cough  hydrALAZINE Injectable 5 milliGRAM(s) IV Push every 6 hours PRN SBP>160  melatonin 5 milliGRAM(s) Oral at bedtime PRN Sleep  polyethylene glycol 3350 17 Gram(s) Oral daily PRN Constipation  sodium chloride 0.65% Nasal 1 Spray(s) Both Nostrils two times a day PRN Nasal Congestion      PHYSICAL EXAM: SEE BELOW                          7.4    11.66 )-----------( 391      ( 28 Sep 2021 06:23 )             25.1       09-28    141  |  108  |  20  ----------------------------<  103<H>  4.0   |  26  |  1.17    Ca    8.5      28 Sep 2021 06:23  Phos  3.9     09-27  Mg     1.9     09-27

## 2021-09-29 LAB
ANION GAP SERPL CALC-SCNC: 6 MMOL/L — SIGNIFICANT CHANGE UP (ref 5–17)
BUN SERPL-MCNC: 19 MG/DL — SIGNIFICANT CHANGE UP (ref 7–23)
CALCIUM SERPL-MCNC: 8.5 MG/DL — SIGNIFICANT CHANGE UP (ref 8.5–10.1)
CHLORIDE SERPL-SCNC: 111 MMOL/L — HIGH (ref 96–108)
CO2 SERPL-SCNC: 27 MMOL/L — SIGNIFICANT CHANGE UP (ref 22–31)
CREAT SERPL-MCNC: 1.15 MG/DL — SIGNIFICANT CHANGE UP (ref 0.5–1.3)
GLUCOSE SERPL-MCNC: 126 MG/DL — HIGH (ref 70–99)
HCT VFR BLD CALC: 26.1 % — LOW (ref 39–50)
HGB BLD-MCNC: 7.9 G/DL — LOW (ref 13–17)
MAGNESIUM SERPL-MCNC: 1.9 MG/DL — SIGNIFICANT CHANGE UP (ref 1.6–2.6)
MCHC RBC-ENTMCNC: 26.2 PG — LOW (ref 27–34)
MCHC RBC-ENTMCNC: 30.3 GM/DL — LOW (ref 32–36)
MCV RBC AUTO: 86.7 FL — SIGNIFICANT CHANGE UP (ref 80–100)
PHOSPHATE SERPL-MCNC: 4.1 MG/DL — SIGNIFICANT CHANGE UP (ref 2.5–4.5)
PLATELET # BLD AUTO: 385 K/UL — SIGNIFICANT CHANGE UP (ref 150–400)
POTASSIUM SERPL-MCNC: 4 MMOL/L — SIGNIFICANT CHANGE UP (ref 3.5–5.3)
POTASSIUM SERPL-SCNC: 4 MMOL/L — SIGNIFICANT CHANGE UP (ref 3.5–5.3)
RBC # BLD: 3.01 M/UL — LOW (ref 4.2–5.8)
RBC # FLD: 15.3 % — HIGH (ref 10.3–14.5)
SODIUM SERPL-SCNC: 144 MMOL/L — SIGNIFICANT CHANGE UP (ref 135–145)
WBC # BLD: 10.13 K/UL — SIGNIFICANT CHANGE UP (ref 3.8–10.5)
WBC # FLD AUTO: 10.13 K/UL — SIGNIFICANT CHANGE UP (ref 3.8–10.5)

## 2021-09-29 PROCEDURE — 99232 SBSQ HOSP IP/OBS MODERATE 35: CPT

## 2021-09-29 RX ORDER — IRON SUCROSE 20 MG/ML
100 INJECTION, SOLUTION INTRAVENOUS EVERY 24 HOURS
Refills: 0 | Status: COMPLETED | OUTPATIENT
Start: 2021-09-29 | End: 2021-10-02

## 2021-09-29 RX ORDER — FLUTICASONE PROPIONATE 50 MCG
1 SPRAY, SUSPENSION NASAL
Refills: 0 | Status: DISCONTINUED | OUTPATIENT
Start: 2021-09-29 | End: 2021-10-05

## 2021-09-29 RX ORDER — AMLODIPINE BESYLATE 2.5 MG/1
5 TABLET ORAL DAILY
Refills: 0 | Status: DISCONTINUED | OUTPATIENT
Start: 2021-09-29 | End: 2021-09-30

## 2021-09-29 RX ADMIN — ATORVASTATIN CALCIUM 40 MILLIGRAM(S): 80 TABLET, FILM COATED ORAL at 21:28

## 2021-09-29 RX ADMIN — Medication 5 MILLIGRAM(S): at 21:28

## 2021-09-29 RX ADMIN — BUDESONIDE AND FORMOTEROL FUMARATE DIHYDRATE 2 PUFF(S): 160; 4.5 AEROSOL RESPIRATORY (INHALATION) at 09:00

## 2021-09-29 RX ADMIN — Medication 2000 UNIT(S): at 10:47

## 2021-09-29 RX ADMIN — AMLODIPINE BESYLATE 5 MILLIGRAM(S): 2.5 TABLET ORAL at 10:47

## 2021-09-29 RX ADMIN — Medication 50 MILLIGRAM(S): at 21:28

## 2021-09-29 RX ADMIN — IRON SUCROSE 210 MILLIGRAM(S): 20 INJECTION, SOLUTION INTRAVENOUS at 12:37

## 2021-09-29 RX ADMIN — ENOXAPARIN SODIUM 40 MILLIGRAM(S): 100 INJECTION SUBCUTANEOUS at 10:47

## 2021-09-29 RX ADMIN — BUDESONIDE AND FORMOTEROL FUMARATE DIHYDRATE 2 PUFF(S): 160; 4.5 AEROSOL RESPIRATORY (INHALATION) at 20:38

## 2021-09-29 RX ADMIN — PANTOPRAZOLE SODIUM 40 MILLIGRAM(S): 20 TABLET, DELAYED RELEASE ORAL at 10:47

## 2021-09-29 RX ADMIN — LOSARTAN POTASSIUM 75 MILLIGRAM(S): 100 TABLET, FILM COATED ORAL at 10:47

## 2021-09-29 RX ADMIN — Medication 1 SPRAY(S): at 12:37

## 2021-09-29 RX ADMIN — Medication 50 MILLIGRAM(S): at 10:47

## 2021-09-29 RX ADMIN — Medication 1 SPRAY(S): at 21:28

## 2021-09-29 NOTE — PROGRESS NOTE ADULT - ASSESSMENT
1) COVID Pneumonia  2) Abnormal CXR  3) Dyspnea  4) Hypoxemic Respiratory Failure   5) Suspected superimposed nosocomial pneumonia    56 M noted to have COVID 7/15  On arrival hypoxic to 80s and tachypneic. NRB placed,in the ER saturating 95%. Na 126 s/p 1L NS. CXR: Frandy infiltrates. Last scr 1.4 in 2019-> today 1.9 unknown if underlying ckd.  Treated with IV Remdesivir and Decadron, Tocilizumab   Given the obesity/hypertension and prior co-morbidities, he was at risk of intubation, was on HFNC with nocturnal CPAP  DDimer elevated, was on therapeutic lovenox, transitioned to 40mg q 12  Completed Solumedrol, Symbicort 160/4.5 BID (https://[email or web address]/journals/lanres/article/AXZN7359-2691, STOIC study)  SaO2 is now 88-95%  Hgb noted to be 7-8; on Pantoprazole 40 BID   Repeat ABG 7.36/60/91 reviewed  Prolonged hospitalization with ARDS post COVID pneumonia  Appreciate ID Recommendation completed cefepime  Cefepime  Continue titrating down O2  Respiratory status continues to improve, likely will be able to transition to NC soon

## 2021-09-29 NOTE — PROGRESS NOTE ADULT - SUBJECTIVE AND OBJECTIVE BOX
Patient is a 56y old  Male who presents with a chief complaint of cough/sob (29 Sep 2021 09:31)    24 hour events: doing well     PAST MEDICAL & SURGICAL HISTORY:  HTN (hypertension)    Diabetes        Allergies    No Known Allergies    Intolerances      REVIEW OF SYSTEMS: SEE BELOW       ICU Vital Signs Last 24 Hrs  T(C): 36.7 (29 Sep 2021 05:00), Max: 36.9 (28 Sep 2021 20:00)  T(F): 98 (29 Sep 2021 05:00), Max: 98.5 (28 Sep 2021 20:00)  HR: 69 (29 Sep 2021 13:00) (67 - 112)  BP: 151/88 (29 Sep 2021 11:00) (133/62 - 178/96)  BP(mean): 103 (29 Sep 2021 11:00) (78 - 117)  ABP: --  ABP(mean): --  RR: 21 (29 Sep 2021 13:00) (15 - 32)  SpO2: 89% (29 Sep 2021 13:00) (85% - 100%)      CAPILLARY BLOOD GLUCOSE          I&O's Summary    28 Sep 2021 07:01  -  29 Sep 2021 07:00  --------------------------------------------------------  IN: 0 mL / OUT: 500 mL / NET: -500 mL    29 Sep 2021 07:01  -  29 Sep 2021 16:22  --------------------------------------------------------  IN: 100 mL / OUT: 0 mL / NET: 100 mL            MEDICATIONS  (STANDING):  amLODIPine   Tablet 5 milliGRAM(s) Oral daily  atorvastatin 40 milliGRAM(s) Oral at bedtime  budesonide 160 MICROgram(s)/formoterol 4.5 MICROgram(s) Inhaler 2 Puff(s) Inhalation two times a day  cholecalciferol 2000 Unit(s) Oral daily  enoxaparin Injectable 40 milliGRAM(s) SubCutaneous daily  fluticasone propionate 50 MICROgram(s)/spray Nasal Spray 1 Spray(s) Both Nostrils two times a day  glucagon  Injectable 1 milliGRAM(s) IntraMuscular once  iron sucrose IVPB 100 milliGRAM(s) IV Intermittent every 24 hours  losartan 75 milliGRAM(s) Oral daily  metoprolol tartrate 50 milliGRAM(s) Oral two times a day  pantoprazole    Tablet 40 milliGRAM(s) Oral daily      MEDICATIONS  (PRN):  acetaminophen   Tablet .. 650 milliGRAM(s) Oral every 4 hours PRN Temp greater or equal to 38C (100.4F), Mild Pain (1 - 3)  ALBUTerol    90 MICROgram(s) HFA Inhaler 2 Puff(s) Inhalation every 4 hours PRN Shortness of Breath and/or Wheezing  benzonatate 100 milliGRAM(s) Oral every 8 hours PRN Cough  guaiFENesin Oral Liquid (Sugar-Free) 100 milliGRAM(s) Oral every 6 hours PRN Cough  melatonin 5 milliGRAM(s) Oral at bedtime PRN Sleep  polyethylene glycol 3350 17 Gram(s) Oral daily PRN Constipation  sodium chloride 0.65% Nasal 1 Spray(s) Both Nostrils two times a day PRN Nasal Congestion      PHYSICAL EXAM: SEE BELOW                          7.9    10.13 )-----------( 385      ( 29 Sep 2021 06:29 )             26.1       09-29    144  |  111<H>  |  19  ----------------------------<  126<H>  4.0   |  27  |  1.15    Ca    8.5      29 Sep 2021 06:29  Phos  4.1     09-29  Mg     1.9     09-29

## 2021-09-29 NOTE — PROGRESS NOTE ADULT - SUBJECTIVE AND OBJECTIVE BOX
Patient is a 55y old  Male who presents with a chief complaint of cough/sob (23 Jul 2021 14:03)      HPI:  55 M with pmh HLD, dm, htn presents with 8 days onset of covid symptoms which included, cough, fevers, sob, hypoxia, fevers, diarrhea, chills and myalgias. TEsted positive 7/15. Wife endorsed he was becoming more sob of recently. On arrival hypoxic to 80s and tachypneic. NRB placed, presently on 15% and saturating 95%. Na 126 s/p 1L NS. CXR: Frandy infiltrates. Last scr 1.4 in 2019-> today 1.9 unknown if underlying ckd.  Patient not vaccinated.   Last seen by me in 2019  History of ROBERT and uncontrolled hypertension    9/29  Patient on 50% VM and tolerated 28% yesterday  Being worked up for GIB, no reported bleeding issues in the last 24 hrs    MEDICATIONS  (STANDING):  amLODIPine   Tablet 5 milliGRAM(s) Oral daily  atorvastatin 40 milliGRAM(s) Oral at bedtime  budesonide 160 MICROgram(s)/formoterol 4.5 MICROgram(s) Inhaler 2 Puff(s) Inhalation two times a day  cholecalciferol 2000 Unit(s) Oral daily  enoxaparin Injectable 40 milliGRAM(s) SubCutaneous daily  glucagon  Injectable 1 milliGRAM(s) IntraMuscular once  iron sucrose Injectable 100 milliGRAM(s) IV Push every 24 hours  losartan 75 milliGRAM(s) Oral daily  metoprolol tartrate 50 milliGRAM(s) Oral two times a day  pantoprazole    Tablet 40 milliGRAM(s) Oral daily    MEDICATIONS  (PRN):  acetaminophen   Tablet .. 650 milliGRAM(s) Oral every 4 hours PRN Temp greater or equal to 38C (100.4F), Mild Pain (1 - 3)  ALBUTerol    90 MICROgram(s) HFA Inhaler 2 Puff(s) Inhalation every 4 hours PRN Shortness of Breath and/or Wheezing  benzonatate 100 milliGRAM(s) Oral every 8 hours PRN Cough  guaiFENesin Oral Liquid (Sugar-Free) 100 milliGRAM(s) Oral every 6 hours PRN Cough  melatonin 5 milliGRAM(s) Oral at bedtime PRN Sleep  polyethylene glycol 3350 17 Gram(s) Oral daily PRN Constipation  sodium chloride 0.65% Nasal 1 Spray(s) Both Nostrils two times a day PRN Nasal Congestion      Vital Signs Last 24 Hrs  T(C): 36.7 (29 Sep 2021 05:00), Max: 37.2 (28 Sep 2021 12:00)  T(F): 98 (29 Sep 2021 05:00), Max: 98.9 (28 Sep 2021 12:00)  HR: 73 (29 Sep 2021 08:00) (70 - 112)  BP: 165/84 (29 Sep 2021 08:00) (133/62 - 178/96)  BP(mean): 103 (29 Sep 2021 08:00) (78 - 119)  RR: 20 (29 Sep 2021 08:00) (17 - 32)  SpO2: 98% (29 Sep 2021 08:00) (85% - 100%)    I&O's Detail    18 Sep 2021 07:01  -  19 Sep 2021 07:00  --------------------------------------------------------  IN:    IV PiggyBack: 50 mL  Total IN: 50 mL    OUT:    Incontinent per Retracted Penis Pouch (mL): 1650 mL  Total OUT: 1650 mL    Total NET: -1600 mL          PHYSICAL EXAM  General Appearance: On VM  Lungs: coarse bilaterally  Heart: +S1,S2  Abdomen: Soft, non-tender, bowel sounds active   Extremities: no cyanosis or edema, no joint swelling  Skin: Skin color, texture normal, no rashes   Neurologic: Alert and oriented X3 , non focal, not disoriented                                < from: Xray Chest 1 View- PORTABLE-Urgent (07.21.21 @ 15:33) >    PROCEDURE DATE:  07/21/2021          INTERPRETATION:  AP chest on July 21, 2021 at 3:27 PM. Patient is short of breath with cough and fever.    Heart magnified by technique.    There arescattered mid lower lung field infiltrates right greater than left possibly related: Pneumonia.    The lungs are clear on August 30, 2019.    IMPRESSION: Bilateral infiltrates as above.    < end of copied text >  < from: US Duplex Venous Lower Ext Complete, Bilateral (07.25.21 @ 08:42) >  COMPARISON: None available.    TECHNIQUE: Duplex sonography of the BILATERAL LOWER extremity veins with color and spectral Doppler, with and without compression.    FINDINGS:    RIGHT:  Normal compressibility of the RIGHT common femoral, femoral and popliteal veins.  Doppler examination shows normal spontaneous and phasic flow.  No RIGHT calf vein thrombosis is detected.    LEFT:  Normal compressibility of the LEFT common femoral, femoral and popliteal veins.  Doppler examination shows normal spontaneous and phasic flow.  No LEFT calf vein thrombosis is detected.    IMPRESSION:  No evidence of deep venous thrombosis in either lower extremity.    < end of copied text >  RADIOLOGY & ADDITIONAL STUDIES:    < from: Xray Chest 1 View- PORTABLE-Urgent (Xray Chest 1 View- PORTABLE-Urgent .) (07.26.21 @ 08:43) >    PROCEDURE DATE:  07/26/2021          INTERPRETATION:  Portable chest radiograph    CLINICAL INFORMATION: Pneumonia due to Covid 19.. Follow-up    TECHNIQUE:  Portable  AP view of the chest was obtained.    COMPARISON: 7/21/2021 chest available for review.    FINDINGS:    The lungs show stable bilateral  multifocal and diffuse ill-defined airspace opacities.. No pneumothorax.    The  heart is enlarged in transverse diameter. No hilar mass.     Visualized osseous structures are intact.    IMPRESSION:   Stable bilateral  multifocal and diffuse ill-defined airspace opacities..    < end of copied text >  < from: US Duplex Venous Lower Ext Complete, Bilateral (08.06.21 @ 17:16) >  FINDINGS:    RIGHT:  Normal compressibility of the RIGHT common femoral, femoral and popliteal veins.  Doppler examination shows normal spontaneous and phasic flow.  No RIGHT calf vein thrombosis is detected.    LEFT:  Normal compressibility of the LEFT common femoral, femoral and popliteal veins.  Doppler examination shows normal spontaneous and phasic flow.  No LEFT calf vein thrombosisis detected.    IMPRESSION:  No evidence of deep venous thrombosis in either lower extremity.    < end of copied text >  < from: Xray Chest 1 View- PORTABLE-Urgent (Xray Chest 1 View- PORTABLE-Urgent .) (08.06.21 @ 16:45) >  INTERPRETATION:  Portable chest radiograph    CLINICAL INFORMATION: Pneumonia due to Covid 19.    TECHNIQUE:  Portable  AP view of the chest was obtained.    COMPARISON: 8/1/2021 chest radiograph available for review.    FINDINGS:    The lungs show decreasing bilateral diffuse airspace disease.. No pneumothorax.    The  heart is mildly enlarged in transverse diameter. No hilar mass.   Visualized osseous structures are intact.    IMPRESSION:   Decreasing bilateral diffuse airspace disease..    < end of copied text >  xr< from: US Duplex Venous Lower Ext Complete, Bilateral (08.17.21 @ 08:58) >  COMPARISON: None available.    TECHNIQUE: Duplex sonography of the BILATERAL LOWER extremity veins with color and spectral Doppler, with and without compression.    FINDINGS:    RIGHT:  Normal compressibility of the RIGHT common femoral, femoral and popliteal veins.  Doppler examination shows normal spontaneous and phasic flow.  No RIGHT calf vein thrombosis is detected.    LEFT:  Normal compressibility of the LEFT common femoral, femoral and popliteal veins.  Doppler examination shows normal spontaneous and phasic flow.  No LEFT calf vein thrombosis is detected.    IMPRESSION:  No evidence of deep venous thrombosis in either lower extremity.    < end of copied text >  r< from: US Duplex Venous Lower Ext Complete, Bilateral (08.23.21 @ 12:53) >  PROCEDURE DATE:  08/23/2021          INTERPRETATION:  CLINICAL INFORMATION: 55 years  Male with r/o DVT    evaluate for DVT. Covid positive. Elevated d-dimer.    COMPARISON: None available.    TECHNIQUE: Duplex sonography of the BILATERAL LOWER extremity veins with color and spectral Doppler, with and without compression.    FINDINGS:    RIGHT:  Normal compressibility of the RIGHT common femoral, femoral and popliteal veins.  Doppler examination shows normal spontaneous and phasic flow.  No RIGHT calf vein thrombosis is detected.    LEFT:  Normal compressibility of the LEFT common femoral, femoral and popliteal veins.  Doppler examination shows normal spontaneous and phasic flow.  No LEFT calf vein thrombosis is detected.    IMPRESSION:  No evidence of deep venous thrombosis in either lower extremity.    < end of copied text >  < from: Xray Chest 1 View- PORTABLE-Urgent (Xray Chest 1 View- PORTABLE-Urgent .) (08.23.21 @ 11:06) >    PROCEDURE DATE:  08/23/2021          INTERPRETATION:  History: Dyspnea, Covid    Chest:  one view.    Comparison: 08/20/2021    AP radiograph of the chest demonstrates moderate diffuse alveolar infiltrates unchanged. The cardiac silhouette is normal in size. Osseous structures are intact.    Impression:moderate diffuse alveolar infiltrates unchanged.    < end of copied text >  r< from: Xray Chest 1 View- PORTABLE-Urgent (Xray Chest 1 View- PORTABLE-Urgent .) (08.23.21 @ 11:06) >  PROCEDURE DATE:  08/23/2021          INTERPRETATION:  History: Dyspnea, Covid    Chest:  one view.    Comparison: 08/20/2021    AP radiograph of the chest demonstrates moderate diffuse alveolar infiltrates unchanged. The cardiac silhouette is normal in size. Osseous structures are intact.    Impression:moderate diffuse alveolar infiltrates unchanged.    < end of copied text >  < from: Xray Chest 1 View-PORTABLE IMMEDIATE (Xray Chest 1 View-PORTABLE IMMEDIATE .) (09.11.21 @ 12:32) >    PROCEDURE DATE:  09/11/2021          INTERPRETATION:  XR CHEST IMMEDIATE    Single AP view    HISTORY:  Fever    Comparison: Chest x-ray 9/4/2021    The cardiac silhouette is within normal limits. Diffuse bilateral airspace disease. No pleural abnormality.    IMPRESSION: Unchanged bilateral airspace disease    < end of copied text >  x< from: Xray Chest 1 View- PORTABLE-Routine (Xray Chest 1 View- PORTABLE-Routine in AM.) (09.16.21 @ 07:15) >    INTERPRETATION:  Portable chest radiograph    CLINICAL INFORMATION: Pneumonia due to Covid 19.    TECHNIQUE:  Portable  AP view of the chest.    COMPARISON: 9/11/2021 chest available for review.    FINDINGS:    The lungs show persistent bilateral  multifocal and diffuse ill-defined airspace opacities. No pneumothorax.    The  heart is enlarged in transverse diameter. No hilar mass.     Visualizedosseous structures are intact.    IMPRESSION:   No significant interval change.    < end of copied text >

## 2021-09-29 NOTE — PROGRESS NOTE ADULT - GASTROINTESTINAL
; Del    2200 - Report from JAMEEL Liz RN. Pt resting comfortably in bed, denies pain at this time. POC discussed, questions answered.   2300 - Pt up to bathroom with steady gait, + void  2320 - Pt transferred to [facility] via wheelchair in stable condition with infant in arms. FOB at side with belongings. Report to MIKE Glass.   negative detailed exam

## 2021-09-29 NOTE — PROGRESS NOTE ADULT - TIME BILLING
56M PMH HTN, DM2, now with-    Imp:   Acute hypoxic resp failure due to COVID-19 pna  Anemia of chronic disease vs GIB     Slowly improving     Plan:  Tolerating 35% VM with ambulation and 28% VM while sitting - try NC   Add flonase for nasal congestion   Hypertensive - add amlodipine   H/H improved, continue iv iron x 5 days total   FOBT pending   Continue PPI     DVT ppx: lovenox   Nutrition: po diet   Central line: no  Arterial line: no  Faulkner: no  Restraints: no  Activity: ambulating   Code status: full  Disposition: ICU  Updated: patient 56M PMH HTN, DM2, now with-    Imp:   Acute hypoxic resp failure due to COVID-19 pna  Anemia of chronic disease vs GIB     Slowly improving     Plan:  Tolerating 35% VM with ambulation and 28% VM while sitting - try NC   Add flonase for nasal congestion   Hypertensive - add amlodipine   H/H improved, continue iv iron x 5 days total   FOBT pending   Continue PPI     Lab holiday tomorrow     DVT ppx: lovenox   Nutrition: po diet   Central line: no  Arterial line: no  Faulkner: no  Restraints: no  Activity: ambulating   Code status: full  Disposition: ICU  Updated: patient

## 2021-09-30 LAB — FUNGITELL: <31 PG/ML — SIGNIFICANT CHANGE UP

## 2021-09-30 PROCEDURE — 99232 SBSQ HOSP IP/OBS MODERATE 35: CPT

## 2021-09-30 RX ORDER — AMLODIPINE BESYLATE 2.5 MG/1
10 TABLET ORAL DAILY
Refills: 0 | Status: DISCONTINUED | OUTPATIENT
Start: 2021-09-30 | End: 2021-10-05

## 2021-09-30 RX ADMIN — Medication 1 SPRAY(S): at 05:36

## 2021-09-30 RX ADMIN — Medication 1 SPRAY(S): at 18:31

## 2021-09-30 RX ADMIN — ENOXAPARIN SODIUM 40 MILLIGRAM(S): 100 INJECTION SUBCUTANEOUS at 11:49

## 2021-09-30 RX ADMIN — IRON SUCROSE 210 MILLIGRAM(S): 20 INJECTION, SOLUTION INTRAVENOUS at 13:13

## 2021-09-30 RX ADMIN — LOSARTAN POTASSIUM 75 MILLIGRAM(S): 100 TABLET, FILM COATED ORAL at 11:49

## 2021-09-30 RX ADMIN — BUDESONIDE AND FORMOTEROL FUMARATE DIHYDRATE 2 PUFF(S): 160; 4.5 AEROSOL RESPIRATORY (INHALATION) at 21:07

## 2021-09-30 RX ADMIN — Medication 2000 UNIT(S): at 11:48

## 2021-09-30 RX ADMIN — BUDESONIDE AND FORMOTEROL FUMARATE DIHYDRATE 2 PUFF(S): 160; 4.5 AEROSOL RESPIRATORY (INHALATION) at 09:46

## 2021-09-30 RX ADMIN — AMLODIPINE BESYLATE 10 MILLIGRAM(S): 2.5 TABLET ORAL at 09:38

## 2021-09-30 RX ADMIN — Medication 50 MILLIGRAM(S): at 21:29

## 2021-09-30 RX ADMIN — Medication 50 MILLIGRAM(S): at 11:48

## 2021-09-30 RX ADMIN — ATORVASTATIN CALCIUM 40 MILLIGRAM(S): 80 TABLET, FILM COATED ORAL at 21:30

## 2021-09-30 RX ADMIN — PANTOPRAZOLE SODIUM 40 MILLIGRAM(S): 20 TABLET, DELAYED RELEASE ORAL at 11:49

## 2021-09-30 NOTE — PROGRESS NOTE ADULT - SUBJECTIVE AND OBJECTIVE BOX
Patient is a 56y old  Male who presents with a chief complaint of cough/sob (29 Sep 2021 16:21)    24 hour events: hypertensive   continues to use VM     PAST MEDICAL & SURGICAL HISTORY:  HTN (hypertension)    Diabetes        Allergies    No Known Allergies    Intolerances      REVIEW OF SYSTEMS: SEE BELOW       ICU Vital Signs Last 24 Hrs  T(C): 36.8 (30 Sep 2021 11:00), Max: 36.8 (29 Sep 2021 16:00)  T(F): 98.3 (30 Sep 2021 11:00), Max: 98.3 (30 Sep 2021 11:00)  HR: 93 (30 Sep 2021 11:00) (69 - 93)  BP: 163/92 (30 Sep 2021 11:00) (142/73 - 179/96)  BP(mean): 109 (30 Sep 2021 11:00) (90 - 116)  ABP: --  ABP(mean): --  RR: 22 (30 Sep 2021 11:00) (13 - 34)  SpO2: 99% (30 Sep 2021 11:00) (85% - 99%)      CAPILLARY BLOOD GLUCOSE          I&O's Summary    29 Sep 2021 07:01  -  30 Sep 2021 07:00  --------------------------------------------------------  IN: 100 mL / OUT: 1150 mL / NET: -1050 mL            MEDICATIONS  (STANDING):  amLODIPine   Tablet 10 milliGRAM(s) Oral daily  atorvastatin 40 milliGRAM(s) Oral at bedtime  budesonide 160 MICROgram(s)/formoterol 4.5 MICROgram(s) Inhaler 2 Puff(s) Inhalation two times a day  cholecalciferol 2000 Unit(s) Oral daily  enoxaparin Injectable 40 milliGRAM(s) SubCutaneous daily  fluticasone propionate 50 MICROgram(s)/spray Nasal Spray 1 Spray(s) Both Nostrils two times a day  glucagon  Injectable 1 milliGRAM(s) IntraMuscular once  iron sucrose IVPB 100 milliGRAM(s) IV Intermittent every 24 hours  losartan 75 milliGRAM(s) Oral daily  metoprolol tartrate 50 milliGRAM(s) Oral two times a day  pantoprazole    Tablet 40 milliGRAM(s) Oral daily      MEDICATIONS  (PRN):  acetaminophen   Tablet .. 650 milliGRAM(s) Oral every 4 hours PRN Temp greater or equal to 38C (100.4F), Mild Pain (1 - 3)  ALBUTerol    90 MICROgram(s) HFA Inhaler 2 Puff(s) Inhalation every 4 hours PRN Shortness of Breath and/or Wheezing  benzonatate 100 milliGRAM(s) Oral every 8 hours PRN Cough  guaiFENesin Oral Liquid (Sugar-Free) 100 milliGRAM(s) Oral every 6 hours PRN Cough  melatonin 5 milliGRAM(s) Oral at bedtime PRN Sleep  polyethylene glycol 3350 17 Gram(s) Oral daily PRN Constipation  sodium chloride 0.65% Nasal 1 Spray(s) Both Nostrils two times a day PRN Nasal Congestion      PHYSICAL EXAM: SEE BELOW                          7.9    10.13 )-----------( 385      ( 29 Sep 2021 06:29 )             26.1       09-29    144  |  111<H>  |  19  ----------------------------<  126<H>  4.0   |  27  |  1.15    Ca    8.5      29 Sep 2021 06:29  Phos  4.1     09-29  Mg     1.9     09-29

## 2021-09-30 NOTE — PROGRESS NOTE ADULT - TIME BILLING
56M PMH HTN, DM2, now with-    Imp:   Acute hypoxic resp failure due to COVID-19 pna  Anemia of chronic disease vs GIB     Plan:  Tolerating 35% VM with ambulation and 28% VM while sitting, ambulate on NC today   Flonase for nasal congestion   Hypertensive - increase amlodipine   H/H improved, continue iv iron x 5 days total   FOBT pending   Continue PPI     DVT ppx: lovenox   Nutrition: po diet   Central line: no  Arterial line: no  Faulkner: no  Restraints: no  Activity: ambulating   Code status: full  Disposition: ICU  Updated: patient    Discharge planning

## 2021-10-01 LAB
ANION GAP SERPL CALC-SCNC: 5 MMOL/L — SIGNIFICANT CHANGE UP (ref 5–17)
BUN SERPL-MCNC: 13 MG/DL — SIGNIFICANT CHANGE UP (ref 7–23)
CALCIUM SERPL-MCNC: 8.7 MG/DL — SIGNIFICANT CHANGE UP (ref 8.5–10.1)
CHLORIDE SERPL-SCNC: 107 MMOL/L — SIGNIFICANT CHANGE UP (ref 96–108)
CO2 SERPL-SCNC: 31 MMOL/L — SIGNIFICANT CHANGE UP (ref 22–31)
CREAT SERPL-MCNC: 0.76 MG/DL — SIGNIFICANT CHANGE UP (ref 0.5–1.3)
GLUCOSE SERPL-MCNC: 88 MG/DL — SIGNIFICANT CHANGE UP (ref 70–99)
HCT VFR BLD CALC: 24.7 % — LOW (ref 39–50)
HGB BLD-MCNC: 7.7 G/DL — LOW (ref 13–17)
MCHC RBC-ENTMCNC: 26.6 PG — LOW (ref 27–34)
MCHC RBC-ENTMCNC: 31.2 GM/DL — LOW (ref 32–36)
MCV RBC AUTO: 85.2 FL — SIGNIFICANT CHANGE UP (ref 80–100)
PLATELET # BLD AUTO: 356 K/UL — SIGNIFICANT CHANGE UP (ref 150–400)
POTASSIUM SERPL-MCNC: 3.4 MMOL/L — LOW (ref 3.5–5.3)
POTASSIUM SERPL-SCNC: 3.4 MMOL/L — LOW (ref 3.5–5.3)
RBC # BLD: 2.9 M/UL — LOW (ref 4.2–5.8)
RBC # FLD: 15.5 % — HIGH (ref 10.3–14.5)
SODIUM SERPL-SCNC: 143 MMOL/L — SIGNIFICANT CHANGE UP (ref 135–145)
WBC # BLD: 10.75 K/UL — HIGH (ref 3.8–10.5)
WBC # FLD AUTO: 10.75 K/UL — HIGH (ref 3.8–10.5)

## 2021-10-01 PROCEDURE — 99232 SBSQ HOSP IP/OBS MODERATE 35: CPT

## 2021-10-01 RX ORDER — POTASSIUM CHLORIDE 20 MEQ
40 PACKET (EA) ORAL EVERY 4 HOURS
Refills: 0 | Status: COMPLETED | OUTPATIENT
Start: 2021-10-01 | End: 2021-10-01

## 2021-10-01 RX ADMIN — Medication 50 MILLIGRAM(S): at 21:25

## 2021-10-01 RX ADMIN — BUDESONIDE AND FORMOTEROL FUMARATE DIHYDRATE 2 PUFF(S): 160; 4.5 AEROSOL RESPIRATORY (INHALATION) at 21:09

## 2021-10-01 RX ADMIN — LOSARTAN POTASSIUM 75 MILLIGRAM(S): 100 TABLET, FILM COATED ORAL at 10:30

## 2021-10-01 RX ADMIN — Medication 2000 UNIT(S): at 10:30

## 2021-10-01 RX ADMIN — PANTOPRAZOLE SODIUM 40 MILLIGRAM(S): 20 TABLET, DELAYED RELEASE ORAL at 10:31

## 2021-10-01 RX ADMIN — AMLODIPINE BESYLATE 10 MILLIGRAM(S): 2.5 TABLET ORAL at 10:30

## 2021-10-01 RX ADMIN — ENOXAPARIN SODIUM 40 MILLIGRAM(S): 100 INJECTION SUBCUTANEOUS at 10:31

## 2021-10-01 RX ADMIN — BUDESONIDE AND FORMOTEROL FUMARATE DIHYDRATE 2 PUFF(S): 160; 4.5 AEROSOL RESPIRATORY (INHALATION) at 10:09

## 2021-10-01 RX ADMIN — ATORVASTATIN CALCIUM 40 MILLIGRAM(S): 80 TABLET, FILM COATED ORAL at 21:25

## 2021-10-01 RX ADMIN — Medication 1 SPRAY(S): at 21:25

## 2021-10-01 RX ADMIN — IRON SUCROSE 210 MILLIGRAM(S): 20 INJECTION, SOLUTION INTRAVENOUS at 12:38

## 2021-10-01 RX ADMIN — Medication 40 MILLIEQUIVALENT(S): at 08:42

## 2021-10-01 RX ADMIN — Medication 1 SPRAY(S): at 08:42

## 2021-10-01 RX ADMIN — Medication 50 MILLIGRAM(S): at 10:31

## 2021-10-01 NOTE — PROGRESS NOTE ADULT - TIME BILLING
56M PMH HTN, DM2, now with-    Imp:   Acute hypoxic resp failure due to COVID-19 pna  Anemia of chronic disease vs GIB     Clinically improving     Plan:  Tolerating 5-6L NC at rest and with ambulation   Repeat CXR in am   Flonase for nasal congestion   Hypertensive - on amlodipine, metoprolol, losartan    H/H improved, continue iv iron x 5 days total   FOBT pending   Continue PPI     DVT ppx: lovenox   Nutrition: po diet   Central line: no  Arterial line: no  Faulkner: no  Restraints: no  Activity: ambulating   Code status: full  Disposition: ICU  Updated: patient    Discharge planning for early next week - asked SW to help set up O2, home PT, HHA

## 2021-10-01 NOTE — PROGRESS NOTE ADULT - SUBJECTIVE AND OBJECTIVE BOX
Patient is a 55y old  Male who presents with a chief complaint of cough/sob (23 Jul 2021 14:03)      HPI:  55 M with pmh HLD, dm, htn presents with 8 days onset of covid symptoms which included, cough, fevers, sob, hypoxia, fevers, diarrhea, chills and myalgias. TEsted positive 7/15. Wife endorsed he was becoming more sob of recently. On arrival hypoxic to 80s and tachypneic. NRB placed, presently on 15% and saturating 95%. Na 126 s/p 1L NS. CXR: Frandy infiltrates. Last scr 1.4 in 2019-> today 1.9 unknown if underlying ckd.  Patient not vaccinated.   Last seen by me in 2019  History of ROBERT and uncontrolled hypertension    10/1  Patient transitioned from  to NC (6L)  Continuing PT  Sitting at bedside, in no resp distress    MEDICATIONS  (STANDING):  amLODIPine   Tablet 5 milliGRAM(s) Oral daily  atorvastatin 40 milliGRAM(s) Oral at bedtime  budesonide 160 MICROgram(s)/formoterol 4.5 MICROgram(s) Inhaler 2 Puff(s) Inhalation two times a day  cholecalciferol 2000 Unit(s) Oral daily  enoxaparin Injectable 40 milliGRAM(s) SubCutaneous daily  glucagon  Injectable 1 milliGRAM(s) IntraMuscular once  iron sucrose Injectable 100 milliGRAM(s) IV Push every 24 hours  losartan 75 milliGRAM(s) Oral daily  metoprolol tartrate 50 milliGRAM(s) Oral two times a day  pantoprazole    Tablet 40 milliGRAM(s) Oral daily    MEDICATIONS  (PRN):  acetaminophen   Tablet .. 650 milliGRAM(s) Oral every 4 hours PRN Temp greater or equal to 38C (100.4F), Mild Pain (1 - 3)  ALBUTerol    90 MICROgram(s) HFA Inhaler 2 Puff(s) Inhalation every 4 hours PRN Shortness of Breath and/or Wheezing  benzonatate 100 milliGRAM(s) Oral every 8 hours PRN Cough  guaiFENesin Oral Liquid (Sugar-Free) 100 milliGRAM(s) Oral every 6 hours PRN Cough  melatonin 5 milliGRAM(s) Oral at bedtime PRN Sleep  polyethylene glycol 3350 17 Gram(s) Oral daily PRN Constipation  sodium chloride 0.65% Nasal 1 Spray(s) Both Nostrils two times a day PRN Nasal Congestion    Vital Signs Last 24 Hrs  T(C): 36.8 (01 Oct 2021 08:00), Max: 36.8 (30 Sep 2021 11:00)  T(F): 98.2 (01 Oct 2021 08:00), Max: 98.3 (30 Sep 2021 11:00)  HR: 86 (01 Oct 2021 08:00) (69 - 99)  BP: 168/103 (01 Oct 2021 08:00) (139/85 - 188/104)  BP(mean): 117 (01 Oct 2021 08:00) (86 - 126)  RR: 15 (01 Oct 2021 08:00) (15 - 38)  SpO2: 97% (01 Oct 2021 08:00) (79% - 100%)  I&O's Detail    18 Sep 2021 07:01  -  19 Sep 2021 07:00  --------------------------------------------------------  IN:    IV PiggyBack: 50 mL  Total IN: 50 mL    OUT:    Incontinent per Retracted Penis Pouch (mL): 1650 mL  Total OUT: 1650 mL    Total NET: -1600 mL          PHYSICAL EXAM  General Appearance: On 6L NC  Lungs: coarse bilaterally, breath sounds improving  Heart: +S1,S2  Abdomen: Soft, non-tender, bowel sounds active   Extremities: no cyanosis or edema, no joint swelling  Skin: Skin color, texture normal, no rashes   Neurologic: Alert and oriented X3 , non focal, not disoriented                                                7.7    10.75 )-----------( 356      ( 01 Oct 2021 05:59 )             24.7   10-01    143  |  107  |  13  ----------------------------<  88  3.4<L>   |  31  |  0.76    Ca    8.7      01 Oct 2021 05:59          < from: Xray Chest 1 View- PORTABLE-Urgent (07.21.21 @ 15:33) >    PROCEDURE DATE:  07/21/2021          INTERPRETATION:  AP chest on July 21, 2021 at 3:27 PM. Patient is short of breath with cough and fever.    Heart magnified by technique.    There arescattered mid lower lung field infiltrates right greater than left possibly related: Pneumonia.    The lungs are clear on August 30, 2019.    IMPRESSION: Bilateral infiltrates as above.    < end of copied text >  < from: US Duplex Venous Lower Ext Complete, Bilateral (07.25.21 @ 08:42) >  COMPARISON: None available.    TECHNIQUE: Duplex sonography of the BILATERAL LOWER extremity veins with color and spectral Doppler, with and without compression.    FINDINGS:    RIGHT:  Normal compressibility of the RIGHT common femoral, femoral and popliteal veins.  Doppler examination shows normal spontaneous and phasic flow.  No RIGHT calf vein thrombosis is detected.    LEFT:  Normal compressibility of the LEFT common femoral, femoral and popliteal veins.  Doppler examination shows normal spontaneous and phasic flow.  No LEFT calf vein thrombosis is detected.    IMPRESSION:  No evidence of deep venous thrombosis in either lower extremity.    < end of copied text >  RADIOLOGY & ADDITIONAL STUDIES:    < from: Xray Chest 1 View- PORTABLE-Urgent (Xray Chest 1 View- PORTABLE-Urgent .) (07.26.21 @ 08:43) >    PROCEDURE DATE:  07/26/2021          INTERPRETATION:  Portable chest radiograph    CLINICAL INFORMATION: Pneumonia due to Covid 19.. Follow-up    TECHNIQUE:  Portable  AP view of the chest was obtained.    COMPARISON: 7/21/2021 chest available for review.    FINDINGS:    The lungs show stable bilateral  multifocal and diffuse ill-defined airspace opacities.. No pneumothorax.    The  heart is enlarged in transverse diameter. No hilar mass.     Visualized osseous structures are intact.    IMPRESSION:   Stable bilateral  multifocal and diffuse ill-defined airspace opacities..    < end of copied text >  < from: US Duplex Venous Lower Ext Complete, Bilateral (08.06.21 @ 17:16) >  FINDINGS:    RIGHT:  Normal compressibility of the RIGHT common femoral, femoral and popliteal veins.  Doppler examination shows normal spontaneous and phasic flow.  No RIGHT calf vein thrombosis is detected.    LEFT:  Normal compressibility of the LEFT common femoral, femoral and popliteal veins.  Doppler examination shows normal spontaneous and phasic flow.  No LEFT calf vein thrombosisis detected.    IMPRESSION:  No evidence of deep venous thrombosis in either lower extremity.    < end of copied text >  < from: Xray Chest 1 View- PORTABLE-Urgent (Xray Chest 1 View- PORTABLE-Urgent .) (08.06.21 @ 16:45) >  INTERPRETATION:  Portable chest radiograph    CLINICAL INFORMATION: Pneumonia due to Covid 19.    TECHNIQUE:  Portable  AP view of the chest was obtained.    COMPARISON: 8/1/2021 chest radiograph available for review.    FINDINGS:    The lungs show decreasing bilateral diffuse airspace disease.. No pneumothorax.    The  heart is mildly enlarged in transverse diameter. No hilar mass.   Visualized osseous structures are intact.    IMPRESSION:   Decreasing bilateral diffuse airspace disease..    < end of copied text >  xr< from: US Duplex Venous Lower Ext Complete, Bilateral (08.17.21 @ 08:58) >  COMPARISON: None available.    TECHNIQUE: Duplex sonography of the BILATERAL LOWER extremity veins with color and spectral Doppler, with and without compression.    FINDINGS:    RIGHT:  Normal compressibility of the RIGHT common femoral, femoral and popliteal veins.  Doppler examination shows normal spontaneous and phasic flow.  No RIGHT calf vein thrombosis is detected.    LEFT:  Normal compressibility of the LEFT common femoral, femoral and popliteal veins.  Doppler examination shows normal spontaneous and phasic flow.  No LEFT calf vein thrombosis is detected.    IMPRESSION:  No evidence of deep venous thrombosis in either lower extremity.    < end of copied text >  r< from: US Duplex Venous Lower Ext Complete, Bilateral (08.23.21 @ 12:53) >  PROCEDURE DATE:  08/23/2021          INTERPRETATION:  CLINICAL INFORMATION: 55 years  Male with r/o DVT    evaluate for DVT. Covid positive. Elevated d-dimer.    COMPARISON: None available.    TECHNIQUE: Duplex sonography of the BILATERAL LOWER extremity veins with color and spectral Doppler, with and without compression.    FINDINGS:    RIGHT:  Normal compressibility of the RIGHT common femoral, femoral and popliteal veins.  Doppler examination shows normal spontaneous and phasic flow.  No RIGHT calf vein thrombosis is detected.    LEFT:  Normal compressibility of the LEFT common femoral, femoral and popliteal veins.  Doppler examination shows normal spontaneous and phasic flow.  No LEFT calf vein thrombosis is detected.    IMPRESSION:  No evidence of deep venous thrombosis in either lower extremity.    < end of copied text >  < from: Xray Chest 1 View- PORTABLE-Urgent (Xray Chest 1 View- PORTABLE-Urgent .) (08.23.21 @ 11:06) >    PROCEDURE DATE:  08/23/2021          INTERPRETATION:  History: Dyspnea, Covid    Chest:  one view.    Comparison: 08/20/2021    AP radiograph of the chest demonstrates moderate diffuse alveolar infiltrates unchanged. The cardiac silhouette is normal in size. Osseous structures are intact.    Impression:moderate diffuse alveolar infiltrates unchanged.    < end of copied text >  r< from: Xray Chest 1 View- PORTABLE-Urgent (Xray Chest 1 View- PORTABLE-Urgent .) (08.23.21 @ 11:06) >  PROCEDURE DATE:  08/23/2021          INTERPRETATION:  History: Dyspnea, Covid    Chest:  one view.    Comparison: 08/20/2021    AP radiograph of the chest demonstrates moderate diffuse alveolar infiltrates unchanged. The cardiac silhouette is normal in size. Osseous structures are intact.    Impression:moderate diffuse alveolar infiltrates unchanged.    < end of copied text >  < from: Xray Chest 1 View-PORTABLE IMMEDIATE (Xray Chest 1 View-PORTABLE IMMEDIATE .) (09.11.21 @ 12:32) >    PROCEDURE DATE:  09/11/2021          INTERPRETATION:  XR CHEST IMMEDIATE    Single AP view    HISTORY:  Fever    Comparison: Chest x-ray 9/4/2021    The cardiac silhouette is within normal limits. Diffuse bilateral airspace disease. No pleural abnormality.    IMPRESSION: Unchanged bilateral airspace disease    < end of copied text >  x< from: Xray Chest 1 View- PORTABLE-Routine (Xray Chest 1 View- PORTABLE-Routine in AM.) (09.16.21 @ 07:15) >    INTERPRETATION:  Portable chest radiograph    CLINICAL INFORMATION: Pneumonia due to Covid 19.    TECHNIQUE:  Portable  AP view of the chest.    COMPARISON: 9/11/2021 chest available for review.    FINDINGS:    The lungs show persistent bilateral  multifocal and diffuse ill-defined airspace opacities. No pneumothorax.    The  heart is enlarged in transverse diameter. No hilar mass.     Visualizedosseous structures are intact.    IMPRESSION:   No significant interval change.    < end of copied text >

## 2021-10-01 NOTE — PROGRESS NOTE ADULT - SUBJECTIVE AND OBJECTIVE BOX
Patient is a 56y old  Male who presents with a chief complaint of cough/sob (01 Oct 2021 09:00)    24 hour events: tolerating NC     PAST MEDICAL & SURGICAL HISTORY:  HTN (hypertension)    Diabetes        Allergies    No Known Allergies    Intolerances      REVIEW OF SYSTEMS: SEE BELOW       ICU Vital Signs Last 24 Hrs  T(C): 36.8 (01 Oct 2021 16:00), Max: 36.8 (30 Sep 2021 20:00)  T(F): 98.2 (01 Oct 2021 16:00), Max: 98.3 (01 Oct 2021 06:00)  HR: 79 (01 Oct 2021 16:00) (69 - 99)  BP: 151/82 (01 Oct 2021 16:00) (141/86 - 188/104)  BP(mean): 98 (01 Oct 2021 16:00) (86 - 126)  ABP: --  ABP(mean): --  RR: 27 (01 Oct 2021 16:00) (15 - 38)  SpO2: 91% (01 Oct 2021 16:00) (79% - 100%)      CAPILLARY BLOOD GLUCOSE          I&O's Summary    30 Sep 2021 07:01  -  01 Oct 2021 07:00  --------------------------------------------------------  IN: 340 mL / OUT: 1750 mL / NET: -1410 mL    01 Oct 2021 07:01  -  01 Oct 2021 16:52  --------------------------------------------------------  IN: 50 mL / OUT: 0 mL / NET: 50 mL            MEDICATIONS  (STANDING):  amLODIPine   Tablet 10 milliGRAM(s) Oral daily  atorvastatin 40 milliGRAM(s) Oral at bedtime  budesonide 160 MICROgram(s)/formoterol 4.5 MICROgram(s) Inhaler 2 Puff(s) Inhalation two times a day  cholecalciferol 2000 Unit(s) Oral daily  enoxaparin Injectable 40 milliGRAM(s) SubCutaneous daily  fluticasone propionate 50 MICROgram(s)/spray Nasal Spray 1 Spray(s) Both Nostrils two times a day  glucagon  Injectable 1 milliGRAM(s) IntraMuscular once  iron sucrose IVPB 100 milliGRAM(s) IV Intermittent every 24 hours  losartan 75 milliGRAM(s) Oral daily  metoprolol tartrate 50 milliGRAM(s) Oral two times a day  pantoprazole    Tablet 40 milliGRAM(s) Oral daily      MEDICATIONS  (PRN):  acetaminophen   Tablet .. 650 milliGRAM(s) Oral every 4 hours PRN Temp greater or equal to 38C (100.4F), Mild Pain (1 - 3)  ALBUTerol    90 MICROgram(s) HFA Inhaler 2 Puff(s) Inhalation every 4 hours PRN Shortness of Breath and/or Wheezing  benzonatate 100 milliGRAM(s) Oral every 8 hours PRN Cough  guaiFENesin Oral Liquid (Sugar-Free) 100 milliGRAM(s) Oral every 6 hours PRN Cough  melatonin 5 milliGRAM(s) Oral at bedtime PRN Sleep  polyethylene glycol 3350 17 Gram(s) Oral daily PRN Constipation  sodium chloride 0.65% Nasal 1 Spray(s) Both Nostrils two times a day PRN Nasal Congestion      PHYSICAL EXAM: SEE BELOW                          7.7    10.75 )-----------( 356      ( 01 Oct 2021 05:59 )             24.7       10-01    143  |  107  |  13  ----------------------------<  88  3.4<L>   |  31  |  0.76    Ca    8.7      01 Oct 2021 05:59

## 2021-10-01 NOTE — PROGRESS NOTE ADULT - ASSESSMENT
1) COVID Pneumonia  2) Abnormal CXR  3) Dyspnea  4) Hypoxemic Respiratory Failure   5) Suspected superimposed nosocomial pneumonia    56 M noted to have COVID 7/15  On arrival hypoxic to 80s and tachypneic. NRB placed,in the ER saturating 95%. Na 126 s/p 1L NS. CXR: Frandy infiltrates. Last scr 1.4 in 2019-> today 1.9 unknown if underlying ckd.  Treated with IV Remdesivir and Decadron, Tocilizumab   Given the obesity/hypertension and prior co-morbidities, he was at risk of intubation, was on HFNC with nocturnal CPAP  DDimer elevated, was on therapeutic lovenox, transitioned to 40mg q 12  Completed Solumedrol, Symbicort 160/4.5 BID (https://www.theCartilixt.com/journals/lanres/article/[email or web address], STOIC study)  SaO2 is now %  Hgb noted to be 7-8; on Pantoprazole 40 BID   Repeat ABG 7.36/60/91 reviewed  Prolonged hospitalization with ARDS post COVID pneumonia  Appreciate ID Recommendation completed cefepime  Continue titrating down O2, now on 6L NC O2, continue to titrate to a SaO2 >92%  Respiratory status continues to improve  Dr Salazar to cover 10/2-10/3

## 2021-10-02 PROCEDURE — 99232 SBSQ HOSP IP/OBS MODERATE 35: CPT

## 2021-10-02 PROCEDURE — 71045 X-RAY EXAM CHEST 1 VIEW: CPT | Mod: 26

## 2021-10-02 RX ADMIN — AMLODIPINE BESYLATE 10 MILLIGRAM(S): 2.5 TABLET ORAL at 10:38

## 2021-10-02 RX ADMIN — ATORVASTATIN CALCIUM 40 MILLIGRAM(S): 80 TABLET, FILM COATED ORAL at 21:21

## 2021-10-02 RX ADMIN — BUDESONIDE AND FORMOTEROL FUMARATE DIHYDRATE 2 PUFF(S): 160; 4.5 AEROSOL RESPIRATORY (INHALATION) at 20:49

## 2021-10-02 RX ADMIN — LOSARTAN POTASSIUM 75 MILLIGRAM(S): 100 TABLET, FILM COATED ORAL at 10:37

## 2021-10-02 RX ADMIN — BUDESONIDE AND FORMOTEROL FUMARATE DIHYDRATE 2 PUFF(S): 160; 4.5 AEROSOL RESPIRATORY (INHALATION) at 09:57

## 2021-10-02 RX ADMIN — Medication 1 SPRAY(S): at 06:29

## 2021-10-02 RX ADMIN — Medication 50 MILLIGRAM(S): at 21:21

## 2021-10-02 RX ADMIN — IRON SUCROSE 210 MILLIGRAM(S): 20 INJECTION, SOLUTION INTRAVENOUS at 17:11

## 2021-10-02 RX ADMIN — Medication 50 MILLIGRAM(S): at 10:37

## 2021-10-02 RX ADMIN — Medication 2000 UNIT(S): at 10:37

## 2021-10-02 RX ADMIN — Medication 1 SPRAY(S): at 17:12

## 2021-10-02 RX ADMIN — ENOXAPARIN SODIUM 40 MILLIGRAM(S): 100 INJECTION SUBCUTANEOUS at 10:37

## 2021-10-02 RX ADMIN — PANTOPRAZOLE SODIUM 40 MILLIGRAM(S): 20 TABLET, DELAYED RELEASE ORAL at 10:37

## 2021-10-02 NOTE — PROGRESS NOTE ADULT - SUBJECTIVE AND OBJECTIVE BOX
Patient is a 56y old  Male who presents with a chief complaint of cough/sob (02 Oct 2021 08:59)    24 hour events: BP improved    PAST MEDICAL & SURGICAL HISTORY:  HTN (hypertension)    Diabetes        Allergies    No Known Allergies    Intolerances      REVIEW OF SYSTEMS: SEE BELOW       ICU Vital Signs Last 24 Hrs  T(C): 37.1 (02 Oct 2021 08:00), Max: 37.1 (02 Oct 2021 08:00)  T(F): 98.7 (02 Oct 2021 08:00), Max: 98.7 (02 Oct 2021 08:00)  HR: 83 (02 Oct 2021 09:00) (68 - 89)  BP: 159/90 (02 Oct 2021 08:00) (147/77 - 169/94)  BP(mean): 107 (02 Oct 2021 08:00) (94 - 115)  ABP: --  ABP(mean): --  RR: 18 (02 Oct 2021 09:00) (18 - 37)  SpO2: 100% (02 Oct 2021 09:00) (87% - 100%)      CAPILLARY BLOOD GLUCOSE          I&O's Summary    01 Oct 2021 07:01  -  02 Oct 2021 07:00  --------------------------------------------------------  IN: 50 mL / OUT: 1750 mL / NET: -1700 mL    02 Oct 2021 07:01  -  02 Oct 2021 14:38  --------------------------------------------------------  IN: 250 mL / OUT: 600 mL / NET: -350 mL            MEDICATIONS  (STANDING):  amLODIPine   Tablet 10 milliGRAM(s) Oral daily  atorvastatin 40 milliGRAM(s) Oral at bedtime  budesonide 160 MICROgram(s)/formoterol 4.5 MICROgram(s) Inhaler 2 Puff(s) Inhalation two times a day  cholecalciferol 2000 Unit(s) Oral daily  enoxaparin Injectable 40 milliGRAM(s) SubCutaneous daily  fluticasone propionate 50 MICROgram(s)/spray Nasal Spray 1 Spray(s) Both Nostrils two times a day  glucagon  Injectable 1 milliGRAM(s) IntraMuscular once  iron sucrose IVPB 100 milliGRAM(s) IV Intermittent every 24 hours  losartan 75 milliGRAM(s) Oral daily  metoprolol tartrate 50 milliGRAM(s) Oral two times a day  pantoprazole    Tablet 40 milliGRAM(s) Oral daily      MEDICATIONS  (PRN):  acetaminophen   Tablet .. 650 milliGRAM(s) Oral every 4 hours PRN Temp greater or equal to 38C (100.4F), Mild Pain (1 - 3)  ALBUTerol    90 MICROgram(s) HFA Inhaler 2 Puff(s) Inhalation every 4 hours PRN Shortness of Breath and/or Wheezing  benzonatate 100 milliGRAM(s) Oral every 8 hours PRN Cough  guaiFENesin Oral Liquid (Sugar-Free) 100 milliGRAM(s) Oral every 6 hours PRN Cough  melatonin 5 milliGRAM(s) Oral at bedtime PRN Sleep  polyethylene glycol 3350 17 Gram(s) Oral daily PRN Constipation  sodium chloride 0.65% Nasal 1 Spray(s) Both Nostrils two times a day PRN Nasal Congestion      PHYSICAL EXAM: SEE BELOW                          7.7    10.75 )-----------( 356      ( 01 Oct 2021 05:59 )             24.7       10-01    143  |  107  |  13  ----------------------------<  88  3.4<L>   |  31  |  0.76    Ca    8.7      01 Oct 2021 05:59

## 2021-10-02 NOTE — PROGRESS NOTE ADULT - TIME BILLING
56M PMH HTN, DM2, now with-    Imp:   Acute hypoxic resp failure due to COVID-19 pna  Anemia of chronic disease vs GIB     Clinically improving     Plan:  Tolerating 5-6L NC at rest and with ambulation  Continue NC at night too (uses VM at night mostly as he gets anxious)   CXR with improvement in b/l infiltrates (R better than L), has chronic changes  Flonase for nasal congestion   BP improved with amlodipine, metoprolol, losartan    Iron supplementation   Continue PPI     DVT ppx: lovenox   Nutrition: po diet   Central line: no  Arterial line: no  [place]: no  Restraints: no  Activity: ambulating   Code status: full  Disposition: ICU  Updated: patient    Discharge planning for early next week - asked SW to help set up O2, home PT, HHA

## 2021-10-02 NOTE — PROGRESS NOTE ADULT - SUBJECTIVE AND OBJECTIVE BOX
Patient is a 55y old  Male who presents with a chief complaint of cough/sob (23 Jul 2021 14:03)      HPI:  55 M with pmh HLD, dm, htn presents with 8 days onset of covid symptoms which included, cough, fevers, sob, hypoxia, fevers, diarrhea, chills and myalgias. TEsted positive 7/15. Wife endorsed he was becoming more sob of recently. On arrival hypoxic to 80s and tachypneic. NRB placed, presently on 15% and saturating 95%. Na 126 s/p 1L NS. CXR: Frandy infiltrates. Last scr 1.4 in 2019-> today 1.9 unknown if underlying ckd.  Patient not vaccinated.   Last seen by me in 2019  History of ROBERT and uncontrolled hypertension    10/1  Patient transitioned from  to NC (6L)  Continuing PT  Sitting at bedside, in no resp distress  10/2  covering for Dr shah  seen in his room and doing better clinically  asks about getting OOB  no cp    MEDICATIONS  (STANDING):  amLODIPine   Tablet 10 milliGRAM(s) Oral daily  atorvastatin 40 milliGRAM(s) Oral at bedtime  budesonide 160 MICROgram(s)/formoterol 4.5 MICROgram(s) Inhaler 2 Puff(s) Inhalation two times a day  cholecalciferol 2000 Unit(s) Oral daily  enoxaparin Injectable 40 milliGRAM(s) SubCutaneous daily  fluticasone propionate 50 MICROgram(s)/spray Nasal Spray 1 Spray(s) Both Nostrils two times a day  glucagon  Injectable 1 milliGRAM(s) IntraMuscular once  iron sucrose IVPB 100 milliGRAM(s) IV Intermittent every 24 hours  losartan 75 milliGRAM(s) Oral daily  metoprolol tartrate 50 milliGRAM(s) Oral two times a day  pantoprazole    Tablet 40 milliGRAM(s) Oral daily      MEDICATIONS  (PRN):  acetaminophen   Tablet .. 650 milliGRAM(s) Oral every 4 hours PRN Temp greater or equal to 38C (100.4F), Mild Pain (1 - 3)  ALBUTerol    90 MICROgram(s) HFA Inhaler 2 Puff(s) Inhalation every 4 hours PRN Shortness of Breath and/or Wheezing  benzonatate 100 milliGRAM(s) Oral every 8 hours PRN Cough  guaiFENesin Oral Liquid (Sugar-Free) 100 milliGRAM(s) Oral every 6 hours PRN Cough  melatonin 5 milliGRAM(s) Oral at bedtime PRN Sleep  polyethylene glycol 3350 17 Gram(s) Oral daily PRN Constipation  sodium chloride 0.65% Nasal 1 Spray(s) Both Nostrils two times a day PRN Nasal Congestion      Vital Signs Last 24 Hrs  T(C): 37.1 (02 Oct 2021 08:00), Max: 37.1 (02 Oct 2021 08:00)  T(F): 98.7 (02 Oct 2021 08:00), Max: 98.7 (02 Oct 2021 08:00)  HR: 84 (02 Oct 2021 08:00) (68 - 89)  BP: 159/90 (02 Oct 2021 08:00) (147/77 - 184/96)  BP(mean): 107 (02 Oct 2021 08:00) (94 - 121)  RR: 25 (02 Oct 2021 08:00) (18 - 37)  SpO2: 100% (02 Oct 2021 08:00) (87% - 100%)    I&O's Detail    01 Oct 2021 07:01  -  02 Oct 2021 07:00  --------------------------------------------------------  IN:    IV PiggyBack: 50 mL  Total IN: 50 mL    OUT:    Voided (mL): 1750 mL  Total OUT: 1750 mL    Total NET: -1700 mL      02 Oct 2021 07:01  -  02 Oct 2021 09:00  --------------------------------------------------------  IN:    Oral Fluid: 250 mL  Total IN: 250 mL    OUT:    Voided (mL): 600 mL  Total OUT: 600 mL    Total NET: -350 mL            PHYSICAL EXAM  General Appearance: On 6L NC  Lungs: coarse bilaterally, breath sounds improving  Heart: +S1,S2  Abdomen: Soft, non-tender, bowel sounds active   Extremities: no cyanosis or edema, no joint swelling  Skin: Skin color, texture normal, no rashes   Neurologic: Alert and oriented X3 , non focal, not disoriented                                                7.7    10.75 )-----------( 356      ( 01 Oct 2021 05:59 )             24.7   10-01    143  |  107  |  13  ----------------------------<  88  3.4<L>   |  31  |  0.76    Ca    8.7      01 Oct 2021 05:59          < from: Xray Chest 1 View- PORTABLE-Urgent (07.21.21 @ 15:33) >    PROCEDURE DATE:  07/21/2021          INTERPRETATION:  AP chest on July 21, 2021 at 3:27 PM. Patient is short of breath with cough and fever.    Heart magnified by technique.    There arescattered mid lower lung field infiltrates right greater than left possibly related: Pneumonia.    The lungs are clear on August 30, 2019.    IMPRESSION: Bilateral infiltrates as above.    < end of copied text >  < from: US Duplex Venous Lower Ext Complete, Bilateral (07.25.21 @ 08:42) >  COMPARISON: None available.    TECHNIQUE: Duplex sonography of the BILATERAL LOWER extremity veins with color and spectral Doppler, with and without compression.    FINDINGS:    RIGHT:  Normal compressibility of the RIGHT common femoral, femoral and popliteal veins.  Doppler examination shows normal spontaneous and phasic flow.  No RIGHT calf vein thrombosis is detected.    LEFT:  Normal compressibility of the LEFT common femoral, femoral and popliteal veins.  Doppler examination shows normal spontaneous and phasic flow.  No LEFT calf vein thrombosis is detected.    IMPRESSION:  No evidence of deep venous thrombosis in either lower extremity.    < end of copied text >  RADIOLOGY & ADDITIONAL STUDIES:    < from: Xray Chest 1 View- PORTABLE-Urgent (Xray Chest 1 View- PORTABLE-Urgent .) (07.26.21 @ 08:43) >    PROCEDURE DATE:  07/26/2021          INTERPRETATION:  Portable chest radiograph    CLINICAL INFORMATION: Pneumonia due to Covid 19.. Follow-up    TECHNIQUE:  Portable  AP view of the chest was obtained.    COMPARISON: 7/21/2021 chest available for review.    FINDINGS:    The lungs show stable bilateral  multifocal and diffuse ill-defined airspace opacities.. No pneumothorax.    The  heart is enlarged in transverse diameter. No hilar mass.     Visualized osseous structures are intact.    IMPRESSION:   Stable bilateral  multifocal and diffuse ill-defined airspace opacities..    < end of copied text >  < from: US Duplex Venous Lower Ext Complete, Bilateral (08.06.21 @ 17:16) >  FINDINGS:    RIGHT:  Normal compressibility of the RIGHT common femoral, femoral and popliteal veins.  Doppler examination shows normal spontaneous and phasic flow.  No RIGHT calf vein thrombosis is detected.    LEFT:  Normal compressibility of the LEFT common femoral, femoral and popliteal veins.  Doppler examination shows normal spontaneous and phasic flow.  No LEFT calf vein thrombosisis detected.    IMPRESSION:  No evidence of deep venous thrombosis in either lower extremity.    < end of copied text >  < from: Xray Chest 1 View- PORTABLE-Urgent (Xray Chest 1 View- PORTABLE-Urgent .) (08.06.21 @ 16:45) >  INTERPRETATION:  Portable chest radiograph    CLINICAL INFORMATION: Pneumonia due to Covid 19.    TECHNIQUE:  Portable  AP view of the chest was obtained.    COMPARISON: 8/1/2021 chest radiograph available for review.    FINDINGS:    The lungs show decreasing bilateral diffuse airspace disease.. No pneumothorax.    The  heart is mildly enlarged in transverse diameter. No hilar mass.   Visualized osseous structures are intact.    IMPRESSION:   Decreasing bilateral diffuse airspace disease..    < end of copied text >  xr< from: US Duplex Venous Lower Ext Complete, Bilateral (08.17.21 @ 08:58) >  COMPARISON: None available.    TECHNIQUE: Duplex sonography of the BILATERAL LOWER extremity veins with color and spectral Doppler, with and without compression.    FINDINGS:    RIGHT:  Normal compressibility of the RIGHT common femoral, femoral and popliteal veins.  Doppler examination shows normal spontaneous and phasic flow.  No RIGHT calf vein thrombosis is detected.    LEFT:  Normal compressibility of the LEFT common femoral, femoral and popliteal veins.  Doppler examination shows normal spontaneous and phasic flow.  No LEFT calf vein thrombosis is detected.    IMPRESSION:  No evidence of deep venous thrombosis in either lower extremity.    < end of copied text >  r< from: US Duplex Venous Lower Ext Complete, Bilateral (08.23.21 @ 12:53) >  PROCEDURE DATE:  08/23/2021          INTERPRETATION:  CLINICAL INFORMATION: 55 years  Male with r/o DVT    evaluate for DVT. Covid positive. Elevated d-dimer.    COMPARISON: None available.    TECHNIQUE: Duplex sonography of the BILATERAL LOWER extremity veins with color and spectral Doppler, with and without compression.    FINDINGS:    RIGHT:  Normal compressibility of the RIGHT common femoral, femoral and popliteal veins.  Doppler examination shows normal spontaneous and phasic flow.  No RIGHT calf vein thrombosis is detected.    LEFT:  Normal compressibility of the LEFT common femoral, femoral and popliteal veins.  Doppler examination shows normal spontaneous and phasic flow.  No LEFT calf vein thrombosis is detected.    IMPRESSION:  No evidence of deep venous thrombosis in either lower extremity.    < end of copied text >  < from: Xray Chest 1 View- PORTABLE-Urgent (Xray Chest 1 View- PORTABLE-Urgent .) (08.23.21 @ 11:06) >    PROCEDURE DATE:  08/23/2021          INTERPRETATION:  History: Dyspnea, Covid    Chest:  one view.    Comparison: 08/20/2021    AP radiograph of the chest demonstrates moderate diffuse alveolar infiltrates unchanged. The cardiac silhouette is normal in size. Osseous structures are intact.    Impression:moderate diffuse alveolar infiltrates unchanged.    < end of copied text >  r< from: Xray Chest 1 View- PORTABLE-Urgent (Xray Chest 1 View- PORTABLE-Urgent .) (08.23.21 @ 11:06) >  PROCEDURE DATE:  08/23/2021          INTERPRETATION:  History: Dyspnea, Covid    Chest:  one view.    Comparison: 08/20/2021    AP radiograph of the chest demonstrates moderate diffuse alveolar infiltrates unchanged. The cardiac silhouette is normal in size. Osseous structures are intact.    Impression:moderate diffuse alveolar infiltrates unchanged.    < end of copied text >  < from: Xray Chest 1 View-PORTABLE IMMEDIATE (Xray Chest 1 View-PORTABLE IMMEDIATE .) (09.11.21 @ 12:32) >    PROCEDURE DATE:  09/11/2021          INTERPRETATION:  XR CHEST IMMEDIATE    Single AP view    HISTORY:  Fever    Comparison: Chest x-ray 9/4/2021    The cardiac silhouette is within normal limits. Diffuse bilateral airspace disease. No pleural abnormality.    IMPRESSION: Unchanged bilateral airspace disease    < end of copied text >  x< from: Xray Chest 1 View- PORTABLE-Routine (Xray Chest 1 View- PORTABLE-Routine in AM.) (09.16.21 @ 07:15) >    INTERPRETATION:  Portable chest radiograph    CLINICAL INFORMATION: Pneumonia due to Covid 19.    TECHNIQUE:  Portable  AP view of the chest.    COMPARISON: 9/11/2021 chest available for review.    FINDINGS:    The lungs show persistent bilateral  multifocal and diffuse ill-defined airspace opacities. No pneumothorax.    The  heart is enlarged in transverse diameter. No hilar mass.     Visualizedosseous structures are intact.    IMPRESSION:   No significant interval change.    < end of copied text >

## 2021-10-02 NOTE — PROGRESS NOTE ADULT - ASSESSMENT
1) COVID Pneumonia  2) Abnormal CXR  3) Dyspnea  4) Hypoxemic Respiratory Failure   5) Suspected superimposed nosocomial pneumonia    56 M noted to have COVID 7/15  On arrival hypoxic to 80s and tachypneic. NRB placed,in the ER saturating 95%. Na 126 s/p 1L NS. CXR: Frandy infiltrates. Last scr 1.4 in 2019-> today 1.9 unknown if underlying ckd.  Treated with IV Remdesivir and Decadron, Tocilizumab   Given the obesity/hypertension and prior co-morbidities, he was at risk of intubation, was on HFNC with nocturnal CPAP  DDimer elevated, was on therapeutic lovenox, transitioned to 40mg q 12  Completed Solumedrol, Symbicort 160/4.5 BID (https://[email or web address]/journals/lanres/article/OZZF8028-4342, STOIC study)  SaO2 is now %  Hgb noted to be 7-8; on Pantoprazole 40 BID   Repeat ABG 7.36/60/91 reviewed  Prolonged hospitalization with ARDS post COVID pneumonia  Appreciate ID Recommendation completed cefepime  Continue titrating down O2, now on 6L NC O2, continue to titrate to a SaO2 >92%  Respiratory status continues to improve / all discussed with pt today  appreciate ICU eval and follow ups  suggest repeat cxr / imaging in am- requested

## 2021-10-03 PROCEDURE — 71045 X-RAY EXAM CHEST 1 VIEW: CPT | Mod: 26

## 2021-10-03 PROCEDURE — 99232 SBSQ HOSP IP/OBS MODERATE 35: CPT

## 2021-10-03 RX ORDER — FERROUS SULFATE 325(65) MG
325 TABLET ORAL DAILY
Refills: 0 | Status: DISCONTINUED | OUTPATIENT
Start: 2021-10-03 | End: 2021-10-05

## 2021-10-03 RX ADMIN — ATORVASTATIN CALCIUM 40 MILLIGRAM(S): 80 TABLET, FILM COATED ORAL at 22:51

## 2021-10-03 RX ADMIN — BUDESONIDE AND FORMOTEROL FUMARATE DIHYDRATE 2 PUFF(S): 160; 4.5 AEROSOL RESPIRATORY (INHALATION) at 20:30

## 2021-10-03 RX ADMIN — PANTOPRAZOLE SODIUM 40 MILLIGRAM(S): 20 TABLET, DELAYED RELEASE ORAL at 09:50

## 2021-10-03 RX ADMIN — BUDESONIDE AND FORMOTEROL FUMARATE DIHYDRATE 2 PUFF(S): 160; 4.5 AEROSOL RESPIRATORY (INHALATION) at 09:49

## 2021-10-03 RX ADMIN — ENOXAPARIN SODIUM 40 MILLIGRAM(S): 100 INJECTION SUBCUTANEOUS at 09:50

## 2021-10-03 RX ADMIN — Medication 1 SPRAY(S): at 18:00

## 2021-10-03 RX ADMIN — AMLODIPINE BESYLATE 10 MILLIGRAM(S): 2.5 TABLET ORAL at 09:50

## 2021-10-03 RX ADMIN — LOSARTAN POTASSIUM 75 MILLIGRAM(S): 100 TABLET, FILM COATED ORAL at 09:50

## 2021-10-03 RX ADMIN — Medication 50 MILLIGRAM(S): at 09:50

## 2021-10-03 RX ADMIN — Medication 50 MILLIGRAM(S): at 22:51

## 2021-10-03 RX ADMIN — Medication 2000 UNIT(S): at 09:51

## 2021-10-03 RX ADMIN — Medication 1 SPRAY(S): at 05:06

## 2021-10-03 NOTE — PROGRESS NOTE ADULT - PSYCHIATRIC DETAILS
normal affect/normal behavior

## 2021-10-03 NOTE — PROGRESS NOTE ADULT - TIME BILLING
56M PMH HTN, DM2, now with-    Imp:   Acute hypoxic resp failure due to COVID-19 pna  Anemia of chronic disease vs GIB     Overall improving and can be discharged home in 2-3 days if tolerates 6L NC at rest and with ambulation, encouraged to ambulate here on NC  Patient has hx ROBERT and has CPAP at home which he does not use due to discomfort--advised to call tech and try different mask   He also grabs VM/NRM at night here mostly because of anxiety/fear--offered small dose of xanax at bedtime but he wants to try without it   He received iv iron x 5 days, will start po iron, he should get outpatient GI w/u for iron def anemia when stable from resp standpoint (he is not actively bleeding at this time)   SW working on home O2 and PT   Will discuss post-discharge care with pulm     DVT ppx: lovenox   Nutrition: po diet   Central line: no  Arterial line: no  [place]: no  Restraints: no  Activity: ambulating   Code status: full  Disposition: ICU  Updated: patient

## 2021-10-03 NOTE — PROGRESS NOTE ADULT - RS GEN HX ROS MEA POS PC
cough
dyspnea/cough
cough
dyspnea
dyspnea
cough
dyspnea
dyspnea
cough
dyspnea
dyspnea

## 2021-10-03 NOTE — PROGRESS NOTE ADULT - NECK DETAILS
supple
supple/no JVD
supple
supple/no JVD
supple

## 2021-10-03 NOTE — PROGRESS NOTE ADULT - CARDIOVASCULAR DETAILS
positive S1/positive S2
tachycardia
positive S1/positive S2
tachycardia
positive S1/positive S2

## 2021-10-03 NOTE — PROGRESS NOTE ADULT - TIME-BASED BILLING (NON-CRITICAL CARE)
Time-based billing (NON-critical care)

## 2021-10-03 NOTE — PROGRESS NOTE ADULT - GENERAL
details…
negative
details…
negative
details…
negative
details…
negative
negative
details…

## 2021-10-03 NOTE — PROGRESS NOTE ADULT - NS NEC GEN PE MLT EXAM PC
detailed exam

## 2021-10-03 NOTE — PROGRESS NOTE ADULT - ASSESSMENT
1) COVID Pneumonia  2) Abnormal CXR  3) Dyspnea  4) Hypoxemic Respiratory Failure   5) Suspected superimposed nosocomial pneumonia    56 M noted to have COVID 7/15  On arrival hypoxic to 80s and tachypneic. NRB placed,in the ER saturating 95%. Na 126 s/p 1L NS. CXR: Frandy infiltrates. Last scr 1.4 in 2019-> today 1.9 unknown if underlying ckd.  Treated with IV Remdesivir and Decadron, Tocilizumab   Given the obesity/hypertension and prior co-morbidities, he was at risk of intubation, was on HFNC with nocturnal CPAP  DDimer elevated, was on therapeutic lovenox, transitioned to 40mg q 12  Completed Solumedrol, Symbicort 160/4.5 BID (https://www.[email or web address]/journals/lanres/article/EAOU5229-0051, STOIC study)  SaO2 is now %  Hgb noted to be 7-8; on Pantoprazole 40 BID   Repeat ABG 7.36/60/91 reviewed  Prolonged hospitalization with ARDS post COVID pneumonia  Appreciate ID Recommendation completed cefepime  Continue titrating down O2, now on 6L NC O2, continue to titrate to a SaO2 >92%  Respiratory status continues to improve / all discussed with pt today  appreciate ICU eval and follow ups  suggest repeat cxr / imaging in am- requested    cxr  10/02/2021      COMPARISON: 9/22/2021 available for review.  IMPRESSION:   Slightly decreasing in bilateral perihilar and upper lobe  multifocal dense airspace consolidations..    < end of copied text >  cxr images reveiwed  DR Arceo will resume in am

## 2021-10-03 NOTE — PROGRESS NOTE ADULT - SUBJECTIVE AND OBJECTIVE BOX
Patient is a 55y old  Male who presents with a chief complaint of cough/sob (23 Jul 2021 14:03)      HPI:  55 M with pmh HLD, dm, htn presents with 8 days onset of covid symptoms which included, cough, fevers, sob, hypoxia, fevers, diarrhea, chills and myalgias. TEsted positive 7/15. Wife endorsed he was becoming more sob of recently. On arrival hypoxic to 80s and tachypneic. NRB placed, presently on 15% and saturating 95%. Na 126 s/p 1L NS. CXR: Frandy infiltrates. Last scr 1.4 in 2019-> today 1.9 unknown if underlying ckd.  Patient not vaccinated.   Last seen by me in 2019  History of ROBERT and uncontrolled hypertension    10/1  Patient transitioned from  to NC (6L)  Continuing PT  Sitting at bedside, in no resp distress  10/2  covering for Dr shah  seen in his room and doing better clinically  asks about getting OOB  no cp  10/3  uneventful overnight  doing better overall on O2 nc    MEDICATIONS  (STANDING):  amLODIPine   Tablet 10 milliGRAM(s) Oral daily  atorvastatin 40 milliGRAM(s) Oral at bedtime  budesonide 160 MICROgram(s)/formoterol 4.5 MICROgram(s) Inhaler 2 Puff(s) Inhalation two times a day  cholecalciferol 2000 Unit(s) Oral daily  enoxaparin Injectable 40 milliGRAM(s) SubCutaneous daily  fluticasone propionate 50 MICROgram(s)/spray Nasal Spray 1 Spray(s) Both Nostrils two times a day  glucagon  Injectable 1 milliGRAM(s) IntraMuscular once  iron sucrose IVPB 100 milliGRAM(s) IV Intermittent every 24 hours  losartan 75 milliGRAM(s) Oral daily  metoprolol tartrate 50 milliGRAM(s) Oral two times a day  pantoprazole    Tablet 40 milliGRAM(s) Oral daily      MEDICATIONS  (PRN):  acetaminophen   Tablet .. 650 milliGRAM(s) Oral every 4 hours PRN Temp greater or equal to 38C (100.4F), Mild Pain (1 - 3)  ALBUTerol    90 MICROgram(s) HFA Inhaler 2 Puff(s) Inhalation every 4 hours PRN Shortness of Breath and/or Wheezing  benzonatate 100 milliGRAM(s) Oral every 8 hours PRN Cough  guaiFENesin Oral Liquid (Sugar-Free) 100 milliGRAM(s) Oral every 6 hours PRN Cough  melatonin 5 milliGRAM(s) Oral at bedtime PRN Sleep  polyethylene glycol 3350 17 Gram(s) Oral daily PRN Constipation  sodium chloride 0.65% Nasal 1 Spray(s) Both Nostrils two times a day PRN Nasal Congestion      ICU Vital Signs Last 24 Hrs  T(C): 36.7 (03 Oct 2021 04:00), Max: 36.8 (02 Oct 2021 15:00)  T(F): 98 (03 Oct 2021 04:00), Max: 98.2 (02 Oct 2021 15:00)  HR: 78 (03 Oct 2021 08:00) (71 - 100)  BP: 142/97 (03 Oct 2021 08:00) (120/56 - 175/103)  BP(mean): 108 (03 Oct 2021 08:00) (72 - 121)  ABP: --  ABP(mean): --  RR: 20 (03 Oct 2021 08:00) (20 - 30)  SpO2: 100% (03 Oct 2021 08:00) (88% - 100%)      I&O's Detail    02 Oct 2021 07:01  -  03 Oct 2021 07:00  --------------------------------------------------------  IN:    IV PiggyBack: 100 mL    Oral Fluid: 1000 mL  Total IN: 1100 mL    OUT:    Voided (mL): 1650 mL  Total OUT: 1650 mL    Total NET: -550 mL                PHYSICAL EXAM  General Appearance: On 6L NC  Lungs: coarse bilaterally, breath sounds improving  Heart: +S1,S2  Abdomen: Soft, non-tender, bowel sounds active   Extremities: no cyanosis or edema, no joint swelling  Skin: Skin color, texture normal, no rashes   Neurologic: Alert and oriented X3 , non focal, not disoriented                                                7.7    10.75 )-----------( 356      ( 01 Oct 2021 05:59 )             24.7   10-01    143  |  107  |  13  ----------------------------<  88  3.4<L>   |  31  |  0.76    Ca    8.7      01 Oct 2021 05:59          < from: Xray Chest 1 View- PORTABLE-Urgent (07.21.21 @ 15:33) >    PROCEDURE DATE:  07/21/2021          INTERPRETATION:  AP chest on July 21, 2021 at 3:27 PM. Patient is short of breath with cough and fever.    Heart magnified by technique.    There arescattered mid lower lung field infiltrates right greater than left possibly related: Pneumonia.    The lungs are clear on August 30, 2019.    IMPRESSION: Bilateral infiltrates as above.    < end of copied text >  < from: US Duplex Venous Lower Ext Complete, Bilateral (07.25.21 @ 08:42) >  COMPARISON: None available.    TECHNIQUE: Duplex sonography of the BILATERAL LOWER extremity veins with color and spectral Doppler, with and without compression.    FINDINGS:    RIGHT:  Normal compressibility of the RIGHT common femoral, femoral and popliteal veins.  Doppler examination shows normal spontaneous and phasic flow.  No RIGHT calf vein thrombosis is detected.    LEFT:  Normal compressibility of the LEFT common femoral, femoral and popliteal veins.  Doppler examination shows normal spontaneous and phasic flow.  No LEFT calf vein thrombosis is detected.    IMPRESSION:  No evidence of deep venous thrombosis in either lower extremity.    < end of copied text >  RADIOLOGY & ADDITIONAL STUDIES:    < from: Xray Chest 1 View- PORTABLE-Urgent (Xray Chest 1 View- PORTABLE-Urgent .) (07.26.21 @ 08:43) >    PROCEDURE DATE:  07/26/2021          INTERPRETATION:  Portable chest radiograph    CLINICAL INFORMATION: Pneumonia due to Covid 19.. Follow-up    TECHNIQUE:  Portable  AP view of the chest was obtained.    COMPARISON: 7/21/2021 chest available for review.    FINDINGS:    The lungs show stable bilateral  multifocal and diffuse ill-defined airspace opacities.. No pneumothorax.    The  heart is enlarged in transverse diameter. No hilar mass.     Visualized osseous structures are intact.    IMPRESSION:   Stable bilateral  multifocal and diffuse ill-defined airspace opacities..    < end of copied text >  < from: US Duplex Venous Lower Ext Complete, Bilateral (08.06.21 @ 17:16) >  FINDINGS:    RIGHT:  Normal compressibility of the RIGHT common femoral, femoral and popliteal veins.  Doppler examination shows normal spontaneous and phasic flow.  No RIGHT calf vein thrombosis is detected.    LEFT:  Normal compressibility of the LEFT common femoral, femoral and popliteal veins.  Doppler examination shows normal spontaneous and phasic flow.  No LEFT calf vein thrombosisis detected.    IMPRESSION:  No evidence of deep venous thrombosis in either lower extremity.    < end of copied text >  < from: Xray Chest 1 View- PORTABLE-Urgent (Xray Chest 1 View- PORTABLE-Urgent .) (08.06.21 @ 16:45) >  INTERPRETATION:  Portable chest radiograph    CLINICAL INFORMATION: Pneumonia due to Covid 19.    TECHNIQUE:  Portable  AP view of the chest was obtained.    COMPARISON: 8/1/2021 chest radiograph available for review.    FINDINGS:    The lungs show decreasing bilateral diffuse airspace disease.. No pneumothorax.    The  heart is mildly enlarged in transverse diameter. No hilar mass.   Visualized osseous structures are intact.    IMPRESSION:   Decreasing bilateral diffuse airspace disease..    < end of copied text >  xr< from: US Duplex Venous Lower Ext Complete, Bilateral (08.17.21 @ 08:58) >  COMPARISON: None available.    TECHNIQUE: Duplex sonography of the BILATERAL LOWER extremity veins with color and spectral Doppler, with and without compression.    FINDINGS:    RIGHT:  Normal compressibility of the RIGHT common femoral, femoral and popliteal veins.  Doppler examination shows normal spontaneous and phasic flow.  No RIGHT calf vein thrombosis is detected.    LEFT:  Normal compressibility of the LEFT common femoral, femoral and popliteal veins.  Doppler examination shows normal spontaneous and phasic flow.  No LEFT calf vein thrombosis is detected.    IMPRESSION:  No evidence of deep venous thrombosis in either lower extremity.    < end of copied text >  r< from: US Duplex Venous Lower Ext Complete, Bilateral (08.23.21 @ 12:53) >  PROCEDURE DATE:  08/23/2021          INTERPRETATION:  CLINICAL INFORMATION: 55 years  Male with r/o DVT    evaluate for DVT. Covid positive. Elevated d-dimer.    COMPARISON: None available.    TECHNIQUE: Duplex sonography of the BILATERAL LOWER extremity veins with color and spectral Doppler, with and without compression.    FINDINGS:    RIGHT:  Normal compressibility of the RIGHT common femoral, femoral and popliteal veins.  Doppler examination shows normal spontaneous and phasic flow.  No RIGHT calf vein thrombosis is detected.    LEFT:  Normal compressibility of the LEFT common femoral, femoral and popliteal veins.  Doppler examination shows normal spontaneous and phasic flow.  No LEFT calf vein thrombosis is detected.    IMPRESSION:  No evidence of deep venous thrombosis in either lower extremity.    < end of copied text >  < from: Xray Chest 1 View- PORTABLE-Urgent (Xray Chest 1 View- PORTABLE-Urgent .) (08.23.21 @ 11:06) >    PROCEDURE DATE:  08/23/2021          INTERPRETATION:  History: Dyspnea, Covid    Chest:  one view.    Comparison: 08/20/2021    AP radiograph of the chest demonstrates moderate diffuse alveolar infiltrates unchanged. The cardiac silhouette is normal in size. Osseous structures are intact.    Impression:moderate diffuse alveolar infiltrates unchanged.    < end of copied text >  r< from: Xray Chest 1 View- PORTABLE-Urgent (Xray Chest 1 View- PORTABLE-Urgent .) (08.23.21 @ 11:06) >  PROCEDURE DATE:  08/23/2021          INTERPRETATION:  History: Dyspnea, Covid    Chest:  one view.    Comparison: 08/20/2021    AP radiograph of the chest demonstrates moderate diffuse alveolar infiltrates unchanged. The cardiac silhouette is normal in size. Osseous structures are intact.    Impression:moderate diffuse alveolar infiltrates unchanged.    < end of copied text >  < from: Xray Chest 1 View-PORTABLE IMMEDIATE (Xray Chest 1 View-PORTABLE IMMEDIATE .) (09.11.21 @ 12:32) >    PROCEDURE DATE:  09/11/2021          INTERPRETATION:  XR CHEST IMMEDIATE    Single AP view    HISTORY:  Fever    Comparison: Chest x-ray 9/4/2021    The cardiac silhouette is within normal limits. Diffuse bilateral airspace disease. No pleural abnormality.    IMPRESSION: Unchanged bilateral airspace disease    < end of copied text >  x< from: Xray Chest 1 View- PORTABLE-Routine (Xray Chest 1 View- PORTABLE-Routine in AM.) (09.16.21 @ 07:15) >    INTERPRETATION:  Portable chest radiograph    CLINICAL INFORMATION: Pneumonia due to Covid 19.    TECHNIQUE:  Portable  AP view of the chest.    COMPARISON: 9/11/2021 chest available for review.    FINDINGS:    The lungs show persistent bilateral  multifocal and diffuse ill-defined airspace opacities. No pneumothorax.    The  heart is enlarged in transverse diameter. No hilar mass.     Visualizedosseous structures are intact.    IMPRESSION:   No significant interval change.    < end of copied text >  FINDINGS:    The lungs show slight decrease in perihilar and upper lobe airspace consolidations. No Pneumothorax.    The  heart is mildly enlarged in transverse diameter. No hilar mass.       Visualized osseous structures are intact.

## 2021-10-03 NOTE — PROGRESS NOTE ADULT - SUBJECTIVE AND OBJECTIVE BOX
Patient is a 56y old  Male who presents with a chief complaint of cough/sob (03 Oct 2021 10:17)    24 hour events: doing ok  slept with NRM last night     PAST MEDICAL & SURGICAL HISTORY:  HTN (hypertension)    Diabetes        Allergies    No Known Allergies    Intolerances      REVIEW OF SYSTEMS: SEE BELOW       ICU Vital Signs Last 24 Hrs  T(C): 36.7 (03 Oct 2021 16:00), Max: 36.9 (03 Oct 2021 13:00)  T(F): 98 (03 Oct 2021 16:00), Max: 98.4 (03 Oct 2021 13:00)  HR: 78 (03 Oct 2021 16:00) (65 - 94)  BP: 145/91 (03 Oct 2021 16:00) (120/56 - 153/103)  BP(mean): 104 (03 Oct 2021 16:00) (72 - 113)  ABP: --  ABP(mean): --  RR: 33 (03 Oct 2021 16:00) (20 - 33)  SpO2: 86% (03 Oct 2021 16:00) (86% - 100%)      CAPILLARY BLOOD GLUCOSE          I&O's Summary    02 Oct 2021 07:01  -  03 Oct 2021 07:00  --------------------------------------------------------  IN: 1100 mL / OUT: 1650 mL / NET: -550 mL            MEDICATIONS  (STANDING):  amLODIPine   Tablet 10 milliGRAM(s) Oral daily  atorvastatin 40 milliGRAM(s) Oral at bedtime  budesonide 160 MICROgram(s)/formoterol 4.5 MICROgram(s) Inhaler 2 Puff(s) Inhalation two times a day  cholecalciferol 2000 Unit(s) Oral daily  enoxaparin Injectable 40 milliGRAM(s) SubCutaneous daily  fluticasone propionate 50 MICROgram(s)/spray Nasal Spray 1 Spray(s) Both Nostrils two times a day  glucagon  Injectable 1 milliGRAM(s) IntraMuscular once  losartan 75 milliGRAM(s) Oral daily  metoprolol tartrate 50 milliGRAM(s) Oral two times a day  pantoprazole    Tablet 40 milliGRAM(s) Oral daily      MEDICATIONS  (PRN):  acetaminophen   Tablet .. 650 milliGRAM(s) Oral every 4 hours PRN Temp greater or equal to 38C (100.4F), Mild Pain (1 - 3)  ALBUTerol    90 MICROgram(s) HFA Inhaler 2 Puff(s) Inhalation every 4 hours PRN Shortness of Breath and/or Wheezing  benzonatate 100 milliGRAM(s) Oral every 8 hours PRN Cough  guaiFENesin Oral Liquid (Sugar-Free) 100 milliGRAM(s) Oral every 6 hours PRN Cough  melatonin 5 milliGRAM(s) Oral at bedtime PRN Sleep  polyethylene glycol 3350 17 Gram(s) Oral daily PRN Constipation  sodium chloride 0.65% Nasal 1 Spray(s) Both Nostrils two times a day PRN Nasal Congestion      PHYSICAL EXAM: SEE BELOW

## 2021-10-04 LAB
ANION GAP SERPL CALC-SCNC: 7 MMOL/L — SIGNIFICANT CHANGE UP (ref 5–17)
BUN SERPL-MCNC: 13 MG/DL — SIGNIFICANT CHANGE UP (ref 7–23)
CALCIUM SERPL-MCNC: 8.6 MG/DL — SIGNIFICANT CHANGE UP (ref 8.5–10.1)
CHLORIDE SERPL-SCNC: 102 MMOL/L — SIGNIFICANT CHANGE UP (ref 96–108)
CO2 SERPL-SCNC: 30 MMOL/L — SIGNIFICANT CHANGE UP (ref 22–31)
CREAT SERPL-MCNC: 0.93 MG/DL — SIGNIFICANT CHANGE UP (ref 0.5–1.3)
GLUCOSE SERPL-MCNC: 92 MG/DL — SIGNIFICANT CHANGE UP (ref 70–99)
HCT VFR BLD CALC: 26.6 % — LOW (ref 39–50)
HGB BLD-MCNC: 8.1 G/DL — LOW (ref 13–17)
MCHC RBC-ENTMCNC: 26.4 PG — LOW (ref 27–34)
MCHC RBC-ENTMCNC: 30.5 GM/DL — LOW (ref 32–36)
MCV RBC AUTO: 86.6 FL — SIGNIFICANT CHANGE UP (ref 80–100)
PLATELET # BLD AUTO: 338 K/UL — SIGNIFICANT CHANGE UP (ref 150–400)
POTASSIUM SERPL-MCNC: 3.4 MMOL/L — LOW (ref 3.5–5.3)
POTASSIUM SERPL-SCNC: 3.4 MMOL/L — LOW (ref 3.5–5.3)
RBC # BLD: 3.07 M/UL — LOW (ref 4.2–5.8)
RBC # FLD: 16.4 % — HIGH (ref 10.3–14.5)
SARS-COV-2 RNA SPEC QL NAA+PROBE: SIGNIFICANT CHANGE UP
SODIUM SERPL-SCNC: 139 MMOL/L — SIGNIFICANT CHANGE UP (ref 135–145)
WBC # BLD: 10.61 K/UL — HIGH (ref 3.8–10.5)
WBC # FLD AUTO: 10.61 K/UL — HIGH (ref 3.8–10.5)

## 2021-10-04 PROCEDURE — 99232 SBSQ HOSP IP/OBS MODERATE 35: CPT

## 2021-10-04 RX ORDER — POTASSIUM CHLORIDE 20 MEQ
40 PACKET (EA) ORAL EVERY 4 HOURS
Refills: 0 | Status: COMPLETED | OUTPATIENT
Start: 2021-10-04 | End: 2021-10-04

## 2021-10-04 RX ADMIN — AMLODIPINE BESYLATE 10 MILLIGRAM(S): 2.5 TABLET ORAL at 10:51

## 2021-10-04 RX ADMIN — Medication 50 MILLIGRAM(S): at 21:09

## 2021-10-04 RX ADMIN — Medication 325 MILLIGRAM(S): at 10:50

## 2021-10-04 RX ADMIN — BUDESONIDE AND FORMOTEROL FUMARATE DIHYDRATE 2 PUFF(S): 160; 4.5 AEROSOL RESPIRATORY (INHALATION) at 08:58

## 2021-10-04 RX ADMIN — BUDESONIDE AND FORMOTEROL FUMARATE DIHYDRATE 2 PUFF(S): 160; 4.5 AEROSOL RESPIRATORY (INHALATION) at 20:57

## 2021-10-04 RX ADMIN — PANTOPRAZOLE SODIUM 40 MILLIGRAM(S): 20 TABLET, DELAYED RELEASE ORAL at 10:49

## 2021-10-04 RX ADMIN — Medication 40 MILLIEQUIVALENT(S): at 18:05

## 2021-10-04 RX ADMIN — Medication 40 MILLIEQUIVALENT(S): at 10:48

## 2021-10-04 RX ADMIN — Medication 5 MILLIGRAM(S): at 21:09

## 2021-10-04 RX ADMIN — Medication 50 MILLIGRAM(S): at 10:49

## 2021-10-04 RX ADMIN — Medication 1 SPRAY(S): at 05:51

## 2021-10-04 RX ADMIN — ATORVASTATIN CALCIUM 40 MILLIGRAM(S): 80 TABLET, FILM COATED ORAL at 21:09

## 2021-10-04 RX ADMIN — LOSARTAN POTASSIUM 75 MILLIGRAM(S): 100 TABLET, FILM COATED ORAL at 10:49

## 2021-10-04 RX ADMIN — Medication 2000 UNIT(S): at 10:50

## 2021-10-04 RX ADMIN — ENOXAPARIN SODIUM 40 MILLIGRAM(S): 100 INJECTION SUBCUTANEOUS at 10:48

## 2021-10-04 NOTE — PROGRESS NOTE ADULT - ASSESSMENT
IMP:    54 y/o male with HLD, HTN and DM--Non Vacc--admitted with Viral PNA sepsis secondary to COVID--Acute type 1 resp failure and ARDS    Overall better    Severe Prot Ted malnutrition     Plan:    NC   OOB  FS with insulin coverage--keep FS < 180  Encourage nutritional support  OOB  DVT prophy--SCD and LMWH    ICU Care-- d/w ICU staff on multi disciplinary rounds and pt-- All concerns addressed including but not limited to diagnosis, treatment plan and overall prognosis     Anticipating DC home soon with home O2

## 2021-10-04 NOTE — PROGRESS NOTE ADULT - SUBJECTIVE AND OBJECTIVE BOX
[facility] D # 75    CC:  COVID     HPI:    56 y/o male with HLD, HTN and DM--Non Vacc--presents with 8 days onset of covid symptoms which included, cough, fevers, sob, hypoxia, fevers, diarrhea, chills and myalgias. Tested positive 7/15.  Rx with Rem/Dexa and then high dose steroids and full AC.  Pt tx to ICU on 7/24 for worsening hypoxia.       10/4: Pt seen and examined in ICU.  Doing well.  On NC. NIV O/N.  Tm 98.5  WBC 10     PMH:  As above.     PSH:  As above.     FH: Non Contributory other than those listed in HPI    Social History:  As above     MEDICATIONS  (STANDING):  amLODIPine   Tablet 10 milliGRAM(s) Oral daily  atorvastatin 40 milliGRAM(s) Oral at bedtime  budesonide 160 MICROgram(s)/formoterol 4.5 MICROgram(s) Inhaler 2 Puff(s) Inhalation two times a day  cholecalciferol 2000 Unit(s) Oral daily  enoxaparin Injectable 40 milliGRAM(s) SubCutaneous daily  ferrous    sulfate 325 milliGRAM(s) Oral daily  fluticasone propionate 50 MICROgram(s)/spray Nasal Spray 1 Spray(s) Both Nostrils two times a day  glucagon  Injectable 1 milliGRAM(s) IntraMuscular once  losartan 75 milliGRAM(s) Oral daily  metoprolol tartrate 50 milliGRAM(s) Oral two times a day  pantoprazole    Tablet 40 milliGRAM(s) Oral daily  potassium chloride    Tablet ER 40 milliEquivalent(s) Oral every 4 hours    MEDICATIONS  (PRN):  acetaminophen   Tablet .. 650 milliGRAM(s) Oral every 4 hours PRN Temp greater or equal to 38C (100.4F), Mild Pain (1 - 3)  ALBUTerol    90 MICROgram(s) HFA Inhaler 2 Puff(s) Inhalation every 4 hours PRN Shortness of Breath and/or Wheezing  benzonatate 100 milliGRAM(s) Oral every 8 hours PRN Cough  guaiFENesin Oral Liquid (Sugar-Free) 100 milliGRAM(s) Oral every 6 hours PRN Cough  melatonin 5 milliGRAM(s) Oral at bedtime PRN Sleep  polyethylene glycol 3350 17 Gram(s) Oral daily PRN Constipation  sodium chloride 0.65% Nasal 1 Spray(s) Both Nostrils two times a day PRN Nasal Congestion      Allergies: NKDA    ROS:  SEE BELOW        ICU Vital Signs Last 24 Hrs  T(C): 36.9 (04 Oct 2021 08:00), Max: 36.9 (04 Oct 2021 00:00)  T(F): 98.5 (04 Oct 2021 08:00), Max: 98.5 (04 Oct 2021 08:00)  HR: 71 (04 Oct 2021 14:00) (67 - 89)  BP: 148/92 (04 Oct 2021 14:00) (134/92 - 164/99)  BP(mean): 105 (04 Oct 2021 14:00) (83 - 118)  ABP: --  ABP(mean): --  RR: 21 (04 Oct 2021 14:00) (15 - 33)  SpO2: 100% (04 Oct 2021 14:00) (80% - 100%)          I&O's Summary    03 Oct 2021 07:01  -  04 Oct 2021 07:00  --------------------------------------------------------  IN: 800 mL / OUT: 1200 mL / NET: -400 mL        Physical Exam:  SEE BELOW                          8.1    10.61 )-----------( 338      ( 04 Oct 2021 06:39 )             26.6       10-04    139  |  102  |  13  ----------------------------<  92  3.4<L>   |  30  |  0.93    Ca    8.6      04 Oct 2021 06:39                      DVT Prophylaxis:                                                            Contraindication:     Advanced Directives:    Discussed with:    Visit Information:  Time spent excluding procedure:      ** Time is exclusive of billed procedures and/or teaching and/or routine family updates.

## 2021-10-04 NOTE — PROGRESS NOTE ADULT - SUBJECTIVE AND OBJECTIVE BOX
Patient is a 55y old  Male who presents with a chief complaint of cough/sob (23 Jul 2021 14:03)      HPI:  55 M with pmh HLD, dm, htn presents with 8 days onset of covid symptoms which included, cough, fevers, sob, hypoxia, fevers, diarrhea, chills and myalgias. TEsted positive 7/15. Wife endorsed he was becoming more sob of recently. On arrival hypoxic to 80s and tachypneic. NRB placed, presently on 15% and saturating 95%. Na 126 s/p 1L NS. CXR: Frandy infiltrates. Last scr 1.4 in 2019-> today 1.9 unknown if underlying ckd.  Patient not vaccinated.   Last seen by me in 2019  History of ROBERT and uncontrolled hypertension    10/1  Patient transitioned from VM to NC (6L)  Continuing PT  Sitting at bedside, in no resp distress  10/2  covering for Dr shah  seen in his room and doing better clinically  asks about getting OOB  no cp  10/3  uneventful overnight  doing better overall on O2 nc    10/4  Doing well today on 5-6L NC O2    MEDICATIONS  (STANDING):  amLODIPine   Tablet 10 milliGRAM(s) Oral daily  atorvastatin 40 milliGRAM(s) Oral at bedtime  budesonide 160 MICROgram(s)/formoterol 4.5 MICROgram(s) Inhaler 2 Puff(s) Inhalation two times a day  cholecalciferol 2000 Unit(s) Oral daily  enoxaparin Injectable 40 milliGRAM(s) SubCutaneous daily  fluticasone propionate 50 MICROgram(s)/spray Nasal Spray 1 Spray(s) Both Nostrils two times a day  glucagon  Injectable 1 milliGRAM(s) IntraMuscular once  iron sucrose IVPB 100 milliGRAM(s) IV Intermittent every 24 hours  losartan 75 milliGRAM(s) Oral daily  metoprolol tartrate 50 milliGRAM(s) Oral two times a day  pantoprazole    Tablet 40 milliGRAM(s) Oral daily      MEDICATIONS  (PRN):  acetaminophen   Tablet .. 650 milliGRAM(s) Oral every 4 hours PRN Temp greater or equal to 38C (100.4F), Mild Pain (1 - 3)  ALBUTerol    90 MICROgram(s) HFA Inhaler 2 Puff(s) Inhalation every 4 hours PRN Shortness of Breath and/or Wheezing  benzonatate 100 milliGRAM(s) Oral every 8 hours PRN Cough  guaiFENesin Oral Liquid (Sugar-Free) 100 milliGRAM(s) Oral every 6 hours PRN Cough  melatonin 5 milliGRAM(s) Oral at bedtime PRN Sleep  polyethylene glycol 3350 17 Gram(s) Oral daily PRN Constipation  sodium chloride 0.65% Nasal 1 Spray(s) Both Nostrils two times a day PRN Nasal Congestion    Vital Signs Last 24 Hrs  T(C): 36.9 (04 Oct 2021 08:00), Max: 36.9 (03 Oct 2021 13:00)  T(F): 98.5 (04 Oct 2021 08:00), Max: 98.5 (04 Oct 2021 08:00)  HR: 81 (04 Oct 2021 09:00) (65 - 89)  BP: 146/82 (04 Oct 2021 08:00) (126/79 - 152/93)  BP(mean): 96 (04 Oct 2021 08:00) (83 - 108)  RR: 15 (04 Oct 2021 09:00) (15 - 33)  SpO2: 96% (04 Oct 2021 09:00) (80% - 100%)    I&O's Detail    02 Oct 2021 07:01  -  03 Oct 2021 07:00  --------------------------------------------------------  IN:    IV PiggyBack: 100 mL    Oral Fluid: 1000 mL  Total IN: 1100 mL    OUT:    Voided (mL): 1650 mL  Total OUT: 1650 mL    Total NET: -550 mL                PHYSICAL EXAM  General Appearance: On 6L NC  Lungs: coarse bilaterally, breath sounds improving  Heart: +S1,S2  Abdomen: Soft, non-tender, bowel sounds active   Extremities: no cyanosis or edema, no joint swelling  Skin: Skin color, texture normal, no rashes   Neurologic: Alert and oriented X3 , non focal, not disoriented                                                7.7    10.75 )-----------( 356      ( 01 Oct 2021 05:59 )             24.7   10-01    143  |  107  |  13  ----------------------------<  88  3.4<L>   |  31  |  0.76    Ca    8.7      01 Oct 2021 05:59          < from: Xray Chest 1 View- PORTABLE-Urgent (07.21.21 @ 15:33) >    PROCEDURE DATE:  07/21/2021          INTERPRETATION:  AP chest on July 21, 2021 at 3:27 PM. Patient is short of breath with cough and fever.    Heart magnified by technique.    There arescattered mid lower lung field infiltrates right greater than left possibly related: Pneumonia.    The lungs are clear on August 30, 2019.    IMPRESSION: Bilateral infiltrates as above.    < end of copied text >  < from: US Duplex Venous Lower Ext Complete, Bilateral (07.25.21 @ 08:42) >  COMPARISON: None available.    TECHNIQUE: Duplex sonography of the BILATERAL LOWER extremity veins with color and spectral Doppler, with and without compression.    FINDINGS:    RIGHT:  Normal compressibility of the RIGHT common femoral, femoral and popliteal veins.  Doppler examination shows normal spontaneous and phasic flow.  No RIGHT calf vein thrombosis is detected.    LEFT:  Normal compressibility of the LEFT common femoral, femoral and popliteal veins.  Doppler examination shows normal spontaneous and phasic flow.  No LEFT calf vein thrombosis is detected.    IMPRESSION:  No evidence of deep venous thrombosis in either lower extremity.    < end of copied text >  RADIOLOGY & ADDITIONAL STUDIES:    < from: Xray Chest 1 View- PORTABLE-Urgent (Xray Chest 1 View- PORTABLE-Urgent .) (07.26.21 @ 08:43) >    PROCEDURE DATE:  07/26/2021          INTERPRETATION:  Portable chest radiograph    CLINICAL INFORMATION: Pneumonia due to Covid 19.. Follow-up    TECHNIQUE:  Portable  AP view of the chest was obtained.    COMPARISON: 7/21/2021 chest available for review.    FINDINGS:    The lungs show stable bilateral  multifocal and diffuse ill-defined airspace opacities.. No pneumothorax.    The  heart is enlarged in transverse diameter. No hilar mass.     Visualized osseous structures are intact.    IMPRESSION:   Stable bilateral  multifocal and diffuse ill-defined airspace opacities..    < end of copied text >  < from: US Duplex Venous Lower Ext Complete, Bilateral (08.06.21 @ 17:16) >  FINDINGS:    RIGHT:  Normal compressibility of the RIGHT common femoral, femoral and popliteal veins.  Doppler examination shows normal spontaneous and phasic flow.  No RIGHT calf vein thrombosis is detected.    LEFT:  Normal compressibility of the LEFT common femoral, femoral and popliteal veins.  Doppler examination shows normal spontaneous and phasic flow.  No LEFT calf vein thrombosisis detected.    IMPRESSION:  No evidence of deep venous thrombosis in either lower extremity.    < end of copied text >  < from: Xray Chest 1 View- PORTABLE-Urgent (Xray Chest 1 View- PORTABLE-Urgent .) (08.06.21 @ 16:45) >  INTERPRETATION:  Portable chest radiograph    CLINICAL INFORMATION: Pneumonia due to Covid 19.    TECHNIQUE:  Portable  AP view of the chest was obtained.    COMPARISON: 8/1/2021 chest radiograph available for review.    FINDINGS:    The lungs show decreasing bilateral diffuse airspace disease.. No pneumothorax.    The  heart is mildly enlarged in transverse diameter. No hilar mass.   Visualized osseous structures are intact.    IMPRESSION:   Decreasing bilateral diffuse airspace disease..    < end of copied text >  xr< from: US Duplex Venous Lower Ext Complete, Bilateral (08.17.21 @ 08:58) >  COMPARISON: None available.    TECHNIQUE: Duplex sonography of the BILATERAL LOWER extremity veins with color and spectral Doppler, with and without compression.    FINDINGS:    RIGHT:  Normal compressibility of the RIGHT common femoral, femoral and popliteal veins.  Doppler examination shows normal spontaneous and phasic flow.  No RIGHT calf vein thrombosis is detected.    LEFT:  Normal compressibility of the LEFT common femoral, femoral and popliteal veins.  Doppler examination shows normal spontaneous and phasic flow.  No LEFT calf vein thrombosis is detected.    IMPRESSION:  No evidence of deep venous thrombosis in either lower extremity.    < end of copied text >  r< from: US Duplex Venous Lower Ext Complete, Bilateral (08.23.21 @ 12:53) >  PROCEDURE DATE:  08/23/2021          INTERPRETATION:  CLINICAL INFORMATION: 55 years  Male with r/o DVT    evaluate for DVT. Covid positive. Elevated d-dimer.    COMPARISON: None available.    TECHNIQUE: Duplex sonography of the BILATERAL LOWER extremity veins with color and spectral Doppler, with and without compression.    FINDINGS:    RIGHT:  Normal compressibility of the RIGHT common femoral, femoral and popliteal veins.  Doppler examination shows normal spontaneous and phasic flow.  No RIGHT calf vein thrombosis is detected.    LEFT:  Normal compressibility of the LEFT common femoral, femoral and popliteal veins.  Doppler examination shows normal spontaneous and phasic flow.  No LEFT calf vein thrombosis is detected.    IMPRESSION:  No evidence of deep venous thrombosis in either lower extremity.    < end of copied text >  < from: Xray Chest 1 View- PORTABLE-Urgent (Xray Chest 1 View- PORTABLE-Urgent .) (08.23.21 @ 11:06) >    PROCEDURE DATE:  08/23/2021          INTERPRETATION:  History: Dyspnea, Covid    Chest:  one view.    Comparison: 08/20/2021    AP radiograph of the chest demonstrates moderate diffuse alveolar infiltrates unchanged. The cardiac silhouette is normal in size. Osseous structures are intact.    Impression:moderate diffuse alveolar infiltrates unchanged.    < end of copied text >  r< from: Xray Chest 1 View- PORTABLE-Urgent (Xray Chest 1 View- PORTABLE-Urgent .) (08.23.21 @ 11:06) >  PROCEDURE DATE:  08/23/2021          INTERPRETATION:  History: Dyspnea, Covid    Chest:  one view.    Comparison: 08/20/2021    AP radiograph of the chest demonstrates moderate diffuse alveolar infiltrates unchanged. The cardiac silhouette is normal in size. Osseous structures are intact.    Impression:moderate diffuse alveolar infiltrates unchanged.    < end of copied text >  < from: Xray Chest 1 View-PORTABLE IMMEDIATE (Xray Chest 1 View-PORTABLE IMMEDIATE .) (09.11.21 @ 12:32) >    PROCEDURE DATE:  09/11/2021          INTERPRETATION:  XR CHEST IMMEDIATE    Single AP view    HISTORY:  Fever    Comparison: Chest x-ray 9/4/2021    The cardiac silhouette is within normal limits. Diffuse bilateral airspace disease. No pleural abnormality.    IMPRESSION: Unchanged bilateral airspace disease    < end of copied text >  x< from: Xray Chest 1 View- PORTABLE-Routine (Xray Chest 1 View- PORTABLE-Routine in AM.) (09.16.21 @ 07:15) >    INTERPRETATION:  Portable chest radiograph    CLINICAL INFORMATION: Pneumonia due to Covid 19.    TECHNIQUE:  Portable  AP view of the chest.    COMPARISON: 9/11/2021 chest available for review.    FINDINGS:    The lungs show persistent bilateral  multifocal and diffuse ill-defined airspace opacities. No pneumothorax.    The  heart is enlarged in transverse diameter. No hilar mass.     Visualizedosseous structures are intact.    IMPRESSION:   No significant interval change.    < end of copied text >  FINDINGS:    The lungs show slight decrease in perihilar and upper lobe airspace consolidations. No Pneumothorax.    The  heart is mildly enlarged in transverse diameter. No hilar mass.       Visualized osseous structures are intact.

## 2021-10-05 VITALS — HEART RATE: 80 BPM | OXYGEN SATURATION: 100 % | RESPIRATION RATE: 24 BRPM

## 2021-10-05 PROCEDURE — 99239 HOSP IP/OBS DSCHRG MGMT >30: CPT

## 2021-10-05 RX ORDER — POTASSIUM CHLORIDE 20 MEQ
40 PACKET (EA) ORAL ONCE
Refills: 0 | Status: COMPLETED | OUTPATIENT
Start: 2021-10-05 | End: 2021-10-05

## 2021-10-05 RX ORDER — METOPROLOL TARTRATE 50 MG
0 TABLET ORAL
Qty: 0 | Refills: 0 | DISCHARGE

## 2021-10-05 RX ORDER — METFORMIN HYDROCHLORIDE 850 MG/1
1 TABLET ORAL
Qty: 0 | Refills: 0 | DISCHARGE

## 2021-10-05 RX ADMIN — Medication 2000 UNIT(S): at 10:08

## 2021-10-05 RX ADMIN — Medication 650 MILLIGRAM(S): at 01:30

## 2021-10-05 RX ADMIN — AMLODIPINE BESYLATE 10 MILLIGRAM(S): 2.5 TABLET ORAL at 10:09

## 2021-10-05 RX ADMIN — Medication 40 MILLIEQUIVALENT(S): at 12:43

## 2021-10-05 RX ADMIN — Medication 50 MILLIGRAM(S): at 10:09

## 2021-10-05 RX ADMIN — LOSARTAN POTASSIUM 75 MILLIGRAM(S): 100 TABLET, FILM COATED ORAL at 10:09

## 2021-10-05 RX ADMIN — Medication 1 SPRAY(S): at 07:37

## 2021-10-05 RX ADMIN — PANTOPRAZOLE SODIUM 40 MILLIGRAM(S): 20 TABLET, DELAYED RELEASE ORAL at 10:09

## 2021-10-05 RX ADMIN — BUDESONIDE AND FORMOTEROL FUMARATE DIHYDRATE 2 PUFF(S): 160; 4.5 AEROSOL RESPIRATORY (INHALATION) at 09:04

## 2021-10-05 RX ADMIN — Medication 650 MILLIGRAM(S): at 00:34

## 2021-10-05 RX ADMIN — Medication 325 MILLIGRAM(S): at 10:09

## 2021-10-05 RX ADMIN — ENOXAPARIN SODIUM 40 MILLIGRAM(S): 100 INJECTION SUBCUTANEOUS at 10:08

## 2021-10-05 NOTE — PROGRESS NOTE ADULT - NEUROLOGICAL DETAILS
Grossly non focal/alert and oriented x 3
Grossly non focal/alert and oriented x 3
alert and oriented x 3/responds to pain/responds to verbal commands
alert and oriented x 3/responds to verbal commands
alert and oriented x 3/responds to pain/responds to verbal commands
Grossly non focal/alert and oriented x 3
alert and oriented x 3/responds to pain/responds to verbal commands
alert and oriented x 3/responds to pain/responds to verbal commands/cranial nerves intact
alert and oriented x 3/responds to verbal commands
Grossly non focal/alert and oriented x 3

## 2021-10-05 NOTE — PROGRESS NOTE ADULT - EXTREMITIES
detailed exam

## 2021-10-05 NOTE — DISCHARGE NOTE PROVIDER - DETAILS OF MALNUTRITION DIAGNOSIS/DIAGNOSES
This patient has been assessed with a concern for Malnutrition and was treated during this hospitalization for the following Nutrition diagnosis/diagnoses:     -  07/23/2021: Severe protein-calorie malnutrition

## 2021-10-05 NOTE — PROGRESS NOTE ADULT - GASTROINTESTINAL DETAILS
soft/nontender/no guarding/no rigidity
soft/no distention
soft/no distention
soft/nontender
soft/nontender/no distention
soft/nontender/no guarding/no rigidity
normal/soft/nontender/bowel sounds normal
soft/nontender/no distention
soft/nontender/no guarding/no rigidity
soft/no distention
soft/no distention
soft/nontender/no distention
soft/nontender/no guarding/no rigidity
soft/no distention
soft/nontender/no guarding/no rigidity
soft/nontender/no guarding/no rigidity
soft/nontender
soft/no distention
soft/no distention
soft/nontender
soft/nontender
soft/no distention
soft/nontender/no guarding/no rigidity
normal/soft/nontender/no distention/bowel sounds normal
normal/soft/nontender/bowel sounds normal
soft/nontender/no guarding/no rigidity
soft/nontender/no guarding/no rigidity
soft/no distention
normal/soft/nontender/bowel sounds normal
soft/nontender
soft/no distention
normal/soft/nontender/bowel sounds normal
soft/nontender/no distention/bowel sounds normal
normal/soft/nontender/no distention/bowel sounds normal
soft/no distention
soft/nontender/no distention

## 2021-10-05 NOTE — PROGRESS NOTE ADULT - MS EXT PE MLT D E PC
no cyanosis/no pedal edema
no clubbing/no cyanosis/no pedal edema
no cyanosis/no pedal edema
no clubbing/no cyanosis
no clubbing/no cyanosis
no cyanosis/no pedal edema
no clubbing/no cyanosis/no pedal edema
no cyanosis/no pedal edema
no clubbing/no cyanosis/no pedal edema
no cyanosis/no pedal edema
normal/no cyanosis/no pedal edema
no cyanosis/no pedal edema
no cyanosis/no pedal edema
normal/no cyanosis/no pedal edema
no cyanosis/no pedal edema
normal/no cyanosis/no pedal edema
no clubbing/no cyanosis/no pedal edema

## 2021-10-05 NOTE — DISCHARGE NOTE PROVIDER - NSDCMRMEDTOKEN_GEN_ALL_CORE_FT
amlodipine-benazepril 10 mg-40 mg oral capsule: 1 cap(s) orally once a day  aspirin 325 mg oral tablet: 1 tab(s) orally once a day  fenofibric acid 135 mg oral delayed release capsule: 1 cap(s) orally once a day  losartan 50 mg oral tablet: 1 tab(s) orally once a day  metFORMIN 1000 mg oral tablet: 1 tab(s) orally 2 times a day  metFORMIN 500 mg oral tablet: 1 tab(s) orally 2 times a day  Metoprolol Tartrate:   spironolactone 25 mg oral tablet: 1 tab(s) orally once a day   amlodipine-benazepril 10 mg-40 mg oral capsule: 1 cap(s) orally once a day  aspirin 325 mg oral tablet: 1 tab(s) orally once a day  fenofibric acid 135 mg oral delayed release capsule: 1 cap(s) orally once a day  losartan 50 mg oral tablet: 1 tab(s) orally once a day  metFORMIN 500 mg oral tablet: 1 tab(s) orally 2 times a day  spironolactone 25 mg oral tablet: 1 tab(s) orally once a day

## 2021-10-05 NOTE — PROGRESS NOTE ADULT - [facility] PE MLT D E PC
regular rate and rhythm

## 2021-10-05 NOTE — PROGRESS NOTE ADULT - CONSTITUTIONAL DETAILS
ill
ill
no distress
ill
no distress
ill
no distress
ill
ill/no distress
no distress
ill/no distress
ill
ill/no distress
no distress
well-groomed/no distress
no distress
no distress
ill  Mod WOB/no distress
ill
ill
well-groomed/no distress

## 2021-10-05 NOTE — PROGRESS NOTE ADULT - COMMENTS
ROS o/w negative

## 2021-10-05 NOTE — PROGRESS NOTE ADULT - ASSESSMENT
IMP:    54 y/o male with HLD, HTN and DM--Non Vacc--admitted with Viral PNA sepsis secondary to COVID--Acute type 1 resp failure and ARDS  Post recovery from COVID infection--Chronic resp failure    Overall better    Severe Prot Ted malnutrition     Plan:    NC.  Qualifies for home O2 during ambulation   OOB  Ambulate   FS with insulin coverage--keep FS < 180  Encourage nutritional support  DVT prophy--SCD and LMWH    ICU Care-- d/w ICU staff on multi disciplinary rounds and pt-- All concerns addressed including but not limited to diagnosis, treatment plan and overall prognosis     Anticipating DC home soon with home O2

## 2021-10-05 NOTE — PROGRESS NOTE ADULT - REASON FOR ADMISSION
cough/sob

## 2021-10-05 NOTE — PROGRESS NOTE ADULT - PROVIDER SPECIALTY LIST ADULT
Critical Care
Pulmonology
Critical Care
Infectious Disease
Pulmonology
Critical Care
Infectious Disease
Pulmonology
Critical Care
Hospitalist
Infectious Disease
Pulmonology
Critical Care
Hospitalist
Infectious Disease
Infectious Disease
Pulmonology
Critical Care
Hospitalist
Infectious Disease
Critical Care

## 2021-10-05 NOTE — PROGRESS NOTE ADULT - NUTRITIONAL ASSESSMENT
This patient has been assessed with a concern for Malnutrition and has been determined to have a diagnosis/diagnoses of Severe protein-calorie malnutrition.    This patient is being managed with:   Diet Consistent Carbohydrate w/Evening Snack-  Entered: Aug  1 2021  7:51AM    
This patient has been assessed with a concern for Malnutrition and has been determined to have a diagnosis/diagnoses of Severe protein-calorie malnutrition.    This patient is being managed with:   Diet Consistent Carbohydrate w/Evening Snack-  Entered: Sep  3 2021 11:56PM    
This patient has been assessed with a concern for Malnutrition and has been determined to have a diagnosis/diagnoses of Severe protein-calorie malnutrition.    This patient is being managed with:   Diet Consistent Carbohydrate w/Evening Snack-  Supplement Feeding Modality:  Oral  Glucerna Shake Cans or Servings Per Day:  1       Frequency:  Three Times a day  Entered: Sep 12 2021  9:06PM    
This patient has been assessed with a concern for Malnutrition and has been determined to have a diagnosis/diagnoses of Severe protein-calorie malnutrition.    This patient is being managed with:   Diet Consistent Carbohydrate w/Evening Snack-  Supplement Feeding Modality:  Oral  Glucerna Shake Cans or Servings Per Day:  1       Frequency:  Three Times a day  Entered: Sep 12 2021  9:06PM    
This patient has been assessed with a concern for Malnutrition and has been determined to have a diagnosis/diagnoses of Severe protein-calorie malnutrition.    This patient is being managed with:   Parenteral Nutrition - Adult-  Entered: Aug 25 2021 10:00PM    fat emulsion (Fish Oil and Plant Based) 20% Infusion-[Known as SMOFLIPID 20% Infusion]  50 Gram(s) in IV Solution 250 milliLiter(s) infuse at 20.7 mL/Hr  Dose Rate: 0.55 Gm/kG/Day Infuse Over: 12 Hours; Stop After 12 Hours  Administration Instructions: Use 1.2 micron in-line filter  Entered: Aug 25 2021  5:00PM    Parenteral Nutrition - Adult-  Entered: Aug 24 2021 10:00PM    fat emulsion (Fish Oil and Plant Based) 20% Infusion-[Known as SMOFLIPID 20% Infusion]  90 Gram(s) in IV Solution 450 milliLiter(s) infuse at 37.5 mL/Hr  Dose Rate: 1 Gm/kG/Day Infuse Over: 12 Hours; Stop After 12 Hours  Administration Instructions: Use 1.2 micron in-line filter  Entered: Aug 24 2021 10:00PM    Diet Regular-  Entered: Aug 20 2021 12:38PM      This patient has been assessed with a concern for Malnutrition and has been determined to have a diagnosis/diagnoses of Severe protein-calorie malnutrition.    This patient is being managed with:   Parenteral Nutrition - Adult-  Entered: Aug 27 2021 10:00PM    fat emulsion (Fish Oil and Plant Based) 20% Infusion-[Known as SMOFLIPID 20% Infusion]  50 Gram(s) in IV Solution 250 milliLiter(s) infuse at 20.8 mL/Hr  Dose Rate: 20.8 mL/Hr Infuse Over: 12 Hours; Stop After 12 Hours  Administration Instructions: Use 1.2 micron in-line filter  Entered: Aug 27 2021 10:00PM    Parenteral Nutrition - Adult-  Entered: Aug 26 2021 10:00PM    Diet Regular-  Entered: Aug 20 2021 12:38PM    
This patient has been assessed with a concern for Malnutrition and has been determined to have a diagnosis/diagnoses of Severe protein-calorie malnutrition.    This patient is being managed with:   Parenteral Nutrition - Adult-  Entered: Sep  2 2021 10:00PM    fat emulsion (Fish Oil and Plant Based) 20% Infusion-[Known as SMOFLIPID 20% Infusion]  50 Gram(s) in IV Solution 250 milliLiter(s) infuse at 20.83 mL/Hr  Dose Rate: 0.55 Gm/kG/Day Infuse Over: 12 Hours; Stop After 12 Hours  Administration Instructions: Use 1.2 micron in-line filter  Entered: Sep  2 2021 10:00PM    Parenteral Nutrition - Adult-  Entered: Sep  1 2021 10:00PM    fat emulsion (Fish Oil and Plant Based) 20% Infusion-[Known as SMOFLIPID 20% Infusion]  50 Gram(s) in IV Solution 250 milliLiter(s) infuse at 20.8 mL/Hr  Dose Rate: 20.8 mL/Hr Infuse Over: 12 Hours  Administration Instructions: Use 1.2 micron in-line filter  Entered: Aug 28 2021 10:00PM    Diet Regular-  Entered: Aug 20 2021 12:38PM    
This patient has been assessed with a concern for Malnutrition and has been determined to have a diagnosis/diagnoses of Severe protein-calorie malnutrition.    This patient is being managed with:   Diet Consistent Carbohydrate w/Evening Snack-  Entered: Aug  1 2021  7:51AM    
This patient has been assessed with a concern for Malnutrition and has been determined to have a diagnosis/diagnoses of Severe protein-calorie malnutrition.    This patient is being managed with:   Diet Consistent Carbohydrate w/Evening Snack-  Entered: Sep  3 2021 11:56PM    
This patient has been assessed with a concern for Malnutrition and has been determined to have a diagnosis/diagnoses of Severe protein-calorie malnutrition.    This patient is being managed with:   Diet Consistent Carbohydrate w/Evening Snack-  Entered: Sep  3 2021 11:56PM    
This patient has been assessed with a concern for Malnutrition and has been determined to have a diagnosis/diagnoses of Severe protein-calorie malnutrition.    This patient is being managed with:   Diet Consistent Carbohydrate w/Evening Snack-  Supplement Feeding Modality:  Oral  Glucerna Shake Cans or Servings Per Day:  1       Frequency:  Three Times a day  Entered: Sep 12 2021  9:06PM    
This patient has been assessed with a concern for Malnutrition and has been determined to have a diagnosis/diagnoses of Severe protein-calorie malnutrition.    This patient is being managed with:   Diet Regular-  Consistent Carbohydrate {Evening Snack} (CSTCHOSN)  Entered: Jul 22 2021 12:12PM    
This patient has been assessed with a concern for Malnutrition and has been determined to have a diagnosis/diagnoses of Severe protein-calorie malnutrition.    This patient is being managed with:   Diet Regular-  Consistent Carbohydrate {Evening Snack} (CSTCHOSN)  Supplement Feeding Modality:  Oral  Glucerna Shake Cans or Servings Per Day:  1       Frequency:  Three Times a day  Entered: Jul 30 2021 10:51PM    
This patient has been assessed with a concern for Malnutrition and has been determined to have a diagnosis/diagnoses of Severe protein-calorie malnutrition.    This patient is being managed with:   Diet Regular-  Consistent Carbohydrate {Evening Snack} (CSTCHOSN)  Supplement Feeding Modality:  Oral  Glucerna Shake Cans or Servings Per Day:  1       Frequency:  Three Times a day  Entered: Jul 30 2021 10:51PM    
This patient has been assessed with a concern for Malnutrition and has been determined to have a diagnosis/diagnoses of Severe protein-calorie malnutrition.    This patient is being managed with:   Parenteral Nutrition - Adult-  Entered: Aug 24 2021 10:00PM    fat emulsion (Fish Oil and Plant Based) 20% Infusion-[Known as SMOFLIPID 20% Infusion]  90 Gram(s) in IV Solution 450 milliLiter(s) infuse at 37.5 mL/Hr  Dose Rate: 1 Gm/kG/Day Infuse Over: 12 Hours; Stop After 12 Hours  Administration Instructions: Use 1.2 micron in-line filter  Entered: Aug 24 2021 10:00PM    Parenteral Nutrition - Adult-  Entered: Aug 23 2021 10:00PM    Diet Regular-  Entered: Aug 20 2021 12:38PM    
This patient has been assessed with a concern for Malnutrition and has been determined to have a diagnosis/diagnoses of Severe protein-calorie malnutrition.    This patient is being managed with:   Parenteral Nutrition - Adult-  Entered: Aug 25 2021 10:00PM    fat emulsion (Fish Oil and Plant Based) 20% Infusion-[Known as SMOFLIPID 20% Infusion]  50 Gram(s) in IV Solution 250 milliLiter(s) infuse at 20.7 mL/Hr  Dose Rate: 0.55 Gm/kG/Day Infuse Over: 12 Hours; Stop After 12 Hours  Administration Instructions: Use 1.2 micron in-line filter  Entered: Aug 25 2021  5:00PM    Parenteral Nutrition - Adult-  Entered: Aug 24 2021 10:00PM    fat emulsion (Fish Oil and Plant Based) 20% Infusion-[Known as SMOFLIPID 20% Infusion]  90 Gram(s) in IV Solution 450 milliLiter(s) infuse at 37.5 mL/Hr  Dose Rate: 1 Gm/kG/Day Infuse Over: 12 Hours; Stop After 12 Hours  Administration Instructions: Use 1.2 micron in-line filter  Entered: Aug 24 2021 10:00PM    Diet Regular-  Entered: Aug 20 2021 12:38PM    
This patient has been assessed with a concern for Malnutrition and has been determined to have a diagnosis/diagnoses of Severe protein-calorie malnutrition.    This patient is being managed with:   Parenteral Nutrition - Adult-  Entered: Aug 28 2021 10:00PM    fat emulsion (Fish Oil and Plant Based) 20% Infusion-[Known as SMOFLIPID 20% Infusion]  50 Gram(s) in IV Solution 250 milliLiter(s) infuse at 20.8 mL/Hr  Dose Rate: 20.8 mL/Hr Infuse Over: 12 Hours  Administration Instructions: Use 1.2 micron in-line filter  Entered: Aug 28 2021 10:00PM    Parenteral Nutrition - Adult-  Entered: Aug 27 2021 10:00PM    Diet Regular-  Entered: Aug 20 2021 12:38PM    
This patient has been assessed with a concern for Malnutrition and has been determined to have a diagnosis/diagnoses of Severe protein-calorie malnutrition.    This patient is being managed with:   Parenteral Nutrition - Adult-  Entered: Aug 31 2021 10:00PM    fat emulsion (Fish Oil and Plant Based) 20% Infusion-[Known as SMOFLIPID 20% Infusion]  50 Gram(s) in IV Solution 250 milliLiter(s) infuse at 20.83 mL/Hr  Dose Rate: 0.55 Gm/kG/Day Infuse Over: 12 Hours; Stop After 12 Hours  Administration Instructions: Use 1.2 micron in-line filter  Entered: Aug 31 2021 10:00PM    Parenteral Nutrition - Adult-  Entered: Aug 30 2021 10:00PM    fat emulsion (Fish Oil and Plant Based) 20% Infusion-[Known as SMOFLIPID 20% Infusion]  50 Gram(s) in IV Solution 250 milliLiter(s) infuse at 20.8 mL/Hr  Dose Rate: 20.8 mL/Hr Infuse Over: 12 Hours  Administration Instructions: Use 1.2 micron in-line filter  Entered: Aug 28 2021 10:00PM    Diet Regular-  Entered: Aug 20 2021 12:38PM      This patient has been assessed with a concern for Malnutrition and has been determined to have a diagnosis/diagnoses of Severe protein-calorie malnutrition.    This patient is being managed with:   Parenteral Nutrition - Adult-  Entered: Aug 31 2021 10:00PM    fat emulsion (Fish Oil and Plant Based) 20% Infusion-[Known as SMOFLIPID 20% Infusion]  50 Gram(s) in IV Solution 250 milliLiter(s) infuse at 20.83 mL/Hr  Dose Rate: 0.55 Gm/kG/Day Infuse Over: 12 Hours; Stop After 12 Hours  Administration Instructions: Use 1.2 micron in-line filter  Entered: Aug 31 2021 10:00PM    Parenteral Nutrition - Adult-  Entered: Aug 30 2021 10:00PM    fat emulsion (Fish Oil and Plant Based) 20% Infusion-[Known as SMOFLIPID 20% Infusion]  50 Gram(s) in IV Solution 250 milliLiter(s) infuse at 20.8 mL/Hr  Dose Rate: 20.8 mL/Hr Infuse Over: 12 Hours  Administration Instructions: Use 1.2 micron in-line filter  Entered: Aug 28 2021 10:00PM    Diet Regular-  Entered: Aug 20 2021 12:38PM    
This patient has been assessed with a concern for Malnutrition and has been determined to have a diagnosis/diagnoses of Severe protein-calorie malnutrition.    This patient is being managed with:   Parenteral Nutrition - Adult-  Entered: Sep  2 2021 10:00PM    fat emulsion (Fish Oil and Plant Based) 20% Infusion-[Known as SMOFLIPID 20% Infusion]  50 Gram(s) in IV Solution 250 milliLiter(s) infuse at 20.83 mL/Hr  Dose Rate: 0.55 Gm/kG/Day Infuse Over: 12 Hours; Stop After 12 Hours  Administration Instructions: Use 1.2 micron in-line filter  Entered: Sep  2 2021 10:00PM    fat emulsion (Fish Oil and Plant Based) 20% Infusion-[Known as SMOFLIPID 20% Infusion]  50 Gram(s) in IV Solution 250 milliLiter(s) infuse at 20.8 mL/Hr  Dose Rate: 20.8 mL/Hr Infuse Over: 12 Hours  Administration Instructions: Use 1.2 micron in-line filter  Entered: Aug 28 2021 10:00PM    Diet Regular-  Entered: Aug 20 2021 12:38PM    
This patient has been assessed with a concern for Malnutrition and has been determined to have a diagnosis/diagnoses of Severe protein-calorie malnutrition.    This patient is being managed with:   Diet Consistent Carbohydrate w/Evening Snack-  Entered: Aug  1 2021  7:51AM    
This patient has been assessed with a concern for Malnutrition and has been determined to have a diagnosis/diagnoses of Severe protein-calorie malnutrition.    This patient is being managed with:   Diet Consistent Carbohydrate w/Evening Snack-  Entered: Sep  3 2021 11:56PM    
This patient has been assessed with a concern for Malnutrition and has been determined to have a diagnosis/diagnoses of Severe protein-calorie malnutrition.    This patient is being managed with:   Diet Consistent Carbohydrate w/Evening Snack-  Entered: Sep  3 2021 11:56PM    
This patient has been assessed with a concern for Malnutrition and has been determined to have a diagnosis/diagnoses of Severe protein-calorie malnutrition.    This patient is being managed with:   Diet Consistent Carbohydrate w/Evening Snack-  Supplement Feeding Modality:  Oral  Glucerna Shake Cans or Servings Per Day:  1       Frequency:  Three Times a day  Entered: Sep 12 2021  9:06PM    
This patient has been assessed with a concern for Malnutrition and has been determined to have a diagnosis/diagnoses of Severe protein-calorie malnutrition.    This patient is being managed with:   Diet NPO-  Entered: Aug 18 2021  6:21AM    
This patient has been assessed with a concern for Malnutrition and has been determined to have a diagnosis/diagnoses of Severe protein-calorie malnutrition.    This patient is being managed with:   Diet Regular-  Consistent Carbohydrate {Evening Snack} (CSTCHOSN)  Entered: Jul 22 2021 12:12PM    
This patient has been assessed with a concern for Malnutrition and has been determined to have a diagnosis/diagnoses of Severe protein-calorie malnutrition.    This patient is being managed with:   Parenteral Nutrition - Adult-  Entered: Aug 23 2021 10:00PM    fat emulsion (Fish Oil and Plant Based) 20% Infusion-[Known as SMOFLIPID 20% Infusion]  90 Gram(s) in IV Solution 450 milliLiter(s) infuse at 37.3 mL/Hr  Dose Rate: 0.99 Gm/kG/Day Infuse Over: 12 Hours; Stop After 12 Hours  Administration Instructions: Use 1.2 micron in-line filter  Entered: Aug 23 2021 10:00PM    Parenteral Nutrition - Adult-  Entered: Aug 22 2021 10:00PM    fat emulsion (Fish Oil and Plant Based) 20% Infusion-[Known as SMOFLIPID 20% Infusion]  90 Gram(s) in IV Solution 450 milliLiter(s) infuse at 37.5 mL/Hr  Dose Rate: 0.99 Gm/kG/Day Infuse Over: 12 Hours; Stop After 12 Hours  Administration Instructions: Use 1.2 micron in-line filter  Entered: Aug 22 2021 10:00PM    Diet Regular-  Entered: Aug 20 2021 12:38PM      This patient has been assessed with a concern for Malnutrition and has been determined to have a diagnosis/diagnoses of Severe protein-calorie malnutrition.    This patient is being managed with:   Parenteral Nutrition - Adult-  Entered: Aug 24 2021 10:00PM    fat emulsion (Fish Oil and Plant Based) 20% Infusion-[Known as SMOFLIPID 20% Infusion]  90 Gram(s) in IV Solution 450 milliLiter(s) infuse at 37.5 mL/Hr  Dose Rate: 1 Gm/kG/Day Infuse Over: 12 Hours; Stop After 12 Hours  Administration Instructions: Use 1.2 micron in-line filter  Entered: Aug 24 2021 10:00PM    Parenteral Nutrition - Adult-  Entered: Aug 23 2021 10:00PM    fat emulsion (Fish Oil and Plant Based) 20% Infusion-[Known as SMOFLIPID 20% Infusion]  90 Gram(s) in IV Solution 450 milliLiter(s) infuse at 37.3 mL/Hr  Dose Rate: 0.99 Gm/kG/Day Infuse Over: 12 Hours; Stop After 12 Hours  Administration Instructions: Use 1.2 micron in-line filter  Entered: Aug 23 2021 10:00PM    Diet Regular-  Entered: Aug 20 2021 12:38PM    
This patient has been assessed with a concern for Malnutrition and has been determined to have a diagnosis/diagnoses of Severe protein-calorie malnutrition.    This patient is being managed with:   Parenteral Nutrition - Adult-  Entered: Aug 26 2021 10:00PM    fat emulsion (Fish Oil and Plant Based) 20% Infusion-[Known as SMOFLIPID 20% Infusion]  50 Gram(s) in IV Solution 250 milliLiter(s) infuse at 20.83 mL/Hr  Dose Rate: 0.55 Gm/kG/Day Infuse Over: 12 Hours; Stop After 12 Hours  Administration Instructions: Use 1.2 micron in-line filter  Entered: Aug 26 2021 10:00PM    Diet Regular-  Entered: Aug 20 2021 12:38PM    
This patient has been assessed with a concern for Malnutrition and has been determined to have a diagnosis/diagnoses of Severe protein-calorie malnutrition.    This patient is being managed with:   Parenteral Nutrition - Adult-  Entered: Aug 26 2021 10:00PM    fat emulsion (Fish Oil and Plant Based) 20% Infusion-[Known as SMOFLIPID 20% Infusion]  50 Gram(s) in IV Solution 250 milliLiter(s) infuse at 20.83 mL/Hr  Dose Rate: 0.55 Gm/kG/Day Infuse Over: 12 Hours; Stop After 12 Hours  Administration Instructions: Use 1.2 micron in-line filter  Entered: Aug 26 2021 10:00PM    Parenteral Nutrition - Adult-  Entered: Aug 25 2021 10:00PM    Diet Regular-  Entered: Aug 20 2021 12:38PM    
This patient has been assessed with a concern for Malnutrition and has been determined to have a diagnosis/diagnoses of Severe protein-calorie malnutrition.    This patient is being managed with:   Diet Consistent Carbohydrate w/Evening Snack-  Entered: Aug  1 2021  7:51AM    
This patient has been assessed with a concern for Malnutrition and has been determined to have a diagnosis/diagnoses of Severe protein-calorie malnutrition.    This patient is being managed with:   Diet Consistent Carbohydrate w/Evening Snack-  Entered: Sep  3 2021 11:56PM    
This patient has been assessed with a concern for Malnutrition and has been determined to have a diagnosis/diagnoses of Severe protein-calorie malnutrition.    This patient is being managed with:   Diet Consistent Carbohydrate w/Evening Snack-  Supplement Feeding Modality:  Oral  Glucerna Shake Cans or Servings Per Day:  1       Frequency:  Three Times a day  Entered: Sep 12 2021  9:06PM    
This patient has been assessed with a concern for Malnutrition and has been determined to have a diagnosis/diagnoses of Severe protein-calorie malnutrition.    This patient is being managed with:   Diet Regular-  Consistent Carbohydrate {Evening Snack} (CSTCHOSN)  Entered: Jul 22 2021 12:12PM    
This patient has been assessed with a concern for Malnutrition and has been determined to have a diagnosis/diagnoses of Severe protein-calorie malnutrition.    This patient is being managed with:   Parenteral Nutrition - Adult-  Entered: Aug 19 2021 10:00PM    Diet NPO-  Entered: Aug 18 2021  6:21AM    
This patient has been assessed with a concern for Malnutrition and has been determined to have a diagnosis/diagnoses of Severe protein-calorie malnutrition.    This patient is being managed with:   Parenteral Nutrition - Adult-  Entered: Aug 21 2021 10:00PM    fat emulsion (Fish Oil and Plant Based) 20% Infusion-[Known as SMOFLIPID 20% Infusion]  90 Gram(s) in IV Solution 450 milliLiter(s) infuse at 37.5 mL/Hr  Dose Rate: 0.99 Gm/kG/Day Infuse Over: 12 Hours; Stop After 12 Hours  Administration Instructions: Use 1.2 micron in-line filter  Entered: Aug 21 2021 10:00PM    Parenteral Nutrition - Adult-  Entered: Aug 20 2021 10:00PM    Diet Regular-  Entered: Aug 20 2021 12:38PM    
This patient has been assessed with a concern for Malnutrition and has been determined to have a diagnosis/diagnoses of Severe protein-calorie malnutrition.    This patient is being managed with:   Parenteral Nutrition - Adult-  Entered: Aug 22 2021  5:00PM    fat emulsion (Fish Oil and Plant Based) 20% Infusion-[Known as SMOFLIPID 20% Infusion]  89.5 Gram(s) in IV Solution 447.5 milliLiter(s) infuse at 37.3 mL/Hr  Dose Rate: 0.99 Gm/kG/Day Infuse Over: 12 Hours; Stop After 12 Hours  Administration Instructions: Use 1.2 micron in-line filter  Entered: Aug 22 2021  5:00PM    Parenteral Nutrition - Adult-  Entered: Aug 21 2021 10:00PM    fat emulsion (Fish Oil and Plant Based) 20% Infusion-[Known as SMOFLIPID 20% Infusion]  90 Gram(s) in IV Solution 450 milliLiter(s) infuse at 37.5 mL/Hr  Dose Rate: 0.99 Gm/kG/Day Infuse Over: 12 Hours; Stop After 12 Hours  Administration Instructions: Use 1.2 micron in-line filter  Entered: Aug 21 2021 10:00PM    Diet Regular-  Entered: Aug 20 2021 12:38PM    The following pending diet order is being considered for treatment of Severe protein-calorie malnutrition:null
This patient has been assessed with a concern for Malnutrition and has been determined to have a diagnosis/diagnoses of Severe protein-calorie malnutrition.    This patient is being managed with:   Parenteral Nutrition - Adult-  Entered: Aug 23 2021 10:00PM    fat emulsion (Fish Oil and Plant Based) 20% Infusion-[Known as SMOFLIPID 20% Infusion]  90 Gram(s) in IV Solution 450 milliLiter(s) infuse at 37.3 mL/Hr  Dose Rate: 0.99 Gm/kG/Day Infuse Over: 12 Hours; Stop After 12 Hours  Administration Instructions: Use 1.2 micron in-line filter  Entered: Aug 23 2021 10:00PM    Parenteral Nutrition - Adult-  Entered: Aug 22 2021 10:00PM    Diet Regular-  Entered: Aug 20 2021 12:38PM    
This patient has been assessed with a concern for Malnutrition and has been determined to have a diagnosis/diagnoses of Severe protein-calorie malnutrition.    This patient is being managed with:   Parenteral Nutrition - Adult-  Entered: Aug 25 2021 10:00PM    fat emulsion (Fish Oil and Plant Based) 20% Infusion-[Known as SMOFLIPID 20% Infusion]  50 Gram(s) in IV Solution 250 milliLiter(s) infuse at 20.7 mL/Hr  Dose Rate: 0.55 Gm/kG/Day Infuse Over: 12 Hours; Stop After 12 Hours  Administration Instructions: Use 1.2 micron in-line filter  Entered: Aug 25 2021  5:00PM    Parenteral Nutrition - Adult-  Entered: Aug 24 2021 10:00PM    Diet Regular-  Entered: Aug 20 2021 12:38PM    
This patient has been assessed with a concern for Malnutrition and has been determined to have a diagnosis/diagnoses of Severe protein-calorie malnutrition.    This patient is being managed with:   Parenteral Nutrition - Adult-  Entered: Aug 31 2021 10:00PM    fat emulsion (Fish Oil and Plant Based) 20% Infusion-[Known as SMOFLIPID 20% Infusion]  50 Gram(s) in IV Solution 250 milliLiter(s) infuse at 20.83 mL/Hr  Dose Rate: 0.55 Gm/kG/Day Infuse Over: 12 Hours; Stop After 12 Hours  Administration Instructions: Use 1.2 micron in-line filter  Entered: Aug 31 2021 10:00PM    Parenteral Nutrition - Adult-  Entered: Aug 30 2021 10:00PM    fat emulsion (Fish Oil and Plant Based) 20% Infusion-[Known as SMOFLIPID 20% Infusion]  50 Gram(s) in IV Solution 250 milliLiter(s) infuse at 20.8 mL/Hr  Dose Rate: 20.8 mL/Hr Infuse Over: 12 Hours  Administration Instructions: Use 1.2 micron in-line filter  Entered: Aug 28 2021 10:00PM    Diet Regular-  Entered: Aug 20 2021 12:38PM    
This patient has been assessed with a concern for Malnutrition and has been determined to have a diagnosis/diagnoses of Severe protein-calorie malnutrition.    This patient is being managed with:   Diet Consistent Carbohydrate w/Evening Snack-  Entered: Aug  1 2021  7:51AM    
This patient has been assessed with a concern for Malnutrition and has been determined to have a diagnosis/diagnoses of Severe protein-calorie malnutrition.    This patient is being managed with:   Diet Consistent Carbohydrate w/Evening Snack-  Supplement Feeding Modality:  Oral  Glucerna Shake Cans or Servings Per Day:  1       Frequency:  Three Times a day  Entered: Sep 12 2021  9:06PM    
This patient has been assessed with a concern for Malnutrition and has been determined to have a diagnosis/diagnoses of Severe protein-calorie malnutrition.    This patient is being managed with:   Diet Regular-  Consistent Carbohydrate {Evening Snack} (CSTCHOSN)  Entered: Jul 22 2021 12:12PM    
This patient has been assessed with a concern for Malnutrition and has been determined to have a diagnosis/diagnoses of Severe protein-calorie malnutrition.    This patient is being managed with:   Parenteral Nutrition - Adult-  Entered: Aug 20 2021  5:00PM    Parenteral Nutrition - Adult-  Entered: Aug 19 2021 10:00PM    Diet NPO-  Entered: Aug 18 2021  6:21AM    The following pending diet order is being considered for treatment of Severe protein-calorie malnutrition:null
This patient has been assessed with a concern for Malnutrition and has been determined to have a diagnosis/diagnoses of Severe protein-calorie malnutrition.    This patient is being managed with:   Parenteral Nutrition - Adult-  Entered: Aug 21 2021  5:00PM    Parenteral Nutrition - Adult-  Entered: Aug 20 2021 10:00PM    Diet Regular-  Entered: Aug 20 2021 12:38PM    The following pending diet order is being considered for treatment of Severe protein-calorie malnutrition:  fat emulsion (Fish Oil and Plant Based) 20% Infusion-[Known as SMOFLIPID 20% Infusion]  89.5 Gram(s) in IV Solution 447.5 milliLiter(s) infuse at 37.3 mL/Hr  Dose Rate: 0.99 Gm/kG/Day Infuse Over: 12 Hours; Stop After 12 Hours  Administration Instructions: Use 1.2 micron in-line filter  Entered: Aug 21 2021  5:00PM  
This patient has been assessed with a concern for Malnutrition and has been determined to have a diagnosis/diagnoses of Severe protein-calorie malnutrition.    This patient is being managed with:   Parenteral Nutrition - Adult-  Entered: Aug 24 2021 10:00PM    fat emulsion (Fish Oil and Plant Based) 20% Infusion-[Known as SMOFLIPID 20% Infusion]  90 Gram(s) in IV Solution 450 milliLiter(s) infuse at 37.5 mL/Hr  Dose Rate: 1 Gm/kG/Day Infuse Over: 12 Hours; Stop After 12 Hours  Administration Instructions: Use 1.2 micron in-line filter  Entered: Aug 24 2021 10:00PM    Parenteral Nutrition - Adult-  Entered: Aug 23 2021 10:00PM    fat emulsion (Fish Oil and Plant Based) 20% Infusion-[Known as SMOFLIPID 20% Infusion]  90 Gram(s) in IV Solution 450 milliLiter(s) infuse at 37.3 mL/Hr  Dose Rate: 0.99 Gm/kG/Day Infuse Over: 12 Hours; Stop After 12 Hours  Administration Instructions: Use 1.2 micron in-line filter  Entered: Aug 23 2021 10:00PM    Diet Regular-  Entered: Aug 20 2021 12:38PM    
This patient has been assessed with a concern for Malnutrition and has been determined to have a diagnosis/diagnoses of Severe protein-calorie malnutrition.    This patient is being managed with:   Parenteral Nutrition - Adult-  Entered: Sep  1 2021 10:00PM    fat emulsion (Fish Oil and Plant Based) 20% Infusion-[Known as SMOFLIPID 20% Infusion]  50 Gram(s) in IV Solution 250 milliLiter(s) infuse at 20.7 mL/Hr  Dose Rate: 0.55 Gm/kG/Day Infuse Over: 12 Hours; Stop After 12 Hours  Administration Instructions: Use 1.2 micron in-line filter  Entered: Sep  1 2021 10:00PM    Parenteral Nutrition - Adult-  Entered: Aug 31 2021 10:00PM    fat emulsion (Fish Oil and Plant Based) 20% Infusion-[Known as SMOFLIPID 20% Infusion]  50 Gram(s) in IV Solution 250 milliLiter(s) infuse at 20.83 mL/Hr  Dose Rate: 0.55 Gm/kG/Day Infuse Over: 12 Hours; Stop After 12 Hours  Administration Instructions: Use 1.2 micron in-line filter  Entered: Aug 31 2021 10:00PM    fat emulsion (Fish Oil and Plant Based) 20% Infusion-[Known as SMOFLIPID 20% Infusion]  50 Gram(s) in IV Solution 250 milliLiter(s) infuse at 20.8 mL/Hr  Dose Rate: 20.8 mL/Hr Infuse Over: 12 Hours  Administration Instructions: Use 1.2 micron in-line filter  Entered: Aug 28 2021 10:00PM    Diet Regular-  Entered: Aug 20 2021 12:38PM    
This patient has been assessed with a concern for Malnutrition and has been determined to have a diagnosis/diagnoses of Severe protein-calorie malnutrition.    This patient is being managed with:   Parenteral Nutrition - Adult-  Entered: Sep  2 2021 10:00PM    fat emulsion (Fish Oil and Plant Based) 20% Infusion-[Known as SMOFLIPID 20% Infusion]  50 Gram(s) in IV Solution 250 milliLiter(s) infuse at 20.8 mL/Hr  Dose Rate: 20.8 mL/Hr Infuse Over: 12 Hours  Administration Instructions: Use 1.2 micron in-line filter  Entered: Aug 28 2021 10:00PM    Diet Regular-  Entered: Aug 20 2021 12:38PM    
This patient has been assessed with a concern for Malnutrition and has been determined to have a diagnosis/diagnoses of Severe protein-calorie malnutrition.    This patient is being managed with:   fat emulsion (Fish Oil and Plant Based) 20% Infusion-[Known as SMOFLIPID 20% Infusion]  50 Gram(s) in IV Solution 250 milliLiter(s) infuse at 20.8 mL/Hr  Dose Rate: 20.83 mL/Hr Infuse Over: 12 Hours; Stop After 12 Hours  Administration Instructions: Use 1.2 micron in-line filter  Entered: Aug 29 2021 10:00PM    Parenteral Nutrition - Adult-  Entered: Aug 29 2021  5:00PM    fat emulsion (Fish Oil and Plant Based) 20% Infusion-[Known as SMOFLIPID 20% Infusion]  50 Gram(s) in IV Solution 250 milliLiter(s) infuse at 20.8 mL/Hr  Dose Rate: 20.8 mL/Hr Infuse Over: 12 Hours  Administration Instructions: Use 1.2 micron in-line filter  Entered: Aug 28 2021 10:00PM    Parenteral Nutrition - Adult-  Entered: Aug 28 2021 10:00PM    Diet Regular-  Entered: Aug 20 2021 12:38PM    
This patient has been assessed with a concern for Malnutrition and has been determined to have a diagnosis/diagnoses of Severe protein-calorie malnutrition.    This patient is being managed with:   Diet Consistent Carbohydrate w/Evening Snack-  Entered: Aug  1 2021  7:51AM    
This patient has been assessed with a concern for Malnutrition and has been determined to have a diagnosis/diagnoses of Severe protein-calorie malnutrition.    This patient is being managed with:   Diet NPO-  Entered: Aug 18 2021  6:21AM    
This patient has been assessed with a concern for Malnutrition and has been determined to have a diagnosis/diagnoses of Severe protein-calorie malnutrition.    This patient is being managed with:   Diet Consistent Carbohydrate w/Evening Snack-  Supplement Feeding Modality:  Oral  Glucerna Shake Cans or Servings Per Day:  1       Frequency:  Three Times a day  Entered: Sep 12 2021  9:06PM    
This patient has been assessed with a concern for Malnutrition and has been determined to have a diagnosis/diagnoses of Severe protein-calorie malnutrition.    This patient is being managed with:   Diet Consistent Carbohydrate w/Evening Snack-  Supplement Feeding Modality:  Oral  Glucerna Shake Cans or Servings Per Day:  1       Frequency:  Three Times a day  Entered: Sep 12 2021  9:06PM    
This patient has been assessed with a concern for Malnutrition and has been determined to have a diagnosis/diagnoses of Severe protein-calorie malnutrition.    This patient is being managed with:   Parenteral Nutrition - Adult-  Entered: Aug 28 2021 10:00PM    fat emulsion (Fish Oil and Plant Based) 20% Infusion-[Known as SMOFLIPID 20% Infusion]  50 Gram(s) in IV Solution 250 milliLiter(s) infuse at 20.8 mL/Hr  Dose Rate: 20.8 mL/Hr Infuse Over: 12 Hours  Administration Instructions: Use 1.2 micron in-line filter  Entered: Aug 28 2021 10:00PM    Parenteral Nutrition - Adult-  Entered: Aug 27 2021 10:00PM    fat emulsion (Fish Oil and Plant Based) 20% Infusion-[Known as SMOFLIPID 20% Infusion]  50 Gram(s) in IV Solution 250 milliLiter(s) infuse at 20.8 mL/Hr  Dose Rate: 20.8 mL/Hr Infuse Over: 12 Hours; Stop After 12 Hours  Administration Instructions: Use 1.2 micron in-line filter  Entered: Aug 27 2021 10:00PM    Diet Regular-  Entered: Aug 20 2021 12:38PM    
This patient has been assessed with a concern for Malnutrition and has been determined to have a diagnosis/diagnoses of Severe protein-calorie malnutrition.    This patient is being managed with:   Diet Consistent Carbohydrate w/Evening Snack-  Supplement Feeding Modality:  Oral  Glucerna Shake Cans or Servings Per Day:  1       Frequency:  Three Times a day  Entered: Sep 12 2021  9:06PM    
This patient has been assessed with a concern for Malnutrition and has been determined to have a diagnosis/diagnoses of Severe protein-calorie malnutrition.    This patient is being managed with:   Parenteral Nutrition - Adult-  Entered: Aug 26 2021  5:00PM    Parenteral Nutrition - Adult-  Entered: Aug 25 2021 10:00PM    Diet Regular-  Entered: Aug 20 2021 12:38PM    The following pending diet order is being considered for treatment of Severe protein-calorie malnutrition:  fat emulsion (Fish Oil and Plant Based) 20% Infusion-[Known as SMOFLIPID 20% Infusion]  49.72 Gram(s) in IV Solution 248.6 milliLiter(s) infuse at 20.7 mL/Hr  Dose Rate: 0.55 Gm/kG/Day Infuse Over: 12 Hours; Stop After 12 Hours  Administration Instructions: Use 1.2 micron in-line filter  Entered: Aug 26 2021  5:00PM  
This patient has been assessed with a concern for Malnutrition and has been determined to have a diagnosis/diagnoses of Severe protein-calorie malnutrition.    This patient is being managed with:   Diet Consistent Carbohydrate w/Evening Snack-  Supplement Feeding Modality:  Oral  Glucerna Shake Cans or Servings Per Day:  1       Frequency:  Three Times a day  Entered: Sep 12 2021  9:06PM    
This patient has been assessed with a concern for Malnutrition and has been determined to have a diagnosis/diagnoses of Severe protein-calorie malnutrition.    This patient is being managed with:   Diet Consistent Carbohydrate w/Evening Snack-  Entered: Aug  1 2021  7:51AM    
This patient has been assessed with a concern for Malnutrition and has been determined to have a diagnosis/diagnoses of Severe protein-calorie malnutrition.    This patient is being managed with:   Diet Consistent Carbohydrate w/Evening Snack-  Entered: Sep  3 2021 11:56PM    
This patient has been assessed with a concern for Malnutrition and has been determined to have a diagnosis/diagnoses of Severe protein-calorie malnutrition.    This patient is being managed with:   Diet Consistent Carbohydrate w/Evening Snack-  Supplement Feeding Modality:  Oral  Glucerna Shake Cans or Servings Per Day:  1       Frequency:  Three Times a day  Entered: Sep 12 2021  9:06PM    
This patient has been assessed with a concern for Malnutrition and has been determined to have a diagnosis/diagnoses of Severe protein-calorie malnutrition.    This patient is being managed with:   Diet Consistent Carbohydrate w/Evening Snack-  Entered: Sep  3 2021 11:56PM    
This patient has been assessed with a concern for Malnutrition and has been determined to have a diagnosis/diagnoses of Severe protein-calorie malnutrition.    This patient is being managed with:   Diet Regular-  Consistent Carbohydrate {Evening Snack} (CSTCHOSN)  Entered: Jul 22 2021 12:12PM    
This patient has been assessed with a concern for Malnutrition and has been determined to have a diagnosis/diagnoses of Severe protein-calorie malnutrition.    This patient is being managed with:   Diet Regular-  Consistent Carbohydrate {Evening Snack} (CSTCHOSN)  Entered: Jul 22 2021 12:12PM    
This patient has been assessed with a concern for Malnutrition and has been determined to have a diagnosis/diagnoses of Severe protein-calorie malnutrition.    This patient is being managed with:   Diet Consistent Carbohydrate w/Evening Snack-  Supplement Feeding Modality:  Oral  Glucerna Shake Cans or Servings Per Day:  1       Frequency:  Three Times a day  Entered: Sep 12 2021  9:06PM    
This patient has been assessed with a concern for Malnutrition and has been determined to have a diagnosis/diagnoses of Severe protein-calorie malnutrition.    This patient is being managed with:   Diet Consistent Carbohydrate w/Evening Snack-  Entered: Aug  1 2021  7:51AM    
This patient has been assessed with a concern for Malnutrition and has been determined to have a diagnosis/diagnoses of Severe protein-calorie malnutrition.    This patient is being managed with:   Diet Consistent Carbohydrate w/Evening Snack-  Entered: Sep  3 2021 11:56PM    
This patient has been assessed with a concern for Malnutrition and has been determined to have a diagnosis/diagnoses of Severe protein-calorie malnutrition.    This patient is being managed with:   Diet Regular-  Consistent Carbohydrate {Evening Snack} (CSTCHOSN)  Entered: Jul 22 2021 12:12PM    
This patient has been assessed with a concern for Malnutrition and has been determined to have a diagnosis/diagnoses of Severe protein-calorie malnutrition.    This patient is being managed with:   Diet Regular-  Consistent Carbohydrate {Evening Snack} (CSTCHOSN)  Entered: Jul 22 2021 12:12PM    
This patient has been assessed with a concern for Malnutrition and has been determined to have a diagnosis/diagnoses of Severe protein-calorie malnutrition.    This patient is being managed with:   Parenteral Nutrition - Adult-  Entered: Aug 30 2021 10:00PM    fat emulsion (Fish Oil and Plant Based) 20% Infusion-[Known as SMOFLIPID 20% Infusion]  50 Gram(s) in IV Solution 250 milliLiter(s) infuse at 20.83 mL/Hr  Dose Rate: 0.55 Gm/kG/Day Infuse Over: 12 Hours; Stop After 12 Hours  Administration Instructions: Use 1.2 micron in-line filter  Entered: Aug 30 2021 10:00PM    fat emulsion (Fish Oil and Plant Based) 20% Infusion-[Known as SMOFLIPID 20% Infusion]  50 Gram(s) in IV Solution 250 milliLiter(s) infuse at 20.8 mL/Hr  Dose Rate: 20.83 mL/Hr Infuse Over: 12 Hours; Stop After 12 Hours  Administration Instructions: Use 1.2 micron in-line filter  Entered: Aug 29 2021 10:00PM    Parenteral Nutrition - Adult-  Entered: Aug 29 2021  5:00PM    fat emulsion (Fish Oil and Plant Based) 20% Infusion-[Known as SMOFLIPID 20% Infusion]  50 Gram(s) in IV Solution 250 milliLiter(s) infuse at 20.8 mL/Hr  Dose Rate: 20.8 mL/Hr Infuse Over: 12 Hours  Administration Instructions: Use 1.2 micron in-line filter  Entered: Aug 28 2021 10:00PM    Diet Regular-  Entered: Aug 20 2021 12:38PM    
This patient has been assessed with a concern for Malnutrition and has been determined to have a diagnosis/diagnoses of Severe protein-calorie malnutrition.    This patient is being managed with:   Diet Consistent Carbohydrate w/Evening Snack-  Entered: Aug  1 2021  7:51AM    
This patient has been assessed with a concern for Malnutrition and has been determined to have a diagnosis/diagnoses of Severe protein-calorie malnutrition.    This patient is being managed with:   Diet Consistent Carbohydrate w/Evening Snack-  Entered: Sep  3 2021 11:56PM    
This patient has been assessed with a concern for Malnutrition and has been determined to have a diagnosis/diagnoses of Severe protein-calorie malnutrition.    This patient is being managed with:   Diet Consistent Carbohydrate w/Evening Snack-  Entered: Sep  3 2021 11:56PM    
This patient has been assessed with a concern for Malnutrition and has been determined to have a diagnosis/diagnoses of Severe protein-calorie malnutrition.    This patient is being managed with:   Diet Consistent Carbohydrate w/Evening Snack-  Supplement Feeding Modality:  Oral  Glucerna Shake Cans or Servings Per Day:  1       Frequency:  Three Times a day  Entered: Sep 12 2021  9:06PM    
This patient has been assessed with a concern for Malnutrition and has been determined to have a diagnosis/diagnoses of Severe protein-calorie malnutrition.    This patient is being managed with:   Diet Consistent Carbohydrate w/Evening Snack-  Entered: Aug  1 2021  7:51AM    
This patient has been assessed with a concern for Malnutrition and has been determined to have a diagnosis/diagnoses of Severe protein-calorie malnutrition.    This patient is being managed with:   Diet Consistent Carbohydrate w/Evening Snack-  Entered: Sep  3 2021 11:56PM    
This patient has been assessed with a concern for Malnutrition and has been determined to have a diagnosis/diagnoses of Severe protein-calorie malnutrition.    This patient is being managed with:   Diet Consistent Carbohydrate w/Evening Snack-  Supplement Feeding Modality:  Oral  Glucerna Shake Cans or Servings Per Day:  1       Frequency:  Three Times a day  Entered: Sep 12 2021  9:06PM    
This patient has been assessed with a concern for Malnutrition and has been determined to have a diagnosis/diagnoses of Severe protein-calorie malnutrition.    This patient is being managed with:   Diet Consistent Carbohydrate w/Evening Snack-  Entered: Aug  1 2021  7:51AM    
This patient has been assessed with a concern for Malnutrition and has been determined to have a diagnosis/diagnoses of Severe protein-calorie malnutrition.    This patient is being managed with:   Diet Consistent Carbohydrate w/Evening Snack-  Supplement Feeding Modality:  Oral  Glucerna Shake Cans or Servings Per Day:  1       Frequency:  Three Times a day  Entered: Sep 12 2021  9:06PM    

## 2021-10-05 NOTE — DISCHARGE NOTE PROVIDER - NSDCCPCAREPLAN_GEN_ALL_CORE_FT
Walk in
PRINCIPAL DISCHARGE DIAGNOSIS  Diagnosis: Bilateral pneumonia  Assessment and Plan of Treatment:       SECONDARY DISCHARGE DIAGNOSES  Diagnosis: COVID-19  Assessment and Plan of Treatment:     Diagnosis: Troponin I above reference range  Assessment and Plan of Treatment:     Diagnosis: Hypoxia  Assessment and Plan of Treatment:     Diagnosis: Sepsis with acute hypoxic respiratory failure  Assessment and Plan of Treatment:

## 2021-10-05 NOTE — DISCHARGE NOTE NURSING/CASE MANAGEMENT/SOCIAL WORK - PATIENT PORTAL LINK FT
You can access the FollowMyHealth Patient Portal offered by [facility] by registering at the following website: [email or web address]. By joining [employer]’s FollowMyHealth portal, you will also be able to view your health information using other applications (apps) compatible with our system.

## 2021-10-05 NOTE — PROGRESS NOTE ADULT - RS GEN PE MLT RESP DETAILS PC
breath sounds equal/no rales/no rhonchi/no wheezes
breath sounds equal/no rales/no rhonchi/no wheezes
airway patent/breath sounds equal/good air movement
breath sounds equal/no rales/no rhonchi/no wheezes
normal/airway patent/breath sounds equal/good air movement/respirations non-labored/clear to auscultation bilaterally
breath sounds equal/no rhonchi/no wheezes/rales
normal/good air movement/respirations non-labored/no chest wall tenderness/no intercostal retractions/diminished breath sounds, L/rales
airway patent/breath sounds equal
airway patent/breath sounds equal/good air movement
breath sounds equal/no rales/no rhonchi/no wheezes
airway patent/breath sounds equal/good air movement
breath sounds equal/no rales/no rhonchi/no wheezes
airway patent/breath sounds equal
airway patent/breath sounds equal/good air movement
breath sounds equal/no rales/no rhonchi/no wheezes
airway patent/breath sounds equal/good air movement
breath sounds equal/no rales/no rhonchi/no wheezes
breath sounds equal/no rales/no rhonchi/no wheezes
breath sounds equal/rales
airway patent/breath sounds equal
breath sounds equal/no rales/no rhonchi/no wheezes
breath sounds equal/no rales/no rhonchi/no wheezes
normal/airway patent/breath sounds equal/good air movement/respirations non-labored/clear to auscultation bilaterally/rales
airway patent/breath sounds equal/good air movement
airway patent/breath sounds equal/good air movement
breath sounds equal/no rales/no rhonchi/no wheezes
airway patent/breath sounds equal/good air movement
airway patent/breath sounds equal/good air movement
breath sounds equal/rales
airway patent/breath sounds equal/good air movement
normal/good air movement/respirations non-labored/clear to auscultation bilaterally
breath sounds equal/no rales/no rhonchi/no wheezes
good air movement/respirations non-labored/diminished breath sounds, L/rales
airway patent/breath sounds equal/good air movement
normal/good air movement/respirations non-labored/diminished breath sounds, R/rales
breath sounds equal/no rales/no rhonchi/no wheezes
breath sounds equal/good air movement/respirations non-labored/no wheezes/rales

## 2021-10-05 NOTE — PROGRESS NOTE ADULT - SUBJECTIVE AND OBJECTIVE BOX
Hospital D # [id number]    CC:  COVID     HPI:    56 y/o male with HLD, HTN and DM--Non Vacc--presents with 8 days onset of covid symptoms which included, cough, fevers, sob, hypoxia, fevers, diarrhea, chills and myalgias. Tested positive 7/15.  Rx with Rem/Dexa and then high dose steroids and full AC.  Pt tx to ICU on 7/24 for worsening hypoxia.       10/4: Pt seen and examined in ICU.  Doing well.  On NC. NIV O/N.  Tm 98.5  WBC 10     10/5:  Pt seen and examined in ICU.  No events overnight.  Awaiting for O2 to be set up at home    PMH:  As above.     PSH:  As above.     FH: Non Contributory other than those listed in HPI    Social History:  NC    MEDICATIONS  (STANDING):  amLODIPine   Tablet 10 milliGRAM(s) Oral daily  atorvastatin 40 milliGRAM(s) Oral at bedtime  budesonide 160 MICROgram(s)/formoterol 4.5 MICROgram(s) Inhaler 2 Puff(s) Inhalation two times a day  cholecalciferol 2000 Unit(s) Oral daily  enoxaparin Injectable 40 milliGRAM(s) SubCutaneous daily  ferrous    sulfate 325 milliGRAM(s) Oral daily  fluticasone propionate 50 MICROgram(s)/spray Nasal Spray 1 Spray(s) Both Nostrils two times a day  glucagon  Injectable 1 milliGRAM(s) IntraMuscular once  losartan 75 milliGRAM(s) Oral daily  metoprolol tartrate 50 milliGRAM(s) Oral two times a day  pantoprazole    Tablet 40 milliGRAM(s) Oral daily    MEDICATIONS  (PRN):  acetaminophen   Tablet .. 650 milliGRAM(s) Oral every 4 hours PRN Temp greater or equal to 38C (100.4F), Mild Pain (1 - 3)  ALBUTerol    90 MICROgram(s) HFA Inhaler 2 Puff(s) Inhalation every 4 hours PRN Shortness of Breath and/or Wheezing  benzonatate 100 milliGRAM(s) Oral every 8 hours PRN Cough  guaiFENesin Oral Liquid (Sugar-Free) 100 milliGRAM(s) Oral every 6 hours PRN Cough  melatonin 5 milliGRAM(s) Oral at bedtime PRN Sleep  polyethylene glycol 3350 17 Gram(s) Oral daily PRN Constipation  sodium chloride 0.65% Nasal 1 Spray(s) Both Nostrils two times a day PRN Nasal Congestion      Allergies: NKDA    ROS:  SEE BELOW        ICU Vital Signs Last 24 Hrs  T(C): 36.7 (05 Oct 2021 10:00), Max: 36.8 (04 Oct 2021 12:00)  T(F): 98 (05 Oct 2021 10:00), Max: 98.2 (04 Oct 2021 12:00)  HR: 79 (05 Oct 2021 10:00) (67 - 95)  BP: 137/83 (05 Oct 2021 10:00) (124/79 - 160/100)  BP(mean): 95 (05 Oct 2021 10:00) (87 - 115)  ABP: --  ABP(mean): --  RR: 25 (05 Oct 2021 10:00) (17 - 34)  SpO2: 97% (05 Oct 2021 10:00) (85% - 100%)          I&O's Summary    04 Oct 2021 07:01  -  05 Oct 2021 07:00  --------------------------------------------------------  IN: 0 mL / OUT: 250 mL / NET: -250 mL        Physical Exam:  SEE BELOW                          8.1    10.61 )-----------( 338      ( 04 Oct 2021 06:39 )             26.6       10-04    139  |  102  |  13  ----------------------------<  92  3.4<L>   |  30  |  0.93    Ca    8.6      04 Oct 2021 06:39                      DVT Prophylaxis:                                                            Contraindication:     Advanced Directives:    Discussed with:    Visit Information:  Time spent excluding procedure:      ** Time is exclusive of billed procedures and/or teaching and/or routine family updates.

## 2021-10-11 DIAGNOSIS — E87.1 HYPO-OSMOLALITY AND HYPONATREMIA: ICD-10-CM

## 2021-10-11 DIAGNOSIS — F41.9 ANXIETY DISORDER, UNSPECIFIED: ICD-10-CM

## 2021-10-11 DIAGNOSIS — G47.33 OBSTRUCTIVE SLEEP APNEA (ADULT) (PEDIATRIC): ICD-10-CM

## 2021-10-11 DIAGNOSIS — Z79.84 LONG TERM (CURRENT) USE OF ORAL HYPOGLYCEMIC DRUGS: ICD-10-CM

## 2021-10-11 DIAGNOSIS — E66.9 OBESITY, UNSPECIFIED: ICD-10-CM

## 2021-10-11 DIAGNOSIS — N17.9 ACUTE KIDNEY FAILURE, UNSPECIFIED: ICD-10-CM

## 2021-10-11 DIAGNOSIS — A41.89 OTHER SPECIFIED SEPSIS: ICD-10-CM

## 2021-10-11 DIAGNOSIS — N18.30 CHRONIC KIDNEY DISEASE, STAGE 3 UNSPECIFIED: ICD-10-CM

## 2021-10-11 DIAGNOSIS — E11.65 TYPE 2 DIABETES MELLITUS WITH HYPERGLYCEMIA: ICD-10-CM

## 2021-10-11 DIAGNOSIS — D50.9 IRON DEFICIENCY ANEMIA, UNSPECIFIED: ICD-10-CM

## 2021-10-11 DIAGNOSIS — U07.1 COVID-19: ICD-10-CM

## 2021-10-11 DIAGNOSIS — E86.0 DEHYDRATION: ICD-10-CM

## 2021-10-11 DIAGNOSIS — Z79.82 LONG TERM (CURRENT) USE OF ASPIRIN: ICD-10-CM

## 2021-10-11 DIAGNOSIS — E43 UNSPECIFIED SEVERE PROTEIN-CALORIE MALNUTRITION: ICD-10-CM

## 2021-10-11 DIAGNOSIS — E55.9 VITAMIN D DEFICIENCY, UNSPECIFIED: ICD-10-CM

## 2021-10-11 DIAGNOSIS — J80 ACUTE RESPIRATORY DISTRESS SYNDROME: ICD-10-CM

## 2021-10-11 DIAGNOSIS — E11.22 TYPE 2 DIABETES MELLITUS WITH DIABETIC CHRONIC KIDNEY DISEASE: ICD-10-CM

## 2021-10-11 DIAGNOSIS — E87.3 ALKALOSIS: ICD-10-CM

## 2021-10-11 DIAGNOSIS — E78.5 HYPERLIPIDEMIA, UNSPECIFIED: ICD-10-CM

## 2021-10-11 DIAGNOSIS — J84.10 PULMONARY FIBROSIS, UNSPECIFIED: ICD-10-CM

## 2021-10-11 DIAGNOSIS — I21.4 NON-ST ELEVATION (NSTEMI) MYOCARDIAL INFARCTION: ICD-10-CM

## 2021-10-11 DIAGNOSIS — I12.9 HYPERTENSIVE CHRONIC KIDNEY DISEASE WITH STAGE 1 THROUGH STAGE 4 CHRONIC KIDNEY DISEASE, OR UNSPECIFIED CHRONIC KIDNEY DISEASE: ICD-10-CM

## 2021-10-11 DIAGNOSIS — J81.1 CHRONIC PULMONARY EDEMA: ICD-10-CM

## 2021-10-11 DIAGNOSIS — J12.82 PNEUMONIA DUE TO CORONAVIRUS DISEASE 2019: ICD-10-CM

## 2021-11-04 NOTE — PATIENT PROFILE ADULT - FUNCTIONAL SCREEN CURRENT LEVEL: COMMUNICATION, MLM
This is a recent snapshot of the patient's [facility] medical record.  For current drug dose and complete information and questions, call [phone] or In Basket pool, [facility] home infusion ([id number])  CSN Number:  [id number]       0 = understands/communicates without difficulty

## 2022-02-09 NOTE — H&P ADULT - NSHPROSALLOTHERNEGRD_GEN_ALL_CORE
Rounding completed with Dr. Amberly Sauer and multidisciplinary team. POC, labs, lines and assessment reviewed.    - Able to transfer to [facility]  - [facility]  - PT/OT/ Speech All other review of systems negative, except as noted in HPI

## 2022-03-21 NOTE — DISCHARGE NOTE PROVIDER - NSDCQMSTROKE_NEU_ALL_CORE
PT. CHANGED SURGERY DATE TO 4/22/22        This pt. Is scheduled for Outpatient surgery on 3/25/22 with Dr. Beckham at [facility] under general anesthesia Pro: DaVinci assisted laparoscopic Epigastric hernia repair with mesh possible open [id number] Dx: Epigastric hernia K43.9 pt. Expressed understanding       All this pt. Needs is CBC, Chem7, EKG, and H&P,  please give pt. A call to schedule.  
Spoke with pt, and scheduled for MCL on 03/22 w/ Dr. Herman. Orders entered.  03/24/22 1:58 PM  Spoke with pt, and scheduled for MCL on 04/09 w/ Dr. Herman. Orders updated.  
No

## 2022-03-29 NOTE — PROGRESS NOTE ADULT - SUBJECTIVE AND OBJECTIVE BOX
Patient is a 55y old  Male who presents with a chief complaint of cough/sob (23 Jul 2021 14:03)      HPI:  55 M with pmh HLD, dm, htn presents with 8 days onset of covid symptoms which included, cough, fevers, sob, hypoxia, fevers, diarrhea, chills and myalgias. TEsted positive 7/15. Wife endorsed he was becoming more sob of recently. On arrival hypoxic to 80s and tachypneic. NRB placed, presently on 15% and saturating 95%. Na 126 s/p 1L NS. CXR: Frandy infiltrates. Last scr 1.4 in 2019-> today 1.9 unknown if underlying ckd.  Patient not vaccinated.   Last seen by me in 2019  History of ROBERT and uncontrolled hypertension  Treated with IV Remdesivir and Decadron, now on Toci  SaO2 is 80-85% despite being on 100% NRB  ABG pending        8/24  Alternating between NIPPV and HFNC  8/25-8/26  covering for DR Arceo  remains on High Fio2 demand for ARDS post Covid pneumonia  8/27  uneventful overnight  remains on cpap setting overnight  high fio2 requirement still  o2 sat appears improved  not as labored with his breathing this am  8/28  he remains on HIGH FIO2 demand  data discussed with Intensivist Dr Espinoza and ICU RN  last cxr and all recent data discussed  repeat labs pending  8/29  data discussed with DR Espinoza  lasix, low dose stopped after second dose  remains on high fio2 but lowered 70%  8/30  covering for Dr Arceo  doing about the same  o2 sat 93-94% at bedside this am  Dr Arceo will resume in am  9/1  covering for Dr Arceo today  pt's status appears unchanged  still with reported desats on any movements  remains on PPN  recent data all reveiwd  RN staff at bedside    9/2  Patient on BiPAP  SaO2 85-95%    9/4  No acute pulmonary events occurred overnight    9/5  No acute pulmonary events occurred overnight  CXR revealed Diffuse advanced interstitial infiltrates are again noted.    9/6  Patient transitioned between HFNC and BiPAP    MEDICATIONS  (STANDING):  amLODIPine   Tablet 5 milliGRAM(s) Oral daily  aspirin  chewable 81 milliGRAM(s) Oral daily  atorvastatin 80 milliGRAM(s) Oral at bedtime  budesonide 160 MICROgram(s)/formoterol 4.5 MICROgram(s) Inhaler 2 Puff(s) Inhalation two times a day  cholecalciferol 2000 Unit(s) Oral daily  dexMEDEtomidine Infusion 0.2 MICROgram(s)/kG/Hr (4.52 mL/Hr) IV Continuous <Continuous>  dextrose 40% Gel 15 Gram(s) Oral once  dextrose 5%. 1000 milliLiter(s) (50 mL/Hr) IV Continuous <Continuous>  dextrose 5%. 1000 milliLiter(s) (100 mL/Hr) IV Continuous <Continuous>  dextrose 50% Injectable 25 Gram(s) IV Push once  dextrose 50% Injectable 12.5 Gram(s) IV Push once  dextrose 50% Injectable 25 Gram(s) IV Push once  enoxaparin Injectable 40 milliGRAM(s) SubCutaneous every 12 hours  fat emulsion (Fish Oil and Plant Based) 20% Infusion 20.8 mL/Hr (20.8 mL/Hr) IV Continuous <Continuous>  fat emulsion (Fish Oil and Plant Based) 20% Infusion 0.55 Gm/kG/Day (20.83 mL/Hr) IV Continuous <Continuous>  glucagon  Injectable 1 milliGRAM(s) IntraMuscular once  insulin glargine Injectable (LANTUS) 8 Unit(s) SubCutaneous <User Schedule>  insulin lispro (ADMELOG) corrective regimen sliding scale   SubCutaneous three times a day before meals  insulin lispro (ADMELOG) corrective regimen sliding scale   SubCutaneous at bedtime  losartan 75 milliGRAM(s) Oral daily  pantoprazole  Injectable 40 milliGRAM(s) IV Push daily  Parenteral Nutrition - Adult 1 Each (75 mL/Hr) TPN Continuous <Continuous>  predniSONE   Tablet 10 milliGRAM(s) Oral daily  senna 1 Tablet(s) Oral at bedtime      MEDICATIONS  (PRN):  acetaminophen   Tablet .. 650 milliGRAM(s) Oral every 4 hours PRN Temp greater or equal to 38C (100.4F), Mild Pain (1 - 3)  ALBUTerol    90 MICROgram(s) HFA Inhaler 2 Puff(s) Inhalation every 4 hours PRN Shortness of Breath and/or Wheezing  benzocaine 15 mG/menthol 3.6 mG (Sugar-Free) Lozenge 1 Lozenge Oral every 6 hours PRN Sore Throat  benzonatate 100 milliGRAM(s) Oral three times a day PRN Cough  hydrALAZINE Injectable 5 milliGRAM(s) IV Push every 6 hours PRN SBP>160  hydrocodone/homatropine Syrup 5 milliLiter(s) Oral every 6 hours PRN Cough  LORazepam   Injectable 0.5 milliGRAM(s) IV Push every 6 hours PRN Agitation  melatonin 6 milliGRAM(s) Oral at bedtime PRN Insomnia  morphine  - Injectable 2 milliGRAM(s) IV Push every 2 hours PRN SOB/Anxiety    Vital Signs Last 24 Hrs  T(C): 37 (05 Sep 2021 05:00), Max: 37.2 (04 Sep 2021 19:00)  T(F): 98.6 (05 Sep 2021 05:00), Max: 99 (04 Sep 2021 19:00)  HR: 77 (05 Sep 2021 08:00) (71 - 99)  BP: 98/62 (05 Sep 2021 08:00) (98/62 - 148/83)  BP(mean): 70 (05 Sep 2021 08:00) (70 - 98)  RR: 26 (05 Sep 2021 08:00) (20 - 37)  SpO2: 98% (05 Sep 2021 08:00) (86% - 100%)    I&O's Detail    31 Aug 2021 07:01  -  01 Sep 2021 07:00  --------------------------------------------------------  IN:    Dexmedetomidine: 371.9 mL    Fat Emulsion (Fish Oil &amp; Plant Based) 20% Infusion: 273.4 mL    PPN (Peripheral Parenteral Nutrition): 1816.7 mL  Total IN: 2462 mL    OUT:    Voided (mL): 1150 mL  Total OUT: 1150 mL    Total NET: 1312 mL      PHYSICAL EXAM  General Appearance: On CPAP, increased work of breathing- about the same  Lungs: coarse bilaterally  Heart: +S1,S2  Abdomen: Soft, non-tender, bowel sounds active   Extremities: no cyanosis or edema, no joint swelling  Skin: Skin color, texture normal, no rashes   Neurologic: Alert and oriented X3 , non focal, not disoriented    ECG:    LABS:                          7.5    11.23 )-----------( 248      ( 30 Aug 2021 07:04 )             23.6   08-30 09-01    138  |  102  |  14  ----------------------------<  153<H>  4.5   |  36<H>  |  0.70    Ca    8.4<L>      01 Sep 2021 05:47  Phos  4.7     09-01  Mg     1.9     09-01      138  |  100  |  20  ----------------------------<  179<H>  3.5   |  35<H>  |  0.68    Ca    8.5      30 Aug 2021 07:04  Phos  3.3     08-30  Mg     2.0     08-30    TPro  6.1  /  Alb  2.3<L>  /  TBili  0.6  /  DBili  x   /  AST  24  /  ALT  46  /  AlkPhos  81  08-28      Pro BNP 2446  repeat              08-26    136  |  102  |  18  ----------------------------<  87  3.7   |  34<H>  |  0.56    Ca    8.3<L>      26 Aug 2021 07:03  Phos  3.6     08-26  Mg     2.0     08-26    TPro  5.5<L>  /  Alb  2.1<L>  /  TBili  0.5  /  DBili  x   /  AST  23  /  ALT  51  /  AlkPhos  79  08-26                          8.0    11.96 )-----------( 278      ( 26 Aug 2021 07:03 )             26.1   08-26    136  |  102  |  18  ----------------------------<  87  3.7   |  34<H>  |  0.56    Ca    8.3<L>      26 Aug 2021 07:03  Phos  3.6     08-26  Mg     2.0     08-26    TPro  5.5<L>  /  Alb  2.1<L>  /  TBili  0.5  /  DBili  x   /  AST  23  /  ALT  51  /  AlkPhos  79  08-26                          8.5    12.89 )-----------( 269      ( 25 Aug 2021 07:10 )             26.7   08-25    138  |  103  |  20  ----------------------------<  143<H>  3.7   |  32<H>  |  0.58    Ca    8.1<L>      25 Aug 2021 07:10  Phos  3.7     08-25  Mg     2.0     08-25    TPro  5.6<L>  /  Alb  2.2<L>  /  TBili  0.5  /  DBili  x   /  AST  32  /  ALT  74  /  AlkPhos  103  08-24                          8.5    14.05 )-----------( 292      ( 18 Aug 2021 07:01 )             26.2   08-18    138  |  104  |  24<H>  ----------------------------<  78  4.1   |  29  |  1.03    Ca    8.8      18 Aug 2021 07:01    TPro  6.3  /  Alb  2.3<L>  /  TBili  0.6  /  DBili  x   /  AST  32  /  ALT  73  /  AlkPhos  168<H>  08-17                                           10.0   19.05 )-----------( 179      ( 08 Aug 2021 07:11 )             30.3   08-08    137  |  102  |  27<H>  ----------------------------<  121<H>  3.9   |  29  |  0.77    Ca    8.4<L>      08 Aug 2021 07:11  Phos  3.4     08-08  Mg     2.0     08-08           TPro  5.2<L>  /  Alb  2.3<L>  /  TBili  0.8  /  DBili  x   /  AST  81<H>  /  ALT  213<H>  /  AlkPhos  200<H>  08-06                            12.9   15.00 )-----------( 366      ( 26 Jul 2021 06:20 )             38.4   07-26          < from: Xray Chest 1 View- PORTABLE-Urgent (07.21.21 @ 15:33) >    PROCEDURE DATE:  07/21/2021          INTERPRETATION:  AP chest on July 21, 2021 at 3:27 PM. Patient is short of breath with cough and fever.    Heart magnified by technique.    There arescattered mid lower lung field infiltrates right greater than left possibly related: Pneumonia.    The lungs are clear on August 30, 2019.    IMPRESSION: Bilateral infiltrates as above.    < end of copied text >  < from: US Duplex Venous Lower Ext Complete, Bilateral (07.25.21 @ 08:42) >  COMPARISON: None available.    TECHNIQUE: Duplex sonography of the BILATERAL LOWER extremity veins with color and spectral Doppler, with and without compression.    FINDINGS:    RIGHT:  Normal compressibility of the RIGHT common femoral, femoral and popliteal veins.  Doppler examination shows normal spontaneous and phasic flow.  No RIGHT calf vein thrombosis is detected.    LEFT:  Normal compressibility of the LEFT common femoral, femoral and popliteal veins.  Doppler examination shows normal spontaneous and phasic flow.  No LEFT calf vein thrombosis is detected.    IMPRESSION:  No evidence of deep venous thrombosis in either lower extremity.    < end of copied text >  RADIOLOGY & ADDITIONAL STUDIES:    < from: Xray Chest 1 View- PORTABLE-Urgent (Xray Chest 1 View- PORTABLE-Urgent .) (07.26.21 @ 08:43) >    PROCEDURE DATE:  07/26/2021          INTERPRETATION:  Portable chest radiograph    CLINICAL INFORMATION: Pneumonia due to Covid 19.. Follow-up    TECHNIQUE:  Portable  AP view of the chest was obtained.    COMPARISON: 7/21/2021 chest available for review.    FINDINGS:    The lungs show stable bilateral  multifocal and diffuse ill-defined airspace opacities.. No pneumothorax.    The  heart is enlarged in transverse diameter. No hilar mass.     Visualized osseous structures are intact.    IMPRESSION:   Stable bilateral  multifocal and diffuse ill-defined airspace opacities..    < end of copied text >  < from: US Duplex Venous Lower Ext Complete, Bilateral (08.06.21 @ 17:16) >  FINDINGS:    RIGHT:  Normal compressibility of the RIGHT common femoral, femoral and popliteal veins.  Doppler examination shows normal spontaneous and phasic flow.  No RIGHT calf vein thrombosis is detected.    LEFT:  Normal compressibility of the LEFT common femoral, femoral and popliteal veins.  Doppler examination shows normal spontaneous and phasic flow.  No LEFT calf vein thrombosisis detected.    IMPRESSION:  No evidence of deep venous thrombosis in either lower extremity.    < end of copied text >  < from: Xray Chest 1 View- PORTABLE-Urgent (Xray Chest 1 View- PORTABLE-Urgent .) (08.06.21 @ 16:45) >  INTERPRETATION:  Portable chest radiograph    CLINICAL INFORMATION: Pneumonia due to Covid 19.    TECHNIQUE:  Portable  AP view of the chest was obtained.    COMPARISON: 8/1/2021 chest radiograph available for review.    FINDINGS:    The lungs show decreasing bilateral diffuse airspace disease.. No pneumothorax.    The  heart is mildly enlarged in transverse diameter. No hilar mass.   Visualized osseous structures are intact.    IMPRESSION:   Decreasing bilateral diffuse airspace disease..    < end of copied text >  xr< from: US Duplex Venous Lower Ext Complete, Bilateral (08.17.21 @ 08:58) >  COMPARISON: None available.    TECHNIQUE: Duplex sonography of the BILATERAL LOWER extremity veins with color and spectral Doppler, with and without compression.    FINDINGS:    RIGHT:  Normal compressibility of the RIGHT common femoral, femoral and popliteal veins.  Doppler examination shows normal spontaneous and phasic flow.  No RIGHT calf vein thrombosis is detected.    LEFT:  Normal compressibility of the LEFT common femoral, femoral and popliteal veins.  Doppler examination shows normal spontaneous and phasic flow.  No LEFT calf vein thrombosis is detected.    IMPRESSION:  No evidence of deep venous thrombosis in either lower extremity.    < end of copied text >  r< from: US Duplex Venous Lower Ext Complete, Bilateral (08.23.21 @ 12:53) >  PROCEDURE DATE:  08/23/2021          INTERPRETATION:  CLINICAL INFORMATION: 55 years  Male with r/o DVT    evaluate for DVT. Covid positive. Elevated d-dimer.    COMPARISON: None available.    TECHNIQUE: Duplex sonography of the BILATERAL LOWER extremity veins with color and spectral Doppler, with and without compression.    FINDINGS:    RIGHT:  Normal compressibility of the RIGHT common femoral, femoral and popliteal veins.  Doppler examination shows normal spontaneous and phasic flow.  No RIGHT calf vein thrombosis is detected.    LEFT:  Normal compressibility of the LEFT common femoral, femoral and popliteal veins.  Doppler examination shows normal spontaneous and phasic flow.  No LEFT calf vein thrombosis is detected.    IMPRESSION:  No evidence of deep venous thrombosis in either lower extremity.    < end of copied text >  < from: Xray Chest 1 View- PORTABLE-Urgent (Xray Chest 1 View- PORTABLE-Urgent .) (08.23.21 @ 11:06) >    PROCEDURE DATE:  08/23/2021          INTERPRETATION:  History: Dyspnea, Covid    Chest:  one view.    Comparison: 08/20/2021    AP radiograph of the chest demonstrates moderate diffuse alveolar infiltrates unchanged. The cardiac silhouette is normal in size. Osseous structures are intact.    Impression:moderate diffuse alveolar infiltrates unchanged.    < end of copied text >  r< from: Xray Chest 1 View- PORTABLE-Urgent (Xray Chest 1 View- PORTABLE-Urgent .) (08.23.21 @ 11:06) >  PROCEDURE DATE:  08/23/2021          INTERPRETATION:  History: Dyspnea, Covid    Chest:  one view.    Comparison: 08/20/2021    AP radiograph of the chest demonstrates moderate diffuse alveolar infiltrates unchanged. The cardiac silhouette is normal in size. Osseous structures are intact.    Impression:moderate diffuse alveolar infiltrates unchanged.    < end of copied text >   Problem: Pain:  Goal: Pain level will decrease  Description: Pain level will decrease  Outcome: Met This Shift  Goal: Control of acute pain  Description: Control of acute pain  Outcome: Met This Shift  Goal: Control of chronic pain  Description: Control of chronic pain  Outcome: Met This Shift     Problem: Falls - Risk of:  Goal: Will remain free from falls  Description: Will remain free from falls  Outcome: Met This Shift  Goal: Absence of physical injury  Description: Absence of physical injury  Outcome: Met This Shift     Problem: Skin Integrity:  Goal: Will show no infection signs and symptoms  Description: Will show no infection signs and symptoms  Outcome: Met This Shift  Goal: Absence of new skin breakdown  Description: Absence of new skin breakdown  Outcome: Met This Shift  Goal: Risk for impaired skin integrity will decrease  Description: Risk for impaired skin integrity will decrease  Outcome: Met This Shift     Problem: ABCDS Injury Assessment  Goal: Absence of physical injury  Outcome: Met This Shift     Problem: Mobility - Impaired:  Goal: Mobility will improve  Description: Mobility will improve  Outcome: Met This Shift     Problem: Activity:  Goal: Ability to avoid complications of mobility impairment will improve  Description: Ability to avoid complications of mobility impairment will improve  Outcome: Met This Shift  Goal: Ability to tolerate increased activity will improve  Description: Ability to tolerate increased activity will improve  Outcome: Met This Shift     Problem:  Bowel/Gastric:  Goal: Gastrointestinal status for postoperative course will improve  Description: Gastrointestinal status for postoperative course will improve  Outcome: Met This Shift  Goal: Control of bowel function will improve  Description: Control of bowel function will improve  Outcome: Met This Shift  Goal: Ability to achieve a regular elimination pattern will improve  Description: Ability to achieve a regular elimination pattern will improve  Outcome: Met This Shift     Problem: Fluid Volume:  Goal: Maintenance of adequate hydration will improve  Description: Maintenance of adequate hydration will improve  Outcome: Met This Shift     Problem: Health Behavior:  Goal: Identification of resources available to assist in meeting health care needs will improve  Description: Identification of resources available to assist in meeting health care needs will improve  Outcome: Met This Shift  Goal: Ability to state signs and symptoms to report to health care provider will improve  Description: Ability to state signs and symptoms to report to health care provider will improve  Outcome: Met This Shift     Problem: Nutritional:  Goal: Ability to attain and maintain optimal nutritional status will improve  Description: Ability to attain and maintain optimal nutritional status will improve  Outcome: Met This Shift  Goal: Ability to follow a diet with enough fiber (20 to 30 grams) for normal bowel function will improve  Description: Ability to follow a diet with enough fiber (20 to 30 grams) for normal bowel function will improve  Outcome: Met This Shift     Problem: Physical Regulation:  Goal: Ability to maintain clinical measurements within normal limits will improve  Description: Ability to maintain clinical measurements within normal limits will improve  Outcome: Met This Shift  Goal: Will remain free from infection  Description: Will remain free from infection  Outcome: Met This Shift     Problem: Respiratory:  Goal: Respiratory status will improve  Description: Respiratory status will improve  Outcome: Met This Shift     Problem: Sensory:  Goal: Pain level will decrease  Description: Pain level will decrease  Outcome: Met This Shift  Goal: Satisfaction with pain management regimen will improve  Description: Satisfaction with pain management regimen will improve  Outcome: Met This Shift     Problem: Urinary Elimination:  Goal: Ability to achieve and maintain adequate urine output will improve  Description: Ability to achieve and maintain adequate urine output will improve  Outcome: Met This Shift

## 2022-10-28 NOTE — PHYSICAL THERAPY INITIAL EVALUATION ADULT - GENERAL OBSERVATIONS, REHAB EVAL
Brief Postoperative Note      Patient: Red Mauricio  YOB: 1982  MRN: [id number]    Date of Procedure: 10/28/2022    Pre-Op Diagnosis: Post-traumatic osteoarthritis, right shoulder [M19.111]    Post-Op Diagnosis: Same       Procedure(s):  RIGHT SHOULDER ARTHROTOMY, OPEN LYSIS OF ADHESIONS, REMOVAL OF LOOSE BODIES, REMOVAL OF RETAINED DEEP SUTURE ANCHOR, PROSTHETIC HEMIARTHROPLASTY WITH GLENOID REAMING (REAM AND RUN PROCEDURE) WITH PATIENT SPECIFIC INSTRUMENTATION    Surgeon(s):  Sunita Liu MD    Assistant:  Surgical Assistant: Otoniel Martino    Anesthesia: General    Estimated Blood Loss (mL): 199     Complications: None    Specimens:   * No specimens in log *    Implants:  * No implants in log *      Drains: * No LDAs found *    Findings: see dictation    Electronically signed by Drea Russo DO on 10/28/2022 at 11:14 AM
Pt seen for 45min PT Eval. Pt s/p COVID19, admitted 7/21. Pt rec'd in recliner in NAD, in ICU, +HFNC, satting 88-90% respitory therapist sting to use NRB mask as needed during therapy. Pt trans sit<>stand with S at RW. Pt with light touch on RW. Pt performed standing therex, nidia well. Pt standing for grossly 8mins. Pt desatting to 79% use of NRB returned to >90. Pt performed seated therex, nidia well. Pt left sitting in chair in NAD all needs met, RN Rubi aware.

## 2023-07-27 NOTE — PHYSICAL THERAPY INITIAL EVALUATION ADULT - ASSISTIVE DEVICE FOR TRANSFER: SIT/STAND, REHAB EVAL
rolling walker Sski Pregnancy And Lactation Text: This medication is Pregnancy Category D and isn't considered safe during pregnancy. It is excreted in breast milk.

## 2023-09-17 NOTE — DISCHARGE NOTE NURSING/CASE MANAGEMENT/SOCIAL WORK - NSCORESITESY/N_GEN_A_CORE_RD
Yes Keep dressing on foot until at least tomorrow. Do not scrub area of bleeding. Clean gently by running water over area. If any bleeding resumes, apply pressure and elevate foot.

## 2023-10-24 NOTE — H&P ADULT - NSTOBACCOSCREENHP_GEN_A_CS
TRANSFER - IN REPORT:    Verbal report received from magnus yeager on Sharyle Phy  being received from pacu for routine post-op      Report consisted of patient's Situation, Background, Assessment and   Recommendations(SBAR). Information from the following report(s) Nurse Handoff Report was reviewed with the receiving nurse. Opportunity for questions and clarification was provided. Assessment completed upon patient's arrival to unit and care assumed. No

## 2024-01-08 NOTE — PATIENT PROFILE ADULT - NSPROMUTINFOINDIVIDFT_GEN_A_NUR
Pharmacy faxed in a request for prior authorization on:     Medication: Symbicort  Dosage: 80-4.5 MCG/ACT  Quantity requested:  10.2 g  Pharmacy for prescription has been selected.     Prior authorization request has been initiated and sent for completion.  
NONE

## 2024-02-11 NOTE — PATIENT PROFILE ADULT - ARE SIGNIFICANT INDICATORS COMPLETE.
Patient's daughters names and numbers found by Crisis.  [place] [phone]  Gwen Guzman [phone]    Dr. Ewing left message with family to call back.    Yes

## 2024-02-22 NOTE — PROGRESS NOTE ADULT - ASSESSMENT
1) COVID Pneumonia  2) Abnormal CXR  3) Dyspnea  4) Hypoxemic Respiratory Failure     56 M noted to have COVID 7/15  On arrival hypoxic to 80s and tachypneic. NRB placed,in the ER saturating 95%. Na 126 s/p 1L NS. CXR: Frandy infiltrates. Last scr 1.4 in 2019-> today 1.9 unknown if underlying ckd.  Treated with IV Remdesivir and Decadron, Tocilizumab   Given the obesity/hypertension and prior co-morbidities, he is at risk of intubation but continue HFNC, respiratory status remains critical  DDimer elevated, was on therapeutic lovenox, transitioned to 40mg q 12  Completed Solumedrol, Symbicort 160/4.5 BID (https://[email or web address]/journals/lanres/article/QTZS5661-0330, STOIC study).Using HFNC  30LPM, 45%FiO2  SaO2 is now 88-95%  Respiratory status is tenuous, work of breathing is improving. Continue titrating down HFNC  ICU level care appreciated  Hgb improving  Repeat ABG 7.36/60/91 reviewed  9/17  remains on high Fio2 requirement due to ARDS post COVID pneumonia  Developed pulm fibrosis post COVID  covering for Dr Arceo  data discussed with overnight RN staff  Will continue to monitor  ct chest once stable for transfer 22-Feb-2024 12:15

## 2024-08-26 NOTE — ED ADULT NURSE NOTE - BREATHING, MLM
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 Tripp    Pure hypercholesterolemia     RAD (reactive airway disease) (CMD)     Right hydrocele 6/7/16    right hydrocelectomy by Genevieve    Sleep apnea     bi-pap    Unspecified essential hypertension 2001    Unspecified vitamin D deficiency 6/16/2010        Tachypnea

## 2024-12-02 NOTE — PROGRESS NOTE ADULT - ASSESSMENT
1) COVID Pneumonia  2) Abnormal CXR  3) Dyspnea  4) Hypoxemic Respiratory Failure   5) Suspected superimposed nosocomial pneumonia    56 M noted to have COVID 7/15  On arrival hypoxic to 80s and tachypneic. NRB placed,in the ER saturating 95%. Na 126 s/p 1L NS. CXR: Frandy infiltrates. Last scr 1.4 in 2019-> today 1.9 unknown if underlying ckd.  Treated with IV Remdesivir and Decadron, Tocilizumab   Given the obesity/hypertension and prior co-morbidities, he was at risk of intubation, was on HFNC with nocturnal CPAP  DDimer elevated, was on therapeutic lovenox, transitioned to 40mg q 12  Completed Solumedrol, Symbicort 160/4.5 BID ([email or web address]/lanres/[email or web address], STOIC study)  SaO2 is now %  Hgb noted to be 7-8; on Pantoprazole 40 BID   Repeat ABG 7.36/60/91 reviewed  Prolonged hospitalization with ARDS post COVID pneumonia  Appreciate ID Recommendation completed cefepime  Continue titrating down O2, now on 6L NC O2, continue to titrate to a SaO2 >92%  Respiratory status continues to improve / all discussed with pt today  appreciate ICU eval and follow ups  Reviewed recent imaging- Slightly decreasing in bilateral perihilar and upper lobe  multifocal dense airspace consolidations  Will continue to monitor and once discharged, will follow up closely             1) COVID Pneumonia  2) Abnormal CXR  3) Dyspnea  4) Hypoxemic Respiratory Failure   5) Suspected superimposed nosocomial pneumonia    56 M noted to have COVID 7/15  On arrival hypoxic to 80s and tachypneic. NRB placed,in the ER saturating 95%. Na 126 s/p 1L NS. CXR: Frandy infiltrates. Last scr 1.4 in 2019-> today 1.9 unknown if underlying ckd.  Treated with IV Remdesivir and Decadron, Tocilizumab   Given the obesity/hypertension and prior co-morbidities, he was at risk of intubation, was on HFNC with nocturnal CPAP  DDimer elevated, was on therapeutic lovenox, transitioned to 40mg q 12  Completed Solumedrol, Symbicort 160/4.5 BID (https://[email or web address]/lanres/article/[email or web address], STOIC study)  SaO2 is now %  Hgb noted to be 7-8; on Pantoprazole 40 BID   Repeat ABG 7.36/60/91 reviewed  Prolonged hospitalization with ARDS post COVID pneumonia  Appreciate ID Recommendation completed cefepime  Continue titrating down O2, now on 6L NC O2, continue to titrate to a SaO2 >92%  Respiratory status continues to improve / all discussed with pt today  appreciate ICU eval and follow ups  Reviewed recent imaging- Slightly decreasing in bilateral perihilar and upper lobe  multifocal dense airspace consolidations  Will continue to monitor and once discharged, will follow up closely  Will need NIPPV at home due to the history of ROBERT           [Dear  ___] : Dear  [unfilled], [Consult Letter:] : I had the pleasure of evaluating your patient, [unfilled]. [Consult Closing:] : Thank you very much for allowing me to participate in the care of this patient.  If you have any questions, please do not hesitate to contact me. [Sincerely,] : Sincerely, [FreeTextEntry3] : ARTIS Geller-C Certified Family Nurse Practitioner Pediatric Neurology [facility] [address] [place] [address] Tel: [phone]. Fax: [phone]